# Patient Record
Sex: FEMALE | Race: WHITE | NOT HISPANIC OR LATINO | Employment: OTHER | URBAN - METROPOLITAN AREA
[De-identification: names, ages, dates, MRNs, and addresses within clinical notes are randomized per-mention and may not be internally consistent; named-entity substitution may affect disease eponyms.]

---

## 2017-03-08 ENCOUNTER — OFFICE VISIT (OUTPATIENT)
Dept: INTERNAL MEDICINE | Facility: CLINIC | Age: 72
End: 2017-03-08
Payer: COMMERCIAL

## 2017-03-08 VITALS
OXYGEN SATURATION: 97 % | HEART RATE: 66 BPM | HEIGHT: 67 IN | RESPIRATION RATE: 16 BRPM | TEMPERATURE: 97 F | DIASTOLIC BLOOD PRESSURE: 88 MMHG | BODY MASS INDEX: 28.56 KG/M2 | WEIGHT: 182 LBS | SYSTOLIC BLOOD PRESSURE: 144 MMHG

## 2017-03-08 DIAGNOSIS — I10 HYPERTENSION GOAL BP (BLOOD PRESSURE) < 140/90: ICD-10-CM

## 2017-03-08 DIAGNOSIS — Z12.11 COLON CANCER SCREENING: ICD-10-CM

## 2017-03-08 DIAGNOSIS — E78.5 HYPERLIPIDEMIA LDL GOAL <100: ICD-10-CM

## 2017-03-08 DIAGNOSIS — Z00.00 ENCOUNTER FOR ROUTINE ADULT HEALTH EXAMINATION WITHOUT ABNORMAL FINDINGS: Primary | ICD-10-CM

## 2017-03-08 DIAGNOSIS — I71.21 ASCENDING AORTIC ANEURYSM (H): ICD-10-CM

## 2017-03-08 DIAGNOSIS — Z12.31 VISIT FOR SCREENING MAMMOGRAM: ICD-10-CM

## 2017-03-08 PROCEDURE — G0439 PPPS, SUBSEQ VISIT: HCPCS | Performed by: INTERNAL MEDICINE

## 2017-03-08 RX ORDER — TRIAMTERENE AND HYDROCHLOROTHIAZIDE 37.5; 25 MG/1; MG/1
1 CAPSULE ORAL DAILY
Qty: 90 CAPSULE | Refills: 3 | Status: ON HOLD | OUTPATIENT
Start: 2017-03-08 | End: 2017-06-10

## 2017-03-08 RX ORDER — ATORVASTATIN CALCIUM 20 MG/1
20 TABLET, FILM COATED ORAL DAILY
Qty: 90 TABLET | Refills: 3 | Status: ON HOLD | OUTPATIENT
Start: 2017-03-08 | End: 2017-05-22

## 2017-03-08 ASSESSMENT — PAIN SCALES - GENERAL: PAINLEVEL: NO PAIN (0)

## 2017-03-08 NOTE — NURSING NOTE
"Chief Complaint   Patient presents with     Wellness Visit       Initial /88 (BP Location: Left arm, Patient Position: Chair, Cuff Size: Adult Regular)  Pulse 66  Temp 97  F (36.1  C) (Temporal)  Resp 16  Ht 5' 7.25\" (1.708 m)  Wt 182 lb (82.6 kg)  SpO2 97%  BMI 28.29 kg/m2 Estimated body mass index is 28.29 kg/(m^2) as calculated from the following:    Height as of this encounter: 5' 7.25\" (1.708 m).    Weight as of this encounter: 182 lb (82.6 kg).  Medication Reconciliation: complete   Kimmy Fitzgerald MA    "

## 2017-03-08 NOTE — MR AVS SNAPSHOT
"              After Visit Summary   3/8/2017    Marilia Bean    MRN: [id number]           Patient Information     Date Of Birth          1945        Visit Information        Provider Department      3/8/2017 1:30 PM Herbert Epstein MD [facility]         Follow-ups after your visit        Who to contact     If you have questions or need follow up information about today's clinic visit or your schedule please contact [facility] directly at [phone].  Normal or non-critical lab and imaging results will be communicated to you by [employer], letter or phone within 4 business days after the clinic has received the results. If you do not hear from us within 7 days, please contact the clinic through [employer] or phone. If you have a critical or abnormal lab result, we will notify you by phone as soon as possible.  Submit refill requests through [employer] or call your pharmacy and they will forward the refill request to us. Please allow 3 business days for your refill to be completed.          Additional Information About Your Visit        MyChart Information     [employer] gives you secure access to your electronic health record. If you see a primary care provider, you can also send messages to your care team and make appointments. If you have questions, please call your primary care clinic.  If you do not have a primary care provider, please call [phone] and they will assist you.        Care EveryWhere ID     This is your Care EveryWhere ID. This could be used by other organizations to access your [facility] medical records  [id number]        Your Vitals Were     Pulse Temperature Respirations Height Pulse Oximetry BMI (Body Mass Index)    66 97  F (36.1  C) (Temporal) 16 5' 7.25\" (1.708 m) 97% 28.29 kg/m2       Blood Pressure from Last 3 Encounters:   03/08/17 144/88   02/09/16 (!) 154/96   12/08/15 119/86    Weight from Last 3 Encounters:   03/08/17 182 lb (82.6 kg)   02/09/16 181 lb " (82.1 kg)   11/02/15 180 lb (81.6 kg)              Today, you had the following     No orders found for display       Primary Care Provider Office Phone # Fax #    Herbert Epstein [phone]       [facility] [address] [place] [address]        Thank you!     Thank you for choosing [facility]  for your care. Our goal is always to provide you with excellent care. Hearing back from our patients is one way we can continue to improve our services. Please take a few minutes to complete the written survey that you may receive in the mail after your visit with us. Thank you!             Your Updated Medication List - Protect others around you: Learn how to safely use, store and throw away your medicines at www.disposemymeds.org.      Notice  As of 3/8/2017  1:42 PM    You have not been prescribed any medications.

## 2017-03-08 NOTE — PROGRESS NOTES
SUBJECTIVE:                                                            Marilia Bean is a 71 year old female who presents for Preventive Visit.    Are you in the first 12 months of your Medicare coverage?  No    Physical   Annual:     Getting at least 3 servings of Calcium per day::  Yes    Bi-annual eye exam::  Yes    Dental care twice a year::  NO    Sleep apnea or symptoms of sleep apnea::  None    Diet::  Regular (no restrictions)    Frequency of exercise::  None    Taking medications regularly::  Not Applicable    Medication side effects::  Not applicable    Additional concerns today::  YES      COGNITIVE SCREEN  1) Repeat 3 items (Banana, Sunrise, Chair)    2) Clock draw: NORMAL  3) 3 item recall: Recalls 3 objects  Results: NORMAL clock, 1-2 items recalled: COGNITIVE IMPAIRMENT LESS LIKELY    Mini-CogTM Copyright LARRY Luciano. Licensed by the author for use in [facility] [employer] water systems; reprinted with permission (yamilet@[facility]). All rights reserved.        Reviewed and updated as needed this visit by clinical staff         Reviewed and updated as needed this visit by Provider        Social History   Substance Use Topics     Smoking status: Never Smoker     Smokeless tobacco: Never Used     Alcohol use Yes      Comment: rarely       The patient does not drink >3 drinks per day nor >7 drinks per week.      Today's PHQ-2 Score:   PHQ-2 ( 1999 Pf[employer]) 3/6/2017   Q1: Little interest or pleasure in doing things -   Q2: Feeling down, depressed or hopeless -   PHQ-2 Score -   Little interest or pleasure in doing things Not at all   Feeling down, depressed or hopeless Not at all   PHQ-2 Score 0       Do you feel safe in your environment - Yes    Do you have a Health Care Directive?: Yes: Patient states has Advance Directive and will bring in a copy to clinic.    Current providers sharing in care for this patient include:   Patient Care Team:  Herbert Epstein MD as PCP - General (Family Practice)      Hearing impairment:  No    Ability to successfully perform activities of daily living: Yes, no assistance needed     Fall risk:  Fallen 2 or more times in the past year?: No  Any fall with injury in the past year?: No    Home safety:  none identified      The following health maintenance items are reviewed in Epic and correct as of today:  Health Maintenance   Topic Date Due     HEPATITIS C SCREENING  03/29/1963     FALL RISK ASSESSMENT  02/09/2017     MAMMO SCREEN Q2 YR (SYSTEM ASSIGNED)  06/22/2017     INFLUENZA VACCINE (SYSTEM ASSIGNED)  09/01/2017     ADVANCE DIRECTIVE PLANNING Q5 YRS (NO INBASKET)  01/08/2018     TETANUS IMMUNIZATION (SYSTEM ASSIGNED)  03/10/2020     LIPID SCREEN Q5 YR FEMALE (SYSTEM ASSIGNED)  02/09/2021     COLON CANCER SCREEN (SYSTEM ASSIGNED)  12/08/2025     DEXA SCAN SCREENING (SYSTEM ASSIGNED)  Completed     PNEUMOCOCCAL  Completed         Pneumonia Vaccine:already has had them.     ROS:  C: NEGATIVE for fever, chills, change in weight  I: NEGATIVE for worrisome rashes, moles or lesions  E: NEGATIVE for vision changes or irritation  E/M: NEGATIVE for ear, mouth and throat problems  R: NEGATIVE for significant cough or SOB  B: NEGATIVE for masses, tenderness or discharge  CV: NEGATIVE for chest pain, palpitations or peripheral edema  GI: NEGATIVE for nausea, abdominal pain, heartburn, or change in bowel habits  : NEGATIVE for frequency, dysuria, or hematuria  M: NEGATIVE for significant arthralgias or myalgia  N: NEGATIVE for weakness, dizziness or paresthesias  E: NEGATIVE for temperature intolerance, skin/hair changes  H: NEGATIVE for bleeding problems  P: NEGATIVE for changes in mood or affect    Problem list, Medication list, Allergies, and Medical/Social/Surgical histories reviewed in [facility] and updated as appropriate.  OBJECTIVE:                                                            /88 (BP Location: Left arm, Patient Position: Chair, Cuff Size: Adult Regular)  Pulse 66  Temp 97  F (36.1  C)  "(Temporal)  Resp 16  Ht 5' 7.25\" (1.708 m)  Wt 182 lb (82.6 kg)  SpO2 97%  BMI 28.29 kg/m2 Estimated body mass index is 28.14 kg/(m^2) as calculated from the following:    Height as of 2/9/16: 5' 7.25\" (1.708 m).    Weight as of 2/9/16: 181 lb (82.1 kg).  EXAM:   GENERAL: healthy, alert and no distress  EYES: Eyes grossly normal to inspection, PERRL and conjunctivae and sclerae normal  HENT: ear canals and TM's normal, nose and mouth without ulcers or lesions  NECK: no adenopathy, no asymmetry, masses, or scars and thyroid normal to palpation  RESP: lungs clear to auscultation - no rales, rhonchi or wheezes  CV: regular rate and rhythm, normal S1 S2, no S3 or S4, no murmur, click or rub, no peripheral edema and peripheral pulses strong  ABDOMEN: soft, nontender, no hepatosplenomegaly, no masses and bowel sounds normal  MS: no gross musculoskeletal defects noted, no edema  SKIN: no suspicious lesions or rashes  NEURO: Normal strength and tone, mentation intact and speech normal  PSYCH: mentation appears normal, affect normal/bright    ASSESSMENT / PLAN:                                                                ICD-10-CM    1. Encounter for routine adult health examination without abnormal findings Z00.00    2. Hyperlipidemia LDL goal <100 E78.5 Lipid Profile     atorvastatin (LIPITOR) 20 MG tablet   3. Hypertension goal BP (blood pressure) < 140/90 I10 Comprehensive metabolic panel     Lipid Profile     Albumin Random Urine Quantitative     triamterene-hydrochlorothiazide (DYAZIDE) 37.5-25 MG per capsule   4. Ascending aortic aneurysm (H) I71.2 CT Chest w/o Contrast   5. Visit for screening mammogram Z12.31 *MA Screening Digital Bilateral   6. 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     Mammogram is due.    Will do chest ct for ascending aortic aneurysm.     Needs to get back on atorvastatin and dyazide for the " "aneurysm and blood pressure.    End of Life Planning:  Patient currently has an advanced directive: No.  I have verified the patient's ablity to prepare an advanced directive/make health care decisions.  Literature was provided to assist patient in preparing an advanced directive.    COUNSELING:  Reviewed preventive health counseling, as reflected in patient instructions       Regular exercise       Healthy diet/nutrition        Estimated body mass index is 28.14 kg/(m^2) as calculated from the following:    Height as of 2/9/16: 5' 7.25\" (1.708 m).    Weight as of 2/9/16: 181 lb (82.1 kg).     reports that she has never smoked. She has never used smokeless tobacco.      Appropriate preventive services were discussed with this patient, including applicable screening as appropriate for cardiovascular disease, diabetes, osteopenia/osteoporosis, and glaucoma.  As appropriate for age/gender, discussed screening for colorectal cancer, prostate cancer, breast cancer, and cervical cancer. Checklist reviewing preventive services available has been given to the patient.    Reviewed patients plan of care and provided an AVS. The Basic Care Plan (routine screening as documented in Health Maintenance) for Marilia meets the Care Plan requirement. This Care Plan has been established and reviewed with the Patient.    Counseling Resources:  ATP IV Guidelines  Pooled Cohorts Equation Calculator  Breast Cancer Risk Calculator  FRAX Risk Assessment  ICSI Preventive Guidelines  Dietary Guidelines for Americans, 2010  USDA's MyPlate  ASA Prophylaxis  Lung CA Screening    Herbert Epstein MD  [facility]  "

## 2017-03-10 ENCOUNTER — TELEPHONE (OUTPATIENT)
Dept: INTERNAL MEDICINE | Facility: CLINIC | Age: 72
End: 2017-03-10

## 2017-03-14 ENCOUNTER — HOSPITAL ENCOUNTER (OUTPATIENT)
Dept: MAMMOGRAPHY | Facility: CLINIC | Age: 72
End: 2017-03-14
Attending: INTERNAL MEDICINE
Payer: MEDICARE

## 2017-03-14 ENCOUNTER — HOSPITAL ENCOUNTER (OUTPATIENT)
Dept: CT IMAGING | Facility: CLINIC | Age: 72
Discharge: HOME | End: 2017-03-14
Attending: INTERNAL MEDICINE | Admitting: INTERNAL MEDICINE
Payer: MEDICARE

## 2017-03-14 DIAGNOSIS — E78.5 HYPERLIPIDEMIA LDL GOAL <100: ICD-10-CM

## 2017-03-14 DIAGNOSIS — I71.21 ASCENDING AORTIC ANEURYSM (H): ICD-10-CM

## 2017-03-14 DIAGNOSIS — I10 HYPERTENSION GOAL BP (BLOOD PRESSURE) < 140/90: ICD-10-CM

## 2017-03-14 DIAGNOSIS — Z12.31 VISIT FOR SCREENING MAMMOGRAM: ICD-10-CM

## 2017-03-14 LAB
ALBUMIN SERPL-MCNC: 3.9 G/DL (ref 3.4–5)
ALP SERPL-CCNC: 94 U/L (ref 40–150)
ALT SERPL W P-5'-P-CCNC: 24 U/L (ref 0–50)
ANION GAP SERPL CALCULATED.3IONS-SCNC: 6 MMOL/L (ref 3–14)
AST SERPL W P-5'-P-CCNC: 16 U/L (ref 0–45)
BILIRUB SERPL-MCNC: 0.5 MG/DL (ref 0.2–1.3)
BUN SERPL-MCNC: 27 MG/DL (ref 7–30)
CALCIUM SERPL-MCNC: 9.7 MG/DL (ref 8.5–10.1)
CHLORIDE SERPL-SCNC: 103 MMOL/L (ref 94–109)
CHOLEST SERPL-MCNC: 186 MG/DL
CO2 SERPL-SCNC: 31 MMOL/L (ref 20–32)
CREAT SERPL-MCNC: 1.14 MG/DL (ref 0.52–1.04)
CREAT UR-MCNC: 19 MG/DL
GFR SERPL CREATININE-BSD FRML MDRD: 47 ML/MIN/1.7M2
GLUCOSE SERPL-MCNC: 100 MG/DL (ref 70–99)
HDLC SERPL-MCNC: 55 MG/DL
LDLC SERPL CALC-MCNC: 111 MG/DL
MICROALBUMIN UR-MCNC: 6 MG/L
MICROALBUMIN/CREAT UR: 28.65 MG/G CR (ref 0–25)
NONHDLC SERPL-MCNC: 131 MG/DL
POTASSIUM SERPL-SCNC: 3.6 MMOL/L (ref 3.4–5.3)
PROT SERPL-MCNC: 7.8 G/DL (ref 6.8–8.8)
SODIUM SERPL-SCNC: 140 MMOL/L (ref 133–144)
TRIGL SERPL-MCNC: 98 MG/DL

## 2017-03-14 PROCEDURE — G0202 SCR MAMMO BI INCL CAD: HCPCS

## 2017-03-14 PROCEDURE — 36415 COLL VENOUS BLD VENIPUNCTURE: CPT | Performed by: INTERNAL MEDICINE

## 2017-03-14 PROCEDURE — 80061 LIPID PANEL: CPT | Performed by: INTERNAL MEDICINE

## 2017-03-14 PROCEDURE — 80053 COMPREHEN METABOLIC PANEL: CPT | Performed by: INTERNAL MEDICINE

## 2017-03-14 PROCEDURE — 71250 CT THORAX DX C-: CPT

## 2017-03-14 PROCEDURE — 82043 UR ALBUMIN QUANTITATIVE: CPT | Performed by: INTERNAL MEDICINE

## 2017-03-15 ENCOUNTER — TELEPHONE (OUTPATIENT)
Dept: INTERNAL MEDICINE | Facility: CLINIC | Age: 72
End: 2017-03-15

## 2017-03-15 DIAGNOSIS — Z82.49 FAMILY HISTORY OF ABDOMINAL AORTIC ANEURYSM (AAA) REPAIR: Primary | ICD-10-CM

## 2017-03-15 DIAGNOSIS — I71.9 AORTIC ANEURYSM (H): ICD-10-CM

## 2017-03-15 NOTE — TELEPHONE ENCOUNTER
----- Message from Kimmy Fitzgerald CMA sent at 3/14/2017  2:36 PM CDT -----  Pt informed of results and will set up for echocardiogram.  Will send to  to set up appt

## 2017-03-16 ENCOUNTER — HOSPITAL ENCOUNTER (OUTPATIENT)
Dept: CARDIOLOGY | Facility: CLINIC | Age: 72
Discharge: HOME | End: 2017-03-16
Attending: INTERNAL MEDICINE | Admitting: INTERNAL MEDICINE
Payer: MEDICARE

## 2017-03-16 DIAGNOSIS — I71.9 AORTIC ANEURYSM (H): ICD-10-CM

## 2017-03-16 DIAGNOSIS — Z82.49 FAMILY HISTORY OF ABDOMINAL AORTIC ANEURYSM (AAA) REPAIR: ICD-10-CM

## 2017-03-16 PROCEDURE — 93306 TTE W/DOPPLER COMPLETE: CPT

## 2017-03-16 PROCEDURE — 93306 TTE W/DOPPLER COMPLETE: CPT | Mod: 26 | Performed by: INTERNAL MEDICINE

## 2017-03-17 ENCOUNTER — TELEPHONE (OUTPATIENT)
Dept: OTHER | Facility: CLINIC | Age: 72
End: 2017-03-17

## 2017-03-17 ENCOUNTER — TELEPHONE (OUTPATIENT)
Dept: INTERNAL MEDICINE | Facility: CLINIC | Age: 72
End: 2017-03-17

## 2017-03-17 DIAGNOSIS — I71.9 AORTIC ANEURYSM (H): Primary | ICD-10-CM

## 2017-03-17 NOTE — TELEPHONE ENCOUNTER
----- Message from Kimmy Fitzgerald CMA sent at 3/17/2017  2:42 PM CDT -----  Pt informed of results and to see a vascular surgeon. Will send to  to set up appt.  Pt would like to do it after April 6th.

## 2017-03-17 NOTE — TELEPHONE ENCOUNTER
Penelope from  Vascular calling to let Dr Epstein, the referral needs to go to Cardio vs Vas because it's ascending & Vascular does descending.  If any questions please call Penelope at [phone]  Thank you,  Pretty Emmanuel  Patient Representative

## 2017-03-21 ENCOUNTER — MYC MEDICAL ADVICE (OUTPATIENT)
Dept: INTERNAL MEDICINE | Facility: CLINIC | Age: 72
End: 2017-03-21

## 2017-03-21 DIAGNOSIS — I77.89 ASCENDING AORTA ENLARGEMENT (H): Primary | ICD-10-CM

## 2017-03-30 ENCOUNTER — PRE VISIT (OUTPATIENT)
Dept: CARDIOLOGY | Facility: CLINIC | Age: 72
End: 2017-03-30

## 2017-03-30 NOTE — TELEPHONE ENCOUNTER
ASKED BY REFERRING PHYSICIAN: Dr Herbert Epstein    CHIEF COMPLAINT: Pre Visit Planning - Done (Consult for ascending aortic aneurysm)      HPI: Marilia is a 72 year old female who presents with asymptomatic ascending aortic aneurysm. Patient has family history of abdominal aortic aneurysm.  Patient's history includes; HTN, hyperlipidemia and pulmonary nodule.    PAST MEDICAL HISTORY:  Past Medical History:   Diagnosis Date     Aortic aneurysm of unspecified site without mention of rupture 7/2007    4.4 cm ascending aorta noted in 2007     Asymptomatic postmenopausal status (age-related) (natural)     on HRT  Prempro -weaning off 5/'2004     Leiomyoma of uterus, unspecified     Uterine fibroid     Lump or mass in breast 7/2004    rt. nodule - bx neg     Personal history of colonic polyps      Postmenopausal atrophic vaginitis 8/20/2006     Pulmonary nodule     incidental noted in 2007 during garcia     Pure hypercholesterolemia     mild, diet - low cholesterol, low fat.10/06 start Lovastatin       PAST SURGICAL HISTORY:  Past Surgical History:   Procedure Laterality Date     C DEXA INTERPRETATION, AXIAL  12/21/01    wnl. 7/2005 wnl but lower     COLONOSCOPY  05/07/07    Repeat in 1 year for surveillance     COLONOSCOPY  12/10/08    repeat 1 year  -see 1/2009 letter     COLONOSCOPY  03/31/10     COLONOSCOPY  10/31/2011    Procedure:COMBINED COLONOSCOPY, SINGLE BIOPSY/POLYPECTOMY BY BIOPSY; colonoscopy with polypectomy by biopsy; Surgeon:JESSICA GARCIA; Location:[facility]     COLONOSCOPY  12/6/2013    Procedure: COMBINED COLONOSCOPY, SINGLE BIOPSY/POLYPECTOMY BY BIOPSY;  Colonoscopy, Polypectomies;  Surgeon: Herbert Salinas MD;  Location: [facility]     COLONOSCOPY N/A 12/8/2015    Procedure: COLONOSCOPY;  Surgeon: Jared Carbajal MD;  Location:  GI     ESOPHAGOSCOPY, GASTROSCOPY, DUODENOSCOPY (EGD), COMBINED N/A 12/8/2015    Procedure: COMBINED ESOPHAGOSCOPY, GASTROSCOPY, DUODENOSCOPY (EGD), BIOPSY SINGLE OR MULTIPLE;   Surgeon: Jared Carbajal MD;  Location: [facility]     HC BIOPSY BREAST, PERC NEEDLE CORE, WITH IMAGING  8/17/2004    Right     HC COLONOSCOPY THRU STOMA W BIOPSY/CAUTERY TUMOR/POLYP/LESION  1999,2002 2004 polyp - hyperplastic - repeat 5 years ?     HC COLONOSCOPY W/WO BRUSH/WASH  12/12/2005    Polypectomy.  Diverticulosis-minimal. Bx adenomatous and mucosal polyps - repeat 1 year     HC LAPAROSCOPY, SURGICAL; APPENDECTOMY  10/31/2004     HC LAPAROSCOPY, SURGICAL; CHOLECYSTECTOMY  2000    Cholecystectomy, Laparoscopic     HC REVISE MEDIAN N/CARPAL TUNNEL SURG  10/01/10    left     HC UGI ENDOSCOPY, SIMPLE EXAM  03/31/10     HYSTERECTOMY, ELVIN  12/14/09    EMMY, JESSE       FAMILY HISTORY:   Family History   Problem Relation Age of Onset     CANCER Mother      Colon Cancer     CANCER Father      Colon Cancer     CANCER Sister      Colon Cancer     HEART DISEASE Mother      MI     HEART DISEASE Father      Heart Disease/ Heart attacks     HEART DISEASE Brother      Heart attacks at age 45     DIABETES Father      Adult Onset     Breast Cancer Sister      CANCER Paternal Grandmother      Unknown type     HEART DISEASE Maternal Grandfather      MI     DIABETES Sister      Adult Onset     DIABETES Sister      Adult Onset     DIABETES Brother      Adult Onset     OSTEOPOROSIS Mother      HEART DISEASE Brother      heart attack age 64     Cancer - colorectal Son      age 36       SOCIAL HISTORY:  Social History     Social History     Marital status:      Spouse name: Cain     Number of children: 4     Years of education: 12     Occupational History     [employer]     Social History Main Topics     Smoking status: Never Smoker     Smokeless tobacco: Never Used     Alcohol use Yes      Comment: rarely     Drug use: No     Sexual activity: Yes     Partners: Male      Comment: Post menopausal     Other Topics Concern      Service No     Blood Transfusions No      Caffeine Concern Yes     coffee; 3c/d pop: 1c/wk     Occupational Exposure No     Hobby Hazards No     Sleep Concern Yes     c/o insomnia     Stress Concern No     Weight Concern Yes     desire wt loss     Special Diet No     Back Care No     Exercise No     Bike Helmet No     Seat Belt Yes     Self-Exams No     Social History Narrative    Lives with spouse. No domestic violence issues.        ALLERGIES:   Allergies   Allergen Reactions     Contrast Dye Itching       CURRENT MEDICATIONS:   [unfilled]    REVIEW OF SYSTEMS:   ROS:  Constitutional: No fever, chills, or sweats. No weight gain/loss.   HEENT: No visual disturbance, ear ache, epistaxis, sore throat.   Allergies/Immunologic: Negative.   Respiratory: No cough, hemoptysis.   Cardiovascular: As per HPI.   GI: No nausea, vomiting, hematemesis, melena, or hematochezia.   : No urinary frequency, dysuria, or hematuria.   Integument: No rash.   Psychiatric: No anxiety / depression.   Neuro: No speech disturbance, focal sensory or motor deficit.   Endocrinology: No polyuria / polyphagia.   Musculoskeletal: No myalgia.      PHYSICAL EXAMINATION:   There were no vitals taken for this visit.  General: alert and oriented x 3, pleasant, no acute distress  CV: S1 S2, no murmurs, rubs or gallops, regular rate and rhythm, no peripheral edema, no carotid or abdomenal bruits, pulses in upper and lower extremities palpable  Pulm: bilateral breath sounds, clear to auscultation, easy work of breathing  GI: (+) bowel sounds, soft non-tender and non-distended  : voiding without problems  MS: moves all extremities x 4,  5+/5+ equal strength bilaterally  Neuro: pupils equal round and reactive to light, cranial nerves, II-XII grossly intact, no gross neurologic deficits noted    LABS:  BMP RESULTS:  Lab Results   Component Value Date     03/14/2017    POTASSIUM 3.6 03/14/2017    CHLORIDE 103 03/14/2017    CO2 31 03/14/2017    ANIONGAP 6 03/14/2017     (H) 03/14/2017     BUN 27 03/14/2017    CR 1.14 (H) 03/14/2017    GFRESTIMATED 47 (L) 03/14/2017    GFRESTBLACK 57 (L) 03/14/2017    TARIQ 9.7 03/14/2017        CBC RESULTS:  Lab Results   Component Value Date    WBC 12.5 (H) 03/18/2015    RBC 4.71 03/18/2015    HGB 14.0 03/18/2015    HCT 40.9 03/18/2015    MCV 87 03/18/2015    MCH 29.7 03/18/2015    MCHC 34.2 03/18/2015    RDW 12.3 03/18/2015     03/18/2015       No results found for: INR  No results found for: PTT  No results found for: UA  Lab Results   Component Value Date    A1C 5.8 08/23/2006       PROCEDURES/IMAGING:    [facility] CARDIOTHORACIC SURGERY CONSULT: 06/25/07        ECHOCARDIOGRAM: 07/25/08   The ascending aorta is Mildly dilated.  ( 4.1cm) Left ventricular systolic function is normal. The visual ejection fraction is estimated at 55-60%. No regional wall motion abnormalities noted. The transmitral spectral Doppler flow pattern is suggestive of impaired LV relaxation. No previous study for comparison.  Aortic Valve  The aortic valve is trileaflet. No aortic regurgitation is present. No hemodynamically significant valvular aortic stenosis.      ECHOCARDIOGRAM:03/11/10  No significant change since July of 2008, as the ascending aorta was 4.1 then and is 4.2 now.  Would consider repeating in two years for aortic size. The left ventricle is normal in size. There is normal left ventricular wall thickness. The visual ejection fraction is estimated at 60-65%. No regional wall motion abnormalities noted. The ascending aorta is Mildly dilated.    ECHOCARDIOGRAM: 01/08/13  The coronary sinuses are normal in diameter but the ascending aorta is moderately dilated at 4.6cm  (The upper limit of normal is 3.7cm for aortic root diameter.)  Previously (3-),  the ascending aorta was measured at 4.2cm and the prior echos study to that (7-),  the asecnding aorta was 4.01cm. A thoracic aortic CT or MRI angiogram would provide a more complete assessment of the  ascending aorta. The mitral valve, the aortic valve and the   tricuspid valve are  normal in structure and function.    CT CHEST WITH CONTRAST : 01/15/13  Findings: The thoracic aorta at the sinuses of Valsalva has a maximum diameter of approximately 3.5 cm. There is a mid ascending thoracic aortic aneurysm with a maximum diameter of approximately 4.5 cm. This is unchanged when compared to the previous exam. The maximum diameter of the mid arch is approximately 2.7 cm. The maximum diameter of the mid descending thoracic aorta is approximately 2.3 cm.  The aortic take off vessels are patent without significant stenoses.  There is a 9 mm nodule in the left lower lobe on image 114 series 6. This is new. There is a 6 mm nodule in the left lower lobe on image 94 series 6. There is a 5 and a 3 mm nodule in the right lower lobe on image 107 and 104 series 6.   IMPRESSION  1. 4.5 cm ascending thoracic aortic aneurysm. This is not significantly changed.  2. Lung nodules. One of these in the left lower lobe is new. The remaining were seen on the previous exam and are not significantly changed. Recommend 6 month followup chest CT to document stability.       ECHOCARDIOGRAM: 03/16/17  The aortic Sinus(es) of Valsalva are mildly dilated at at 4.1 cm and the ascending aorta is Moderately dilated at 5.0 cm (The ULNL = 3.7 cm). Comparedto the serial echos dated 3- and 1-8-2013, the ascending aortahas progressively dilated from 4.2 cm => 4.6 cm => currently 5.0 cm. Close continued and regular monitoring of this progressive ascending aortic dilation/aneurysm is indicated. The left ventricle is normal in size with borderline to mild concentric left ventricular hypertrophy. The visual ejection fraction is estimated at 65-70% with Grade I or early diastolic dysfunction. There is trace mitral regurgitation. There is trace aortic regurgitation. Right ventricle systolic pressure estimate is noted below and   Normal.    ASSESSMENT/PLAN:   Marilia is a 72 year old female who presents with         Risks and benefits of surgery were discussed with patient (and all present) including risk of death, stroke, bleeding, cardiac ischemia, wound infection, renal failure, arrhythmias and possible pacemaker implantation. Patient accepts these risks and is willing to proceed with surgery.   Approximately 50 minutes spent with this case including review of the clinical history and data; discussion with the patient and his family and coordination of the care    Thank you for including me in the care of this kind patient. Do not hesitate to contact me with any questions.    Dr. Akshat Soto     Cardiothoracic Surgery  [phone] pager  [phone] office          CC  Patient Care Team:  Herbert Epstein MD as PCP - General (Family Practice)

## 2017-04-13 ENCOUNTER — OFFICE VISIT (OUTPATIENT)
Dept: CARDIOLOGY | Facility: CLINIC | Age: 72
End: 2017-04-13
Attending: THORACIC SURGERY (CARDIOTHORACIC VASCULAR SURGERY)
Payer: MEDICARE

## 2017-04-13 VITALS
OXYGEN SATURATION: 96 % | BODY MASS INDEX: 27.31 KG/M2 | SYSTOLIC BLOOD PRESSURE: 130 MMHG | DIASTOLIC BLOOD PRESSURE: 89 MMHG | WEIGHT: 180.2 LBS | HEIGHT: 68 IN | HEART RATE: 69 BPM

## 2017-04-13 DIAGNOSIS — I77.89 ASCENDING AORTA ENLARGEMENT (H): ICD-10-CM

## 2017-04-13 PROCEDURE — 99213 OFFICE O/P EST LOW 20 MIN: CPT | Mod: ZF

## 2017-04-13 ASSESSMENT — PAIN SCALES - GENERAL: PAINLEVEL: NO PAIN (0)

## 2017-04-13 NOTE — MR AVS SNAPSHOT
After Visit Summary   4/13/2017    Marilia Bean    MRN: [id number]           Patient Information     Date Of Birth          1945        Visit Information        Provider Department      4/13/2017 11:00 AM Akshat Soto MD [facility]        Today's Diagnoses     Ascending aorta enlargement (H)          Care Instructions    No AVS given today        Follow-ups after your visit        Your next 10 appointments already scheduled     May 22, 2017 10:00 AM CDT   US CAROTID BILATERAL with UUUS1   [facility]    [address] [address] [phone]           Please bring a list of your medicines (including vitamins, minerals and over-the-counter drugs). Also, tell your doctor about any allergies you may have. Wear comfortable clothes and leave your valuables at home.  You do not need to do anything special to prepare for your exam.  Please call the Imaging Department at your exam site with any questions.            May 22, 2017 11:15 AM CDT   XR CHEST 2 VIEWS with UUXR3   [facility]    [address] [facility] [address] [phone]           Please bring a list of your current medicines to your exam. (Include vitamins, minerals and over-thecounter medicines.) Leave your valuables at home.  Tell your doctor if there is a chance you may be pregnant.  You do not need to do anything special for this exam.            May 22, 2017 11:30 AM CDT   Procedure 1.5 hr with U2A ROOM 17   Unit 2A [facility] ([facility])    [address] [address]               May 22, 2017  1:00 PM CDT   Cath 90 Minute with UUHCVR4   [facility] ([facility])    [address] [address]    [phone]            May 25, 2017 10:15 AM CDT   LAB with  LAB   [facility] ([facility])    [address] [place] [address]  [address] [facility] [address] [phone]           Patient must bring picture ID.  Patient should be prepared to give a urine specimen  Please do not eat 10-12 hours before your appointment if you are coming in fasting for labs on lipids, cholesterol, or glucose (sugar).  Pregnant women should follow their Care Team instructions. Water with medications is okay. Do not drink coffee or other fluids.   If you have concerns about taking  your medications, please ask at office or if scheduling via [employer], send a message by clicking on Secure Messaging, Message Your Care Team.            May 25, 2017 10:30 AM CDT   (Arrive by 10:15 AM)   PAC RN ASSESSMENT with  Pac Rn   [facility] ([facility])    [address] [place] [address]   [phone]            May 25, 2017 11:00 AM CDT   (Arrive by 10:45 AM)   PAC EVALUATION with  Pac [place] 1   [facility] ([facility])    [address] [place] [address]   [phone]            May 25, 2017 12:00 PM CDT   (Arrive by 11:45 AM)   PAC Anesthesia Consult with  Pac Anesthesiologist   [facility] ([facility])    [address] [place] [address]   [phone]            May 25, 2017  1:00 PM CDT   Ech Anup with UMARISELA   [facility] [place],  Echocardiography ([facility])    [address] [facility] [address] [facility] [address]   [phone]           1.  Please bring or wear a comfortable two-piece outfit. 2.  Arrival time: -   [facility]:  arrive 75 minutes prior to examination time. -   [facility]:  arrive 90 minutes prior to examination time. -    [facility]:   arrive 15 minutes prior to examination time. 3.  Plan to have someone here to drive you home after the test. -   Someone should stay with you for 6 hours after your test. 4.  No food or drink: -   6 hours before the test 5.  If you take antacids or water pills (diuretics): Do not take them until after your test. You may take blood pressure medicine with a few sips of water. 6.  If you have diabetes: -   Morning slots preferred -   If you take insulin, call your diabetes care team. Ask if you should take a   dose the morning of your test. -   If you take diabetes medicine by mouth, don't take it on the morning of your test. Bring it with you to take after the test. (If you have questions, call your diabetes care team.) 7.  Bring a list of any medicines you are taking. 8.  Do not drive for 24 hours after the test. 9.   A responsible adult must stay with you for 24 hours after the test.  10.  For any questions that cannot be answered, please contact the ordering physician            Jun 05, 2017   Procedure with Akshat Soto MD   [facility], [place], Same Day Surgery (--)    [address] [facility] [address] [phone]              Who to contact     If you have questions or need follow up information about today's clinic visit or your schedule please contact [facility] directly at [phone].  Normal or non-critical lab and imaging results will be communicated to you by MyChart, letter or phone within 4 business days after the clinic has received the results. If you do not hear from us within 7 days, please contact the clinic through [employer] or phone. If you have a critical or abnormal lab result, we will notify you by phone as soon as possible.  Submit refill requests through [employer] or call your pharmacy and they will forward the refill request to us. Please allow 3 business days for your refill to be completed.          Additional Information About Your Visit        [employer] Information  "    [employer] gives you secure access to your electronic health record. If you see a primary care provider, you can also send messages to your care team and make appointments. If you have questions, please call your primary care clinic.  If you do not have a primary care provider, please call [phone] and they will assist you.        Care EveryWhere ID     This is your Care EveryWhere ID. This could be used by other organizations to access your [facility] medical records  [id number]        Your Vitals Were     Pulse Height Pulse Oximetry BMI (Body Mass Index)          69 1.715 m (5' 7.5\") 96% 27.81 kg/m2         Blood Pressure from Last 3 Encounters:   04/26/17 112/88   04/13/17 130/89   03/08/17 144/88    Weight from Last 3 Encounters:   04/13/17 81.7 kg (180 lb 3.2 oz)   03/08/17 82.6 kg (182 lb)   02/09/16 82.1 kg (181 lb)              Today, you had the following     No orders found for display       Primary Care Provider Office Phone # Fax #    Herbert Epstein [phone]       [facility] [address] [place] [address]        Thank you!     Thank you for choosing [facility]  for your care. Our goal is always to provide you with excellent care. Hearing back from our patients is one way we can continue to improve our services. Please take a few minutes to complete the written survey that you may receive in the mail after your visit with us. Thank you!             Your Updated Medication List - Protect others around you: Learn how to safely use, store and throw away your medicines at www.disposemymeds.org.          This list is accurate as of: 4/13/17 11:59 PM.  Always use your most recent med list.                   Brand Name Dispense Instructions for use    atorvastatin 20 MG tablet    LIPITOR    90 tablet    Take 1 tablet (20 mg) by mouth daily       triamterene-hydrochlorothiazide 37.5-25 MG per capsule    DYAZIDE    90 capsule    Take 1 capsule by mouth daily "

## 2017-04-13 NOTE — NURSING NOTE
Chief Complaint   Patient presents with     Follow Up For     Consult for ascending aortic aneurysm

## 2017-04-13 NOTE — LETTER
4/13/2017      RE: Marilia Bean  [address]  [place] [place] [facility] [address]       Dear Colleague,    Thank you for the opportunity to participate in the care of your patient, Marilia Bean, at the [facility]. Please see a copy of my visit note below.    ASKED BY REFERRING PHYSICIAN: Dr Herbert Epstein regarding surgical treatment opinion of aortic aneurysm     CHIEF COMPLAINT:  Increased ascending aortic aneurysm      HPI:     Marilia is a 72 year old female who presents with asymptomatic ascending aortic aneurysm. Patient was found to have an ascending aneurysm since 2013 and has been followed up with annual ECHO study which has shown growth in size.  She denies chest pain, back pain or shortness of breath.     Patient has family history of abdominal aortic aneurysm.     Patient's history includes; HTN, hyperlipidemia and pulmonary nodule.    PAST MEDICAL HISTORY:  Past Medical History:   Diagnosis Date     Aortic aneurysm (H) 7/1/2007    see 7/07 [place] report -follow yearly, treat high BP is occurs Problem list name updated by automated process. Provider to review     Aortic aneurysm of unspecified site without mention of rupture 7/2007    4.4 cm ascending aorta noted in 2007     Asymptomatic postmenopausal status (age-related) (natural)     on HRT  Prempro -weaning off 5/'2004     Hyperlipidemia LDL goal <100 10/31/2010     Hypertension goal BP (blood pressure) < 140/90 2/9/2016     Leiomyoma of uterus, unspecified     Uterine fibroid     Lump or mass in breast 7/2004    rt. nodule - bx neg     Personal history of colonic polyps      Postmenopausal atrophic vaginitis 8/20/2006     Pulmonary nodule     incidental noted in 2007 during [place]     Pure hypercholesterolemia     mild, diet - low cholesterol, low fat.10/06 start Lovastatin       PAST SURGICAL HISTORY:  Past Surgical History:   Procedure Laterality Date     C DEXA INTERPRETATION, AXIAL  12/21/01    wnl.  7/2005 wnl but lower     COLONOSCOPY  05/07/07    Repeat in 1 year for surveillance     COLONOSCOPY  12/10/08    repeat 1 year  -see 1/2009 letter     COLONOSCOPY  03/31/10     COLONOSCOPY  10/31/2011    Procedure:COMBINED COLONOSCOPY, SINGLE BIOPSY/POLYPECTOMY BY BIOPSY; colonoscopy with polypectomy by biopsy; Surgeon:JESSICA GARCIA; Location:[facility]     COLONOSCOPY  12/6/2013    Procedure: COMBINED COLONOSCOPY, SINGLE BIOPSY/POLYPECTOMY BY BIOPSY;  Colonoscopy, Polypectomies;  Surgeon: Herbert Salinas MD;  Location: [facility]     COLONOSCOPY N/A 12/8/2015    Procedure: COLONOSCOPY;  Surgeon: Jared Carbajal MD;  Location: [facility]     ESOPHAGOSCOPY, GASTROSCOPY, DUODENOSCOPY (EGD), COMBINED N/A 12/8/2015    Procedure: COMBINED ESOPHAGOSCOPY, GASTROSCOPY, DUODENOSCOPY (EGD), BIOPSY SINGLE OR MULTIPLE;  Surgeon: Jared Carbajal MD;  Location: [facility]     HC BIOPSY BREAST, PERC NEEDLE CORE, WITH IMAGING  8/17/2004    Right     HC COLONOSCOPY THRU STOMA W BIOPSY/CAUTERY TUMOR/POLYP/LESION  1999,2002 2004 polyp - hyperplastic - repeat 5 years ?     HC COLONOSCOPY W/WO BRUSH/WASH  12/12/2005    Polypectomy.  Diverticulosis-minimal. Bx adenomatous and mucosal polyps - repeat 1 year     HC LAPAROSCOPY, SURGICAL; APPENDECTOMY  10/31/2004     HC LAPAROSCOPY, SURGICAL; CHOLECYSTECTOMY  2000    Cholecystectomy, Laparoscopic     HC REVISE MEDIAN N/CARPAL TUNNEL SURG  10/01/10    left      UGI ENDOSCOPY, SIMPLE EXAM  03/31/10     HYSTERECTOMY, ELVIN  12/14/09    JESSE BOOTH       FAMILY HISTORY:   Family History   Problem Relation Age of Onset     CANCER Mother      Colon Cancer     CANCER Father      Colon Cancer     CANCER Sister      Colon Cancer     HEART DISEASE Mother      MI     HEART DISEASE Father      Heart Disease/ Heart attacks     HEART DISEASE Brother      Heart attacks at age 45     DIABETES Father      Adult Onset     Breast Cancer Sister      CANCER Paternal Grandmother      Unknown type     HEART  DISEASE Maternal Grandfather      MI     DIABETES Sister      Adult Onset     DIABETES Sister      Adult Onset     DIABETES Brother      Adult Onset     OSTEOPOROSIS Mother      HEART DISEASE Brother      heart attack age 64     Cancer - colorectal Son      age 36       SOCIAL HISTORY:  Social History     Social History     Marital status:      Spouse name: Cain     Number of children: 4     Years of education: 12     Occupational History     [employer]     Social History Main Topics     Smoking status: Never Smoker     Smokeless tobacco: Never Used     Alcohol use Yes      Comment: rarely     Drug use: No     Sexual activity: Yes     Partners: Male      Comment: Post menopausal     Other Topics Concern      Service No     Blood Transfusions No     Caffeine Concern Yes     coffee; 3c/d pop: 1c/wk     Occupational Exposure No     Hobby Hazards No     Sleep Concern Yes     c/o insomnia     Stress Concern No     Weight Concern Yes     desire wt loss     Special Diet No     Back Care No     Exercise No     Bike Helmet No     Seat Belt Yes     Self-Exams No     Social History Narrative    Lives with spouse. No domestic violence issues.        ALLERGIES:   Allergies   Allergen Reactions     Contrast Dye Itching       CURRENT MEDICATIONS:   Prescription Medications as of 4/14/2017             atorvastatin (LIPITOR) 20 MG tablet Take 1 tablet (20 mg) by mouth daily    triamterene-hydrochlorothiazide (DYAZIDE) 37.5-25 MG per capsule Take 1 capsule by mouth daily          ROS:  Constitutional: No fever, chills, or sweats. No weight gain/loss.   HEENT: No visual disturbance, ear ache, epistaxis, sore throat.   Allergies/Immunologic: Negative.   Respiratory: No cough, hemoptysis.   Cardiovascular: As per HPI.   GI: No nausea, vomiting, hematemesis, melena, or hematochezia.   : No urinary frequency, dysuria, or hematuria.   Integument: No rash.   Psychiatric: No anxiety  "/ depression.   Neuro: No speech disturbance, focal sensory or motor deficit.   Endocrinology: No polyuria / polyphagia.   Musculoskeletal: No myalgia.      PHYSICAL EXAMINATION:   /89  Pulse 69  Ht 1.715 m (5' 7.5\")  Wt 81.7 kg (180 lb 3.2 oz)  SpO2 96%  BMI 27.81 kg/m2  General: alert and oriented x 3, pleasant, no acute distress  CV: S1 S2, no murmurs, rubs or gallops, regular rate and rhythm, no peripheral edema, no carotid or abdomenal bruits, pulses in upper and lower extremities palpable  Pulm: bilateral breath sounds, clear to auscultation, easy work of breathing  GI: (+) bowel sounds, soft non-tender and non-distended  : voiding without problems  MS: moves all extremities x 4,  5+/5+ equal strength bilaterally  Neuro: pupils equal round and reactive to light, cranial nerves, II-XII grossly intact, no gross neurologic deficits noted    LABS:  BMP RESULTS:  Lab Results   Component Value Date     03/14/2017    POTASSIUM 3.6 03/14/2017    CHLORIDE 103 03/14/2017    CO2 31 03/14/2017    ANIONGAP 6 03/14/2017     (H) 03/14/2017    BUN 27 03/14/2017    CR 1.14 (H) 03/14/2017    GFRESTIMATED 47 (L) 03/14/2017    GFRESTBLACK 57 (L) 03/14/2017    TARIQ 9.7 03/14/2017        CBC RESULTS:  Lab Results   Component Value Date    WBC 12.5 (H) 03/18/2015    RBC 4.71 03/18/2015    HGB 14.0 03/18/2015    HCT 40.9 03/18/2015    MCV 87 03/18/2015    MCH 29.7 03/18/2015    MCHC 34.2 03/18/2015    RDW 12.3 03/18/2015     03/18/2015       No results found for: INR  No results found for: PTT  No results found for: UA  Lab Results   Component Value Date    A1C 5.8 08/23/2006       PROCEDURES/IMAGING:    ECHOCARDIOGRAM: 07/25/08   The ascending aorta is Mildly dilated.  ( 4.1cm) Left ventricular systolic function is normal. The visual ejection fraction is estimated at 55-60%. No regional wall motion abnormalities noted. The transmitral spectral Doppler flow pattern is suggestive of impaired LV " relaxation. No previous study for comparison.  Aortic Valve  The aortic valve is trileaflet. No aortic regurgitation is present. No hemodynamically significant valvular aortic stenosis.      ECHOCARDIOGRAM:03/11/10  No significant change since July of 2008, as the ascending aorta was 4.1 then and is 4.2 now.  Would consider repeating in two years for aortic size. The left ventricle is normal in size. There is normal left ventricular wall thickness. The visual ejection fraction is estimated at 60-65%. No regional wall motion abnormalities noted. The ascending aorta is Mildly dilated.   ECHOCARDIOGRAM: 01/08/13  The coronary sinuses are normal in diameter but the ascending aorta is moderately dilated at 4.6cm  (The upper limit of normal is 3.7cm for aortic root diameter.)  Previously (3-),  the ascending aorta was measured at 4.2cm and the prior echos study to that (7-),  the asecnding aorta was 4.01cm. A thoracic aortic CT or MRI angiogram would provide a more complete assessment of the ascending aorta. The mitral valve, the aortic valve and the   tricuspid valve are  normal in structure and function.     CT CHEST WITH CONTRAST : 01/15/13  Findings: The thoracic aorta at the sinuses of Valsalva has a maximum diameter of approximately 3.5 cm. There is a mid ascending thoracic aortic aneurysm with a maximum diameter of approximately 4.5 cm. This is unchanged when compared to the previous exam. The maximum diameter of the mid arch is approximately 2.7 cm. The maximum diameter of the mid descending thoracic aorta is approximately 2.3 cm. The aortic take off vessels are patent without significant stenoses.  There is a 9 mm nodule in the left lower lobe on image 114 series 6. This is new. There is a 6 mm nodule in the left lower lobe on image 94 series 6. There is a 5 and a 3 mm nodule in the right lower lobe on image 107 and 104 series 6.   IMPRESSION  1. 4.5 cm ascending thoracic aortic aneurysm. This is  not significantly changed.  2. Lung nodules. One of these in the left lower lobe is new. The remaining were seen on the previous exam and are not significantly changed. Recommend 6 month followup chest CT to document stability.         ECHOCARDIOGRAM: 03/16/17  The aortic Sinus(es) of Valsalva are mildly dilated at at 4.1 cm and the ascending aorta is Moderately dilated at 5.0 cm (The ULNL = 3.7 cm). Comparedto the serial echos dated 3- and 1-8-2013, the ascending aortahas progressively dilated from 4.2 cm => 4.6 cm => currently 5.0 cm. Close continued and regular monitoring of this progressive ascending aortic dilation/aneurysm is indicated. The left ventricle is normal in size with borderline to mild concentric left ventricular hypertrophy. The visual ejection fraction is estimated at 65-70% with Grade I or early diastolic dysfunction. There is trace mitral regurgitation. There is trace aortic regurgitation. Right ventricle systolic pressure estimate is noted below and Normal.     ASSESSMENT/PLAN:     Marilia is a 72 year old female who presents with asymptomatic ascending aortic aneurysm.  The size of aneurysm has met the criteria of surgical repair.       We recommend ascending aortic aneurysm repair, with exploration of aortic root and valve with possible valve sparing aortic root aneurysm repair.  Should she need aortic valve replacement she recommend a tissue valve.      Risks and benefits of surgery were discussed with patient (and all present) including risk of death, stroke, bleeding, cardiac ischemia, wound infection, renal failure, arrhythmias and possible pacemaker implantation. Patient accepts these risks and is willing to proceed   with surgery.     Approximately 60 minutes spent with this case including review of the clinical history and data; discussion with the patient and his family and coordination of the care     Thank you for including me in the care of this kind patient. Do not hesitate  to contact me with any questions.     Dr. Akshat Soto   Cardiothoracic Surgery  [phone] pager  [phone] office        CC  Patient Care Team:  Herbert Epstein MD as PCP - General (Family Practice)

## 2017-04-13 NOTE — LETTER
4/13/2017      RE: Marilia Bean  [address]  [facility] [address]       Dear Colleague,    Thank you for the opportunity to participate in the care of your patient, Marilia Bean, at the [facility] at [facility]. Please see a copy of my visit note below.    ASKED BY REFERRING PHYSICIAN: Dr Herbert Epstein regarding surgical treatment opinion of aortic aneurysm     CHIEF COMPLAINT:  Increased ascending aortic aneurysm      HPI:     Marilia is a 72 year old female who presents with asymptomatic ascending aortic aneurysm. Patient was found to have an ascending aneurysm since 2013 and has been followed up with annual ECHO study which has shown growth in size.  She denies chest pain, back pain or shortness of breath.     Patient has family history of abdominal aortic aneurysm.     Patient's history includes; HTN, hyperlipidemia and pulmonary nodule.    PAST MEDICAL HISTORY:  Past Medical History:   Diagnosis Date     Aortic aneurysm (H) 7/1/2007    see 7/07 [place] report -follow yearly, treat high BP is occurs Problem list name updated by automated process. Provider to review     Aortic aneurysm of unspecified site without mention of rupture 7/2007    4.4 cm ascending aorta noted in 2007     Asymptomatic postmenopausal status (age-related) (natural)     on HRT  Prempro -weaning off 5/'2004     Hyperlipidemia LDL goal <100 10/31/2010     Hypertension goal BP (blood pressure) < 140/90 2/9/2016     Leiomyoma of uterus, unspecified     Uterine fibroid     Lump or mass in breast 7/2004    rt. nodule - bx neg     Personal history of colonic polyps      Postmenopausal atrophic vaginitis 8/20/2006     Pulmonary nodule     incidental noted in 2007 during [place]     Pure hypercholesterolemia     mild, diet - low cholesterol, low fat.10/06 start Lovastatin       PAST SURGICAL HISTORY:  Past Surgical History:   Procedure Laterality Date     C DEXA INTERPRETATION, AXIAL  12/21/01    wnl.  7/2005 wnl but lower     COLONOSCOPY  05/07/07    Repeat in 1 year for surveillance     COLONOSCOPY  12/10/08    repeat 1 year  -see 1/2009 letter     COLONOSCOPY  03/31/10     COLONOSCOPY  10/31/2011    Procedure:COMBINED COLONOSCOPY, SINGLE BIOPSY/POLYPECTOMY BY BIOPSY; colonoscopy with polypectomy by biopsy; Surgeon:JESSICA GARCIA; Location:[facility]     COLONOSCOPY  12/6/2013    Procedure: COMBINED COLONOSCOPY, SINGLE BIOPSY/POLYPECTOMY BY BIOPSY;  Colonoscopy, Polypectomies;  Surgeon: Herbert Salinas MD;  Location: [facility]     COLONOSCOPY N/A 12/8/2015    Procedure: COLONOSCOPY;  Surgeon: Jared Carbajal MD;  Location: [facility]     ESOPHAGOSCOPY, GASTROSCOPY, DUODENOSCOPY (EGD), COMBINED N/A 12/8/2015    Procedure: COMBINED ESOPHAGOSCOPY, GASTROSCOPY, DUODENOSCOPY (EGD), BIOPSY SINGLE OR MULTIPLE;  Surgeon: Jared Carbajal MD;  Location: [facility]     HC BIOPSY BREAST, PERC NEEDLE CORE, WITH IMAGING  8/17/2004    Right     HC COLONOSCOPY THRU STOMA W BIOPSY/CAUTERY TUMOR/POLYP/LESION  1999,2002 2004 polyp - hyperplastic - repeat 5 years ?     HC COLONOSCOPY W/WO BRUSH/WASH  12/12/2005    Polypectomy.  Diverticulosis-minimal. Bx adenomatous and mucosal polyps - repeat 1 year     HC LAPAROSCOPY, SURGICAL; APPENDECTOMY  10/31/2004     HC LAPAROSCOPY, SURGICAL; CHOLECYSTECTOMY  2000    Cholecystectomy, Laparoscopic     HC REVISE MEDIAN N/CARPAL TUNNEL SURG  10/01/10    left      UGI ENDOSCOPY, SIMPLE EXAM  03/31/10     HYSTERECTOMY, ELVIN  12/14/09    JESSE BOOTH       FAMILY HISTORY:   Family History   Problem Relation Age of Onset     CANCER Mother      Colon Cancer     CANCER Father      Colon Cancer     CANCER Sister      Colon Cancer     HEART DISEASE Mother      MI     HEART DISEASE Father      Heart Disease/ Heart attacks     HEART DISEASE Brother      Heart attacks at age 45     DIABETES Father      Adult Onset     Breast Cancer Sister      CANCER Paternal Grandmother      Unknown type     HEART  DISEASE Maternal Grandfather      MI     DIABETES Sister      Adult Onset     DIABETES Sister      Adult Onset     DIABETES Brother      Adult Onset     OSTEOPOROSIS Mother      HEART DISEASE Brother      heart attack age 64     Cancer - colorectal Son      age 36       SOCIAL HISTORY:  Social History     Social History     Marital status:      Spouse name: Cain     Number of children: 4     Years of education: 12     Occupational History     [employer]     Social History Main Topics     Smoking status: Never Smoker     Smokeless tobacco: Never Used     Alcohol use Yes      Comment: rarely     Drug use: No     Sexual activity: Yes     Partners: Male      Comment: Post menopausal     Other Topics Concern      Service No     Blood Transfusions No     Caffeine Concern Yes     coffee; 3c/d pop: 1c/wk     Occupational Exposure No     Hobby Hazards No     Sleep Concern Yes     c/o insomnia     Stress Concern No     Weight Concern Yes     desire wt loss     Special Diet No     Back Care No     Exercise No     Bike Helmet No     Seat Belt Yes     Self-Exams No     Social History Narrative    Lives with spouse. No domestic violence issues.        ALLERGIES:   Allergies   Allergen Reactions     Contrast Dye Itching       CURRENT MEDICATIONS:   Prescription Medications as of 4/14/2017             atorvastatin (LIPITOR) 20 MG tablet Take 1 tablet (20 mg) by mouth daily    triamterene-hydrochlorothiazide (DYAZIDE) 37.5-25 MG per capsule Take 1 capsule by mouth daily          ROS:  Constitutional: No fever, chills, or sweats. No weight gain/loss.   HEENT: No visual disturbance, ear ache, epistaxis, sore throat.   Allergies/Immunologic: Negative.   Respiratory: No cough, hemoptysis.   Cardiovascular: As per HPI.   GI: No nausea, vomiting, hematemesis, melena, or hematochezia.   : No urinary frequency, dysuria, or hematuria.   Integument: No rash.   Psychiatric: No anxiety  "/ depression.   Neuro: No speech disturbance, focal sensory or motor deficit.   Endocrinology: No polyuria / polyphagia.   Musculoskeletal: No myalgia.      PHYSICAL EXAMINATION:   /89  Pulse 69  Ht 1.715 m (5' 7.5\")  Wt 81.7 kg (180 lb 3.2 oz)  SpO2 96%  BMI 27.81 kg/m2  General: alert and oriented x 3, pleasant, no acute distress  CV: S1 S2, no murmurs, rubs or gallops, regular rate and rhythm, no peripheral edema, no carotid or abdomenal bruits, pulses in upper and lower extremities palpable  Pulm: bilateral breath sounds, clear to auscultation, easy work of breathing  GI: (+) bowel sounds, soft non-tender and non-distended  : voiding without problems  MS: moves all extremities x 4,  5+/5+ equal strength bilaterally  Neuro: pupils equal round and reactive to light, cranial nerves, II-XII grossly intact, no gross neurologic deficits noted    LABS:  BMP RESULTS:  Lab Results   Component Value Date     03/14/2017    POTASSIUM 3.6 03/14/2017    CHLORIDE 103 03/14/2017    CO2 31 03/14/2017    ANIONGAP 6 03/14/2017     (H) 03/14/2017    BUN 27 03/14/2017    CR 1.14 (H) 03/14/2017    GFRESTIMATED 47 (L) 03/14/2017    GFRESTBLACK 57 (L) 03/14/2017    TARIQ 9.7 03/14/2017        CBC RESULTS:  Lab Results   Component Value Date    WBC 12.5 (H) 03/18/2015    RBC 4.71 03/18/2015    HGB 14.0 03/18/2015    HCT 40.9 03/18/2015    MCV 87 03/18/2015    MCH 29.7 03/18/2015    MCHC 34.2 03/18/2015    RDW 12.3 03/18/2015     03/18/2015       No results found for: INR  No results found for: PTT  No results found for: UA  Lab Results   Component Value Date    A1C 5.8 08/23/2006       PROCEDURES/IMAGING:    ECHOCARDIOGRAM: 07/25/08   The ascending aorta is Mildly dilated.  ( 4.1cm) Left ventricular systolic function is normal. The visual ejection fraction is estimated at 55-60%. No regional wall motion abnormalities noted. The transmitral spectral Doppler flow pattern is suggestive of impaired LV " relaxation. No previous study for comparison.  Aortic Valve  The aortic valve is trileaflet. No aortic regurgitation is present. No hemodynamically significant valvular aortic stenosis.      ECHOCARDIOGRAM:03/11/10  No significant change since July of 2008, as the ascending aorta was 4.1 then and is 4.2 now.  Would consider repeating in two years for aortic size. The left ventricle is normal in size. There is normal left ventricular wall thickness. The visual ejection fraction is estimated at 60-65%. No regional wall motion abnormalities noted. The ascending aorta is Mildly dilated.   ECHOCARDIOGRAM: 01/08/13  The coronary sinuses are normal in diameter but the ascending aorta is moderately dilated at 4.6cm  (The upper limit of normal is 3.7cm for aortic root diameter.)  Previously (3-),  the ascending aorta was measured at 4.2cm and the prior echos study to that (7-),  the asecnding aorta was 4.01cm. A thoracic aortic CT or MRI angiogram would provide a more complete assessment of the ascending aorta. The mitral valve, the aortic valve and the   tricuspid valve are  normal in structure and function.     CT CHEST WITH CONTRAST : 01/15/13  Findings: The thoracic aorta at the sinuses of Valsalva has a maximum diameter of approximately 3.5 cm. There is a mid ascending thoracic aortic aneurysm with a maximum diameter of approximately 4.5 cm. This is unchanged when compared to the previous exam. The maximum diameter of the mid arch is approximately 2.7 cm. The maximum diameter of the mid descending thoracic aorta is approximately 2.3 cm. The aortic take off vessels are patent without significant stenoses.  There is a 9 mm nodule in the left lower lobe on image 114 series 6. This is new. There is a 6 mm nodule in the left lower lobe on image 94 series 6. There is a 5 and a 3 mm nodule in the right lower lobe on image 107 and 104 series 6.   IMPRESSION  1. 4.5 cm ascending thoracic aortic aneurysm. This is  not significantly changed.  2. Lung nodules. One of these in the left lower lobe is new. The remaining were seen on the previous exam and are not significantly changed. Recommend 6 month followup chest CT to document stability.         ECHOCARDIOGRAM: 03/16/17  The aortic Sinus(es) of Valsalva are mildly dilated at at 4.1 cm and the ascending aorta is Moderately dilated at 5.0 cm (The ULNL = 3.7 cm). Comparedto the serial echos dated 3- and 1-8-2013, the ascending aortahas progressively dilated from 4.2 cm => 4.6 cm => currently 5.0 cm. Close continued and regular monitoring of this progressive ascending aortic dilation/aneurysm is indicated. The left ventricle is normal in size with borderline to mild concentric left ventricular hypertrophy. The visual ejection fraction is estimated at 65-70% with Grade I or early diastolic dysfunction. There is trace mitral regurgitation. There is trace aortic regurgitation. Right ventricle systolic pressure estimate is noted below and Normal.     ASSESSMENT/PLAN:     Marilia is a 72 year old female who presents with asymptomatic ascending aortic aneurysm.  The size of aneurysm has met the criteria of surgical repair.       We recommend ascending aortic aneurysm repair, with exploration of aortic root and valve with possible valve sparing aortic root aneurysm repair.  Should she need aortic valve replacement she recommend a tissue valve.      Risks and benefits of surgery were discussed with patient (and all present) including risk of death, stroke, bleeding, cardiac ischemia, wound infection, renal failure, arrhythmias and possible pacemaker implantation. Patient accepts these risks and is willing to proceed   with surgery.     Approximately 60 minutes spent with this case including review of the clinical history and data; discussion with the patient and his family and coordination of the care     Thank you for including me in the care of this kind patient. Do not hesitate  to contact me with any questions.     Dr. Akshat Soto   Cardiothoracic Surgery  [phone] pager  [phone] office        CC  Patient Care Team:  Herbert Epstein MD as PCP - General ([facility])  HERBERT EPSTEIN    Please do not hesitate to contact me if you have any questions/concerns.     Sincerely,     Akshat Soto MD

## 2017-04-14 ENCOUNTER — TELEPHONE (OUTPATIENT)
Dept: CARDIOLOGY | Facility: CLINIC | Age: 72
End: 2017-04-14

## 2017-04-14 DIAGNOSIS — I72.5 ANEURYSM OF OTHER PRECEREBRAL ARTERIES (H): ICD-10-CM

## 2017-04-14 DIAGNOSIS — R93.1 ABNORMAL FINDINGS DIAGNOSTIC IMAGING OF HEART AND CORONARY CIRCULATION: ICD-10-CM

## 2017-04-14 DIAGNOSIS — R93.89 ABNORMAL CT OF THE CHEST: Primary | ICD-10-CM

## 2017-04-14 DIAGNOSIS — I71.20 THORACIC AORTIC ANEURYSM WITHOUT RUPTURE (H): ICD-10-CM

## 2017-04-14 DIAGNOSIS — Z01.810 PRE-OPERATIVE CARDIOVASCULAR EXAMINATION: ICD-10-CM

## 2017-04-14 RX ORDER — SODIUM CHLORIDE 9 MG/ML
INJECTION, SOLUTION INTRAVENOUS CONTINUOUS
Status: CANCELLED | OUTPATIENT
Start: 2017-04-14

## 2017-04-14 RX ORDER — LIDOCAINE 40 MG/G
CREAM TOPICAL
Status: CANCELLED | OUTPATIENT
Start: 2017-04-14

## 2017-04-14 NOTE — TELEPHONE ENCOUNTER
Per task, pt needs to schedule surgery with Dr. Soto . Talked with pt and offered her the first opening 6/5 at 7am . Pt ok with that. Explained to pt that I would contact her to schedule additional appts needed for surgery

## 2017-04-14 NOTE — TELEPHONE ENCOUNTER
Talked with pt about scheduling additional appts needed before surgery. Pt needs a angio and LEXI. Both appts cant be scheduled on the same day. Offered her 5/22 for the angio and 5/25 for the LEXI. Also scheduled additional appts needed for surgery on those days. Pt ok with that. Will mail packet

## 2017-04-14 NOTE — PROGRESS NOTES
ASKED BY REFERRING PHYSICIAN: Dr Herbert Epstein regarding surgical treatment opinion of aortic aneurysm     CHIEF COMPLAINT:  Increased ascending aortic aneurysm      HPI:     Marilia is a 72 year old female who presents with asymptomatic ascending aortic aneurysm. Patient was found to have an ascending aneurysm since 2013 and has been followed up with annual ECHO study which has shown growth in size.  She denies chest pain, back pain or shortness of breath.     Patient has family history of abdominal aortic aneurysm.     Patient's history includes; HTN, hyperlipidemia and pulmonary nodule.    PAST MEDICAL HISTORY:  Past Medical History:   Diagnosis Date     Aortic aneurysm (H) 7/1/2007    see 7/07 [place] report -follow yearly, treat high BP is occurs Problem list name updated by automated process. Provider to review     Aortic aneurysm of unspecified site without mention of rupture 7/2007    4.4 cm ascending aorta noted in 2007     Asymptomatic postmenopausal status (age-related) (natural)     on HRT  Prempro -weaning off 5/'2004     Hyperlipidemia LDL goal <100 10/31/2010     Hypertension goal BP (blood pressure) < 140/90 2/9/2016     Leiomyoma of uterus, unspecified     Uterine fibroid     Lump or mass in breast 7/2004    rt. nodule - bx neg     Personal history of colonic polyps      Postmenopausal atrophic vaginitis 8/20/2006     Pulmonary nodule     incidental noted in 2007 during [place]     Pure hypercholesterolemia     mild, diet - low cholesterol, low fat.10/06 start Lovastatin       PAST SURGICAL HISTORY:  Past Surgical History:   Procedure Laterality Date     C DEXA INTERPRETATION, AXIAL  12/21/01    wnl. 7/2005 wnl but lower     COLONOSCOPY  05/07/07    Repeat in 1 year for surveillance     COLONOSCOPY  12/10/08    repeat 1 year  -see 1/2009 letter     COLONOSCOPY  03/31/10     COLONOSCOPY  10/31/2011    Procedure:COMBINED COLONOSCOPY, SINGLE BIOPSY/POLYPECTOMY BY BIOPSY; colonoscopy with polypectomy by biopsy;  Surgeon:JESSICA GARCIA; Location:[facility]     COLONOSCOPY  12/6/2013    Procedure: COMBINED COLONOSCOPY, SINGLE BIOPSY/POLYPECTOMY BY BIOPSY;  Colonoscopy, Polypectomies;  Surgeon: Herbert Salinas MD;  Location: [facility]     COLONOSCOPY N/A 12/8/2015    Procedure: COLONOSCOPY;  Surgeon: Jared Carbajal MD;  Location: [facility]     ESOPHAGOSCOPY, GASTROSCOPY, DUODENOSCOPY (EGD), COMBINED N/A 12/8/2015    Procedure: COMBINED ESOPHAGOSCOPY, GASTROSCOPY, DUODENOSCOPY (EGD), BIOPSY SINGLE OR MULTIPLE;  Surgeon: Jared Carbajal MD;  Location: [facility]     HC BIOPSY BREAST, PERC NEEDLE CORE, WITH IMAGING  8/17/2004    Right     HC COLONOSCOPY THRU STOMA W BIOPSY/CAUTERY TUMOR/POLYP/LESION  1999,2002 2004 polyp - hyperplastic - repeat 5 years ?     HC COLONOSCOPY W/WO BRUSH/WASH  12/12/2005    Polypectomy.  Diverticulosis-minimal. Bx adenomatous and mucosal polyps - repeat 1 year     HC LAPAROSCOPY, SURGICAL; APPENDECTOMY  10/31/2004     HC LAPAROSCOPY, SURGICAL; CHOLECYSTECTOMY  2000    Cholecystectomy, Laparoscopic     HC REVISE MEDIAN N/CARPAL TUNNEL SURG  10/01/10    left     [facility] UGI ENDOSCOPY, SIMPLE EXAM  03/31/10     HYSTERECTOMY, ELVIN  12/14/09    JESSE BOOTH       FAMILY HISTORY:   Family History   Problem Relation Age of Onset     CANCER Mother      Colon Cancer     CANCER Father      Colon Cancer     CANCER Sister      Colon Cancer     HEART DISEASE Mother      MI     HEART DISEASE Father      Heart Disease/ Heart attacks     HEART DISEASE Brother      Heart attacks at age 45     DIABETES Father      Adult Onset     Breast Cancer Sister      CANCER Paternal Grandmother      Unknown type     HEART DISEASE Maternal Grandfather      MI     DIABETES Sister      Adult Onset     DIABETES Sister      Adult Onset     DIABETES Brother      Adult Onset     OSTEOPOROSIS Mother      HEART DISEASE Brother      heart attack age 64     Cancer - colorectal Son      age 36       SOCIAL HISTORY:  Social History     Social  "History     Marital status:      Spouse name: Cain     Number of children: 4     Years of education: 12     Occupational History     [employer] [place]     Social History Main Topics     Smoking status: Never Smoker     Smokeless tobacco: Never Used     Alcohol use Yes      Comment: rarely     Drug use: No     Sexual activity: Yes     Partners: Male      Comment: Post menopausal     Other Topics Concern      Service No     Blood Transfusions No     Caffeine Concern Yes     coffee; 3c/d pop: 1c/wk     Occupational Exposure No     Hobby Hazards No     Sleep Concern Yes     c/o insomnia     Stress Concern No     Weight Concern Yes     desire wt loss     Special Diet No     Back Care No     Exercise No     Bike Helmet No     Seat Belt Yes     Self-Exams No     Social History Narrative    Lives with spouse. No domestic violence issues.        ALLERGIES:   Allergies   Allergen Reactions     Contrast Dye Itching       CURRENT MEDICATIONS:   Prescription Medications as of 4/14/2017             atorvastatin (LIPITOR) 20 MG tablet Take 1 tablet (20 mg) by mouth daily    triamterene-hydrochlorothiazide (DYAZIDE) 37.5-25 MG per capsule Take 1 capsule by mouth daily          ROS:  Constitutional: No fever, chills, or sweats. No weight gain/loss.   HEENT: No visual disturbance, ear ache, epistaxis, sore throat.   Allergies/Immunologic: Negative.   Respiratory: No cough, hemoptysis.   Cardiovascular: As per HPI.   GI: No nausea, vomiting, hematemesis, melena, or hematochezia.   : No urinary frequency, dysuria, or hematuria.   Integument: No rash.   Psychiatric: No anxiety / depression.   Neuro: No speech disturbance, focal sensory or motor deficit.   Endocrinology: No polyuria / polyphagia.   Musculoskeletal: No myalgia.      PHYSICAL EXAMINATION:   /89  Pulse 69  Ht 1.715 m (5' 7.5\")  Wt 81.7 kg (180 lb 3.2 oz)  SpO2 96%  BMI 27.81 kg/m2  General: alert and oriented x " 3, pleasant, no acute distress  CV: S1 S2, no murmurs, rubs or gallops, regular rate and rhythm, no peripheral edema, no carotid or abdomenal bruits, pulses in upper and lower extremities palpable  Pulm: bilateral breath sounds, clear to auscultation, easy work of breathing  GI: (+) bowel sounds, soft non-tender and non-distended  : voiding without problems  MS: moves all extremities x 4,  5+/5+ equal strength bilaterally  Neuro: pupils equal round and reactive to light, cranial nerves, II-XII grossly intact, no gross neurologic deficits noted    LABS:  BMP RESULTS:  Lab Results   Component Value Date     03/14/2017    POTASSIUM 3.6 03/14/2017    CHLORIDE 103 03/14/2017    CO2 31 03/14/2017    ANIONGAP 6 03/14/2017     (H) 03/14/2017    BUN 27 03/14/2017    CR 1.14 (H) 03/14/2017    GFRESTIMATED 47 (L) 03/14/2017    GFRESTBLACK 57 (L) 03/14/2017    TARIQ 9.7 03/14/2017        CBC RESULTS:  Lab Results   Component Value Date    WBC 12.5 (H) 03/18/2015    RBC 4.71 03/18/2015    HGB 14.0 03/18/2015    HCT 40.9 03/18/2015    MCV 87 03/18/2015    MCH 29.7 03/18/2015    MCHC 34.2 03/18/2015    RDW 12.3 03/18/2015     03/18/2015       No results found for: INR  No results found for: PTT  No results found for: UA  Lab Results   Component Value Date    A1C 5.8 08/23/2006       PROCEDURES/IMAGING:    ECHOCARDIOGRAM: 07/25/08   The ascending aorta is Mildly dilated.  ( 4.1cm) Left ventricular systolic function is normal. The visual ejection fraction is estimated at 55-60%. No regional wall motion abnormalities noted. The transmitral spectral Doppler flow pattern is suggestive of impaired LV relaxation. No previous study for comparison.  Aortic Valve  The aortic valve is trileaflet. No aortic regurgitation is present. No hemodynamically significant valvular aortic stenosis.      ECHOCARDIOGRAM:03/11/10  No significant change since July of 2008, as the ascending aorta was 4.1 then and is 4.2 now.  Would  consider repeating in two years for aortic size. The left ventricle is normal in size. There is normal left ventricular wall thickness. The visual ejection fraction is estimated at 60-65%. No regional wall motion abnormalities noted. The ascending aorta is Mildly dilated.   ECHOCARDIOGRAM: 01/08/13  The coronary sinuses are normal in diameter but the ascending aorta is moderately dilated at 4.6cm  (The upper limit of normal is 3.7cm for aortic root diameter.)  Previously (3-),  the ascending aorta was measured at 4.2cm and the prior echos study to that (7-),  the asecnding aorta was 4.01cm. A thoracic aortic CT or MRI angiogram would provide a more complete assessment of the ascending aorta. The mitral valve, the aortic valve and the   tricuspid valve are  normal in structure and function.     CT CHEST WITH CONTRAST : 01/15/13  Findings: The thoracic aorta at the sinuses of Valsalva has a maximum diameter of approximately 3.5 cm. There is a mid ascending thoracic aortic aneurysm with a maximum diameter of approximately 4.5 cm. This is unchanged when compared to the previous exam. The maximum diameter of the mid arch is approximately 2.7 cm. The maximum diameter of the mid descending thoracic aorta is approximately 2.3 cm. The aortic take off vessels are patent without significant stenoses.  There is a 9 mm nodule in the left lower lobe on image 114 series 6. This is new. There is a 6 mm nodule in the left lower lobe on image 94 series 6. There is a 5 and a 3 mm nodule in the right lower lobe on image 107 and 104 series 6.   IMPRESSION  1. 4.5 cm ascending thoracic aortic aneurysm. This is not significantly changed.  2. Lung nodules. One of these in the left lower lobe is new. The remaining were seen on the previous exam and are not significantly changed. Recommend 6 month followup chest CT to document stability.         ECHOCARDIOGRAM: 03/16/17  The aortic Sinus(es) of Valsalva are mildly dilated at at  4.1 cm and the ascending aorta is Moderately dilated at 5.0 cm (The ULNL = 3.7 cm). Comparedto the serial echos dated 3- and 1-8-2013, the ascending aortahas progressively dilated from 4.2 cm => 4.6 cm => currently 5.0 cm. Close continued and regular monitoring of this progressive ascending aortic dilation/aneurysm is indicated. The left ventricle is normal in size with borderline to mild concentric left ventricular hypertrophy. The visual ejection fraction is estimated at 65-70% with Grade I or early diastolic dysfunction. There is trace mitral regurgitation. There is trace aortic regurgitation. Right ventricle systolic pressure estimate is noted below and Normal.     ASSESSMENT/PLAN:     Marilia is a 72 year old female who presents with asymptomatic ascending aortic aneurysm.  The size of aneurysm has met the criteria of surgical repair.       We recommend ascending aortic aneurysm repair, with exploration of aortic root and valve with possible valve sparing aortic root aneurysm repair.  Should she need aortic valve replacement she recommend a tissue valve.      Risks and benefits of surgery were discussed with patient (and all present) including risk of death, stroke, bleeding, cardiac ischemia, wound infection, renal failure, arrhythmias and possible pacemaker implantation. Patient accepts these risks and is willing to proceed   with surgery.     Approximately 60 minutes spent with this case including review of the clinical history and data; discussion with the patient and his family and coordination of the care     Thank you for including me in the care of this kind patient. Do not hesitate to contact me with any questions.     Dr. Akshat Soto   Cardiothoracic Surgery  [phone] pager  [phone] office        CC  Patient Care Team:  Herbert Epstein MD as PCP - General (Family Practice)  HERBERT EPSTEIN

## 2017-04-14 NOTE — NURSING NOTE
Patient seen today for consultation for ascending aortic aneurysm repair.     Surgery procedure explained to patient all questions and concerns were answered and addressed.     Risks and benefits of surgery were discussed with patient (and all present) including risk of death, stroke, bleeding, cardiac ischemia, wound infection, renal failure, arrhythmias and possible pacemaker implantation. Patient accepts these risks and is willing to proceed with surgery.     No medication changes made today.    Reviewed pre surgery tests and procedures needed and will call patient to schedule.      Pre surgery preparation folder with instructions for surgery preparation and recovery will be mailed to the patient.     Instructed patient on preparation for an angiogram to assess for coronary artery disease and LEXI to assess for tricuspid or bicuspid aortic valve.    Patient verbalized understanding of all instructions and will call with any questions or concerns.

## 2017-04-26 ENCOUNTER — HOSPITAL ENCOUNTER (OUTPATIENT)
Facility: CLINIC | Age: 72
Discharge: HOME | End: 2017-04-26
Attending: INTERNAL MEDICINE | Admitting: INTERNAL MEDICINE
Payer: MEDICARE

## 2017-04-26 ENCOUNTER — SURGERY (OUTPATIENT)
Age: 72
End: 2017-04-26

## 2017-04-26 VITALS
OXYGEN SATURATION: 96 % | SYSTOLIC BLOOD PRESSURE: 112 MMHG | RESPIRATION RATE: 19 BRPM | DIASTOLIC BLOOD PRESSURE: 88 MMHG

## 2017-04-26 LAB — COLONOSCOPY: NORMAL

## 2017-04-26 PROCEDURE — 45385 COLONOSCOPY W/LESION REMOVAL: CPT | Mod: PT | Performed by: INTERNAL MEDICINE

## 2017-04-26 PROCEDURE — 40000296 ZZH STATISTIC ENDO RECOVERY CLASS 1:2 FIRST HOUR: Performed by: INTERNAL MEDICINE

## 2017-04-26 PROCEDURE — 88305 TISSUE EXAM BY PATHOLOGIST: CPT | Performed by: INTERNAL MEDICINE

## 2017-04-26 PROCEDURE — 25000128 H RX IP 250 OP 636: Performed by: INTERNAL MEDICINE

## 2017-04-26 PROCEDURE — 88305 TISSUE EXAM BY PATHOLOGIST: CPT | Mod: 26 | Performed by: INTERNAL MEDICINE

## 2017-04-26 PROCEDURE — 25000125 ZZHC RX 250: Performed by: INTERNAL MEDICINE

## 2017-04-26 PROCEDURE — 45380 COLONOSCOPY AND BIOPSY: CPT | Mod: XU,PT | Performed by: INTERNAL MEDICINE

## 2017-04-26 RX ORDER — ONDANSETRON 2 MG/ML
4 INJECTION INTRAMUSCULAR; INTRAVENOUS
Status: DISCONTINUED | OUTPATIENT
Start: 2017-04-26 | End: 2017-04-26 | Stop reason: HOSPADM

## 2017-04-26 RX ORDER — LIDOCAINE 40 MG/G
CREAM TOPICAL
Status: DISCONTINUED | OUTPATIENT
Start: 2017-04-26 | End: 2017-04-26 | Stop reason: HOSPADM

## 2017-04-26 RX ORDER — FENTANYL CITRATE 50 UG/ML
INJECTION, SOLUTION INTRAMUSCULAR; INTRAVENOUS PRN
Status: DISCONTINUED | OUTPATIENT
Start: 2017-04-26 | End: 2017-04-26 | Stop reason: HOSPADM

## 2017-04-26 RX ADMIN — LIDOCAINE HYDROCHLORIDE 1 ML: 10 INJECTION, SOLUTION EPIDURAL; INFILTRATION; INTRACAUDAL; PERINEURAL at 08:31

## 2017-04-26 RX ADMIN — MIDAZOLAM HYDROCHLORIDE 1 MG: 1 INJECTION, SOLUTION INTRAMUSCULAR; INTRAVENOUS at 09:06

## 2017-04-26 RX ADMIN — MIDAZOLAM HYDROCHLORIDE 1 MG: 1 INJECTION, SOLUTION INTRAMUSCULAR; INTRAVENOUS at 09:08

## 2017-04-26 RX ADMIN — MIDAZOLAM HYDROCHLORIDE 1 MG: 1 INJECTION, SOLUTION INTRAMUSCULAR; INTRAVENOUS at 09:14

## 2017-04-26 RX ADMIN — MIDAZOLAM HYDROCHLORIDE 1 MG: 1 INJECTION, SOLUTION INTRAMUSCULAR; INTRAVENOUS at 09:13

## 2017-04-26 RX ADMIN — FENTANYL CITRATE 50 MCG: 50 INJECTION, SOLUTION INTRAMUSCULAR; INTRAVENOUS at 09:04

## 2017-04-26 RX ADMIN — MIDAZOLAM HYDROCHLORIDE 1 MG: 1 INJECTION, SOLUTION INTRAMUSCULAR; INTRAVENOUS at 09:05

## 2017-04-26 NOTE — CONSULTS
[facility] GI Pre-Procedure Physical Assessment    Marilia Bean MRN# [id number]   Age: 72 year old YOB: 1945      Date of Surgery: 4/26/2017  Location [facility]      Date of Exam 4/26/2017 Facility (Same day)       Home clinic: [facility]  Primary care provider: Herbert Epstein         Active problem list:   Patient Active Problem List   Diagnosis     Asymptomatic postmenopausal status     Postmenopausal atrophic vaginitis     Aortic aneurysm (H)     HYPERLIPIDEMIA LDL GOAL <100     Advanced directives, counseling/discussion     Hypertension goal BP (blood pressure) < 140/90            Medications (include herbals and vitamins):   Any Plavix use in the last 7 days?  No     Current Facility-Administered Medications   Medication     lidocaine 1 % 1 mL     lidocaine (LMX4) kit     sodium chloride (PF) 0.9% PF flush 3 mL     sodium chloride (PF) 0.9% PF flush 3 mL     sodium chloride (PF) 0.9% PF flush 3 mL     ondansetron (ZOFRAN) injection 4 mg             Allergies:      Allergies   Allergen Reactions     Contrast Dye Itching     Allergy to Latex?  No  Allergy to tape?    No          Social History:     Social History   Substance Use Topics     Smoking status: Never Smoker     Smokeless tobacco: Never Used     Alcohol use Yes      Comment: rarely            Physical Exam:   All vitals have been reviewed  There were no vitals taken for this visit.  Airway assessment:   Patient is able to open mouth wide  Patient is able to stick out tongue  Mallampatti classification: Class I (visualization of the soft palate, fauces, uvula, anterior and posterior pillars)      Lungs:   No increased work of breathing, good air exchange, clear to auscultation bilaterally, no crackles or wheezing      Cardiovascular:   Normal apical impulse, regular rate and rhythm, normal S1 and S2, no S3 or S4, and no murmur noted and R = 57           Lab / Radiology Results:   All laboratory  data reviewed          Assessment:   Appropriately NPO  Chief complaint or anatomic assessment of involved area: colonoscopy HNPCC, FH colon cancer, history polyps         Plan:   Moderate (conscious) sedation     Patient's active problems diagnostically and therapeutically optimized for the planned procedure  Risks, benefits, alternatives to sedation and blood explained and consent obtained  Risks, benefits, alternatives to procedure explained and consent obtained  P2 (patient with mild systemic disease)  Orders and progress notes are in the chart  Discharge from Phase 1 and / or Phase 2 recovery when patient meets criteria    I have reviewed the history and physical, lab finding(s), diagnostic data, medicaitons, and the plan for sedation.  I have determined this patient to be an appropriate candidate for the planned sedation / procedure and have reassessed the patient immediately prior to sedation / procedure.    I have personally and medically directed the administration of medications used.    Herbert Salinas MD

## 2017-04-27 LAB — COPATH REPORT: NORMAL

## 2017-05-19 ENCOUNTER — CARE COORDINATION (OUTPATIENT)
Dept: CARDIOLOGY | Facility: CLINIC | Age: 72
End: 2017-05-19

## 2017-05-19 NOTE — PROGRESS NOTES
Called and spoke to patient regarding preparation for her angiogram tomorrow.  Instructed patient on NPO for 4 hour prior, sips of water for meds ok.  325 mg asa the day prior and am of the procedure.  Must have a  and someone to stay with her the 1st 24 hours. Reviewed entire pre op schedule with patient and patient verbalized understanding.  Patient will call if she has any further questions.    Millicent Mina, RNCC  Cardiothoracic Surgery   O) [phone]

## 2017-05-22 ENCOUNTER — APPOINTMENT (OUTPATIENT)
Dept: MEDSURG UNIT | Facility: CLINIC | Age: 72
End: 2017-05-22
Attending: THORACIC SURGERY (CARDIOTHORACIC VASCULAR SURGERY)
Payer: MEDICARE

## 2017-05-22 ENCOUNTER — HOSPITAL ENCOUNTER (OUTPATIENT)
Dept: GENERAL RADIOLOGY | Facility: CLINIC | Age: 72
End: 2017-05-22
Attending: THORACIC SURGERY (CARDIOTHORACIC VASCULAR SURGERY)
Payer: MEDICARE

## 2017-05-22 ENCOUNTER — HOSPITAL ENCOUNTER (OUTPATIENT)
Facility: CLINIC | Age: 72
Discharge: HOME | End: 2017-05-22
Attending: THORACIC SURGERY (CARDIOTHORACIC VASCULAR SURGERY) | Admitting: INTERNAL MEDICINE
Payer: MEDICARE

## 2017-05-22 ENCOUNTER — APPOINTMENT (OUTPATIENT)
Dept: CARDIOLOGY | Facility: CLINIC | Age: 72
End: 2017-05-22
Attending: THORACIC SURGERY (CARDIOTHORACIC VASCULAR SURGERY)
Payer: MEDICARE

## 2017-05-22 ENCOUNTER — HOSPITAL ENCOUNTER (OUTPATIENT)
Dept: ULTRASOUND IMAGING | Facility: CLINIC | Age: 72
End: 2017-05-22
Attending: THORACIC SURGERY (CARDIOTHORACIC VASCULAR SURGERY)
Payer: MEDICARE

## 2017-05-22 VITALS
TEMPERATURE: 98.3 F | HEART RATE: 74 BPM | DIASTOLIC BLOOD PRESSURE: 82 MMHG | OXYGEN SATURATION: 94 % | SYSTOLIC BLOOD PRESSURE: 129 MMHG | RESPIRATION RATE: 16 BRPM

## 2017-05-22 DIAGNOSIS — Z01.810 PRE-OPERATIVE CARDIOVASCULAR EXAMINATION: ICD-10-CM

## 2017-05-22 DIAGNOSIS — R93.1 ABNORMAL FINDINGS DIAGNOSTIC IMAGING OF HEART AND CORONARY CIRCULATION: ICD-10-CM

## 2017-05-22 DIAGNOSIS — I72.5 ANEURYSM OF OTHER PRECEREBRAL ARTERIES (H): ICD-10-CM

## 2017-05-22 DIAGNOSIS — I71.20 THORACIC AORTIC ANEURYSM WITHOUT RUPTURE (H): ICD-10-CM

## 2017-05-22 DIAGNOSIS — E78.5 HYPERLIPIDEMIA LDL GOAL <100: ICD-10-CM

## 2017-05-22 DIAGNOSIS — I25.10 CORONARY ARTERY DISEASE INVOLVING NATIVE CORONARY ARTERY OF NATIVE HEART WITHOUT ANGINA PECTORIS: Primary | ICD-10-CM

## 2017-05-22 DIAGNOSIS — R93.89 ABNORMAL CT OF THE CHEST: ICD-10-CM

## 2017-05-22 PROBLEM — Z98.890 STATUS POST CORONARY ANGIOGRAM: Status: ACTIVE | Noted: 2017-05-22

## 2017-05-22 LAB
ANION GAP SERPL CALCULATED.3IONS-SCNC: 6 MMOL/L (ref 3–14)
BUN SERPL-MCNC: 21 MG/DL (ref 7–30)
CALCIUM SERPL-MCNC: 9.4 MG/DL (ref 8.5–10.1)
CHLORIDE SERPL-SCNC: 104 MMOL/L (ref 94–109)
CO2 SERPL-SCNC: 31 MMOL/L (ref 20–32)
CREAT SERPL-MCNC: 1.04 MG/DL (ref 0.52–1.04)
ERYTHROCYTE [DISTWIDTH] IN BLOOD BY AUTOMATED COUNT: 12.9 % (ref 10–15)
GFR SERPL CREATININE-BSD FRML MDRD: 52 ML/MIN/1.7M2
GLUCOSE SERPL-MCNC: 92 MG/DL (ref 70–99)
HCT VFR BLD AUTO: 41.2 % (ref 35–47)
HGB BLD-MCNC: 13.8 G/DL (ref 11.7–15.7)
MCH RBC QN AUTO: 29.9 PG (ref 26.5–33)
MCHC RBC AUTO-ENTMCNC: 33.5 G/DL (ref 31.5–36.5)
MCV RBC AUTO: 89 FL (ref 78–100)
PLATELET # BLD AUTO: 279 10E9/L (ref 150–450)
POTASSIUM SERPL-SCNC: 3.6 MMOL/L (ref 3.4–5.3)
RBC # BLD AUTO: 4.62 10E12/L (ref 3.8–5.2)
SODIUM SERPL-SCNC: 140 MMOL/L (ref 133–144)
WBC # BLD AUTO: 5.8 10E9/L (ref 4–11)

## 2017-05-22 PROCEDURE — 93010 ELECTROCARDIOGRAM REPORT: CPT | Performed by: INTERNAL MEDICINE

## 2017-05-22 PROCEDURE — 25000125 ZZHC RX 250

## 2017-05-22 PROCEDURE — C1894 INTRO/SHEATH, NON-LASER: HCPCS

## 2017-05-22 PROCEDURE — 27210742 ZZH CATH CR1

## 2017-05-22 PROCEDURE — 40000172 ZZH STATISTIC PROCEDURE PREP ONLY

## 2017-05-22 PROCEDURE — 93454 CORONARY ARTERY ANGIO S&I: CPT | Mod: 26 | Performed by: INTERNAL MEDICINE

## 2017-05-22 PROCEDURE — 27211089 ZZH KIT ACIST INJECTOR CR3

## 2017-05-22 PROCEDURE — 93454 CORONARY ARTERY ANGIO S&I: CPT

## 2017-05-22 PROCEDURE — 25000128 H RX IP 250 OP 636: Performed by: THORACIC SURGERY (CARDIOTHORACIC VASCULAR SURGERY)

## 2017-05-22 PROCEDURE — 27210946 ZZH KIT HC TOTES DISP CR8

## 2017-05-22 PROCEDURE — 85027 COMPLETE CBC AUTOMATED: CPT | Performed by: THORACIC SURGERY (CARDIOTHORACIC VASCULAR SURGERY)

## 2017-05-22 PROCEDURE — B2111ZZ FLUOROSCOPY OF MULTIPLE CORONARY ARTERIES USING LOW OSMOLAR CONTRAST: ICD-10-PCS | Performed by: INTERNAL MEDICINE

## 2017-05-22 PROCEDURE — 80048 BASIC METABOLIC PNL TOTAL CA: CPT | Performed by: THORACIC SURGERY (CARDIOTHORACIC VASCULAR SURGERY)

## 2017-05-22 PROCEDURE — 93880 EXTRACRANIAL BILAT STUDY: CPT

## 2017-05-22 PROCEDURE — 25000128 H RX IP 250 OP 636

## 2017-05-22 PROCEDURE — 27210787 ZZH MANIFOLD CR2

## 2017-05-22 PROCEDURE — 71020 XR CHEST 2 VW: CPT

## 2017-05-22 PROCEDURE — 93005 ELECTROCARDIOGRAM TRACING: CPT

## 2017-05-22 PROCEDURE — 40000065 ZZH STATISTIC EKG NON-CHARGEABLE

## 2017-05-22 RX ORDER — ASPIRIN 81 MG/1
81-324 TABLET, CHEWABLE ORAL
Status: DISCONTINUED | OUTPATIENT
Start: 2017-05-22 | End: 2017-05-22 | Stop reason: HOSPADM

## 2017-05-22 RX ORDER — NALOXONE HYDROCHLORIDE 0.4 MG/ML
.2-.4 INJECTION, SOLUTION INTRAMUSCULAR; INTRAVENOUS; SUBCUTANEOUS
Status: DISCONTINUED | OUTPATIENT
Start: 2017-05-22 | End: 2017-05-22 | Stop reason: HOSPADM

## 2017-05-22 RX ORDER — ATROPINE SULFATE 0.1 MG/ML
.5-1 INJECTION INTRAVENOUS
Status: DISCONTINUED | OUTPATIENT
Start: 2017-05-22 | End: 2017-05-22 | Stop reason: CLARIF

## 2017-05-22 RX ORDER — NITROGLYCERIN 5 MG/ML
100-200 VIAL (ML) INTRAVENOUS
Status: DISCONTINUED | OUTPATIENT
Start: 2017-05-22 | End: 2017-05-22 | Stop reason: CLARIF

## 2017-05-22 RX ORDER — LIDOCAINE 40 MG/G
CREAM TOPICAL
Status: DISCONTINUED | OUTPATIENT
Start: 2017-05-22 | End: 2017-05-22 | Stop reason: HOSPADM

## 2017-05-22 RX ORDER — FUROSEMIDE 10 MG/ML
20-100 INJECTION INTRAMUSCULAR; INTRAVENOUS
Status: DISCONTINUED | OUTPATIENT
Start: 2017-05-22 | End: 2017-05-22 | Stop reason: CLARIF

## 2017-05-22 RX ORDER — EPTIFIBATIDE 2 MG/ML
180 INJECTION, SOLUTION INTRAVENOUS EVERY 10 MIN PRN
Status: DISCONTINUED | OUTPATIENT
Start: 2017-05-22 | End: 2017-05-22 | Stop reason: CLARIF

## 2017-05-22 RX ORDER — NITROGLYCERIN 20 MG/100ML
.07-2 INJECTION INTRAVENOUS CONTINUOUS PRN
Status: DISCONTINUED | OUTPATIENT
Start: 2017-05-22 | End: 2017-05-22 | Stop reason: CLARIF

## 2017-05-22 RX ORDER — PRASUGREL 10 MG/1
10-60 TABLET, FILM COATED ORAL
Status: DISCONTINUED | OUTPATIENT
Start: 2017-05-22 | End: 2017-05-22 | Stop reason: CLARIF

## 2017-05-22 RX ORDER — ASPIRIN 325 MG
325 TABLET ORAL
Status: DISCONTINUED | OUTPATIENT
Start: 2017-05-22 | End: 2017-05-22 | Stop reason: HOSPADM

## 2017-05-22 RX ORDER — FENTANYL CITRATE 50 UG/ML
25-50 INJECTION, SOLUTION INTRAMUSCULAR; INTRAVENOUS
Status: DISCONTINUED | OUTPATIENT
Start: 2017-05-22 | End: 2017-05-22 | Stop reason: HOSPADM

## 2017-05-22 RX ORDER — DEXTROSE MONOHYDRATE 25 G/50ML
12.5-5 INJECTION, SOLUTION INTRAVENOUS EVERY 30 MIN PRN
Status: DISCONTINUED | OUTPATIENT
Start: 2017-05-22 | End: 2017-05-22 | Stop reason: HOSPADM

## 2017-05-22 RX ORDER — NICARDIPINE HYDROCHLORIDE 2.5 MG/ML
100 INJECTION INTRAVENOUS
Status: DISCONTINUED | OUTPATIENT
Start: 2017-05-22 | End: 2017-05-22 | Stop reason: CLARIF

## 2017-05-22 RX ORDER — ARGATROBAN 1 MG/ML
350 INJECTION, SOLUTION INTRAVENOUS
Status: DISCONTINUED | OUTPATIENT
Start: 2017-05-22 | End: 2017-05-22 | Stop reason: CLARIF

## 2017-05-22 RX ORDER — ARGATROBAN 1 MG/ML
150 INJECTION, SOLUTION INTRAVENOUS
Status: DISCONTINUED | OUTPATIENT
Start: 2017-05-22 | End: 2017-05-22 | Stop reason: CLARIF

## 2017-05-22 RX ORDER — POTASSIUM CHLORIDE 29.8 MG/ML
20 INJECTION INTRAVENOUS
Status: DISCONTINUED | OUTPATIENT
Start: 2017-05-22 | End: 2017-05-22 | Stop reason: CLARIF

## 2017-05-22 RX ORDER — NIFEDIPINE 10 MG/1
10 CAPSULE ORAL
Status: DISCONTINUED | OUTPATIENT
Start: 2017-05-22 | End: 2017-05-22 | Stop reason: CLARIF

## 2017-05-22 RX ORDER — SODIUM NITROPRUSSIDE 25 MG/ML
100-200 INJECTION INTRAVENOUS
Status: DISCONTINUED | OUTPATIENT
Start: 2017-05-22 | End: 2017-05-22 | Stop reason: CLARIF

## 2017-05-22 RX ORDER — ATROPINE SULFATE 0.1 MG/ML
0.5 INJECTION INTRAVENOUS EVERY 5 MIN PRN
Status: DISCONTINUED | OUTPATIENT
Start: 2017-05-22 | End: 2017-05-22 | Stop reason: HOSPADM

## 2017-05-22 RX ORDER — DOBUTAMINE HYDROCHLORIDE 200 MG/100ML
2-20 INJECTION INTRAVENOUS CONTINUOUS PRN
Status: DISCONTINUED | OUTPATIENT
Start: 2017-05-22 | End: 2017-05-22 | Stop reason: CLARIF

## 2017-05-22 RX ORDER — NALOXONE HYDROCHLORIDE 0.4 MG/ML
.1-.4 INJECTION, SOLUTION INTRAMUSCULAR; INTRAVENOUS; SUBCUTANEOUS
Status: DISCONTINUED | OUTPATIENT
Start: 2017-05-22 | End: 2017-05-22 | Stop reason: HOSPADM

## 2017-05-22 RX ORDER — PHENYLEPHRINE HCL IN 0.9% NACL 1 MG/10 ML
20-100 SYRINGE (ML) INTRAVENOUS
Status: DISCONTINUED | OUTPATIENT
Start: 2017-05-22 | End: 2017-05-22 | Stop reason: CLARIF

## 2017-05-22 RX ORDER — CLOPIDOGREL BISULFATE 75 MG/1
300-600 TABLET ORAL
Status: DISCONTINUED | OUTPATIENT
Start: 2017-05-22 | End: 2017-05-22 | Stop reason: HOSPADM

## 2017-05-22 RX ORDER — FLUMAZENIL 0.1 MG/ML
0.2 INJECTION, SOLUTION INTRAVENOUS
Status: DISCONTINUED | OUTPATIENT
Start: 2017-05-22 | End: 2017-05-22 | Stop reason: HOSPADM

## 2017-05-22 RX ORDER — PROTAMINE SULFATE 10 MG/ML
25-100 INJECTION, SOLUTION INTRAVENOUS EVERY 5 MIN PRN
Status: DISCONTINUED | OUTPATIENT
Start: 2017-05-22 | End: 2017-05-22 | Stop reason: HOSPADM

## 2017-05-22 RX ORDER — CLOPIDOGREL BISULFATE 75 MG/1
75 TABLET ORAL
Status: DISCONTINUED | OUTPATIENT
Start: 2017-05-22 | End: 2017-05-22 | Stop reason: HOSPADM

## 2017-05-22 RX ORDER — LIDOCAINE HYDROCHLORIDE 10 MG/ML
30 INJECTION, SOLUTION EPIDURAL; INFILTRATION; INTRACAUDAL; PERINEURAL
Status: DISCONTINUED | OUTPATIENT
Start: 2017-05-22 | End: 2017-05-22 | Stop reason: CLARIF

## 2017-05-22 RX ORDER — METHYLPREDNISOLONE SODIUM SUCCINATE 125 MG/2ML
125 INJECTION, POWDER, LYOPHILIZED, FOR SOLUTION INTRAMUSCULAR; INTRAVENOUS ONCE
Status: COMPLETED | OUTPATIENT
Start: 2017-05-22 | End: 2017-05-22

## 2017-05-22 RX ORDER — DIPHENHYDRAMINE HYDROCHLORIDE 50 MG/ML
25-50 INJECTION INTRAMUSCULAR; INTRAVENOUS
Status: COMPLETED | OUTPATIENT
Start: 2017-05-22 | End: 2017-05-22

## 2017-05-22 RX ORDER — NALOXONE HYDROCHLORIDE 0.4 MG/ML
0.4 INJECTION, SOLUTION INTRAMUSCULAR; INTRAVENOUS; SUBCUTANEOUS EVERY 5 MIN PRN
Status: DISCONTINUED | OUTPATIENT
Start: 2017-05-22 | End: 2017-05-22 | Stop reason: HOSPADM

## 2017-05-22 RX ORDER — EPTIFIBATIDE 2 MG/ML
2 INJECTION, SOLUTION INTRAVENOUS CONTINUOUS PRN
Status: DISCONTINUED | OUTPATIENT
Start: 2017-05-22 | End: 2017-05-22 | Stop reason: CLARIF

## 2017-05-22 RX ORDER — LORAZEPAM 2 MG/ML
.5-2 INJECTION INTRAMUSCULAR EVERY 4 HOURS PRN
Status: DISCONTINUED | OUTPATIENT
Start: 2017-05-22 | End: 2017-05-22 | Stop reason: HOSPADM

## 2017-05-22 RX ORDER — NITROGLYCERIN 5 MG/ML
100-500 VIAL (ML) INTRAVENOUS
Status: DISCONTINUED | OUTPATIENT
Start: 2017-05-22 | End: 2017-05-22 | Stop reason: CLARIF

## 2017-05-22 RX ORDER — VERAPAMIL HYDROCHLORIDE 2.5 MG/ML
1-5 INJECTION, SOLUTION INTRAVENOUS
Status: DISCONTINUED | OUTPATIENT
Start: 2017-05-22 | End: 2017-05-22 | Stop reason: CLARIF

## 2017-05-22 RX ORDER — PROMETHAZINE HYDROCHLORIDE 25 MG/ML
6.25-25 INJECTION, SOLUTION INTRAMUSCULAR; INTRAVENOUS EVERY 4 HOURS PRN
Status: DISCONTINUED | OUTPATIENT
Start: 2017-05-22 | End: 2017-05-22 | Stop reason: CLARIF

## 2017-05-22 RX ORDER — SODIUM CHLORIDE 9 MG/ML
INJECTION, SOLUTION INTRAVENOUS CONTINUOUS
Status: DISCONTINUED | OUTPATIENT
Start: 2017-05-22 | End: 2017-05-22 | Stop reason: CLARIF

## 2017-05-22 RX ORDER — IOPAMIDOL 755 MG/ML
30 INJECTION, SOLUTION INTRAVASCULAR ONCE
Status: COMPLETED | OUTPATIENT
Start: 2017-05-22 | End: 2017-05-22

## 2017-05-22 RX ORDER — ADENOSINE 3 MG/ML
12-12000 INJECTION, SOLUTION INTRAVENOUS
Status: DISCONTINUED | OUTPATIENT
Start: 2017-05-22 | End: 2017-05-22 | Stop reason: HOSPADM

## 2017-05-22 RX ORDER — HYDRALAZINE HYDROCHLORIDE 20 MG/ML
10-20 INJECTION INTRAMUSCULAR; INTRAVENOUS
Status: DISCONTINUED | OUTPATIENT
Start: 2017-05-22 | End: 2017-05-22 | Stop reason: HOSPADM

## 2017-05-22 RX ORDER — DIPHENHYDRAMINE HYDROCHLORIDE 50 MG/ML
50 INJECTION INTRAMUSCULAR; INTRAVENOUS EVERY 6 HOURS PRN
Status: DISCONTINUED | OUTPATIENT
Start: 2017-05-22 | End: 2017-05-22 | Stop reason: HOSPADM

## 2017-05-22 RX ORDER — DOPAMINE HYDROCHLORIDE 160 MG/100ML
2-20 INJECTION, SOLUTION INTRAVENOUS CONTINUOUS PRN
Status: DISCONTINUED | OUTPATIENT
Start: 2017-05-22 | End: 2017-05-22 | Stop reason: CLARIF

## 2017-05-22 RX ORDER — METHYLPREDNISOLONE SODIUM SUCCINATE 125 MG/2ML
125 INJECTION, POWDER, LYOPHILIZED, FOR SOLUTION INTRAMUSCULAR; INTRAVENOUS
Status: DISCONTINUED | OUTPATIENT
Start: 2017-05-22 | End: 2017-05-22 | Stop reason: HOSPADM

## 2017-05-22 RX ORDER — METOPROLOL TARTRATE 1 MG/ML
5 INJECTION, SOLUTION INTRAVENOUS EVERY 5 MIN PRN
Status: DISCONTINUED | OUTPATIENT
Start: 2017-05-22 | End: 2017-05-22 | Stop reason: HOSPADM

## 2017-05-22 RX ORDER — POTASSIUM CHLORIDE 7.45 MG/ML
10 INJECTION INTRAVENOUS
Status: DISCONTINUED | OUTPATIENT
Start: 2017-05-22 | End: 2017-05-22 | Stop reason: CLARIF

## 2017-05-22 RX ORDER — ATORVASTATIN CALCIUM 20 MG/1
40 TABLET, FILM COATED ORAL DAILY
Qty: 90 TABLET | Refills: 3 | Status: ON HOLD | OUTPATIENT
Start: 2017-05-22 | End: 2017-06-10

## 2017-05-22 RX ORDER — ONDANSETRON 2 MG/ML
4 INJECTION INTRAMUSCULAR; INTRAVENOUS EVERY 4 HOURS PRN
Status: DISCONTINUED | OUTPATIENT
Start: 2017-05-22 | End: 2017-05-22 | Stop reason: HOSPADM

## 2017-05-22 RX ORDER — ENALAPRILAT 1.25 MG/ML
1.25-2.5 INJECTION INTRAVENOUS
Status: DISCONTINUED | OUTPATIENT
Start: 2017-05-22 | End: 2017-05-22 | Stop reason: CLARIF

## 2017-05-22 RX ORDER — HEPARIN SODIUM 1000 [USP'U]/ML
1000-10000 INJECTION, SOLUTION INTRAVENOUS; SUBCUTANEOUS EVERY 5 MIN PRN
Status: DISCONTINUED | OUTPATIENT
Start: 2017-05-22 | End: 2017-05-22 | Stop reason: CLARIF

## 2017-05-22 RX ORDER — PROTAMINE SULFATE 10 MG/ML
1-5 INJECTION, SOLUTION INTRAVENOUS
Status: DISCONTINUED | OUTPATIENT
Start: 2017-05-22 | End: 2017-05-22 | Stop reason: HOSPADM

## 2017-05-22 RX ORDER — NITROGLYCERIN 0.4 MG/1
0.4 TABLET SUBLINGUAL EVERY 5 MIN PRN
Status: DISCONTINUED | OUTPATIENT
Start: 2017-05-22 | End: 2017-05-22 | Stop reason: HOSPADM

## 2017-05-22 RX ADMIN — METHYLPREDNISOLONE SODIUM SUCCINATE 125 MG: 125 INJECTION, POWDER, LYOPHILIZED, FOR SOLUTION INTRAMUSCULAR; INTRAVENOUS at 12:15

## 2017-05-22 RX ADMIN — DIPHENHYDRAMINE HYDROCHLORIDE 50 MG: 50 INJECTION, SOLUTION INTRAMUSCULAR; INTRAVENOUS at 13:45

## 2017-05-22 RX ADMIN — MIDAZOLAM HYDROCHLORIDE 0.5 MG: 1 INJECTION, SOLUTION INTRAMUSCULAR; INTRAVENOUS at 13:48

## 2017-05-22 RX ADMIN — MIDAZOLAM HYDROCHLORIDE 0.5 MG: 1 INJECTION, SOLUTION INTRAMUSCULAR; INTRAVENOUS at 13:50

## 2017-05-22 RX ADMIN — IOPAMIDOL 30 ML: 755 INJECTION, SOLUTION INTRAVASCULAR at 14:30

## 2017-05-22 RX ADMIN — FENTANYL CITRATE 50 MCG: 50 INJECTION, SOLUTION INTRAMUSCULAR; INTRAVENOUS at 13:46

## 2017-05-22 NOTE — PROGRESS NOTES
VSS.  Groin soft, flat, no hematoma.  IV's removed.  Discharge instructions gien and questions answered.  Pt. Able to verbalize understanding.  Pt. Has a ride home.  Pt. Has all belongings.

## 2017-05-22 NOTE — PROGRESS NOTES
Manual pressure held for 16 minutes to right groin site.  Sheath, size 4 Fr,  pulled by LACEY Barker.  Site CDI, no hematoma.  Stasis achieved at 1545. Off bedrest @ 1745.

## 2017-05-22 NOTE — IP AVS SNAPSHOT
Unit [address]D Observation [address] [address]    Phone:  [phone]    Fax:  [phone]                                       After Visit Summary   5/22/2017    Marilia Bean    MRN: [id number]           After Visit Summary Signature Page     I have received my discharge instructions, and my questions have been answered. I have discussed any challenges I see with this plan with the nurse or doctor.    ..........................................................................................................................................  Patient/Patient Representative Signature      ..........................................................................................................................................  Patient Representative Print Name and Relationship to Patient    ..................................................               ................................................  Date                                            Time    ..........................................................................................................................................  Reviewed by Signature/Title    ...................................................              ..............................................  Date                                                            Time

## 2017-05-22 NOTE — PROCEDURES
CARDIAC CATH REPORT:     PROCEDURES PERFORMED:   Coronary Angiography    PHYSICIANS:  Attending Physician: Sid Conner MD  Interventional Cardiology Fellow: Moises Claudio MD  Cardiology Fellow: MARY Mtz MD, PhD    INDICATION:  Marilia Bean is a 72 year old female who has an ascending aortic aneurysm (5.0cm), planned for surgery with Dr. Soto 6/2017, presents for coronary angiogram as part of pre-surgery workup. Mild sob symptoms with exertion.    DESCRIPTION:  1. Consent obtained with discussion of risks.  All questions were answered.  2. Sterile prep and procedure.  3. Location with Sheaths:   Rt Femoral Arterial  4 Fr 25 cm [long]  4. Access: Local anesthetic with lidocaine.  A standard 18 guage needle with ultrasound guidance was used to establish vascular access using a modified Seldinger technique.  5. Diagnostic Catheters:   4 Fr  JL 5, 3DRC  6. Guiding Catheters:  None  6. Estimated blood loss: < 5 ml    MEDICATIONS:  The procedure was performed under conscious sedation for 15 minutes from 1345 to 1400.  The patient was assessed immediately before the first sedation medication was administered.  Midazolam 1 mg and Fentanyl 50 mcg were administered.  Heart rate, BP, respiration, oxygen saturation and patient responses were monitored throughout the procedure with the assistance of the RN under my supervision.    Procedures:    CORONARY ANGIOGRAM:   1. Both coronary arteries arise from their respective cusps.  2. Dominance: Right  3. The LM has 20-30% disease.   4. LAD: Type 3 [LAD supplies the entire apex]. The LAD gives rise to septal perforators, D1 and D2.  The pLAD is calcified, and has a mild, <25% stenosis, mLAD has a 80% stenosis right at the takeoff of D2. The D2 has an 80% ostial stenosis.  5. LCX is a very small system, does not give rise to any OM branches.   6. RCA is a large system that gives rise to large PL branches and supplies PDA. Mild disease, 20-30% in the mRCA and  dRCA.      Sheath Removal:  The RFA sheath will be removed after the patient has been transferred to the unit.    Contrast: Isovue,  30 ml     Fluoroscopy Time: 2.0 min    COMPLICATIONS:  1. None    SUMMARY:   Single vessel CAD  LAD    PLAN:   >> Recommend LIMA to LAD during ascending aneurysm repair  >> ASA 81 mg qd   >> Increased atorvastatin to 40mg daily  >> Bedrest per protocol.  >> Continued medical management and lifestyle modification for cardiovascular risk factor optimization.   >> Discharge today per protocol    The attending interventional cardiologist was present and supervised all critical aspects the procedure.    See CVIS report for final draft.    MARY Mtz MD, PhD   Cardiology Fellow

## 2017-05-22 NOTE — DISCHARGE INSTRUCTIONS
Going Home after Coronary Angiogram        Name: Marilia Bean  Medical Record Number:  [id number]  Today's Date: May 22, 2017        For 24 hours:         Have an adult stay with you for 24 hours.         Relax and take it easy.         Drink plenty of fluids.         You may eat your normal diet, unless your doctor tells you otherwise.         Do NOT make any important or legal decisions.         Do NOT drive or operate machines at home or at work.         Do NOT drink alcohol.      Do NOT smoke.     Medicines:         If you have begun Plavix (clopidogrel), Effient (prasugrel), or Brilinta (ticagrelor), do not stop taking it until you talk to your heart doctor (cardiologist).         If you are on metformin (Glucophage), do not restart it until you have blood tests (within 2 to 3 days after discharge). When your doctor tells you it is safe, you may restart the metformin.         If you have stopped any other medicines, check with your nurse or provider about when to restart them.    Care of groin site:         Remove the Band-Aid after 24 hours. If there is minor oozing, apply another Band-aid and remove it after 12 hours.          Do NOT take a bath, or use a hot tub or pool for at least 3 days. You may shower.          It is normal to have a small bruise or lump at the site.         Do not scrub the site.         Do not use lotion or powder near the puncture site for 3 days.         For the first 2 days: Do not stoop or squat. When you cough, sneeze or move your bowels, hold your hand over the puncture site and press gently.         Do not lift more than 10 pounds for at least 3 to 5 days.         For 2 days, do NOT have sex or do any heavy exercise.     If you start bleeding from the site in your groin:  Lie down flat and press firmly on the site.  Call your physician immediately, or, come to the emergency room.      Call 911 right away if you have bleeding that is heavy or does not stop.     Call your doctor  if:         You have a large or growing hard lump around the site.         The site is red, swollen, hot or tender.         Blood or fluid is draining from the site.         You have chills or a fever greater than 101 F (38 C).         Your leg or arm turns bluish, feels numb or cool.         You have hives, a rash or unusual itching.

## 2017-05-22 NOTE — IP AVS SNAPSHOT
MRN:[id number]                      After Visit Summary   5/22/2017    Marilia Bean    MRN: [id number]           Thank you!     Thank you for choosing [facility] for your care. Our goal is always to provide you with excellent care. Hearing back from our patients is one way we can continue to improve our services. Please take a few minutes to complete the written survey that you may receive in the mail after you visit with us. Thank you!        Patient Information     Date Of Birth          1945        About your hospital stay     You were admitted on:  May 22, 2017 You last received care in the:  Unit 6D Observation [facility]    You were discharged on:  May 22, 2017       Who to Call     For medical emergencies, please call 911.  For non-urgent questions about your medical care, please call your primary care provider or clinic, [phone]          Attending Provider     Provider Akshat Carrero MD Thoracic Diseases    Sid Conner MD Cardiology       Primary Care Provider Office Phone # Fax #    Herbert Epstein [phone]       [facility] [address] [place] [address]        After Care Instructions     Discharge Instructions - IF on Metformin (Glucophage or Glucovance) or Metformin containing medications       IF on Metformin (Glucophage or Glucovance) or Metformin containing medications , schedule a Basic Metabolic Panel at [facility] Heart or Primary Clinic in 48 - 72 hours post procedure and PRIOR TO resuming the Metformin or Metformin containing medications.  Hold Metformin (Glucophage or Glucovance) or Metformin containing medications until after the Basic Metabolic Panel on the 2nd or 3rd day following the procedure.  May resume after blood draw is complete.                  Your next 10 appointments already scheduled     May 25, 2017 10:15 AM CDT   LAB with  LAB    Health Lab ([facility])     [address] [place] [id number]   [phone]           Patient must bring picture ID.  Patient should be prepared to give a urine specimen  Please do not eat 10-12 hours before your appointment if you are coming in fasting for labs on lipids, cholesterol, or glucose (sugar).  Pregnant women should follow their Care Team instructions. Water with medications is okay. Do not drink coffee or other fluids.   If you have concerns about taking  your medications, please ask at office or if scheduling via [employer], send a message by clicking on Secure Messaging, Message Your Care Team.            May 25, 2017 10:30 AM CDT   (Arrive by 10:15 AM)   PAC RN ASSESSMENT with Yung Pac Rn   [facility] Preoperative Assessment [place] ([facility])    [address] [place] [id number]   [phone]            May 25, 2017 11:00 AM CDT   (Arrive by 10:45 AM)   PAC EVALUATION with  Pac [place] 1   [facility] Preoperative Assessment [place] ([facility])    [address] [place] [phone]   [phone]            May 25, 2017 12:00 PM CDT   (Arrive by 11:45 AM)   PAC Anesthesia Consult with  Pac Anesthesiologist   [facility] Preoperative Assessment [place] ([facility])    [address] [place] [phone]   [phone]            May 25, 2017  1:00 PM CDT   Ech Anup with [email or web address]   [facility], [place],  [facility] ([facility] [place])    [address] [address]   [phone]           1.  Please bring or wear a comfortable two-piece outfit. 2.  Arrival time: -   [facility]:  arrive 75 minutes prior to examination time. -   [facility]:  arrive 90 minutes prior to examination time. -   [facility]:   arrive 15 minutes prior to examination time. 3.  Plan to have someone here to drive you home after the test. -    Someone should stay with you for 6 hours after your test. 4.  No food or drink: -   6 hours before the test 5.  If you take antacids or water pills (diuretics): Do not take them until after your test. You may take blood pressure medicine with a few sips of water. 6.  If you have diabetes: -   Morning slots preferred -   If you take insulin, call your diabetes care team. Ask if you should take a   dose the morning of your test. -   If you take diabetes medicine by mouth, don't take it on the morning of your test. Bring it with you to take after the test. (If you have questions, call your diabetes care team.) 7.  Bring a list of any medicines you are taking. 8.  Do not drive for 24 hours after the test. 9.   A responsible adult must stay with you for 24 hours after the test.  10.  For any questions that cannot be answered, please contact the ordering physician            Jun 05, 2017   Procedure with Akshat Soto MD   [facility], [place], Same Day Surgery (--)    [address] [id number] [phone]              Further instructions from your care team       Going Home after Coronary Angiogram        Name: Marilia Bean  Medical Record Number:  [id number]  Today's Date: May 22, 2017        For 24 hours:         Have an adult stay with you for 24 hours.         Relax and take it easy.         Drink plenty of fluids.         You may eat your normal diet, unless your doctor tells you otherwise.         Do NOT make any important or legal decisions.         Do NOT drive or operate machines at home or at work.         Do NOT drink alcohol.      Do NOT smoke.     Medicines:         If you have begun Plavix (clopidogrel), Effient (prasugrel), or Brilinta (ticagrelor), do not stop taking it until you talk to your heart doctor (cardiologist).         If you are on metformin (Glucophage), do not restart it until you have blood tests (within 2 to 3 days after discharge). When your doctor tells you it is safe,  you may restart the metformin.         If you have stopped any other medicines, check with your nurse or provider about when to restart them.    Care of groin site:         Remove the Band-Aid after 24 hours. If there is minor oozing, apply another Band-aid and remove it after 12 hours.          Do NOT take a bath, or use a hot tub or pool for at least 3 days. You may shower.          It is normal to have a small bruise or lump at the site.         Do not scrub the site.         Do not use lotion or powder near the puncture site for 3 days.         For the first 2 days: Do not stoop or squat. When you cough, sneeze or move your bowels, hold your hand over the puncture site and press gently.         Do not lift more than 10 pounds for at least 3 to 5 days.         For 2 days, do NOT have sex or do any heavy exercise.     If you start bleeding from the site in your groin:  Lie down flat and press firmly on the site.  Call your physician immediately, or, come to the emergency room.      Call 911 right away if you have bleeding that is heavy or does not stop.     Call your doctor if:         You have a large or growing hard lump around the site.         The site is red, swollen, hot or tender.         Blood or fluid is draining from the site.         You have chills or a fever greater than 101 F (38 C).         Your leg or arm turns bluish, feels numb or cool.         You have hives, a rash or unusual itching.              Pending Results     Date and Time Order Name Status Description    5/22/2017 1142 EKG 12-lead, tracing only Preliminary             Admission Information     Date & Time Provider Department Dept. Phone    5/22/2017 Sid Conner MD Unit 6D Observation [facility] [phone]      Your Vitals Were     Blood Pressure Pulse Temperature Respirations Pulse Oximetry       129/82 (BP Location: Right arm) 74 98.3  F (36.8  C) (Oral) 16 94%       MyChart Information     [employer] gives you secure  access to your electronic health record. If you see a primary care provider, you can also send messages to your care team and make appointments. If you have questions, please call your primary care clinic.  If you do not have a primary care provider, please call [phone] and they will assist you.        Care EveryWhere ID     This is your Care EveryWhere ID. This could be used by other organizations to access your [facility] medical records  [id number]           Review of your medicines      UNREVIEWED medicines. Ask your doctor about these medicines        Dose / Directions    triamterene-hydrochlorothiazide 37.5-25 MG per capsule   Commonly known as:  DYAZIDE   Used for:  Hypertension goal BP (blood pressure) < 140/90        Dose:  1 capsule   Take 1 capsule by mouth daily   Quantity:  90 capsule   Refills:  3         START taking        Dose / Directions    aspirin 81 MG tablet   Used for:  Coronary artery disease involving native coronary artery of native heart without angina pectoris        Dose:  81 mg   Take 1 tablet (81 mg) by mouth daily   Quantity:  90 tablet   Refills:  3         CONTINUE these medicines which may have CHANGED, or have new prescriptions. If we are uncertain of the size of tablets/capsules you have at home, strength may be listed as something that might have changed.        Dose / Directions    atorvastatin 20 MG tablet   Commonly known as:  LIPITOR   This may have changed:  how much to take   Used for:  Hyperlipidemia LDL goal <100        Dose:  40 mg   Take 2 tablets (40 mg) by mouth daily   Quantity:  90 tablet   Refills:  3            Where to get your medicines      Some of these will need a paper prescription and others can be bought over the counter. Ask your nurse if you have questions.     Bring a paper prescription for each of these medications     aspirin 81 MG tablet    atorvastatin 20 MG tablet                Protect others around you: Learn how to safely use, store and  throw away your medicines at www.disposemymeds.org.             Medication List: This is a list of all your medications and when to take them. Check marks below indicate your daily home schedule. Keep this list as a reference.      Medications           Morning Afternoon Evening Bedtime As Needed    aspirin 81 MG tablet   Take 1 tablet (81 mg) by mouth daily                                atorvastatin 20 MG tablet   Commonly known as:  LIPITOR   Take 2 tablets (40 mg) by mouth daily                                triamterene-hydrochlorothiazide 37.5-25 MG per capsule   Commonly known as:  DYAZIDE   Take 1 capsule by mouth daily

## 2017-05-22 NOTE — H&P
HPI:  71 y/o female with hx of ascending aortic aneurysm, planned for repair by Dr. Soto, sent for angiogram as part of pre-surgery workup. Normal stress echo in 11/2015. No symptoms.    Review Of Systems  Skin: no new lesions notes  Eyes: no vision changes, no blurry vision  ENT: no hearing changes, no change in vocal quality  Resp: no dyspnea  CV: no palpitations or chest pain  GI: no diarrhea, constipation  : no dysuria, no hematuria  MS: no muscle or joint pains  Neuro: no new neuropathies, no strength or sensation deficits  Psych: no behavioral changes  Heme/Lymph/Immune: no abnormal bruising or swelling  Endo: no heat/cold intolerance      Physical Exam:  BP (!) 135/96  Pulse 74  Temp 98  F (36.7  C) (Oral)  Resp 16  SpO2 94%    Const:  NAD, AOx3   HEENT:  EOMI, PERRLA, MMM   Resp: CTAB   CV: RRR, no m/r/g  GI:  Bowel sounds normal, NT/ND, no HSM or masses   MS: Full range of motion, no effusions  Skin: No concerning lesions, rashes or jaundice   Neuro: No focal deficit, normal gait, no tremor     Labs:  Reviewed in Epic    Imaging:  Reviewed in [facility]    Assessment and Plan  71 y/o female with hx of ascending aortic aneurysm, planned for surgical repair 6/2017.    -Proceed with angiogram as planned.    Staffed with Dr. Dalila Mtz MD, PhD  Cardiology Fellow  [phone]        I have seen and examined the patient with the [facility] team. I agree with the assessment and plan of the note above.I have reviewed pertinent labs.     Sid Conner MD  Interventional Cardiology  Pager: [phone]

## 2017-05-22 NOTE — PROGRESS NOTES
1220 Pt on 2a prepped and ready for cardiac angiogram. PIV placed and labs sent. EKG done. Pre-med solumedrol given. H&P not updated, MD notified. Family at BS. Pt ready for consent.

## 2017-05-23 LAB — INTERPRETATION ECG - MUSE: NORMAL

## 2017-05-25 ENCOUNTER — OFFICE VISIT (OUTPATIENT)
Dept: SURGERY | Facility: CLINIC | Age: 72
End: 2017-05-25

## 2017-05-25 ENCOUNTER — ANESTHESIA EVENT (OUTPATIENT)
Dept: SURGERY | Facility: CLINIC | Age: 72
DRG: 221 | End: 2017-05-25
Payer: MEDICARE

## 2017-05-25 ENCOUNTER — ALLIED HEALTH/NURSE VISIT (OUTPATIENT)
Dept: SURGERY | Facility: CLINIC | Age: 72
End: 2017-05-25

## 2017-05-25 ENCOUNTER — HOSPITAL ENCOUNTER (OUTPATIENT)
Dept: CARDIOLOGY | Facility: CLINIC | Age: 72
Discharge: HOME | End: 2017-05-25
Attending: THORACIC SURGERY (CARDIOTHORACIC VASCULAR SURGERY) | Admitting: THORACIC SURGERY (CARDIOTHORACIC VASCULAR SURGERY)
Payer: MEDICARE

## 2017-05-25 VITALS
SYSTOLIC BLOOD PRESSURE: 169 MMHG | HEART RATE: 74 BPM | OXYGEN SATURATION: 95 % | TEMPERATURE: 98.7 F | BODY MASS INDEX: 26.48 KG/M2 | DIASTOLIC BLOOD PRESSURE: 102 MMHG | RESPIRATION RATE: 16 BRPM | WEIGHT: 174.7 LBS | HEIGHT: 68 IN

## 2017-05-25 VITALS
RESPIRATION RATE: 16 BRPM | OXYGEN SATURATION: 96 % | HEART RATE: 60 BPM | DIASTOLIC BLOOD PRESSURE: 86 MMHG | SYSTOLIC BLOOD PRESSURE: 137 MMHG

## 2017-05-25 DIAGNOSIS — Z01.818 PREOP EXAMINATION: Primary | ICD-10-CM

## 2017-05-25 DIAGNOSIS — R93.1 ABNORMAL FINDINGS DIAGNOSTIC IMAGING OF HEART AND CORONARY CIRCULATION: ICD-10-CM

## 2017-05-25 DIAGNOSIS — Z01.810 PRE-OPERATIVE CARDIOVASCULAR EXAMINATION: ICD-10-CM

## 2017-05-25 DIAGNOSIS — R93.89 ABNORMAL CT OF THE CHEST: ICD-10-CM

## 2017-05-25 LAB
ALBUMIN UR-MCNC: NEGATIVE MG/DL
APPEARANCE UR: CLEAR
BILIRUB UR QL STRIP: NEGATIVE
COLOR UR AUTO: YELLOW
GLUCOSE UR STRIP-MCNC: NEGATIVE MG/DL
HGB UR QL STRIP: NEGATIVE
KETONES UR STRIP-MCNC: NEGATIVE MG/DL
LEUKOCYTE ESTERASE UR QL STRIP: NEGATIVE
MUCOUS THREADS #/AREA URNS LPF: PRESENT /LPF
NITRATE UR QL: NEGATIVE
PH UR STRIP: 5 PH (ref 5–7)
RBC #/AREA URNS AUTO: <1 /HPF (ref 0–2)
SP GR UR STRIP: 1.01 (ref 1–1.03)
URN SPEC COLLECT METH UR: ABNORMAL
UROBILINOGEN UR STRIP-MCNC: 0 MG/DL (ref 0–2)
WBC #/AREA URNS AUTO: <1 /HPF (ref 0–2)

## 2017-05-25 PROCEDURE — 99153 MOD SED SAME PHYS/QHP EA: CPT

## 2017-05-25 PROCEDURE — 93312 ECHO TRANSESOPHAGEAL: CPT | Mod: 26 | Performed by: INTERNAL MEDICINE

## 2017-05-25 PROCEDURE — 93320 DOPPLER ECHO COMPLETE: CPT | Mod: 26 | Performed by: INTERNAL MEDICINE

## 2017-05-25 PROCEDURE — 99152 MOD SED SAME PHYS/QHP 5/>YRS: CPT

## 2017-05-25 PROCEDURE — 99152 MOD SED SAME PHYS/QHP 5/>YRS: CPT | Performed by: INTERNAL MEDICINE

## 2017-05-25 PROCEDURE — 25000128 H RX IP 250 OP 636: Performed by: INTERNAL MEDICINE

## 2017-05-25 PROCEDURE — 25000132 ZZH RX MED GY IP 250 OP 250 PS 637: Mod: GY | Performed by: INTERNAL MEDICINE

## 2017-05-25 PROCEDURE — 93325 DOPPLER ECHO COLOR FLOW MAPG: CPT | Mod: 26 | Performed by: INTERNAL MEDICINE

## 2017-05-25 PROCEDURE — 25000125 ZZHC RX 250: Performed by: INTERNAL MEDICINE

## 2017-05-25 PROCEDURE — 93320 DOPPLER ECHO COMPLETE: CPT

## 2017-05-25 PROCEDURE — 99153 MOD SED SAME PHYS/QHP EA: CPT | Performed by: INTERNAL MEDICINE

## 2017-05-25 RX ORDER — FENTANYL CITRATE 50 UG/ML
25-50 INJECTION, SOLUTION INTRAMUSCULAR; INTRAVENOUS
Status: COMPLETED | OUTPATIENT
Start: 2017-05-25 | End: 2017-05-25

## 2017-05-25 RX ORDER — SODIUM CHLORIDE 9 MG/ML
INJECTION, SOLUTION INTRAVENOUS CONTINUOUS PRN
Status: DISCONTINUED | OUTPATIENT
Start: 2017-05-25 | End: 2017-05-26 | Stop reason: HOSPADM

## 2017-05-25 RX ORDER — FENTANYL CITRATE 50 UG/ML
25 INJECTION, SOLUTION INTRAMUSCULAR; INTRAVENOUS
Status: DISCONTINUED | OUTPATIENT
Start: 2017-05-25 | End: 2017-05-26 | Stop reason: HOSPADM

## 2017-05-25 RX ORDER — FLUMAZENIL 0.1 MG/ML
0.2 INJECTION, SOLUTION INTRAVENOUS
Status: DISCONTINUED | OUTPATIENT
Start: 2017-05-25 | End: 2017-05-26 | Stop reason: HOSPADM

## 2017-05-25 RX ORDER — NALOXONE HYDROCHLORIDE 0.4 MG/ML
.1-.4 INJECTION, SOLUTION INTRAMUSCULAR; INTRAVENOUS; SUBCUTANEOUS
Status: DISCONTINUED | OUTPATIENT
Start: 2017-05-25 | End: 2017-05-26 | Stop reason: HOSPADM

## 2017-05-25 RX ADMIN — LIDOCAINE HYDROCHLORIDE 30 ML: 20 SOLUTION ORAL; TOPICAL at 13:55

## 2017-05-25 RX ADMIN — FENTANYL CITRATE 75 MCG: 50 INJECTION, SOLUTION INTRAMUSCULAR; INTRAVENOUS at 13:58

## 2017-05-25 RX ADMIN — MIDAZOLAM 1.5 MG: 1 INJECTION INTRAMUSCULAR; INTRAVENOUS at 13:58

## 2017-05-25 RX ADMIN — TOPICAL ANESTHETIC 1 SPRAY: 200 SPRAY DENTAL; PERIODONTAL at 13:54

## 2017-05-25 ASSESSMENT — LIFESTYLE VARIABLES: TOBACCO_USE: 0

## 2017-05-25 NOTE — PROGRESS NOTES
Pt arrived in ECHO department  for scheduled LEXI.   Procedure explained, questions answered and consent signed. Discharge instructions discussed with patient.  Pt's throat sprayed at 13:50, therefore pt will not be able to eat or drink until 2 hours after at 15:50.  Informed pt of this time and encouraged to start with warm fluids and soft foods.    Pt tolerated procedure well, and was given a total of 75 mcg IV fentanyl and 1.5 mg IV versed for conscious sedation.   GE probe used for procedure.  Pt denied C.P or throat pain after procedure and was D/C home after awake and VSS.  Escorted out to front lobby by staff in w/c.

## 2017-05-25 NOTE — H&P
Pre-Operative H & P     CC:  Preoperative exam to assess for increased cardiopulmonary risk while undergoing surgery and anesthesia.    Date of Encounter: 5/25/2017  Primary Care Physician:  Herbert Epstein  Marilia YINKA Bean is a 72 year old female who presents for pre-operative H & P in preparation for ascending aortic aneurysm repair with Dr. Soto on 6/5/17 at [facility]. History is obtained from the patient.     Patient with enlarging ascending aortic aneurysm, first discovered 4-5 years ago when she was trying to donate a kidney to her sister. This has been followed over time, now 5.0 cm. Consult with Dr. Soto with above procedure planned.    Patient has just completed an angiogram today with finding of LAD lesion. A coronary bypass graft, LIMA to LAD has been recommended to be added to above procedure.     Patient's history is otherwise significant for Gardiner syndrome in her family. Her son had colon cancer at age 36 and she has personal history of colon polyps, but has not had genetic testing.     Past Medical History  Past Medical History:   Diagnosis Date     Aortic aneurysm (H) 7/1/2007    see 7/07 Ba report -follow yearly, treat high BP is occurs Problem list name updated by automated process. Provider to review     Aortic aneurysm of unspecified site without mention of rupture 7/2007    4.4 cm ascending aorta noted in 2007     Asymptomatic postmenopausal status (age-related) (natural)     on HRT  Prempro -weaning off 5/'2004     CAD (coronary artery disease)     LAD     Family history of Gardiner syndrome      Hyperlipidemia LDL goal <100 10/31/2010     Hypertension goal BP (blood pressure) < 140/90 2/9/2016     Leiomyoma of uterus, unspecified     Uterine fibroid     Lump or mass in breast 7/2004    rt. nodule - bx neg     Personal history of colonic polyps      Postmenopausal atrophic vaginitis 8/20/2006     Pulmonary nodule     incidental noted in 2007  during [place]     Pure hypercholesterolemia     mild, diet - low cholesterol, low fat.10/06 start Lovastatin       Past Surgical History  Past Surgical History:   Procedure Laterality Date     C DEXA INTERPRETATION, AXIAL  12/21/01    wnl. 7/2005 wnl but lower     COLONOSCOPY  05/07/07    Repeat in 1 year for surveillance     COLONOSCOPY  12/10/08    repeat 1 year  -see 1/2009 letter     COLONOSCOPY  03/31/10     COLONOSCOPY  10/31/2011    Procedure:COMBINED COLONOSCOPY, SINGLE BIOPSY/POLYPECTOMY BY BIOPSY; colonoscopy with polypectomy by biopsy; Surgeon:JESSICA GARCIA; Location:[facility]     COLONOSCOPY  12/6/2013    Procedure: COMBINED COLONOSCOPY, SINGLE BIOPSY/POLYPECTOMY BY BIOPSY;  Colonoscopy, Polypectomies;  Surgeon: Herbert Salinas MD;  Location: [facility]     COLONOSCOPY N/A 12/8/2015    Procedure: COLONOSCOPY;  Surgeon: Jared Carbajal MD;  Location:  GI     COLONOSCOPY N/A 4/26/2017    Procedure: COMBINED COLONOSCOPY, SINGLE OR MULTIPLE BIOPSY/POLYPECTOMY BY BIOPSY;  Colonoscopy with polypectomies with forceps and snare;  Surgeon: Herbert Salinas MD;  Location:  GI     ESOPHAGOSCOPY, GASTROSCOPY, DUODENOSCOPY (EGD), COMBINED N/A 12/8/2015    Procedure: COMBINED ESOPHAGOSCOPY, GASTROSCOPY, DUODENOSCOPY (EGD), BIOPSY SINGLE OR MULTIPLE;  Surgeon: Jared Carbajal MD;  Location:  GI     HC BIOPSY BREAST, PERC NEEDLE CORE, WITH IMAGING  8/17/2004    Right     HC COLONOSCOPY THRU STOMA W BIOPSY/CAUTERY TUMOR/POLYP/LESION  1999,2002 2004 polyp - hyperplastic - repeat 5 years ?     HC COLONOSCOPY W/WO BRUSH/WASH  12/12/2005    Polypectomy.  Diverticulosis-minimal. Bx adenomatous and mucosal polyps - repeat 1 year     HC ECP WITH CATARACT SURGERY Bilateral 2015, 2016     HC LAPAROSCOPY, SURGICAL; APPENDECTOMY  10/31/2004     HC LAPAROSCOPY, SURGICAL; CHOLECYSTECTOMY  2000    Cholecystectomy, Laparoscopic     HC REVISE MEDIAN N/CARPAL TUNNEL SURG  10/01/10    left     HC UGI ENDOSCOPY, SIMPLE  EXAM  03/31/10     HYSTERECTOMY, ELVIN  12/14/09    EMMY [facility]       Hx of Blood transfusions/reactions: Denies.     Hx of abnormal bleeding or anti-platelet use: Denies.    Menstrual history: No LMP recorded. Patient has had a hysterectomy.    Steroid use in the last year: Yes, for contrast prophylaxis.    Personal or FH with difficulty with Anesthesia:  Denies.    Prior to Admission Medications  Current Outpatient Prescriptions   Medication Sig Dispense Refill     aspirin 81 MG tablet Take 1 tablet (81 mg) by mouth daily (Patient taking differently: Take 81 mg by mouth every morning ) 90 tablet 3     atorvastatin (LIPITOR) 20 MG tablet Take 2 tablets (40 mg) by mouth daily (Patient taking differently: Take 20 mg by mouth every evening ) 90 tablet 3     triamterene-hydrochlorothiazide (DYAZIDE) 37.5-25 MG per capsule Take 1 capsule by mouth daily (Patient taking differently: Take 1 capsule by mouth every morning ) 90 capsule 3       Allergies  Allergies   Allergen Reactions     Contrast Dye Itching       Social History  Social History     Social History     Marital status:      Spouse name: Cain     Number of children: 4     Years of education: 12     Occupational History     Formerly named [employer]     Social History Main Topics     Smoking status: Never Smoker     Smokeless tobacco: Never Used     Alcohol use Yes      Comment: rarely     Drug use: No     Sexual activity: Yes     Partners: Male      Comment: Post menopausal     Other Topics Concern      Service No     Blood Transfusions No     Caffeine Concern Yes     coffee; 3c/d pop: 1c/wk     Occupational Exposure No     Hobby Hazards No     Sleep Concern Yes     c/o insomnia     Stress Concern No     Weight Concern Yes     desire wt loss     Special Diet No     Back Care No     Exercise No     Bike Helmet No     Seat Belt Yes     Self-Exams No     Social History Narrative    Lives with spouse. No domestic violence  "issues.       Family History  Family History   Problem Relation Age of Onset     CANCER Mother      Colon Cancer     HEART DISEASE Mother      MI     OSTEOPOROSIS Mother      CANCER Father      Colon Cancer     HEART DISEASE Father      Heart Disease/ Heart attacks     DIABETES Father      Adult Onset     CANCER Sister      Colon Cancer     HEART DISEASE Brother      Heart attacks at age 45     Breast Cancer Sister      CANCER Paternal Grandmother      Unknown type     HEART DISEASE Maternal Grandfather      MI     DIABETES Sister      Adult Onset     DIABETES Sister      Adult Onset     DIABETES Brother      Adult Onset     HEART DISEASE Brother      heart attack age 64     Cancer - colorectal Son      age 36, Gardiner syndrome       Review of Systems  The complete review of systems is negative other than noted in the HPI or here.   Constitutional: Denies fever, chills, weight loss.  HEENT: Wears glasses for vision. History of cataract surgery. Occasional feeling of food getting stuck in throat, easily washed down with water.  Respiratory: Denies cough or shortness of breath. Occasional, random feeling of not being able to get enough oxygen. Denies concern for JARED. Some snoring.  CV: Denies chest pain or irregular HR. Good activity tolerance. BP range typically 130s/70s. Has some \"white coat syndrome\".  GI: Denies abdominal pain. Irregular bowel function with frequent constipation.  : Denies dysuria.  M/S: OA.    Temp: 98.7  F (37.1  C) Temp src: Oral BP: (!) 169/102 Pulse: 74   Resp: 16 SpO2: 95 %         174 lbs 11.2 oz  5' 7.5\"   Body mass index is 26.96 kg/(m^2).       Physical Exam  Constitutional: Awake, alert, cooperative, no apparent distress, and appears stated age. Thin. Accompanied by daughter in law who is an ED nurse.  Eyes: Pupils equal, round and reactive to light, extra ocular muscles intact, sclera clear, conjunctiva normal. Glasses on.  HENT: Normocephalic, oral pharynx with moist mucus membranes, " upper/lower dentures. No goiter appreciated.   Respiratory: Clear to auscultation bilaterally, no crackles or wheezing. No cough or obvious dyspnea.  Cardiovascular: Regular rate and rhythm, normal S1 and S2, and no murmur noted.  Carotids, no bruits. No edema. Palpable pulses to radial  DP and PT arteries.   GI: Normal bowel sounds, soft, non-distended, non-tender, no masses palpated, no hepatosplenomegaly.   Lymph/Hematologic: No cervical lymphadenopathy and no supraclavicular lymphadenopathy.  Genitourinary: Deferred.   Skin: Warm and dry.  No rashes at anticipated surgical site. Right groin site checked, mild bruising, small puncture site. No signs of bleeding or hematoma.  Musculoskeletal: Full ROM of neck. There is no redness, warmth, or swelling of the joints. Gross motor strength is normal.    Neurologic: Awake, alert, oriented to name, place and time. Cranial nerves II-XII are grossly intact. Gait is normal.   Neuropsychiatric: Calm, cooperative. Normal affect.     Labs: (personally reviewed)  Lab Results   Component Value Date    WBC 5.8 05/22/2017     Lab Results   Component Value Date    RBC 4.62 05/22/2017     Lab Results   Component Value Date    HGB 13.8 05/22/2017     Lab Results   Component Value Date    HCT 41.2 05/22/2017     Lab Results   Component Value Date    MCV 89 05/22/2017     Lab Results   Component Value Date    MCH 29.9 05/22/2017     Lab Results   Component Value Date    MCHC 33.5 05/22/2017     Lab Results   Component Value Date    RDW 12.9 05/22/2017     Lab Results   Component Value Date     05/22/2017     Last Basic Metabolic Panel:  Lab Results   Component Value Date     05/22/2017      Lab Results   Component Value Date    POTASSIUM 3.6 05/22/2017     Lab Results   Component Value Date    CHLORIDE 104 05/22/2017     Lab Results   Component Value Date    TARIQ 9.4 05/22/2017     Lab Results   Component Value Date    CO2 31 05/22/2017     Lab Results   Component Value  Date    BUN 21 05/22/2017     Lab Results   Component Value Date    CR 1.04 05/22/2017     Lab Results   Component Value Date    GLC 92 05/22/2017     Lab Results   Component Value Date    AST 16 03/14/2017     Lab Results   Component Value Date    ALT 24 03/14/2017     Lab Results   Component Value Date    BILICONJ 0.0 01/02/2009      Lab Results   Component Value Date    BILITOTAL 0.5 03/14/2017     Lab Results   Component Value Date    ALBUMIN 3.9 03/14/2017     Lab Results   Component Value Date    PROTTOTAL 7.8 03/14/2017      Lab Results   Component Value Date    ALKPHOS 94 03/14/2017     EKG: Personally reviewed 5/22/17 Sinus rhythm, read as inferior infarct, age undetermined    Cardiac echo: 3/16/17 Echo  Interpretation Summary     The aortic Sinus(es) of Valsalva are mildly dilated at at 4.1 cm and the  ascending aorta is Moderately dilated at 5.0 cm (The ULNL = 3.7 cm). Compared  to the serial echos dated 3- and 1-8-2013, the ascending aorta has  progressively dilated from 4.2 cm => 4.6 cm => currently 5.0 cm. Close  continued and regular monitoring of this progressive ascending aortic  dilation/aneurysm is indicated. The left ventricle is normal in size with borderline to mild concentric left  ventricular hypertrophy. The visual ejection fraction is estimated at 65-70% with Grade I or early  diastolic dysfunction. There is trace mitral regurgitation. There is trace aortic regurgitation.  Right ventricle systolic pressure estimate is noted below and normal.  LEXI: 5/25/17 pending results  Stress test: 2014 Stress test  Interpretation Summary  Normal resting wall motion and no stress-induced wall motion abnormality.  Exercise and EKG portion reported separately. THIS IS A NORMAL STRESS  ECHOCARDIOGRAM.  Cath 5/25/17  Single vessel disease LAD.    5/22/17 Carotid US  Impression:     1. Right side:      Degree of stenosis: Normal  2. Left side:       Degree of stenosis: Normal  CTA 2013  Impression:       1. 4.5 cm ascending thoracic aortic aneurysm. This is not  significantly changed.  2. Lung nodules. One of these in the left lower lobe is new. The  remaining were seen on the previous exam and are not significantly  changed. Recommend 6 month followup chest CT to document stability.  5/22/17  CXR  FINDINGS: Cardiac silhouette is not enlarged. Fibrotic changes are  noted in the upper lung zones. Nodular opacity in the left lower lung  zone again noted. Cardiac silhouette is not enlarged.         IMPRESSION: No acute airspace disease.  3/24/17 Chest CT  IMPRESSION:  1.  Minimal enlargement of the ascending aorta, now measuring 5.0 cm  compared to 4.8 cm on prior study.  2. Bilateral subcentimeter pulmonary nodules, unchanged from prior  exam.    ASSESSMENT and PLAN  Marilia Bean is a 72 year old female scheduled to undergo ascending aortic aneurysm repair on 6/5/17 by Dr. Soto. She has the following specific operative considerations:   - RCRI : No serious cardiac risks.     - Anesthesia considerations:  Refer to PAC assessment in anesthesia records  - VTE risk: 0.26%  - JARED # of risks 4/8 = Intermediate risk  - Risk of PONV score = 2.  If > 2, anti-emetic intervention recommended.     --Ascending aortic aneurysm, first discovered 4-5 years ago. Now enlarged to 5.0 cm. Asymptomatic. Above procedure planned for repair.   --HLD. Atorvastatin. HYPERTENSION. Average range 130s/70s. Will hold Dyazide on DOS. ASA 81 mg daily and will hold up to DOS. Multiple tests above. Angiogram today showing single vessel disease in LAD. LIMA to LAD has been recommended at the same time. Surgical planning continues. Good activity tolerance.   --Nonsmoker. No pulmonary symptoms. Some snoring but denies concern for JARED.   --No history of blood transfusion. Type and screen drawn today by service.    Arrival time, NPO, shower and medication instructions provided by nursing staff today. Preparing For Your Surgery handout given.    Patient  was discussed with Dr Rasmussne.    CECILIA Mg CNS  Preoperative Assessment Center  [facility]  Phone: [phone]  Fax: [phone]

## 2017-05-25 NOTE — ANESTHESIA PREPROCEDURE EVALUATION
Anesthesia Evaluation     . Pt has had prior anesthetic. Type: General and MAC    No history of anesthetic complications          ROS/MED HX    ENT/Pulmonary:     (+)JARED risk factors snores loudly, hypertension, , . .   (-) tobacco use   Neurologic:  - neg neurologic ROS     Cardiovascular: Comment: Ascending aortic aneurysm 5.0 cm. Angiogram showed single vessel disease in LAD.    (+) Dyslipidemia, hypertension-range: 130s/70s, -CAD, --. Taking blood thinners Pt has received instructions: Instructions Given to patient: Will remain on up to DOS. . . :. . Previous cardiac testing Echodate:3/16/17 results:Interpretation Summary      The aortic Sinus(es) of Valsalva are mildly dilated at at 4.1 cm and the ascending aorta is Moderately dilated at 5.0 cm (The ULNL = 3.7 cm). Compared to the serial echos dated 3- and 1-8-2013, the ascending aorta has progressively dilated from 4.2 cm => 4.6 cm => currently 5.0 cm. Close continued and regular monitoring of this progressive ascending aortic dilation/aneurysm is indicated. The left ventricle is normal in size with borderline to mild concentric left ventricular hypertrophy.  The visual ejection fraction is estimated at 65-70% with Grade I or early diastolic dysfunction. There is trace mitral regurgitation. There is trace aortic regurgitation.  Right ventricle systolic pressure estimate is noted below and normal.   Stress Testdate:2014 results:Interpretation Summary  Normal resting wall motion and no stress-induced wall motion abnormality. Exercise and EKG portion reported separately. THIS IS A NORMAL STRESS ECHOCARDIOGRAM.  ECG reviewed date:5/22/17  results:SR, read as inferior infarct, age undeterminedCath date: 5/25/17 results:Single vessel disease LAD          METS/Exercise Tolerance:  >4 METS   Hematologic:  - neg hematologic  ROS       Musculoskeletal:   (+) arthritis, , , -       GI/Hepatic:     (+) Other GI/Hepatic Family history of Gardiner syndrome, personal  history of colon polyps      Renal/Genitourinary:  - ROS Renal section negative       Endo:  - neg endo ROS       Psychiatric:  - neg psychiatric ROS       Infectious Disease:  - neg infectious disease ROS       Malignancy:      - no malignancy   Other:    (+) No chance of pregnancy C-spine cleared: N/A, no H/O Chronic Pain,no other significant disability                    Physical Exam      Airway   Mallampati: II  TM distance: >3 FB  Neck ROM: full    Dental   (+) upper dentures and lower dentures    Cardiovascular   Rhythm and rate: regular and normal      Pulmonary    breath sounds clear to auscultation    Other findings: For further details of assessment, testing, and physical exam please see H and P completed on same date.           PAC Discussion and Assessment    ASA Classification: 2  Case is suitable for: Jet  Anesthetic techniques and relevant risks discussed: GA  Invasive monitoring and risk discussed: Yes  Types: CVC, arterial line  Possibility and Risk of blood transfusion discussed: Yes  NPO instructions given:   Additional anesthetic preparation and risks discussed:   Needs early admission to pre-op area:   Other:     PAC Resident/NP Anesthesia Assessment:  Marilia Bean is a 72 year old female scheduled to undergo ascending aortic aneurysm repair on 6/5/17 by Dr. Soto. She has the following specific operative considerations:   - RCRI : No serious cardiac risks.     - VTE risk: 0.26%  - JARED # of risks 4/8 = Intermediate risk  - Risk of PONV score = 2.  If > 2, anti-emetic intervention recommended.    Multiple sedation procedures. Last GA for hysterectomy 09. No history of problems with anesthesia.       --Ascending aortic aneurysm, first discovered 4-5 years ago. Now enlarged to 5.0 cm. Asymptomatic. Above procedure planned for repair.   --HLD. Atorvastatin. HYPERTENSION. Average range 130s/70s. Will hold Dyazide on DOS. ASA 81 mg daily and will hold up to DOS. Multiple tests above. Angiogram  today showing single vessel disease in LAD. LIMA to LAD has been recommended at the same time. Surgical planning continues. Good activity tolerance.   --Nonsmoker. No pulmonary symptoms. Some snoring but denies concern for JARED.   --No history of blood transfusion. Type and screen drawn today by service.      Patient was discussed with Dr Rasmussen.      Reviewed and Signed by PAC Mid-Level Provider/Resident  Mid-Level Provider/Resident: CECILIA Giordano CNS  Date: 5/25/17  Time: 11:52am    Attending Anesthesiologist Anesthesia Assessment:  I have examined the patient and reviewed the chart.  I have discussed the patient with the JOANA and concur with her findings.  The patient is scheduled for Asc Aortic Aneurysm repair.  Yesterday's angiogram demonstrated LAD disease so I suspect she will have LIMA to LAD also. The patient is asymptomatic; aneurysm was found on work up for possible donation of her kidney.  Her activity level is > 4 with no symptoms.  No known pulmonary or renal disease.  She has had cholecystectomy, appendectomy, ELVIN with no anesthetic complications.    PE: MPC 1 airway, full upper and lower dentures, lungs clear, RRR with 4/6 murmur.    Discussed GA, a line, central line , LEXI, possble transfusion with the patient.  She agrees to all.    Final plan per attending the day of surgery.      Reviewed and Signed by PAC Anesthesiologist  Anesthesiologist: Yovanny Rasmussen MD  Date: 05/25/2017  Time:   Pass/Fail:   Disposition:     PAC Pharmacist Assessment:        Pharmacist:   Date:   Time:      Anesthesia Plan      History & Physical Review  History and physical reviewed and following examination; no interval change.    ASA Status:  4 .    NPO Status:  > 8 hours    Plan for General and ETT with Intravenous induction. Maintenance will be Balanced.      Additional equipment: 2nd IV, Arterial Line, Central Line, LEXI, CVP and PA Catheter      Postoperative Care  Postoperative pain management:  IV analgesics.       Consents  Anesthetic plan, risks, benefits and alternatives discussed with:  Patient..        Procedure:  Procedure(s):  Ascending Aortic Aneurysm Repair  - Wound Class: I-Clean    Patient Active Problem List   Diagnosis     Asymptomatic postmenopausal status     Postmenopausal atrophic vaginitis     Aortic aneurysm (H)     HYPERLIPIDEMIA LDL GOAL <100     Advanced directives, counseling/discussion     Hypertension goal BP (blood pressure) < 140/90     Status post coronary angiogram       Past Medical History:   Diagnosis Date     Aortic aneurysm (H) 7/1/2007    see 7/07 [place] report -follow yearly, treat high BP is occurs Problem list name updated by automated process. Provider to review     Aortic aneurysm of unspecified site without mention of rupture 7/2007    4.4 cm ascending aorta noted in 2007     Asymptomatic postmenopausal status (age-related) (natural)     on HRT  Prempro -weaning off 5/'2004     CAD (coronary artery disease)     LAD     Family history of Gardiner syndrome      Hyperlipidemia LDL goal <100 10/31/2010     Hypertension goal BP (blood pressure) < 140/90 2/9/2016     Leiomyoma of uterus, unspecified     Uterine fibroid     Lump or mass in breast 7/2004    rt. nodule - bx neg     Personal history of colonic polyps      Postmenopausal atrophic vaginitis 8/20/2006     Pulmonary nodule     incidental noted in 2007 during [place]     Pure hypercholesterolemia     mild, diet - low cholesterol, low fat.10/06 start Lovastatin       Past Surgical History:   Procedure Laterality Date     C DEXA INTERPRETATION, AXIAL  12/21/01    wnl. 7/2005 wnl but lower     COLONOSCOPY  05/07/07    Repeat in 1 year for surveillance     COLONOSCOPY  12/10/08    repeat 1 year  -see 1/2009 letter     COLONOSCOPY  03/31/10     COLONOSCOPY  10/31/2011    Procedure:COMBINED COLONOSCOPY, SINGLE BIOPSY/POLYPECTOMY BY BIOPSY; colonoscopy with polypectomy by biopsy; Surgeon:JESSICA GARCIA; Location: GI     COLONOSCOPY  12/6/2013     Procedure: COMBINED COLONOSCOPY, SINGLE BIOPSY/POLYPECTOMY BY BIOPSY;  Colonoscopy, Polypectomies;  Surgeon: Herbert Salinas MD;  Location: [facility]     COLONOSCOPY N/A 12/8/2015    Procedure: COLONOSCOPY;  Surgeon: Jared Carbajal MD;  Location: [facility]     COLONOSCOPY N/A 4/26/2017    Procedure: COMBINED COLONOSCOPY, SINGLE OR MULTIPLE BIOPSY/POLYPECTOMY BY BIOPSY;  Colonoscopy with polypectomies with forceps and snare;  Surgeon: Herbert Salinas MD;  Location:  GI     ESOPHAGOSCOPY, GASTROSCOPY, DUODENOSCOPY (EGD), COMBINED N/A 12/8/2015    Procedure: COMBINED ESOPHAGOSCOPY, GASTROSCOPY, DUODENOSCOPY (EGD), BIOPSY SINGLE OR MULTIPLE;  Surgeon: Jared Carbajal MD;  Location:  GI     HC BIOPSY BREAST, PERC NEEDLE CORE, WITH IMAGING  8/17/2004    Right     HC COLONOSCOPY THRU STOMA W BIOPSY/CAUTERY TUMOR/POLYP/LESION  1999,2002 2004 polyp - hyperplastic - repeat 5 years ?     HC COLONOSCOPY W/WO BRUSH/WASH  12/12/2005    Polypectomy.  Diverticulosis-minimal. Bx adenomatous and mucosal polyps - repeat 1 year     HC ECP WITH CATARACT SURGERY Bilateral 2015, 2016     HC LAPAROSCOPY, SURGICAL; APPENDECTOMY  10/31/2004     HC LAPAROSCOPY, SURGICAL; CHOLECYSTECTOMY  2000    Cholecystectomy, Laparoscopic     HC REVISE MEDIAN N/CARPAL TUNNEL SURG  10/01/10    left     HC UGI ENDOSCOPY, SIMPLE EXAM  03/31/10     HYSTERECTOMY, ELVIN  12/14/09    TAHBSO, MMK         No current facility-administered medications on file prior to encounter.   Current Outpatient Prescriptions on File Prior to Encounter:  triamterene-hydrochlorothiazide (DYAZIDE) 37.5-25 MG per capsule Take 1 capsule by mouth daily (Patient taking differently: Take 1 capsule by mouth every morning )       Allergies   Allergen Reactions     Contrast Dye Itching       Recent Labs   Lab Test  05/22/17   1155   HGB  13.8     Recent Labs   Lab Test  06/05/17   0610   POTASSIUM  3.1*     Recent Labs   Lab Test  05/22/17   1155   PLT  279     No  results for input(s): INR in the last 13298 hours.    Recent Results (from the past 4320 hour(s))   Echocardiogram Complete    Narrative    [id number]  [id number]  [id number]  [id number]^VELASQUEZ^YANNI^WALTER           [facility]  Echocardiography Laboratory  [address] [place] [address]        Name: MARGIE PATINO  MRN: [id number]  : 1945  Study Date: 2017 10:30 AM  Age: 71 yrs  Gender: Female  Patient Location: [facility]  Reason For Study: Family history of ischemic heart disease and other diseases  of  History: Aortic Aneurysm, HTN  Ordering Physician: YANNI ENG  Referring Physician: YANNI ENG  Performed By: Leona Mullen     BSA: 1.9 m2  Height: 67 in  Weight: 182 lb  HR: 65  BP: 144/88 mmHg  _____________________________________________________________________________  __        Procedure  Complete Echo Adult.  _____________________________________________________________________________  __        Interpretation Summary        The aortic Sinus(es) of Valsalva are mildly dilated at at 4.1 cm and the  ascending aorta is Moderately dilated at 5.0 cm (The ULNL = 3.7 cm). Compared  to the serial echos dated 3- and 2013, the ascending aorta has  progressively dilated from 4.2 cm => 4.6 cm => currently 5.0 cm. Close  continued and regular monitoring of this progressive ascending aortic  dilation/aneurysm is indicated.  The left ventricle is normal in size with borderline to mild concentric left  ventricular hypertrophy.  The visual ejection fraction is estimated at 65-70% with Grade I or early  diastolic dysfunction.  There is trace mitral regurgitation.  There is trace aortic regurgitation.  Right ventricle systolic pressure estimate is noted below and normal.     _____________________________________________________________________________  __        Left Ventricle  The left ventricle is normal in size. There is borderline concentric left  ventricular hypertrophy.  The left ventricular ejection fraction is normal. The  visual ejection fraction is estimated at 65-70%. Grade I or early diastolic  dysfunction. E by E prime ratio is between 8 and 15, which is indeterminate  for assessment of left ventricular filling pressures. Normal left ventricular  wall motion. There is no thrombus seen in the left ventricle.     Right Ventricle  The right ventricle is normal in structure, function and size. The right  ventricular systolic function is normal.     Atria  Normal left atrial size. Right atrial size is normal. Intact atrial septum.     Mitral Valve  The mitral valve is normal in structure and function. There is no evidence of  mitral valve prolapse. There is trace mitral regurgitation. There is no mitral  valve stenosis.        Tricuspid Valve  The tricuspid valve is normal in structure and function. Right ventricle  systolic pressure estimate normal. There is trace tricuspid regurgitation. The  right ventricular systolic pressure is approximated at 14.5 mmHg plus the  right atrial pressure.     Aortic Valve  The aortic valve is normal in structure and function. There is trace aortic  regurgitation. No aortic stenosis is present.     Pulmonic Valve  The pulmonic valve is not well seen, but is grossly normal. There is trace  pulmonic valvular regurgitation.     Vessels  The aortic Sinus(es) of Valsalva are mildly dilated. at 4.1 cm. The ascending  aorta is Moderately dilated. at 5.0 cm (The ULNL = 3.7 cm). The inferior vena  cava is not dilated. The IVC is normal in size and reactivity with  respiration, suggesting normal central venous pressure.     Pericardium  The pericardium appears normal. There is no pleural effusion.     _____________________________________________________________________________  __  MMode/2D Measurements & Calculations  IVSd: 1.2 cm  LVIDd: 3.9 cm  LVIDs: 2.8 cm  LVPWd: 1.2 cm  FS: 28.8 %  EDV(Teich): 65.3 ml  ESV(Teich): 28.7 ml  LV mass(C)d: 158.6  grams     Ao root diam: 4.1 cm  asc Aorta Diam: 5.0 cm  LA Volume (BP): 47.2 ml  LA Volume Index (BP): 24.3 ml/m2        Doppler Measurements & Calculations  MV E max dorinda: 47.6 cm/sec  MV A max dorinda: 86.5 cm/sec  MV E/A: 0.55  MV dec time: 0.27 sec  PA acc time: 0.11 sec  TR max dorinda: 190.6 cm/sec  TR max P.5 mmHg     Lateral E/e': 12.1  Medial E/e': 10.9           _____________________________________________________________________________  __           Report approved by: Lennie Clarke 2017 12:10 PM            Attending Anesthesiologist    Luis Armando Kohler MD    [phone]                  .

## 2017-05-25 NOTE — PATIENT INSTRUCTIONS
Preparing for Your Surgery      Name:  Marilia Bean   MRN:  [id number]   :  1945   Today's Date:  2017     Arriving for surgery:   Repair Aneurysm Ascending Aorta  Surgery date:    Surgery time:  7:00 am   Arrival time:  5:00 am   Please come to:       [facility] [address]  [address] [place]  [address]    -   parking is available in front of the hospital from 5:15 am to 8:00 pm    -  Stop at the Information Desk in the lobby    -   Inform the information person that you are here for surgery. An escort to 3c will be provided. If you would not like an escort, please proceed to 3C on the 3rd floor. [phone]     What can I eat or drink?  -  You may have solid food or milk products until 8 hours prior to your surgery.  Nothing after 11 pm   -  You may have water, apple juice or 7up/Sprite until 2 hours prior to your surgery.  Until 5 am arrival time     Which medicines can I take?  -  Do NOT take these medications in the morning, the day of surgery:      Aspirin       triemterene-hydrocholorothiazide (dyazide)        -  Please take these medications the day of surgery:       Atorvastatin               How do I prepare myself?  -  Take two showers: one the night before surgery; and one the morning of surgery.         Use Scrubcare or Hibiclens to wash from neck down.  You may use your own shampoo and conditioner. No other hair products.   -  Do NOT use lotion, powder, deodorant, or antiperspirant the day of your surgery.  -  Do NOT wear any makeup, fingernail polish or jewelry.  -  Begin using Incentive Spirometer 1 week prior to surgery.  Use 4 times per day, up to 5-10 breaths each time.  Bring Incentive Spirometer to hospital.  -Do not bring your own medications to the hospital, except for inhalers and eye drops.  -  Bring your ID and insurance card.    Questions or Concerns:  If you have questions or concerns, please call  the  Preoperative Assessment Center, Monday-Friday 7AM-7PM:  [phone]  AFTER YOUR SURGERY  Breathing exercises   Breathing exercises help you recover faster. Take deep breaths and let the air out slowly. This will:     Help you wake up after surgery.    Help prevent complications like pneumonia.  Preventing complications will help you go home sooner.   We may give you a breathing device (incentive spirometer) to encourage you to breathe deeply.   Nausea and vomiting   You may feel sick to your stomach after surgery; if so, let your nurse know.    Pain control:  After surgery, you may have pain. Our goal is to help you manage your pain. Pain medicine will help you feel comfortable enough to do activities that will help you heal.  These activities may include breathing exercises, walking and physical therapy.   To help your health care team treat your pain we will ask: 1) If you have pain  2) where it is located 3) describe your pain in your words  Methods of pain control include medications given by mouth, vein or by nerve block for some surgeries.  We may give you a pain control pump that will:  1) Deliver the medicine through a tube placed in your vein  2) Control the amount of medicine you receive  3) Allow you to push a button to deliver a dose of pain medicine  Sequential Compression Device (SCD) or Pneumo Boots:  You may need to wear SCD S on your legs or feet. These are wraps connected to a machine that pumps in air and releases it. The repeated pumping helps prevent blood clots from forming.     Using an Incentive Spirometer  Soon after your surgery, a nurse or therapist will teach you breathing exercises. These keep your lungs clear, strengthen your breathing muscles, and help prevent complications.  The exercises include doing a deep-breathing exercise using a device called an incentive spirometer.  To do these exercises, you will breathe in through your mouth and not your nose. The incentive spirometer  only works correctly if you breathe in through your mouth.  Four steps to clear lungs     Deep breathing expands the lungs, aids circulation, and helps prevent pneumonia.   1. Exhale normally.    Relax and breathe out.  2. Place your lips tightly around the mouthpiece.    Make sure the device is upright and not tilted.  3. Inhale as much air as you can through the mouthpiece (don't breath through your nose).    Inhale slowly and deeply.    Hold your breath long enough to keep the balls or disk raised for at least 3 seconds.    If you re inhaling too quickly, your device may make a tone. If you hear this tone, inhale more slowly.  4. Repeat the exercise regularly.    Do this exercise every hour while you're awake, or as your health care provider instructs.    You will also be taught coughing exercises and be asked to do them regularly on your own.    2519-8827 [employer]. [address] [place] [address], [place] [address]. All rights reserved. This information is not intended as a substitute for professional medical care. Always follow your healthcare professional's instructions.

## 2017-06-05 ENCOUNTER — HOSPITAL ENCOUNTER (INPATIENT)
Facility: CLINIC | Age: 72
LOS: 5 days | Discharge: HOME | DRG: 221 | End: 2017-06-10
Attending: THORACIC SURGERY (CARDIOTHORACIC VASCULAR SURGERY) | Admitting: THORACIC SURGERY (CARDIOTHORACIC VASCULAR SURGERY)
Payer: MEDICARE

## 2017-06-05 ENCOUNTER — ANESTHESIA (OUTPATIENT)
Dept: SURGERY | Facility: CLINIC | Age: 72
DRG: 221 | End: 2017-06-05
Payer: MEDICARE

## 2017-06-05 ENCOUNTER — APPOINTMENT (OUTPATIENT)
Dept: GENERAL RADIOLOGY | Facility: CLINIC | Age: 72
DRG: 221 | End: 2017-06-05
Attending: THORACIC SURGERY (CARDIOTHORACIC VASCULAR SURGERY)
Payer: MEDICARE

## 2017-06-05 DIAGNOSIS — Z95.1 S/P CABG (CORONARY ARTERY BYPASS GRAFT): ICD-10-CM

## 2017-06-05 DIAGNOSIS — I10 HYPERTENSION GOAL BP (BLOOD PRESSURE) < 140/90: ICD-10-CM

## 2017-06-05 DIAGNOSIS — E78.5 HYPERLIPIDEMIA LDL GOAL <100: ICD-10-CM

## 2017-06-05 DIAGNOSIS — Z98.890 S/P AORTIC ANEURYSM REPAIR: ICD-10-CM

## 2017-06-05 DIAGNOSIS — G89.18 ACUTE POST-OPERATIVE PAIN: Primary | ICD-10-CM

## 2017-06-05 DIAGNOSIS — Z86.79 S/P AORTIC ANEURYSM REPAIR: ICD-10-CM

## 2017-06-05 DIAGNOSIS — I71.21 ASCENDING AORTIC ANEURYSM (H): Primary | ICD-10-CM

## 2017-06-05 LAB
ABO + RH BLD: NORMAL
ABO + RH BLD: NORMAL
ANGLE RATE OF CLOT GROWTH: 69.1 DEG (ref 59–74)
ANGLE RATE OF CLOT GROWTH: 70 DEG (ref 59–74)
ANGLE RATE OF CLOT GROWTH: 72.2 DEG (ref 59–74)
ANION GAP SERPL CALCULATED.3IONS-SCNC: 10 MMOL/L (ref 3–14)
APTT PPP: 54 SEC (ref 22–37)
BASE DEFICIT BLDA-SCNC: 0.1 MMOL/L
BASE DEFICIT BLDA-SCNC: 0.8 MMOL/L
BASE DEFICIT BLDA-SCNC: 2 MMOL/L
BASE DEFICIT BLDA-SCNC: 2.5 MMOL/L
BASE DEFICIT BLDA-SCNC: 3.2 MMOL/L
BASE DEFICIT BLDA-SCNC: 5.3 MMOL/L
BASE DEFICIT BLDV-SCNC: 4.7 MMOL/L
BASE EXCESS BLDA CALC-SCNC: 0.4 MMOL/L
BASE EXCESS BLDA CALC-SCNC: 0.6 MMOL/L
BASE EXCESS BLDA CALC-SCNC: 0.7 MMOL/L
BASE EXCESS BLDA CALC-SCNC: 1.2 MMOL/L
BASE EXCESS BLDA CALC-SCNC: 2.8 MMOL/L
BASE EXCESS BLDV CALC-SCNC: 3.2 MMOL/L
BLD GP AB SCN SERPL QL: NORMAL
BLD PROD TYP BPU: NORMAL
BLD UNIT ID BPU: 0
BLOOD BANK CMNT PATIENT-IMP: NORMAL
BLOOD BANK CMNT PATIENT-IMP: NORMAL
BLOOD PRODUCT CODE: NORMAL
BPU ID: NORMAL
BUN SERPL-MCNC: 13 MG/DL (ref 7–30)
CA-I BLD-MCNC: 4 MG/DL (ref 4.4–5.2)
CA-I BLD-MCNC: 4.1 MG/DL (ref 4.4–5.2)
CA-I BLD-MCNC: 4.2 MG/DL (ref 4.4–5.2)
CA-I BLD-MCNC: 4.6 MG/DL (ref 4.4–5.2)
CA-I BLD-MCNC: 4.7 MG/DL (ref 4.4–5.2)
CA-I BLD-MCNC: 4.9 MG/DL (ref 4.4–5.2)
CA-I BLD-MCNC: 5.3 MG/DL (ref 4.4–5.2)
CALCIUM SERPL-MCNC: 8.1 MG/DL (ref 8.5–10.1)
CHLORIDE SERPL-SCNC: 116 MMOL/L (ref 94–109)
CI HYPERCOAGULATION INDEX: 1.4 RATIO (ref 0–3)
CI HYPERCOAGULATION INDEX: 1.8 RATIO (ref 0–3)
CI HYPOCOAGULATION INDEX: 1.1 RATIO (ref 0–3)
CLOT LYSIS 30M P MA LENFR BLD TEG: 0 % (ref 0–8)
CLOT LYSIS 30M P MA LENFR BLD TEG: 0 % (ref 0–8)
CLOT LYSIS 30M P MA LENFR BLD TEG: 2 % (ref 0–8)
CLOT STRENGTH BLD TEG: 10.7 KD/SC (ref 5.3–13.2)
CLOT STRENGTH BLD TEG: 11.6 KD/SC (ref 5.3–13.2)
CLOT STRENGTH BLD TEG: 8.9 KD/SC (ref 5.3–13.2)
CO2 SERPL-SCNC: 21 MMOL/L (ref 20–32)
CREAT SERPL-MCNC: 0.9 MG/DL (ref 0.52–1.04)
ERYTHROCYTE [DISTWIDTH] IN BLOOD BY AUTOMATED COUNT: 12.9 % (ref 10–15)
FIBRINOGEN PPP-MCNC: 243 MG/DL (ref 200–420)
FIBRINOGEN PPP-MCNC: 245 MG/DL (ref 200–420)
GFR SERPL CREATININE-BSD FRML MDRD: 61 ML/MIN/1.7M2
GLUCOSE BLD-MCNC: 115 MG/DL (ref 70–99)
GLUCOSE BLD-MCNC: 151 MG/DL (ref 70–99)
GLUCOSE BLD-MCNC: 155 MG/DL (ref 70–99)
GLUCOSE BLD-MCNC: 165 MG/DL (ref 70–99)
GLUCOSE BLD-MCNC: 166 MG/DL (ref 70–99)
GLUCOSE BLD-MCNC: 168 MG/DL (ref 70–99)
GLUCOSE BLD-MCNC: 174 MG/DL (ref 70–99)
GLUCOSE BLD-MCNC: 176 MG/DL (ref 70–99)
GLUCOSE BLD-MCNC: 182 MG/DL (ref 70–99)
GLUCOSE BLDC GLUCOMTR-MCNC: 126 MG/DL (ref 70–99)
GLUCOSE BLDC GLUCOMTR-MCNC: 131 MG/DL (ref 70–99)
GLUCOSE BLDC GLUCOMTR-MCNC: 136 MG/DL (ref 70–99)
GLUCOSE BLDC GLUCOMTR-MCNC: 155 MG/DL (ref 70–99)
GLUCOSE BLDC GLUCOMTR-MCNC: 156 MG/DL (ref 70–99)
GLUCOSE BLDC GLUCOMTR-MCNC: 166 MG/DL (ref 70–99)
GLUCOSE BLDC GLUCOMTR-MCNC: 173 MG/DL (ref 70–99)
GLUCOSE BLDC GLUCOMTR-MCNC: 180 MG/DL (ref 70–99)
GLUCOSE BLDC GLUCOMTR-MCNC: 188 MG/DL (ref 70–99)
GLUCOSE SERPL-MCNC: 140 MG/DL (ref 70–99)
HCO3 BLD-SCNC: 21 MMOL/L (ref 21–28)
HCO3 BLD-SCNC: 22 MMOL/L (ref 21–28)
HCO3 BLD-SCNC: 23 MMOL/L (ref 21–28)
HCO3 BLD-SCNC: 23 MMOL/L (ref 21–28)
HCO3 BLD-SCNC: 25 MMOL/L (ref 21–28)
HCO3 BLD-SCNC: 25 MMOL/L (ref 21–28)
HCO3 BLD-SCNC: 26 MMOL/L (ref 21–28)
HCO3 BLDV-SCNC: 22 MMOL/L (ref 21–28)
HCO3 BLDV-SCNC: 28 MMOL/L (ref 21–28)
HCT VFR BLD AUTO: 28.2 % (ref 35–47)
HGB BLD-MCNC: 11.8 G/DL (ref 11.7–15.7)
HGB BLD-MCNC: 11.9 G/DL (ref 11.7–15.7)
HGB BLD-MCNC: 8.2 G/DL (ref 11.7–15.7)
HGB BLD-MCNC: 8.5 G/DL (ref 11.7–15.7)
HGB BLD-MCNC: 8.8 G/DL (ref 11.7–15.7)
HGB BLD-MCNC: 9.1 G/DL (ref 11.7–15.7)
HGB BLD-MCNC: 9.6 G/DL (ref 11.7–15.7)
INR PPP: 1.38 (ref 0.86–1.14)
INR PPP: 1.59 (ref 0.86–1.14)
K TIME TO SPEC CLOT STRENGTH: 1.2 MIN (ref 1–3)
K TIME TO SPEC CLOT STRENGTH: 1.4 MIN (ref 1–3)
K TIME TO SPEC CLOT STRENGTH: 1.5 MIN (ref 1–3)
LACTATE BLD-SCNC: 0.6 MMOL/L (ref 0.7–2.1)
LACTATE BLD-SCNC: 0.8 MMOL/L (ref 0.7–2.1)
LACTATE BLD-SCNC: 1 MMOL/L (ref 0.7–2.1)
LACTATE BLD-SCNC: 1.1 MMOL/L (ref 0.7–2.1)
LACTATE BLD-SCNC: 1.2 MMOL/L (ref 0.7–2.1)
LACTATE BLD-SCNC: 1.4 MMOL/L (ref 0.7–2.1)
LACTATE BLD-SCNC: 1.5 MMOL/L (ref 0.7–2.1)
LACTATE BLD-SCNC: 1.8 MMOL/L (ref 0.7–2.1)
LACTATE BLD-SCNC: 2.7 MMOL/L (ref 0.7–2.1)
LACTATE BLD-SCNC: 4.7 MMOL/L (ref 0.7–2.1)
LY60 LYSIS AT 60 MINUTES: 2.9 % (ref 0–15)
LY60 LYSIS AT 60 MINUTES: 3.3 % (ref 0–15)
LY60 LYSIS AT 60 MINUTES: 4.8 % (ref 0–15)
MA MAXIMUM CLOT STRENGTH: 64 MM (ref 55–74)
MA MAXIMUM CLOT STRENGTH: 68.2 MM (ref 55–74)
MA MAXIMUM CLOT STRENGTH: 69.8 MM (ref 55–74)
MAGNESIUM SERPL-MCNC: 2.5 MG/DL (ref 1.6–2.3)
MAGNESIUM SERPL-MCNC: 2.8 MG/DL (ref 1.6–2.3)
MCH RBC QN AUTO: 29.7 PG (ref 26.5–33)
MCHC RBC AUTO-ENTMCNC: 34 G/DL (ref 31.5–36.5)
MCV RBC AUTO: 87 FL (ref 78–100)
NUM BPU REQUESTED: 2
NUM BPU REQUESTED: 4
NUM BPU REQUESTED: 5
NUM BPU REQUESTED: 6
O2/TOTAL GAS SETTING VFR VENT: 100 %
O2/TOTAL GAS SETTING VFR VENT: 40 %
O2/TOTAL GAS SETTING VFR VENT: 48 %
O2/TOTAL GAS SETTING VFR VENT: 50 %
O2/TOTAL GAS SETTING VFR VENT: 55 %
O2/TOTAL GAS SETTING VFR VENT: 80 %
O2/TOTAL GAS SETTING VFR VENT: 80 %
O2/TOTAL GAS SETTING VFR VENT: NORMAL %
OXYHGB MFR BLD: 94 % (ref 92–100)
OXYHGB MFR BLD: 95 % (ref 92–100)
OXYHGB MFR BLD: 97 % (ref 92–100)
OXYHGB MFR BLDV: 72 %
PCO2 BLD: 35 MM HG (ref 35–45)
PCO2 BLD: 37 MM HG (ref 35–45)
PCO2 BLD: 39 MM HG (ref 35–45)
PCO2 BLD: 41 MM HG (ref 35–45)
PCO2 BLD: 42 MM HG (ref 35–45)
PCO2 BLD: 42 MM HG (ref 35–45)
PCO2 BLD: 44 MM HG (ref 35–45)
PCO2 BLD: 44 MM HG (ref 35–45)
PCO2 BLD: 46 MM HG (ref 35–45)
PCO2 BLD: 46 MM HG (ref 35–45)
PCO2 BLD: 47 MM HG (ref 35–45)
PCO2 BLDV: 47 MM HG (ref 40–50)
PCO2 BLDV: 47 MM HG (ref 40–50)
PH BLD: 7.31 PH (ref 7.35–7.45)
PH BLD: 7.35 PH (ref 7.35–7.45)
PH BLD: 7.35 PH (ref 7.35–7.45)
PH BLD: 7.36 PH (ref 7.35–7.45)
PH BLD: 7.36 PH (ref 7.35–7.45)
PH BLD: 7.37 PH (ref 7.35–7.45)
PH BLD: 7.38 PH (ref 7.35–7.45)
PH BLD: 7.4 PH (ref 7.35–7.45)
PH BLD: 7.49 PH (ref 7.35–7.45)
PH BLDV: 7.27 PH (ref 7.32–7.43)
PH BLDV: 7.39 PH (ref 7.32–7.43)
PHOSPHATE SERPL-MCNC: 1.3 MG/DL (ref 2.5–4.5)
PHOSPHATE SERPL-MCNC: 2.5 MG/DL (ref 2.5–4.5)
PLATELET # BLD AUTO: 125 10E9/L (ref 150–450)
PLATELET # BLD AUTO: 130 10E9/L (ref 150–450)
PO2 BLD: 108 MM HG (ref 80–105)
PO2 BLD: 137 MM HG (ref 80–105)
PO2 BLD: 139 MM HG (ref 80–105)
PO2 BLD: 179 MM HG (ref 80–105)
PO2 BLD: 223 MM HG (ref 80–105)
PO2 BLD: 244 MM HG (ref 80–105)
PO2 BLD: 258 MM HG (ref 80–105)
PO2 BLD: 296 MM HG (ref 80–105)
PO2 BLD: 333 MM HG (ref 80–105)
PO2 BLD: 438 MM HG (ref 80–105)
PO2 BLD: 81 MM HG (ref 80–105)
PO2 BLDV: 44 MM HG (ref 25–47)
PO2 BLDV: 50 MM HG (ref 25–47)
POTASSIUM BLD-SCNC: 2.8 MMOL/L (ref 3.4–5.3)
POTASSIUM BLD-SCNC: 3 MMOL/L (ref 3.4–5.3)
POTASSIUM BLD-SCNC: 3.1 MMOL/L (ref 3.4–5.3)
POTASSIUM BLD-SCNC: 3.7 MMOL/L (ref 3.4–5.3)
POTASSIUM BLD-SCNC: 3.8 MMOL/L (ref 3.4–5.3)
POTASSIUM BLD-SCNC: 4 MMOL/L (ref 3.4–5.3)
POTASSIUM BLD-SCNC: 4.3 MMOL/L (ref 3.4–5.3)
POTASSIUM SERPL-SCNC: 3.1 MMOL/L (ref 3.4–5.3)
POTASSIUM SERPL-SCNC: 3.4 MMOL/L (ref 3.4–5.3)
POTASSIUM SERPL-SCNC: 3.4 MMOL/L (ref 3.4–5.3)
R TIME UNTIL CLOT FORMS: 6 MIN (ref 4–9)
R TIME UNTIL CLOT FORMS: 6.1 MIN (ref 4–9)
R TIME UNTIL CLOT FORMS: 9 MIN (ref 4–9)
RBC # BLD AUTO: 3.23 10E12/L (ref 3.8–5.2)
SODIUM BLD-SCNC: 139 MMOL/L (ref 133–144)
SODIUM BLD-SCNC: 139 MMOL/L (ref 133–144)
SODIUM BLD-SCNC: 140 MMOL/L (ref 133–144)
SODIUM BLD-SCNC: 141 MMOL/L (ref 133–144)
SODIUM BLD-SCNC: 142 MMOL/L (ref 133–144)
SODIUM BLD-SCNC: 142 MMOL/L (ref 133–144)
SODIUM SERPL-SCNC: 147 MMOL/L (ref 133–144)
SPECIMEN EXP DATE BLD: NORMAL
TRANSFUSION STATUS PATIENT QL: NORMAL
WBC # BLD AUTO: 14.1 10E9/L (ref 4–11)

## 2017-06-05 PROCEDURE — 25000132 ZZH RX MED GY IP 250 OP 250 PS 637: Mod: GY | Performed by: PHYSICIAN ASSISTANT

## 2017-06-05 PROCEDURE — 25000128 H RX IP 250 OP 636: Performed by: THORACIC SURGERY (CARDIOTHORACIC VASCULAR SURGERY)

## 2017-06-05 PROCEDURE — 25000128 H RX IP 250 OP 636

## 2017-06-05 PROCEDURE — 25000125 ZZHC RX 250: Performed by: THORACIC SURGERY (CARDIOTHORACIC VASCULAR SURGERY)

## 2017-06-05 PROCEDURE — 20000004 ZZH R&B ICU UMMC

## 2017-06-05 PROCEDURE — 88304 TISSUE EXAM BY PATHOLOGIST: CPT | Performed by: THORACIC SURGERY (CARDIOTHORACIC VASCULAR SURGERY)

## 2017-06-05 PROCEDURE — 37000009 ZZH ANESTHESIA TECHNICAL FEE, EACH ADDTL 15 MIN: Performed by: THORACIC SURGERY (CARDIOTHORACIC VASCULAR SURGERY)

## 2017-06-05 PROCEDURE — 27210460 ZZH PUMP APP ADULT PERFUSION: Performed by: THORACIC SURGERY (CARDIOTHORACIC VASCULAR SURGERY)

## 2017-06-05 PROCEDURE — A9270 NON-COVERED ITEM OR SERVICE: HCPCS | Mod: GY | Performed by: THORACIC SURGERY (CARDIOTHORACIC VASCULAR SURGERY)

## 2017-06-05 PROCEDURE — 84132 ASSAY OF SERUM POTASSIUM: CPT | Performed by: THORACIC SURGERY (CARDIOTHORACIC VASCULAR SURGERY)

## 2017-06-05 PROCEDURE — 84100 ASSAY OF PHOSPHORUS: CPT | Performed by: THORACIC SURGERY (CARDIOTHORACIC VASCULAR SURGERY)

## 2017-06-05 PROCEDURE — 87081 CULTURE SCREEN ONLY: CPT | Performed by: THORACIC SURGERY (CARDIOTHORACIC VASCULAR SURGERY)

## 2017-06-05 PROCEDURE — 82330 ASSAY OF CALCIUM: CPT | Performed by: THORACIC SURGERY (CARDIOTHORACIC VASCULAR SURGERY)

## 2017-06-05 PROCEDURE — 36000076 ZZH SURGERY LEVEL 6 EA 15 ADDTL MIN - UMMC: Performed by: THORACIC SURGERY (CARDIOTHORACIC VASCULAR SURGERY)

## 2017-06-05 PROCEDURE — 25000125 ZZHC RX 250

## 2017-06-05 PROCEDURE — 85049 AUTOMATED PLATELET COUNT: CPT | Performed by: THORACIC SURGERY (CARDIOTHORACIC VASCULAR SURGERY)

## 2017-06-05 PROCEDURE — 85730 THROMBOPLASTIN TIME PARTIAL: CPT | Performed by: THORACIC SURGERY (CARDIOTHORACIC VASCULAR SURGERY)

## 2017-06-05 PROCEDURE — 27210794 ZZH OR GENERAL SUPPLY STERILE: Performed by: THORACIC SURGERY (CARDIOTHORACIC VASCULAR SURGERY)

## 2017-06-05 PROCEDURE — P9059 PLASMA, FRZ BETWEEN 8-24HOUR: HCPCS | Performed by: THORACIC SURGERY (CARDIOTHORACIC VASCULAR SURGERY)

## 2017-06-05 PROCEDURE — 36000074 ZZH SURGERY LEVEL 6 1ST 30 MIN - UMMC: Performed by: THORACIC SURGERY (CARDIOTHORACIC VASCULAR SURGERY)

## 2017-06-05 PROCEDURE — 25000128 H RX IP 250 OP 636: Performed by: ANESTHESIOLOGY

## 2017-06-05 PROCEDURE — 37000008 ZZH ANESTHESIA TECHNICAL FEE, 1ST 30 MIN: Performed by: THORACIC SURGERY (CARDIOTHORACIC VASCULAR SURGERY)

## 2017-06-05 PROCEDURE — 25000125 ZZHC RX 250: Performed by: ANESTHESIOLOGY

## 2017-06-05 PROCEDURE — 85610 PROTHROMBIN TIME: CPT | Performed by: THORACIC SURGERY (CARDIOTHORACIC VASCULAR SURGERY)

## 2017-06-05 PROCEDURE — 25800025 ZZH RX 258: Performed by: THORACIC SURGERY (CARDIOTHORACIC VASCULAR SURGERY)

## 2017-06-05 PROCEDURE — 27210995 ZZH RX 272: Performed by: THORACIC SURGERY (CARDIOTHORACIC VASCULAR SURGERY)

## 2017-06-05 PROCEDURE — 82805 BLOOD GASES W/O2 SATURATION: CPT | Performed by: THORACIC SURGERY (CARDIOTHORACIC VASCULAR SURGERY)

## 2017-06-05 PROCEDURE — 02RX0JZ REPLACEMENT OF THORACIC AORTA, ASCENDING/ARCH WITH SYNTHETIC SUBSTITUTE, OPEN APPROACH: ICD-10-PCS | Performed by: THORACIC SURGERY (CARDIOTHORACIC VASCULAR SURGERY)

## 2017-06-05 PROCEDURE — 85384 FIBRINOGEN ACTIVITY: CPT | Performed by: THORACIC SURGERY (CARDIOTHORACIC VASCULAR SURGERY)

## 2017-06-05 PROCEDURE — 25000131 ZZH RX MED GY IP 250 OP 636 PS 637: Mod: GY | Performed by: THORACIC SURGERY (CARDIOTHORACIC VASCULAR SURGERY)

## 2017-06-05 PROCEDURE — C1768 GRAFT, VASCULAR: HCPCS | Performed by: THORACIC SURGERY (CARDIOTHORACIC VASCULAR SURGERY)

## 2017-06-05 PROCEDURE — P9016 RBC LEUKOCYTES REDUCED: HCPCS | Performed by: THORACIC SURGERY (CARDIOTHORACIC VASCULAR SURGERY)

## 2017-06-05 PROCEDURE — 25000131 ZZH RX MED GY IP 250 OP 636 PS 637: Mod: GY | Performed by: ANESTHESIOLOGY

## 2017-06-05 PROCEDURE — 80048 BASIC METABOLIC PNL TOTAL CA: CPT | Performed by: THORACIC SURGERY (CARDIOTHORACIC VASCULAR SURGERY)

## 2017-06-05 PROCEDURE — C9248 INJ, CLEVIDIPINE BUTYRATE: HCPCS | Performed by: THORACIC SURGERY (CARDIOTHORACIC VASCULAR SURGERY)

## 2017-06-05 PROCEDURE — 25000565 ZZH ISOFLURANE, EA 15 MIN: Performed by: THORACIC SURGERY (CARDIOTHORACIC VASCULAR SURGERY)

## 2017-06-05 PROCEDURE — C1781 MESH (IMPLANTABLE): HCPCS | Performed by: THORACIC SURGERY (CARDIOTHORACIC VASCULAR SURGERY)

## 2017-06-05 PROCEDURE — P9012 CRYOPRECIPITATE EACH UNIT: HCPCS | Performed by: THORACIC SURGERY (CARDIOTHORACIC VASCULAR SURGERY)

## 2017-06-05 PROCEDURE — P9041 ALBUMIN (HUMAN),5%, 50ML: HCPCS

## 2017-06-05 PROCEDURE — 41000018 ZZH PER-PERFUSION 1ST 30 MIN: Performed by: THORACIC SURGERY (CARDIOTHORACIC VASCULAR SURGERY)

## 2017-06-05 PROCEDURE — 84295 ASSAY OF SERUM SODIUM: CPT | Performed by: THORACIC SURGERY (CARDIOTHORACIC VASCULAR SURGERY)

## 2017-06-05 PROCEDURE — 02100Z9 BYPASS CORONARY ARTERY, ONE ARTERY FROM LEFT INTERNAL MAMMARY, OPEN APPROACH: ICD-10-PCS | Performed by: THORACIC SURGERY (CARDIOTHORACIC VASCULAR SURGERY)

## 2017-06-05 PROCEDURE — 83735 ASSAY OF MAGNESIUM: CPT | Performed by: THORACIC SURGERY (CARDIOTHORACIC VASCULAR SURGERY)

## 2017-06-05 PROCEDURE — 82947 ASSAY GLUCOSE BLOOD QUANT: CPT | Performed by: THORACIC SURGERY (CARDIOTHORACIC VASCULAR SURGERY)

## 2017-06-05 PROCEDURE — 71010 XR CHEST PORT 1 VW: CPT

## 2017-06-05 PROCEDURE — 40000014 ZZH STATISTIC ARTERIAL MONITORING DAILY

## 2017-06-05 PROCEDURE — 00000146 ZZHCL STATISTIC GLUCOSE BY METER IP

## 2017-06-05 PROCEDURE — 40000196 ZZH STATISTIC RAPCV CVP MONITORING

## 2017-06-05 PROCEDURE — 83605 ASSAY OF LACTIC ACID: CPT | Performed by: THORACIC SURGERY (CARDIOTHORACIC VASCULAR SURGERY)

## 2017-06-05 PROCEDURE — 93005 ELECTROCARDIOGRAM TRACING: CPT

## 2017-06-05 PROCEDURE — S0017 INJECTION, AMINOCAPROIC ACID: HCPCS | Performed by: THORACIC SURGERY (CARDIOTHORACIC VASCULAR SURGERY)

## 2017-06-05 PROCEDURE — 40000275 ZZH STATISTIC RCP TIME EA 10 MIN

## 2017-06-05 PROCEDURE — 41000019 ZZH PERA-PERFUSION EACH ADDTL 15 MIN: Performed by: THORACIC SURGERY (CARDIOTHORACIC VASCULAR SURGERY)

## 2017-06-05 PROCEDURE — 85396 CLOTTING ASSAY WHOLE BLOOD: CPT | Performed by: THORACIC SURGERY (CARDIOTHORACIC VASCULAR SURGERY)

## 2017-06-05 PROCEDURE — 93010 ELECTROCARDIOGRAM REPORT: CPT | Performed by: INTERNAL MEDICINE

## 2017-06-05 PROCEDURE — 40000170 ZZH STATISTIC PRE-PROCEDURE ASSESSMENT II: Performed by: THORACIC SURGERY (CARDIOTHORACIC VASCULAR SURGERY)

## 2017-06-05 PROCEDURE — 36415 COLL VENOUS BLD VENIPUNCTURE: CPT | Performed by: ANESTHESIOLOGY

## 2017-06-05 PROCEDURE — 25000128 H RX IP 250 OP 636: Performed by: TRANSPLANT SURGERY

## 2017-06-05 PROCEDURE — 84132 ASSAY OF SERUM POTASSIUM: CPT | Performed by: ANESTHESIOLOGY

## 2017-06-05 PROCEDURE — 40000344 ZZHCL STATISTIC THAWING COMPONENT: Performed by: THORACIC SURGERY (CARDIOTHORACIC VASCULAR SURGERY)

## 2017-06-05 PROCEDURE — 85027 COMPLETE CBC AUTOMATED: CPT | Performed by: THORACIC SURGERY (CARDIOTHORACIC VASCULAR SURGERY)

## 2017-06-05 PROCEDURE — 25000132 ZZH RX MED GY IP 250 OP 250 PS 637: Mod: GY | Performed by: THORACIC SURGERY (CARDIOTHORACIC VASCULAR SURGERY)

## 2017-06-05 PROCEDURE — 88313 SPECIAL STAINS GROUP 2: CPT | Performed by: THORACIC SURGERY (CARDIOTHORACIC VASCULAR SURGERY)

## 2017-06-05 PROCEDURE — 25000128 H RX IP 250 OP 636: Performed by: PHYSICIAN ASSISTANT

## 2017-06-05 PROCEDURE — 40000048 ZZH STATISTIC DAILY SWAN MONITORING

## 2017-06-05 PROCEDURE — 5A1221Z PERFORMANCE OF CARDIAC OUTPUT, CONTINUOUS: ICD-10-PCS | Performed by: THORACIC SURGERY (CARDIOTHORACIC VASCULAR SURGERY)

## 2017-06-05 PROCEDURE — 82803 BLOOD GASES ANY COMBINATION: CPT | Performed by: THORACIC SURGERY (CARDIOTHORACIC VASCULAR SURGERY)

## 2017-06-05 PROCEDURE — P9037 PLATE PHERES LEUKOREDU IRRAD: HCPCS | Performed by: THORACIC SURGERY (CARDIOTHORACIC VASCULAR SURGERY)

## 2017-06-05 PROCEDURE — 27210447 ZZH PACK CELL SAVER CSP: Performed by: THORACIC SURGERY (CARDIOTHORACIC VASCULAR SURGERY)

## 2017-06-05 DEVICE — GRAFT PTFE FELT 6X6" 007837: Type: IMPLANTABLE DEVICE | Site: AORTA | Status: FUNCTIONAL

## 2017-06-05 DEVICE — IMPLANTABLE DEVICE: Type: IMPLANTABLE DEVICE | Site: AORTA | Status: FUNCTIONAL

## 2017-06-05 RX ORDER — VANCOMYCIN HYDROCHLORIDE 1 G/200ML
1000 INJECTION, SOLUTION INTRAVENOUS SEE ADMIN INSTRUCTIONS
Status: CANCELLED | OUTPATIENT
Start: 2017-06-05

## 2017-06-05 RX ORDER — PROPOFOL 10 MG/ML
10-20 INJECTION, EMULSION INTRAVENOUS EVERY 30 MIN PRN
Status: DISCONTINUED | OUTPATIENT
Start: 2017-06-05 | End: 2017-06-06

## 2017-06-05 RX ORDER — CEFAZOLIN SODIUM 1 G/3ML
1 INJECTION, POWDER, FOR SOLUTION INTRAMUSCULAR; INTRAVENOUS EVERY 8 HOURS
Status: COMPLETED | OUTPATIENT
Start: 2017-06-05 | End: 2017-06-06

## 2017-06-05 RX ORDER — PAPAVERINE HYDROCHLORIDE 30 MG/ML
INJECTION INTRAMUSCULAR; INTRAVENOUS PRN
Status: DISCONTINUED | OUTPATIENT
Start: 2017-06-05 | End: 2017-06-05 | Stop reason: HOSPADM

## 2017-06-05 RX ORDER — PROPOFOL 10 MG/ML
INJECTION, EMULSION INTRAVENOUS PRN
Status: DISCONTINUED | OUTPATIENT
Start: 2017-06-05 | End: 2017-06-05

## 2017-06-05 RX ORDER — NALOXONE HYDROCHLORIDE 0.4 MG/ML
.1-.4 INJECTION, SOLUTION INTRAMUSCULAR; INTRAVENOUS; SUBCUTANEOUS
Status: DISCONTINUED | OUTPATIENT
Start: 2017-06-05 | End: 2017-06-05

## 2017-06-05 RX ORDER — ACETAMINOPHEN 650 MG/1
650 SUPPOSITORY RECTAL EVERY 4 HOURS PRN
Status: DISCONTINUED | OUTPATIENT
Start: 2017-06-05 | End: 2017-06-07

## 2017-06-05 RX ORDER — PROTAMINE SULFATE 10 MG/ML
25 INJECTION, SOLUTION INTRAVENOUS ONCE
Status: COMPLETED | OUTPATIENT
Start: 2017-06-05 | End: 2017-06-05

## 2017-06-05 RX ORDER — EPHEDRINE SULFATE 50 MG/ML
INJECTION, SOLUTION INTRAMUSCULAR; INTRAVENOUS; SUBCUTANEOUS PRN
Status: DISCONTINUED | OUTPATIENT
Start: 2017-06-05 | End: 2017-06-05

## 2017-06-05 RX ORDER — CEFAZOLIN SODIUM 2 G/100ML
2 INJECTION, SOLUTION INTRAVENOUS
Status: COMPLETED | OUTPATIENT
Start: 2017-06-05 | End: 2017-06-05

## 2017-06-05 RX ORDER — POTASSIUM CHLORIDE 7.45 MG/ML
INJECTION INTRAVENOUS PRN
Status: DISCONTINUED | OUTPATIENT
Start: 2017-06-05 | End: 2017-06-05

## 2017-06-05 RX ORDER — NITROGLYCERIN 20 MG/100ML
.07-1.5 INJECTION INTRAVENOUS CONTINUOUS
Status: DISCONTINUED | OUTPATIENT
Start: 2017-06-05 | End: 2017-06-06

## 2017-06-05 RX ORDER — POTASSIUM CHLORIDE 7.45 MG/ML
10 INJECTION INTRAVENOUS
Status: DISCONTINUED | OUTPATIENT
Start: 2017-06-05 | End: 2017-06-10 | Stop reason: HOSPADM

## 2017-06-05 RX ORDER — VANCOMYCIN HYDROCHLORIDE 1 G/200ML
1000 INJECTION, SOLUTION INTRAVENOUS
Status: CANCELLED | OUTPATIENT
Start: 2017-06-05

## 2017-06-05 RX ORDER — DEXTROSE MONOHYDRATE 25 G/50ML
25-50 INJECTION, SOLUTION INTRAVENOUS
Status: DISCONTINUED | OUTPATIENT
Start: 2017-06-05 | End: 2017-06-06

## 2017-06-05 RX ORDER — ALBUMIN, HUMAN INJ 5% 5 %
SOLUTION INTRAVENOUS
Status: COMPLETED
Start: 2017-06-05 | End: 2017-06-05

## 2017-06-05 RX ORDER — SODIUM CHLORIDE, SODIUM LACTATE, POTASSIUM CHLORIDE, CALCIUM CHLORIDE 600; 310; 30; 20 MG/100ML; MG/100ML; MG/100ML; MG/100ML
INJECTION, SOLUTION INTRAVENOUS CONTINUOUS
Status: DISCONTINUED | OUTPATIENT
Start: 2017-06-05 | End: 2017-06-05 | Stop reason: HOSPADM

## 2017-06-05 RX ORDER — NICOTINE POLACRILEX 4 MG
15-30 LOZENGE BUCCAL
Status: DISCONTINUED | OUTPATIENT
Start: 2017-06-05 | End: 2017-06-06

## 2017-06-05 RX ORDER — ACETAMINOPHEN 325 MG/1
650 TABLET ORAL EVERY 4 HOURS PRN
Status: DISCONTINUED | OUTPATIENT
Start: 2017-06-05 | End: 2017-06-07

## 2017-06-05 RX ORDER — SODIUM CHLORIDE, SODIUM LACTATE, POTASSIUM CHLORIDE, CALCIUM CHLORIDE 600; 310; 30; 20 MG/100ML; MG/100ML; MG/100ML; MG/100ML
INJECTION, SOLUTION INTRAVENOUS CONTINUOUS PRN
Status: DISCONTINUED | OUTPATIENT
Start: 2017-06-05 | End: 2017-06-05

## 2017-06-05 RX ORDER — ONDANSETRON 4 MG/1
4 TABLET, ORALLY DISINTEGRATING ORAL EVERY 6 HOURS PRN
Status: DISCONTINUED | OUTPATIENT
Start: 2017-06-05 | End: 2017-06-10 | Stop reason: HOSPADM

## 2017-06-05 RX ORDER — ONDANSETRON 2 MG/ML
4 INJECTION INTRAMUSCULAR; INTRAVENOUS EVERY 6 HOURS PRN
Status: DISCONTINUED | OUTPATIENT
Start: 2017-06-05 | End: 2017-06-10 | Stop reason: HOSPADM

## 2017-06-05 RX ORDER — LIDOCAINE HYDROCHLORIDE 20 MG/ML
INJECTION, SOLUTION INFILTRATION; PERINEURAL PRN
Status: DISCONTINUED | OUTPATIENT
Start: 2017-06-05 | End: 2017-06-05

## 2017-06-05 RX ORDER — DEXMEDETOMIDINE HYDROCHLORIDE 4 UG/ML
.2-.7 INJECTION, SOLUTION INTRAVENOUS CONTINUOUS
Status: DISCONTINUED | OUTPATIENT
Start: 2017-06-05 | End: 2017-06-06

## 2017-06-05 RX ORDER — HEPARIN SODIUM 1000 [USP'U]/ML
INJECTION, SOLUTION INTRAVENOUS; SUBCUTANEOUS PRN
Status: DISCONTINUED | OUTPATIENT
Start: 2017-06-05 | End: 2017-06-05

## 2017-06-05 RX ORDER — ALBUTEROL SULFATE 90 UG/1
6 AEROSOL, METERED RESPIRATORY (INHALATION) EVERY 4 HOURS PRN
Status: DISCONTINUED | OUTPATIENT
Start: 2017-06-05 | End: 2017-06-06

## 2017-06-05 RX ORDER — POTASSIUM CL/LIDO/0.9 % NACL 10MEQ/0.1L
10 INTRAVENOUS SOLUTION, PIGGYBACK (ML) INTRAVENOUS
Status: DISCONTINUED | OUTPATIENT
Start: 2017-06-05 | End: 2017-06-10 | Stop reason: HOSPADM

## 2017-06-05 RX ORDER — FENTANYL CITRATE 50 UG/ML
50-100 INJECTION, SOLUTION INTRAMUSCULAR; INTRAVENOUS
Status: DISCONTINUED | OUTPATIENT
Start: 2017-06-05 | End: 2017-06-05

## 2017-06-05 RX ORDER — LIDOCAINE 40 MG/G
CREAM TOPICAL
Status: DISCONTINUED | OUTPATIENT
Start: 2017-06-05 | End: 2017-06-05 | Stop reason: HOSPADM

## 2017-06-05 RX ORDER — POTASSIUM CHLORIDE 1.5 G/1.58G
20-40 POWDER, FOR SOLUTION ORAL
Status: DISCONTINUED | OUTPATIENT
Start: 2017-06-05 | End: 2017-06-10 | Stop reason: HOSPADM

## 2017-06-05 RX ORDER — CEFAZOLIN SODIUM 1 G/3ML
INJECTION, POWDER, FOR SOLUTION INTRAMUSCULAR; INTRAVENOUS PRN
Status: DISCONTINUED | OUTPATIENT
Start: 2017-06-05 | End: 2017-06-05

## 2017-06-05 RX ORDER — LIDOCAINE 40 MG/G
CREAM TOPICAL
Status: DISCONTINUED | OUTPATIENT
Start: 2017-06-05 | End: 2017-06-10 | Stop reason: HOSPADM

## 2017-06-05 RX ORDER — ASPIRIN 81 MG/1
81 TABLET ORAL DAILY
Status: DISCONTINUED | OUTPATIENT
Start: 2017-06-06 | End: 2017-06-09

## 2017-06-05 RX ORDER — AMOXICILLIN 250 MG
1-2 CAPSULE ORAL 2 TIMES DAILY
Status: DISCONTINUED | OUTPATIENT
Start: 2017-06-05 | End: 2017-06-10 | Stop reason: HOSPADM

## 2017-06-05 RX ORDER — HYDRALAZINE HYDROCHLORIDE 20 MG/ML
10 INJECTION INTRAMUSCULAR; INTRAVENOUS EVERY 30 MIN PRN
Status: DISCONTINUED | OUTPATIENT
Start: 2017-06-05 | End: 2017-06-10 | Stop reason: HOSPADM

## 2017-06-05 RX ORDER — SODIUM CHLORIDE 9 MG/ML
INJECTION, SOLUTION INTRAVENOUS CONTINUOUS PRN
Status: DISCONTINUED | OUTPATIENT
Start: 2017-06-05 | End: 2017-06-05

## 2017-06-05 RX ORDER — DEXTROSE MONOHYDRATE, SODIUM CHLORIDE, AND POTASSIUM CHLORIDE 50; 1.49; 4.5 G/1000ML; G/1000ML; G/1000ML
INJECTION, SOLUTION INTRAVENOUS CONTINUOUS
Status: DISCONTINUED | OUTPATIENT
Start: 2017-06-05 | End: 2017-06-09

## 2017-06-05 RX ORDER — PROTAMINE SULFATE 10 MG/ML
INJECTION, SOLUTION INTRAVENOUS PRN
Status: DISCONTINUED | OUTPATIENT
Start: 2017-06-05 | End: 2017-06-05

## 2017-06-05 RX ORDER — POTASSIUM CHLORIDE 29.8 MG/ML
20 INJECTION INTRAVENOUS
Status: DISCONTINUED | OUTPATIENT
Start: 2017-06-05 | End: 2017-06-10 | Stop reason: HOSPADM

## 2017-06-05 RX ORDER — MUPIROCIN 20 MG/G
0.5 OINTMENT TOPICAL 2 TIMES DAILY
Status: DISCONTINUED | OUTPATIENT
Start: 2017-06-05 | End: 2017-06-10 | Stop reason: HOSPADM

## 2017-06-05 RX ORDER — PROPOFOL 10 MG/ML
INJECTION, EMULSION INTRAVENOUS CONTINUOUS PRN
Status: DISCONTINUED | OUTPATIENT
Start: 2017-06-05 | End: 2017-06-05

## 2017-06-05 RX ORDER — POTASSIUM CHLORIDE 750 MG/1
20-40 TABLET, EXTENDED RELEASE ORAL
Status: DISCONTINUED | OUTPATIENT
Start: 2017-06-05 | End: 2017-06-10 | Stop reason: HOSPADM

## 2017-06-05 RX ORDER — FENTANYL CITRATE 50 UG/ML
INJECTION, SOLUTION INTRAMUSCULAR; INTRAVENOUS PRN
Status: DISCONTINUED | OUTPATIENT
Start: 2017-06-05 | End: 2017-06-05

## 2017-06-05 RX ORDER — HYDROMORPHONE HYDROCHLORIDE 1 MG/ML
.3-.5 INJECTION, SOLUTION INTRAMUSCULAR; INTRAVENOUS; SUBCUTANEOUS
Status: DISCONTINUED | OUTPATIENT
Start: 2017-06-05 | End: 2017-06-10 | Stop reason: HOSPADM

## 2017-06-05 RX ORDER — CEFAZOLIN SODIUM 1 G/3ML
1 INJECTION, POWDER, FOR SOLUTION INTRAMUSCULAR; INTRAVENOUS SEE ADMIN INSTRUCTIONS
Status: DISCONTINUED | OUTPATIENT
Start: 2017-06-05 | End: 2017-06-05 | Stop reason: HOSPADM

## 2017-06-05 RX ORDER — ALBUMIN, HUMAN INJ 5% 5 %
500-1000 SOLUTION INTRAVENOUS
Status: COMPLETED | OUTPATIENT
Start: 2017-06-05 | End: 2017-06-05

## 2017-06-05 RX ORDER — PROPOFOL 10 MG/ML
5-75 INJECTION, EMULSION INTRAVENOUS CONTINUOUS
Status: DISCONTINUED | OUTPATIENT
Start: 2017-06-05 | End: 2017-06-06

## 2017-06-05 RX ORDER — MEPERIDINE HYDROCHLORIDE 25 MG/ML
12.5-25 INJECTION INTRAMUSCULAR; INTRAVENOUS; SUBCUTANEOUS
Status: DISCONTINUED | OUTPATIENT
Start: 2017-06-05 | End: 2017-06-06

## 2017-06-05 RX ORDER — MAGNESIUM SULFATE HEPTAHYDRATE 40 MG/ML
4 INJECTION, SOLUTION INTRAVENOUS EVERY 4 HOURS PRN
Status: DISCONTINUED | OUTPATIENT
Start: 2017-06-05 | End: 2017-06-10 | Stop reason: HOSPADM

## 2017-06-05 RX ORDER — NALOXONE HYDROCHLORIDE 0.4 MG/ML
.1-.4 INJECTION, SOLUTION INTRAMUSCULAR; INTRAVENOUS; SUBCUTANEOUS
Status: DISCONTINUED | OUTPATIENT
Start: 2017-06-05 | End: 2017-06-10 | Stop reason: HOSPADM

## 2017-06-05 RX ORDER — POTASSIUM CHLORIDE 29.8 MG/ML
INJECTION INTRAVENOUS PRN
Status: DISCONTINUED | OUTPATIENT
Start: 2017-06-05 | End: 2017-06-05

## 2017-06-05 RX ADMIN — PROTAMINE SULFATE 25 MG: 10 INJECTION, SOLUTION INTRAVENOUS at 16:27

## 2017-06-05 RX ADMIN — POTASSIUM CHLORIDE 20 MEQ: 29.8 INJECTION, SOLUTION INTRAVENOUS at 23:19

## 2017-06-05 RX ADMIN — HYDROMORPHONE HYDROCHLORIDE 0.5 MG: 1 INJECTION, SOLUTION INTRAMUSCULAR; INTRAVENOUS; SUBCUTANEOUS at 21:42

## 2017-06-05 RX ADMIN — MIDAZOLAM HYDROCHLORIDE 2 MG: 1 INJECTION, SOLUTION INTRAMUSCULAR; INTRAVENOUS at 12:37

## 2017-06-05 RX ADMIN — NOREPINEPHRINE BITARTRATE 6.4 MCG: 1 INJECTION INTRAVENOUS at 07:50

## 2017-06-05 RX ADMIN — CLEVIDIPINE 0.12 MG: 0.5 EMULSION INTRAVENOUS at 10:04

## 2017-06-05 RX ADMIN — PANTOPRAZOLE SODIUM 40 MG: 40 INJECTION, POWDER, FOR SOLUTION INTRAVENOUS at 15:54

## 2017-06-05 RX ADMIN — NITROGLYCERIN 0.1 MCG/KG/MIN: 20 INJECTION INTRAVENOUS at 15:58

## 2017-06-05 RX ADMIN — MUPIROCIN 1 G: 20 OINTMENT TOPICAL at 05:56

## 2017-06-05 RX ADMIN — POTASSIUM CHLORIDE 10 MEQ: 29.8 INJECTION, SOLUTION INTRAVENOUS at 13:38

## 2017-06-05 RX ADMIN — ALBUMIN HUMAN 12.5 G: 50 SOLUTION INTRAVENOUS at 15:25

## 2017-06-05 RX ADMIN — HUMAN INSULIN 2 UNITS/HR: 100 INJECTION, SOLUTION SUBCUTANEOUS at 11:46

## 2017-06-05 RX ADMIN — PROPOFOL 30 MG: 10 INJECTION, EMULSION INTRAVENOUS at 08:43

## 2017-06-05 RX ADMIN — AMINOCAPROIC ACID 5 G: 250 INJECTION, SOLUTION INTRAVENOUS at 08:52

## 2017-06-05 RX ADMIN — PHENYLEPHRINE HYDROCHLORIDE 100 MCG: 10 INJECTION, SOLUTION INTRAMUSCULAR; INTRAVENOUS; SUBCUTANEOUS at 10:24

## 2017-06-05 RX ADMIN — MUPIROCIN 0.5 G: 20 OINTMENT TOPICAL at 20:47

## 2017-06-05 RX ADMIN — HUMAN INSULIN 4 UNITS/HR: 100 INJECTION, SOLUTION SUBCUTANEOUS at 15:50

## 2017-06-05 RX ADMIN — FENTANYL CITRATE 50 MCG: 50 INJECTION, SOLUTION INTRAMUSCULAR; INTRAVENOUS at 08:45

## 2017-06-05 RX ADMIN — CLEVIDIPINE 0.12 MG: 0.5 EMULSION INTRAVENOUS at 10:18

## 2017-06-05 RX ADMIN — HYDROMORPHONE HYDROCHLORIDE 0.5 MG: 1 INJECTION, SOLUTION INTRAMUSCULAR; INTRAVENOUS; SUBCUTANEOUS at 22:39

## 2017-06-05 RX ADMIN — CEFAZOLIN 1 G: 1 INJECTION, POWDER, FOR SOLUTION INTRAMUSCULAR; INTRAVENOUS at 12:36

## 2017-06-05 RX ADMIN — Medication 10 MG: at 07:55

## 2017-06-05 RX ADMIN — FENTANYL CITRATE 250 MCG: 50 INJECTION, SOLUTION INTRAMUSCULAR; INTRAVENOUS at 07:30

## 2017-06-05 RX ADMIN — SODIUM CHLORIDE: 9 INJECTION, SOLUTION INTRAVENOUS at 07:21

## 2017-06-05 RX ADMIN — POTASSIUM CHLORIDE 20 MEQ: 29.8 INJECTION, SOLUTION INTRAVENOUS at 14:13

## 2017-06-05 RX ADMIN — CEFAZOLIN 1 G: 1 INJECTION, POWDER, FOR SOLUTION INTRAMUSCULAR; INTRAVENOUS at 20:47

## 2017-06-05 RX ADMIN — METOPROLOL TARTRATE 12.5 MG: 25 TABLET, FILM COATED ORAL at 05:53

## 2017-06-05 RX ADMIN — EPINEPHRINE 0.03 MCG/KG/MIN: 1 INJECTION PARENTERAL at 12:38

## 2017-06-05 RX ADMIN — SODIUM BICARBONATE 50 MEQ: 84 INJECTION, SOLUTION INTRAVENOUS at 16:27

## 2017-06-05 RX ADMIN — CLEVIDIPINE 0.25 MG: 0.5 EMULSION INTRAVENOUS at 13:08

## 2017-06-05 RX ADMIN — FENTANYL CITRATE 100 MCG: 50 INJECTION INTRAMUSCULAR; INTRAVENOUS at 20:27

## 2017-06-05 RX ADMIN — ALBUMIN (HUMAN) 12.5 G: 12.5 SOLUTION INTRAVENOUS at 15:25

## 2017-06-05 RX ADMIN — CLEVIDIPINE 0.25 MG: 0.5 EMULSION INTRAVENOUS at 13:25

## 2017-06-05 RX ADMIN — POTASSIUM PHOSPHATE, MONOBASIC AND POTASSIUM PHOSPHATE, DIBASIC 20 MMOL: 224; 236 INJECTION, SOLUTION INTRAVENOUS at 17:42

## 2017-06-05 RX ADMIN — INSULIN HUMAN 2 UNITS: 100 INJECTION, SOLUTION PARENTERAL at 13:16

## 2017-06-05 RX ADMIN — Medication 30000 UNITS: at 09:41

## 2017-06-05 RX ADMIN — ALBUMIN HUMAN 12.5 G: 50 SOLUTION INTRAVENOUS at 15:48

## 2017-06-05 RX ADMIN — PROPOFOL 20 MCG/KG/MIN: 10 INJECTION, EMULSION INTRAVENOUS at 15:50

## 2017-06-05 RX ADMIN — POTASSIUM CHLORIDE 20 MEQ: 29.8 INJECTION, SOLUTION INTRAVENOUS at 08:56

## 2017-06-05 RX ADMIN — ROCURONIUM BROMIDE 10 MG: 10 INJECTION INTRAVENOUS at 10:02

## 2017-06-05 RX ADMIN — HYDRALAZINE HYDROCHLORIDE 10 MG: 20 INJECTION INTRAMUSCULAR; INTRAVENOUS at 22:33

## 2017-06-05 RX ADMIN — FENTANYL CITRATE 200 MCG: 50 INJECTION, SOLUTION INTRAMUSCULAR; INTRAVENOUS at 08:42

## 2017-06-05 RX ADMIN — ROCURONIUM BROMIDE 20 MG: 10 INJECTION INTRAVENOUS at 12:37

## 2017-06-05 RX ADMIN — CEFAZOLIN 1 G: 1 INJECTION, POWDER, FOR SOLUTION INTRAMUSCULAR; INTRAVENOUS at 10:28

## 2017-06-05 RX ADMIN — POTASSIUM CHLORIDE 20 MEQ: 29.8 INJECTION, SOLUTION INTRAVENOUS at 16:03

## 2017-06-05 RX ADMIN — MIDAZOLAM HYDROCHLORIDE 5 MG: 1 INJECTION, SOLUTION INTRAMUSCULAR; INTRAVENOUS at 07:30

## 2017-06-05 RX ADMIN — FENTANYL CITRATE 50 MCG: 50 INJECTION INTRAMUSCULAR; INTRAVENOUS at 19:30

## 2017-06-05 RX ADMIN — FENTANYL CITRATE 100 MCG: 50 INJECTION INTRAMUSCULAR; INTRAVENOUS at 16:48

## 2017-06-05 RX ADMIN — FENTANYL CITRATE 100 MCG: 50 INJECTION, SOLUTION INTRAMUSCULAR; INTRAVENOUS at 14:32

## 2017-06-05 RX ADMIN — NOREPINEPHRINE BITARTRATE 6.4 MCG: 1 INJECTION INTRAVENOUS at 07:43

## 2017-06-05 RX ADMIN — PROPOFOL 30 MG: 10 INJECTION, EMULSION INTRAVENOUS at 08:50

## 2017-06-05 RX ADMIN — PROTAMINE SULFATE 180 MG: 10 INJECTION, SOLUTION INTRAVENOUS at 13:06

## 2017-06-05 RX ADMIN — ACETAMINOPHEN 650 MG: 325 TABLET, FILM COATED ORAL at 22:39

## 2017-06-05 RX ADMIN — SODIUM CHLORIDE, POTASSIUM CHLORIDE, SODIUM LACTATE AND CALCIUM CHLORIDE: 600; 310; 30; 20 INJECTION, SOLUTION INTRAVENOUS at 08:53

## 2017-06-05 RX ADMIN — AMINOCAPROIC ACID 1 G/HR: 250 INJECTION, SOLUTION INTRAVENOUS at 10:33

## 2017-06-05 RX ADMIN — ROCURONIUM BROMIDE 100 MG: 10 INJECTION INTRAVENOUS at 07:30

## 2017-06-05 RX ADMIN — HYDRALAZINE HYDROCHLORIDE 10 MG: 20 INJECTION INTRAMUSCULAR; INTRAVENOUS at 21:26

## 2017-06-05 RX ADMIN — NOREPINEPHRINE BITARTRATE 6.4 MCG: 1 INJECTION INTRAVENOUS at 07:44

## 2017-06-05 RX ADMIN — PROPOFOL 25 MCG/KG/MIN: 10 INJECTION, EMULSION INTRAVENOUS at 13:27

## 2017-06-05 RX ADMIN — POTASSIUM CHLORIDE, DEXTROSE MONOHYDRATE AND SODIUM CHLORIDE: 150; 5; 450 INJECTION, SOLUTION INTRAVENOUS at 15:56

## 2017-06-05 RX ADMIN — CLEVIDIPINE 0.25 MG: 0.5 EMULSION INTRAVENOUS at 13:18

## 2017-06-05 RX ADMIN — CEFAZOLIN SODIUM 2 G: 2 INJECTION, SOLUTION INTRAVENOUS at 08:33

## 2017-06-05 RX ADMIN — CLEVIDIPINE 0.12 MG: 0.5 EMULSION INTRAVENOUS at 10:13

## 2017-06-05 RX ADMIN — LIDOCAINE HYDROCHLORIDE 100 MG: 20 INJECTION, SOLUTION INFILTRATION; PERINEURAL at 07:30

## 2017-06-05 RX ADMIN — ALBUMIN (HUMAN) 12.5 G: 12.5 SOLUTION INTRAVENOUS at 15:48

## 2017-06-05 RX ADMIN — HUMAN INSULIN 5 UNITS/HR: 100 INJECTION, SOLUTION SUBCUTANEOUS at 18:54

## 2017-06-05 NOTE — BRIEF OP NOTE
[facility], [place]    Brief Operative Note    Pre-operative diagnosis: Ascending Aneurysm   Post-operative diagnosis * No post-op diagnosis entered *  Procedure: Procedure(s):  Median Sternotomy, Ascending Aortic Aneurysm Repair, Coronary Artery Bypass Graft x1 on pump oxygenator - Wound Class: I-Clean   - Wound Class: I-Clean  Surgeon: Surgeon(s) and Role:     * Akshat Soto MD - Primary     * Luis Armando Olivarez MD - Assisting     * Gregory Fitzpatrick MD - Resident - Assisting     * Kye Vasques PA-C - Assisting  Anesthesia: General   Estimated blood loss: 1000cc  Drains: Mediastinal x2, bilateral pleural  Specimens:   ID Type Source Tests Collected by Time Destination   1 : Aortic Aneurysm Wall Tissue Artery, Aortic OR DOCUMENTATION ONLY Akshat Soto MD 6/5/2017 10:58 AM    A : Aortic Aneurysm Wall Tissue Artery, Aortic SURGICAL PATHOLOGY EXAM Akshat Soto MD 6/5/2017 10:58 AM      Findings:   See op note.  Complications: None.      Asc Ao replacement with 30mm Gelweave  CABx1, LIMA-LAD  Femoral arterial cannulation for repair of arch cannulation site.

## 2017-06-05 NOTE — ANESTHESIA PROCEDURE NOTES
Arterial Line Procedure Note  Staff:     Anesthesiologist:  MARIO GALARZA    Resident/CRNA:  MELVIN FUCHS    Arterial line performed by resident/CRNA in presence of a teaching physician    Location: In OR After Induction  Procedure Start/Stop Times:     patient identified, IV checked, site marked, risks and benefits discussed, informed consent, monitors and equipment checked, pre-op evaluation and at physician/surgeon's request      Correct Patient: Yes      Correct Position: Yes      Correct Site: Yes      Correct Procedure: Yes      Correct Laterality:  Yes    Site Marked:  Yes  Line Placement:     Procedure:  Arterial Line    Insertion Site:  Radial    Insertion laterality:  Right    Skin Prep: Chloraprep      Patient Prep: patient draped, mask, sterile gloves, hat and hand hygiene      Catheter size:  20 gauge, 12 cm    Cath secured with: suture      Dressing:  Tegaderm    Complications:  None obvious    Arterial waveform: Yes      IBP within 10% of NIBP: Yes

## 2017-06-05 NOTE — PLAN OF CARE
Problem: Goal Outcome Summary  Goal: Goal Outcome Summary  Outcome: Improving  Pt came up from OR around 1445 after having AAA and CABGx1. VSS, afebrile. Pt on vent, currently pressure supporting. Pt came up on small dose of epi. That was turned off within the first hour. Nitroglycerin is currently running at 0.1 to keep systolic <120. Map goal is >60. Insulin gtt is running at 4u since pt got to the floor. Propofol was stopped around 1700 and pt is following commands appropriately. Urine output is about 50-75cc/hr. Chest tubes are putting out about 40-60cc/hr of sanguinous drainage. Pt is having pain at her incision site; PRN fentanyl is being given. Pt's CVP is 6 so 500cc of albumin was given. Will continue to monitor and update as needed.

## 2017-06-05 NOTE — ANESTHESIA CARE TRANSFER NOTE
Patient: Marilia Bean    Procedure(s):  Median Sternotomy, Ascending Aortic Aneurysm Repair, Coronary Artery Bypass Graft x1 on pump oxygenator - Wound Class: I-Clean   - Wound Class: I-Clean    Diagnosis: Ascending Aneurysm   Diagnosis Additional Information: No value filed.    Anesthesia Type:   General, ETT     Note:  Airway :ETT  Patient transferred to:ICU  Comments: Patient transferred ventilated and on propofol sedation to CVICU. Patient was hemodynamically stable and on minimal pressor for inotropic affect. Sign out given to CVICU resident.    Manoj Turner MD        Vitals: (Last set prior to Anesthesia Care Transfer)    [facility] VITALS  6/5/2017 1409 - 6/5/2017 1509      6/5/2017             Resp Rate (set): 10                Electronically Signed By: Manoj Turner MD  June 5, 2017  3:22 PM

## 2017-06-05 NOTE — OP NOTE
DATE OF SURGERY:  06/05/2017       PREOPERATIVE DIAGNOSES:   1.  Ascending aortic aneurysm.   2.  Single vessel coronary artery disease.      POSTOPERATIVE DIAGNOSES:   1.  Ascending aortic aneurysm.   2.  Single vessel coronary artery disease.      SURGICAL PROCEDURES PERFORMED:   1.  Ascending aortic aneurysm repair with 30 mm Gelweave vascular graft.   2.  Coronary artery bypass grafting x1 with left internal mammary artery to left anterior descending artery.   3.  Placement of ventricular epicardial pacing leads.        SURGEON:  Akshat Soto MD      FIRST ASSISTANT:  Cardiac surgery fellow, Luis Armando Olivarez MD      SECOND ASSISTANT:  Gregory Fitzpatrick MD; Kye Vasques PA-C      INDICATION FOR SURGERY:  Marilia Bean is a 72-year-old female who had a history of ascending aortic dilatation, diagnosed since 2007.  She had been having followup serial echocardiogram over the past few years and CT of the chest as well.  The followup echocardiogram and CT scan showed gradual increase in the ascending aorta to the point that it meets the criteria for aortic aneurysm repair.  The patient was also found to have single vessel coronary artery disease with 80% stenosis in the proximal mid segment of the left anterior descending artery.  Due to these findings, we recommend the patient to undergo aortic undergo ascending aortic aneurysm repair and coronary artery bypass grafting with left internal mammary artery to left anterior descending artery branch.  The benefits and risks of surgery were explained to the patient and the consent was obtained.      SURGICAL PROCEDURE:  The patient was brought to the operating room.  She was intubated.  General anesthesia was given.  Her chest, abdomen and bilateral lower extremities were prepped and draped in sterile fashion.  We made a median sternotomy incision first.  We harvested the left internal mammary artery as a pedicle graft.  We gave heparin and divided the distal end of the left  internal mammary artery which has good blood flow.      We opened the pericardium.  We inspected the ascending aorta and the heart.  We noted the ascending aorta is significantly enlarged with aortic aneurysm.  The sinotubular junction appears to be preserved, but slightly effaced.  The distal ascending aortic dilatation started to taper down near the junction with the low aortic arch.      We gave heparin.  We cannulated the distal ascending aorta with an 18-[place] EOPA arterial cannula into the low aortic arch.  We cannulated the venous cannula with a 2-stage venous cannula through the right atrial appendage to the inferior vena cava.  We induced cardiopulmonary bypass.  We placed a retrograde cardioplegic catheter into coronary sinus.  We placed an antegrade cardioplegia in the proximal ascending aorta.  We also placed an LV vent through the right superior pulmonary vein.      We applied the aortic cross-clamp at the very distal portion of the ascending aorta at the junction with the aortic arch.  We gave a dose of antegrade cardioplegia, which was followed with a dose of retrograde cardioplegia.  The heart was arrested.  We proceeded with the coronary artery bypass grafting first.      We prepared the distal end of the LIMA graft which has excellent flow.  We exposed the mid distal segment of the LAD artery.  We made a 6-mm anterior wall arteriotomy in the mid distal segment of the LAD.  We brought down the distal end of the LIMA graft for an end-to-side anastomosis.  A 7-0 Prolene suture was used for the continuous running anastomosis.  We also attached the muscle pedicle to the epicardial layer after the distal anastomosis was performed.  The LIMA to LAD flow appears to be good and there was no bleeding from the anastomosis.      We then proceeded with the ascending aortic aneurysm resection and repair.  We transected the distal ascending aorta about 1 cm off the aortic cross-clamp.  We transected the proximal  ascending aorta at the sinotubular junction.  We measured the size of the aortic stump.  A 30 mm aortic Gelweave vascular graft was found to be a good fit.  We placed a PTFE felt on the outside of the aortic stumps and Gelweave vascular graft rim strips inside the endocardial lumen.  We then used a pair of 3-0 Prolene sutures to conduct the proximal anastomosis first.  It was conducted in a continuous running suture fashion with a 3-0 Prolene suture in an end-to-end fashion.  The anastomosis was satisfactory.  We then cut the vascular graft to appropriate length to reach the distal end of the ascending aortic stump.  We used the end-to-end anastomotic technique to conduct distal anastomosis between the vascular graft and the native aorta.  Again, a 3-0 Prolene suture was used for the continuous running anastomosis.      After completion of the distal anastomosis, we released the aortic cross-clamp.  We performed aggressive deair measures.  We allowed the heart to be reperfused.  Once we released the aortic cross-clamp, we noted a small tear in the vascular graft near the arterial cannula site and near the aortic cross-clamp.  Some bleeding was noted from the arterial cannula pursestring suture needle hole.  With the amount of bleeding that is more than we usually see from the suture hole.  In order to have a good repair of the distal arterial cannulation site.  We decided to reestablish the cardiopulmonary bypass through the femoral arterial cannula, so that we can reliably repair the arterial cannulation site in the arch before the heart started to contract or move the blood with systemic pulsatile blood flow.  We brought in a separate arterial cannula to reach the groin area.  We made a cut down incision in the right groin area and dissected out the right femoral artery.  We inserted the needle in the right femoral artery and passed a guidewire into the right femoral artery.  We used a dilator to dilate the right  femoral artery.  We subsequently inserted a 17-[place] arterial cannula into the right femoral artery and advanced into the distal abdominal aorta.   We then temporarily weaned the cardiopulmonary bypass off and connected the femoral arterial cannula to the cardiopulmonary bypass circuit.  We then reestablished cardiopulmonary bypass again.      With adequate amount of drainage and there was no pulsatile flow from the arterial system and the mean pressure was kept around 50 mmHg, we pulled the aortic cannula from the low aortic arch.  We then tied the pursestring sutures.  After we tied the pursestring sutures, we noted that there is bleeding from the corner of the pursestring suture line.   We added a couple of 4-0 Prolene sutures with pledgets to reinforce and close the bleeder from the corner of the pursestring suture line.  We further reinforced the hemostasis by placing pledgeted sutures and the Hemashield pursestring sutures and Surgicel onto the suture line and the bleeding stopped and hemostasis appears to be fine, with the mean pressure of 60.  We further used the surrounding pericardial reflection to further tack on the lower aortic arch suture line for further stable hemostasis.      At this time, we rewarmed the patient.  We were able to wean the patient off the cardiopulmonary bypass without difficulties.  We checked the lower aortic arch cannulation site.  There is no bleeding coming out from the cannulation site.  We did an echocardiogram which showed no dissection or hematoma.  We removed the arterial and venous cannulas.  The cannulation sites were repaired with Prolene sutures.  We gave protamine and hemostasis is improved and ensured.  Initially the patient's native heart rate was relatively slow, so we placed a pair of epicardial pacing leads and then we were able to wean the patient off the cardiopulmonary bypass without difficulties.  We achieved good hemostasis.  We placed a pair of mediastinal  drainage tubes around the heart.  We placed 2 separate chest tubes in the left and right pleural space.  The patient's sternum was closed with interrupted sternal wires.  The subcutaneous tissue and skin were closed with the Vicryl sutures.  The patient was transferred to surgical intensive care unit in stable vital signs.         OZIEL VANESSA MD             D: 2017 15:02   T: 2017 16:11   MT: MELODY      Name:     MARGIE PATINO   MRN:      [id number]        Account:        [id number]   :      1945           Procedure Date: 2017      Document: [id number]

## 2017-06-05 NOTE — ANESTHESIA PROCEDURE NOTES
Central Line Procedure Note  Staff:     Anesthesiologist:  MARIO GALARZA    Resident/CRNA:  MELVIN FUCHS    Central line placed by Resident/CRNA in the presence of a teaching physician    Location: In OR after induction  Procedure Start/Stop Times:     patient identified, IV checked, site marked, risks and benefits discussed, informed consent, monitors and equipment checked, pre-op evaluation and at physician/surgeon's request      Correct Patient: Yes      Correct Position: Yes      Correct Site: Yes      Correct Procedure: Yes      Correct Laterality:  Yes    Site Marked:  Yes  Line Placement:     Procedure:  Central Line    Insertion laterality:  Right    Insertion site:  Internal Jugular    Position:  Trendelenburg      Maximal Sterile Barriers: All elements of maximal sterile barrier technique followed      (Maximal sterile barriers include:   Sterile gown, Sterile Gloves, Mask, Cap, Whole body draped, hand hygiene and acceptable skin prep).Skin Prep: Chloraprep         Injection Technique:  Ultrasound guided    Sterile Ultrasound Technique:  Sterile probe cover and Sterile gel    Vein evaluated via U/S for patency/adequacy of catheter insertion and is adequate.  Using realtime U/S imaging the vein was punctured, and needle was observed entering vein on U/S      Permanent Image entered into patient's record      Catheter size:  9 Fr, 2 lumen 11.5 cm (MAC)    Cath secured with: suture      Dressing:  Tegaderm    Complications:  None obvious    Blood aspirated all lumens: Yes      All Lumens Flushed: Yes      Verification method:  Placement to be verified post-op

## 2017-06-05 NOTE — OR NURSING
Dr. Kohler aware of low Potassium, stated they will take care of it intra-op, and to not replace K in pre-op

## 2017-06-05 NOTE — IP AVS SNAPSHOT
MRN:[id number]                      After Visit Summary   6/5/2017    Marilia Bean    MRN: [id number]           Thank you!     Thank you for choosing [facility] for your care. Our goal is always to provide you with excellent care. Hearing back from our patients is one way we can continue to improve our services. Please take a few minutes to complete the written survey that you may receive in the mail after you visit with us. Thank you!        Patient Information     Date Of Birth          1945        Designated Caregiver       Most Recent Value    Caregiver    Will someone help with your care after discharge? yes    Name of designated caregiver Elroy    Phone number of caregiver [phone]    Caregiver address n/a      About your hospital stay     You were admitted on:  June 5, 2017 You last received care in the:  [address] [facility]    You were discharged on:  Janey 10, 2017        Reason for your hospital stay       Your ascending aortic aneurysm was repaired 6/5 by Dr Soto with coronary artery bypass x 1 graft.                  Who to Call     For medical emergencies, please call 911.  For non-urgent questions about your medical care, please call your primary care provider or clinic, [phone]  For questions related to your surgery, please call your surgery clinic        Attending Provider     Provider Akshat Carrero MD Thoracic Diseases       Primary Care Provider Office Phone # Fax #    Herbert Epstein [phone]       When to contact your care team       Call your primary doctor or surgery team if you have any of the following: temperature greater than 101 F,  increased shortness of breath, increased drainage, increased swelling or increased pain.                  After Care Instructions     Activity       Your activity upon discharge: activity as tolerated and no driving for 4 weeks            Diet       Follow this diet upon discharge:  Orders Placed This Encounter      Snacks/Supplements Adult: Ensure Plus (Adult); Between Meals      Regular Diet Adult            Monitor and record       blood pressure daily  pulse daily  weight every day            Wound care and dressings       Instructions to care for your wound at home: as directed.  You have dissolvable sutures under your skin that do not need to be removed.  Pat wound dressing dry after showers.  Skin glue will eventually fall off in 1-2 weeks.     Keep wound clean and dry, showers are okay after discharge, but don't let spray hit directly on incision. No baths or swimming for 1 month.  Clean wounds twice a day for 2 weeks with microklenz spray if available. Cover chest tube sites with gauze until they stop draining, then leave open.                  Follow-up Appointments     Follow Up and recommended labs and tests       Follow up with primary care provider, Herbert Epstein, within 7-10 days to evaluate medication change, to evaluate treatment change, to evaluate after surgery and for hospital follow- up.  The following labs/tests are recommended: CBC, BMP, check Blood Pressure and Heart Rate. You have a follow up visit with [facility] Advance Care Practitioners on 6/23/17 at 2 pm (arrive 1:45 pm) at the [facility] building. Make an appointment with your cardiologist in 6 weeks.                  Your next 10 appointments already scheduled     Jun 23, 2017  2:00 PM CDT   (Arrive by 1:45 PM)   Return Visit with [facility]    [address]  [address] [facility] [address] [phone]              Additional Services     Cardiac Rehab Referral       Your provider has referred you to: PREFERRED PROVIDERS:                  Further instructions from your care team       [facility]      AFTER YOU GO HOME FROM YOUR HEART SURGERY    Avoid lifting anything greater than ten pounds for 6  weeks after surgery and then less than 20 pounds for an additional 6 weeks.    No driving for 4 weeks after surgery or while on pain medication.     Avoid strenuous activities such as bowling, vacuuming, raking, shoveling, golf or tennis for 12 weeks after your surgery. It is okay to resume sex if you feel comfortable in doing so. You may have to try different positions with your partner.     Splint your chest incision by hugging a pillow or bringing your arms across your chest when coughing or sneezing. Avoid pushing off with your arms when getting up for the first month if you have had your sternum opened.    Shower or wash your incisions daily with soap and water (or as instructed), pat dry. Keep wound clean and dry, showers are okay after discharge, but don't let spray hit directly on incision. No baths or swimming for 1 month.  Cover chest tube sites with gauze until they stop draining, then leave open. It is not abnormal for chest tube sites to drain yellowish/clear fluid for up to 2-3 weeks after surgery.   Watch for signs of infection: increased redness, tenderness, warmth or any drainage that appears infected (pus like) or is persistent.  Also a temperature > 100.5 F or chills. Call your surgeon or primary care provider's office immediately. Remove any skin glue left on incisions after 10 days. This will not affect your incision and can speed up healing.    Exercise is very important in your recovery. Please follow the guidelines set up for you in your cardiac rehab classes at the hospital. If outpatient cardiac rehab was ordered for you, we highly recommend you participate. If you have problems arranging your cardiac rehab, please call [phone].     Avoid sitting for prolonged periods of time, try to walk every hour during the day.     Check your weight when you get home from the hospital and continue to check it daily through your recovery for at least a month. If you notice a weight gain of 2-3  pounds in a week, notify your primary care physician, cardiologist or surgeon.    Bowel activity may be slow after surgery. If necessary, you may take an over the counter laxative such as Miralax. You may have stool softeners prescribed (docusate sodium, Senokot). We recommend using stool softeners while using narcotics for pain (oxycodone/percocet, hydrocodone/vicodin).        DO NOT SMOKE.  IF YOU NEED HELP QUITTING, PLEASE TALK WITH YOUR CARDIOLOGIST OR PRIMARY DOCTOR.    REGARDING PRESCRIPTION REFILLS.  If you need a refill on your pain medication contact us.  All other medications will be adjusted, discontinued and re-filled by your primary care physician and/or your cardiologist as they were prior to your surgery. We have given you enough for one to three month with possibly one refill.    POST-OPERATIVE CLINIC VISITS  You have a follow up visit with [facility] Advance Care Practitioners on 6/23/17 at 2 pm (arrive 1:45 pm) at the [facility].  You will then return to the care of your primary physician and your cardiologist.   It is important to call for an appointment to see your cardiologist in 4-6 weeks after surgery and your primary care physician in 7-10 days after surgery. If there is a need to return to see CT Surgery please call our  at [phone].    SURGICAL QUESTIONS  Please call Millicent Mina with any surgical questions, her phone number is listed below.  She can assist you with your needs and contact other surgery care team members as indicated.    For general questions or concerns, please call the Cardiothoracic Surgery Department at [phone] 8-4:30 M-F.   On weekends or after hours, please call [phone] and ask the  to page the Cardiothoracic Surgery fellow on call.      Thank you,    Your Cardiothoracic Surgery Team  Millicent Mina RN Care Coordinator-  [phone]   Louise Vasques  "KATHE                          Pending Results     Date and Time Order Name Status Description    6/9/2017 1048 CT Chest Angio w/o & w Contrast In process             Statement of Approval     Ordered          06/10/17 1030  I have reviewed and agree with all the recommendations and orders detailed in this document.  EFFECTIVE NOW     Approved and electronically signed by:  Louise Thorpe PA-C             Admission Information     Date & Time Provider Department Dept. Phone    6/5/2017 Akshat Soto MD [address] [facility] [phone]      Your Vitals Were     Blood Pressure Pulse Temperature Respirations Height Weight    110/59 (BP Location: Left arm) 72 99.5  F (37.5  C) (Oral) 18 1.715 m (5' 7.5\") 79.7 kg (175 lb 12.8 oz)    Pulse Oximetry BMI (Body Mass Index)                92% 27.13 kg/m2          [employer] Information     [employer] gives you secure access to your electronic health record. If you see a primary care provider, you can also send messages to your care team and make appointments. If you have questions, please call your primary care clinic.  If you do not have a primary care provider, please call [phone] and they will assist you.        Care EveryWhere ID     This is your Care EveryWhere ID. This could be used by other organizations to access your [facility] medical records  [id number]           Review of your medicines      START taking        Dose / Directions    acetaminophen 325 MG tablet   Commonly known as:  TYLENOL   Used for:  Acute post-operative pain        Dose:  650 mg   Take 2 tablets (650 mg) by mouth every 6 hours as needed for mild pain   Quantity:  100 tablet   Refills:  0       aspirin 325 MG EC tablet   Used for:  S/P CABG (coronary artery bypass graft), S/P aortic aneurysm repair   Replaces:  aspirin 81 MG tablet        Dose:  325 mg   Take 1 tablet (325 mg) by mouth daily   Quantity:  100 tablet   Refills:  0       cyclobenzaprine 5 MG tablet   Commonly known " as:  FLEXERIL   Used for:  Acute post-operative pain        Dose:  5 mg   Take 1 tablet (5 mg) by mouth 3 times daily as needed for muscle spasms or other (pain control while sleeping)   Quantity:  30 tablet   Refills:  0       lisinopril 5 MG tablet   Commonly known as:  PRINIVIL/ZESTRIL   Used for:  S/P aortic aneurysm repair, S/P CABG (coronary artery bypass graft)        Dose:  7.5 mg   Take 1.5 tablets (7.5 mg) by mouth daily   Quantity:  45 tablet   Refills:  1       metoprolol 25 MG tablet   Commonly known as:  LOPRESSOR   Used for:  S/P CABG (coronary artery bypass graft), S/P aortic aneurysm repair        Dose:  25 mg   1 tablet (25 mg) by Oral or Feeding Tube route 2 times daily   Quantity:  60 tablet   Refills:  1       oxyCODONE 5 MG IR tablet   Commonly known as:  ROXICODONE   Used for:  Acute post-operative pain        Dose:  5-10 mg   Take 1-2 tablets (5-10 mg) by mouth every 4 hours as needed for moderate to severe pain   Quantity:  50 tablet   Refills:  0       pantoprazole 40 MG EC tablet   Commonly known as:  PROTONIX   Used for:  Acute post-operative pain        Dose:  40 mg   Take 1 tablet (40 mg) by mouth every morning   Quantity:  30 tablet   Refills:  0       polyethylene glycol Packet   Commonly known as:  MIRALAX/GLYCOLAX   Used for:  Acute post-operative pain        Dose:  17 g   Take 17 g by mouth daily as needed for constipation   Quantity:  10 packet   Refills:  0       senna-docusate 8.6-50 MG per tablet   Commonly known as:  SENOKOT-S;PERICOLACE   Used for:  Acute post-operative pain        Dose:  1-2 tablet   Take 1-2 tablets by mouth 2 times daily as needed for constipation (while using narcotics)   Quantity:  100 tablet   Refills:  0         CONTINUE these medicines which have NOT CHANGED        Dose / Directions    atorvastatin 20 MG tablet   Commonly known as:  LIPITOR   Used for:  Hyperlipidemia LDL goal <100        Dose:  20 mg   Take 1 tablet (20 mg) by mouth every evening    Quantity:  90 tablet   Refills:  1         STOP taking     aspirin 81 MG tablet   Replaced by:  aspirin 325 MG EC tablet           triamterene-hydrochlorothiazide 37.5-25 MG per capsule   Commonly known as:  DYAZIDE                Where to get your medicines      These medications were sent to [facility] - [place], [place] - [address]  [address] [address]     Phone:  [phone]     acetaminophen 325 MG tablet    aspirin 325 MG EC tablet    atorvastatin 20 MG tablet    lisinopril 5 MG tablet    metoprolol 25 MG tablet    pantoprazole 40 MG EC tablet    polyethylene glycol Packet    senna-docusate 8.6-50 MG per tablet         Some of these will need a paper prescription and others can be bought over the counter. Ask your nurse if you have questions.     Bring a paper prescription for each of these medications     cyclobenzaprine 5 MG tablet    oxyCODONE 5 MG IR tablet                Protect others around you: Learn how to safely use, store and throw away your medicines at www.disposemymeds.org.             Medication List: This is a list of all your medications and when to take them. Check marks below indicate your daily home schedule. Keep this list as a reference.      Medications           Morning Afternoon Evening Bedtime As Needed    acetaminophen 325 MG tablet   Commonly known as:  TYLENOL   Take 2 tablets (650 mg) by mouth every 6 hours as needed for mild pain   Last time this was given:  975 mg on 6/9/2017  8:51 AM                                   aspirin 325 MG EC tablet   Take 1 tablet (325 mg) by mouth daily   Last time this was given:  325 mg on 6/10/2017 11:19 AM                                   atorvastatin 20 MG tablet   Commonly known as:  LIPITOR   Take 1 tablet (20 mg) by mouth every evening                                   cyclobenzaprine 5 MG tablet   Commonly known as:  FLEXERIL   Take 1 tablet (5 mg) by mouth 3 times daily as needed for  muscle spasms or other (pain control while sleeping)   Last time this was given:  5 mg on 6/10/2017 11:20 AM                                         lisinopril 5 MG tablet   Commonly known as:  PRINIVIL/ZESTRIL   Take 1.5 tablets (7.5 mg) by mouth daily   Last time this was given:  5 mg on 6/10/2017 11:20 AM                                   metoprolol 25 MG tablet   Commonly known as:  LOPRESSOR   1 tablet (25 mg) by Oral or Feeding Tube route 2 times daily   Last time this was given:  25 mg on 6/10/2017 11:20 AM                                      oxyCODONE 5 MG IR tablet   Commonly known as:  ROXICODONE   Take 1-2 tablets (5-10 mg) by mouth every 4 hours as needed for moderate to severe pain   Last time this was given:  5 mg on 6/9/2017  9:15 PM                                   pantoprazole 40 MG EC tablet   Commonly known as:  PROTONIX   Take 1 tablet (40 mg) by mouth every morning   Last time this was given:  40 mg on 6/10/2017 11:20 AM                                   polyethylene glycol Packet   Commonly known as:  MIRALAX/GLYCOLAX   Take 17 g by mouth daily as needed for constipation   Last time this was given:  17 g on 6/9/2017  8:52 AM                                   senna-docusate 8.6-50 MG per tablet   Commonly known as:  SENOKOT-S;PERICOLACE   Take 1-2 tablets by mouth 2 times daily as needed for constipation (while using narcotics)   Last time this was given:  2 tablets on 6/9/2017  8:51 AM

## 2017-06-05 NOTE — PROGRESS NOTES
Preop evaluation of patient's studies reviewed coronary angiogram showed 80% in the mid LAD.   Plan: CABG with LIMA to LAD in addition to ascending aortic aneurysm repair.

## 2017-06-05 NOTE — IP AVS SNAPSHOT
[address] [address]    Phone:  [phone]                                       After Visit Summary   6/5/2017    Marilia Bean    MRN: [id number]           After Visit Summary Signature Page     I have received my discharge instructions, and my questions have been answered. I have discussed any challenges I see with this plan with the nurse or doctor.    ..........................................................................................................................................  Patient/Patient Representative Signature      ..........................................................................................................................................  Patient Representative Print Name and Relationship to Patient    ..................................................               ................................................  Date                                            Time    ..........................................................................................................................................  Reviewed by Signature/Title    ...................................................              ..............................................  Date                                                            Time

## 2017-06-06 ENCOUNTER — APPOINTMENT (OUTPATIENT)
Dept: GENERAL RADIOLOGY | Facility: CLINIC | Age: 72
DRG: 221 | End: 2017-06-06
Attending: THORACIC SURGERY (CARDIOTHORACIC VASCULAR SURGERY)
Payer: MEDICARE

## 2017-06-06 LAB
ANION GAP SERPL CALCULATED.3IONS-SCNC: 8 MMOL/L (ref 3–14)
APTT PPP: 37 SEC (ref 22–37)
BASE DEFICIT BLDA-SCNC: 1.4 MMOL/L
BLD PROD TYP BPU: NORMAL
BLD UNIT ID BPU: 0
BLOOD PRODUCT CODE: NORMAL
BPU ID: NORMAL
BUN SERPL-MCNC: 17 MG/DL (ref 7–30)
CA-I BLD-MCNC: 4.7 MG/DL (ref 4.4–5.2)
CALCIUM SERPL-MCNC: 8 MG/DL (ref 8.5–10.1)
CHLORIDE SERPL-SCNC: 111 MMOL/L (ref 94–109)
CO2 SERPL-SCNC: 26 MMOL/L (ref 20–32)
CREAT SERPL-MCNC: 0.92 MG/DL (ref 0.52–1.04)
ERYTHROCYTE [DISTWIDTH] IN BLOOD BY AUTOMATED COUNT: 13.6 % (ref 10–15)
FIBRINOGEN PPP-MCNC: 269 MG/DL (ref 200–420)
GFR SERPL CREATININE-BSD FRML MDRD: 60 ML/MIN/1.7M2
GLUCOSE BLDC GLUCOMTR-MCNC: 104 MG/DL (ref 70–99)
GLUCOSE BLDC GLUCOMTR-MCNC: 104 MG/DL (ref 70–99)
GLUCOSE BLDC GLUCOMTR-MCNC: 113 MG/DL (ref 70–99)
GLUCOSE BLDC GLUCOMTR-MCNC: 117 MG/DL (ref 70–99)
GLUCOSE BLDC GLUCOMTR-MCNC: 134 MG/DL (ref 70–99)
GLUCOSE BLDC GLUCOMTR-MCNC: 139 MG/DL (ref 70–99)
GLUCOSE BLDC GLUCOMTR-MCNC: 144 MG/DL (ref 70–99)
GLUCOSE BLDC GLUCOMTR-MCNC: 154 MG/DL (ref 70–99)
GLUCOSE BLDC GLUCOMTR-MCNC: 95 MG/DL (ref 70–99)
GLUCOSE SERPL-MCNC: 129 MG/DL (ref 70–99)
HCO3 BLD-SCNC: 25 MMOL/L (ref 21–28)
HCT VFR BLD AUTO: 23.7 % (ref 35–47)
HGB BLD-MCNC: 7.9 G/DL (ref 11.7–15.7)
HGB BLD-MCNC: 8.1 G/DL (ref 11.7–15.7)
INR PPP: 1.3 (ref 0.86–1.14)
INTERPRETATION ECG - MUSE: NORMAL
INTERPRETATION ECG - MUSE: NORMAL
LACTATE BLD-SCNC: 1.1 MMOL/L (ref 0.7–2.1)
MAGNESIUM SERPL-MCNC: 2.7 MG/DL (ref 1.6–2.3)
MCH RBC QN AUTO: 29.7 PG (ref 26.5–33)
MCHC RBC AUTO-ENTMCNC: 34.2 G/DL (ref 31.5–36.5)
MCV RBC AUTO: 87 FL (ref 78–100)
O2/TOTAL GAS SETTING VFR VENT: ABNORMAL %
OXYHGB MFR BLD: 92 % (ref 92–100)
PCO2 BLD: 48 MM HG (ref 35–45)
PH BLD: 7.32 PH (ref 7.35–7.45)
PHOSPHATE SERPL-MCNC: 4 MG/DL (ref 2.5–4.5)
PLATELET # BLD AUTO: 117 10E9/L (ref 150–450)
PO2 BLD: 69 MM HG (ref 80–105)
POTASSIUM SERPL-SCNC: 3.7 MMOL/L (ref 3.4–5.3)
POTASSIUM SERPL-SCNC: 4 MMOL/L (ref 3.4–5.3)
RBC # BLD AUTO: 2.73 10E12/L (ref 3.8–5.2)
SODIUM SERPL-SCNC: 144 MMOL/L (ref 133–144)
TRANSFUSION STATUS PATIENT QL: NORMAL
TRANSFUSION STATUS PATIENT QL: NORMAL
WBC # BLD AUTO: 15.4 10E9/L (ref 4–11)

## 2017-06-06 PROCEDURE — 71010 XR CHEST PORT 1 VW: CPT

## 2017-06-06 PROCEDURE — 83605 ASSAY OF LACTIC ACID: CPT | Performed by: THORACIC SURGERY (CARDIOTHORACIC VASCULAR SURGERY)

## 2017-06-06 PROCEDURE — 21400006 ZZH R&B CCU INTERMEDIATE UMMC

## 2017-06-06 PROCEDURE — 00000146 ZZHCL STATISTIC GLUCOSE BY METER IP

## 2017-06-06 PROCEDURE — 40000275 ZZH STATISTIC RCP TIME EA 10 MIN

## 2017-06-06 PROCEDURE — 93005 ELECTROCARDIOGRAM TRACING: CPT

## 2017-06-06 PROCEDURE — A9270 NON-COVERED ITEM OR SERVICE: HCPCS | Mod: GY | Performed by: PHYSICIAN ASSISTANT

## 2017-06-06 PROCEDURE — 93010 ELECTROCARDIOGRAM REPORT: CPT | Performed by: INTERNAL MEDICINE

## 2017-06-06 PROCEDURE — 40000196 ZZH STATISTIC RAPCV CVP MONITORING

## 2017-06-06 PROCEDURE — 85610 PROTHROMBIN TIME: CPT | Performed by: THORACIC SURGERY (CARDIOTHORACIC VASCULAR SURGERY)

## 2017-06-06 PROCEDURE — A9270 NON-COVERED ITEM OR SERVICE: HCPCS | Mod: GY | Performed by: SURGERY

## 2017-06-06 PROCEDURE — 25000128 H RX IP 250 OP 636: Performed by: THORACIC SURGERY (CARDIOTHORACIC VASCULAR SURGERY)

## 2017-06-06 PROCEDURE — 84132 ASSAY OF SERUM POTASSIUM: CPT | Performed by: SURGERY

## 2017-06-06 PROCEDURE — 82330 ASSAY OF CALCIUM: CPT | Performed by: THORACIC SURGERY (CARDIOTHORACIC VASCULAR SURGERY)

## 2017-06-06 PROCEDURE — P9016 RBC LEUKOCYTES REDUCED: HCPCS | Performed by: THORACIC SURGERY (CARDIOTHORACIC VASCULAR SURGERY)

## 2017-06-06 PROCEDURE — 80048 BASIC METABOLIC PNL TOTAL CA: CPT | Performed by: THORACIC SURGERY (CARDIOTHORACIC VASCULAR SURGERY)

## 2017-06-06 PROCEDURE — 25000132 ZZH RX MED GY IP 250 OP 250 PS 637: Mod: GY | Performed by: SURGERY

## 2017-06-06 PROCEDURE — A9270 NON-COVERED ITEM OR SERVICE: HCPCS | Mod: GY | Performed by: THORACIC SURGERY (CARDIOTHORACIC VASCULAR SURGERY)

## 2017-06-06 PROCEDURE — 82805 BLOOD GASES W/O2 SATURATION: CPT | Performed by: THORACIC SURGERY (CARDIOTHORACIC VASCULAR SURGERY)

## 2017-06-06 PROCEDURE — 84100 ASSAY OF PHOSPHORUS: CPT | Performed by: THORACIC SURGERY (CARDIOTHORACIC VASCULAR SURGERY)

## 2017-06-06 PROCEDURE — 85018 HEMOGLOBIN: CPT | Performed by: SURGERY

## 2017-06-06 PROCEDURE — 86850 RBC ANTIBODY SCREEN: CPT | Performed by: THORACIC SURGERY (CARDIOTHORACIC VASCULAR SURGERY)

## 2017-06-06 PROCEDURE — 25000125 ZZHC RX 250: Performed by: THORACIC SURGERY (CARDIOTHORACIC VASCULAR SURGERY)

## 2017-06-06 PROCEDURE — 85384 FIBRINOGEN ACTIVITY: CPT | Performed by: THORACIC SURGERY (CARDIOTHORACIC VASCULAR SURGERY)

## 2017-06-06 PROCEDURE — 86901 BLOOD TYPING SEROLOGIC RH(D): CPT | Performed by: THORACIC SURGERY (CARDIOTHORACIC VASCULAR SURGERY)

## 2017-06-06 PROCEDURE — 85730 THROMBOPLASTIN TIME PARTIAL: CPT | Performed by: THORACIC SURGERY (CARDIOTHORACIC VASCULAR SURGERY)

## 2017-06-06 PROCEDURE — 25000128 H RX IP 250 OP 636: Performed by: TRANSPLANT SURGERY

## 2017-06-06 PROCEDURE — P9041 ALBUMIN (HUMAN),5%, 50ML: HCPCS | Performed by: TRANSPLANT SURGERY

## 2017-06-06 PROCEDURE — 25000132 ZZH RX MED GY IP 250 OP 250 PS 637: Mod: GY | Performed by: THORACIC SURGERY (CARDIOTHORACIC VASCULAR SURGERY)

## 2017-06-06 PROCEDURE — 25000132 ZZH RX MED GY IP 250 OP 250 PS 637: Mod: GY | Performed by: ANESTHESIOLOGY

## 2017-06-06 PROCEDURE — 40000014 ZZH STATISTIC ARTERIAL MONITORING DAILY

## 2017-06-06 PROCEDURE — 85027 COMPLETE CBC AUTOMATED: CPT | Performed by: THORACIC SURGERY (CARDIOTHORACIC VASCULAR SURGERY)

## 2017-06-06 PROCEDURE — 83735 ASSAY OF MAGNESIUM: CPT | Performed by: THORACIC SURGERY (CARDIOTHORACIC VASCULAR SURGERY)

## 2017-06-06 PROCEDURE — 86900 BLOOD TYPING SEROLOGIC ABO: CPT | Performed by: THORACIC SURGERY (CARDIOTHORACIC VASCULAR SURGERY)

## 2017-06-06 PROCEDURE — 25000132 ZZH RX MED GY IP 250 OP 250 PS 637: Mod: GY | Performed by: PHYSICIAN ASSISTANT

## 2017-06-06 RX ORDER — METHOCARBAMOL 500 MG/1
250 TABLET ORAL 4 TIMES DAILY PRN
Status: DISCONTINUED | OUTPATIENT
Start: 2017-06-06 | End: 2017-06-10 | Stop reason: HOSPADM

## 2017-06-06 RX ORDER — DEXTROSE MONOHYDRATE 25 G/50ML
25-50 INJECTION, SOLUTION INTRAVENOUS
Status: DISCONTINUED | OUTPATIENT
Start: 2017-06-06 | End: 2017-06-10 | Stop reason: HOSPADM

## 2017-06-06 RX ORDER — ALBUMIN, HUMAN INJ 5% 5 %
12.5 SOLUTION INTRAVENOUS ONCE
Status: COMPLETED | OUTPATIENT
Start: 2017-06-06 | End: 2017-06-06

## 2017-06-06 RX ORDER — NICOTINE POLACRILEX 4 MG
15-30 LOZENGE BUCCAL
Status: DISCONTINUED | OUTPATIENT
Start: 2017-06-06 | End: 2017-06-10 | Stop reason: HOSPADM

## 2017-06-06 RX ORDER — PANTOPRAZOLE SODIUM 40 MG/1
40 TABLET, DELAYED RELEASE ORAL EVERY MORNING
Status: DISCONTINUED | OUTPATIENT
Start: 2017-06-07 | End: 2017-06-10 | Stop reason: HOSPADM

## 2017-06-06 RX ORDER — OXYCODONE HYDROCHLORIDE 5 MG/1
5-10 TABLET ORAL EVERY 4 HOURS PRN
Status: DISCONTINUED | OUTPATIENT
Start: 2017-06-06 | End: 2017-06-10 | Stop reason: HOSPADM

## 2017-06-06 RX ADMIN — ASPIRIN 81 MG: 81 TABLET, COATED ORAL at 07:26

## 2017-06-06 RX ADMIN — CEFAZOLIN 1 G: 1 INJECTION, POWDER, FOR SOLUTION INTRAMUSCULAR; INTRAVENOUS at 12:15

## 2017-06-06 RX ADMIN — HYDROMORPHONE HYDROCHLORIDE 0.5 MG: 1 INJECTION, SOLUTION INTRAMUSCULAR; INTRAVENOUS; SUBCUTANEOUS at 00:14

## 2017-06-06 RX ADMIN — HYDROMORPHONE HYDROCHLORIDE 0.5 MG: 1 INJECTION, SOLUTION INTRAMUSCULAR; INTRAVENOUS; SUBCUTANEOUS at 05:39

## 2017-06-06 RX ADMIN — METOPROLOL TARTRATE 12.5 MG: 25 TABLET, FILM COATED ORAL at 20:52

## 2017-06-06 RX ADMIN — Medication 250 MG: at 20:53

## 2017-06-06 RX ADMIN — HUMAN INSULIN 8 UNITS/HR: 100 INJECTION, SOLUTION SUBCUTANEOUS at 00:14

## 2017-06-06 RX ADMIN — PANTOPRAZOLE SODIUM 40 MG: 40 INJECTION, POWDER, FOR SOLUTION INTRAVENOUS at 07:27

## 2017-06-06 RX ADMIN — HYDROMORPHONE HYDROCHLORIDE 0.3 MG: 1 INJECTION, SOLUTION INTRAMUSCULAR; INTRAVENOUS; SUBCUTANEOUS at 17:46

## 2017-06-06 RX ADMIN — ONDANSETRON 4 MG: 2 INJECTION INTRAMUSCULAR; INTRAVENOUS at 09:39

## 2017-06-06 RX ADMIN — SENNOSIDES AND DOCUSATE SODIUM 2 TABLET: 8.6; 5 TABLET ORAL at 07:26

## 2017-06-06 RX ADMIN — SENNOSIDES AND DOCUSATE SODIUM 2 TABLET: 8.6; 5 TABLET ORAL at 20:52

## 2017-06-06 RX ADMIN — HYDROMORPHONE HYDROCHLORIDE 0.5 MG: 1 INJECTION, SOLUTION INTRAMUSCULAR; INTRAVENOUS; SUBCUTANEOUS at 02:07

## 2017-06-06 RX ADMIN — Medication 250 MG: at 17:53

## 2017-06-06 RX ADMIN — METOPROLOL TARTRATE 12.5 MG: 25 TABLET, FILM COATED ORAL at 09:44

## 2017-06-06 RX ADMIN — CEFAZOLIN 1 G: 1 INJECTION, POWDER, FOR SOLUTION INTRAMUSCULAR; INTRAVENOUS at 03:52

## 2017-06-06 RX ADMIN — ALBUMIN (HUMAN) 12.5 G: 12.5 SOLUTION INTRAVENOUS at 01:42

## 2017-06-06 RX ADMIN — OXYCODONE HYDROCHLORIDE 5 MG: 5 TABLET ORAL at 20:52

## 2017-06-06 RX ADMIN — MUPIROCIN 0.5 G: 20 OINTMENT TOPICAL at 07:27

## 2017-06-06 RX ADMIN — HYDROMORPHONE HYDROCHLORIDE 0.5 MG: 1 INJECTION, SOLUTION INTRAMUSCULAR; INTRAVENOUS; SUBCUTANEOUS at 03:04

## 2017-06-06 RX ADMIN — POTASSIUM PHOSPHATE, MONOBASIC AND POTASSIUM PHOSPHATE, DIBASIC 10 MMOL: 224; 236 INJECTION, SOLUTION INTRAVENOUS at 00:38

## 2017-06-06 RX ADMIN — OXYCODONE HYDROCHLORIDE 5 MG: 5 TABLET ORAL at 09:39

## 2017-06-06 RX ADMIN — HYDROMORPHONE HYDROCHLORIDE 0.3 MG: 1 INJECTION, SOLUTION INTRAMUSCULAR; INTRAVENOUS; SUBCUTANEOUS at 23:51

## 2017-06-06 RX ADMIN — POTASSIUM CHLORIDE 20 MEQ: 29.8 INJECTION, SOLUTION INTRAVENOUS at 06:08

## 2017-06-06 RX ADMIN — POTASSIUM CHLORIDE 20 MEQ: 750 TABLET, EXTENDED RELEASE ORAL at 13:26

## 2017-06-06 NOTE — PROGRESS NOTES
VSS, afebrile. Pt's lines and kaur were removed. Temp pacing wires were capped. Report was given to nurse on 6C and she was transferred up there at 1600.

## 2017-06-06 NOTE — PROGRESS NOTES
CV ICU PROGRESS NOTE  6/6/2017      ASSESSMENT: Marilia Bean is a 72 year old female with h/o enlarging ascending aortic aneurysm, first discovered 4-5 years ago when she was trying to donate a kidney to her sister, this was followed over time, and when 5.0 cm patient underwent median sternotomy, AAA repair with CABGx1, POD#1      PLAN:   Neuro/ pain/ sedation:  -Monitor neurological status. Notify the MD for any acute changes in exam.  -oxy and dilaudid for pain.     Pulmonary care:   -Supplemental oxygen to keep saturation above 92 %.  -Incentive spirometer every 15- 30 minutes when awake.  -wean of O2     Cardiovascular:    -Monitor hemodynamic status.   -will start metoprolol 12.5 bid  -discontinue swan     GI care:   -Regular diet  -Senna     Fluids/ Electrolytes/ Nutrition:   -Replace lytes per protocol  -No indication for parenteral nutrition.     Renal/ Fluid Balance:    -Urine output is adequate so far.  -Will continue to monitor intake and output.  -discontinue kaur     Endocrine:    -SSI     ID/ Antibiotics:  -No indication for antibiotics.      Heme:     -Hemoglobin trending down  -Re check in afternoon   -Transfuse for hgb<8     Prophylaxis:    -Mechanical prophylaxis for DVT.     Disposition:  -CV ICU. Ok to go to floor later today.    Patient seen, findings and plan discussed with CV ICU staff.  Harvinder Ortiz [facility]  Anesthesiology  Pager #[phone]    - - - - - - - - - - - - - - - - - - - - - - - - - - - - - - - - - - - - - - - - - - - - - - - - - - - - - - - - - - - - - - - - - - - - - - - -     SUBJECTIVE: Extubated and doing well.    PHYSICAL EXAMINATION:  Temp:  [95.8  F (35.4  C)-98.6  F (37  C)] 96.5  F (35.8  C)  Heart Rate:  [70-95] 75  Resp:  [9-35] 11  BP: ()/(52-66) 83/52  MAP:  [50 mmHg-88 mmHg] 62 mmHg  Arterial Line BP: ()/(16-61) 94/46  FiO2 (%):  [40 %-50 %] 40 %  SpO2:  [92 %-100 %] 94 %     General: Alert, well-appearing, in no acute distress.  Respiratory:  Non-labored breathing. Lung sounds clear to auscultation bilaterally.   Cardiovascular: Regular rate and rhythm.   Gastrointestinal: Abdomen soft, non-distended, non-tender to palpation. No organomegaly or masses appreciated.   Extremities: Moving all four extremities. No pedal edema.      I&O:    Intake/Output Summary (Last 24 hours) at 06/06/17 1014  Last data filed at 06/06/17 0700   Gross per 24 hour   Intake          5098.19 ml   Output             2860 ml   Net          2238.19 ml       LABS: Reviewed.   Arterial Blood Gases     Recent Labs  Lab 06/06/17 0446 06/05/17 2217 06/05/17  1838 06/05/17  1456   PH 7.32* 7.38 7.38 7.31*   PCO2 48* 39 37 41   PO2 69* 81 137* 108*   HCO3 25 23 22 21     Complete Blood Count     Recent Labs  Lab 06/06/17 0446 06/05/17 1456 06/05/17  1313 06/05/17  1312 06/05/17  1250   WBC 15.4* 14.1*  --   --   --    HGB 8.1* 9.6* 9.1*  --  8.5*   * 125*  --  130*  --      Basic Metabolic Panel    Recent Labs  Lab 06/06/17 0446 06/05/17 2217 06/05/17 1456 06/05/17  1313 06/05/17  1250     --  147* 142 142   POTASSIUM 3.7 3.4 3.4 3.0* 3.7   CHLORIDE 111*  --  116*  --   --    CO2 26  --  21  --   --    BUN 17  --  13  --   --    CR 0.92  --  0.90  --   --    *  --  140* 182* 174*     Liver Function Tests    Recent Labs  Lab 06/06/17 0446 06/05/17  1456 06/05/17  1312   INR 1.30* 1.38* 1.59*     Pancreatic Enzymes  No lab results found in last 7 days.  Coagulation Profile    Recent Labs  Lab 06/06/17 0446 06/05/17 1456 06/05/17  1312   INR 1.30* 1.38* 1.59*   PTT 37 54*  --      Lactate  Invalid input(s): LACTATE

## 2017-06-06 NOTE — PROGRESS NOTES
CVICU Progress Note  06/06/2017    Subjective and Interval Events: extubated, off pressors. Pain controlled.    Objective:  Vitals:   Temp:  [95.8  F (35.4  C)-98.6  F (37  C)] 97.5  F (36.4  C)  Heart Rate:  [70-95] 76  Resp:  [9-35] 22  BP: ()/(52-66) 83/52  MAP:  [50 mmHg-88 mmHg] 84 mmHg  Arterial Line BP: ()/(16-62) 126/62  FiO2 (%):  [40 %-50 %] 40 %  SpO2:  [92 %-100 %] 98 %    Ventilation Mode: CPAP/PS  FiO2 (%): 40 %  Rate Set (breaths/minute): 16 breaths/min  Tidal Volume Set (mL): 500 mL  PEEP (cm H2O): 5 cmH2O  Pressure Support (cm H2O): 7 cmH2O  Oxygen Concentration (%): 40 %  Resp: 22    Intake/Output:   I/O last 3 completed shifts:  In: 5566.19 [P.O.:240; I.V.:3997.19; Other:290]  Out: 3285 [Urine:2160; Chest Tube:1125]    Physical exam:  General: NAD  Neuro: A&Ox3  Resp: Breathing non-labored; 4L NC  CV: RRR  Abdomen: Soft, Non-distended, Non-tender  Extremities: warm and well perfused    Labs:    Recent Labs  Lab 06/06/17  0446 06/05/17  2217 06/05/17  1456   WBC 15.4*  --  14.1*   HGB 8.1*  --  9.6*   *  --  125*   INR 1.30*  --  1.38*     --  147*   POTASSIUM 3.7 3.4 3.4   BUN 17  --  13   CR 0.92  --  0.90       Assessment and Plan: 72 F with 4.9cm ascending aortic aneurysm s/p CAB x1, ascending aortic aneurysm repair.    Neuro:  Prn tylenol, prn oxy, prn dilaudid  CV:  ASA, metoprolol 12.5mg,   Resp:  Wean O2 as tolerated  GI: PPI  FEN:  Regular diet  :  Snell out   Heme:  Hgb recheck; transfuse 1u pRBCs if <8  ID:  Ancef x48hrs  Endo:  SSI  PPx:  SCDs, PPI  Lines: PIV, CVC, a line  Dispo:  To floor    Seen and discussed with Dr. Olivarez.    Gregory Fitzpatrick MD PGY-3  Surgery Resident

## 2017-06-06 NOTE — PLAN OF CARE
Problem: Goal Outcome Summary  Goal: Goal Outcome Summary  Outcome: Improving  POD 1 CABG x1 (LIMA), and AAA repair, extubated at 1915 last evening. Pt had very positional blood pressure last evening, requiring hydralazine and nitro gtt. Epi restarted briefly overnight for low MAPs after nitro stopped. 12.5g albumin given overnight with appropriate response, epi off. Nitro remained off. K+ and Phos replaced per protocol. Dilaudid for pain management with good response. Up to 4lpm NC overnight, needs encouragement and more work with I/S. Passed bedside swallow eval, able to take PO pills well. Pleural CT with ~30-40mL/hr out, mediastinal CT with ~25mL/hr out overnight. CVP 8,11,10. Sinus rhythm with rare PACs this AM, one 14b run of ST. TPW attached to generator (VVI-[id number]), no pacing overnight. Family at bedside last evening, updated with POC for overnight.      Nathanael Romeo RN 06/06/17 5:15 AM     Hours of cares [phone]

## 2017-06-06 NOTE — PROGRESS NOTES
[facility], [place]  Procedure Note          Extubation:       Marilia Bean  MRN# [id number]   June 5, 2017, 7:14 PM         Patient extubated at: June 5, 2017, 7:14 PM   Supplemental Oxygen: Via nasal cannula at 2 liters per minute   Cough: The cough is good   Secretion Mode: Able to clear   Secretion Amount: Scant amount, moderately thick and white / yellow in color   Respiratory Exam:: Breath sounds: clear     Location: bilaterally   Skin Exam:: Patient color: natural   Patient Status: Currently appears comfortable   Arterial Blood Gasses: pH Arterial (pH)   Date Value   06/05/2017 7.38     pO2 Arterial (mm Hg)   Date Value   06/05/2017 137 (H)     pCO2 Arterial (mm Hg)   Date Value   06/05/2017 37     Bicarbonate Arterial (mmol/L)   Date Value   06/05/2017 22            Recorded by Navid Lainez

## 2017-06-07 ENCOUNTER — APPOINTMENT (OUTPATIENT)
Dept: PHYSICAL THERAPY | Facility: CLINIC | Age: 72
DRG: 221 | End: 2017-06-07
Attending: THORACIC SURGERY (CARDIOTHORACIC VASCULAR SURGERY)
Payer: MEDICARE

## 2017-06-07 ENCOUNTER — APPOINTMENT (OUTPATIENT)
Dept: GENERAL RADIOLOGY | Facility: CLINIC | Age: 72
DRG: 221 | End: 2017-06-07
Attending: PHYSICIAN ASSISTANT
Payer: MEDICARE

## 2017-06-07 ENCOUNTER — APPOINTMENT (OUTPATIENT)
Dept: OCCUPATIONAL THERAPY | Facility: CLINIC | Age: 72
DRG: 221 | End: 2017-06-07
Attending: THORACIC SURGERY (CARDIOTHORACIC VASCULAR SURGERY)
Payer: MEDICARE

## 2017-06-07 ENCOUNTER — APPOINTMENT (OUTPATIENT)
Dept: GENERAL RADIOLOGY | Facility: CLINIC | Age: 72
DRG: 221 | End: 2017-06-07
Attending: THORACIC SURGERY (CARDIOTHORACIC VASCULAR SURGERY)
Payer: MEDICARE

## 2017-06-07 LAB
ANION GAP SERPL CALCULATED.3IONS-SCNC: 6 MMOL/L (ref 3–14)
BACTERIA SPEC CULT: NORMAL
BUN SERPL-MCNC: 22 MG/DL (ref 7–30)
CALCIUM SERPL-MCNC: 8.4 MG/DL (ref 8.5–10.1)
CHLORIDE SERPL-SCNC: 106 MMOL/L (ref 94–109)
CO2 SERPL-SCNC: 25 MMOL/L (ref 20–32)
COPATH REPORT: NORMAL
CREAT SERPL-MCNC: 0.79 MG/DL (ref 0.52–1.04)
ERYTHROCYTE [DISTWIDTH] IN BLOOD BY AUTOMATED COUNT: 14.6 % (ref 10–15)
GFR SERPL CREATININE-BSD FRML MDRD: 71 ML/MIN/1.7M2
GLUCOSE BLDC GLUCOMTR-MCNC: 121 MG/DL (ref 70–99)
GLUCOSE BLDC GLUCOMTR-MCNC: 124 MG/DL (ref 70–99)
GLUCOSE BLDC GLUCOMTR-MCNC: 132 MG/DL (ref 70–99)
GLUCOSE BLDC GLUCOMTR-MCNC: 141 MG/DL (ref 70–99)
GLUCOSE SERPL-MCNC: 107 MG/DL (ref 70–99)
HBA1C MFR BLD: 5.8 % (ref 4.3–6)
HCT VFR BLD AUTO: 27.9 % (ref 35–47)
HGB BLD-MCNC: 9.2 G/DL (ref 11.7–15.7)
MCH RBC QN AUTO: 29.2 PG (ref 26.5–33)
MCHC RBC AUTO-ENTMCNC: 33 G/DL (ref 31.5–36.5)
MCV RBC AUTO: 89 FL (ref 78–100)
MICRO REPORT STATUS: NORMAL
PLATELET # BLD AUTO: 104 10E9/L (ref 150–450)
POTASSIUM SERPL-SCNC: 4.6 MMOL/L (ref 3.4–5.3)
RBC # BLD AUTO: 3.15 10E12/L (ref 3.8–5.2)
SODIUM SERPL-SCNC: 136 MMOL/L (ref 133–144)
SPECIMEN SOURCE: NORMAL
WBC # BLD AUTO: 16.9 10E9/L (ref 4–11)

## 2017-06-07 PROCEDURE — 71010 XR CHEST PORT 1 VW: CPT

## 2017-06-07 PROCEDURE — 21400006 ZZH R&B CCU INTERMEDIATE UMMC

## 2017-06-07 PROCEDURE — 97535 SELF CARE MNGMENT TRAINING: CPT | Mod: GO

## 2017-06-07 PROCEDURE — 25000132 ZZH RX MED GY IP 250 OP 250 PS 637: Mod: GY | Performed by: ANESTHESIOLOGY

## 2017-06-07 PROCEDURE — 25000132 ZZH RX MED GY IP 250 OP 250 PS 637: Mod: GY | Performed by: PHYSICIAN ASSISTANT

## 2017-06-07 PROCEDURE — 83036 HEMOGLOBIN GLYCOSYLATED A1C: CPT | Performed by: SURGERY

## 2017-06-07 PROCEDURE — A9270 NON-COVERED ITEM OR SERVICE: HCPCS | Mod: GY | Performed by: THORACIC SURGERY (CARDIOTHORACIC VASCULAR SURGERY)

## 2017-06-07 PROCEDURE — A9270 NON-COVERED ITEM OR SERVICE: HCPCS | Mod: GY | Performed by: SURGERY

## 2017-06-07 PROCEDURE — 97161 PT EVAL LOW COMPLEX 20 MIN: CPT | Mod: GP | Performed by: PHYSICAL THERAPIST

## 2017-06-07 PROCEDURE — 25000132 ZZH RX MED GY IP 250 OP 250 PS 637: Mod: GY | Performed by: SURGERY

## 2017-06-07 PROCEDURE — 25000132 ZZH RX MED GY IP 250 OP 250 PS 637: Mod: GY | Performed by: STUDENT IN AN ORGANIZED HEALTH CARE EDUCATION/TRAINING PROGRAM

## 2017-06-07 PROCEDURE — 97165 OT EVAL LOW COMPLEX 30 MIN: CPT | Mod: GO

## 2017-06-07 PROCEDURE — 36415 COLL VENOUS BLD VENIPUNCTURE: CPT | Performed by: PHYSICIAN ASSISTANT

## 2017-06-07 PROCEDURE — 85027 COMPLETE CBC AUTOMATED: CPT | Performed by: PHYSICIAN ASSISTANT

## 2017-06-07 PROCEDURE — 80048 BASIC METABOLIC PNL TOTAL CA: CPT | Performed by: PHYSICIAN ASSISTANT

## 2017-06-07 PROCEDURE — 40000133 ZZH STATISTIC OT WARD VISIT

## 2017-06-07 PROCEDURE — 25000132 ZZH RX MED GY IP 250 OP 250 PS 637: Mod: GY | Performed by: THORACIC SURGERY (CARDIOTHORACIC VASCULAR SURGERY)

## 2017-06-07 PROCEDURE — A9270 NON-COVERED ITEM OR SERVICE: HCPCS | Mod: GY | Performed by: STUDENT IN AN ORGANIZED HEALTH CARE EDUCATION/TRAINING PROGRAM

## 2017-06-07 PROCEDURE — 71020 XR CHEST 2 VW: CPT

## 2017-06-07 PROCEDURE — 40000193 ZZH STATISTIC PT WARD VISIT: Performed by: PHYSICAL THERAPIST

## 2017-06-07 PROCEDURE — 00000146 ZZHCL STATISTIC GLUCOSE BY METER IP

## 2017-06-07 PROCEDURE — 97530 THERAPEUTIC ACTIVITIES: CPT | Mod: GP | Performed by: PHYSICAL THERAPIST

## 2017-06-07 PROCEDURE — A9270 NON-COVERED ITEM OR SERVICE: HCPCS | Mod: GY | Performed by: PHYSICIAN ASSISTANT

## 2017-06-07 RX ORDER — CYCLOBENZAPRINE HCL 5 MG
5 TABLET ORAL 3 TIMES DAILY
Status: DISCONTINUED | OUTPATIENT
Start: 2017-06-07 | End: 2017-06-10 | Stop reason: HOSPADM

## 2017-06-07 RX ORDER — FUROSEMIDE 20 MG
20 TABLET ORAL ONCE
Status: COMPLETED | OUTPATIENT
Start: 2017-06-07 | End: 2017-06-07

## 2017-06-07 RX ORDER — ACETAMINOPHEN 325 MG/1
975 TABLET ORAL 3 TIMES DAILY
Status: DISPENSED | OUTPATIENT
Start: 2017-06-07 | End: 2017-06-09

## 2017-06-07 RX ORDER — POLYETHYLENE GLYCOL 3350 17 G/17G
17 POWDER, FOR SOLUTION ORAL DAILY
Status: DISCONTINUED | OUTPATIENT
Start: 2017-06-07 | End: 2017-06-10 | Stop reason: HOSPADM

## 2017-06-07 RX ADMIN — ACETAMINOPHEN 975 MG: 325 TABLET, FILM COATED ORAL at 20:20

## 2017-06-07 RX ADMIN — METOPROLOL TARTRATE 12.5 MG: 25 TABLET, FILM COATED ORAL at 07:40

## 2017-06-07 RX ADMIN — CYCLOBENZAPRINE HYDROCHLORIDE 5 MG: 5 TABLET, FILM COATED ORAL at 21:40

## 2017-06-07 RX ADMIN — FUROSEMIDE 20 MG: 20 TABLET ORAL at 11:17

## 2017-06-07 RX ADMIN — METOPROLOL TARTRATE 12.5 MG: 25 TABLET, FILM COATED ORAL at 20:20

## 2017-06-07 RX ADMIN — ACETAMINOPHEN 975 MG: 325 TABLET, FILM COATED ORAL at 14:47

## 2017-06-07 RX ADMIN — MUPIROCIN 0.5 G: 20 OINTMENT TOPICAL at 20:20

## 2017-06-07 RX ADMIN — OXYCODONE HYDROCHLORIDE 10 MG: 5 TABLET ORAL at 02:01

## 2017-06-07 RX ADMIN — PANTOPRAZOLE SODIUM 40 MG: 40 TABLET, DELAYED RELEASE ORAL at 07:40

## 2017-06-07 RX ADMIN — ACETAMINOPHEN 650 MG: 325 TABLET, FILM COATED ORAL at 02:01

## 2017-06-07 RX ADMIN — OXYCODONE HYDROCHLORIDE 5 MG: 5 TABLET ORAL at 12:07

## 2017-06-07 RX ADMIN — OXYCODONE HYDROCHLORIDE 10 MG: 5 TABLET ORAL at 20:20

## 2017-06-07 RX ADMIN — POLYETHYLENE GLYCOL 3350 17 G: 17 POWDER, FOR SOLUTION ORAL at 11:17

## 2017-06-07 RX ADMIN — ASPIRIN 81 MG: 81 TABLET, COATED ORAL at 07:40

## 2017-06-07 RX ADMIN — ACETAMINOPHEN 650 MG: 325 TABLET, FILM COATED ORAL at 07:41

## 2017-06-07 RX ADMIN — SENNOSIDES AND DOCUSATE SODIUM 2 TABLET: 8.6; 5 TABLET ORAL at 07:41

## 2017-06-07 RX ADMIN — MUPIROCIN 0.5 G: 20 OINTMENT TOPICAL at 07:41

## 2017-06-07 ASSESSMENT — PAIN DESCRIPTION - DESCRIPTORS
DESCRIPTORS: SHARP
DESCRIPTORS: DISCOMFORT
DESCRIPTORS: DISCOMFORT
DESCRIPTORS: DISCOMFORT;CONSTANT
DESCRIPTORS: SHARP

## 2017-06-07 NOTE — PLAN OF CARE
"Problem: Goal Outcome Summary  Goal: Goal Outcome Summary  PT/6c: PT eval completed. Pt c/o feeling \"very tired\" at rest; c/o mild lightheadedness w/ activity - VSS. Pt performs bed mobility, transfers, gait w/ Ax1; ambulates a total of 24 ft w/ WW w/ CGA. Pt also c/o pain and burning in her R thigh/groin site - CVTS notified.  Currently anticipate short term (tcu) rehab placement to maximize her functional IND and endurance; however, pending length of stay, may progress to discharging home w/ OP phase II CR as long as pt is functionally IND w/ all mobility.       "

## 2017-06-07 NOTE — PLAN OF CARE
Problem: Goal Outcome Summary  Goal: Goal Outcome Summary  OT-6C: Evaluation completed and treatment initiated. Therapist educated pt on OT role and discussed OT POC and Goals for therapy. Therapist educated pt on sternal precautions and safety with functional transfers with heart pillow. Pt required Min A and Max vc's for transfer supine<->seated EOB and sit<->standing pivot to bedside chair. Pt limited by fatigue weakness and decreased activity tolerance. VSS.     REC: Discharge to TCU at this time though anticipate pt may progress to discharge Home with A and OP CR with continued progression during IP hospital stay.

## 2017-06-07 NOTE — PROGRESS NOTES
CVTS Daily Note  6/7/2017  Attending: Akshat Soto,*    S:   No overnight events. Up from ICU yesterday, lines and kaur out, TPW capped.   1 unit PRBC yesterday for drifting Hgb 7.9.   Good BP control.     Pt seen at bedside resting comfortably.    Does complain of feeling very tired, otherwise no acute complaints.  Sleeping okay.     Denies F/C/Sweats.  No CP, SOB, or calf pain.    Tolerating diet but not eating much due to lack of hunger.  - BM.  - Flatus.    Ambulates well with assistance.    Pain controlled well.    O:   Vitals:    06/06/17 2345 06/07/17 0354 06/07/17 0500 06/07/17 0730   BP: 105/63 103/59  125/69   BP Location: Left arm Left arm  Left arm   Pulse:       Resp: 20 18 18   Temp: 97.9  F (36.6  C)   97.9  F (36.6  C)   TempSrc: Oral   Oral   SpO2: 96% 98%  95%   Weight:   105 kg (231 lb 7.7 oz)    Height:         Vitals:    06/05/17 0520 06/06/17 0600 06/07/17 0500   Weight: 78.7 kg (173 lb 8 oz) 82.1 kg (181 lb) 105 kg (231 lb 7.7 oz)       Intake/Output Summary (Last 24 hours) at 06/07/17 0926  Last data filed at 06/07/17 0745   Gross per 24 hour   Intake             1490 ml   Output             1240 ml   Net              250 ml       MAPs: 77 - 93   Gen: AAO x 3, pleasant, NAD  CV: RRR, S1S2 normal, no murmurs, rubs, or gallops.   Pulm: CTA, no rhonchi or wheezes  Abd: soft, non-tender, no guarding  Ext: trace peripheral edema, non-pitting  Incision: clean, dry, intact, no erythema  Chest Tube sites: dressings clean and dry, serosanguinous, no air leak, output 375 cc mediastinal, 420 cc pleurals   * removed mediastinal tubes without immediate complication.    *  R and L pleural tubes.     Labs:  BMP    Recent Labs  Lab 06/07/17  0549 06/06/17  1156 06/06/17  0446 06/05/17  2217 06/05/17  1456 06/05/17  1313     --  144  --  147* 142   POTASSIUM 4.6 4.0 3.7 3.4 3.4 3.0*   CHLORIDE 106  --  111*  --  116*  --    TARIQ 8.4*  --  8.0*  --  8.1*  --    CO2 25  --  26  --  21   --    BUN 22  --  17  --  13  --    CR 0.79  --  0.92  --  0.90  --    *  --  129*  --  140* 182*     CBC    Recent Labs  Lab 06/07/17  0549 06/06/17  1156 06/06/17  0446 06/05/17  1456  06/05/17  1312   WBC 16.9*  --  15.4* 14.1*  --   --    RBC 3.15*  --  2.73* 3.23*  --   --    HGB 9.2* 7.9* 8.1* 9.6*  < >  --    HCT 27.9*  --  23.7* 28.2*  --   --    MCV 89  --  87 87  --   --    MCH 29.2  --  29.7 29.7  --   --    MCHC 33.0  --  34.2 34.0  --   --    RDW 14.6  --  13.6 12.9  --   --    *  --  117* 125*  --  130*   < > = values in this interval not displayed.  INR    Recent Labs  Lab 06/06/17  0446 06/05/17  1456 06/05/17  1312   INR 1.30* 1.38* 1.59*      Hepatic Panel   Lab Results   Component Value Date    AST 16 03/14/2017     Lab Results   Component Value Date    ALT 24 03/14/2017     Lab Results   Component Value Date    ALBUMIN 3.9 03/14/2017     GLUCOSE:     Recent Labs  Lab 06/07/17  0549 06/07/17  0204 06/06/17  2220 06/06/17  1712 06/06/17  1208 06/06/17  0945 06/06/17  0735  06/06/17  0446  06/05/17  1456 06/05/17  1313 06/05/17  1250 06/05/17  1226   *  --   --   --   --   --   --   --  129*  --  140* 182* 174* 176*   BGM  --  121* 134* 113* 154* 117* 95  < >  --   < >  --   --   --   --    < > = values in this interval not displayed.      Imaging:    CXR 6/6-   1. Increasing left basilar atelectasis/consolidation, and increased left pleural fluid.  2. Interval extubation. Otherwise stable support devices.      A/P:   Marilia Bean is a 72 year old female who is status post repair of ascending aortic aneurysm and CAB x 1 on 6/5 with Dr Soto.     Neuro:   - Dylon APAP, PRN oxycodone and IV dilaudid.   - Intact neuro status, but feels weak after surgery.     CV:   - ASA 81 mg, metoprolol 12.5 mg BID   - BP Goal: keep SBP < 120 mmHg      Pulm:   - Wean O2 as tolerated.   - Bilateral Pleural tubes, currently on suction, off suction okay for exams/walking     ID:   - NGTD for  surgical tissue cultures     GI / FEN:  - Reg Diet, supplements. Bowel regimen.      Renal / :   - Remove Snell this afternoon.  Lasix 20 mg PO x 1 dose    Heme:   - Received 1 unit PRBC 6/6. Continue to monitor Hgb. Hgb 9.2 today.   - Plts 104     Endo:   - Hgb A1C 5.8 (6/7), sliding scale pre-prandial only     PPX:   - Protonix    Anticoagulation:   - Asa 81 mg, increase to 325 mg at discharge    Dispo:   - 6C since 6/6      Staff surgeons have been informed of changes through both  verbal and written communication.      Efren Rangel PA-C  Cardiothoracic Surgery  Pager [phone]    9:26 AM   June 7, 2017

## 2017-06-07 NOTE — PROGRESS NOTES
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 to assist as needed   Functional Level Prior   Ambulation 0-->independent  (Reports being able to walk 1 mile without rest pre-op)   Transferring 0-->independent   Toileting 0-->independent   Bathing 0-->independent   Dressing 0-->independent   Eating 0-->independent   Communication 0-->understands/communicates without difficulty   Swallowing 0-->swallows foods/liquids without difficulty   Cognition 0 - no cognition issues reported   Fall history within last six months no   General Information   Onset of Illness/Injury or Date of Surgery - Date 06/05/17   Referring Physician Nanette Jenkins MD   Patient/Family Goals Statement Return to prior level of function, return to running food shelf   Pertinent History of Current Problem (include personal factors and/or comorbidities that impact the POC) 71 yo female s/p CABG X 1 and AAA repair on 6/6/2017   Precautions/Limitations sternal precautions;fall precautions;oxygen therapy device and L/min   Weight-Bearing Status - LUE (No lifting over 10# X6-8 weeks)   Weight-Bearing Status - RUE (No lifting over 10# X6-8 weeks)   Heart Disease Risk Factors Overweight;Family history;Age;Dislipidemia;Medical history;Gender   Cognitive Status Examination   Orientation orientation to person, place and time   Level of Consciousness alert   Follows Commands and Answers Questions able to follow multistep instructions;100% of  the time   Personal Safety and Judgment impaired;at risk behaviors demonstrated  (needs cues to adhere to sternal precautions)   Memory intact   Cognitive Comment Pt appeared fatigued and complained of being tired, but was responsive and cooperative   Pain Assessment   Patient Currently in Pain Yes, see Vital Sign flowsheet   Integumentary/Edema   Integumentary/Edema other (describe)   Integumentary/Edema Comments Edema in UEs and LEs, UEs seemed to be more affected especially R forearm and hand   Posture    Posture Comments Preference to sit EOB with arms ABD and bracing for stability   Range of Motion (ROM)   ROM Quick Adds Shoulder, Left;Shoulder, Right;Elbow/Forearm, Left;Elbow/Forearm, Right;Wrist, Right   ROM Comment B shoulder flexion/ABD ~90 degrees self limited; B elbow flexion end range limitations d/t soft tissue approximation; AROM restrictions possibly due to incisional pain or edema being physically in the way and/or weakness from edema adding weight to pt's arm; BLE AROM WFL except pain w/ R hip movement d/t groin site   Strength   Strength Comments UE not tested due to sternal precautions, B quads at least 3/5   Bed Mobility   Bed Mobility Bed mobility skill: Rolling/Turning;Bed mobility skill: Supine to sit;Bed mobility skill: Sit to supine;Bed mobility skill: Scooting/Bridging   Bed Mobility Skill: Rolling/Turning   Level of [place]: Rolling/Turning contact guard   Physical/Nonphysical Assist: Rolling/Turning verbal cues   Bed Mobility Skill: Scooting/Bridging   Level of [place]: Scooting/Bridging contact guard   Physical/Nonphysical Assist: Scooting/Bridging verbal cues   Bed Mobility Skill: Sit to Supine   Level of [place]: Sit/Supine other (see comments)  (CGA for trunk/UE, mod assist needed to lift legs)   Physical Assist/Nonphysical Assist: Sit/Supine 1 person assist;verbal cues   Assistive Device: Sit/Supine other (see comments)  (HOB elevated 10 degrees)   Bed Mobility Skill:  Supine to Sit   Level of [place]: Supine/Sit contact guard   Physical Assist/Nonphysical Assist: Supine/Sit verbal cues   Assistive Device: Supine/Sit other (see comments)  (HOB raised to 30 degrees)   Transfer Skills   Transfer Transfer Skill: Sit to Stand   Transfer Skill:  Sit to Stand   Level of [place]: Sit/Stand contact guard   Physical Assist/Nonphysical Assist: Sit/Stand verbal cues   Assistive Device for Transfer: Sit/Stand rolling walker   Gait   Gait Comments pt ambulated 25 ft, required CGA and 2WW, verbal cues for gait mechanics   Balance   Balance Comments pt able to sit IND EOB, uses UE support on walker in standing   Sensory Examination   Sensory Perception no deficits were identified   Sensory Perception Comments B UE and LE LT   General Therapy Interventions   Planned Therapy Interventions balance training;bed mobility training;gait training;transfer training;strengthening;risk factor education;home program guidelines;progressive activity/exercise   Clinical Impression   Criteria for Skilled Therapeutic Intervention yes, treatment indicated   PT Diagnosis Impaired functional mobility   Influenced by the following impairments Edema, UE ROM limitations, sternal precautions, fatigue, weakness, pain, balance deficits   Functional limitations due to impairments reduced activity tolerance, bed mobility, transfers, gait   Clinical Presentation Stable/Uncomplicated   Clinical Presentation Rationale previously IND; good support system; no complications following heart surgery   Clinical Decision Making (Complexity) Low complexity   Therapy Frequency` daily   Predicted Duration of Therapy Intervention (days/wks) 6/12/17   Anticipated Discharge Disposition Transitional Care Facility   Risk & Benefits of therapy have been explained Yes   Patient, Family & other staff in agreement with plan of care Yes   Clinical Impression Comments Currently anticipate short term (tcu) rehab placement to maximize her  "functional IND and endurance; however, pending length of stay, may progress to discharging home w/ OP phase II CR as long as pt is functionally IND w/ all mobility.    [facility] TM \"6 Clicks\"   2016, Trustees of [facility], under license to [employer].  All rights reserved.   6 Clicks Short Forms Basic Mobility Inpatient Short Form   [facility] AM-PAC  \"6 Clicks\" V.2 Basic Mobility Inpatient Short Form   1. Turning from your back to your side while in a flat bed without using bedrails? 3 - A Little   2. Moving from lying on your back to sitting on the side of a flat bed without using bedrails? 3 - A Little   3. Moving to and from a bed to a chair (including a wheelchair)? 3 - A Little   4. Standing up from a chair using your arms (e.g., wheelchair, or bedside chair)? 3 - A Little   5. To walk in hospital room? 3 - A Little   6. Climbing 3-5 steps with a railing? 2 - A Lot   Basic Mobility Raw Score (Score out of 24.Lower scores equate to lower levels of function) 17   Total Evaluation Time   Total Evaluation Time (Minutes) 20     "

## 2017-06-07 NOTE — PLAN OF CARE
D:shandra lungs diminished, IS use up to 750 cc SATs 86-94 on room air  I:promote and demo IS use and respiratory toilet  A:pt needs reminding  P:promote activity as tolerated, x-ray in the am

## 2017-06-07 NOTE — PLAN OF CARE
Problem: Goal Outcome Summary  Goal: Goal Outcome Summary  Outcome: No Change     D: S/p AAA and CABG on 6/5.   I/A: VSSA, on 4L NC to maintains sats >92%. Monitor shows SR. Incisional pain controlled with prn oxycodone, tylenol q4h and dilaudid (x1). CT x4 to two suction canisters with serosanguinous drainage, see flowsheets for output. Pacer wires capped. All dressings CDI. Encouraged pt to use IS. BG stable overnight. Sleeping between cares.   P: Chest xray this morning. Continue to monitor and notify CVTS with pertinent changes.

## 2017-06-07 NOTE — PROGRESS NOTES
Care Coordinator Progress Note     Admission Date/Time:  6/5/2017  Attending MD:  Akshat Soto  Data  Chart reviewed, discussed with interdisciplinary team.   Patient is S/P ascending aortic aneurysm repair and CAB x1 on 6/5    Concerns with insurance coverage for discharge needs: None.  Current Living Situation: Patient lives with spouse. Pt states she was independent with her cares prior to this admission.  Support System: Supportive and Involved spouse  Services Involved: None currently  Transportation: Family or Friend will provide  Barriers to Discharge: Post op recovery       Assessment  This writer met with pt and family to introduce self and role of RNCC, per discussion, no home care needs identified.  OT currently recommending discharge to TCU, though anticipate that pt may progress to discharge to home with assist and OP CR, pt states she is agreeable with this recommendation.  Pt currently with chest tubes x2 to suction and cardiac telemetry monitoring inpatient.     Plan  Anticipated Discharge Date: TBD  Anticipated Discharge Plan: TBD, pending progress with therapies.  CC will continue to monitor patient's medical condition and progress towards discharge.  Mona Díaz RN BSN  6C Unit Care Coordinator  Phone number: [phone]  Pager: [phone]

## 2017-06-07 NOTE — PLAN OF CARE
Problem: Goal Outcome Summary  Goal: Goal Outcome Summary  Outcome: Improving  Pt VSS; SR with HR in the 70s; 2L NC with sats in the mid 90s. Incisional pain addressed with oxycodone and Tylenol with relief. Mediastinal CT pulled; 2 pleural CT to suction. Pacer wires capped. Up with A1 to commode; adequate UO. No BM; miralax and senna given. BS WNL, no SSI administered. Pt has poor appetite; states she is not hungry and does not want to eat this morning/afternoon. Continue to monitor and notify team with changes.

## 2017-06-08 ENCOUNTER — APPOINTMENT (OUTPATIENT)
Dept: OCCUPATIONAL THERAPY | Facility: CLINIC | Age: 72
DRG: 221 | End: 2017-06-08
Attending: THORACIC SURGERY (CARDIOTHORACIC VASCULAR SURGERY)
Payer: MEDICARE

## 2017-06-08 ENCOUNTER — APPOINTMENT (OUTPATIENT)
Dept: GENERAL RADIOLOGY | Facility: CLINIC | Age: 72
DRG: 221 | End: 2017-06-08
Attending: PHYSICIAN ASSISTANT
Payer: MEDICARE

## 2017-06-08 ENCOUNTER — APPOINTMENT (OUTPATIENT)
Dept: ULTRASOUND IMAGING | Facility: CLINIC | Age: 72
DRG: 221 | End: 2017-06-08
Attending: PHYSICIAN ASSISTANT
Payer: MEDICARE

## 2017-06-08 ENCOUNTER — APPOINTMENT (OUTPATIENT)
Dept: PHYSICAL THERAPY | Facility: CLINIC | Age: 72
DRG: 221 | End: 2017-06-08
Attending: THORACIC SURGERY (CARDIOTHORACIC VASCULAR SURGERY)
Payer: MEDICARE

## 2017-06-08 LAB
ANION GAP SERPL CALCULATED.3IONS-SCNC: 5 MMOL/L (ref 3–14)
BUN SERPL-MCNC: 20 MG/DL (ref 7–30)
CALCIUM SERPL-MCNC: 8.4 MG/DL (ref 8.5–10.1)
CHLORIDE SERPL-SCNC: 101 MMOL/L (ref 94–109)
CO2 SERPL-SCNC: 29 MMOL/L (ref 20–32)
CREAT SERPL-MCNC: 0.78 MG/DL (ref 0.52–1.04)
ERYTHROCYTE [DISTWIDTH] IN BLOOD BY AUTOMATED COUNT: 13.9 % (ref 10–15)
GFR SERPL CREATININE-BSD FRML MDRD: 73 ML/MIN/1.7M2
GLUCOSE BLDC GLUCOMTR-MCNC: 106 MG/DL (ref 70–99)
GLUCOSE BLDC GLUCOMTR-MCNC: 116 MG/DL (ref 70–99)
GLUCOSE BLDC GLUCOMTR-MCNC: 117 MG/DL (ref 70–99)
GLUCOSE BLDC GLUCOMTR-MCNC: 98 MG/DL (ref 70–99)
GLUCOSE SERPL-MCNC: 106 MG/DL (ref 70–99)
HCT VFR BLD AUTO: 25.8 % (ref 35–47)
HGB BLD-MCNC: 8.9 G/DL (ref 11.7–15.7)
MAGNESIUM SERPL-MCNC: 2.2 MG/DL (ref 1.6–2.3)
MCH RBC QN AUTO: 29.9 PG (ref 26.5–33)
MCHC RBC AUTO-ENTMCNC: 34.5 G/DL (ref 31.5–36.5)
MCV RBC AUTO: 87 FL (ref 78–100)
PLATELET # BLD AUTO: 120 10E9/L (ref 150–450)
POTASSIUM SERPL-SCNC: 3.7 MMOL/L (ref 3.4–5.3)
RBC # BLD AUTO: 2.98 10E12/L (ref 3.8–5.2)
SODIUM SERPL-SCNC: 135 MMOL/L (ref 133–144)
WBC # BLD AUTO: 11.2 10E9/L (ref 4–11)

## 2017-06-08 PROCEDURE — 93970 EXTREMITY STUDY: CPT

## 2017-06-08 PROCEDURE — 97110 THERAPEUTIC EXERCISES: CPT | Mod: GO

## 2017-06-08 PROCEDURE — 36415 COLL VENOUS BLD VENIPUNCTURE: CPT | Performed by: PHYSICIAN ASSISTANT

## 2017-06-08 PROCEDURE — 25000132 ZZH RX MED GY IP 250 OP 250 PS 637: Mod: GY | Performed by: STUDENT IN AN ORGANIZED HEALTH CARE EDUCATION/TRAINING PROGRAM

## 2017-06-08 PROCEDURE — A9270 NON-COVERED ITEM OR SERVICE: HCPCS | Mod: GY | Performed by: STUDENT IN AN ORGANIZED HEALTH CARE EDUCATION/TRAINING PROGRAM

## 2017-06-08 PROCEDURE — 97535 SELF CARE MNGMENT TRAINING: CPT | Mod: GO

## 2017-06-08 PROCEDURE — 40000802 ZZH SITE CHECK

## 2017-06-08 PROCEDURE — 83735 ASSAY OF MAGNESIUM: CPT | Performed by: PHYSICIAN ASSISTANT

## 2017-06-08 PROCEDURE — 85027 COMPLETE CBC AUTOMATED: CPT | Performed by: PHYSICIAN ASSISTANT

## 2017-06-08 PROCEDURE — 40000133 ZZH STATISTIC OT WARD VISIT

## 2017-06-08 PROCEDURE — 40000193 ZZH STATISTIC PT WARD VISIT: Performed by: PHYSICAL THERAPIST

## 2017-06-08 PROCEDURE — 40000141 ZZH STATISTIC PERIPHERAL IV START W/O US GUIDANCE

## 2017-06-08 PROCEDURE — A9270 NON-COVERED ITEM OR SERVICE: HCPCS | Mod: GY | Performed by: PHYSICIAN ASSISTANT

## 2017-06-08 PROCEDURE — 21400006 ZZH R&B CCU INTERMEDIATE UMMC

## 2017-06-08 PROCEDURE — 25000128 H RX IP 250 OP 636: Performed by: TRANSPLANT SURGERY

## 2017-06-08 PROCEDURE — 25000132 ZZH RX MED GY IP 250 OP 250 PS 637: Mod: GY | Performed by: ANESTHESIOLOGY

## 2017-06-08 PROCEDURE — 25000132 ZZH RX MED GY IP 250 OP 250 PS 637: Mod: GY | Performed by: THORACIC SURGERY (CARDIOTHORACIC VASCULAR SURGERY)

## 2017-06-08 PROCEDURE — 25000132 ZZH RX MED GY IP 250 OP 250 PS 637: Mod: GY | Performed by: PHYSICIAN ASSISTANT

## 2017-06-08 PROCEDURE — 71020 XR CHEST 2 VW: CPT

## 2017-06-08 PROCEDURE — 97530 THERAPEUTIC ACTIVITIES: CPT | Mod: GP | Performed by: PHYSICAL THERAPIST

## 2017-06-08 PROCEDURE — 80048 BASIC METABOLIC PNL TOTAL CA: CPT | Performed by: PHYSICIAN ASSISTANT

## 2017-06-08 PROCEDURE — A9270 NON-COVERED ITEM OR SERVICE: HCPCS | Mod: GY | Performed by: THORACIC SURGERY (CARDIOTHORACIC VASCULAR SURGERY)

## 2017-06-08 PROCEDURE — 00000146 ZZHCL STATISTIC GLUCOSE BY METER IP

## 2017-06-08 PROCEDURE — 97116 GAIT TRAINING THERAPY: CPT | Mod: GP | Performed by: PHYSICAL THERAPIST

## 2017-06-08 PROCEDURE — 71020 XR CHEST 2 VW: CPT | Mod: 77

## 2017-06-08 RX ORDER — LISINOPRIL 5 MG/1
5 TABLET ORAL DAILY
Status: DISCONTINUED | OUTPATIENT
Start: 2017-06-08 | End: 2017-06-10 | Stop reason: HOSPADM

## 2017-06-08 RX ADMIN — CYCLOBENZAPRINE HYDROCHLORIDE 5 MG: 5 TABLET, FILM COATED ORAL at 13:21

## 2017-06-08 RX ADMIN — LISINOPRIL 5 MG: 5 TABLET ORAL at 13:21

## 2017-06-08 RX ADMIN — MUPIROCIN 0.5 G: 20 OINTMENT TOPICAL at 07:54

## 2017-06-08 RX ADMIN — METOPROLOL TARTRATE 12.5 MG: 25 TABLET, FILM COATED ORAL at 07:53

## 2017-06-08 RX ADMIN — PANTOPRAZOLE SODIUM 40 MG: 40 TABLET, DELAYED RELEASE ORAL at 07:53

## 2017-06-08 RX ADMIN — POTASSIUM CHLORIDE 20 MEQ: 750 TABLET, EXTENDED RELEASE ORAL at 07:59

## 2017-06-08 RX ADMIN — SENNOSIDES AND DOCUSATE SODIUM 2 TABLET: 8.6; 5 TABLET ORAL at 19:55

## 2017-06-08 RX ADMIN — POLYETHYLENE GLYCOL 3350 17 G: 17 POWDER, FOR SOLUTION ORAL at 07:53

## 2017-06-08 RX ADMIN — ACETAMINOPHEN 975 MG: 325 TABLET, FILM COATED ORAL at 19:55

## 2017-06-08 RX ADMIN — CYCLOBENZAPRINE HYDROCHLORIDE 5 MG: 5 TABLET, FILM COATED ORAL at 07:53

## 2017-06-08 RX ADMIN — METOPROLOL TARTRATE 12.5 MG: 25 TABLET, FILM COATED ORAL at 19:55

## 2017-06-08 RX ADMIN — HYDROMORPHONE HYDROCHLORIDE 0.5 MG: 1 INJECTION, SOLUTION INTRAMUSCULAR; INTRAVENOUS; SUBCUTANEOUS at 12:42

## 2017-06-08 RX ADMIN — ACETAMINOPHEN 975 MG: 325 TABLET, FILM COATED ORAL at 07:53

## 2017-06-08 RX ADMIN — CYCLOBENZAPRINE HYDROCHLORIDE 5 MG: 5 TABLET, FILM COATED ORAL at 19:55

## 2017-06-08 RX ADMIN — MUPIROCIN 0.5 G: 20 OINTMENT TOPICAL at 19:55

## 2017-06-08 RX ADMIN — SENNOSIDES AND DOCUSATE SODIUM 2 TABLET: 8.6; 5 TABLET ORAL at 07:53

## 2017-06-08 RX ADMIN — ASPIRIN 81 MG: 81 TABLET, COATED ORAL at 07:53

## 2017-06-08 ASSESSMENT — PAIN DESCRIPTION - DESCRIPTORS: DESCRIPTORS: ACHING

## 2017-06-08 NOTE — PLAN OF CARE
Problem: Goal Outcome Summary  Goal: Goal Outcome Summary  RN  1. Pt will be hemodynamically stable  2.Pain will be controlled  3. Pt will have adequate nutrition   Outcome: No Change  Pt VSS; SR with HR in the 70s; 1L NC with sats in the upper 90s. Incisional pain addressed with Tylenol with relief. Significant decrease in L and R arm strength; MD notified and pt sent down for US. No significant findings. Continues on diuretics for edema. Pleural CT removed. Up with SBA to commode; adequate UO. No BM. BS WNL. Pt has increased appetite and had a larger lunch. Plan to discharge in the next few days. Continue to monitor and notify team with changes.

## 2017-06-08 NOTE — PROVIDER NOTIFICATION
Pt has significant weakness in BUE. R>L. Pt having difficult time gripping toothbrush to brush dentures. Neuros intact. CVTS notified and US ordered.

## 2017-06-08 NOTE — PROVIDER NOTIFICATION
C/o L sided pain. Medicated with scheduled tylenol and prn oxycodone. Pt reports pain is now sharp under her L breast/ rib cage. Unable to take a deep breath. O2 shanna on 2l 93%. No air leak noted in chest tubes. Discussed with Dr. Justice Burris MD to come and assess. Await further orders.

## 2017-06-08 NOTE — PLAN OF CARE
Problem: Goal Outcome Summary  Goal: Goal Outcome Summary  RN  1. Pt will be hemodynamically stable  2.Pain will be controlled  3. Pt will have adequate nutrition   Outcome: Improving  D:  Patient is s/p repair of ascending aortic aneurysm and CAB x 1 on 6/5 with Dr Soto     I/A: Hemodynamically stable.      VS: Stable. Tmax 99.3   Lines/drains/airway: Left and Right chest tubes. PIV.  Neuro: A/O x4. Anxious at times, redirectable.   CV: SR.  Pulm: LS more diminished in the left. 2L NC.  GI:  No BM this shift. Passing flatus.  Regular diet, poor appetite.   : Voiding adequately in bathroom.  Skin: Chest tube dressings intact. No other skin concerns.   I/D: Tmax 99.3  Pain: C/o of intermittent Left chest pains, but declines pain medication. Is receiving scheduled Flexeril which has improved pain.      P: Continue to monitor and notify MD of changes.

## 2017-06-08 NOTE — PLAN OF CARE
Problem: Goal Outcome Summary  Goal: Goal Outcome Summary  RN  1. Pt will be hemodynamically stable  2.Pain will be controlled  3. Pt will have adequate nutrition   PT/CR/6C  Pt moved in and out of bed w/ SBA and verbal cueing. Pt ambulated 115 ft using 2WW on 2L O2 w/ CGA. Pt mobility and tolerance to activity is improving. Anticipate that pt will be appropriate to discharge to home when medically stable.

## 2017-06-08 NOTE — PROGRESS NOTES
6/8/2017   CVTS Progress Note  Attending provider: Akshat Soto,*        S:  No acute issues over night. Patient reports breathing better today as pain better with flexeril. Denies CP, fever, chills, sweats. Patient  eating well.  + ambulating in halls. No BM, passing flatus. No arrhythmias.     O:   Vitals:    06/08/17 0400 06/08/17 0714 06/08/17 1058 06/08/17 1124   BP: 112/67 120/60 119/63 146/70   BP Location: Left arm Left arm Left arm Left arm   Pulse:       Resp: 16 16 16   Temp: 99.3  F (37.4  C) 98.8  F (37.1  C)  97.8  F (36.6  C)   TempSrc: Oral Oral  Oral   SpO2: 93% 93% 92% 94%   Weight:       Height:         Vitals:    06/06/17 0600 06/07/17 0500 06/08/17 0300   Weight: 82.1 kg (181 lb) 105 kg (231 lb 7.7 oz) 82.3 kg (181 lb 8 oz)       Intake/Output Summary (Last 24 hours) at 06/08/17 1137  Last data filed at 06/08/17 1125   Gross per 24 hour   Intake              220 ml   Output             2298 ml   Net            -2078 ml       MAPs: 77 - 93   Gen: AAO x 3, pleasant, NAD  CV: RRR, S1S2 normal, no murmurs, rubs, or gallops.   Pulm: CTA, no rhonchi or wheezes  Abd: soft, non-tender, no guarding  Ext: no peripheral edema, non-pitting  Incision: clean, dry, intact, no erythema  Chest Tube sites: left 100 last 24 hours, Right pleural 40/50cc, no air leaks.      Labs:   BMP  Recent Labs  Lab 06/08/17  0705 06/07/17  0549 06/06/17  1156 06/06/17  0446  06/05/17  1456    136  --  144  --  147*   POTASSIUM 3.7 4.6 4.0 3.7  < > 3.4   CHLORIDE 101 106  --  111*  --  116*   TARIQ 8.4* 8.4*  --  8.0*  --  8.1*   CO2 29 25  --  26  --  21   BUN 20 22  --  17  --  13   CR 0.78 0.79  --  0.92  --  0.90   * 107*  --  129*  --  140*   < > = values in this interval not displayed.  CBC  Recent Labs  Lab 06/08/17  0705 06/07/17  0549 06/06/17  1156 06/06/17  0446 06/05/17  1456   WBC 11.2* 16.9*  --  15.4* 14.1*   RBC 2.98* 3.15*  --  2.73* 3.23*   HGB 8.9* 9.2* 7.9* 8.1* 9.6*   HCT 25.8* 27.9*   --  23.7* 28.2*   MCV 87 89  --  87 87   MCH 29.9 29.2  --  29.7 29.7   MCHC 34.5 33.0  --  34.2 34.0   RDW 13.9 14.6  --  13.6 12.9   * 104*  --  117* 125*     INR  Recent Labs  Lab 17  0446 17  1456 17  1312   INR 1.30* 1.38* 1.59*      Hepatic Panel   Lab Results   Component Value Date    AST 16 2017     Lab Results   Component Value Date    ALT 24 2017     Lab Results   Component Value Date    BILICONJ 0.0 2009      Lab Results   Component Value Date    BILITOTAL 0.5 2017     Lab Results   Component Value Date    ALBUMIN 3.9 2017     Lab Results   Component Value Date    PROTTOTAL 7.8 2017      Lab Results   Component Value Date    ALKPHOS 94 2017         Recent Labs  Lab 17  1123 17  0705 17  0240 17  2146 17  1722 17  1120 17  0549 17  0204  17  0446  17  1456 17  1313 17  1250   GLC  --  106*  --   --   --   --  107*  --   --  129*  --  140* 182* 174*   *  --  106* 141* 132* 124*  --  121*  < >  --   < >  --   --   --    < > = values in this interval not displayed.      Imagin/8/17  9:29 AM [id number] [facility], [place], Ultrasound    [employer] Interactive Report and InfoRx   View the interactive report   PACS Images   Show images for US Upper Extremity Venous Duplex Bilat   Study Result   Examination:  Right upper extremity venous ultrasound      History: upper extremity edema, rule out thrombus; recent right  internal jugular central venous line     Comparison: None.     Technique: Grayscale images with compression, color and spectral  Doppler's of the deep veins of the left or right? upper extremity.     Findings:   The diameter of the right internal jugular vein smoothly taper from  4mm to 2mm as it joins the subclavian vein.  Both internal jugular veins are fully compressible. A barium vein at  the base of the neck with flow toward the right internal jugular  vein.  Waveforms do not suggest a central obstruction.  The right and left internal jugular, brachiocephalic, subclavian,  axillary veins have no filling defects, and normal venous waveforms.          Impression:   1. No deep venous thrombosis in the internal jugular, brachiocephalic,  subclavian, or axillary veins.  2. Small diameter of the central right internal jugular vein, likely  from prior thrombus.     I have personally reviewed the examination and initial interpretation  and I agree with the findings.     PRANAV GARDNER MD         A/P:   Marilia Bean is a 72 year old female who is status post repair of ascending aortic aneurysm and CAB x 1 on 6/5 with Dr Soto. EF 65-70%.     Neuro:   - Dylon APAP, PRN oxycodone and IV dilaudid, flexeril.      CV:   - ASA 81 mg, metoprolol 12.5 mg BID   - BP Goal: keep SBP < 120 mmHg    - mediastinal drains removed 6/7, will remove both pleural drains today 6/8.   - Start Lisinopril 5 mg today     Pulm:   - Wean O2 as tolerated.   - remove pleural drains today.      ID:   - NGTD for surgical tissue cultures   - completed post op Abx  - WBC trending down 11.2     GI / FEN:  - Reg Diet, supplements. Bowel regimen.       Renal / :   - Remove Snell this afternoon.  Lasix 20 mg PO x 1 dose on 6/7, patient now auto diuresing with 2L out this AM. Will hold lasix for now.      Heme:   - Received 1 unit PRBC 6/6. Continue to monitor Hgb. Hgb 9.2 today.   - Plts 120      Endo:   - Hgb A1C 5.8 (6/7), sliding scale pre-prandial only, can stop insulin today 6/8.      PPX:   - Protonix     Anticoagulation:   - Asa 81 mg, increase to 325 mg at discharge     Dispo:   - 6C since 6/6  - possible discharge Friday Saturday.         Staff surgeons have been informed of changes through both  verbal and written communication.      Louise Mercado PA-C  [phone]  June 8, 2017

## 2017-06-08 NOTE — PROGRESS NOTES
Social Work: Assessment with Discharge Plan    Patient Name:  Marilia Bean  :  1945  Age:  72 year old  MRN:  [id number]  Risk/Complexity Score:  Filed Complexity Screen Score: 4  Completed assessment with:  Patient, pt;s  Cain and pt's daughter Elle     Presenting Information   Reason for Referral:  Discharge plan  Date of Intake:  2017  Referral Source:  Chart Review  Decision Maker:  Patient   Alternate Decision Maker:  Patients    Health Care Directive:  Copy in Chart Patient reports that copy was provided when patient admitted. Not scanned in yet.   Living Situation:  House  Previous Functional Status:  Independent  Patient and family understanding of hospitalization:  Patient and pt's family appear to understand hospitalization. Very involved in patients treatment plan.   Cultural/Language/Spiritual Considerations:  None reported   Adjustment to Illness:  Patient appears to be adjusting appropriately. Patients family report that seeing patient in the hospital is much different as patient is normally very independent.     Physical Health  Reason for Admission:  No diagnosis found.  Services Needed/Recommended:  TCU    Mental Health/Chemical Dependency  Diagnosis:  NA   Support/Services in Place:  NA   Services Needed/Recommended:  NA     Support System  Significant relationship at present time:  Patients  Cain   Family of origin is available for support:  Yes   Other support available:  Additional family members   Gaps in support system:  None reported at this time   Patient is caregiver to:  None     Provider Information   Primary Care Physician:  Herbert Epstein   [phone]   Clinic:  [place] [id number] [facility]  / [address] [facility] [address]      :  None reported     Financial   Income Source:  SSI   Financial Concerns:  None reported   Insurance:    Payor/Plan Subscriber Name Rel Member # Group #   MEDICARE - MEDICARE F* MARILIA BEAN  [id number]        [employer], [address]   MEDICA - MEDICA PRIME* MARGIE PATINO  [id number]      [address] [address]       Discharge Plan   Patient and family discharge goal:  Discharge home with services   Provided education on discharge plan:  YES   Patient agreeable to discharge plan:  NO  A list of Medicare Certified Facilities was provided to the patient and/or family to encourage patient choice. Patient's choices for facility are:  Patient and pt's family were not interested in seeing a list of TCU facilities in chosen location. It is patients choice and family's that patient will DC home when medically stable.   Will NH provide Skilled rehabilitation or complex medical:  YES  General information regarding anticipated insurance coverage and possible out of pocket cost was discussed. Patient and patient's family are aware patient may incur the cost of transportation to the facility, pending insurance payment: YES  Barriers to discharge:  Patient and pt's family are not in agreement with patient Discharging to a TCU. Patient prefers to DC home. Patients  reports that he will provide support to patient and that patient has additional family members involved for assistance.     Discharge Recommendations   Anticipated Disposition:  Home with services  Transportation Needs:  Family:  Patients spouse      Additional comments   SW provided education to patient and patients family that TCU was recommended. Patient is not interested in TCU at this time.     JAX Urrutia  [facility] , Coverage 6C   PH: [phone]  Pgr: [phone]

## 2017-06-08 NOTE — PLAN OF CARE
"Problem: Goal Outcome Summary  Goal: Goal Outcome Summary  RN  1. Pt will be hemodynamically stable  2.Pain will be controlled  3. Pt will have adequate nutrition  D-S/P repair of ascending aortic aneurysm and CBG X1 on 06/05/17. See flow sheets for vs and assessments. Up to the BR with walker and assist of one. Poor appetite. Denied pain until around 2015 when pt c/o sharp pain under L breast/ rib cage. Unable to take a deep breath.  I-Medicated with scheduled tylenol and 10 mg of prn oxycodone..  A-Pain not relieved by 2100. Declined IV dilaudid. States, \"It makes me sick\".  I-Notified surgery cross cover, Dr. Justice Florian. Here to assess pt. Obtained order for chest xray and flexeril.  A-Chest xray showed \"Stable position of chest tubes with out appreciable pneumothorax.\" Appeared to be sleeping after flexeril.  Telemetry shows SR.  P-Continue with current poc. Encourage po intake. Monitor pain. Increase activity as tolerated.      "

## 2017-06-08 NOTE — PLAN OF CARE
Problem: Goal Outcome Summary  Goal: Goal Outcome Summary  RN  1. Pt will be hemodynamically stable  2.Pain will be controlled  3. Pt will have adequate nutrition   Outcome: Therapy, progress towards functional goals is fair  OT-6C: Pt required Max vc's, and Min A for transfer supine<->seated EOB within precautions. Pt required vc's to recall precautions. Pt required many vc's for adherence to precautions. Therapist educated pt on LE dressing within precautions. Pt required Min A, and Max vc's for transfer sit<->standing ambulation to bathroom. Pt engaged in self cares standing at sink with setup, Min A and vc's. Pt demonstrates impaired RUE fine motor control/coordination improved during session. Pt engaged in BUE foam block exercises with fair tolerance. Pt tolerated session well. Pt limited by impulsivity with mobility, weakness,  VSS.      REC: Discharge to TCU when medically appropriate.

## 2017-06-08 NOTE — PROGRESS NOTES
Surgery Cross-Cover Note    Was asked to assess Pt's new chest pain. Pt reports a sharp subxiphoid pleuritic chest pain which started earlier this evening. Pain is not reproducible, no tenderness to exam. Of note patient had some mediastinal chest tubes removed today.    Patient resting comfortably upon arrival at bedside. No acute change in respiratory status, satting well on 2LNC, nonlabored breathing. Vitals unremarkable. No tenderness on exam.     CXR obtained, no acute changes noted. Given Flexeril for muscle spasm, will continue to monitor.      Justice Florian MD  PGY-1 General Surgery

## 2017-06-09 ENCOUNTER — APPOINTMENT (OUTPATIENT)
Dept: PHYSICAL THERAPY | Facility: CLINIC | Age: 72
DRG: 221 | End: 2017-06-09
Attending: THORACIC SURGERY (CARDIOTHORACIC VASCULAR SURGERY)
Payer: MEDICARE

## 2017-06-09 ENCOUNTER — APPOINTMENT (OUTPATIENT)
Dept: OCCUPATIONAL THERAPY | Facility: CLINIC | Age: 72
DRG: 221 | End: 2017-06-09
Attending: THORACIC SURGERY (CARDIOTHORACIC VASCULAR SURGERY)
Payer: MEDICARE

## 2017-06-09 ENCOUNTER — APPOINTMENT (OUTPATIENT)
Dept: GENERAL RADIOLOGY | Facility: CLINIC | Age: 72
DRG: 221 | End: 2017-06-09
Attending: PHYSICIAN ASSISTANT
Payer: MEDICARE

## 2017-06-09 LAB
ANION GAP SERPL CALCULATED.3IONS-SCNC: 7 MMOL/L (ref 3–14)
BUN SERPL-MCNC: 20 MG/DL (ref 7–30)
CALCIUM SERPL-MCNC: 8.6 MG/DL (ref 8.5–10.1)
CHLORIDE SERPL-SCNC: 104 MMOL/L (ref 94–109)
CO2 SERPL-SCNC: 28 MMOL/L (ref 20–32)
CREAT SERPL-MCNC: 0.76 MG/DL (ref 0.52–1.04)
ERYTHROCYTE [DISTWIDTH] IN BLOOD BY AUTOMATED COUNT: 14.2 % (ref 10–15)
GFR SERPL CREATININE-BSD FRML MDRD: 75 ML/MIN/1.7M2
GLUCOSE BLDC GLUCOMTR-MCNC: 83 MG/DL (ref 70–99)
GLUCOSE SERPL-MCNC: 118 MG/DL (ref 70–99)
HCT VFR BLD AUTO: 27.4 % (ref 35–47)
HGB BLD-MCNC: 9.3 G/DL (ref 11.7–15.7)
MCH RBC QN AUTO: 29.8 PG (ref 26.5–33)
MCHC RBC AUTO-ENTMCNC: 33.9 G/DL (ref 31.5–36.5)
MCV RBC AUTO: 88 FL (ref 78–100)
PLATELET # BLD AUTO: 172 10E9/L (ref 150–450)
POTASSIUM SERPL-SCNC: 3.6 MMOL/L (ref 3.4–5.3)
RBC # BLD AUTO: 3.12 10E12/L (ref 3.8–5.2)
SODIUM SERPL-SCNC: 139 MMOL/L (ref 133–144)
WBC # BLD AUTO: 7.7 10E9/L (ref 4–11)

## 2017-06-09 PROCEDURE — 00000146 ZZHCL STATISTIC GLUCOSE BY METER IP

## 2017-06-09 PROCEDURE — 25000128 H RX IP 250 OP 636: Performed by: PHYSICIAN ASSISTANT

## 2017-06-09 PROCEDURE — A9270 NON-COVERED ITEM OR SERVICE: HCPCS | Mod: GY | Performed by: THORACIC SURGERY (CARDIOTHORACIC VASCULAR SURGERY)

## 2017-06-09 PROCEDURE — 97116 GAIT TRAINING THERAPY: CPT | Mod: GP | Performed by: PHYSICAL THERAPIST

## 2017-06-09 PROCEDURE — 25000132 ZZH RX MED GY IP 250 OP 250 PS 637: Mod: GY | Performed by: SURGERY

## 2017-06-09 PROCEDURE — A9270 NON-COVERED ITEM OR SERVICE: HCPCS | Mod: GY | Performed by: SURGERY

## 2017-06-09 PROCEDURE — 21400006 ZZH R&B CCU INTERMEDIATE UMMC

## 2017-06-09 PROCEDURE — 40000141 ZZH STATISTIC PERIPHERAL IV START W/O US GUIDANCE

## 2017-06-09 PROCEDURE — 97530 THERAPEUTIC ACTIVITIES: CPT | Mod: GP | Performed by: PHYSICAL THERAPIST

## 2017-06-09 PROCEDURE — A9270 NON-COVERED ITEM OR SERVICE: HCPCS | Mod: GY | Performed by: PHYSICIAN ASSISTANT

## 2017-06-09 PROCEDURE — 40000193 ZZH STATISTIC PT WARD VISIT: Performed by: PHYSICAL THERAPIST

## 2017-06-09 PROCEDURE — 71020 XR CHEST 2 VW: CPT

## 2017-06-09 PROCEDURE — 25000125 ZZHC RX 250: Performed by: PHYSICIAN ASSISTANT

## 2017-06-09 PROCEDURE — 97110 THERAPEUTIC EXERCISES: CPT | Mod: GO

## 2017-06-09 PROCEDURE — A9270 NON-COVERED ITEM OR SERVICE: HCPCS | Mod: GY | Performed by: STUDENT IN AN ORGANIZED HEALTH CARE EDUCATION/TRAINING PROGRAM

## 2017-06-09 PROCEDURE — 40000133 ZZH STATISTIC OT WARD VISIT

## 2017-06-09 PROCEDURE — 25000132 ZZH RX MED GY IP 250 OP 250 PS 637: Mod: GY | Performed by: THORACIC SURGERY (CARDIOTHORACIC VASCULAR SURGERY)

## 2017-06-09 PROCEDURE — 25000132 ZZH RX MED GY IP 250 OP 250 PS 637: Mod: GY | Performed by: PHYSICIAN ASSISTANT

## 2017-06-09 PROCEDURE — 36415 COLL VENOUS BLD VENIPUNCTURE: CPT | Performed by: PHYSICIAN ASSISTANT

## 2017-06-09 PROCEDURE — 85027 COMPLETE CBC AUTOMATED: CPT | Performed by: PHYSICIAN ASSISTANT

## 2017-06-09 PROCEDURE — 97530 THERAPEUTIC ACTIVITIES: CPT | Mod: GO

## 2017-06-09 PROCEDURE — 97535 SELF CARE MNGMENT TRAINING: CPT | Mod: GO

## 2017-06-09 PROCEDURE — 25000132 ZZH RX MED GY IP 250 OP 250 PS 637: Mod: GY | Performed by: STUDENT IN AN ORGANIZED HEALTH CARE EDUCATION/TRAINING PROGRAM

## 2017-06-09 PROCEDURE — 80048 BASIC METABOLIC PNL TOTAL CA: CPT | Performed by: PHYSICIAN ASSISTANT

## 2017-06-09 PROCEDURE — 25000132 ZZH RX MED GY IP 250 OP 250 PS 637: Mod: GY | Performed by: ANESTHESIOLOGY

## 2017-06-09 RX ORDER — BISACODYL 10 MG
10 SUPPOSITORY, RECTAL RECTAL DAILY PRN
Status: DISCONTINUED | OUTPATIENT
Start: 2017-06-09 | End: 2017-06-10 | Stop reason: HOSPADM

## 2017-06-09 RX ORDER — CYCLOBENZAPRINE HCL 5 MG
5 TABLET ORAL 2 TIMES DAILY PRN
Qty: 30 TABLET | Refills: 0 | Status: SHIPPED | OUTPATIENT
Start: 2017-06-09 | End: 2017-06-10

## 2017-06-09 RX ORDER — METHYLPREDNISOLONE SODIUM SUCCINATE 40 MG/ML
32 INJECTION, POWDER, LYOPHILIZED, FOR SOLUTION INTRAMUSCULAR; INTRAVENOUS ONCE
Status: COMPLETED | OUTPATIENT
Start: 2017-06-09 | End: 2017-06-09

## 2017-06-09 RX ORDER — PANTOPRAZOLE SODIUM 40 MG/1
40 TABLET, DELAYED RELEASE ORAL EVERY MORNING
Qty: 30 TABLET | Refills: 0 | Status: SHIPPED | OUTPATIENT
Start: 2017-06-09 | End: 2017-07-09

## 2017-06-09 RX ORDER — BACITRACIN ZINC 500 [USP'U]/G
OINTMENT TOPICAL 2 TIMES DAILY
Status: DISCONTINUED | OUTPATIENT
Start: 2017-06-09 | End: 2017-06-10 | Stop reason: HOSPADM

## 2017-06-09 RX ORDER — ASPIRIN 325 MG
325 TABLET, DELAYED RELEASE (ENTERIC COATED) ORAL DAILY
Qty: 100 TABLET | Refills: 0 | Status: SHIPPED | OUTPATIENT
Start: 2017-06-10 | End: 2017-06-21

## 2017-06-09 RX ORDER — POLYETHYLENE GLYCOL 3350 17 G/17G
17 POWDER, FOR SOLUTION ORAL DAILY PRN
Qty: 10 PACKET | Refills: 0 | Status: SHIPPED | OUTPATIENT
Start: 2017-06-09 | End: 2017-08-25

## 2017-06-09 RX ORDER — OXYCODONE HYDROCHLORIDE 5 MG/1
5-10 TABLET ORAL EVERY 4 HOURS PRN
Qty: 50 TABLET | Refills: 0 | Status: SHIPPED | OUTPATIENT
Start: 2017-06-09 | End: 2017-06-21

## 2017-06-09 RX ORDER — METHYLPREDNISOLONE SODIUM SUCCINATE 40 MG/ML
32 INJECTION, POWDER, LYOPHILIZED, FOR SOLUTION INTRAMUSCULAR; INTRAVENOUS ONCE
Status: COMPLETED | OUTPATIENT
Start: 2017-06-10 | End: 2017-06-10

## 2017-06-09 RX ORDER — AMOXICILLIN 250 MG
1-2 CAPSULE ORAL 2 TIMES DAILY PRN
Qty: 100 TABLET | Refills: 0 | Status: SHIPPED | OUTPATIENT
Start: 2017-06-09 | End: 2017-07-06

## 2017-06-09 RX ORDER — HYDROXYZINE HYDROCHLORIDE 25 MG/1
25 TABLET, FILM COATED ORAL 3 TIMES DAILY
Status: DISCONTINUED | OUTPATIENT
Start: 2017-06-09 | End: 2017-06-09

## 2017-06-09 RX ORDER — METOPROLOL TARTRATE 25 MG/1
25 TABLET, FILM COATED ORAL 2 TIMES DAILY
Status: DISCONTINUED | OUTPATIENT
Start: 2017-06-09 | End: 2017-06-10 | Stop reason: HOSPADM

## 2017-06-09 RX ORDER — ACETAMINOPHEN 325 MG/1
650 TABLET ORAL EVERY 6 HOURS PRN
Qty: 100 TABLET | Refills: 0 | Status: SHIPPED | OUTPATIENT
Start: 2017-06-09 | End: 2018-09-19 | Stop reason: ALTCHOICE

## 2017-06-09 RX ADMIN — OXYCODONE HYDROCHLORIDE 5 MG: 5 TABLET ORAL at 02:05

## 2017-06-09 RX ADMIN — METOPROLOL TARTRATE 12.5 MG: 25 TABLET, FILM COATED ORAL at 08:52

## 2017-06-09 RX ADMIN — CYCLOBENZAPRINE HYDROCHLORIDE 5 MG: 5 TABLET, FILM COATED ORAL at 08:51

## 2017-06-09 RX ADMIN — LISINOPRIL 5 MG: 5 TABLET ORAL at 08:51

## 2017-06-09 RX ADMIN — CYCLOBENZAPRINE HYDROCHLORIDE 5 MG: 5 TABLET, FILM COATED ORAL at 13:37

## 2017-06-09 RX ADMIN — BISACODYL 10 MG: 10 SUPPOSITORY RECTAL at 13:44

## 2017-06-09 RX ADMIN — ASPIRIN 81 MG: 81 TABLET, COATED ORAL at 08:51

## 2017-06-09 RX ADMIN — BACITRACIN ZINC: 500 OINTMENT TOPICAL at 13:37

## 2017-06-09 RX ADMIN — BACITRACIN ZINC: 500 OINTMENT TOPICAL at 21:16

## 2017-06-09 RX ADMIN — MUPIROCIN 0.5 G: 20 OINTMENT TOPICAL at 21:01

## 2017-06-09 RX ADMIN — OXYCODONE HYDROCHLORIDE 5 MG: 5 TABLET ORAL at 21:15

## 2017-06-09 RX ADMIN — ACETAMINOPHEN 975 MG: 325 TABLET, FILM COATED ORAL at 08:51

## 2017-06-09 RX ADMIN — CYCLOBENZAPRINE HYDROCHLORIDE 5 MG: 5 TABLET, FILM COATED ORAL at 20:59

## 2017-06-09 RX ADMIN — POTASSIUM CHLORIDE 20 MEQ: 750 TABLET, EXTENDED RELEASE ORAL at 08:52

## 2017-06-09 RX ADMIN — METOPROLOL TARTRATE 25 MG: 25 TABLET, FILM COATED ORAL at 20:59

## 2017-06-09 RX ADMIN — METHYLPREDNISOLONE SODIUM SUCCINATE 32 MG: 40 INJECTION, POWDER, LYOPHILIZED, FOR SOLUTION INTRAMUSCULAR; INTRAVENOUS at 21:00

## 2017-06-09 RX ADMIN — SENNOSIDES AND DOCUSATE SODIUM 2 TABLET: 8.6; 5 TABLET ORAL at 08:51

## 2017-06-09 RX ADMIN — PANTOPRAZOLE SODIUM 40 MG: 40 TABLET, DELAYED RELEASE ORAL at 08:51

## 2017-06-09 RX ADMIN — METOPROLOL TARTRATE 12.5 MG: 25 TABLET, FILM COATED ORAL at 12:38

## 2017-06-09 RX ADMIN — POLYETHYLENE GLYCOL 3350 17 G: 17 POWDER, FOR SOLUTION ORAL at 08:52

## 2017-06-09 ASSESSMENT — PAIN DESCRIPTION - DESCRIPTORS
DESCRIPTORS: BURNING;CONSTANT
DESCRIPTORS: SORE
DESCRIPTORS: SORE

## 2017-06-09 NOTE — PLAN OF CARE
Problem: Goal Outcome Summary  Goal: Goal Outcome Summary  RN  1. Pt will be hemodynamically stable  2.Pain will be controlled  3. Pt will have adequate nutrition   PT/CR/6C  Pt IND with bed mobility, requiring cues for sit<>stand to maintain sternal precautions 100% of time. Ascended/descended 3 stairs X2 with SBA. Progressed gait to no AD with SBA and pt ambulated 140' without loss of balance.     Given pt's progress, discharge to home with spouse and OP Cardiac Rehab is appropriate. Initially, would recommend SBA of spouse/caregiver with all activity for balance/safety.

## 2017-06-09 NOTE — PROGRESS NOTES
CVTS Daily Note  6/9/2017  Attending: Akshat Soto,*    S:   No overnight events.    RN reports she is unsteady and weak on feet. Patient and family want to DC home.     Pt seen at bedside resting comfortably.    Does complain of bilateral upper ext weakness that is slowly improving, otherwise no acute complaints.      Denies F/C/Sweats.  No CP, SOB, or calf pain.    Tolerating diet but not very hungry, using supplements.     - BM.  + Flatus.  Has a Hx 4-5 days without stools at home, possibly related to antihistamine use.     Ambulated well without assistance.    Pain controlled well.    O:   Vitals:    06/08/17 1905 06/08/17 2300 06/09/17 0100 06/09/17 0733   BP: 128/66 127/75  136/69   BP Location: Left arm Left arm  Left arm   Pulse:       Resp: 16 18 16   Temp: 98.4  F (36.9  C) 98.8  F (37.1  C)  99.6  F (37.6  C)   TempSrc: Oral Oral  Oral   SpO2: 94% 93%  94%   Weight:   79.8 kg (176 lb)    Height:         Vitals:    06/07/17 0500 06/08/17 0300 06/09/17 0100   Weight: 105 kg (231 lb 7.7 oz) 82.3 kg (181 lb 8 oz) 79.8 kg (176 lb)     + 1 kg since admit    Intake/Output Summary (Last 24 hours) at 06/09/17 0929  Last data filed at 06/09/17 0733   Gross per 24 hour   Intake              440 ml   Output             4075 ml   Net            -3635 ml       MAPs: 85 - 102  Gen: AAO x 3, pleasant, NAD  CV: RRR, S1S2 normal, no murmurs, rubs, or gallops. no JVD  Pulm: CTA, no rhonchi or wheezes  Abd: soft, non-tender, no guarding  Ext: no peripheral edema  Incision: clean, dry, intact, no erythema  Chest Tube sites: dressings clean and dry    Labs:  BMP    Recent Labs  Lab 06/09/17  0658 06/08/17  0705 06/07/17  0549 06/06/17  1156 06/06/17  0446    135 136  --  144   POTASSIUM 3.6 3.7 4.6 4.0 3.7   CHLORIDE 104 101 106  --  111*   TARIQ 8.6 8.4* 8.4*  --  8.0*   CO2 28 29 25  --  26   BUN 20 20 22  --  17   CR 0.76 0.78 0.79  --  0.92   * 106* 107*  --  129*     CBC    Recent Labs  Lab  06/09/17  0658 06/08/17  0705 06/07/17  0549 06/06/17  1156 06/06/17  0446   WBC 7.7 11.2* 16.9*  --  15.4*   RBC 3.12* 2.98* 3.15*  --  2.73*   HGB 9.3* 8.9* 9.2* 7.9* 8.1*   HCT 27.4* 25.8* 27.9*  --  23.7*   MCV 88 87 89  --  87   MCH 29.8 29.9 29.2  --  29.7   MCHC 33.9 34.5 33.0  --  34.2   RDW 14.2 13.9 14.6  --  13.6    120* 104*  --  117*      Hepatic Panel   Lab Results   Component Value Date    AST 16 03/14/2017     Lab Results   Component Value Date    ALT 24 03/14/2017     Lab Results   Component Value Date    ALBUMIN 3.9 03/14/2017     GLUCOSE:     Recent Labs  Lab 06/09/17  0658 06/08/17  2147 06/08/17  1724 06/08/17  1123 06/08/17  0705 06/08/17  0240 06/07/17  2146 06/07/17  1722  06/07/17  0549  06/06/17  0446  06/05/17  1456 06/05/17  1313   *  --   --   --  106*  --   --   --   --  107*  --  129*  --  140* 182*   BGM  --  98 116* 117*  --  106* 141* 132*  < >  --   < >  --   < >  --   --    < > = values in this interval not displayed.    Imaging:   CXR 6/8-   1. Interval removal of chest tubes with apparent resolution of the right apical pneumothorax.  2. No change in left pleural effusion and bibasilar opacities.  3. Widened mediastinum of uncertain etiology.      A/P:   Marilia Bean is a 72 year old female who is status post repair of ascending aortic aneurysm and CAB x 1 on 6/5 with Dr Soto. EF 65-70%.    Routine post-repair Imaging to be done 6/10, methylprednisolone and benadryl ordered for pre-contrast empiric allergy treatment.        Neuro:   - Dylon APAP, PRN oxycodone and IV dilaudid, flexeril.       CV:    -  mg, metoprolol 25 mg BID   - BP Goal: keep SBP < 120 mmHg    - mediastinal drains removed 6/7, removed both pleural drains 6/8.   - Lisinopril 5 mg 6/8 with better MAPs       Pulm:   - Wean O2 as tolerated.   - Removed pleural drains 6/8.        ID:   - NGTD for surgical tissue cultures   - completed post op Abx  - WBC trending down, now WNL       GI / FEN:  -  Reg Diet, supplements. Bowel regimen.   - Lactulose 20 g 6/9, PRN suppository available       Renal / :   - Snell out.  Lasix 20 mg PO x 1 dose on 6/7, patient now auto diuresing with 2L out this AM. Will hold lasix for now.        Heme:   - Received 1 unit PRBC 6/6. Continue to monitor Hgb. Hgb stable in 9's.   - Plts 172   - Upper Ext Edema, Doppler 6/8: no DVT in upper extremities      Endo:   - Hgb A1C 5.8 (6/7), off insulin      PPX:   - Protonix      Anticoagulation:   - Asa 325 mg      Dispo:   - 6C since 6/6  - CT scan before discharge.   - DC home Saturday AM if + BM. Does not want rehab stay.       Staff surgeons have been informed of changes through both  verbal and written communication.      Efren Rangel PA-C  Cardiothoracic Surgery  Pager [phone]    9:29 AM   June 9, 2017

## 2017-06-09 NOTE — PLAN OF CARE
OT/6C: Pt completed supine<>sit with SBA. Pt SBA for sit<>stand x3 . Pt ambulated ~70ft with SBA and FWW. Pt required vcs for adherence to sternal precautions.  Pt completed toilet transfer with SBA and FWW. Pt requiring Min A for toileting cares, Max to doff/don socks.  Pt completed fine motor task ~10 mins to facilitated FM strength and coordination. Pt required vcs to sequence task. Pt limited by impulsivity, decreased activity tolerance and impaired FM coordination.  REC: Patient is progressing well with rehab goals; still needing assist with some ADL's and with significant deficits in FMC. Patient and family with strong preference of discharge to home. Given patient's increasing activity tolerance, discharge home may be realistic with family support for dressing, bathing and heavier IADL's. Also recommend follow up with [facility].

## 2017-06-09 NOTE — DISCHARGE SUMMARY
[facility], [address]   Cardiothoracic Surgery Hospital Discharge Summary     Marilia Bean MRN# [id number]   Age: 72 year old YOB: 1945     Admitting Physician:  Akshat Soto MD  Discharge Physician:  INA Weston  Primary Care Physician:        Herbert Epstein     DATE OF ADMISSION: 6/5/2017     DATE OF DISCHARGE: Janey 10, 2017          Admission Diagnoses:   1. Ascending aortic aneurysm  2. Single vessel Coronary Artery Disease  3. Hypercholesterolemia   4. HTN          Discharge Diagnosis:   1. Ascending aortic aneurysm, s/p repair with vascular graft  2. Single vessel Coronary Artery Disease, s/p CAB x 1  3. Hypercholesterolemia   4. HTN    PROCEDURES PERFORMED:   Date: 6/5/2017.  Surgeon: Dr. Colin Soto   1.  Ascending aortic aneurysm repair with 30 mm Gelweave vascular graft.   2.  Coronary artery bypass grafting x1 with left internal mammary artery to left anterior descending artery.   3.  Placement of ventricular epicardial pacing leads.       INTRAOPERATIVE COMPLICATIONS:  None    PATHOLOGY RESULTS:    1. Surgical Pathology 6/5/17:    SPECIMEN(S): Aortic aneurysm wall   FINAL DIAGNOSIS: AORTA   - Focal aortic laminar medial necrosis   - Mild focal fragmentation of elastic lamellae, see comment   COMMENT:   Sections of aortic wall show nonspecific focal fragmentation of elastic   lamellae.  This finding is consistent with degeneration related to aging   and hypertension and is not diagnostic for Marfan's syndrome.     CULTURE RESULTS:    1. Nares, swab: no MRSA, final 6/7/17.    DRAINS/TUBES PRESENT AT DISCHARGE:  None    CONSULTS:    1.  PT/OT  2.  None    Patient discharged on aspirin:  Yes 325 mg  Patient discharged on beta blocker: yes    Patient discharged on ACE Inhibitor/ARB: yes               Discharge Disposition:   Discharged to home            Condition on Discharge:   Discharge condition: Stable   Discharge vitals: Blood pressure  "110/59, pulse 72, temperature 99.5  F (37.5  C), temperature source Oral, resp. rate 18, height 1.715 m (5' 7.5\"), weight 79.7 kg (175 lb 12.8 oz), SpO2 92 %.     Code status on discharge: Full Code     DAY OF DISCHARGE PHYSICAL EXAM:  Vitals:    06/08/17 2300 06/09/17 0100 06/09/17 0733 06/09/17 1106   BP: 127/75  136/69 122/57   BP Location: Left arm  Left arm Left arm   Pulse:       Resp: 18  16 16   Temp: 98.8  F (37.1  C)  99.6  F (37.6  C) 98.5  F (36.9  C)   TempSrc: Oral  Oral Oral   SpO2: 93%  94% 93%   Weight:  79.8 kg (176 lb)     Height:         Vitals:    06/07/17 0500 06/08/17 0300 06/09/17 0100   Weight: 105 kg (231 lb 7.7 oz) 82.3 kg (181 lb 8 oz) 79.8 kg (176 lb)     Gen:  NAD, conversational  CV:  S1S2 normal, no murmurs, rubs, or gallops  Pulm:  CTA, no rhonchi or wheezes  Abd:  Soft, nondistended, NTTP  Ext: no LE edema  Incision: sternal and groin incision Clean, dry, intact, no erythema  Chest Tube sites:  Dressings clean and dry      BMP    Recent Labs  Lab 06/09/17  0658 06/08/17  0705 06/07/17  0549 06/06/17  1156 06/06/17  0446    135 136  --  144   POTASSIUM 3.6 3.7 4.6 4.0 3.7   CHLORIDE 104 101 106  --  111*   TARIQ 8.6 8.4* 8.4*  --  8.0*   CO2 28 29 25  --  26   BUN 20 20 22  --  17   CR 0.76 0.78 0.79  --  0.92   * 106* 107*  --  129*     CBC    Recent Labs  Lab 06/09/17  0658 06/08/17  0705 06/07/17  0549 06/06/17  1156 06/06/17  0446   WBC 7.7 11.2* 16.9*  --  15.4*   RBC 3.12* 2.98* 3.15*  --  2.73*   HGB 9.3* 8.9* 9.2* 7.9* 8.1*   HCT 27.4* 25.8* 27.9*  --  23.7*   MCV 88 87 89  --  87   MCH 29.8 29.9 29.2  --  29.7   MCHC 33.9 34.5 33.0  --  34.2   RDW 14.2 13.9 14.6  --  13.6    120* 104*  --  117*     INR    Recent Labs  Lab 06/06/17  0446 06/05/17  1456 06/05/17  1312   INR 1.30* 1.38* 1.59*      Hepatic Panel   Lab Results   Component Value Date    AST 16 03/14/2017     Lab Results   Component Value Date    ALT 24 03/14/2017     Lab Results   Component Value " Date    ALBUMIN 3.9 03/14/2017         Recent Labs  Lab 06/09/17  1156 06/09/17  0658 06/08/17  2147 06/08/17  1724 06/08/17  1123 06/08/17  0705 06/08/17  0240 06/07/17  2146  06/07/17  0549  06/06/17  0446  06/05/17  1456 06/05/17  1313   GLC  --  118*  --   --   --  106*  --   --   --  107*  --  129*  --  140* 182*   BGM 83  --  98 116* 117*  --  106* 141*  < >  --   < >  --   < >  --   --    < > = values in this interval not displayed.    BRIEF HISTORY OF ILLNESS:  Marilia Bean is a 72-year-old female who had a history of ascending aortic dilatation, diagnosed since 2007.  She had been having followup serial echocardiogram over the past few years and a CT of the chest as well.  The followup echocardiogram and CT scan showed gradual increase in the ascending aorta to the point that it meets the criteria of aortic aneurysm repair (5.0 cm). The patient was also found to have single vessel coronary artery disease with 80% stenosis in the proximal mid segment of the left anterior descending artery. Due to these findings, we recommended the patient to undergo aortic undergo ascending aortic aneurysm repair and coronary artery bypass grafting with left internal mammary artery to left anterior descending artery branch.      HOSPITAL COURSE: Marilia Bean is a 72 year old female who on 6/5/2017 underwent the above-named procedures.  She tolerated the operation well and postoperatively was admitted to the CVICU.  She was extubated on POD # 0 to 2 lpm via NC.  Her ICU stay was relatively uncomplicated by and treatment included the usual hemodynamic support, she was weaned off pressors by POD #1 AM.      She was transferred to the post-surgical telemetry unit on evening of POD # 1.  Temporary Pacing wires and mediastinal tubes were removed POD #2 without complication. She complained of chest discomfort that was relieved with flexeril on evening of POD #2. Bilateral pleural tubes were removed without complication as  indicated on POD#3.  She complained of upper arm weakness and edema after surgery, no DVTs on doppler 6/8. Weakness was improving before discharge. She participated well with PT/OT, who did recommend DC to rehab, but patient and family wanted to go home.     Prior to discharge, her pain was controlled well, she was able to perform most ADLs and ambulate without difficulty, and had full return of bowel and bladder function.  On Janey 10, 2017, she was discharged to home in stable condition.    ECHOCARDIOGRAM, 5/25/2017-   The Ejection Fraction is estimated at 60-65%.  Right ventricular function, chamber size, wall motion, and thickness are normal.  The left atrial appendage is normal. It is free of spontaneous echo contrast and thrombus.  The aortic valve is tricuspid. There is trace central and commisural aortic  insufficiency. The aortic root is severely dilated measuring 3.9 cm x 3.8 cm x 3.7 cm (Z score + 8.00). The sinuses of valsalva are moderately dilated measuring 3.9 cm (Z score +3.00).   The ST junction is effaced. The ascending aorta was incompletely visualized.  Compared to prior study on 3/16/17, the aortic root and sinuses of valsalva  are significantly dilated, within standard measurement variability from prior  study. The aortic valve is tricuspid with trace aortic insufficiency.    CXR 6/9/17-   PA and lateral views of chest. Postsurgical changes of CABG with aortic valve replacement.  Cardiomegaly. Bibasilar streaky opacities, unchanged. Trace right pneumothorax. Small left  pleural effusion.    IMPRESSION:   1. Postsurgical changes of CABG and aortic valve replacement.  2. Stable cardiomegaly and bibasilar streaky opacities, likely atelectasis.  3. Trace right pneumothorax    CTA performed at time of discharge 6/10 and result pending     DISCHARGE INSTRUCTIONS:  Avoid lifting anything greater than ten pounds for 6 weeks after surgery and then less than 20 pounds for an additional 6 weeks.    No  driving for 4 weeks after surgery or while on pain medication. If you had a minimally invasive procedure, you may drive after three weeks if not taking pain medication.      Avoid strenuous activities such as bowling, vacuuming, raking, shoveling, golf or tennis for 12 weeks after your surgery. It is okay to resume sex if you feel comfortable in doing so. You may have to try different positions with your partner.     Splint your chest incision by hugging a pillow or bringing your arms across your chest when coughing or sneezing. Avoid pushing off with your arms when getting up for the first month if you have had your sternum opened.    Shower or wash your incisions daily with soap and water (or as instructed), pat dry. Keep wound clean and dry, showers are okay after discharge, but don't let spray hit directly on incision. No baths or swimming for 1 month.  Clean wounds twice a day for 2 weeks with microklenz spray if available. Cover chest tube sites with gauze until they stop draining, then leave open. It is not abnormal for chest tube sites to drain yellowish/clear fluid for up to 2-3 weeks after surgery.   Watch for signs of infection: increased redness, tenderness, warmth or any drainage that appears infected (pus like) or is persistent.  Also a temperature > 100.5 F or chills. Call your surgeon or primary care provider's office immediately. Remove any skin glue left on incisions after 10 days. This will not affect your incision and can speed up healing.    Exercise is very important in your recovery. Please follow the guidelines set up for you in your cardiac rehab classes at the hospital. If outpatient cardiac rehab was ordered for you, we highly recommend you participate. If you have problems arranging your cardiac rehab, please call [phone].     Avoid sitting for prolonged periods of time, try to walk every hour during the day. If you have a leg incision, elevate your leg often when you are not  walking.    Check your weight when you get home from the hospital and continue to check it daily through your recovery for at least a month. If you notice a weight gain of 2-3 pounds in a week, notify your primary care physician, cardiologist or surgeon.    Bowel activity may be slow after surgery. If necessary, you may take an over the counter laxative such as Milk of Magnesia or Miralax. You may have stool softeners prescribed (docusate sodium, Senokot). We recommend using stool softeners while using narcotics for pain (oxycodone/percocet, hydrocodone/vicodin).      DENTAL VISITS AFTER SURGERY  If you have had your heart valve repaired or replaced, we do not recommend having any dental work done for 6 months and you will need to take an antibiotic prior to dental visits from now on.  Please notify your dentist before any procedure for the proper treatment needed. The antibiotic is taken by mouth one hour prior to visit. This includes routine cleanings.    DO NOT SMOKE.  IF YOU NEED HELP QUITTING, PLEASE TALK WITH YOUR CARDIOLOGIST OR PRIMARY DOCTOR.    REGARDING PRESCRIPTION REFILLS.  If you need a refill on your pain medication contact us.  All other medications will be adjusted, discontinued and re-filled by your primary care physician and/or your cardiologist as they were prior to your surgery. We have given you enough for one to three month with possibly one refill.    FOLLOW UP APPOINTMENTS:   You have a follow up visit with CVTS Surgery Advance Care Practitioners on 6/23/17 at 2 pm (arrive 1:45 pm) at the [facility].  You will then return to the care of your primary physician and your cardiologist.   It is important to call for an appointment to see your cardiologist in 4-6 weeks after surgery and your primary care physician in 7-10 days after surgery. If there is a need to return to see CT Surgery please call our  at [phone].    PRE-ADMISSION MEDICATIONS:    No current  facility-administered medications on file prior to encounter.   Current Outpatient Prescriptions on File Prior to Encounter:  triamterene-hydrochlorothiazide (DYAZIDE) 37.5-25 MG per capsule Take 1 capsule by mouth daily (Patient taking differently: Take 1 capsule by mouth every morning )        DISCHARGE MEDICATIONS:      Review of your medicines      START taking       Dose / Directions    acetaminophen 325 MG tablet   Commonly known as:  TYLENOL   Used for:  Acute post-operative pain        Dose:  650 mg   Take 2 tablets (650 mg) by mouth every 6 hours as needed for mild pain   Quantity:  100 tablet   Refills:  0       aspirin 325 MG EC tablet   Used for:  S/P CABG (coronary artery bypass graft), S/P aortic aneurysm repair   Replaces:  aspirin 81 MG tablet        Dose:  325 mg   Take 1 tablet (325 mg) by mouth daily   Quantity:  100 tablet   Refills:  0       cyclobenzaprine 5 MG tablet   Commonly known as:  FLEXERIL   Used for:  Acute post-operative pain        Dose:  5 mg   Take 1 tablet (5 mg) by mouth 3 times daily as needed for muscle spasms or other (pain control while sleeping)   Quantity:  30 tablet   Refills:  0       lisinopril 5 MG tablet   Commonly known as:  PRINIVIL/ZESTRIL   Used for:  S/P aortic aneurysm repair, S/P CABG (coronary artery bypass graft)        Dose:  7.5 mg   Take 1.5 tablets (7.5 mg) by mouth daily   Quantity:  45 tablet   Refills:  1       metoprolol 25 MG tablet   Commonly known as:  LOPRESSOR   Used for:  S/P CABG (coronary artery bypass graft), S/P aortic aneurysm repair        Dose:  25 mg   1 tablet (25 mg) by Oral or Feeding Tube route 2 times daily   Quantity:  60 tablet   Refills:  1       oxyCODONE 5 MG IR tablet   Commonly known as:  ROXICODONE   Used for:  Acute post-operative pain        Dose:  5-10 mg   Take 1-2 tablets (5-10 mg) by mouth every 4 hours as needed for moderate to severe pain   Quantity:  50 tablet   Refills:  0       pantoprazole 40 MG EC tablet    Commonly known as:  PROTONIX   Used for:  Acute post-operative pain        Dose:  40 mg   Take 1 tablet (40 mg) by mouth every morning   Quantity:  30 tablet   Refills:  0       polyethylene glycol Packet   Commonly known as:  MIRALAX/GLYCOLAX   Used for:  Acute post-operative pain        Dose:  17 g   Take 17 g by mouth daily as needed for constipation   Quantity:  10 packet   Refills:  0       senna-docusate 8.6-50 MG per tablet   Commonly known as:  SENOKOT-S;PERICOLACE   Used for:  Acute post-operative pain        Dose:  1-2 tablet   Take 1-2 tablets by mouth 2 times daily as needed for constipation (while using narcotics)   Quantity:  100 tablet   Refills:  0         CONTINUE these medicines which have NOT CHANGED       Dose / Directions    atorvastatin 20 MG tablet   Commonly known as:  LIPITOR   Used for:  Hyperlipidemia LDL goal <100        Dose:  20 mg   Take 1 tablet (20 mg) by mouth every evening   Quantity:  90 tablet   Refills:  1         STOP taking          aspirin 81 MG tablet   Replaced by:  aspirin 325 MG EC tablet           triamterene-hydrochlorothiazide 37.5-25 MG per capsule   Commonly known as:  DYAZIDE                Where to get your medicines      These medications were sent to [facility] [place], [place] - [address] [place] [id number]     Phone:  [phone]      acetaminophen 325 MG tablet     aspirin 325 MG EC tablet     atorvastatin 20 MG tablet     lisinopril 5 MG tablet     metoprolol 25 MG tablet     pantoprazole 40 MG EC tablet     polyethylene glycol Packet     senna-docusate 8.6-50 MG per tablet         Some of these will need a paper prescription and others can be bought over the counter. Ask your nurse if you have questions.     Bring a paper prescription for each of these medications      cyclobenzaprine 5 MG tablet     oxyCODONE 5 MG IR tablet               CC:Herbert Garcia PA-C  [facility]  [facility]   Cardiothoracic Surgery  Office phone: [phone]

## 2017-06-09 NOTE — PLAN OF CARE
Problem: Goal Outcome Summary  Goal: Goal Outcome Summary  RN  1. Pt will be hemodynamically stable  2.Pain will be controlled  3. Pt will have adequate nutrition   D-S/P ascending aortic aneurysm repair and CABG X1 on 06/05/17. See flow sheets for vs and assessments. Working with therapies. All tubes out. Ambulates to BR with walker and assist of one. Fair po intake at evening meal. Verbalizes lack of appetite.  I-Flexeril and scheduled tylenol for pain control. Encouraged to sit up in chair, eat and use IS.  A-Pain controlled. Telemetry shows SR.  P-Continue with current poc. Encourage activity and po intake. Anticipate dc to home soon.

## 2017-06-09 NOTE — PLAN OF CARE
Problem: Goal Outcome Summary  Goal: Goal Outcome Summary  RN  1. Pt will be hemodynamically stable  2.Pain will be controlled  3. Pt will have adequate nutrition   Outcome: Improving     D: Pt POD 4 s/p AAA repair.  I/A: Sternal incision c/d/i- some edema at superior end, bruising. Dressing over old CT sites c/d/i. Oxycodone x1 and ice pack for incisional pain- pt declined other PRN meds. Continues with moderate edema in arms, trace edema in legs. No BM yet, passing flatus. Drank 1/2 ensure overnight. Up with A of 1, unsteady, weak. SR 70-90. Afebrile.   P: Continue to monitor and assess pt condition. Encourage pulm toilet, activity, PO intake. Contact treatment team with questions or concerns.

## 2017-06-10 ENCOUNTER — APPOINTMENT (OUTPATIENT)
Dept: CT IMAGING | Facility: CLINIC | Age: 72
DRG: 221 | End: 2017-06-10
Attending: PHYSICIAN ASSISTANT
Payer: MEDICARE

## 2017-06-10 VITALS
HEIGHT: 68 IN | OXYGEN SATURATION: 92 % | DIASTOLIC BLOOD PRESSURE: 59 MMHG | SYSTOLIC BLOOD PRESSURE: 110 MMHG | TEMPERATURE: 99.5 F | RESPIRATION RATE: 18 BRPM | WEIGHT: 175.8 LBS | HEART RATE: 72 BPM | BODY MASS INDEX: 26.64 KG/M2

## 2017-06-10 LAB
ANION GAP SERPL CALCULATED.3IONS-SCNC: 6 MMOL/L (ref 3–14)
BUN SERPL-MCNC: 21 MG/DL (ref 7–30)
CALCIUM SERPL-MCNC: 8.6 MG/DL (ref 8.5–10.1)
CHLORIDE SERPL-SCNC: 106 MMOL/L (ref 94–109)
CO2 SERPL-SCNC: 27 MMOL/L (ref 20–32)
CREAT SERPL-MCNC: 0.78 MG/DL (ref 0.52–1.04)
ERYTHROCYTE [DISTWIDTH] IN BLOOD BY AUTOMATED COUNT: 14 % (ref 10–15)
GFR SERPL CREATININE-BSD FRML MDRD: 73 ML/MIN/1.7M2
GLUCOSE BLDC GLUCOMTR-MCNC: 108 MG/DL (ref 70–99)
GLUCOSE SERPL-MCNC: 124 MG/DL (ref 70–99)
HCT VFR BLD AUTO: 27 % (ref 35–47)
HGB BLD-MCNC: 8.9 G/DL (ref 11.7–15.7)
MCH RBC QN AUTO: 29.4 PG (ref 26.5–33)
MCHC RBC AUTO-ENTMCNC: 33 G/DL (ref 31.5–36.5)
MCV RBC AUTO: 89 FL (ref 78–100)
PLATELET # BLD AUTO: 197 10E9/L (ref 150–450)
POTASSIUM SERPL-SCNC: 4.5 MMOL/L (ref 3.4–5.3)
RBC # BLD AUTO: 3.03 10E12/L (ref 3.8–5.2)
SODIUM SERPL-SCNC: 139 MMOL/L (ref 133–144)
WBC # BLD AUTO: 7 10E9/L (ref 4–11)

## 2017-06-10 PROCEDURE — 25000132 ZZH RX MED GY IP 250 OP 250 PS 637: Mod: GY | Performed by: PHYSICIAN ASSISTANT

## 2017-06-10 PROCEDURE — 25000125 ZZHC RX 250: Performed by: PHYSICIAN ASSISTANT

## 2017-06-10 PROCEDURE — 80048 BASIC METABOLIC PNL TOTAL CA: CPT | Performed by: PHYSICIAN ASSISTANT

## 2017-06-10 PROCEDURE — 25000132 ZZH RX MED GY IP 250 OP 250 PS 637: Mod: GY | Performed by: STUDENT IN AN ORGANIZED HEALTH CARE EDUCATION/TRAINING PROGRAM

## 2017-06-10 PROCEDURE — 71275 CT ANGIOGRAPHY CHEST: CPT

## 2017-06-10 PROCEDURE — 00000146 ZZHCL STATISTIC GLUCOSE BY METER IP

## 2017-06-10 PROCEDURE — A9270 NON-COVERED ITEM OR SERVICE: HCPCS | Mod: GY | Performed by: THORACIC SURGERY (CARDIOTHORACIC VASCULAR SURGERY)

## 2017-06-10 PROCEDURE — 25000132 ZZH RX MED GY IP 250 OP 250 PS 637: Mod: GY | Performed by: SURGERY

## 2017-06-10 PROCEDURE — A9270 NON-COVERED ITEM OR SERVICE: HCPCS | Mod: GY | Performed by: PHYSICIAN ASSISTANT

## 2017-06-10 PROCEDURE — 40000141 ZZH STATISTIC PERIPHERAL IV START W/O US GUIDANCE

## 2017-06-10 PROCEDURE — 85027 COMPLETE CBC AUTOMATED: CPT | Performed by: PHYSICIAN ASSISTANT

## 2017-06-10 PROCEDURE — 36415 COLL VENOUS BLD VENIPUNCTURE: CPT | Performed by: PHYSICIAN ASSISTANT

## 2017-06-10 PROCEDURE — A9270 NON-COVERED ITEM OR SERVICE: HCPCS | Mod: GY | Performed by: STUDENT IN AN ORGANIZED HEALTH CARE EDUCATION/TRAINING PROGRAM

## 2017-06-10 PROCEDURE — 25000128 H RX IP 250 OP 636: Performed by: RADIOLOGY

## 2017-06-10 PROCEDURE — A9270 NON-COVERED ITEM OR SERVICE: HCPCS | Mod: GY | Performed by: SURGERY

## 2017-06-10 PROCEDURE — 25000132 ZZH RX MED GY IP 250 OP 250 PS 637: Mod: GY | Performed by: THORACIC SURGERY (CARDIOTHORACIC VASCULAR SURGERY)

## 2017-06-10 PROCEDURE — 40000802 ZZH SITE CHECK

## 2017-06-10 PROCEDURE — 25000128 H RX IP 250 OP 636: Performed by: PHYSICIAN ASSISTANT

## 2017-06-10 RX ORDER — HYDROXYZINE HYDROCHLORIDE 10 MG/1
10 TABLET, FILM COATED ORAL 3 TIMES DAILY PRN
Status: DISCONTINUED | OUTPATIENT
Start: 2017-06-10 | End: 2017-06-10 | Stop reason: HOSPADM

## 2017-06-10 RX ORDER — ATORVASTATIN CALCIUM 20 MG/1
20 TABLET, FILM COATED ORAL EVERY EVENING
Qty: 90 TABLET | Refills: 1 | Status: SHIPPED | OUTPATIENT
Start: 2017-06-10 | End: 2018-01-02

## 2017-06-10 RX ORDER — METOPROLOL TARTRATE 25 MG/1
25 TABLET, FILM COATED ORAL 2 TIMES DAILY
Qty: 60 TABLET | Refills: 1 | Status: SHIPPED | OUTPATIENT
Start: 2017-06-10 | End: 2017-08-09

## 2017-06-10 RX ORDER — IOPAMIDOL 755 MG/ML
100 INJECTION, SOLUTION INTRAVASCULAR ONCE
Status: COMPLETED | OUTPATIENT
Start: 2017-06-10 | End: 2017-06-10

## 2017-06-10 RX ORDER — CYCLOBENZAPRINE HCL 5 MG
5 TABLET ORAL 3 TIMES DAILY PRN
Qty: 30 TABLET | Refills: 0 | Status: SHIPPED | OUTPATIENT
Start: 2017-06-10 | End: 2017-07-06

## 2017-06-10 RX ORDER — LISINOPRIL 5 MG/1
7.5 TABLET ORAL DAILY
Qty: 45 TABLET | Refills: 1 | Status: SHIPPED | OUTPATIENT
Start: 2017-06-10 | End: 2017-07-06

## 2017-06-10 RX ADMIN — BACITRACIN ZINC: 500 OINTMENT TOPICAL at 10:00

## 2017-06-10 RX ADMIN — METOPROLOL TARTRATE 25 MG: 25 TABLET, FILM COATED ORAL at 11:20

## 2017-06-10 RX ADMIN — METHYLPREDNISOLONE SODIUM SUCCINATE 32 MG: 40 INJECTION, POWDER, LYOPHILIZED, FOR SOLUTION INTRAMUSCULAR; INTRAVENOUS at 06:39

## 2017-06-10 RX ADMIN — ASPIRIN 325 MG: 325 TABLET, DELAYED RELEASE ORAL at 11:19

## 2017-06-10 RX ADMIN — PANTOPRAZOLE SODIUM 40 MG: 40 TABLET, DELAYED RELEASE ORAL at 11:20

## 2017-06-10 RX ADMIN — LISINOPRIL 5 MG: 5 TABLET ORAL at 11:20

## 2017-06-10 RX ADMIN — OXYCODONE HYDROCHLORIDE 5 MG: 5 TABLET ORAL at 12:07

## 2017-06-10 RX ADMIN — DIPHENHYDRAMINE HYDROCHLORIDE 50 MG: 50 INJECTION, SOLUTION INTRAMUSCULAR; INTRAVENOUS at 08:26

## 2017-06-10 RX ADMIN — CYCLOBENZAPRINE HYDROCHLORIDE 5 MG: 5 TABLET, FILM COATED ORAL at 11:20

## 2017-06-10 RX ADMIN — IOPAMIDOL 100 ML: 755 INJECTION, SOLUTION INTRAVENOUS at 10:16

## 2017-06-10 NOTE — PLAN OF CARE
Problem: Cardiac Surgery (Adult)  Goal: Signs and Symptoms of Listed Potential Problems Will be Absent or Manageable (Cardiac Surgery)  Signs and symptoms of listed potential problems will be absent or manageable by discharge/transition of care (reference Cardiac Surgery (Adult) CPG).   Outcome: Adequate for Discharge Date Met:  06/10/17  D/I: Monitor shows SR 80s. PIV and monitor dc'd. Declined shower, stating she would do it at home. Incision care reviewed with patient and . Ambulated in halls with family with minimal assist. Denies pain or shortness of breath. See flowsheets for assessments and additional data.  A: Stable post-OHS.  P: Dc to home with family and f/u CTs for monitoring.      DISCHARGE   Discharged to: Home  Via: Automobile  Accompanied by: Family  Discharge Instructions: diet, activity, medications, follow up appointments, when to call the MD, and what to watchout for (i.e. s/s of infection, increasing SOB, palpitations, chest pain)  Prescriptions: To be filled by [facility] pharmacy per pt's request; medication list reviewed & sent with pt  Follow Up Appointments: arranged; information given  Belongings: All sent with pt  IV: out  Telemetry: off  Pt exhibits understanding of above discharge instructions; all questions answered.  Discharge Paperwork: faxed        06/10/17 2764   Cardiac Surgery   Problems Assessed (Cardiac Surgery) all   Problems Present (Cardiac Surgery) none

## 2017-06-10 NOTE — PLAN OF CARE
Problem: Goal Outcome Summary  Goal: Goal Outcome Summary  RN  1. Pt will be hemodynamically stable  2.Pain will be controlled  3. Pt will have adequate nutrition   D: Pt requested 1 oxycodone for burning feeling at sternum incision.   I: Monitored/assessed pt. Assisted with cares.  A: Pt VS stable and denied further pain. Rhythm remains NSR. Pt given solumedrol for CT with contrast in the morning.  P: Continue to monitor/assess pt, contact provider with concerns.

## 2017-06-10 NOTE — DISCHARGE INSTRUCTIONS
[facility]      AFTER YOU GO HOME FROM YOUR HEART SURGERY    Avoid lifting anything greater than ten pounds for 6 weeks after surgery and then less than 20 pounds for an additional 6 weeks.    No driving for 4 weeks after surgery or while on pain medication.     Avoid strenuous activities such as bowling, vacuuming, raking, shoveling, golf or tennis for 12 weeks after your surgery. It is okay to resume sex if you feel comfortable in doing so. You may have to try different positions with your partner.     Splint your chest incision by hugging a pillow or bringing your arms across your chest when coughing or sneezing. Avoid pushing off with your arms when getting up for the first month if you have had your sternum opened.    Shower or wash your incisions daily with soap and water (or as instructed), pat dry. Keep wound clean and dry, showers are okay after discharge, but don't let spray hit directly on incision. No baths or swimming for 1 month.  Cover chest tube sites with gauze until they stop draining, then leave open. It is not abnormal for chest tube sites to drain yellowish/clear fluid for up to 2-3 weeks after surgery.   Watch for signs of infection: increased redness, tenderness, warmth or any drainage that appears infected (pus like) or is persistent.  Also a temperature > 100.5 F or chills. Call your surgeon or primary care provider's office immediately. Remove any skin glue left on incisions after 10 days. This will not affect your incision and can speed up healing.    Exercise is very important in your recovery. Please follow the guidelines set up for you in your cardiac rehab classes at the hospital. If outpatient cardiac rehab was ordered for you, we highly recommend you participate. If you have problems arranging your cardiac rehab, please call [phone].     Avoid sitting for prolonged periods of time, try to walk every hour during the day.     Check your weight when you  get home from the hospital and continue to check it daily through your recovery for at least a month. If you notice a weight gain of 2-3 pounds in a week, notify your primary care physician, cardiologist or surgeon.    Bowel activity may be slow after surgery. If necessary, you may take an over the counter laxative such as Miralax. You may have stool softeners prescribed (docusate sodium, Senokot). We recommend using stool softeners while using narcotics for pain (oxycodone/percocet, hydrocodone/vicodin).        DO NOT SMOKE.  IF YOU NEED HELP QUITTING, PLEASE TALK WITH YOUR CARDIOLOGIST OR PRIMARY DOCTOR.    REGARDING PRESCRIPTION REFILLS.  If you need a refill on your pain medication contact us.  All other medications will be adjusted, discontinued and re-filled by your primary care physician and/or your cardiologist as they were prior to your surgery. We have given you enough for one to three month with possibly one refill.    POST-OPERATIVE CLINIC VISITS  You have a follow up visit with CV Surgery Advance Care Practitioners on 6/23/17 at 2 pm (arrive 1:45 pm) at the [facility].  You will then return to the care of your primary physician and your cardiologist.   It is important to call for an appointment to see your cardiologist in 4-6 weeks after surgery and your primary care physician in 7-10 days after surgery. If there is a need to return to see CT Surgery please call our  at [phone].    SURGICAL QUESTIONS  Please call Millicent Mina with any surgical questions, her phone number is listed below.  She can assist you with your needs and contact other surgery care team members as indicated.    For general questions or concerns, please call the Cardiothoracic Surgery Department at [phone] 8-4:30 M-F.   On weekends or after hours, please call [phone] and ask the  to page the Cardiothoracic Surgery fellow on call.      Thank you,    Your Cardiothoracic  Surgery Team  Millicent Mina RN Care Coordinator-  [phone]   Louise Vasques PA-C

## 2017-06-10 NOTE — PLAN OF CARE
Problem: Cardiac Surgery (Adult)  Goal: Signs and Symptoms of Listed Potential Problems Will be Absent or Manageable (Cardiac Surgery)  Signs and symptoms of listed potential problems will be absent or manageable by discharge/transition of care (reference Cardiac Surgery (Adult) CPG).  Outcome: Improving  D/I: Monitor shows SR 80s. Denies pain or shortness of breath. 4 large BMs since suppository given at 1300. Ambulated to bathroom independently. Increasing appetite. Eating more today than yesterday, per patient. See flowsheets for assessments and additional data.  A: Improving post-op AAArepair/CAB x1.  P: Post-op baseline chest CT tomorrow. Plan to DC to home after. Encourage PO intake. Continue current cares and notify providers with questions or concerns.     06/09/17 1905   Cardiac Surgery   Problems Assessed (Cardiac Surgery) all   Problems Present (Cardiac Surgery) none;situational response

## 2017-06-11 NOTE — PLAN OF CARE
Problem: Goal Outcome Summary  Goal: Goal Outcome Summary  RN  1. Pt will be hemodynamically stable  2.Pain will be controlled  3. Pt will have adequate nutrition   Physical Therapy Discharge Summary     Reason for therapy discharge:    Discharged to home with outpatient therapy.     Progress towards therapy goal(s). See goals on Care Plan in [facility] electronic health record for goal details.  Goals partially met.  Barriers to achieving goals:   discharge from facility.     Therapy recommendation(s):    Continued therapy is recommended.  Rationale/Recommendations:  OP Cardiac Rehab to continue to work on endurance. .

## 2017-06-12 ENCOUNTER — CARE COORDINATION (OUTPATIENT)
Dept: CARE COORDINATION | Facility: CLINIC | Age: 72
End: 2017-06-12

## 2017-06-12 ENCOUNTER — HOSPITAL ENCOUNTER (OUTPATIENT)
Facility: CLINIC | Age: 72
Setting detail: OBSERVATION
Discharge: HOME | DRG: 309 | End: 2017-06-13
Attending: INTERNAL MEDICINE | Admitting: INTERNAL MEDICINE
Payer: MEDICARE

## 2017-06-12 ENCOUNTER — HOSPITAL ENCOUNTER (EMERGENCY)
Facility: CLINIC | Age: 72
Discharge: ANOTHER HOSPITAL | End: 2017-06-12
Attending: PHYSICIAN ASSISTANT | Admitting: PHYSICIAN ASSISTANT
Payer: MEDICARE

## 2017-06-12 ENCOUNTER — CARE COORDINATION (OUTPATIENT)
Dept: CARDIOLOGY | Facility: CLINIC | Age: 72
End: 2017-06-12

## 2017-06-12 ENCOUNTER — TELEPHONE (OUTPATIENT)
Dept: INTERNAL MEDICINE | Facility: CLINIC | Age: 72
End: 2017-06-12

## 2017-06-12 VITALS
HEIGHT: 68 IN | RESPIRATION RATE: 20 BRPM | DIASTOLIC BLOOD PRESSURE: 71 MMHG | SYSTOLIC BLOOD PRESSURE: 127 MMHG | TEMPERATURE: 98.3 F | WEIGHT: 175 LBS | BODY MASS INDEX: 26.52 KG/M2 | HEART RATE: 158 BPM | OXYGEN SATURATION: 93 %

## 2017-06-12 DIAGNOSIS — Z95.1 STATUS POST CORONARY ARTERY BYPASS GRAFT: ICD-10-CM

## 2017-06-12 DIAGNOSIS — I48.91 ATRIAL FIBRILLATION WITH RVR (H): ICD-10-CM

## 2017-06-12 DIAGNOSIS — I48.91 ATRIAL FIBRILLATION WITH RVR (H): Primary | ICD-10-CM

## 2017-06-12 LAB
ABO + RH BLD: NORMAL
ABO + RH BLD: NORMAL
ALBUMIN SERPL-MCNC: 2.6 G/DL (ref 3.4–5)
ALP SERPL-CCNC: 116 U/L (ref 40–150)
ALT SERPL W P-5'-P-CCNC: 39 U/L (ref 0–50)
ANION GAP SERPL CALCULATED.3IONS-SCNC: 10 MMOL/L (ref 3–14)
APTT PPP: 33 SEC (ref 22–37)
AST SERPL W P-5'-P-CCNC: 36 U/L (ref 0–45)
BASOPHILS # BLD AUTO: 0 10E9/L (ref 0–0.2)
BASOPHILS NFR BLD AUTO: 0.2 %
BILIRUB SERPL-MCNC: 0.5 MG/DL (ref 0.2–1.3)
BLD GP AB SCN SERPL QL: NORMAL
BLOOD BANK CMNT PATIENT-IMP: NORMAL
BUN SERPL-MCNC: 18 MG/DL (ref 7–30)
CALCIUM SERPL-MCNC: 8.5 MG/DL (ref 8.5–10.1)
CHLORIDE SERPL-SCNC: 103 MMOL/L (ref 94–109)
CO2 SERPL-SCNC: 27 MMOL/L (ref 20–32)
CREAT SERPL-MCNC: 0.98 MG/DL (ref 0.52–1.04)
DIFFERENTIAL METHOD BLD: ABNORMAL
EOSINOPHIL # BLD AUTO: 0.7 10E9/L (ref 0–0.7)
EOSINOPHIL NFR BLD AUTO: 6.5 %
ERYTHROCYTE [DISTWIDTH] IN BLOOD BY AUTOMATED COUNT: 13.8 % (ref 10–15)
GFR SERPL CREATININE-BSD FRML MDRD: 56 ML/MIN/1.7M2
GLUCOSE SERPL-MCNC: 125 MG/DL (ref 70–99)
HCT VFR BLD AUTO: 29.3 % (ref 35–47)
HGB BLD-MCNC: 9.4 G/DL (ref 11.7–15.7)
IMM GRANULOCYTES # BLD: 0.1 10E9/L (ref 0–0.4)
IMM GRANULOCYTES NFR BLD: 0.9 %
INR PPP: 0.95 (ref 0.86–1.14)
LYMPHOCYTES # BLD AUTO: 2 10E9/L (ref 0.8–5.3)
LYMPHOCYTES NFR BLD AUTO: 18.3 %
MCH RBC QN AUTO: 29.6 PG (ref 26.5–33)
MCHC RBC AUTO-ENTMCNC: 32.1 G/DL (ref 31.5–36.5)
MCV RBC AUTO: 92 FL (ref 78–100)
MONOCYTES # BLD AUTO: 1.2 10E9/L (ref 0–1.3)
MONOCYTES NFR BLD AUTO: 11.3 %
NEUTROPHILS # BLD AUTO: 6.8 10E9/L (ref 1.6–8.3)
NEUTROPHILS NFR BLD AUTO: 62.8 %
PLATELET # BLD AUTO: 363 10E9/L (ref 150–450)
POTASSIUM SERPL-SCNC: 3.9 MMOL/L (ref 3.4–5.3)
PROT SERPL-MCNC: 6.4 G/DL (ref 6.8–8.8)
RBC # BLD AUTO: 3.18 10E12/L (ref 3.8–5.2)
SODIUM SERPL-SCNC: 140 MMOL/L (ref 133–144)
SPECIMEN EXP DATE BLD: NORMAL
TROPONIN I SERPL-MCNC: 0.11 UG/L (ref 0–0.04)
WBC # BLD AUTO: 10.9 10E9/L (ref 4–11)

## 2017-06-12 PROCEDURE — 84484 ASSAY OF TROPONIN QUANT: CPT | Performed by: PHYSICIAN ASSISTANT

## 2017-06-12 PROCEDURE — 96361 HYDRATE IV INFUSION ADD-ON: CPT | Performed by: PHYSICIAN ASSISTANT

## 2017-06-12 PROCEDURE — 96365 THER/PROPH/DIAG IV INF INIT: CPT | Performed by: PHYSICIAN ASSISTANT

## 2017-06-12 PROCEDURE — 99285 EMERGENCY DEPT VISIT HI MDM: CPT | Mod: Z6 | Performed by: PHYSICIAN ASSISTANT

## 2017-06-12 PROCEDURE — 80053 COMPREHEN METABOLIC PANEL: CPT | Performed by: PHYSICIAN ASSISTANT

## 2017-06-12 PROCEDURE — 25000125 ZZHC RX 250: Performed by: PHYSICIAN ASSISTANT

## 2017-06-12 PROCEDURE — 96375 TX/PRO/DX INJ NEW DRUG ADDON: CPT | Performed by: PHYSICIAN ASSISTANT

## 2017-06-12 PROCEDURE — 96367 TX/PROPH/DG ADDL SEQ IV INF: CPT | Performed by: PHYSICIAN ASSISTANT

## 2017-06-12 PROCEDURE — 86900 BLOOD TYPING SEROLOGIC ABO: CPT | Performed by: PHYSICIAN ASSISTANT

## 2017-06-12 PROCEDURE — 86901 BLOOD TYPING SEROLOGIC RH(D): CPT | Performed by: PHYSICIAN ASSISTANT

## 2017-06-12 PROCEDURE — 25000128 H RX IP 250 OP 636: Performed by: PHYSICIAN ASSISTANT

## 2017-06-12 PROCEDURE — 85025 COMPLETE CBC W/AUTO DIFF WBC: CPT | Performed by: PHYSICIAN ASSISTANT

## 2017-06-12 PROCEDURE — 85730 THROMBOPLASTIN TIME PARTIAL: CPT | Performed by: PHYSICIAN ASSISTANT

## 2017-06-12 PROCEDURE — 86850 RBC ANTIBODY SCREEN: CPT | Performed by: PHYSICIAN ASSISTANT

## 2017-06-12 PROCEDURE — 85610 PROTHROMBIN TIME: CPT | Performed by: PHYSICIAN ASSISTANT

## 2017-06-12 PROCEDURE — 99285 EMERGENCY DEPT VISIT HI MDM: CPT | Mod: 25 | Performed by: PHYSICIAN ASSISTANT

## 2017-06-12 PROCEDURE — 93010 ELECTROCARDIOGRAM REPORT: CPT | Mod: Z6 | Performed by: PHYSICIAN ASSISTANT

## 2017-06-12 PROCEDURE — 93005 ELECTROCARDIOGRAM TRACING: CPT | Performed by: PHYSICIAN ASSISTANT

## 2017-06-12 RX ORDER — HEPARIN SODIUM 10000 [USP'U]/100ML
0-3500 INJECTION, SOLUTION INTRAVENOUS CONTINUOUS
Status: DISCONTINUED | OUTPATIENT
Start: 2017-06-12 | End: 2017-06-12 | Stop reason: HOSPADM

## 2017-06-12 RX ORDER — SODIUM CHLORIDE 9 MG/ML
1000 INJECTION, SOLUTION INTRAVENOUS CONTINUOUS
Status: DISCONTINUED | OUTPATIENT
Start: 2017-06-12 | End: 2017-06-12 | Stop reason: HOSPADM

## 2017-06-12 RX ORDER — METOPROLOL TARTRATE 1 MG/ML
5 INJECTION, SOLUTION INTRAVENOUS ONCE
Status: COMPLETED | OUTPATIENT
Start: 2017-06-12 | End: 2017-06-12

## 2017-06-12 RX ORDER — LIDOCAINE 40 MG/G
CREAM TOPICAL
Status: DISCONTINUED | OUTPATIENT
Start: 2017-06-12 | End: 2017-06-12 | Stop reason: HOSPADM

## 2017-06-12 RX ADMIN — AMIODARONE HYDROCHLORIDE 150 MG: 1.5 INJECTION, SOLUTION INTRAVENOUS at 19:32

## 2017-06-12 RX ADMIN — HEPARIN SODIUM 950 UNITS/HR: 10000 INJECTION, SOLUTION INTRAVENOUS at 19:31

## 2017-06-12 RX ADMIN — METOPROLOL TARTRATE 5 MG: 5 INJECTION INTRAVENOUS at 18:16

## 2017-06-12 RX ADMIN — SODIUM CHLORIDE 1000 ML: 9 INJECTION, SOLUTION INTRAVENOUS at 17:53

## 2017-06-12 RX ADMIN — AMIODARONE HYDROCHLORIDE 1 MG/MIN: 50 INJECTION, SOLUTION INTRAVENOUS at 20:23

## 2017-06-12 RX ADMIN — SODIUM CHLORIDE 1000 ML: 9 INJECTION, SOLUTION INTRAVENOUS at 18:40

## 2017-06-12 ASSESSMENT — ENCOUNTER SYMPTOMS
VOMITING: 0
LIGHT-HEADEDNESS: 1
COUGH: 0
BACK PAIN: 0
ABDOMINAL PAIN: 0
FEVER: 0
NAUSEA: 0
SHORTNESS OF BREATH: 0
APPETITE CHANGE: 1
WEAKNESS: 1

## 2017-06-12 NOTE — LETTER
Transition Communication Hand-off for Care Transitions to Next Level of Care Provider    Name: Marilia Bean  MRN #: [id number]  Primary Care Provider: Herbert Epstein     Primary Clinic: [facility] [address] [place] [address] [address] [address]     Reason for Hospitalization:  A-Fib  Atrial fibrillation with RVR (H)  Admit Date/Time: 6/12/2017 11:49 PM  Discharge Date:  6/13/17  Payor Source: Payor: MEDICA / Plan: MEDICA PRIME SOLUTION / Product Type: Indemnity /     Readmission Assessment Measure (IVAN) Risk Score/category:  medium  Reason for Communication Hand-off Referral: Multiple providers/specialties  Discharge Plan:     Concern for non-adherence with plan of care:   No  Discharge Needs Assessment:    Follow-up specialty is recommended: Yes    Follow-up plan:  Future Appointments  Date Time Provider Department Center   6/15/2017 10:30 AM Maggie Pop TH [facility]   6/23/2017 2:00 PM UC CVTS [facility]             Key Recommendations:  Post hospitalization follow up.     WIN MCFARLAND RN CC

## 2017-06-12 NOTE — PROGRESS NOTES
"[facility]  \"Hello, my name is Ashley Boo , and I am calling from the [facility].  I want to check in and see how you are doing, after leaving the hospital.  You may also receive a call from your Care Coordinator (care team), but I want to make sure you don t have any urgent needs.  I have a couple questions to review with you:     Post-Discharge Outreach                                                    Marilia Bean is a 72 year old female     Follow-up Appointments           Follow Up and recommended labs and tests         Follow up with primary care provider, Herbert Epstein, within 7-10 days to evaluate medication change, to evaluate treatment change, to evaluate after surgery and for hospital follow- up.  The following labs/tests are recommended: CBC, BMP, check Blood Pressure and Heart Rate. You have a follow up visit with [facility] Advance Care Practitioners on 6/23/17 at 2 pm (arrive 1:45 pm) at the [facility] building. Make an appointment with your cardiologist in 6 weeks.                        Your next 10 appointments already scheduled            Jun 23, 2017  2:00 PM CDT   (Arrive by 1:45 PM)   Return Visit with [facility] ([facility])     [address] [place] [address]  3rd Floor  [facility] [address] [phone]              Care Team:    Patient Care Team       Relationship Specialty Notifications Start End    Herbert Epstein MD PCP - General Family Practice  2/17/10     Phone: [phone] Fax: [phone]         [address] [place] [address]            Transition of Care Review                                                      Did you have a surgery or procedure during your hospital visit? Yes   If yes, do you have any of the following:     Signs of infection:  NO    Pain:  Yes     Pain Scale (0-10) 2/10     Location: Left arm radiates to " back    Wound/incision concerns? No    Do you have all of your medications/refills?  Yes    Are you having any side effects or questions about your medication(s)? No    Do you have any new or worsening symptoms?  Yes- patient reports left arm pain/tingling that radiates to her back, she wants to know if this is normal and if she can do anything to relieve it?    Do you have any future appointments scheduled?   Yes             Plan                                                      Thanks for your time.  Your Care Coordinator may follow-up within the next couple days.  In the meantime if you have questions, concerns or problems call your care team.        Ashley Boo

## 2017-06-12 NOTE — ED NOTES
"Pt presents with weakness and hypotension. Pt states took blood pressure at home and BP was in the 80's. Pt had aortic aneurysm repair and CABG times 1 one week ago. Discharged from [facility] on Saturday. Note at triage 's to 170's. BP stable. Color pale. Pt states \"just incisional pain to chest.\" Pt mentions \"my heart is beating fast I can feel it in my neck..\"   "

## 2017-06-12 NOTE — ED NOTES
BP 81/63, only 1.6mg/1.6ml of metoprolol given. Put head of bed flat , Patient talking, denies dizziness Becka BUCKLEY notified, she came in the room to talk with the patient

## 2017-06-12 NOTE — IP AVS SNAPSHOT
` ` Patient Information     Patient Name Sex     Marilia Patino ([id number]) Female 1945       Room Bed    [facility] 6420-02      Patient Demographics     Address Phone E-mail Address    [address] [address] [phone] (Home)  [phone] (Mobile) [email or web address]      Patient Ethnicity & Race     Ethnic Group Patient Race    American White      Emergency Contact(s)     Name Relation Home Work Mobile    Cain Patino Spouse [phone]    Elle Castillo  Daughter [phone]      Documents on File        Status Date Received Description       Documents for the Patient    Privacy Notice - [place] Received 11/10/08     Face Sheet Received () 05     Insurance Card  06     Face Sheet Received () 11/10/08     Consent Form  08     Waiver - Payment  08     External Medication Information Consent Accepted () 09     Face Sheet Received () 03/10/10     External Medication Information Consent Accepted () 09/27/10     Consent for Services - Hospital/Clinic Received () 11     Insurance Card Received 11     HIM ALVA Authorization    Chart Authorization - [facility] - 11    External Medication Information Consent Accepted 13     Consent for Services - Hospital/Clinic Received () 13     Insurance Card Received 13 medica    Consent to Communicate   PHI - 13    HIM ALVA Authorization  01/10/13 MARILIA PATINO 01/10/13    HIM ALVA Authorization  13 patient    Consent for EHR Access  13 Copied from existing Consent for services - C/HOD collected on 2013    [facility] Specified Other       Insurance Card Received 01/15/14 Med[employer]    Consent for Services - Hospital/Clinic Received () 01/15/14     Consent for Services - Hospital/Clinic Received () 01/30/15     Insurance Card Received 11/02/15 medica    Patient ID       Physical Therapy  Certification Received 11/05/15     Business/Insurance/Care Coordination/Health Form - Patient  16 ADULT REHABILITATION ATTENDANCE POLICY    Consent for Services/Privacy Notice - Hospital/Clinic Received () 16     Patient ID Received 17 exp 3/2019 [place]    Insurance Card Received 17 medica    Consent for Services/Privacy Notice - Hospital/Clinic Received 17     Consent for Services - [facility]       Consent for EHR Access Received 17     Insurance Card Received 17 [employer] card    Face Sheet Received (Deleted) 03/11/10     Patient ID  (Deleted)        Admission Information     Attending Provider Admitting Provider Admission Type Admission Date/Time    Casimiro Kwan MD Sakaguchi, Scott, MD Urgent 17  2349    Discharge Date Hospital Service Auth/Cert Status Service Area     Cardiology Incomplete [facility] SERVICES    Unit Room/Bed Admission Status       UU U6 6420/6420-02 Admission (Confirmed)       Admission     Complaint    A-Fib      Hospital Account     Name Acct ID Class Status Primary Coverage    Marilia Bean [id number] Outpatient Open MEDICARE - MEDICARE FOR HB SUPPLEMENT            Guarantor Account (for Hospital Account #[id number])     Name Relation to Pt Service Area Active? Acct Type    Marilia Bean  FCS Yes Personal/Family    Address Phone          [address] [address] [phone](H)              Coverage Information (for Hospital Account #[id number])     1. MEDICARE/MEDICARE FOR HB SUPPLEMENT     F/O Payor/Plan Precert #    MEDICARE/MEDICARE FOR HB SUPPLEMENT     Subscriber Subscriber #    Marilia Bean [id number]    Address Phone    ATTN CLAIMS  [address]  [facility] [place] [address] [phone]          2. MEDICA/MEDICA PRIME SOLUTION     F/O Payor/Plan Precert #    MEDICA/MEDICA PRIME SOLUTION     Subscriber Subscriber #    Marilia Bean [id number]    Address Phone    [address] [address]  [place] [address]  [phone]

## 2017-06-12 NOTE — IP AVS SNAPSHOT
[address] [address]    Phone:  [phone]                                       After Visit Summary   6/12/2017    Marilia Bean    MRN: [id number]           After Visit Summary Signature Page     I have received my discharge instructions, and my questions have been answered. I have discussed any challenges I see with this plan with the nurse or doctor.    ..........................................................................................................................................  Patient/Patient Representative Signature      ..........................................................................................................................................  Patient Representative Print Name and Relationship to Patient    ..................................................               ................................................  Date                                            Time    ..........................................................................................................................................  Reviewed by Signature/Title    ...................................................              ..............................................  Date                                                            Time

## 2017-06-12 NOTE — IP AVS SNAPSHOT
MRN:[id number]                      After Visit Summary   6/12/2017    Marilia Bean    MRN: [id number]           Thank you!     Thank you for choosing [facility] for your care. Our goal is always to provide you with excellent care. Hearing back from our patients is one way we can continue to improve our services. Please take a few minutes to complete the written survey that you may receive in the mail after you visit with us. Thank you!        Patient Information     Date Of Birth          1945        Designated Caregiver       Most Recent Value    Caregiver    Will someone help with your care after discharge? yes    Name of designated caregiver Elroy    Phone number of caregiver [phone]    Caregiver address [address] [place] [id number]      About your hospital stay     You were admitted on:  June 12, 2017 You last received care in the:  [address] [place] [address]    You were discharged on:  June 13, 2017        Reason for your hospital stay       Atrial fibrillation                  Who to Call     For medical emergencies, please call 911.  For non-urgent questions about your medical care, please call your primary care provider or clinic, [phone]          Attending Provider     Provider Specialty    Casimiro Kwan MD Clinical Cardiac Electrophysiology       Primary Care Provider Office Phone # Fax #    Herbert Epstein [phone]      After Care Instructions     Activity       Your activity upon discharge: As prior to admission            Diet       Follow this diet upon discharge: Cardiac                  Follow-up Appointments     Adult [facility] Follow-up and recommended labs and tests       Follow up with primary care provider, Herbert Epstein, within 7 days for hospital follow- up.    Please also follow up with your out patient Cardiologist within a month of discharge.      Appointments on [facility]or [facility] (with [facility] or [facility] provider or service). Call  "[phone] if you haven't heard regarding these appointments within 7 days of discharge.                  Your next 10 appointments already scheduled     Iggy 15, 2017 10:30 AM CDT   Cardiac Evaluation with EDDIE Berumen   [facility] ([facility])    [address] [place] [address] [address] [address]   [phone]            Jun 23, 2017  2:00 PM CDT   (Arrive by 1:45 PM)   Return Visit with  CVTS   [facility] ([facility])    [address] [place] [address]  3rd Floor  [facility] [address] [phone]              Pending Results     Date and Time Order Name Status Description    6/13/2017 0853 XR Chest Port 1 View Preliminary     6/13/2017 0109 EKG 12-lead, tracing only Preliminary             Statement of Approval     Ordered          06/13/17 1034  I have reviewed and agree with all the recommendations and orders detailed in this document.  EFFECTIVE NOW     Approved and electronically signed by:  Gato Mack MD             Admission Information     Date & Time Provider Department Dept. Phone    6/12/2017 Casimiro Kwan MD [address] [facility] [phone]      Your Vitals Were     Blood Pressure Pulse Temperature Respirations Height Weight    140/82 (BP Location: Left arm) 75 99.1  F (37.3  C) (Oral) 17 1.702 m (5' 7\") 82.3 kg (181 lb 6.4 oz)    Pulse Oximetry BMI (Body Mass Index)                96% 28.41 kg/m2          [facility] Information     [facility] gives you secure access to your electronic health record. If you see a primary care provider, you can also send messages to your care team and make appointments. If you have questions, please call your primary care clinic.  If you do not have a primary care provider, please call [phone] and they will assist you.        Care EveryWhere ID     This is your Care EveryWhere ID. This could be used by other organizations to access your [place] medical records  [id number]      "      Review of your medicines      START taking        Dose / Directions    * amiodarone 200 MG tablet   Commonly known as:  PACERONE/CODARONE        Dose:  200 mg   Take 1 tablet (200 mg) by mouth 2 times daily for 7 days   Quantity:  14 tablet   Refills:  0       * amiodarone 200 MG tablet   Commonly known as:  PACERONE/CODARONE        Dose:  200 mg   Start taking on:  6/21/2017   Take 1 tablet (200 mg) by mouth daily for 21 days   Quantity:  21 tablet   Refills:  0       rivaroxaban ANTICOAGULANT 20 MG Tabs tablet   Commonly known as:  XARELTO        Dose:  20 mg   Take 1 tablet (20 mg) by mouth daily (with dinner)   Quantity:  90 tablet   Refills:  0       * Notice:  This list has 2 medication(s) that are the same as other medications prescribed for you. Read the directions carefully, and ask your doctor or other care provider to review them with you.      CONTINUE these medicines which have NOT CHANGED        Dose / Directions    acetaminophen 325 MG tablet   Commonly known as:  TYLENOL   Used for:  Acute post-operative pain        Dose:  650 mg   Take 2 tablets (650 mg) by mouth every 6 hours as needed for mild pain   Quantity:  100 tablet   Refills:  0       aspirin 325 MG EC tablet   Used for:  S/P CABG (coronary artery bypass graft), S/P aortic aneurysm repair        Dose:  325 mg   Take 1 tablet (325 mg) by mouth daily   Quantity:  100 tablet   Refills:  0       atorvastatin 20 MG tablet   Commonly known as:  LIPITOR   Used for:  Hyperlipidemia LDL goal <100        Dose:  20 mg   Take 1 tablet (20 mg) by mouth every evening   Quantity:  90 tablet   Refills:  1       cyclobenzaprine 5 MG tablet   Commonly known as:  FLEXERIL   Used for:  Acute post-operative pain        Dose:  5 mg   Take 1 tablet (5 mg) by mouth 3 times daily as needed for muscle spasms or other (pain control while sleeping)   Quantity:  30 tablet   Refills:  0       lisinopril 5 MG tablet   Commonly known as:  PRINIVIL/ZESTRIL   Used for:   S/P aortic aneurysm repair, S/P CABG (coronary artery bypass graft)        Dose:  7.5 mg   Take 1.5 tablets (7.5 mg) by mouth daily   Quantity:  45 tablet   Refills:  1       metoprolol 25 MG tablet   Commonly known as:  LOPRESSOR   Used for:  S/P CABG (coronary artery bypass graft), S/P aortic aneurysm repair        Dose:  25 mg   1 tablet (25 mg) by Oral or Feeding Tube route 2 times daily   Quantity:  60 tablet   Refills:  1       oxyCODONE 5 MG IR tablet   Commonly known as:  ROXICODONE   Used for:  Acute post-operative pain        Dose:  5-10 mg   Take 1-2 tablets (5-10 mg) by mouth every 4 hours as needed for moderate to severe pain   Quantity:  50 tablet   Refills:  0       pantoprazole 40 MG EC tablet   Commonly known as:  PROTONIX   Used for:  Acute post-operative pain        Dose:  40 mg   Take 1 tablet (40 mg) by mouth every morning   Quantity:  30 tablet   Refills:  0       polyethylene glycol Packet   Commonly known as:  MIRALAX/GLYCOLAX   Used for:  Acute post-operative pain        Dose:  17 g   Take 17 g by mouth daily as needed for constipation   Quantity:  10 packet   Refills:  0       senna-docusate 8.6-50 MG per tablet   Commonly known as:  SENOKOT-S;PERICOLACE   Used for:  Acute post-operative pain        Dose:  1-2 tablet   Take 1-2 tablets by mouth 2 times daily as needed for constipation (while using narcotics)   Quantity:  100 tablet   Refills:  0            Where to get your medicines      These medications were sent to [facility] [place], [place] - [address] [facility]  [address] [facility] [address]     Phone:  [phone]     amiodarone 200 MG tablet    amiodarone 200 MG tablet    rivaroxaban ANTICOAGULANT 20 MG Tabs tablet                Protect others around you: Learn how to safely use, store and throw away your medicines at www.disposemymeds.org.             Medication List: This is a list of all your medications and when to take them. Check marks below  indicate your daily home schedule. Keep this list as a reference.      Medications           Morning Afternoon Evening Bedtime As Needed    acetaminophen 325 MG tablet   Commonly known as:  TYLENOL   Take 2 tablets (650 mg) by mouth every 6 hours as needed for mild pain   Last time this was given:  650 mg on 6/13/2017  8:17 AM                                   * amiodarone 200 MG tablet   Commonly known as:  PACERONE/CODARONE   Take 1 tablet (200 mg) by mouth 2 times daily for 7 days                                      * amiodarone 200 MG tablet   Commonly known as:  PACERONE/CODARONE   Take 1 tablet (200 mg) by mouth daily for 21 days   Start taking on:  6/21/2017                                   aspirin 325 MG EC tablet   Take 1 tablet (325 mg) by mouth daily                                   atorvastatin 20 MG tablet   Commonly known as:  LIPITOR   Take 1 tablet (20 mg) by mouth every evening                                   cyclobenzaprine 5 MG tablet   Commonly known as:  FLEXERIL   Take 1 tablet (5 mg) by mouth 3 times daily as needed for muscle spasms or other (pain control while sleeping)   Last time this was given:  5 mg on 6/13/2017  5:56 AM                                   lisinopril 5 MG tablet   Commonly known as:  PRINIVIL/ZESTRIL   Take 1.5 tablets (7.5 mg) by mouth daily   Last time this was given:  7.5 mg on 6/13/2017  8:10 AM                                   metoprolol 25 MG tablet   Commonly known as:  LOPRESSOR   1 tablet (25 mg) by Oral or Feeding Tube route 2 times daily   Last time this was given:  25 mg on 6/13/2017  8:10 AM                                      oxyCODONE 5 MG IR tablet   Commonly known as:  ROXICODONE   Take 1-2 tablets (5-10 mg) by mouth every 4 hours as needed for moderate to severe pain                                   pantoprazole 40 MG EC tablet   Commonly known as:  PROTONIX   Take 1 tablet (40 mg) by mouth every morning   Last time this was given:  40 mg on  6/13/2017  8:10 AM                                   polyethylene glycol Packet   Commonly known as:  MIRALAX/GLYCOLAX   Take 17 g by mouth daily as needed for constipation                                   rivaroxaban ANTICOAGULANT 20 MG Tabs tablet   Commonly known as:  XARELTO   Take 1 tablet (20 mg) by mouth daily (with dinner)                                   senna-docusate 8.6-50 MG per tablet   Commonly known as:  SENOKOT-S;PERICOLACE   Take 1-2 tablets by mouth 2 times daily as needed for constipation (while using narcotics)                                   * Notice:  This list has 2 medication(s) that are the same as other medications prescribed for you. Read the directions carefully, and ask your doctor or other care provider to review them with you.

## 2017-06-12 NOTE — TELEPHONE ENCOUNTER
"Patient is reporting she had aortic aneurysm surgery 1 week ago today.  Patient is reporting today she took an Oxy in the morning about 10:00 which makes her feel \"Blech\".  She is reporting that has since worn off and she has taken Tylenol, but is feeling lightheaded, dizzy, having a hard time sitting up in her chair and feeling like she is going to black out any minute.  Her BP's throughout the day have been slowly decreasing.  In the AM it was 104/56, afternoon was 81/46 and now 1 hour ago was 80/40.  She states she has not been drinking much and is wondering if she eats and drinks if the BP will come back up.    Patient is informed that with the symptoms she is experiencing, she needs to go to the ED for further evaluation.   Patient does not really want to go to ED, but she is informed that with the recent surgery, RN is concerned she may have a slight bleed causing these symptoms.  Patient understands and agrees to this plan.  Closing this encounter.  Julianne Godfrey RN      "

## 2017-06-12 NOTE — ED PROVIDER NOTES
"  History     Chief Complaint   Patient presents with     Generalized Weakness     Associated with hypotension. Pt had aortic anuerysm repair and CABG times 1 one week ago.      The history is provided by the patient.     Marilia Bean is a 72 year old female who presented to the ED with her , with complaints of generalized weakness associated with hypotension.  Patient underwent single coronary artery bypass surgery (LIMA to LAD) and ascending aortic aneurysm repair on 6/5 at the [facility].  She was discharged from the hospital on 6/9.  Since then she reports feeling very well, but suddenly started to feel light headed and weak/fatigued at approximately 11:00 this morning after taking Oxycodone around 10:30am. Stated her heart seemed to be pounding fast and that she can feel it in her neck. She endorses having fuzzy vision since this morning. She reports not drinking as much fluids as usual because \"nothing tastes good\". The patient denies worsened chest pain (endorses pain at incision site from recent heart surgery). Denies shortness of breath, abdominal pain, fever, edema, nausea or vomiting. She is currently on metoprolol and lisinopril.     I have reviewed the Medications, Allergies, Past Medical and Surgical History, and Social History in the Epic system.    Patient Active Problem List   Diagnosis     Asymptomatic postmenopausal status     Postmenopausal atrophic vaginitis     Aortic aneurysm (H)     HYPERLIPIDEMIA LDL GOAL <100     Advanced directives, counseling/discussion     Hypertension goal BP (blood pressure) < 140/90     Status post coronary angiogram     Past Medical History:   Diagnosis Date     Aortic aneurysm (H) 7/1/2007    see 7/07 Ba report -follow yearly, treat high BP is occurs Problem list name updated by automated process. Provider to review     Aortic aneurysm of unspecified site without mention of rupture 7/2007    4.4 cm ascending aorta noted in 2007 "     Asymptomatic postmenopausal status (age-related) (natural)     on HRT  Prempro -weaning off 5/'2004     CAD (coronary artery disease)     LAD     Family history of Gardiner syndrome      Hyperlipidemia LDL goal <100 10/31/2010     Hypertension goal BP (blood pressure) < 140/90 2/9/2016     Leiomyoma of uterus, unspecified     Uterine fibroid     Lump or mass in breast 7/2004    rt. nodule - bx neg     Personal history of colonic polyps      Postmenopausal atrophic vaginitis 8/20/2006     Pulmonary nodule     incidental noted in 2007 during garcia     Pure hypercholesterolemia     mild, diet - low cholesterol, low fat.10/06 start Lovastatin     Past Surgical History:   Procedure Laterality Date     BYPASS GRAFT ARTERY CORONARY N/A 6/5/2017    Procedure: BYPASS GRAFT ARTERY CORONARY;;  Surgeon: Akshat Soto MD;  Location: [facility] OR     C DEXA INTERPRETATION, AXIAL  12/21/01    wnl. 7/2005 wnl but lower     COLONOSCOPY  05/07/07    Repeat in 1 year for surveillance     COLONOSCOPY  12/10/08    repeat 1 year  -see 1/2009 letter     COLONOSCOPY  03/31/10     COLONOSCOPY  10/31/2011    Procedure:COMBINED COLONOSCOPY, SINGLE BIOPSY/POLYPECTOMY BY BIOPSY; colonoscopy with polypectomy by biopsy; Surgeon:JESSICA GARCIA; Location:[facility]     COLONOSCOPY  12/6/2013    Procedure: COMBINED COLONOSCOPY, SINGLE BIOPSY/POLYPECTOMY BY BIOPSY;  Colonoscopy, Polypectomies;  Surgeon: Herbert Salinas MD;  Location: [facility]     COLONOSCOPY N/A 12/8/2015    Procedure: COLONOSCOPY;  Surgeon: Jared Carbajal MD;  Location:  GI     COLONOSCOPY N/A 4/26/2017    Procedure: COMBINED COLONOSCOPY, SINGLE OR MULTIPLE BIOPSY/POLYPECTOMY BY BIOPSY;  Colonoscopy with polypectomies with forceps and snare;  Surgeon: Herbert Salinas MD;  Location:  GI     ESOPHAGOSCOPY, GASTROSCOPY, DUODENOSCOPY (EGD), COMBINED N/A 12/8/2015    Procedure: COMBINED ESOPHAGOSCOPY, GASTROSCOPY, DUODENOSCOPY (EGD), BIOPSY SINGLE OR MULTIPLE;  Surgeon:  Jared Carbajal MD;  Location: [facility]     HC BIOPSY BREAST, PERC NEEDLE CORE, WITH IMAGING  8/17/2004    Right     HC COLONOSCOPY THRU STOMA W BIOPSY/CAUTERY TUMOR/POLYP/LESION  1999,2002 2004 polyp - hyperplastic - repeat 5 years ?     HC COLONOSCOPY W/WO BRUSH/WASH  12/12/2005    Polypectomy.  Diverticulosis-minimal. Bx adenomatous and mucosal polyps - repeat 1 year     HC ECP WITH CATARACT SURGERY Bilateral 2015, 2016     HC LAPAROSCOPY, SURGICAL; APPENDECTOMY  10/31/2004     HC LAPAROSCOPY, SURGICAL; CHOLECYSTECTOMY  2000    Cholecystectomy, Laparoscopic     HC REVISE MEDIAN N/CARPAL TUNNEL SURG  10/01/10    left     [facility] ENDOSCOPY, SIMPLE EXAM  03/31/10     HYSTERECTOMY, ELVIN  12/14/09    TAHBSO, MMK     REPAIR ANEURYSM ASCENDING AORTA N/A 6/5/2017    Procedure: REPAIR ANEURYSM ASCENDING AORTA;  Median Sternotomy, Ascending Aortic Aneurysm Repair, Coronary Artery Bypass Graft x1 on pump oxygenator;  Surgeon: Akshat Soto MD;  Location: [facility] OR     Family History   Problem Relation Age of Onset     CANCER Mother      Colon Cancer     HEART DISEASE Mother      MI     OSTEOPOROSIS Mother      CANCER Father      Colon Cancer     HEART DISEASE Father      Heart Disease/ Heart attacks     DIABETES Father      Adult Onset     CANCER Sister      Colon Cancer     HEART DISEASE Brother      Heart attacks at age 45     Breast Cancer Sister      CANCER Paternal Grandmother      Unknown type     HEART DISEASE Maternal Grandfather      MI     DIABETES Sister      Adult Onset     DIABETES Sister      Adult Onset     DIABETES Brother      Adult Onset     HEART DISEASE Brother      heart attack age 64     Cancer - colorectal Son      age 36, Gardiner syndrome     Social History   Substance Use Topics     Smoking status: Never Smoker     Smokeless tobacco: Never Used     Alcohol use Yes      Comment: rarely      Immunization History   Administered Date(s) Administered     Hepatitis B 01/17/1997     Influenza  (High Dose) 3 valent vaccine 01/08/2013     Influenza (IIV3) 10/31/2005, 11/06/2006     Pneumococcal (PCV 13) 02/09/2016     Pneumococcal 23 valent 01/15/2014     TD (ADULT, 7+) 05/04/1994, 05/18/2004     TDAP Vaccine (Adacel) 03/10/2010     Varicella Pt Report Hx of Varicella/Chicken Pox 01[birth year]01/1951     Allergies   Allergen Reactions     Contrast Dye Itching     Current Outpatient Prescriptions   Medication Sig Dispense Refill     lisinopril (PRINIVIL/ZESTRIL) 5 MG tablet Take 1.5 tablets (7.5 mg) by mouth daily 45 tablet 1     metoprolol (LOPRESSOR) 25 MG tablet 1 tablet (25 mg) by Oral or Feeding Tube route 2 times daily 60 tablet 1     atorvastatin (LIPITOR) 20 MG tablet Take 1 tablet (20 mg) by mouth every evening 90 tablet 1     cyclobenzaprine (FLEXERIL) 5 MG tablet Take 1 tablet (5 mg) by mouth 3 times daily as needed for muscle spasms or other (pain control while sleeping) 30 tablet 0     oxyCODONE (ROXICODONE) 5 MG IR tablet Take 1-2 tablets (5-10 mg) by mouth every 4 hours as needed for moderate to severe pain 50 tablet 0     acetaminophen (TYLENOL) 325 MG tablet Take 2 tablets (650 mg) by mouth every 6 hours as needed for mild pain 100 tablet 0     aspirin  MG EC tablet Take 1 tablet (325 mg) by mouth daily 100 tablet 0     pantoprazole (PROTONIX) 40 MG EC tablet Take 1 tablet (40 mg) by mouth every morning 30 tablet 0     polyethylene glycol (MIRALAX/GLYCOLAX) Packet Take 17 g by mouth daily as needed for constipation 10 packet 0     senna-docusate (SENOKOT-S;PERICOLACE) 8.6-50 MG per tablet Take 1-2 tablets by mouth 2 times daily as needed for constipation (while using narcotics) 100 tablet 0     Review of Systems   Constitutional: Positive for appetite change (decreased). Negative for fever.   Eyes: Positive for visual disturbance (blurred vision).   Respiratory: Negative for cough and shortness of breath.    Cardiovascular: Positive for chest pain (post-op). Negative for leg swelling.  "  Gastrointestinal: Negative for abdominal pain, nausea and vomiting.   Musculoskeletal: Negative for back pain.   Neurological: Positive for weakness and light-headedness.   All other systems reviewed and are negative.    Physical Exam   BP: 117/84  Pulse: 158  Temp: 98.3  F (36.8  C)  Resp: 23  Height: 171.5 cm (5' 7.5\")  Weight: 79.4 kg (175 lb)  SpO2: 96 %  Physical Exam   Constitutional: She is oriented to person, place, and time. She appears well-developed and well-nourished.   HENT:   Head: Normocephalic and atraumatic.   Eyes: Conjunctivae and EOM are normal.   Neck: Normal range of motion. Neck supple.   Cardiovascular: An irregularly irregular rhythm present. Tachycardia present.    No murmur heard.  Pulmonary/Chest: Effort normal and breath sounds normal. No respiratory distress. She has no wheezes. She has no rales.   Post-op sternal surgical incision C/D/I.   Abdominal: Soft. Bowel sounds are normal. She exhibits no distension. There is no tenderness.   Post-op chest tube incisions C/D/I, no drainage   Musculoskeletal: Normal range of motion. She exhibits no edema.   Neurological: She is alert and oriented to person, place, and time.   Skin: Skin is warm and dry.   Psychiatric: She has a normal mood and affect. Her behavior is normal.   Nursing note and vitals reviewed.    ED Course     ED Course     Procedures             EKG Interpretation:      Interpreted by Becka Grant  Time reviewed: 17:42  Symptoms at time of EKG: lightheadedness   Rhythm: atrial fibrillation - rapid  Rate: Tachycardia- 158  Axis: Left Axis Deviation  Ectopy: premature ventricular contractions (multifocal)  Conduction: normal  ST Segments/ T Waves: No acute ischemic changes, non-specific ST-T wave changes  Q Waves: none  Comparison to prior: now with atrial fibrillation    Clinical Impression: atrial fibrillation (new onset)      Results for orders placed or performed during the hospital encounter of 06/12/17 (from the " past 24 hour(s))   CBC with platelets differential   Result Value Ref Range    WBC 10.9 4.0 - 11.0 10e9/L    RBC Count 3.18 (L) 3.8 - 5.2 10e12/L    Hemoglobin 9.4 (L) 11.7 - 15.7 g/dL    Hematocrit 29.3 (L) 35.0 - 47.0 %    MCV 92 78 - 100 fl    MCH 29.6 26.5 - 33.0 pg    MCHC 32.1 31.5 - 36.5 g/dL    RDW 13.8 10.0 - 15.0 %    Platelet Count 363 150 - 450 10e9/L    Diff Method Automated Method     % Neutrophils 62.8 %    % Lymphocytes 18.3 %    % Monocytes 11.3 %    % Eosinophils 6.5 %    % Basophils 0.2 %    % Immature Granulocytes 0.9 %    Absolute Neutrophil 6.8 1.6 - 8.3 10e9/L    Absolute Lymphocytes 2.0 0.8 - 5.3 10e9/L    Absolute Monocytes 1.2 0.0 - 1.3 10e9/L    Absolute Eosinophils 0.7 0.0 - 0.7 10e9/L    Absolute Basophils 0.0 0.0 - 0.2 10e9/L    Abs Immature Granulocytes 0.1 0 - 0.4 10e9/L   ABO/Rh type and screen   Result Value Ref Range    ABO Pending     RH(D) Pending     Antibody Screen Pending     Test Valid Only At Pending     Specimen Expires Pending    INR   Result Value Ref Range    INR 0.95 0.86 - 1.14   Partial thromboplastin time   Result Value Ref Range    PTT 33 22 - 37 sec   Comprehensive metabolic panel   Result Value Ref Range    Sodium 140 133 - 144 mmol/L    Potassium 3.9 3.4 - 5.3 mmol/L    Chloride 103 94 - 109 mmol/L    Carbon Dioxide 27 20 - 32 mmol/L    Anion Gap 10 3 - 14 mmol/L    Glucose 125 (H) 70 - 99 mg/dL    Urea Nitrogen 18 7 - 30 mg/dL    Creatinine 0.98 0.52 - 1.04 mg/dL    GFR Estimate 56 (L) >60 mL/min/1.7m2    GFR Estimate If Black 68 >60 mL/min/1.7m2    Calcium 8.5 8.5 - 10.1 mg/dL    Bilirubin Total 0.5 0.2 - 1.3 mg/dL    Albumin 2.6 (L) 3.4 - 5.0 g/dL    Protein Total 6.4 (L) 6.8 - 8.8 g/dL    Alkaline Phosphatase 116 40 - 150 U/L    ALT 39 0 - 50 U/L    AST 36 0 - 45 U/L   Troponin I   Result Value Ref Range    Troponin I ES 0.107 (H) 0.000 - 0.045 ug/L     Medications   lidocaine 1 % 1 mL (not administered)   lidocaine (LMX4) kit (not administered)   sodium  chloride (PF) 0.9% PF flush 3 mL (not administered)   sodium chloride (PF) 0.9% PF flush 3 mL (3 mLs Intracatheter Given 6/12/17 1753)   0.9% sodium chloride BOLUS (0 mLs Intravenous Stopped 6/12/17 1840)     Followed by   0.9% sodium chloride infusion (1,000 mLs Intravenous New Bag 6/12/17 1840)   heparin  drip 25,000 units in 0.45% NaCl 250 mL (see additional administration details for dose) (950 Units/hr Intravenous New Bag 6/12/17 1931)   amiodarone (NEXTERONE) 250 mg in D5W 250 mL infusion (not administered)   amiodarone (NEXTERONE) 250 mg in D5W 250 mL infusion (not administered)   amiodarone (NEXTERONE) bolus 150 mg (150 mg Intravenous Given 6/12/17 1932)   metoprolol (LOPRESSOR) injection 5 mg (5 mg Intravenous Given 6/12/17 1816)     Assessments & Plan (with Medical Decision Making)  Marilia Bean is a 72 year old female with history significant for recent CABG and aortic aneurysm repair one week ago who presented to the emergency department complaining of lightheadedness and generalized weakness.  This developed around 11 AM today.  Prior to this she reported feeling well.  On arrival vital signs are notable for heart rate in the 150s with a blood pressure 117/54, but otherwise normal vital signs.  She had a tachycardic, irregularly irregular rhythm on examination and clear lung sounds.  EKG confirmed that she was in atrial fibrillation.  Patient denied ever having A. fib in the past.  Patient was started on IV fluids and labs were drawn.  Her troponin was elevated at 0.107, but this may be related to recent cardiac surgery.  CBC was unremarkable, hemoglobin was 9.4, improved from 8.9 2 days ago.  Creatinine was 0.98, slightly increased from 0.78 2 days ago.  After speaking with Dr. Grant we decided to try IV metoprolol to slow her rate down.  She received less than 2 mg of this and her blood pressure dropped to 80 systolic so this was stopped.  Rate remained unchanged. Patient denied symptoms of  lightheadedness at this time, but was resting in exam bed. I called and spoke with Dr. Kwan, cardiologist at [facility], who advised starting amiodarone 150 mg over the course of 30-45 minutes to reduce risk of recurrent hypotension, then starting subsequent amiodarone infusion. Also recommended initiating heparin infusion. Both of these medications were administered in the ED here. On reassessment patient reported feeling near baseline, and on cardiac monitoring she did appear to be back into sinus rhythm after bolus of amiodarone. Because of new onset atrial fibrillation with recent cardiac surgery, we determined that it would be best for the patient to be admitted to the [facility] cardiology service. Dr. Kwan accepted the patient in transfer. Patient and family were also agreeable to this plan. Patient will be transferred via ALS due to infusions, continued cardiac monitoring.   Plan:  - Transfer to [facility]     I have reviewed the nursing notes.    I have reviewed the findings, diagnosis, plan and need for follow up with the patient.      New Prescriptions    No medications on file     Final diagnoses:   Atrial fibrillation with RVR (H)   Status post coronary artery bypass graft     This document serves as a record of services personally performed by Becka Grant PA-C. It was created on their behalf by Marlen Mckay, a trained medical scribe. The creation of this record is based on the provider's personal observations and the statements of the patient. This document has been checked and approved by the attending provider.    Note: Chart documentation done in part with Dragon Voice Recognition software. Although reviewed after completion, some word and grammatical errors may remain.    6/12/2017   [facility] EMERGENCY DEPARTMENT     Becka Grant PA-C  06/12/17 2016

## 2017-06-13 ENCOUNTER — APPOINTMENT (OUTPATIENT)
Dept: GENERAL RADIOLOGY | Facility: CLINIC | Age: 72
DRG: 309 | End: 2017-06-13
Payer: MEDICARE

## 2017-06-13 VITALS
DIASTOLIC BLOOD PRESSURE: 82 MMHG | RESPIRATION RATE: 17 BRPM | SYSTOLIC BLOOD PRESSURE: 140 MMHG | WEIGHT: 181.4 LBS | HEIGHT: 67 IN | HEART RATE: 75 BPM | TEMPERATURE: 99.1 F | OXYGEN SATURATION: 96 % | BODY MASS INDEX: 28.47 KG/M2

## 2017-06-13 PROBLEM — I48.91 ATRIAL FIBRILLATION WITH RVR (H): Status: ACTIVE | Noted: 2017-06-13

## 2017-06-13 LAB
ANION GAP SERPL CALCULATED.3IONS-SCNC: 8 MMOL/L (ref 3–14)
BUN SERPL-MCNC: 15 MG/DL (ref 7–30)
CALCIUM SERPL-MCNC: 8.5 MG/DL (ref 8.5–10.1)
CHLORIDE SERPL-SCNC: 107 MMOL/L (ref 94–109)
CO2 SERPL-SCNC: 23 MMOL/L (ref 20–32)
CREAT SERPL-MCNC: 0.82 MG/DL (ref 0.52–1.04)
GFR SERPL CREATININE-BSD FRML MDRD: 69 ML/MIN/1.7M2
GLUCOSE SERPL-MCNC: 99 MG/DL (ref 70–99)
LMWH PPP CHRO-ACNC: 0.5 IU/ML
MAGNESIUM SERPL-MCNC: 2.4 MG/DL (ref 1.6–2.3)
POTASSIUM SERPL-SCNC: 3.9 MMOL/L (ref 3.4–5.3)
SODIUM SERPL-SCNC: 138 MMOL/L (ref 133–144)
TROPONIN I SERPL-MCNC: 0.11 UG/L (ref 0–0.04)
TROPONIN I SERPL-MCNC: 0.24 UG/L (ref 0–0.04)
TSH SERPL DL<=0.05 MIU/L-ACNC: 4.97 MU/L (ref 0.4–4)

## 2017-06-13 PROCEDURE — 93010 ELECTROCARDIOGRAM REPORT: CPT | Performed by: INTERNAL MEDICINE

## 2017-06-13 PROCEDURE — 99234 HOSP IP/OBS SM DT SF/LOW 45: CPT | Mod: GC | Performed by: INTERNAL MEDICINE

## 2017-06-13 RX ORDER — NALOXONE HYDROCHLORIDE 0.4 MG/ML
.1-.4 INJECTION, SOLUTION INTRAMUSCULAR; INTRAVENOUS; SUBCUTANEOUS
Status: DISCONTINUED | OUTPATIENT
Start: 2017-06-13 | End: 2017-06-13 | Stop reason: HOSPADM

## 2017-06-13 RX ORDER — AMOXICILLIN 250 MG
1-2 CAPSULE ORAL 2 TIMES DAILY PRN
Status: DISCONTINUED | OUTPATIENT
Start: 2017-06-13 | End: 2017-06-13 | Stop reason: HOSPADM

## 2017-06-13 RX ORDER — PANTOPRAZOLE SODIUM 40 MG/1
40 TABLET, DELAYED RELEASE ORAL EVERY MORNING
Status: DISCONTINUED | OUTPATIENT
Start: 2017-06-13 | End: 2017-06-13 | Stop reason: HOSPADM

## 2017-06-13 RX ORDER — OXYCODONE HYDROCHLORIDE 5 MG/1
5-10 TABLET ORAL EVERY 4 HOURS PRN
Status: DISCONTINUED | OUTPATIENT
Start: 2017-06-13 | End: 2017-06-13 | Stop reason: HOSPADM

## 2017-06-13 RX ORDER — ATORVASTATIN CALCIUM 20 MG/1
20 TABLET, FILM COATED ORAL EVERY EVENING
Status: DISCONTINUED | OUTPATIENT
Start: 2017-06-13 | End: 2017-06-13 | Stop reason: HOSPADM

## 2017-06-13 RX ORDER — METOPROLOL TARTRATE 25 MG/1
25 TABLET, FILM COATED ORAL 2 TIMES DAILY
Status: DISCONTINUED | OUTPATIENT
Start: 2017-06-13 | End: 2017-06-13 | Stop reason: HOSPADM

## 2017-06-13 RX ORDER — AMIODARONE HYDROCHLORIDE 200 MG/1
200 TABLET ORAL 2 TIMES DAILY
Qty: 14 TABLET | Refills: 0 | Status: SHIPPED | OUTPATIENT
Start: 2017-06-13 | End: 2017-06-14

## 2017-06-13 RX ORDER — CYCLOBENZAPRINE HCL 5 MG
5 TABLET ORAL 3 TIMES DAILY PRN
Status: DISCONTINUED | OUTPATIENT
Start: 2017-06-13 | End: 2017-06-13 | Stop reason: HOSPADM

## 2017-06-13 RX ORDER — AMIODARONE HYDROCHLORIDE 200 MG/1
200 TABLET ORAL DAILY
Qty: 21 TABLET | Refills: 0 | Status: SHIPPED | OUTPATIENT
Start: 2017-06-21 | End: 2017-06-14

## 2017-06-13 RX ORDER — HEPARIN SODIUM 10000 [USP'U]/100ML
0-3500 INJECTION, SOLUTION INTRAVENOUS CONTINUOUS
Status: DISCONTINUED | OUTPATIENT
Start: 2017-06-13 | End: 2017-06-13 | Stop reason: HOSPADM

## 2017-06-13 RX ORDER — POLYETHYLENE GLYCOL 3350 17 G/17G
17 POWDER, FOR SOLUTION ORAL DAILY PRN
Status: DISCONTINUED | OUTPATIENT
Start: 2017-06-13 | End: 2017-06-13 | Stop reason: HOSPADM

## 2017-06-13 RX ORDER — ACETAMINOPHEN 325 MG/1
650 TABLET ORAL EVERY 6 HOURS PRN
Status: DISCONTINUED | OUTPATIENT
Start: 2017-06-13 | End: 2017-06-13 | Stop reason: HOSPADM

## 2017-06-13 RX ADMIN — PANTOPRAZOLE SODIUM 40 MG: 40 TABLET, DELAYED RELEASE ORAL at 08:10

## 2017-06-13 RX ADMIN — METOPROLOL TARTRATE 25 MG: 25 TABLET, FILM COATED ORAL at 08:10

## 2017-06-13 RX ADMIN — CYCLOBENZAPRINE HYDROCHLORIDE 5 MG: 5 TABLET, FILM COATED ORAL at 05:56

## 2017-06-13 RX ADMIN — LISINOPRIL 7.5 MG: 2.5 TABLET ORAL at 08:10

## 2017-06-13 RX ADMIN — HEPARIN SODIUM 1000 UNITS/HR: 10000 INJECTION, SOLUTION INTRAVENOUS at 02:15

## 2017-06-13 RX ADMIN — ACETAMINOPHEN 650 MG: 325 TABLET, FILM COATED ORAL at 08:17

## 2017-06-13 RX ADMIN — DEXTROSE 0.5 MG/MIN: 5 SOLUTION INTRAVENOUS at 03:34

## 2017-06-13 NOTE — DISCHARGE SUMMARY
Hospital Discharge Summary     Marilia Bean MRN# [id number]   YOB: 1945 Age: 72 year old      Date of Admission: 6/12/2017  Date of Discharge:       06/13/17  Discharging MD:  Gato Mack       Discharge Diagnoses:  1] AFib with RVR          Brief HPI:  Adapted from H&P.  Please refer to it for further details.    72 year old female with CAD s/p CABG (LIMA to LAD on 6/5/17), ascending aortic aneurysm s/p vascular graft 6/5/17, and HTN presenting for evaluation of chest pounding and lightheadedness. She has a history of rare palpitations and chest pounding. No known history of Afib. She had been feeling well and recovering from her recent CABG and aortic aneurysm repair, when she develops chest pounding and lightheadedness around 11 AM the day of presentation. She took a nap to see if this would be better with rest. When she awoke, she was still having the same symptoms. After taking her BP at home with a reading of 80/40, she presented to the ED. She denies any associated chest pain. No worsening with exertion or relief with rest. Has had mild cough, but mostly non-productive. No fevers, chills.     She was found to be in Afib with RVR in the ED with rates in the 150s, she was given fluids and IV metoprolol with a drop in her BP.  She was started on a heparin gtt and amiodarone (bolused 150). She converted to sinus rhythm prior to transfer. She was transferred to [facility], due to her recent surgery.      Hospital Course:    By the time, she reached the hospital she had already converted to NSR and was asymptomatic. She remained asymptomatic and in NSR overnight. Was discharged home the following morning with plan to follow up with her out pt cardiologist in one month.  Prior to d/c, she was started on Amiodarone (200 mg BID for one week, followed by 200 mg daily for 3 weeks), as well as Xarelto. The duration of treatment (for amiodarone and Xarelto) will be decided by  "her out pt cardiologist (post op AFib).       Discharge Physical Exam:  /82 (BP Location: Left arm)  Pulse 75  Temp 99.1  F (37.3  C) (Oral)  Resp 17  Ht 1.702 m (5' 7\")  Wt 82.3 kg (181 lb 6.4 oz)  SpO2 96%  BMI 28.41 kg/m2  GEN: aao x 3, NAD  Neck: No JVD elevation  LUNGS: No wheezing or rales  HEART: S1S2 audible, no murmurs or rubs. Regular rhythm  ABDOMEN: Soft, nt, nd. +BS  EXTREMITIES: Warm calves, +DPs, no LE edema  NEURO: aao x 3, no focal deficits        Discharge Information:  Discharge diet:  Cardiac  Discharge activity:  Activity as tolerated  Disposition:  Discharged to home        Code Status:  FULL      Plan of care discussed with Attending Physician Dr. Cerna on the day of discharge.    Gato Mack MD  Cardiovascular Disease Fellow  Pager: [phone]    CARDIOLOGY STAFF  Patient seen and examined by me.  History and physical examination discussed with Dr. Mack whose note reflects our joint assessment and recommendation/plans.  Please see my comments for today on the admission H&P.  This is her first documented episode of AF although it is possible that she has had PAF in the past.  We have stared a 4 week course of oral amiodarone for post-op AF.  She will then discontinue amiodarone.  We will begin a NOAC.  She would like to follow-up with Cardiology at [facility].  I would suggest 3 month follow-up.   At that point it would be appropriate to consider stopping anticoagulation, continuing anticoagulation indefinitely, or using outpatient monitoring to look for additional AF.  She would like to see Dr. Rock and she will contact him.  (20 minutes were spent in discharge instructions, but no Discharge Day charge will be filed as it is the same day as the Admission.)  Casimiro Kwan          =========================================================================    Discharge Medications:   Marilia Bean   Home Medication Instructions NEELIMA:[id number]    Printed " on:06/13/17 1144   Medication Information                      acetaminophen (TYLENOL) 325 MG tablet  Take 2 tablets (650 mg) by mouth every 6 hours as needed for mild pain             amiodarone (PACERONE/CODARONE) 200 MG tablet  Take 1 tablet (200 mg) by mouth 2 times daily for 7 days             amiodarone (PACERONE/CODARONE) 200 MG tablet  Take 1 tablet (200 mg) by mouth daily for 21 days             aspirin  MG EC tablet  Take 1 tablet (325 mg) by mouth daily             atorvastatin (LIPITOR) 20 MG tablet  Take 1 tablet (20 mg) by mouth every evening             cyclobenzaprine (FLEXERIL) 5 MG tablet  Take 1 tablet (5 mg) by mouth 3 times daily as needed for muscle spasms or other (pain control while sleeping)             lisinopril (PRINIVIL/ZESTRIL) 5 MG tablet  Take 1.5 tablets (7.5 mg) by mouth daily             metoprolol (LOPRESSOR) 25 MG tablet  1 tablet (25 mg) by Oral or Feeding Tube route 2 times daily             oxyCODONE (ROXICODONE) 5 MG IR tablet  Take 1-2 tablets (5-10 mg) by mouth every 4 hours as needed for moderate to severe pain             pantoprazole (PROTONIX) 40 MG EC tablet  Take 1 tablet (40 mg) by mouth every morning             polyethylene glycol (MIRALAX/GLYCOLAX) Packet  Take 17 g by mouth daily as needed for constipation             rivaroxaban ANTICOAGULANT (XARELTO) 20 MG TABS tablet  Take 1 tablet (20 mg) by mouth daily (with dinner)             senna-docusate (SENOKOT-S;PERICOLACE) 8.6-50 MG per tablet  Take 1-2 tablets by mouth 2 times daily as needed for constipation (while using narcotics)                   =========================================================================      Reason for your hospital stay   Atrial fibrillation     Adult [facility] Follow-up and recommended labs and tests   Follow up with primary care provider, Herbert Epstein, within 7 days for hospital follow- up.    Please also follow up with your out patient Cardiologist within a month of  discharge.      Appointments on [place] and/or [facility] (with [facility] or [facility] provider or service). Call [phone] if you haven't heard regarding these appointments within 7 days of discharge.     Activity   Your activity upon discharge: As prior to admission     Diet   Follow this diet upon discharge: Cardiac

## 2017-06-13 NOTE — H&P
Cards 1  History and Physical    Marilia Bean MRN# [id number]   Age: 72 year old YOB: 1945     Date of Admission:  6/12/2017    Primary care provider: Herbert Epstein      Chief Complaint   Chest pounding and lightheadedness    History of Present Illness   Marilia Bean is a 72 year old female with CAD s/p CABG (LIMA to LAD on 6/5/17), ascending aortic aneurysm s/p vascular graft 6/5/17, and HTN presenting for evaluation of chest pounding and lightheadedness. She has a history of rare palpitations and chest pounding. No known history of Afib. She had been feeling well and recovering from her recent CABG and aortic aneurysm repair, when she develops chest pounding and lightheadedness around 11 AM. She took a nap to see if this would be better with rest. When she awoke, she was still having the same symptoms. After taking her BP at home with a reading of 80/40, she presented to the ED. She denies any associated chest pain. No worsening with exertion or relief with rest. Has had mild cough, but mostly non-productive. No fevers, chills.    She was found to be in Afib with RVR in the ED with rates in the 150s, she was given fluids and IV metoprolol with a drop in her BP.  She was started on a heparin gtt and amiodarone (bolused 150). She converted to sinus rhythm prior to transfer. She was transferred to [facility], due to her recent surgery.    Past Medical History     Past Medical History:   Diagnosis Date     Aortic aneurysm (H) 7/1/2007    see 7/07 [facility] report -follow yearly, treat high BP is occurs Problem list name updated by automated process. Provider to review     Aortic aneurysm of unspecified site without mention of rupture 7/2007    4.4 cm ascending aorta noted in 2007     Asymptomatic postmenopausal status (age-related) (natural)     on HRT  Prempro -weaning off 5/'2004     CAD (coronary artery disease)     LAD     Family history of Gardiner syndrome      Hyperlipidemia LDL goal <100 10/31/2010      Hypertension goal BP (blood pressure) < 140/90 2/9/2016     Leiomyoma of uterus, unspecified     Uterine fibroid     Lump or mass in breast 7/2004    rt. nodule - bx neg     Personal history of colonic polyps      Postmenopausal atrophic vaginitis 8/20/2006     Pulmonary nodule     incidental noted in 2007 during [place]     Pure hypercholesterolemia     mild, diet - low cholesterol, low fat.10/06 start Lovastatin        Past Surgical History      Past Surgical History:   Procedure Laterality Date     BYPASS GRAFT ARTERY CORONARY N/A 6/5/2017    Procedure: BYPASS GRAFT ARTERY CORONARY;;  Surgeon: Akshat Soto MD;  Location: [facility] OR     C DEXA INTERPRETATION, AXIAL  12/21/01    wnl. 7/2005 wnl but lower     COLONOSCOPY  05/07/07    Repeat in 1 year for surveillance     COLONOSCOPY  12/10/08    repeat 1 year  -see 1/2009 letter     COLONOSCOPY  03/31/10     COLONOSCOPY  10/31/2011    Procedure:COMBINED COLONOSCOPY, SINGLE BIOPSY/POLYPECTOMY BY BIOPSY; colonoscopy with polypectomy by biopsy; Surgeon:JESSICA GARCIA; Location: GI     COLONOSCOPY  12/6/2013    Procedure: COMBINED COLONOSCOPY, SINGLE BIOPSY/POLYPECTOMY BY BIOPSY;  Colonoscopy, Polypectomies;  Surgeon: Herbert Salinas MD;  Location: [facility]     COLONOSCOPY N/A 12/8/2015    Procedure: COLONOSCOPY;  Surgeon: Jared Carbajal MD;  Location:  GI     COLONOSCOPY N/A 4/26/2017    Procedure: COMBINED COLONOSCOPY, SINGLE OR MULTIPLE BIOPSY/POLYPECTOMY BY BIOPSY;  Colonoscopy with polypectomies with forceps and snare;  Surgeon: Herbert Salinas MD;  Location:  GI     ESOPHAGOSCOPY, GASTROSCOPY, DUODENOSCOPY (EGD), COMBINED N/A 12/8/2015    Procedure: COMBINED ESOPHAGOSCOPY, GASTROSCOPY, DUODENOSCOPY (EGD), BIOPSY SINGLE OR MULTIPLE;  Surgeon: Jared Carbajal MD;  Location:  GI     HC BIOPSY BREAST, PERC NEEDLE CORE, WITH IMAGING  8/17/2004    Right     HC COLONOSCOPY THRU STOMA W BIOPSY/CAUTERY TUMOR/POLYP/LESION  1999,2002 2004  polyp - hyperplastic - repeat 5 years ?     HC COLONOSCOPY W/WO BRUSH/WASH  12/12/2005    Polypectomy.  Diverticulosis-minimal. Bx adenomatous and mucosal polyps - repeat 1 year     HC ECP WITH CATARACT SURGERY Bilateral 2015, 2016     HC LAPAROSCOPY, SURGICAL; APPENDECTOMY  10/31/2004     HC LAPAROSCOPY, SURGICAL; CHOLECYSTECTOMY  2000    Cholecystectomy, Laparoscopic     HC REVISE MEDIAN N/CARPAL TUNNEL SURG  10/01/10    left     HC [facility] ENDOSCOPY, SIMPLE EXAM  03/31/10     HYSTERECTOMY, ELVIN  12/14/09    TAHBSO, MMK     REPAIR ANEURYSM ASCENDING AORTA N/A 6/5/2017    Procedure: REPAIR ANEURYSM ASCENDING AORTA;  Median Sternotomy, Ascending Aortic Aneurysm Repair, Coronary Artery Bypass Graft x1 on pump oxygenator;  Surgeon: Akshat Soto MD;  Location:  OR        Social History     Social History   Substance Use Topics     Smoking status: Never Smoker     Smokeless tobacco: Never Used     Alcohol use Yes      Comment: rarely      Lives in [place] with [place] .     Family History     Family History   Problem Relation Age of Onset     CANCER Mother      Colon Cancer     HEART DISEASE Mother      MI     OSTEOPOROSIS Mother      CANCER Father      Colon Cancer     HEART DISEASE Father      Heart Disease/ Heart attacks     DIABETES Father      Adult Onset     CANCER Sister      Colon Cancer     HEART DISEASE Brother      Heart attacks at age 45     Breast Cancer Sister      CANCER Paternal Grandmother      Unknown type     HEART DISEASE Maternal Grandfather      MI     DIABETES Sister      Adult Onset     DIABETES Sister      Adult Onset     DIABETES Brother      Adult Onset     HEART DISEASE Brother      heart attack age 64     Cancer - colorectal Son      age 36, Gardiner syndrome        Allergies     Allergies   Allergen Reactions     Contrast Dye Itching        Medications     Prescriptions Prior to Admission   Medication Sig Dispense Refill Last Dose     lisinopril (PRINIVIL/ZESTRIL) 5 MG tablet Take  "1.5 tablets (7.5 mg) by mouth daily 45 tablet 1 6/12/2017 at 0800     metoprolol (LOPRESSOR) 25 MG tablet 1 tablet (25 mg) by Oral or Feeding Tube route 2 times daily 60 tablet 1 6/12/2017 at 0800     atorvastatin (LIPITOR) 20 MG tablet Take 1 tablet (20 mg) by mouth every evening 90 tablet 1 6/11/2017 at 2000     cyclobenzaprine (FLEXERIL) 5 MG tablet Take 1 tablet (5 mg) by mouth 3 times daily as needed for muscle spasms or other (pain control while sleeping) 30 tablet 0 6/12/2017 at 0800     oxyCODONE (ROXICODONE) 5 MG IR tablet Take 1-2 tablets (5-10 mg) by mouth every 4 hours as needed for moderate to severe pain 50 tablet 0 6/12/2017 at 1400     acetaminophen (TYLENOL) 325 MG tablet Take 2 tablets (650 mg) by mouth every 6 hours as needed for mild pain 100 tablet 0 6/12/2017 at 1400     aspirin  MG EC tablet Take 1 tablet (325 mg) by mouth daily 100 tablet 0 6/12/2017 at 0800     polyethylene glycol (MIRALAX/GLYCOLAX) Packet Take 17 g by mouth daily as needed for constipation 10 packet 0 Unknown at Unknown time     senna-docusate (SENOKOT-S;PERICOLACE) 8.6-50 MG per tablet Take 1-2 tablets by mouth 2 times daily as needed for constipation (while using narcotics) 100 tablet 0 Unknown at Unknown time     pantoprazole (PROTONIX) 40 MG EC tablet Take 1 tablet (40 mg) by mouth every morning 30 tablet 0 6/12/2017 at 0800        Review of Systems   Review Of Systems  Skin: no drainage from surgical incision  Eyes: negative  Ears/Nose/Throat: negative  Respiratory: see HPI  Cardiovascular: see HPI  Gastrointestinal: negative  Genitourinary: negative  Musculoskeletal: negative  Neurologic: numbness of left hand in ulnar distribution  Psychiatric: negative  Hematologic/Lymphatic/Immunologic: negative  Endocrine: negative     Physical Exam   Blood pressure 136/71, pulse 75, temperature 98.3  F (36.8  C), temperature source Oral, resp. rate 16, height 1.702 m (5' 7\"), weight 82.3 kg (181 lb 6.4 oz), SpO2 96 %.    Wt " Readings from Last 4 Encounters:   06/12/17 82.3 kg (181 lb 6.4 oz)   06/12/17 79.4 kg (175 lb)   06/10/17 79.7 kg (175 lb 12.8 oz)   05/25/17 79.2 kg (174 lb 11.2 oz)     General: Alert, interactive, NAD  HEENT: AT/NC, sclera anicteric, EOMI  Neck: Supple, no JVD or cervical LAD  Resp: clear to auscultation bilaterally (anterior)  Cardiac: regular rate and rhythm, no murmur  Abdomen: Soft, nontender, nondistended. +BS.  No rebound or guarding.  Extremities: No LE edema  Skin: Warm and dry, no jaundice or rash; well healing skin incision above sternum without drainage  Neuro:  Face symmetric, moving all extremities without focal deficit     Data   CMP  Recent Labs  Lab 06/12/17  1745 06/10/17  0718 06/09/17  0658 06/08/17  0705  06/06/17  0446    139 139 135  < > 144   POTASSIUM 3.9 4.5 3.6 3.7  < > 3.7   CHLORIDE 103 106 104 101  < > 111*   CO2 27 27 28 29  < > 26   ANIONGAP 10 6 7 5  < > 8   * 124* 118* 106*  < > 129*   BUN 18 21 20 20  < > 17   CR 0.98 0.78 0.76 0.78  < > 0.92   GFRESTIMATED 56* 73 75 73  < > 60*   GFRESTBLACK 68 88 >90African American GFR Calc 88  < > 72   TARIQ 8.5 8.6 8.6 8.4*  < > 8.0*   MAG  --   --   --  2.2  --  2.7*   PHOS  --   --   --   --   --  4.0   PROTTOTAL 6.4*  --   --   --   --   --    ALBUMIN 2.6*  --   --   --   --   --    BILITOTAL 0.5  --   --   --   --   --    ALKPHOS 116  --   --   --   --   --    AST 36  --   --   --   --   --    ALT 39  --   --   --   --   --    < > = values in this interval not displayed.  CBC  Recent Labs  Lab 06/12/17  1745 06/10/17  0718 06/09/17  0658 06/08/17  0705   WBC 10.9 7.0 7.7 11.2*   RBC 3.18* 3.03* 3.12* 2.98*   HGB 9.4* 8.9* 9.3* 8.9*   HCT 29.3* 27.0* 27.4* 25.8*   MCV 92 89 88 87   MCH 29.6 29.4 29.8 29.9   MCHC 32.1 33.0 33.9 34.5   RDW 13.8 14.0 14.2 13.9    197 172 120*     INR  Recent Labs  Lab 06/12/17  1745 06/06/17  0446   INR 0.95 1.30*     Lab Results   Component Value Date    TROPI 0.107 (H) 06/12/2017      EKG  Afib with RVR, with mild T wave inversions in I and II compared to prior EKG    LEXI 5/25/2017  Interpretation Summary  The Ejection Fraction is estimated at 60-65%.  Right ventricular function, chamber size, wall motion, and thickness are  normal.  The left atrial appendage is normal. It is free of spontaneous echo contrast  and thrombus.  The aortic valve is tricuspid. There is trace central and commisural aortic  insufficiency. The aortic root is severely dilated measuring 3.9 cm x 3.8 cm x  3.7 cm (Z score + 8.00). The sinuses of valsalva are moderately dilated  measuring 3.9 cm (Z score +3.00). The ST junction is effaced. The ascending  aorta was incompletely visualized.     Compared to prior study on 3/16/17, the aortic root and sinuses of valsalva  are significantly dilated, within standard measurement variability from prior  study. The aortic valve is tricuspid with trace aortic insufficiency.     Assessment    Marilai Bean is a 72 year old female with CAD s/p CABG (LIMA to LAD on 6/5/17), ascending aortic aneurysm s/p vascular graft 6/5/17, and HTN presenting with Afib with RVR.       Plan   # Afib with RVR, resolved  CHADSVASC 4. Discussed role of anticoagulation for stroke prevention going forward. Had recent ECHO without significant valvular disease. Unclear trigger. No signs of infection, though may have been a bit dehydrated based on mild increase in creatinine and lower BP on presentation.  - continue amiodarone  - continue heparin gtt  - defer long term anticoagulation plan to AM team  - TSH      # Demand ischemia  Suspect type II NSTEMI in setting of tachycardia.  - trend troponin    # CAD s/p CABG (LIMA to LAD on 6/5/2017)  # Ascending aortic aneurysm s/p vascular graft 6/5/17  - continue ASA and statin  - continue metoprolol  - consider CV surgery consult given recent surgery    Chronic medical problems:  # HTN - continue lisinopril and metoprolol  # GERD - continue pantoprazole    FEN:  cardiac diet  Prophylaxis: on heparin gtt  Dispo: admit to observation for management of Afib with RVR    CODE: FULL    Discussed with cardiology fellow overnight. To be staffed in the AM.    Oleg Morales, PGY-2  Internal Medicine  [phone]    CARDIOLOGY STAFF  Patient seen and examined by me.  History and physical examination discussed with housestaff whose note reflects our joint assessment and recommendation/plans.  72 year old woman with aortic aneurysm followed by her primary care physician and referred to Dr. Soto for repair and single vessel CABG at same surgery, 6/5/2017.  She was discharged last week, then presented to [facility] ER with new onset palpitations and found to be in AF with RVR. She had associated lightheadedness and dyspnea.  Transfer to [facility] was requested. She was started on IV amiodarone and converted to sinus before transfer here.  She felt better immediately.  She has had somewhat similar, shorter palpitations in the past but it is not clear whether these are AF.  On exam, NAD, Cor RRR nol S1S2, no rubs or murmurs.  Crackles left base.  I had a long discussion with the patient about atrial fibrillation, including the natural history of the disease, risk of embolic events, role of antithrombotic therapy, role of rate control therapy and the role of antiarrhythmic medications.  We discussed the AFFIRM trial.  At this point, we will begin a 1 month course of amiodarone and then d/c.  We will begin a NOAC for antithrombotic therapy ([id number] = 3, age, vascular disease, female gender).  She should have cardiology follow-up to consider whether she needs lifelong antithrombotic therapy, or whether she has only post-op AF.  I discussed the importance of the distinction and the difficulties in making such a distinction.  She would like to have her follow-up at [facility].  We can help arrange that.  I anticipate that she can be discharged today.  Casimiro Kwan

## 2017-06-13 NOTE — PROGRESS NOTES
DISCHARGE                         6/13/2017 11:49 AM  ----------------------------------------------------------------------------  Discharged to: Home  Via: Automobile  Accompanied by: Family  Discharge Instructions: diet, activity, medications, follow up appointments, when to call the MD, aftercare instructions, and what to watchout for (i.e. s/s of infection, increasing SOB, palpitations, chest pain,)  Prescriptions: To be filled by [facility] pharmacy per pt's request; medication list reviewed & sent with pt  Follow Up Appointments: arranged; information given  Belongings: All sent with pt  IV: out  Telemetry: off  Pt exhibits understanding of above discharge instructions; all questions answered.    Discharge Paperwork: Signed, copied, and sent home with patient.

## 2017-06-13 NOTE — PLAN OF CARE
Problem: Goal Outcome Summary  Goal: Goal Outcome Summary  1) Monitor and tx to facilitate hemodynamic stability.  2) Monitor and tx pain to facilitate adequate pain control.  3) Educate and/or reinforce heart healthy habits.  4) Maintain fall and infection precautions.  Outcome: Improving  Pt admitted 6/12 from [facility] ER with Afib RVR (recent s/p AAA and CABG).  SR on arrival and remained.  VS'S on RA and denied pain.  Prn Flexeril given for back ache.  Amio gtt decrease to 0.5 mg/min (30 ml/hr) around 200.  Hep gtt started at 1000 units/hr with next 10a scheduled for 800.  1+ LE edema.  LUE been numb since p/o.  Yesterday's chest angio showed new anterior sternum inflammation and hematoma.   [place]roy stayed in family lounge and now in room.  Otherwise, pt slept okay and up SBA.  Continue to monitor and with POC.

## 2017-06-13 NOTE — PROGRESS NOTES
"Tell me how you have been doing since you were discharged from the hospital? \"I have felt better, but it is as I expected.  I have back pain.\"    ACTIVITY  How is your activity tolerance? Increasing slowly, took a walk to the mailbox today, will go to the neighbors mailbox tomorrow.     POST OP MONITORING  How is your pain on a 0-10 scale, how are you managing your pain? Patient has pain but is using only tylenol 500 mg tabs, 2 tabs every 4-6 hours.  Patient states oxycodone makes her \"goofy\" and won't take it.  Instructed patient on maximum of 4000 mg of tylenol in 24 hours.  Patient verbalized understanding and declined alternative pain meds.     Are you still doing sternal precautions? Yes    Do you hear any clicking when you are moving or taking a deep breath?  No    Are you weighing yourself daily?  Yes, weight is stable and patient denies swelling.        SIGNS AND SYMPTOMS OF INFECTION  1. INCREASE IN PAIN no  2. FEVER no  3. DRAINAGE no    If Yes, color:                 5. REDNESS no    6. SWELLING no    ASSISTANCE  Do you have someone at home to assist you with your daily activities?  Spouse is helping      MEDICATIONS  Is someone helping you to set up your medications?  Pt is independent.   Do you have any questions about your medications?  No     Are you on a blood thinner?  No  Who is managing your INRs?  N/A     FOLLOW UP  Are you scheduled for cardiac rehab? Patient is scheduled for rehab at [facility].       You are scheduled to see our surgery advanced practice provider for post operative follow up on 06/23 at 2:00 pm, however since patient is so far away informed patient she can just follow with her PCP and cardiologist and cancel the appointment here.  Patient will call to cancel if neeeded.     You are scheduled to see your cardiologist on:  Patient to call and schedule    You are scheduled to see your primary care physician on: patient will call and schedule, instructed patient " on labs recommended on discharge, patient states she knows what labs as they are on the discharge paperwork.        CONTACT INFORMATION  Please feel free to call us with any other questions or symptoms that are concerning for you at [phone], if it is after 4:30 in the afternoon, or a weekend please call [phone] and ask for the on call specialist.  We want to do everything we can to help prevent you needing to return to the ED, so please do not hesitate to call us.

## 2017-06-13 NOTE — PROGRESS NOTES
Admission    Diagnosis: Afib with RVR, hypotension, dizzness  Admitted from: [facility] ER  Via: Wheelchair  Accompanied by: family  Belongings: Placed in closet; valuables sent home with family, declined sending any items to security.  Admission Profile: Complete  Teaching: orientation to unit, call don't fall, use of console, meal times, visiting hours, when to call for the RN (angina/sob/dizzyness, etc.), and enforced importance of safety   Access: PIV Right and Left AC  Telemetry: Placed on patient  Height/Weight: Complete

## 2017-06-14 ENCOUNTER — HOSPITAL ENCOUNTER (EMERGENCY)
Facility: CLINIC | Age: 72
Discharge: HOME | End: 2017-06-14
Attending: FAMILY MEDICINE | Admitting: FAMILY MEDICINE
Payer: MEDICARE

## 2017-06-14 ENCOUNTER — APPOINTMENT (OUTPATIENT)
Dept: GENERAL RADIOLOGY | Facility: CLINIC | Age: 72
End: 2017-06-14
Attending: FAMILY MEDICINE
Payer: MEDICARE

## 2017-06-14 ENCOUNTER — TELEPHONE (OUTPATIENT)
Dept: INTERNAL MEDICINE | Facility: CLINIC | Age: 72
End: 2017-06-14

## 2017-06-14 VITALS
RESPIRATION RATE: 16 BRPM | WEIGHT: 177 LBS | SYSTOLIC BLOOD PRESSURE: 140 MMHG | BODY MASS INDEX: 27.72 KG/M2 | DIASTOLIC BLOOD PRESSURE: 80 MMHG | OXYGEN SATURATION: 96 % | TEMPERATURE: 97 F

## 2017-06-14 DIAGNOSIS — I48.91 ATRIAL FIBRILLATION WITH RVR (H): Primary | ICD-10-CM

## 2017-06-14 LAB
ANION GAP SERPL CALCULATED.3IONS-SCNC: 9 MMOL/L (ref 3–14)
BASOPHILS # BLD AUTO: 0 10E9/L (ref 0–0.2)
BASOPHILS NFR BLD AUTO: 0.3 %
BUN SERPL-MCNC: 12 MG/DL (ref 7–30)
CALCIUM SERPL-MCNC: 8.4 MG/DL (ref 8.5–10.1)
CHLORIDE SERPL-SCNC: 104 MMOL/L (ref 94–109)
CO2 SERPL-SCNC: 25 MMOL/L (ref 20–32)
CREAT SERPL-MCNC: 0.91 MG/DL (ref 0.52–1.04)
DIFFERENTIAL METHOD BLD: ABNORMAL
EOSINOPHIL # BLD AUTO: 0.4 10E9/L (ref 0–0.7)
EOSINOPHIL NFR BLD AUTO: 3.2 %
ERYTHROCYTE [DISTWIDTH] IN BLOOD BY AUTOMATED COUNT: 13.6 % (ref 10–15)
GFR SERPL CREATININE-BSD FRML MDRD: 61 ML/MIN/1.7M2
GLUCOSE SERPL-MCNC: 112 MG/DL (ref 70–99)
HCT VFR BLD AUTO: 29.3 % (ref 35–47)
HGB BLD-MCNC: 9.3 G/DL (ref 11.7–15.7)
IMM GRANULOCYTES # BLD: 0.1 10E9/L (ref 0–0.4)
IMM GRANULOCYTES NFR BLD: 0.4 %
INTERPRETATION ECG - MUSE: NORMAL
LYMPHOCYTES # BLD AUTO: 1.4 10E9/L (ref 0.8–5.3)
LYMPHOCYTES NFR BLD AUTO: 10.9 %
MCH RBC QN AUTO: 29.1 PG (ref 26.5–33)
MCHC RBC AUTO-ENTMCNC: 31.7 G/DL (ref 31.5–36.5)
MCV RBC AUTO: 92 FL (ref 78–100)
MONOCYTES # BLD AUTO: 1.1 10E9/L (ref 0–1.3)
MONOCYTES NFR BLD AUTO: 8.3 %
NEUTROPHILS # BLD AUTO: 10 10E9/L (ref 1.6–8.3)
NEUTROPHILS NFR BLD AUTO: 76.9 %
PLATELET # BLD AUTO: 390 10E9/L (ref 150–450)
POTASSIUM SERPL-SCNC: 4.1 MMOL/L (ref 3.4–5.3)
RBC # BLD AUTO: 3.2 10E12/L (ref 3.8–5.2)
SODIUM SERPL-SCNC: 138 MMOL/L (ref 133–144)
TROPONIN I SERPL-MCNC: 0.17 UG/L (ref 0–0.04)
WBC # BLD AUTO: 13 10E9/L (ref 4–11)

## 2017-06-14 PROCEDURE — 85025 COMPLETE CBC W/AUTO DIFF WBC: CPT | Performed by: FAMILY MEDICINE

## 2017-06-14 PROCEDURE — 96375 TX/PRO/DX INJ NEW DRUG ADDON: CPT | Performed by: FAMILY MEDICINE

## 2017-06-14 PROCEDURE — 71010 XR CHEST PORT 1 VW: CPT | Mod: TC

## 2017-06-14 PROCEDURE — 93010 ELECTROCARDIOGRAM REPORT: CPT | Mod: Z6 | Performed by: FAMILY MEDICINE

## 2017-06-14 PROCEDURE — 80048 BASIC METABOLIC PNL TOTAL CA: CPT | Performed by: FAMILY MEDICINE

## 2017-06-14 PROCEDURE — 99285 EMERGENCY DEPT VISIT HI MDM: CPT | Mod: 25 | Performed by: FAMILY MEDICINE

## 2017-06-14 PROCEDURE — A9270 NON-COVERED ITEM OR SERVICE: HCPCS | Mod: GY | Performed by: FAMILY MEDICINE

## 2017-06-14 PROCEDURE — 25000128 H RX IP 250 OP 636: Performed by: FAMILY MEDICINE

## 2017-06-14 PROCEDURE — 93005 ELECTROCARDIOGRAM TRACING: CPT | Performed by: FAMILY MEDICINE

## 2017-06-14 PROCEDURE — 96365 THER/PROPH/DIAG IV INF INIT: CPT | Performed by: FAMILY MEDICINE

## 2017-06-14 PROCEDURE — 25000132 ZZH RX MED GY IP 250 OP 250 PS 637: Mod: GY | Performed by: FAMILY MEDICINE

## 2017-06-14 PROCEDURE — 25000125 ZZHC RX 250: Performed by: FAMILY MEDICINE

## 2017-06-14 PROCEDURE — 93005 ELECTROCARDIOGRAM TRACING: CPT | Mod: 76 | Performed by: FAMILY MEDICINE

## 2017-06-14 PROCEDURE — 96366 THER/PROPH/DIAG IV INF ADDON: CPT | Performed by: FAMILY MEDICINE

## 2017-06-14 PROCEDURE — 84484 ASSAY OF TROPONIN QUANT: CPT | Performed by: FAMILY MEDICINE

## 2017-06-14 RX ORDER — AMIODARONE HYDROCHLORIDE 200 MG/1
200 TABLET ORAL 3 TIMES DAILY
Qty: 21 TABLET | Refills: 0 | COMMUNITY
Start: 2017-06-14 | End: 2017-06-14

## 2017-06-14 RX ORDER — SODIUM CHLORIDE 9 MG/ML
1000 INJECTION, SOLUTION INTRAVENOUS CONTINUOUS
Status: DISCONTINUED | OUTPATIENT
Start: 2017-06-14 | End: 2017-06-14 | Stop reason: HOSPADM

## 2017-06-14 RX ORDER — ONDANSETRON 2 MG/ML
4 INJECTION INTRAMUSCULAR; INTRAVENOUS EVERY 30 MIN PRN
Status: DISCONTINUED | OUTPATIENT
Start: 2017-06-14 | End: 2017-06-14

## 2017-06-14 RX ORDER — AMIODARONE HYDROCHLORIDE 200 MG/1
TABLET ORAL
Qty: 40 TABLET | Refills: 0 | Status: SHIPPED | OUTPATIENT
Start: 2017-06-14 | End: 2017-06-14

## 2017-06-14 RX ORDER — LIDOCAINE 40 MG/G
CREAM TOPICAL
Status: DISCONTINUED | OUTPATIENT
Start: 2017-06-14 | End: 2017-06-14 | Stop reason: HOSPADM

## 2017-06-14 RX ORDER — AMIODARONE HYDROCHLORIDE 200 MG/1
TABLET ORAL
Qty: 40 TABLET | Refills: 0 | Status: ON HOLD | OUTPATIENT
Start: 2017-06-14 | End: 2017-06-17

## 2017-06-14 RX ORDER — FENTANYL CITRATE 50 UG/ML
50 INJECTION, SOLUTION INTRAMUSCULAR; INTRAVENOUS EVERY 30 MIN PRN
Status: DISCONTINUED | OUTPATIENT
Start: 2017-06-14 | End: 2017-06-14 | Stop reason: HOSPADM

## 2017-06-14 RX ORDER — AMIODARONE HYDROCHLORIDE 200 MG/1
200 TABLET ORAL ONCE
Status: COMPLETED | OUTPATIENT
Start: 2017-06-14 | End: 2017-06-14

## 2017-06-14 RX ADMIN — AMIODARONE HYDROCHLORIDE 1 MG/MIN: 50 INJECTION, SOLUTION INTRAVENOUS at 08:29

## 2017-06-14 RX ADMIN — AMIODARONE HYDROCHLORIDE 200 MG: 200 TABLET ORAL at 11:13

## 2017-06-14 RX ADMIN — PROCHLORPERAZINE EDISYLATE 5 MG: 5 INJECTION INTRAMUSCULAR; INTRAVENOUS at 08:15

## 2017-06-14 RX ADMIN — SODIUM CHLORIDE 1000 ML: 9 INJECTION, SOLUTION INTRAVENOUS at 09:20

## 2017-06-14 RX ADMIN — FENTANYL CITRATE 50 MCG: 50 INJECTION, SOLUTION INTRAMUSCULAR; INTRAVENOUS at 08:29

## 2017-06-14 RX ADMIN — SODIUM CHLORIDE 1000 ML: 9 INJECTION, SOLUTION INTRAVENOUS at 08:16

## 2017-06-14 NOTE — ED AVS SNAPSHOT
[facility] Emergency Department    [address] [place] [address]    Phone:  [phone]    Fax:  [phone]                                       Marilia Bean   MRN: [id number]    Department:  [facility] Emergency Department   Date of Visit:  6/14/2017           Patient Information     Date Of Birth          1945        Your diagnoses for this visit were:     Atrial fibrillation with RVR (H)        You were seen by Kavin Grant MD.      Follow-up Information     Schedule an appointment as soon as possible for a visit with cardiology.    Why:  June 23, as previously scheduled        Discharge Instructions         Discharge Instructions for Atrial Fibrillation  You have been diagnosed with an abnormal heart rhythm called atrial fibrillation. With this condition, your heart s 2 upper chambers quiver rather than squeeze the blood out in a normal pattern. This leads to an irregular and sometimes rapid heartbeat. Some people will develop associated symptoms such as a flip-flopping heartbeat, chest pain, lightheadedness, or shortness of breath. Other people may have no symptoms at all. Atrial fibrillation is serious because it affects the heart s ability to fill with blood as it should. Blood clots may form. This increases the risk for stroke. Untreated atrial fibrillation can also lead to heart failure. Atrial fibrillation can be controlled. With treatment, most people with atrial fibrillation lead normal lives.  Treatment options  Recommended treatment for atrial fibrillation depends on your age, symptoms, how long you have had atrial fibrillation, and other factors. You will have a complete evaluation to find out if you have any abnormalities that caused your heart to go into atrial fibrillation. This might be blocked heart arteries or a thyroid problem. Your doctor will assess your particular case and discuss choices with you.  Treatment choices may  include:    Treating an underlying disorder that puts you at risk for atrial fibrillation. For example, correcting an abnormal thyroid or electrolyte problem, or treating a blocked heart artery.    Restoring a normal heart rhythm with an electrical shock (cardioversion) or with an antiarrhythmic medicine (chemical cardioversion)    Using medicine to control your heart rate in atrial fibrillation.    Preventing the risk for blood clot and stroke using blood-thinning medicines. Your doctor will tell you what he or she recommends. Choices may include aspirin, clopidogrel, warfarin, dabigatran, rivaroxaban, apixaban, and edoxaban.    Doing catheter ablation or a surgical maze procedure. These use different methods to destroy certain areas of heart tissue. This interrupts the electrical signals causing atrial fibrillation. One of these procedures may be a choice when medicines do not work, or as an alternative to long-term medicine.    Other treatment choices may be recommended for you by your doctor.  Managing risk factors for stroke and preventing heart failure are important parts of any treatment plan for atrial fibrillation.  Home care    Take your medicines exactly as directed. We have increased the Amiodarone to three times a day.  Don t skip doses.    Work with your doctor to find the right medicines and doses for you.    Learn to take your own pulse. Keep a record of your results. Ask your doctor which pulse rates mean that you need medical attention. Slowing your pulse is often the goal of treatment. Ask your doctor if it s OK for you to use an automatic machine to check your pulse at home. Sometimes these machines don t count the pulse correctly when you have atrial fibrillation.    Limit your intake of coffee, tea, cola, and other beverages with caffeine. Talk with your doctor about whether you should eliminate caffeine.    Avoid over-the-counter medicines that have caffeine in them.    Let your doctor know  what medicines you take, including prescription and over-the-counter medicines, as well as any supplements. They interfere with some medicines given for atrial fibrillation.    Ask your doctor about whether you can drink alcohol. Some people need to avoid alcohol to better treat atrial fibrillation. If you are taking blood-thinner medicines, alcohol may interfere with them by increasing their effect.    Never take stimulants such as amphetamines or cocaine. These drugs can speed up your heart rate and trigger atrial fibrillation.  Follow-up care  Follow up with cardiologist as planned previously.      When should I call my healthcare provider  Call your healthcare provider right away if you have any of the following:    Weakness    Dizziness    Fainting    Fatigue    Shortness of breath    Chest pain with increased activity    A change in the usual regularity of your heartbeat, or an unusually fast heartbeat     Thank you for choosing our Emergency Department for your care.     Sincerely,    Dr Vasyl Grant M.D.        Future Appointments        Provider Department Dept Phone Center    6/16/2017 10:00 AM Maggie Pop [facility] Cardiac Rehab [phone] [facility]    6/23/2017 2:00 PM Cardiovascular Thoracic Surgery [facility] [phone] [facility]      24 Hour Appointment Hotline       To make an appointment at any [facility], call [phone] ([phone]). If you don't have a family doctor or clinic, we will help you find one. [facility] are conveniently located to serve the needs of you and your family.             Review of your medicines      CONTINUE these medicines which may have CHANGED, or have new prescriptions. If we are uncertain of the size of tablets/capsules you have at home, strength may be listed as something that might have changed.        Dose / Directions Last dose taken    amiodarone 200 MG tablet   Commonly known as:  PACERONE/CODARONE   What  changed:    - how much to take  - how to take this  - when to take this  - additional instructions  - Another medication with the same name was removed. Continue taking this medication, and follow the directions you see here.   Quantity:  40 tablet        Take one pill three times a day for a week, then go to one tablet twice a day until you see cardiology.   Refills:  0          Our records show that you are taking the medicines listed below. If these are incorrect, please call your family doctor or clinic.        Dose / Directions Last dose taken    acetaminophen 325 MG tablet   Commonly known as:  TYLENOL   Dose:  650 mg   Quantity:  100 tablet        Take 2 tablets (650 mg) by mouth every 6 hours as needed for mild pain   Refills:  0        aspirin 325 MG EC tablet   Dose:  325 mg   Quantity:  100 tablet        Take 1 tablet (325 mg) by mouth daily   Refills:  0        atorvastatin 20 MG tablet   Commonly known as:  LIPITOR   Dose:  20 mg   Quantity:  90 tablet        Take 1 tablet (20 mg) by mouth every evening   Refills:  1        cyclobenzaprine 5 MG tablet   Commonly known as:  FLEXERIL   Dose:  5 mg   Quantity:  30 tablet        Take 1 tablet (5 mg) by mouth 3 times daily as needed for muscle spasms or other (pain control while sleeping)   Refills:  0        lisinopril 5 MG tablet   Commonly known as:  PRINIVIL/ZESTRIL   Dose:  7.5 mg   Quantity:  45 tablet        Take 1.5 tablets (7.5 mg) by mouth daily   Refills:  1        metoprolol 25 MG tablet   Commonly known as:  LOPRESSOR   Dose:  25 mg   Quantity:  60 tablet        1 tablet (25 mg) by Oral or Feeding Tube route 2 times daily   Refills:  1        oxyCODONE 5 MG IR tablet   Commonly known as:  ROXICODONE   Dose:  5-10 mg   Quantity:  50 tablet        Take 1-2 tablets (5-10 mg) by mouth every 4 hours as needed for moderate to severe pain   Refills:  0        pantoprazole 40 MG EC tablet   Commonly known as:  PROTONIX   Dose:  40 mg   Quantity:  30  tablet        Take 1 tablet (40 mg) by mouth every morning   Refills:  0        polyethylene glycol Packet   Commonly known as:  MIRALAX/GLYCOLAX   Dose:  17 g   Quantity:  10 packet        Take 17 g by mouth daily as needed for constipation   Refills:  0        rivaroxaban ANTICOAGULANT 20 MG Tabs tablet   Commonly known as:  XARELTO   Dose:  20 mg   Quantity:  90 tablet        Take 1 tablet (20 mg) by mouth daily (with dinner)   Refills:  0        senna-docusate 8.6-50 MG per tablet   Commonly known as:  SENOKOT-S;PERICOLACE   Dose:  1-2 tablet   Quantity:  100 tablet        Take 1-2 tablets by mouth 2 times daily as needed for constipation (while using narcotics)   Refills:  0                Prescriptions were sent or printed at these locations (1 Prescription)                   [facility] [place] [id number] [facility]    [address] , [place] [address]    Telephone:  [phone]   Fax:  [phone]   Hours:                  E-Prescribed (1 of 1)         amiodarone (PACERONE/CODARONE) 200 MG tablet                Procedures and tests performed during your visit     Procedure/Test Number of Times Performed    Basic metabolic panel 1    CBC with platelets differential 1    Chest  XR, 1 view portable 1    EKG 12-lead, tracing only 2    Peripheral IV catheter 1    Troponin I 1      Orders Needing Specimen Collection     None      Pending Results     Date and Time Order Name Status Description    6/14/2017 0905 Chest  XR, 1 view portable Preliminary     6/13/2017 0109 EKG 12-lead, tracing only Preliminary             Pending Culture Results     No orders found from 6/12/2017 to 6/15/2017.            Pending Results Instructions     If you had any lab results that were not finalized at the time of your Discharge, you can call the ED Lab Result RN at [phone]. You will be contacted by this team for any positive Lab results or changes in treatment. The nurses are available 7 days a week  from 10A to 6:30P.  You can leave a message 24 hours per day and they will return your call.        Thank you for choosing [facility]       Thank you for choosing [facility] for your care. Our goal is always to provide you with excellent care. Hearing back from our patients is one way we can continue to improve our services. Please take a few minutes to complete the written survey that you may receive in the mail after you visit with us. Thank you!        [employer] Information     [employer] gives you secure access to your electronic health record. If you see a primary care provider, you can also send messages to your care team and make appointments. If you have questions, please call your primary care clinic.  If you do not have a primary care provider, please call [phone] and they will assist you.        Care EveryWhere ID     This is your Care EveryWhere ID. This could be used by other organizations to access your [facility] medical records  [id number]        After Visit Summary       This is your record. Keep this with you and show to your community pharmacist(s) and doctor(s) at your next visit.

## 2017-06-14 NOTE — ED PROVIDER NOTES
History     Chief Complaint   Patient presents with     Irregular Heart Beat     HPI  Marilia Bean is a 72 year old female who s here with onset of rapid heart rate.  They came home from the hospital yesterday and that her 1st blood pressure and pulse yesterday at home was at 2:40 PM when her blood pressure was 81/46 with a pulse of 96.  She does sometimes have soft blood pressures and through the evening yesterday her blood pressure ran anywhere from 8249 with a pulse from .  At 3:15 AM she woke up and was not feeling well.  She had pulses as high as 156 bpm.  She did take her dose of amiodarone 200 mg last night and took her morning dose of amiodarone at 5:30 this morning.  She continued to have elevated heart rate this morning at home and they came to the emergency department.  Here in the ED, as we were rolling her down the christy toward room 6, she started retching.    Her cardiac symptoms seem to be related to her recent visit to the [facility], where she was hospitalized from June 5 - 10. Here is part of that note:  BRIEF HISTORY OF ILLNESS:  Marilia Bean is a 72-year-old female who had a history of ascending aortic dilatation, diagnosed since 2007.  She had been having followup serial echocardiogram over the past few years and a CT of the chest as well.  The followup echocardiogram and CT scan showed gradual increase in the ascending aorta to the point that it meets the criteria of aortic aneurysm repair (5.0 cm). The patient was also found to have single vessel coronary artery disease with 80% stenosis in the proximal mid segment of the left anterior descending artery. Due to these findings, we recommended the patient to undergo aortic undergo ascending aortic aneurysm repair and coronary artery bypass grafting with left internal mammary artery to left anterior descending artery branch.       HOSPITAL COURSE: Marilia Bean is a 72 year old female who on 6/5/2017 underwent the above-named procedures.   She tolerated the operation well and postoperatively was admitted to the CVICU.  She was extubated on POD # 0 to 2 lpm via NC.  Her ICU stay was relatively uncomplicated by and treatment included the usual hemodynamic support, she was weaned off pressors by POD #1 AM.       She was transferred to the post-surgical telemetry unit on evening of POD # 1.  Temporary Pacing wires and mediastinal tubes were removed POD #2 without complication. She complained of chest discomfort that was relieved with flexeril on evening of POD #2. Bilateral pleural tubes were removed without complication as indicated on POD#3.  She complained of upper arm weakness and edema after surgery, no DVTs on doppler 6/8. Weakness was improving before discharge. She participated well with PT/OT, who did recommend DC to rehab, but patient and family wanted to go home.      Prior to discharge, her pain was controlled well, she was able to perform most ADLs and ambulate without difficulty, and had full return of bowel and bladder function.  On Janey 10, 2017, she was discharged to home in stable condition.     ECHOCARDIOGRAM, 5/25/2017-   The Ejection Fraction is estimated at 60-65%.  Right ventricular function, chamber size, wall motion, and thickness are normal.  The left atrial appendage is normal. It is free of spontaneous echo contrast and thrombus.  The aortic valve is tricuspid. There is trace central and commisural aortic  insufficiency. The aortic root is severely dilated measuring 3.9 cm x 3.8 cm x 3.7 cm (Z score + 8.00). The sinuses of valsalva are moderately dilated measuring 3.9 cm (Z score +3.00).   The ST junction is effaced. The ascending aorta was incompletely visualized.  Compared to prior study on 3/16/17, the aortic root and sinuses of valsalva  are significantly dilated, within standard measurement variability from prior  study. The aortic valve is tricuspid with trace aortic insufficiency.     CXR 6/9/17-   PA and lateral views  of chest. Postsurgical changes of CABG with aortic valve replacement.  Cardiomegaly. Bibasilar streaky opacities, unchanged. Trace right pneumothorax. Small left  pleural effusion.    IMPRESSION:   1. Postsurgical changes of CABG and aortic valve replacement.  2. Stable cardiomegaly and bibasilar streaky opacities, likely atelectasis.  3. Trace right pneumothorax     CTA performed at time of discharge 6/10 and result pending       She was seen in our ED by Becka Grant two days ago. She responded well to amiodarone bolus and drip and was sent to [facility] as she has just recently had a cardiac procedure there.  She was discharged from the [facility] yesterday. Here is part of that note:  Hospital Course:    By the time, she reached the hospital she had already converted to NSR and was asymptomatic. She remained asymptomatic and in NSR overnight. Was discharged home the following morning with plan to follow up with her out pt cardiologist in one month.  Prior to d/c, she was started on Amiodarone (200 mg BID for one week, followed by 200 mg daily for 3 weeks), as well as Xarelto. The duration of treatment (for amiodarone and Xarelto) will be decided by her out pt cardiologist (post op AFib).      Since that discharge she had done well, until this morning at about 3:00 AM as noted above.        I have reviewed the Medications, Allergies, Past Medical and Surgical History, and Social History in the Epic system.    Allergies:   Allergies   Allergen Reactions     Contrast Dye Itching         No current facility-administered medications on file prior to encounter.   Current Outpatient Prescriptions on File Prior to Encounter:  rivaroxaban ANTICOAGULANT (XARELTO) 20 MG TABS tablet Take 1 tablet (20 mg) by mouth daily (with dinner)   [DISCONTINUED] amiodarone (PACERONE/CODARONE) 200 MG tablet Take 1 tablet (200 mg) by mouth 2 times daily for 7 days   [DISCONTINUED] amiodarone (PACERONE/CODARONE) 200 MG tablet  Take 1 tablet (200 mg) by mouth daily for 21 days   lisinopril (PRINIVIL/ZESTRIL) 5 MG tablet Take 1.5 tablets (7.5 mg) by mouth daily   metoprolol (LOPRESSOR) 25 MG tablet 1 tablet (25 mg) by Oral or Feeding Tube route 2 times daily   atorvastatin (LIPITOR) 20 MG tablet Take 1 tablet (20 mg) by mouth every evening   cyclobenzaprine (FLEXERIL) 5 MG tablet Take 1 tablet (5 mg) by mouth 3 times daily as needed for muscle spasms or other (pain control while sleeping)   oxyCODONE (ROXICODONE) 5 MG IR tablet Take 1-2 tablets (5-10 mg) by mouth every 4 hours as needed for moderate to severe pain   acetaminophen (TYLENOL) 325 MG tablet Take 2 tablets (650 mg) by mouth every 6 hours as needed for mild pain   aspirin  MG EC tablet Take 1 tablet (325 mg) by mouth daily   polyethylene glycol (MIRALAX/GLYCOLAX) Packet Take 17 g by mouth daily as needed for constipation   senna-docusate (SENOKOT-S;PERICOLACE) 8.6-50 MG per tablet Take 1-2 tablets by mouth 2 times daily as needed for constipation (while using narcotics)   pantoprazole (PROTONIX) 40 MG EC tablet Take 1 tablet (40 mg) by mouth every morning       Patient Active Problem List   Diagnosis     Asymptomatic postmenopausal status     Postmenopausal atrophic vaginitis     Aortic aneurysm (H)     HYPERLIPIDEMIA LDL GOAL <100     Advanced directives, counseling/discussion     Hypertension goal BP (blood pressure) < 140/90     Status post coronary angiogram     Atrial fibrillation with RVR (H)       Past Surgical History:   Procedure Laterality Date     BYPASS GRAFT ARTERY CORONARY N/A 6/5/2017    Procedure: BYPASS GRAFT ARTERY CORONARY;;  Surgeon: Akshat Soto MD;  Location:  OR      DEXA INTERPRETATION, AXIAL  12/21/01    wnl. 7/2005 wnl but lower     COLONOSCOPY  05/07/07    Repeat in 1 year for surveillance     COLONOSCOPY  12/10/08    repeat 1 year  -see 1/2009 letter     COLONOSCOPY  03/31/10     COLONOSCOPY  10/31/2011    Procedure:COMBINED  COLONOSCOPY, SINGLE BIOPSY/POLYPECTOMY BY BIOPSY; colonoscopy with polypectomy by biopsy; Surgeon:JESSICA GARCIA; Location:[facility]     COLONOSCOPY  12/6/2013    Procedure: COMBINED COLONOSCOPY, SINGLE BIOPSY/POLYPECTOMY BY BIOPSY;  Colonoscopy, Polypectomies;  Surgeon: Herbert Salinas MD;  Location: [facility]     COLONOSCOPY N/A 12/8/2015    Procedure: COLONOSCOPY;  Surgeon: Jared Carbajal MD;  Location: [facility]     COLONOSCOPY N/A 4/26/2017    Procedure: COMBINED COLONOSCOPY, SINGLE OR MULTIPLE BIOPSY/POLYPECTOMY BY BIOPSY;  Colonoscopy with polypectomies with forceps and snare;  Surgeon: Herbert Salinas MD;  Location: [facility]     ESOPHAGOSCOPY, GASTROSCOPY, DUODENOSCOPY (EGD), COMBINED N/A 12/8/2015    Procedure: COMBINED ESOPHAGOSCOPY, GASTROSCOPY, DUODENOSCOPY (EGD), BIOPSY SINGLE OR MULTIPLE;  Surgeon: Jared Carbajal MD;  Location: [facility]     HC BIOPSY BREAST, PERC NEEDLE CORE, WITH IMAGING  8/17/2004    Right     HC COLONOSCOPY THRU STOMA W BIOPSY/CAUTERY TUMOR/POLYP/LESION  1999,2002 2004 polyp - hyperplastic - repeat 5 years ?     HC COLONOSCOPY W/WO BRUSH/WASH  12/12/2005    Polypectomy.  Diverticulosis-minimal. Bx adenomatous and mucosal polyps - repeat 1 year     HC ECP WITH CATARACT SURGERY Bilateral 2015, 2016     HC LAPAROSCOPY, SURGICAL; APPENDECTOMY  10/31/2004     HC LAPAROSCOPY, SURGICAL; CHOLECYSTECTOMY  2000    Cholecystectomy, Laparoscopic     HC REVISE MEDIAN N/CARPAL TUNNEL SURG  10/01/10    left     HC UGI ENDOSCOPY, SIMPLE EXAM  03/31/10     HYSTERECTOMY, ELVIN  12/14/09    TAHBSO, MMK     REPAIR ANEURYSM ASCENDING AORTA N/A 6/5/2017    Procedure: REPAIR ANEURYSM ASCENDING AORTA;  Median Sternotomy, Ascending Aortic Aneurysm Repair, Coronary Artery Bypass Graft x1 on pump oxygenator;  Surgeon: Akshat Soto MD;  Location: [facility]       Social History   Substance Use Topics     Smoking status: Never Smoker     Smokeless tobacco: Never Used     Alcohol use Yes       "Comment: rarely       Most Recent Immunizations   Administered Date(s) Administered     Hepatitis B 01/17/1997     Influenza (High Dose) 3 valent vaccine 01/08/2013     Influenza (IIV3) 11/06/2006     Pneumococcal (PCV 13) 02/09/2016     Pneumococcal 23 valent 01/15/2014     TD (ADULT, 7+) 05/18/2004     TDAP Vaccine (Adacel) 03/10/2010     Varicella Pt Report Hx of Varicella/Chicken Pox 01/01/1951       BMI: Estimated body mass index is 27.72 kg/(m^2) as calculated from the following:    Height as of 6/12/17: 1.702 m (5' 7\").    Weight as of this encounter: 80.3 kg (177 lb).      Review of Systems    Physical Exam      Physical Exam    ED Course  8:19 AM  I spoke with Dr. Kwan at [facility].  We did discuss the patient's case this morning.  I have already started an amiodarone bolus and drip, and he agrees with that idea.  We did discuss her home amiodarone dose and he recommends we try and increase that at discharge to 200 mg 3 times a day.  Sometimes this can cause nausea and vomiting, similar to what our patient had when she rolled into our emergency department.  He feels pretty comfortable that her symptoms today are caused by her atrial fibrillation rather than the medication and recommends we not change her from amiodarone.      10:35 AM  It appears that she has converted to sinus rhythm. Her rate is now 81 bpm and repeat EKG shows sinus rhythm with no ST segment elevations       ED Course     Procedures    EKG was completed on arrival at 8:06 AM and was reviewed by me.  Atrial fibrillation.  Heart rate 172 bpm.  It is difficult to determine if there is ST depression, although that is read by the computer.  We will repeat an EKG if she gets under 100 bpm.    Critical Care time:  was 30 minutes for this patient excluding procedures.  EKG reviewed by me after the patient returned to sinus rhythm. Normal rate. Sinus rhythm. Normal axis. No ST segment elevation.    Labs: note that some of these " results are from her [facility] visit yesterday:    Results for orders placed or performed during the hospital encounter of 06/14/17 (from the past 24 hour(s))   CBC with platelets differential   Result Value Ref Range    WBC 13.0 (H) 4.0 - 11.0 10e9/L    RBC Count 3.20 (L) 3.8 - 5.2 10e12/L    Hemoglobin 9.3 (L) 11.7 - 15.7 g/dL    Hematocrit 29.3 (L) 35.0 - 47.0 %    MCV 92 78 - 100 fl    MCH 29.1 26.5 - 33.0 pg    MCHC 31.7 31.5 - 36.5 g/dL    RDW 13.6 10.0 - 15.0 %    Platelet Count 390 150 - 450 10e9/L    Diff Method Automated Method     % Neutrophils 76.9 %    % Lymphocytes 10.9 %    % Monocytes 8.3 %    % Eosinophils 3.2 %    % Basophils 0.3 %    % Immature Granulocytes 0.4 %    Absolute Neutrophil 10.0 (H) 1.6 - 8.3 10e9/L    Absolute Lymphocytes 1.4 0.8 - 5.3 10e9/L    Absolute Monocytes 1.1 0.0 - 1.3 10e9/L    Absolute Eosinophils 0.4 0.0 - 0.7 10e9/L    Absolute Basophils 0.0 0.0 - 0.2 10e9/L    Abs Immature Granulocytes 0.1 0 - 0.4 10e9/L   Basic metabolic panel   Result Value Ref Range    Sodium 138 133 - 144 mmol/L    Potassium 4.1 3.4 - 5.3 mmol/L    Chloride 104 94 - 109 mmol/L    Carbon Dioxide 25 20 - 32 mmol/L    Anion Gap 9 3 - 14 mmol/L    Glucose 112 (H) 70 - 99 mg/dL    Urea Nitrogen 12 7 - 30 mg/dL    Creatinine 0.91 0.52 - 1.04 mg/dL    GFR Estimate 61 >60 mL/min/1.7m2    GFR Estimate If Black 73 >60 mL/min/1.7m2    Calcium 8.4 (L) 8.5 - 10.1 mg/dL   Troponin I   Result Value Ref Range    Troponin I ES 0.166 (HH) 0.000 - 0.045 ug/L   Chest  XR, 1 view portable    Narrative    CHEST PORTABLE ONE VIEW   6/14/2017 9:26 AM     HISTORY: Atrial fibrillation with rapid ventricular response, no  fever, no cough, recent open heart surgery.    COMPARISON: Chest x-rays dated 6/13/2017 and 5/22/2017, CT chest dated  6/10/2017.      Impression    IMPRESSION:  Postop changes status post probable CABG. Left basilar  opacity is again noted and likely represents pleural fluid,  atelectasis and scarring.  Infiltrate is difficult to exclude, but is  not definitely seen. Right lung remains clear. No pneumothorax.  Cardiac silhouette remains enlarged. No significant change since prior  study dated 6/13/2017.       Results for orders placed or performed during the hospital encounter of 06/14/17 (from the past 24 hour(s))   CBC with platelets differential   Result Value Ref Range    WBC 13.0 (H) 4.0 - 11.0 10e9/L    RBC Count 3.20 (L) 3.8 - 5.2 10e12/L    Hemoglobin 9.3 (L) 11.7 - 15.7 g/dL    Hematocrit 29.3 (L) 35.0 - 47.0 %    MCV 92 78 - 100 fl    MCH 29.1 26.5 - 33.0 pg    MCHC 31.7 31.5 - 36.5 g/dL    RDW 13.6 10.0 - 15.0 %    Platelet Count 390 150 - 450 10e9/L    Diff Method Automated Method     % Neutrophils 76.9 %    % Lymphocytes 10.9 %    % Monocytes 8.3 %    % Eosinophils 3.2 %    % Basophils 0.3 %    % Immature Granulocytes 0.4 %    Absolute Neutrophil 10.0 (H) 1.6 - 8.3 10e9/L    Absolute Lymphocytes 1.4 0.8 - 5.3 10e9/L    Absolute Monocytes 1.1 0.0 - 1.3 10e9/L    Absolute Eosinophils 0.4 0.0 - 0.7 10e9/L    Absolute Basophils 0.0 0.0 - 0.2 10e9/L    Abs Immature Granulocytes 0.1 0 - 0.4 10e9/L   Basic metabolic panel   Result Value Ref Range    Sodium 138 133 - 144 mmol/L    Potassium 4.1 3.4 - 5.3 mmol/L    Chloride 104 94 - 109 mmol/L    Carbon Dioxide 25 20 - 32 mmol/L    Anion Gap 9 3 - 14 mmol/L    Glucose 112 (H) 70 - 99 mg/dL    Urea Nitrogen 12 7 - 30 mg/dL    Creatinine 0.91 0.52 - 1.04 mg/dL    GFR Estimate 61 >60 mL/min/1.7m2    GFR Estimate If Black 73 >60 mL/min/1.7m2    Calcium 8.4 (L) 8.5 - 10.1 mg/dL   Troponin I   Result Value Ref Range    Troponin I ES 0.166 (HH) 0.000 - 0.045 ug/L   Chest  XR, 1 view portable    Narrative    CHEST PORTABLE ONE VIEW   6/14/2017 9:26 AM     HISTORY: Atrial fibrillation with rapid ventricular response, no  fever, no cough, recent open heart surgery.    COMPARISON: Chest x-rays dated 6/13/2017 and 5/22/2017, CT chest dated  6/10/2017.      Impression     IMPRESSION:  Postop changes status post probable CABG. Left basilar  opacity is again noted and likely represents pleural fluid,  atelectasis and scarring. Infiltrate is difficult to exclude, but is  not definitely seen. Right lung remains clear. No pneumothorax.  Cardiac silhouette remains enlarged. No significant change since prior  study dated 6/13/2017.         Assessments & Plan (with Medical Decision Making)  Marilia is a 73 yo female here with atrial fibrillation with rapid ventricular response.  She has been in this situation previously responded well to amiodarone.  She is taking 200 mg amiodarone twice a day at home.  On arrival her vital signs showed an elevated heart rate and irregular rhythm.  The rest of her exam was normal.  We gave her an amiodarone bolus and she did convert to normal sinus rhythm.   I did speak to the cardiologist and he recommended 200 mg amiodarone 3 times a day.  At about 11:00 AM we gave her her mid day old dose of amiodarone.  The patient was discharged home with recommendations to take amiodarone 3 times a day for 7 days, then changed to twice a day and she has a follow-up with cardiology in about 9 days.  I recommend she keep that appointment.       I have reviewed the nursing notes.    I have reviewed the findings, diagnosis, plan and need for follow up with the patient.       Discharge Medication List as of 6/14/2017 11:45 AM          Final diagnoses:   Atrial fibrillation with RVR (H)       6/14/2017   [facility] EMERGENCY DEPARTMENT     Kavin Grant MD  06/14/17 [id number]

## 2017-06-14 NOTE — ED NOTES
Presents with complaints of not feeling well. I haven't felt good since my aneurysm repair. My heart is in Afib again.

## 2017-06-14 NOTE — ED AVS SNAPSHOT
[facility] Emergency Department    [address] [place] [address]    Phone:  [phone]    Fax:  [phone]                                       Marilia Bean   MRN: [id number]    Department:  [facility] Emergency Department   Date of Visit:  6/14/2017           After Visit Summary Signature Page     I have received my discharge instructions, and my questions have been answered. I have discussed any challenges I see with this plan with the nurse or doctor.    ..........................................................................................................................................  Patient/Patient Representative Signature      ..........................................................................................................................................  Patient Representative Print Name and Relationship to Patient    ..................................................               ................................................  Date                                            Time    ..........................................................................................................................................  Reviewed by Signature/Title    ...................................................              ..............................................  Date                                                            Time

## 2017-06-14 NOTE — DISCHARGE INSTRUCTIONS
Discharge Instructions for Atrial Fibrillation  You have been diagnosed with an abnormal heart rhythm called atrial fibrillation. With this condition, your heart s 2 upper chambers quiver rather than squeeze the blood out in a normal pattern. This leads to an irregular and sometimes rapid heartbeat. Some people will develop associated symptoms such as a flip-flopping heartbeat, chest pain, lightheadedness, or shortness of breath. Other people may have no symptoms at all. Atrial fibrillation is serious because it affects the heart s ability to fill with blood as it should. Blood clots may form. This increases the risk for stroke. Untreated atrial fibrillation can also lead to heart failure. Atrial fibrillation can be controlled. With treatment, most people with atrial fibrillation lead normal lives.  Treatment options  Recommended treatment for atrial fibrillation depends on your age, symptoms, how long you have had atrial fibrillation, and other factors. You will have a complete evaluation to find out if you have any abnormalities that caused your heart to go into atrial fibrillation. This might be blocked heart arteries or a thyroid problem. Your doctor will assess your particular case and discuss choices with you.  Treatment choices may include:    Treating an underlying disorder that puts you at risk for atrial fibrillation. For example, correcting an abnormal thyroid or electrolyte problem, or treating a blocked heart artery.    Restoring a normal heart rhythm with an electrical shock (cardioversion) or with an antiarrhythmic medicine (chemical cardioversion)    Using medicine to control your heart rate in atrial fibrillation.    Preventing the risk for blood clot and stroke using blood-thinning medicines. Your doctor will tell you what he or she recommends. Choices may include aspirin, clopidogrel, warfarin, dabigatran, rivaroxaban, apixaban, and edoxaban.    Doing catheter ablation or a surgical maze  procedure. These use different methods to destroy certain areas of heart tissue. This interrupts the electrical signals causing atrial fibrillation. One of these procedures may be a choice when medicines do not work, or as an alternative to long-term medicine.    Other treatment choices may be recommended for you by your doctor.  Managing risk factors for stroke and preventing heart failure are important parts of any treatment plan for atrial fibrillation.  Home care    Take your medicines exactly as directed. We have increased the Amiodarone to three times a day.  Don t skip doses.    Work with your doctor to find the right medicines and doses for you.    Learn to take your own pulse. Keep a record of your results. Ask your doctor which pulse rates mean that you need medical attention. Slowing your pulse is often the goal of treatment. Ask your doctor if it s OK for you to use an automatic machine to check your pulse at home. Sometimes these machines don t count the pulse correctly when you have atrial fibrillation.    Limit your intake of coffee, tea, cola, and other beverages with caffeine. Talk with your doctor about whether you should eliminate caffeine.    Avoid over-the-counter medicines that have caffeine in them.    Let your doctor know what medicines you take, including prescription and over-the-counter medicines, as well as any supplements. They interfere with some medicines given for atrial fibrillation.    Ask your doctor about whether you can drink alcohol. Some people need to avoid alcohol to better treat atrial fibrillation. If you are taking blood-thinner medicines, alcohol may interfere with them by increasing their effect.    Never take stimulants such as amphetamines or cocaine. These drugs can speed up your heart rate and trigger atrial fibrillation.  Follow-up care  Follow up with cardiologist as planned previously.      When should I call my healthcare provider  Call your healthcare provider  right away if you have any of the following:    Weakness    Dizziness    Fainting    Fatigue    Shortness of breath    Chest pain with increased activity    A change in the usual regularity of your heartbeat, or an unusually fast heartbeat     Thank you for choosing our Emergency Department for your care.     Sincerely,    Dr Vasyl Grant M.D.

## 2017-06-14 NOTE — ED NOTES
"Pt stood on scale at triage at 0806. She stated \"I feel like passing out. Proceeded to ER room 6 at that time. Dr. Grant at bedside assessing pt. EKG, IV, and labs started. O2 applied at 2 LNC  "

## 2017-06-15 ENCOUNTER — HOSPITAL ENCOUNTER (INPATIENT)
Facility: CLINIC | Age: 72
LOS: 2 days | Discharge: HOME | DRG: 309 | End: 2017-06-17
Attending: THORACIC SURGERY (CARDIOTHORACIC VASCULAR SURGERY) | Admitting: THORACIC SURGERY (CARDIOTHORACIC VASCULAR SURGERY)
Payer: MEDICARE

## 2017-06-15 ENCOUNTER — CARE COORDINATION (OUTPATIENT)
Dept: CARDIOLOGY | Facility: CLINIC | Age: 72
End: 2017-06-15

## 2017-06-15 ENCOUNTER — APPOINTMENT (OUTPATIENT)
Dept: GENERAL RADIOLOGY | Facility: CLINIC | Age: 72
End: 2017-06-15
Attending: NURSE PRACTITIONER
Payer: MEDICARE

## 2017-06-15 ENCOUNTER — TELEPHONE (OUTPATIENT)
Dept: OTHER | Facility: CLINIC | Age: 72
End: 2017-06-15

## 2017-06-15 ENCOUNTER — HOSPITAL ENCOUNTER (EMERGENCY)
Facility: CLINIC | Age: 72
Discharge: ANOTHER HOSPITAL | End: 2017-06-15
Attending: NURSE PRACTITIONER | Admitting: NURSE PRACTITIONER
Payer: MEDICARE

## 2017-06-15 VITALS
DIASTOLIC BLOOD PRESSURE: 82 MMHG | SYSTOLIC BLOOD PRESSURE: 132 MMHG | HEART RATE: 117 BPM | WEIGHT: 179 LBS | TEMPERATURE: 98.5 F | RESPIRATION RATE: 16 BRPM | OXYGEN SATURATION: 98 % | BODY MASS INDEX: 27.13 KG/M2 | HEIGHT: 68 IN

## 2017-06-15 DIAGNOSIS — G89.18 ACUTE POST-OPERATIVE PAIN: Primary | ICD-10-CM

## 2017-06-15 DIAGNOSIS — N39.0 URINARY TRACT INFECTION WITHOUT HEMATURIA, SITE UNSPECIFIED: ICD-10-CM

## 2017-06-15 DIAGNOSIS — I48.0 PAROXYSMAL ATRIAL FIBRILLATION (H): Primary | ICD-10-CM

## 2017-06-15 DIAGNOSIS — I48.91 ATRIAL FIBRILLATION WITH RVR (H): ICD-10-CM

## 2017-06-15 DIAGNOSIS — Z79.01 LONG TERM (CURRENT) USE OF ANTICOAGULANTS: ICD-10-CM

## 2017-06-15 DIAGNOSIS — M79.2 NERVE PAIN: ICD-10-CM

## 2017-06-15 DIAGNOSIS — J90 PLEURAL EFFUSION: ICD-10-CM

## 2017-06-15 DIAGNOSIS — R79.89 OTHER SPECIFIED ABNORMAL FINDINGS OF BLOOD CHEMISTRY: ICD-10-CM

## 2017-06-15 LAB
ALBUMIN SERPL-MCNC: 2.5 G/DL (ref 3.4–5)
ALP SERPL-CCNC: 128 U/L (ref 40–150)
ALT SERPL W P-5'-P-CCNC: 32 U/L (ref 0–50)
ANION GAP SERPL CALCULATED.3IONS-SCNC: 9 MMOL/L (ref 3–14)
AST SERPL W P-5'-P-CCNC: 26 U/L (ref 0–45)
BASOPHILS # BLD AUTO: 0 10E9/L (ref 0–0.2)
BASOPHILS NFR BLD AUTO: 0.2 %
BILIRUB SERPL-MCNC: 0.4 MG/DL (ref 0.2–1.3)
BUN SERPL-MCNC: 14 MG/DL (ref 7–30)
CALCIUM SERPL-MCNC: 8.1 MG/DL (ref 8.5–10.1)
CHLORIDE SERPL-SCNC: 106 MMOL/L (ref 94–109)
CO2 SERPL-SCNC: 25 MMOL/L (ref 20–32)
CREAT SERPL-MCNC: 0.87 MG/DL (ref 0.52–1.04)
DIFFERENTIAL METHOD BLD: ABNORMAL
EOSINOPHIL # BLD AUTO: 0.6 10E9/L (ref 0–0.7)
EOSINOPHIL NFR BLD AUTO: 4.3 %
ERYTHROCYTE [DISTWIDTH] IN BLOOD BY AUTOMATED COUNT: 13.8 % (ref 10–15)
GFR SERPL CREATININE-BSD FRML MDRD: 64 ML/MIN/1.7M2
GLUCOSE SERPL-MCNC: 118 MG/DL (ref 70–99)
HCT VFR BLD AUTO: 26.4 % (ref 35–47)
HGB BLD-MCNC: 8.4 G/DL (ref 11.7–15.7)
IMM GRANULOCYTES # BLD: 0.1 10E9/L (ref 0–0.4)
IMM GRANULOCYTES NFR BLD: 0.5 %
LYMPHOCYTES # BLD AUTO: 1.4 10E9/L (ref 0.8–5.3)
LYMPHOCYTES NFR BLD AUTO: 10.9 %
MCH RBC QN AUTO: 29.1 PG (ref 26.5–33)
MCHC RBC AUTO-ENTMCNC: 31.8 G/DL (ref 31.5–36.5)
MCV RBC AUTO: 91 FL (ref 78–100)
MONOCYTES # BLD AUTO: 0.9 10E9/L (ref 0–1.3)
MONOCYTES NFR BLD AUTO: 6.9 %
NEUTROPHILS # BLD AUTO: 10.2 10E9/L (ref 1.6–8.3)
NEUTROPHILS NFR BLD AUTO: 77.2 %
NT-PROBNP SERPL-MCNC: 3623 PG/ML (ref 0–900)
PLATELET # BLD AUTO: 428 10E9/L (ref 150–450)
POTASSIUM SERPL-SCNC: 3.3 MMOL/L (ref 3.4–5.3)
PROT SERPL-MCNC: 6.6 G/DL (ref 6.8–8.8)
RBC # BLD AUTO: 2.89 10E12/L (ref 3.8–5.2)
SODIUM SERPL-SCNC: 140 MMOL/L (ref 133–144)
TROPONIN I SERPL-MCNC: 0.08 UG/L (ref 0–0.04)
WBC # BLD AUTO: 13.2 10E9/L (ref 4–11)

## 2017-06-15 PROCEDURE — 80053 COMPREHEN METABOLIC PANEL: CPT | Performed by: NURSE PRACTITIONER

## 2017-06-15 PROCEDURE — A9270 NON-COVERED ITEM OR SERVICE: HCPCS | Mod: GY | Performed by: STUDENT IN AN ORGANIZED HEALTH CARE EDUCATION/TRAINING PROGRAM

## 2017-06-15 PROCEDURE — 25000128 H RX IP 250 OP 636: Performed by: NURSE PRACTITIONER

## 2017-06-15 PROCEDURE — 96366 THER/PROPH/DIAG IV INF ADDON: CPT | Performed by: FAMILY MEDICINE

## 2017-06-15 PROCEDURE — 93010 ELECTROCARDIOGRAM REPORT: CPT | Mod: Z6 | Performed by: FAMILY MEDICINE

## 2017-06-15 PROCEDURE — 93005 ELECTROCARDIOGRAM TRACING: CPT | Performed by: FAMILY MEDICINE

## 2017-06-15 PROCEDURE — 83880 ASSAY OF NATRIURETIC PEPTIDE: CPT | Performed by: NURSE PRACTITIONER

## 2017-06-15 PROCEDURE — 85025 COMPLETE CBC W/AUTO DIFF WBC: CPT | Performed by: NURSE PRACTITIONER

## 2017-06-15 PROCEDURE — 25000125 ZZHC RX 250: Performed by: NURSE PRACTITIONER

## 2017-06-15 PROCEDURE — 25000132 ZZH RX MED GY IP 250 OP 250 PS 637: Mod: GY | Performed by: STUDENT IN AN ORGANIZED HEALTH CARE EDUCATION/TRAINING PROGRAM

## 2017-06-15 PROCEDURE — 99285 EMERGENCY DEPT VISIT HI MDM: CPT | Mod: 25 | Performed by: FAMILY MEDICINE

## 2017-06-15 PROCEDURE — 96375 TX/PRO/DX INJ NEW DRUG ADDON: CPT | Performed by: FAMILY MEDICINE

## 2017-06-15 PROCEDURE — 21400006 ZZH R&B CCU INTERMEDIATE UMMC

## 2017-06-15 PROCEDURE — 96365 THER/PROPH/DIAG IV INF INIT: CPT | Performed by: FAMILY MEDICINE

## 2017-06-15 PROCEDURE — 71010 XR CHEST PORT 1 VW: CPT | Mod: TC

## 2017-06-15 PROCEDURE — 84484 ASSAY OF TROPONIN QUANT: CPT | Performed by: NURSE PRACTITIONER

## 2017-06-15 PROCEDURE — 96376 TX/PRO/DX INJ SAME DRUG ADON: CPT | Performed by: FAMILY MEDICINE

## 2017-06-15 RX ORDER — LIDOCAINE 40 MG/G
CREAM TOPICAL
Status: DISCONTINUED | OUTPATIENT
Start: 2017-06-15 | End: 2017-06-17 | Stop reason: HOSPADM

## 2017-06-15 RX ORDER — POTASSIUM CHLORIDE 1.5 G/1.58G
20-40 POWDER, FOR SOLUTION ORAL
Status: DISCONTINUED | OUTPATIENT
Start: 2017-06-15 | End: 2017-06-17 | Stop reason: HOSPADM

## 2017-06-15 RX ORDER — ATORVASTATIN CALCIUM 20 MG/1
20 TABLET, FILM COATED ORAL EVERY EVENING
Status: DISCONTINUED | OUTPATIENT
Start: 2017-06-15 | End: 2017-06-17 | Stop reason: HOSPADM

## 2017-06-15 RX ORDER — TRAMADOL HYDROCHLORIDE 100 MG/1
50 TABLET, EXTENDED RELEASE ORAL EVERY 8 HOURS PRN
Qty: 20 TABLET | Refills: 0 | Status: SHIPPED | OUTPATIENT
Start: 2017-06-15 | End: 2017-06-15 | Stop reason: ALTCHOICE

## 2017-06-15 RX ORDER — FENTANYL CITRATE 50 UG/ML
25 INJECTION, SOLUTION INTRAMUSCULAR; INTRAVENOUS ONCE
Status: COMPLETED | OUTPATIENT
Start: 2017-06-15 | End: 2017-06-15

## 2017-06-15 RX ORDER — POTASSIUM CHLORIDE 750 MG/1
20-40 TABLET, EXTENDED RELEASE ORAL
Status: DISCONTINUED | OUTPATIENT
Start: 2017-06-15 | End: 2017-06-17 | Stop reason: HOSPADM

## 2017-06-15 RX ORDER — NALOXONE HYDROCHLORIDE 0.4 MG/ML
.1-.4 INJECTION, SOLUTION INTRAMUSCULAR; INTRAVENOUS; SUBCUTANEOUS
Status: DISCONTINUED | OUTPATIENT
Start: 2017-06-15 | End: 2017-06-17 | Stop reason: HOSPADM

## 2017-06-15 RX ORDER — TRAMADOL HYDROCHLORIDE 50 MG/1
50-100 TABLET ORAL EVERY 8 HOURS PRN
Qty: 40 TABLET | Refills: 0 | Status: SHIPPED | OUTPATIENT
Start: 2017-06-15 | End: 2017-06-21

## 2017-06-15 RX ORDER — OXYCODONE HYDROCHLORIDE 5 MG/1
5-10 TABLET ORAL EVERY 4 HOURS PRN
Status: DISCONTINUED | OUTPATIENT
Start: 2017-06-15 | End: 2017-06-17 | Stop reason: HOSPADM

## 2017-06-15 RX ORDER — ONDANSETRON 4 MG/1
4 TABLET, ORALLY DISINTEGRATING ORAL EVERY 6 HOURS PRN
Status: DISCONTINUED | OUTPATIENT
Start: 2017-06-15 | End: 2017-06-17 | Stop reason: HOSPADM

## 2017-06-15 RX ORDER — PANTOPRAZOLE SODIUM 40 MG/1
40 TABLET, DELAYED RELEASE ORAL
Status: DISCONTINUED | OUTPATIENT
Start: 2017-06-16 | End: 2017-06-17 | Stop reason: HOSPADM

## 2017-06-15 RX ORDER — ACETAMINOPHEN 325 MG/1
650 TABLET ORAL EVERY 6 HOURS PRN
Status: DISCONTINUED | OUTPATIENT
Start: 2017-06-15 | End: 2017-06-17 | Stop reason: HOSPADM

## 2017-06-15 RX ORDER — TRAMADOL HYDROCHLORIDE 50 MG/1
50-100 TABLET ORAL EVERY 8 HOURS PRN
Status: DISCONTINUED | OUTPATIENT
Start: 2017-06-15 | End: 2017-06-17 | Stop reason: HOSPADM

## 2017-06-15 RX ORDER — POTASSIUM CHLORIDE 7.45 MG/ML
10 INJECTION INTRAVENOUS
Status: DISCONTINUED | OUTPATIENT
Start: 2017-06-15 | End: 2017-06-17 | Stop reason: HOSPADM

## 2017-06-15 RX ORDER — POTASSIUM CHLORIDE 29.8 MG/ML
20 INJECTION INTRAVENOUS
Status: DISCONTINUED | OUTPATIENT
Start: 2017-06-15 | End: 2017-06-17 | Stop reason: HOSPADM

## 2017-06-15 RX ORDER — AMOXICILLIN 250 MG
1-2 CAPSULE ORAL 2 TIMES DAILY PRN
Status: DISCONTINUED | OUTPATIENT
Start: 2017-06-15 | End: 2017-06-17 | Stop reason: HOSPADM

## 2017-06-15 RX ORDER — POTASSIUM CL/LIDO/0.9 % NACL 10MEQ/0.1L
10 INTRAVENOUS SOLUTION, PIGGYBACK (ML) INTRAVENOUS
Status: DISCONTINUED | OUTPATIENT
Start: 2017-06-15 | End: 2017-06-17 | Stop reason: HOSPADM

## 2017-06-15 RX ORDER — LIDOCAINE 40 MG/G
CREAM TOPICAL
Status: DISCONTINUED | OUTPATIENT
Start: 2017-06-15 | End: 2017-06-15 | Stop reason: HOSPADM

## 2017-06-15 RX ORDER — ONDANSETRON 2 MG/ML
4 INJECTION INTRAMUSCULAR; INTRAVENOUS EVERY 6 HOURS PRN
Status: DISCONTINUED | OUTPATIENT
Start: 2017-06-15 | End: 2017-06-17 | Stop reason: HOSPADM

## 2017-06-15 RX ORDER — GABAPENTIN 100 MG/1
100 CAPSULE ORAL 3 TIMES DAILY
Status: DISCONTINUED | OUTPATIENT
Start: 2017-06-15 | End: 2017-06-17

## 2017-06-15 RX ORDER — POLYETHYLENE GLYCOL 3350 17 G/17G
17 POWDER, FOR SOLUTION ORAL DAILY PRN
Status: DISCONTINUED | OUTPATIENT
Start: 2017-06-15 | End: 2017-06-17 | Stop reason: HOSPADM

## 2017-06-15 RX ORDER — MAGNESIUM SULFATE HEPTAHYDRATE 40 MG/ML
4 INJECTION, SOLUTION INTRAVENOUS EVERY 4 HOURS PRN
Status: DISCONTINUED | OUTPATIENT
Start: 2017-06-15 | End: 2017-06-17 | Stop reason: HOSPADM

## 2017-06-15 RX ORDER — CYCLOBENZAPRINE HCL 5 MG
5 TABLET ORAL 3 TIMES DAILY PRN
Status: DISCONTINUED | OUTPATIENT
Start: 2017-06-15 | End: 2017-06-17 | Stop reason: HOSPADM

## 2017-06-15 RX ADMIN — AMIODARONE HYDROCHLORIDE 1 MG/MIN: 50 INJECTION, SOLUTION INTRAVENOUS at 18:26

## 2017-06-15 RX ADMIN — AMIODARONE HYDROCHLORIDE 150 MG: 1.5 INJECTION, SOLUTION INTRAVENOUS at 18:15

## 2017-06-15 RX ADMIN — FENTANYL CITRATE 25 MCG: 50 INJECTION, SOLUTION INTRAMUSCULAR; INTRAVENOUS at 19:29

## 2017-06-15 RX ADMIN — TRAMADOL HYDROCHLORIDE 100 MG: 50 TABLET, COATED ORAL at 23:52

## 2017-06-15 ASSESSMENT — ENCOUNTER SYMPTOMS
PALPITATIONS: 1
APPETITE CHANGE: 0
HEADACHES: 0
CHEST TIGHTNESS: 0
MUSCULOSKELETAL NEGATIVE: 1
DIZZINESS: 0
LIGHT-HEADEDNESS: 1
DIAPHORESIS: 0
WEAKNESS: 0
COUGH: 0
ACTIVITY CHANGE: 1
PSYCHIATRIC NEGATIVE: 1
GASTROINTESTINAL NEGATIVE: 1
SHORTNESS OF BREATH: 1
EYES NEGATIVE: 1
FATIGUE: 1

## 2017-06-15 NOTE — IP AVS SNAPSHOT
[address] [address]    Phone:  [phone]                                       After Visit Summary   6/15/2017    Marilia Bean    MRN: [id number]           After Visit Summary Signature Page     I have received my discharge instructions, and my questions have been answered. I have discussed any challenges I see with this plan with the nurse or doctor.    ..........................................................................................................................................  Patient/Patient Representative Signature      ..........................................................................................................................................  Patient Representative Print Name and Relationship to Patient    ..................................................               ................................................  Date                                            Time    ..........................................................................................................................................  Reviewed by Signature/Title    ...................................................              ..............................................  Date                                                            Time

## 2017-06-15 NOTE — IP AVS SNAPSHOT
MRN:[id number]                      After Visit Summary   6/15/2017    Marilia Bean    MRN: [id number]           Thank you!     Thank you for choosing [facility] for your care. Our goal is always to provide you with excellent care. Hearing back from our patients is one way we can continue to improve our services. Please take a few minutes to complete the written survey that you may receive in the mail after you visit with us. Thank you!        Patient Information     Date Of Birth          1945        Designated Caregiver       Most Recent Value    Caregiver    Will someone help with your care after discharge? no      About your hospital stay     You were admitted on:  Janey 15, 2017 You last received care in the:  [address] [facility]    You were discharged on:  June 17, 2017        Reason for your hospital stay       Atrial fibrillation                  Who to Call     For medical emergencies, please call 911.  For non-urgent questions about your medical care, please call your primary care provider or clinic, [phone]          Attending Provider     Provider Akshat Carrero MD Thoracic Diseases       Primary Care Provider Office Phone # Fax #    Herbert Epstein [phone]      After Care Instructions     Activity       Your activity upon discharge: keep the activity limitations as discussed at your post operative discharge.            Diet       Follow this diet upon discharge: Orders Placed This Encounter      Regular Diet Adult                  Follow-up Appointments     Adult [facility] Follow-up and recommended labs and tests       Follow up with CV surgery and cardiology as planned.    Follow up with your Primary care physician on 6/19 for INR check.                  Your next 10 appointments already scheduled     Jun 23, 2017  2:00 PM CDT   (Arrive by 1:45 PM)   Return Visit with  CVTS   [facility]  "[place])    [address]  3rd Floor  [facility] [address] [phone]              Additional Services     Inr Clinic Referral       Discharge coumadin 3mg daily  INR goal 2-3 for Atrial fibrillation  Next INR check 6/19/17  Lives in [place], [place]                  Future tests that were ordered for you     INR       Must get on 6/19/17                  Warfarin Instruction     You have started taking a medicine called warfarin. This is a blood-thinning medicine (anticoagulant). It helps prevent and treat blood clots.      Before leaving the hospital, make sure you know how much to take and how long to take it.      You will need regular blood tests to make sure your blood is clotting safely. It is very important to see your doctor for regular blood tests.    Talk to your doctor before taking any new medicine (this includes over-the-counter drugs and herbal products). Many medicines can interact with warfarin. This may cause more bleeding or too much clotting.     Eating a lot of vitamin K--found in green, leafy vegetables--can change the way warfarin works in your body. Do NOT avoid these foods. Instead, try to eat the same amount each day.     Bleeding is the most common side-effect of warfarin. You may notice bleeding gums, a bloody nose, bruises and bleeding longer when you cut yourself. See a doctor at once if:   o You cough up blood  o You find blood in your stool (poop)  o You have a deep cut, or a cut that bleeds longer than 10 minutes   o You have a bad cut, hard fall, accident or hit your head (go to urgent care or the emergency room).    For women who can get pregnant: This medicine can harm an unborn baby. Be very careful not to get pregnant while taking this medicine. If you think you might be pregnant, call your doctor right away.    For more information, read \"Guide to Warfarin Therapy,  the booklet you received in the hospital.        Pending Results     No orders found from 6/13/2017 to " 6/16/2017.            Statement of Approval     Ordered          06/17/17 1107  I have reviewed and agree with all the recommendations and orders detailed in this document.  EFFECTIVE NOW     Approved and electronically signed by:  Gregory Fitzpatrick MD             Admission Information     Date & Time Provider Department Dept. Phone    6/15/2017 Akshat Soot MD [address] [facility] [phone]      Your Vitals Were     Blood Pressure Temperature Respirations Weight Pulse Oximetry BMI (Body Mass Index)    127/92 (BP Location: Right arm) 98.4  F (36.9  C) (Oral) 18 79.6 kg (175 lb 8 oz) 94% 27.08 kg/m2      [employer] Information     [facility] gives you secure access to your electronic health record. If you see a primary care provider, you can also send messages to your care team and make appointments. If you have questions, please call your primary care clinic.  If you do not have a primary care provider, please call [phone] and they will assist you.        Care EveryWhere ID     This is your Care EveryWhere ID. This could be used by other organizations to access your [facility] medical records  [id number]           Review of your medicines      START taking        Dose / Directions    gabapentin 100 MG capsule   Commonly known as:  NEURONTIN   Used for:  Nerve pain        Dose:  200 mg   Take 2 capsules (200 mg) by mouth 3 times daily   Quantity:  180 capsule   Refills:  0       levofloxacin 250 MG tablet   Commonly known as:  LEVAQUIN   Indication:  Urinary Tract Infection   Used for:  Urinary tract infection without hematuria, site unspecified        Dose:  250 mg   Take 1 tablet (250 mg) by mouth daily for 4 days   Quantity:  4 tablet   Refills:  0       warfarin 3 MG tablet   Commonly known as:  COUMADIN        Dose:  3 mg   Take 1 tablet (3 mg) by mouth daily   Quantity:  7 tablet   Refills:  0         CONTINUE these medicines which may have CHANGED, or have new prescriptions. If we are uncertain  of the size of tablets/capsules you have at home, strength may be listed as something that might have changed.        Dose / Directions    amiodarone 200 MG tablet   Commonly known as:  PACERONE/CODARONE   This may have changed:    - how much to take  - how to take this  - when to take this  - additional instructions        Dose:  400 mg   Take 2 tablets (400 mg) by mouth 2 times daily Take 400mg twice daily for 7 days Then take 200mg twice daily for 7 days Then take 200mg once daily for 7 days Then stop taking   Quantity:  49 tablet   Refills:  0         CONTINUE these medicines which have NOT CHANGED        Dose / Directions    acetaminophen 325 MG tablet   Commonly known as:  TYLENOL   Used for:  Acute post-operative pain        Dose:  650 mg   Take 2 tablets (650 mg) by mouth every 6 hours as needed for mild pain   Quantity:  100 tablet   Refills:  0       aspirin 325 MG EC tablet   Used for:  S/P CABG (coronary artery bypass graft), S/P aortic aneurysm repair        Dose:  325 mg   Take 1 tablet (325 mg) by mouth daily   Quantity:  100 tablet   Refills:  0       atorvastatin 20 MG tablet   Commonly known as:  LIPITOR   Used for:  Hyperlipidemia LDL goal <100        Dose:  20 mg   Take 1 tablet (20 mg) by mouth every evening   Quantity:  90 tablet   Refills:  1       cyclobenzaprine 5 MG tablet   Commonly known as:  FLEXERIL   Used for:  Acute post-operative pain        Dose:  5 mg   Take 1 tablet (5 mg) by mouth 3 times daily as needed for muscle spasms or other (pain control while sleeping)   Quantity:  30 tablet   Refills:  0       lisinopril 5 MG tablet   Commonly known as:  PRINIVIL/ZESTRIL   Used for:  S/P aortic aneurysm repair, S/P CABG (coronary artery bypass graft)        Dose:  7.5 mg   Take 1.5 tablets (7.5 mg) by mouth daily   Quantity:  45 tablet   Refills:  1       metoprolol 25 MG tablet   Commonly known as:  LOPRESSOR   Used for:  S/P CABG (coronary artery bypass graft), S/P aortic aneurysm  repair        Dose:  25 mg   1 tablet (25 mg) by Oral or Feeding Tube route 2 times daily   Quantity:  60 tablet   Refills:  1       oxyCODONE 5 MG IR tablet   Commonly known as:  ROXICODONE   Used for:  Acute post-operative pain        Dose:  5-10 mg   Take 1-2 tablets (5-10 mg) by mouth every 4 hours as needed for moderate to severe pain   Quantity:  50 tablet   Refills:  0       pantoprazole 40 MG EC tablet   Commonly known as:  PROTONIX   Used for:  Acute post-operative pain        Dose:  40 mg   Take 1 tablet (40 mg) by mouth every morning   Quantity:  30 tablet   Refills:  0       polyethylene glycol Packet   Commonly known as:  MIRALAX/GLYCOLAX   Used for:  Acute post-operative pain        Dose:  17 g   Take 17 g by mouth daily as needed for constipation   Quantity:  10 packet   Refills:  0       senna-docusate 8.6-50 MG per tablet   Commonly known as:  SENOKOT-S;PERICOLACE   Used for:  Acute post-operative pain        Dose:  1-2 tablet   Take 1-2 tablets by mouth 2 times daily as needed for constipation (while using narcotics)   Quantity:  100 tablet   Refills:  0       traMADol 50 MG tablet   Commonly known as:  ULTRAM   Used for:  Acute post-operative pain        Dose:   mg   Take 1-2 tablets ( mg) by mouth every 8 hours as needed for pain maximum 6 tablet(s) per day   Quantity:  40 tablet   Refills:  0         STOP taking     rivaroxaban ANTICOAGULANT 20 MG Tabs tablet   Commonly known as:  XARELTO                Where to get your medicines      These medications were sent to [facility] [place], [place] - [address] [address]     Phone:  [phone]     gabapentin 100 MG capsule    levofloxacin 250 MG tablet    warfarin 3 MG tablet         Some of these will need a paper prescription and others can be bought over the counter. Ask your nurse if you have questions.     Bring a paper prescription for each of these medications      amiodarone 200 MG tablet                Protect others around you: Learn how to safely use, store and throw away your medicines at www.disposemymeds.org.             Medication List: This is a list of all your medications and when to take them. Check marks below indicate your daily home schedule. Keep this list as a reference.      Medications           Morning Afternoon Evening Bedtime As Needed    acetaminophen 325 MG tablet   Commonly known as:  TYLENOL   Take 2 tablets (650 mg) by mouth every 6 hours as needed for mild pain   Last time this was given:  650 mg on 6/17/2017  2:35 AM                                   amiodarone 200 MG tablet   Commonly known as:  PACERONE/CODARONE   Take 2 tablets (400 mg) by mouth 2 times daily Take 400mg twice daily for 7 days Then take 200mg twice daily for 7 days Then take 200mg once daily for 7 days Then stop taking   Last time this was given:  400 mg on 6/17/2017  7:55 AM                                      aspirin 325 MG EC tablet   Take 1 tablet (325 mg) by mouth daily   Last time this was given:  81 mg on 6/17/2017  7:56 AM                                   atorvastatin 20 MG tablet   Commonly known as:  LIPITOR   Take 1 tablet (20 mg) by mouth every evening   Last time this was given:  20 mg on 6/16/2017  8:37 PM                                   cyclobenzaprine 5 MG tablet   Commonly known as:  FLEXERIL   Take 1 tablet (5 mg) by mouth 3 times daily as needed for muscle spasms or other (pain control while sleeping)                                   gabapentin 100 MG capsule   Commonly known as:  NEURONTIN   Take 2 capsules (200 mg) by mouth 3 times daily   Last time this was given:  100 mg on 6/17/2017  7:55 AM   Next Dose Due:  2 pm                                         levofloxacin 250 MG tablet   Commonly known as:  LEVAQUIN   Take 1 tablet (250 mg) by mouth daily for 4 days   Last time this was given:  250 mg on 6/17/2017  7:56 AM                                    lisinopril 5 MG tablet   Commonly known as:  PRINIVIL/ZESTRIL   Take 1.5 tablets (7.5 mg) by mouth daily   Last time this was given:  7.5 mg on 6/17/2017  7:55 AM                                   metoprolol 25 MG tablet   Commonly known as:  LOPRESSOR   1 tablet (25 mg) by Oral or Feeding Tube route 2 times daily   Last time this was given:  25 mg on 6/17/2017  7:55 AM                                      oxyCODONE 5 MG IR tablet   Commonly known as:  ROXICODONE   Take 1-2 tablets (5-10 mg) by mouth every 4 hours as needed for moderate to severe pain                                   pantoprazole 40 MG EC tablet   Commonly known as:  PROTONIX   Take 1 tablet (40 mg) by mouth every morning   Last time this was given:  40 mg on 6/17/2017  7:56 AM                                   polyethylene glycol Packet   Commonly known as:  MIRALAX/GLYCOLAX   Take 17 g by mouth daily as needed for constipation                                   senna-docusate 8.6-50 MG per tablet   Commonly known as:  SENOKOT-S;PERICOLACE   Take 1-2 tablets by mouth 2 times daily as needed for constipation (while using narcotics)                                   traMADol 50 MG tablet   Commonly known as:  ULTRAM   Take 1-2 tablets ( mg) by mouth every 8 hours as needed for pain maximum 6 tablet(s) per day   Last time this was given:  100 mg on 6/15/2017 11:52 PM                                   warfarin 3 MG tablet   Commonly known as:  COUMADIN   Take 1 tablet (3 mg) by mouth daily   Last time this was given:  3 mg on 6/16/2017  6:34 PM

## 2017-06-15 NOTE — ED PROVIDER NOTES
"  History     Chief Complaint   Patient presents with     Atrial Fib     HPI  Marilia Bean is a 72 year old female who presents to the ED with heart palpitations. Nilam states this is her third trip to our ED in the last 4 days with heart palpitations. She recently was at the [facility] for an aortic anuerysm repair and single bypass surgery on June 5, 2017. Since being discharged she has had two episodes of rapid atrial fibrillation and feels this is the cause of her heart palpitations this afternoon. On her first presentation to the ED on  6/12/17 she was given amiodarone infusion in which she converted to a normal sinus rhythm. She was transferred to the [facility] at that time however was discharged the next day. She subsequently presented again yesterday with the same symptoms, she was again found to be in rapid atrial fibrillation and was treated again with an amiodarone infusion and increase in her home dose of amiodarone. She was discharged in a normal sinus rhythm. Today, she states she was drinking a glass of ice water when she felt her heart start \"jumping around\" in her chest. She states she felt light-headed when her symptoms started. She states she has decreased energy and becomes short of breath with exertion when she is having the palpitations. She also complains of pain in her left arm and shoulder, extending down her arm into her hand which she states has numbness and tingling which has been ongoing since surgery. She states she has called the nurse of her cardiac surgeon who prescribed her pain medication for her arm, however has not picked them up from the pharmacy yet. She otherwise denies chest pain and shortness of breath. She denies nausea or headache. She is accompanied by her  whom she has been living with at home.     I have reviewed the Medications, Allergies, Past Medical and Surgical History, and Social History in the Epic system.    Allergies:   Allergies   Allergen Reactions     " Contrast Dye Itching         No current facility-administered medications on file prior to encounter.   Current Outpatient Prescriptions on File Prior to Encounter:  amiodarone (PACERONE/CODARONE) 200 MG tablet Take one pill three times a day for a week, then go to one tablet twice a day until you see cardiology.   lisinopril (PRINIVIL/ZESTRIL) 5 MG tablet Take 1.5 tablets (7.5 mg) by mouth daily   metoprolol (LOPRESSOR) 25 MG tablet 1 tablet (25 mg) by Oral or Feeding Tube route 2 times daily   atorvastatin (LIPITOR) 20 MG tablet Take 1 tablet (20 mg) by mouth every evening   cyclobenzaprine (FLEXERIL) 5 MG tablet Take 1 tablet (5 mg) by mouth 3 times daily as needed for muscle spasms or other (pain control while sleeping)   acetaminophen (TYLENOL) 325 MG tablet Take 2 tablets (650 mg) by mouth every 6 hours as needed for mild pain   aspirin  MG EC tablet Take 1 tablet (325 mg) by mouth daily   pantoprazole (PROTONIX) 40 MG EC tablet Take 1 tablet (40 mg) by mouth every morning   traMADol (ULTRAM) 50 MG tablet Take 1-2 tablets ( mg) by mouth every 8 hours as needed for pain maximum 6 tablet(s) per day   rivaroxaban ANTICOAGULANT (XARELTO) 20 MG TABS tablet Take 1 tablet (20 mg) by mouth daily (with dinner)   oxyCODONE (ROXICODONE) 5 MG IR tablet Take 1-2 tablets (5-10 mg) by mouth every 4 hours as needed for moderate to severe pain   polyethylene glycol (MIRALAX/GLYCOLAX) Packet Take 17 g by mouth daily as needed for constipation   senna-docusate (SENOKOT-S;PERICOLACE) 8.6-50 MG per tablet Take 1-2 tablets by mouth 2 times daily as needed for constipation (while using narcotics)       Patient Active Problem List   Diagnosis     Asymptomatic postmenopausal status     Postmenopausal atrophic vaginitis     Aortic aneurysm (H)     HYPERLIPIDEMIA LDL GOAL <100     Advanced directives, counseling/discussion     Hypertension goal BP (blood pressure) < 140/90     Status post coronary angiogram     Atrial  fibrillation with RVR (H)       Past Surgical History:   Procedure Laterality Date     BYPASS GRAFT ARTERY CORONARY N/A 6/5/2017    Procedure: BYPASS GRAFT ARTERY CORONARY;;  Surgeon: Akshat Soto MD;  Location: [facility] OR     C DEXA INTERPRETATION, AXIAL  12/21/01    wnl. 7/2005 wnl but lower     COLONOSCOPY  05/07/07    Repeat in 1 year for surveillance     COLONOSCOPY  12/10/08    repeat 1 year  -see 1/2009 letter     COLONOSCOPY  03/31/10     COLONOSCOPY  10/31/2011    Procedure:COMBINED COLONOSCOPY, SINGLE BIOPSY/POLYPECTOMY BY BIOPSY; colonoscopy with polypectomy by biopsy; Surgeon:JESSICA GARCIA; Location:[facility]     COLONOSCOPY  12/6/2013    Procedure: COMBINED COLONOSCOPY, SINGLE BIOPSY/POLYPECTOMY BY BIOPSY;  Colonoscopy, Polypectomies;  Surgeon: Herbert Salinas MD;  Location: [facility]     COLONOSCOPY N/A 12/8/2015    Procedure: COLONOSCOPY;  Surgeon: Jared Carbajal MD;  Location:  GI     COLONOSCOPY N/A 4/26/2017    Procedure: COMBINED COLONOSCOPY, SINGLE OR MULTIPLE BIOPSY/POLYPECTOMY BY BIOPSY;  Colonoscopy with polypectomies with forceps and snare;  Surgeon: Herbert Salinas MD;  Location:  GI     ESOPHAGOSCOPY, GASTROSCOPY, DUODENOSCOPY (EGD), COMBINED N/A 12/8/2015    Procedure: COMBINED ESOPHAGOSCOPY, GASTROSCOPY, DUODENOSCOPY (EGD), BIOPSY SINGLE OR MULTIPLE;  Surgeon: Jared Carbajal MD;  Location:  GI     HC BIOPSY BREAST, PERC NEEDLE CORE, WITH IMAGING  8/17/2004    Right     HC COLONOSCOPY THRU STOMA W BIOPSY/CAUTERY TUMOR/POLYP/LESION  1999,2002 2004 polyp - hyperplastic - repeat 5 years ?     HC COLONOSCOPY W/WO BRUSH/WASH  12/12/2005    Polypectomy.  Diverticulosis-minimal. Bx adenomatous and mucosal polyps - repeat 1 year     HC ECP WITH CATARACT SURGERY Bilateral 2015, 2016     HC LAPAROSCOPY, SURGICAL; APPENDECTOMY  10/31/2004     HC LAPAROSCOPY, SURGICAL; CHOLECYSTECTOMY  2000    Cholecystectomy, Laparoscopic     HC REVISE MEDIAN N/CARPAL TUNNEL SURG   "10/01/10    left     HC U[facility] ENDOSCOPY, SIMPLE EXAM  03/31/10     HYSTERECTOMY, ELVIN  12/14/09    TAHBSO, MMK     REPAIR ANEURYSM ASCENDING AORTA N/A 6/5/2017    Procedure: REPAIR ANEURYSM ASCENDING AORTA;  Median Sternotomy, Ascending Aortic Aneurysm Repair, Coronary Artery Bypass Graft x1 on pump oxygenator;  Surgeon: Akshat Soto MD;  Location:  OR       Social History   Substance Use Topics     Smoking status: Never Smoker     Smokeless tobacco: Never Used     Alcohol use Yes      Comment: rarely       Most Recent Immunizations   Administered Date(s) Administered     Hepatitis B 01/17/1997     Influenza (High Dose) 3 valent vaccine 01/08/2013     Influenza (IIV3) 11/06/2006     Pneumococcal (PCV 13) 02/09/2016     Pneumococcal 23 valent 01/15/2014     TD (ADULT, 7+) 05/18/2004     TDAP Vaccine (Adacel) 03/10/2010     Varicella Pt Report Hx of Varicella/Chicken Pox 01/01/1951       BMI: Estimated body mass index is 27.62 kg/(m^2) as calculated from the following:    Height as of this encounter: 1.715 m (5' 7.5\").    Weight as of this encounter: 81.2 kg (179 lb).      Review of Systems   Constitutional: Positive for activity change and fatigue. Negative for appetite change and diaphoresis.   HENT: Negative.    Eyes: Negative.    Respiratory: Positive for shortness of breath. Negative for cough and chest tightness.    Cardiovascular: Positive for palpitations. Negative for chest pain and leg swelling.   Gastrointestinal: Negative.    Genitourinary: Negative.    Musculoskeletal: Negative.    Neurological: Positive for light-headedness. Negative for dizziness, syncope, weakness and headaches.   Psychiatric/Behavioral: Negative.        Physical Exam   BP: (!) 139/113  Heart Rate: 144  Resp: 20  Height: 171.5 cm (5' 7.5\")  Weight: 81.2 kg (179 lb)  SpO2: 95 %  Physical Exam   Constitutional: She is oriented to person, place, and time. She appears well-developed and well-nourished. She appears distressed. "   HENT:   Head: Normocephalic and atraumatic.   Mouth/Throat: Oropharynx is clear and moist.   Eyes: Pupils are equal, round, and reactive to light.   Neck: Normal range of motion.   Cardiovascular: S1 normal, S2 normal, normal heart sounds and intact distal pulses.  An irregular rhythm present. Tachycardia present.    No murmur heard.  Pulmonary/Chest: Effort normal and breath sounds normal. No respiratory distress. She exhibits tenderness (anterior chest wall).   Abdominal: Soft. Bowel sounds are normal.   Musculoskeletal: Normal range of motion.   Lymphadenopathy:     She has no cervical adenopathy.   Neurological: She is alert and oriented to person, place, and time. She has normal reflexes.   Skin: Skin is warm and dry. She is not diaphoretic. There is pallor.   Sternotomy scar, healing well   Psychiatric: She has a normal mood and affect.       ED Course     ED Course     Procedures         EKG Interpretation:      Interpreted by Citlalli Escalante  Time reviewed:1730  Symptoms at time of EKG: Palpitations   Rhythm: Normal sinus  and Atrial fibrillation - rapid  Rate: Tachycardia  Axis: Normal  Ectopy: None and Premature ventricular contractions (unifocal)  Conduction: Normal  ST Segments/ T Waves: No ST-T wave changes and No acute ischemic changes  Q Waves: None  Comparison to prior: Unchanged  Clinical Impression: Atrial fibrillation with RVR      Results for orders placed or performed during the hospital encounter of 06/15/17   XR Chest Port 1 View    Narrative    XR CHEST PORT 1 VW 6/15/2017 6:16 PM    COMPARISON: 6/14/2017    HISTORY: Atrial fibrillation.      Impression    IMPRESSION: Median sternotomy wires appear intact. Enlarged cardiac  silhouette is again seen and unchanged. Small left pleural effusion  with associated left basilar atelectasis/consolidation is unchanged.  Right lung is clear. No pneumothorax on either side.    YAYO CONDE   CBC with platelets differential   Result Value Ref Range     WBC 13.2 (H) 4.0 - 11.0 10e9/L    RBC Count 2.89 (L) 3.8 - 5.2 10e12/L    Hemoglobin 8.4 (L) 11.7 - 15.7 g/dL    Hematocrit 26.4 (L) 35.0 - 47.0 %    MCV 91 78 - 100 fl    MCH 29.1 26.5 - 33.0 pg    MCHC 31.8 31.5 - 36.5 g/dL    RDW 13.8 10.0 - 15.0 %    Platelet Count 428 150 - 450 10e9/L    Diff Method Automated Method     % Neutrophils 77.2 %    % Lymphocytes 10.9 %    % Monocytes 6.9 %    % Eosinophils 4.3 %    % Basophils 0.2 %    % Immature Granulocytes 0.5 %    Absolute Neutrophil 10.2 (H) 1.6 - 8.3 10e9/L    Absolute Lymphocytes 1.4 0.8 - 5.3 10e9/L    Absolute Monocytes 0.9 0.0 - 1.3 10e9/L    Absolute Eosinophils 0.6 0.0 - 0.7 10e9/L    Absolute Basophils 0.0 0.0 - 0.2 10e9/L    Abs Immature Granulocytes 0.1 0 - 0.4 10e9/L   Comprehensive metabolic panel   Result Value Ref Range    Sodium 140 133 - 144 mmol/L    Potassium 3.3 (L) 3.4 - 5.3 mmol/L    Chloride 106 94 - 109 mmol/L    Carbon Dioxide 25 20 - 32 mmol/L    Anion Gap 9 3 - 14 mmol/L    Glucose 118 (H) 70 - 99 mg/dL    Urea Nitrogen 14 7 - 30 mg/dL    Creatinine 0.87 0.52 - 1.04 mg/dL    GFR Estimate 64 >60 mL/min/1.7m2    GFR Estimate If Black 78 >60 mL/min/1.7m2    Calcium 8.1 (L) 8.5 - 10.1 mg/dL    Bilirubin Total 0.4 0.2 - 1.3 mg/dL    Albumin 2.5 (L) 3.4 - 5.0 g/dL    Protein Total 6.6 (L) 6.8 - 8.8 g/dL    Alkaline Phosphatase 128 40 - 150 U/L    ALT 32 0 - 50 U/L    AST 26 0 - 45 U/L   Troponin I   Result Value Ref Range    Troponin I ES 0.077 (H) 0.000 - 0.045 ug/L   Nt probnp inpatient (BNP)   Result Value Ref Range    N-Terminal Pro BNP Inpatient 3623 (H) 0 - 900 pg/mL       Assessments & Plan (with Medical Decision Making)  Marilia is a 72-year-old female who presents to the emergency department today in atrial fibrillation with RVR.  Please refer to HPI and focused exam.  This is patient's 3rd ED visit here since June 12 with this problem, she has previously converted with amiodarone and has been discharged home.  Patient denies any  other associated symptoms other than increased weakness and mild shortness of breath.  EKG confirms A. fib with RVR with no signs of acute ischemic findings. Patient did have aortic aneurysm repair with single vessel bypass at the [facility] on June 5.  Patient is currently anticoagulated on Xarelto.  Patient's initial heart rate on arrival here is 158.  She is mildly hypertensive and afebrile, she is not hypoxic.  Amiodarone bolus of 150 mg was ordered and given by nursing staff followed by a 1 mg/m infusion, patient at this time remains in A. fib with RVR, rate has slowed anywhere from 120 to mid 130s, blood pressure is slightly lower at 109 systolic.  Patient's blood work is consistent with where it is been the last several times she is been here, hemoglobin has been slightly trending downwards and is 8.9 today.  Troponin is also trending downwards and is 0.077 today.  BNP is mildly elevated at 3623, calcium is 8.1 consistent with mild hypocalcemia, potassium is 3.3 consistent with mild hypokalemia.  Chest x-ray is unchanged with a small left pleural effusion.  I feel based on patient's repeat visits and returning A. fib with RVR, she really needs to see cardiology again and be transferred back to [facility] for further testing and possible intervention including an ablation.  I spoke to Dr. Shane, cardiologist, from the [facility] who has accepted patient for transfer.  I did discuss with patient findings of today's exam and my concerns, she is agreeable to transfer.  Patient was given 25  g of fentanyl for her ongoing left upper arm pain and placed in a sling.  This is not a new finding for patient and can be further addressed at the [facility].  Patient was staffed in the emergency department with Dr. Grant and will be discharged via ALS in stable condition.       I have reviewed the nursing notes.    I have reviewed the findings, diagnosis, plan and need for follow up with the  patient.    New Prescriptions    No medications on file       Final diagnoses:   Atrial fibrillation with RVR (H)       6/15/2017   [facility] EMERGENCY DEPARTMENT     Citlalli Escalante APRN CNP  06/15/17 [id number]

## 2017-06-15 NOTE — PROGRESS NOTES
Patient called with c/o of severe pain that starts in her back and shoulder and radiates to her elbow and from the elbow to her hand she has numbness.  Patient states she cannot take the oxycodone as it makes her feel like she is floating and goofy.  Patient states she has had several episodes of A-fib since going home and has been to the ER twice once on 06/12 and again on 06/14.  On 06/12 patient was given IV amiodarone and kept overnight and converted to NSR and discharged the next day.  Patient returned to the ED on the 14th and again was started on amiodarone gtt and converted to NSR.  Patient was discharged from the hospital with an increased dose of oral amiodarone at 200 mg three times daily.    Patient states she thinks the oxycodone was causing her to go into a-fib.  Explained to patient that it was most likely the pain, anxiety and stress she was experiencing from the surgery, pain and ER visits that was causing her to go into a-fib.      Spoke to INA Rangel and he called a prescription into patient's pharmacy for Tramadol for her ongoing pain.    Called patient back to let her know about the new pain medication and patient informed this nurse that she was back at the [facility] ED with an episode of A-Fib again.  Asked patient to call this nurse in the am with an update on her status.      Millicent Mina RN

## 2017-06-15 NOTE — LETTER
Transition Communication Hand-off for Care Transitions to Next Level of Care Provider    Name: Marilia Bean  MRN #: [id number]  Primary Care Provider: Herbert Epstein     Primary Clinic: [facility] [address] [place] [address] [address] DELLA DONG [id number]       Reason for Communication Hand-off Referral:  Notify of admit and discharge    Discharge Plan:Home with follow up with PCP and lab draw on 6/19/2017 for INR       Concern for non-adherence with plan of care:   Y/N No  Discharge Needs Assessment:      Follow-up specialty is recommended: Yes    Follow-up plan:  Future Appointments  Date Time Provider Department Center   6/23/2017 2:00 PM [facility]       Any outstanding tests or procedures:        Referrals     Future Labs/Procedures    Inr Clinic Referral     Comments:    Discharge coumadin 3mg daily  INR goal 2-3 for Atrial fibrillation  Next INR check 6/19/17  Lives in [place], [place]            Key Recommendations:      Aline Dent    AVS/Discharge Summary is the source of truth; this is a helpful guide for improved communication of patient story

## 2017-06-15 NOTE — TELEPHONE ENCOUNTER
Still with incisional pain, will give Toradol 50 mg tabs PO q 8 hrs PRN for pain.   Don't take with oxycodone.       Efren Rangel PA-C  Cardiothoracic Surgery  Pager [phone]    5:08 PM   Janey 15, 2017

## 2017-06-16 LAB
ALBUMIN UR-MCNC: 10 MG/DL
ANION GAP SERPL CALCULATED.3IONS-SCNC: 9 MMOL/L (ref 3–14)
APPEARANCE UR: CLEAR
BILIRUB UR QL STRIP: NEGATIVE
BUN SERPL-MCNC: 11 MG/DL (ref 7–30)
CALCIUM SERPL-MCNC: 8.4 MG/DL (ref 8.5–10.1)
CHLORIDE SERPL-SCNC: 103 MMOL/L (ref 94–109)
CO2 SERPL-SCNC: 25 MMOL/L (ref 20–32)
COLOR UR AUTO: YELLOW
CREAT SERPL-MCNC: 0.71 MG/DL (ref 0.52–1.04)
ERYTHROCYTE [DISTWIDTH] IN BLOOD BY AUTOMATED COUNT: 13.9 % (ref 10–15)
GFR SERPL CREATININE-BSD FRML MDRD: 81 ML/MIN/1.7M2
GLUCOSE SERPL-MCNC: 113 MG/DL (ref 70–99)
GLUCOSE UR STRIP-MCNC: NEGATIVE MG/DL
HCT VFR BLD AUTO: 27.6 % (ref 35–47)
HGB BLD-MCNC: 8.9 G/DL (ref 11.7–15.7)
HGB UR QL STRIP: NEGATIVE
KETONES UR STRIP-MCNC: NEGATIVE MG/DL
LACTATE BLD-SCNC: 1 MMOL/L (ref 0.7–2.1)
LEUKOCYTE ESTERASE UR QL STRIP: ABNORMAL
MCH RBC QN AUTO: 29.3 PG (ref 26.5–33)
MCHC RBC AUTO-ENTMCNC: 32.2 G/DL (ref 31.5–36.5)
MCV RBC AUTO: 91 FL (ref 78–100)
MUCOUS THREADS #/AREA URNS LPF: PRESENT /LPF
NITRATE UR QL: NEGATIVE
PH UR STRIP: 5.5 PH (ref 5–7)
PLATELET # BLD AUTO: 523 10E9/L (ref 150–450)
POTASSIUM SERPL-SCNC: 3.4 MMOL/L (ref 3.4–5.3)
RBC # BLD AUTO: 3.04 10E12/L (ref 3.8–5.2)
RBC #/AREA URNS AUTO: 4 /HPF (ref 0–2)
SODIUM SERPL-SCNC: 136 MMOL/L (ref 133–144)
SP GR UR STRIP: 1.01 (ref 1–1.03)
SQUAMOUS #/AREA URNS AUTO: 2 /HPF (ref 0–1)
TRANS CELLS #/AREA URNS HPF: <1 /HPF (ref 0–1)
URN SPEC COLLECT METH UR: ABNORMAL
UROBILINOGEN UR STRIP-MCNC: NORMAL MG/DL (ref 0–2)
WBC # BLD AUTO: 16.7 10E9/L (ref 4–11)
WBC #/AREA URNS AUTO: 8 /HPF (ref 0–2)

## 2017-06-16 PROCEDURE — 83605 ASSAY OF LACTIC ACID: CPT | Performed by: THORACIC SURGERY (CARDIOTHORACIC VASCULAR SURGERY)

## 2017-06-16 PROCEDURE — 36415 COLL VENOUS BLD VENIPUNCTURE: CPT | Performed by: STUDENT IN AN ORGANIZED HEALTH CARE EDUCATION/TRAINING PROGRAM

## 2017-06-16 PROCEDURE — 25000132 ZZH RX MED GY IP 250 OP 250 PS 637: Mod: GY | Performed by: PHYSICIAN ASSISTANT

## 2017-06-16 PROCEDURE — 36415 COLL VENOUS BLD VENIPUNCTURE: CPT | Performed by: THORACIC SURGERY (CARDIOTHORACIC VASCULAR SURGERY)

## 2017-06-16 PROCEDURE — 25000125 ZZHC RX 250: Performed by: STUDENT IN AN ORGANIZED HEALTH CARE EDUCATION/TRAINING PROGRAM

## 2017-06-16 PROCEDURE — A9270 NON-COVERED ITEM OR SERVICE: HCPCS | Mod: GY | Performed by: STUDENT IN AN ORGANIZED HEALTH CARE EDUCATION/TRAINING PROGRAM

## 2017-06-16 PROCEDURE — 25000128 H RX IP 250 OP 636: Performed by: STUDENT IN AN ORGANIZED HEALTH CARE EDUCATION/TRAINING PROGRAM

## 2017-06-16 PROCEDURE — 21400006 ZZH R&B CCU INTERMEDIATE UMMC

## 2017-06-16 PROCEDURE — 81001 URINALYSIS AUTO W/SCOPE: CPT | Performed by: PHYSICIAN ASSISTANT

## 2017-06-16 PROCEDURE — 99233 SBSQ HOSP IP/OBS HIGH 50: CPT | Mod: GC | Performed by: INTERNAL MEDICINE

## 2017-06-16 PROCEDURE — 80048 BASIC METABOLIC PNL TOTAL CA: CPT | Performed by: STUDENT IN AN ORGANIZED HEALTH CARE EDUCATION/TRAINING PROGRAM

## 2017-06-16 PROCEDURE — A9270 NON-COVERED ITEM OR SERVICE: HCPCS | Mod: GY | Performed by: PHYSICIAN ASSISTANT

## 2017-06-16 PROCEDURE — 25000132 ZZH RX MED GY IP 250 OP 250 PS 637: Mod: GY

## 2017-06-16 PROCEDURE — 85027 COMPLETE CBC AUTOMATED: CPT | Performed by: PHYSICIAN ASSISTANT

## 2017-06-16 PROCEDURE — A9270 NON-COVERED ITEM OR SERVICE: HCPCS | Mod: GY

## 2017-06-16 PROCEDURE — 25000132 ZZH RX MED GY IP 250 OP 250 PS 637: Mod: GY | Performed by: STUDENT IN AN ORGANIZED HEALTH CARE EDUCATION/TRAINING PROGRAM

## 2017-06-16 PROCEDURE — 36415 COLL VENOUS BLD VENIPUNCTURE: CPT | Performed by: PHYSICIAN ASSISTANT

## 2017-06-16 RX ORDER — ASPIRIN 81 MG/1
81 TABLET ORAL DAILY
Status: DISCONTINUED | OUTPATIENT
Start: 2017-06-17 | End: 2017-06-17 | Stop reason: HOSPADM

## 2017-06-16 RX ORDER — LEVOFLOXACIN 250 MG/1
250 TABLET, FILM COATED ORAL DAILY
Status: DISCONTINUED | OUTPATIENT
Start: 2017-06-16 | End: 2017-06-17 | Stop reason: HOSPADM

## 2017-06-16 RX ORDER — AMIODARONE HYDROCHLORIDE 200 MG/1
400 TABLET ORAL 2 TIMES DAILY
Status: DISCONTINUED | OUTPATIENT
Start: 2017-06-16 | End: 2017-06-17 | Stop reason: HOSPADM

## 2017-06-16 RX ORDER — WARFARIN SODIUM 3 MG/1
3 TABLET ORAL
Status: COMPLETED | OUTPATIENT
Start: 2017-06-16 | End: 2017-06-16

## 2017-06-16 RX ORDER — METOPROLOL TARTRATE 25 MG/1
25 TABLET, FILM COATED ORAL 2 TIMES DAILY
Status: DISCONTINUED | OUTPATIENT
Start: 2017-06-16 | End: 2017-06-17 | Stop reason: HOSPADM

## 2017-06-16 RX ORDER — DEXTROMETHORPHAN POLISTIREX 30 MG/5ML
30 SUSPENSION ORAL 2 TIMES DAILY PRN
Status: DISCONTINUED | OUTPATIENT
Start: 2017-06-16 | End: 2017-06-17 | Stop reason: HOSPADM

## 2017-06-16 RX ADMIN — DEXTROMETHORPHAN POLISTIREX 30 MG: 30 SUSPENSION ORAL at 22:35

## 2017-06-16 RX ADMIN — DEXTROSE 0.5 MG/MIN: 5 SOLUTION INTRAVENOUS at 11:45

## 2017-06-16 RX ADMIN — POTASSIUM CHLORIDE 40 MEQ: 750 TABLET, EXTENDED RELEASE ORAL at 12:27

## 2017-06-16 RX ADMIN — DEXTROSE 1 MG/MIN: 5 SOLUTION INTRAVENOUS at 05:10

## 2017-06-16 RX ADMIN — GABAPENTIN 100 MG: 100 CAPSULE ORAL at 13:45

## 2017-06-16 RX ADMIN — ATORVASTATIN CALCIUM 20 MG: 20 TABLET, FILM COATED ORAL at 20:37

## 2017-06-16 RX ADMIN — METOPROLOL TARTRATE 25 MG: 25 TABLET ORAL at 10:57

## 2017-06-16 RX ADMIN — AMIODARONE HYDROCHLORIDE 400 MG: 200 TABLET ORAL at 20:37

## 2017-06-16 RX ADMIN — METOPROLOL TARTRATE 25 MG: 25 TABLET ORAL at 20:37

## 2017-06-16 RX ADMIN — AMIODARONE HYDROCHLORIDE 400 MG: 200 TABLET ORAL at 13:45

## 2017-06-16 RX ADMIN — ATORVASTATIN CALCIUM 20 MG: 20 TABLET, FILM COATED ORAL at 01:05

## 2017-06-16 RX ADMIN — LISINOPRIL 7.5 MG: 2.5 TABLET ORAL at 08:55

## 2017-06-16 RX ADMIN — DEXTROSE 1 MG/MIN: 5 SOLUTION INTRAVENOUS at 01:06

## 2017-06-16 RX ADMIN — GABAPENTIN 100 MG: 100 CAPSULE ORAL at 01:05

## 2017-06-16 RX ADMIN — LEVOFLOXACIN 250 MG: 250 TABLET, FILM COATED ORAL at 15:00

## 2017-06-16 RX ADMIN — GABAPENTIN 100 MG: 100 CAPSULE ORAL at 08:55

## 2017-06-16 RX ADMIN — RIVAROXABAN 20 MG: 20 TABLET, FILM COATED ORAL at 01:05

## 2017-06-16 RX ADMIN — PANTOPRAZOLE SODIUM 40 MG: 40 TABLET, DELAYED RELEASE ORAL at 08:55

## 2017-06-16 RX ADMIN — GABAPENTIN 100 MG: 100 CAPSULE ORAL at 20:37

## 2017-06-16 RX ADMIN — WARFARIN SODIUM 3 MG: 3 TABLET ORAL at 18:34

## 2017-06-16 RX ADMIN — ASPIRIN 325 MG: 325 TABLET, DELAYED RELEASE ORAL at 08:55

## 2017-06-16 NOTE — PROGRESS NOTES
CVTS Progress Note  6/16/2017   Attending provider: Akshat Soto,*        S:    No acute issues over night.   Nerve pain improved with gabapentin, Otherwise no acute complaints  Patient denies SOB, CP, fever, chills, sweats. Denies any dysuria.     Patient has been tolerating diet. + BM.   Ambulating in halls. NSR since starting IV amio.     O:   Vitals:    06/16/17 0500 06/16/17 0756 06/16/17 1156 06/16/17 1500   BP: 144/77 147/79 164/90 (!) 169/93   BP Location: Right arm Right arm Right arm Right arm   Resp:  20 18 16   Temp:  97.7  F (36.5  C) 98.1  F (36.7  C) 99.1  F (37.3  C)   TempSrc:  Oral Oral Oral   SpO2: 93% 96% 96% 90%   Weight:         HR range 24 hours: Heart Rate: 71 Heart Rate  Min: 67  Max: 158    Gen: A&Ox3, NAD  Neck: No JVD   CV: RRR, no murmurs, rubs or gallops   Pulm: Lungs CTA, No wheezing or rhonchi  Abd: Soft, NT, ND, +BS  Ext: No LE edema  Incision: c/d/i, no erythema, sternum stable  Tubes/drains: Dressing clean and dry    Labs:   BMP    Recent Labs  Lab 06/16/17  0651 06/15/17  1740 06/14/17  0815 06/13/17  0800    140 138 138   POTASSIUM 3.4 3.3* 4.1 3.9   CHLORIDE 103 106 104 107   TARIQ 8.4* 8.1* 8.4* 8.5   CO2 25 25 25 23   BUN 11 14 12 15   CR 0.71 0.87 0.91 0.82   * 118* 112* 99     CBC    Recent Labs  Lab 06/16/17  1307 06/15/17  1740 06/14/17  0815 06/12/17  1745   WBC 16.7* 13.2* 13.0* 10.9   RBC 3.04* 2.89* 3.20* 3.18*   HGB 8.9* 8.4* 9.3* 9.4*   HCT 27.6* 26.4* 29.3* 29.3*   MCV 91 91 92 92   MCH 29.3 29.1 29.1 29.6   MCHC 32.2 31.8 31.7 32.1   RDW 13.9 13.8 13.6 13.8   * 428 390 363     INR    Recent Labs  Lab 06/12/17  1745   INR 0.95      Hepatic Panel   Lab Results   Component Value Date    AST 26 06/15/2017     Lab Results   Component Value Date    ALT 32 06/15/2017     Lab Results   Component Value Date    BILICONJ 0.0 01/02/2009      Lab Results   Component Value Date    BILITOTAL 0.4 06/15/2017     Lab Results   Component Value Date     ALBUMIN 2.5 06/15/2017     Lab Results   Component Value Date    PROTTOTAL 6.6 06/15/2017      Lab Results   Component Value Date    ALKPHOS 128 06/15/2017       Recent Labs  Lab 06/16/17  0651 06/15/17  1740 06/14/17  0815 06/13/17  0800 06/12/17  1745 06/10/17  0729 06/10/17  0718   * 118* 112* 99 125*  --  124*   BGM  --   --   --   --   --  108*  --        Imaging: CXR 6/15:  Median sternotomy wires appear intact. Enlarged cardiac  silhouette is again seen and unchanged. Small left pleural effusion  with associated left basilar atelectasis/consolidation is unchanged.  Right lung is clear. No pneumothorax on either side.      A/P: Patient is a 72 yr old woman POD 10 s/p CABG and aortic aneurysm repair with recurrent postop A-fib with RVR now converted to sinus rhythm on amiodarone gtt.      Neuro:   - Acute postop pain: tylenol, oxycodone prn. Gabapentin for nerve pain, improving    CV:   - ASA 81 mg, atorvastatin 20 qd. Metoprolol 25 mg bid. Lisinopril 7.5 mg daily  - HTN: BP elevated today, restarted metoprolol 25 mg BID, continue to monitor, goal SBP less than 130  - Post op atrial fibrillation - multiple ED visits in past week, cards consult. Converted with IV amio, start PO amio at 400 mg BID (was on 200 BID).     Resp:  - IS, encourage activity      FEN/GI:   - regular diet, bowel regimen, + BM    Renal/:   - Creatinine at baseline, adequate UOP  - Volume status: euvolemic  - UTI - Large LE and 8 WBC in urine, start levaquin 250 mg daily      ID: Completed og-op abx,   - WBC 13->16, afebrile, no signs or symptoms of infection, UA as above, starting levaquin for UTI    Heme:   - Hgb 8.4->8.9, no signs or symptoms of bleeding      Endo:   - BG well controlled      PPx:   - PPI      Anticoagulation:   - Xarelto as outpatient, d/c and start coumadin, goal 2-3, no need to bridge if remains in sinus      Dispo:   - 6C since 6/15  - D/c to home when stable off IV amio    Staff surgeons have been  informed of changes through both verbal and written communication.       Kye Vasques PA-C  June 16, 2017 4:00 PM   p. [phone]

## 2017-06-16 NOTE — PROGRESS NOTES
Care Coordinator Progress Note     Admission Date/Time:  6/15/2017  Attending MD:  Akshat Soto  Data  Chart reviewed, discussed with interdisciplinary team.   Patient with recent history of CABG and aortic aneurysm repair admitted with a fib and RVR.  Concerns with insurance coverage for discharge needs: None.  Current Living Situation: Patient lives with spouse.  Support System: Supportive and Involved spouse  Services Involved: None currently  Transportation: Family or Friend will provide  Barriers to Discharge: Medical plan of care    Coordination of Care and Referrals: No home care needs anticipated. Pt states she has been adjusting alright at home, but has been limited by symptomatic a fib and neuropathy pain. Pt states she would like to return home upon discharge and states she has adequate family support.  Assessment  Pt currently on telemetry cardiac monitoring and IV amiodarone drip inpatient.  Unable to complete discharge planning at this time.     Plan  Anticipated Discharge Date: TBD  Anticipated Discharge Plan: Discharge to home   CC will continue to monitor patient's medical condition and progress towards discharge.  Mona Díaz RN BSN  6C Unit Care Coordinator  Phone number: [phone]  Pager: [phone]

## 2017-06-16 NOTE — H&P
Cardiothoracic Surgery History & Physical    Marilia Bean MRN# [id number]   Age: 72 year old YOB: 1945     Date of Admission:  6/15/2017         Assessment and Plan:   Assessment:   Marilia Bean is a 72 yr old woman POD 10 s/p CABG and aortic aneurysm repair with recurrent postop A-fib with RVR now converted to sinus rhythm on amiodarone gtt.        Plan:   - Admit to CVTS  - Continue amiodarone gtt  - Restart home meds  - Begin gabapentin for neuropathic pain in left upper extremity  - Regular diet    Discussed with fellow, Dr. Olivarez, who will discuss with staff.    Justice Florian MD  PGY-1 General Surgery            Chief Complaint:   Afib with RVR         History of Present Illness:   Marilia Bean is a 73yo female s/p CAB and aortic aneurysm repair on 6/5/17 transferred from [facility] ED for AFib in RVR. She reports heart palpitations, shortness of breath, easy fatigability and weakness. Notably she has presented several times to her local ED since postop discharge from [facility] for episodes of Afib, usually successfully converted with a dose of amiodarone. She was noted in [facility] ED to be tachycardic to 140-150s, normal BPs. She was started on amiodarone infusion and transferred to [facility]. Upon arrival on floor here she was initially -130s with slight dyspnea, shortly found to be converted back to sinus rhythm with HR 80s, normal range BPs, and resolution of dyspnea.    She also notes LUE pain and numbness in the ulnar nerve distribution. She denies chest pain, ROM restriction, nausea, vomiting, and fever. She also states that she has been unable to do her physical therapy requirements due to these symptoms.          Past Medical History:     Past Medical History:   Diagnosis Date     Aortic aneurysm (H) 7/1/2007    see 7/07 [facility] report -follow yearly, treat high BP is occurs Problem list name updated by automated process. Provider to review     Aortic aneurysm of unspecified site without  mention of rupture 7/2007    4.4 cm ascending aorta noted in 2007     Asymptomatic postmenopausal status (age-related) (natural)     on HRT  Prempro -weaning off 5/'2004     CAD (coronary artery disease)     LAD     Family history of Gardiner syndrome      Hyperlipidemia LDL goal <100 10/31/2010     Hypertension goal BP (blood pressure) < 140/90 2/9/2016     Leiomyoma of uterus, unspecified     Uterine fibroid     Lump or mass in breast 7/2004    rt. nodule - bx neg     Personal history of colonic polyps      Postmenopausal atrophic vaginitis 8/20/2006     Pulmonary nodule     incidental noted in 2007 during [place]     Pure hypercholesterolemia     mild, diet - low cholesterol, low fat.10/06 start Lovastatin             Past Surgical History:     Past Surgical History:   Procedure Laterality Date     BYPASS GRAFT ARTERY CORONARY N/A 6/5/2017    Procedure: BYPASS GRAFT ARTERY CORONARY;;  Surgeon: Akshat Soto MD;  Location: [facility] OR     C DEXA INTERPRETATION, AXIAL  12/21/01    wnl. 7/2005 wnl but lower     COLONOSCOPY  05/07/07    Repeat in 1 year for surveillance     COLONOSCOPY  12/10/08    repeat 1 year  -see 1/2009 letter     COLONOSCOPY  03/31/10     COLONOSCOPY  10/31/2011    Procedure:COMBINED COLONOSCOPY, SINGLE BIOPSY/POLYPECTOMY BY BIOPSY; colonoscopy with polypectomy by biopsy; Surgeon:JESSICA GARCIA; Location: GI     COLONOSCOPY  12/6/2013    Procedure: COMBINED COLONOSCOPY, SINGLE BIOPSY/POLYPECTOMY BY BIOPSY;  Colonoscopy, Polypectomies;  Surgeon: Herbert Salinas MD;  Location:  GI     COLONOSCOPY N/A 12/8/2015    Procedure: COLONOSCOPY;  Surgeon: Jared Carbajal MD;  Location:  GI     COLONOSCOPY N/A 4/26/2017    Procedure: COMBINED COLONOSCOPY, SINGLE OR MULTIPLE BIOPSY/POLYPECTOMY BY BIOPSY;  Colonoscopy with polypectomies with forceps and snare;  Surgeon: Herbert Salinas MD;  Location:  GI     ESOPHAGOSCOPY, GASTROSCOPY, DUODENOSCOPY (EGD), COMBINED N/A 12/8/2015     Procedure: COMBINED ESOPHAGOSCOPY, GASTROSCOPY, DUODENOSCOPY (EGD), BIOPSY SINGLE OR MULTIPLE;  Surgeon: Jared Carbajal MD;  Location: [facility]     HC BIOPSY BREAST, PERC NEEDLE CORE, WITH IMAGING  8/17/2004    Right     HC COLONOSCOPY THRU STOMA W BIOPSY/CAUTERY TUMOR/POLYP/LESION  1999,2002 2004 polyp - hyperplastic - repeat 5 years ?     HC COLONOSCOPY W/WO BRUSH/WASH  12/12/2005    Polypectomy.  Diverticulosis-minimal. Bx adenomatous and mucosal polyps - repeat 1 year     HC ECP WITH CATARACT SURGERY Bilateral 2015, 2016     HC LAPAROSCOPY, SURGICAL; APPENDECTOMY  10/31/2004     HC LAPAROSCOPY, SURGICAL; CHOLECYSTECTOMY  2000    Cholecystectomy, Laparoscopic     HC REVISE MEDIAN N/CARPAL TUNNEL SURG  10/01/10    left     HC UGI ENDOSCOPY, SIMPLE EXAM  03/31/10     HYSTERECTOMY, ELVIN  12/14/09    TAHBSO, MMK     REPAIR ANEURYSM ASCENDING AORTA N/A 6/5/2017    Procedure: REPAIR ANEURYSM ASCENDING AORTA;  Median Sternotomy, Ascending Aortic Aneurysm Repair, Coronary Artery Bypass Graft x1 on pump oxygenator;  Surgeon: Akshat Soto MD;  Location: [facility] OR             Social History:     Social History   Substance Use Topics     Smoking status: Never Smoker     Smokeless tobacco: Never Used     Alcohol use Yes      Comment: rarely             Family History:     Family History   Problem Relation Age of Onset     CANCER Mother      Colon Cancer     HEART DISEASE Mother      MI     OSTEOPOROSIS Mother      CANCER Father      Colon Cancer     HEART DISEASE Father      Heart Disease/ Heart attacks     DIABETES Father      Adult Onset     CANCER Sister      Colon Cancer     HEART DISEASE Brother      Heart attacks at age 45     Breast Cancer Sister      CANCER Paternal Grandmother      Unknown type     HEART DISEASE Maternal Grandfather      MI     DIABETES Sister      Adult Onset     DIABETES Sister      Adult Onset     DIABETES Brother      Adult Onset     HEART DISEASE Brother      heart attack age 64      Cancer - colorectal Son      age 36, Gardiner syndrome                Allergies:     Allergies   Allergen Reactions     Contrast Dye Itching             Medications:     Current Facility-Administered Medications   Medication     acetaminophen (TYLENOL) tablet 650 mg     [START ON 6/16/2017] aspirin EC EC tablet 325 mg     atorvastatin (LIPITOR) tablet 20 mg     cyclobenzaprine (FLEXERIL) tablet 5 mg     [START ON 6/16/2017] lisinopril (PRINIVIL/Zestril) tablet 7.5 mg     oxyCODONE (ROXICODONE) IR tablet 5-10 mg     [START ON 6/16/2017] pantoprazole (PROTONIX) EC tablet 40 mg     polyethylene glycol (MIRALAX/GLYCOLAX) Packet 17 g     rivaroxaban ANTICOAGULANT (XARELTO) tablet 20 mg     senna-docusate (SENOKOT-S;PERICOLACE) 8.6-50 MG per tablet 1-2 tablet     traMADol (ULTRAM) tablet  mg     naloxone (NARCAN) injection 0.1-0.4 mg     lidocaine 1 % 1 mL     lidocaine (LMX4) kit     sodium chloride (PF) 0.9% PF flush 3 mL     sodium chloride (PF) 0.9% PF flush 3 mL     ondansetron (ZOFRAN-ODT) ODT tab 4 mg    Or     ondansetron (ZOFRAN) injection 4 mg     gabapentin (NEURONTIN) capsule 100 mg     potassium chloride SA (K-DUR/KLOR-CON M) CR tablet 20-40 mEq     potassium chloride (KLOR-CON) Packet 20-40 mEq     potassium chloride 10 mEq in 100 mL intermittent infusion     potassium chloride 10 mEq in 100 mL intermittent infusion with 10 mg lidocaine     potassium chloride 20 mEq in 50 mL intermittent infusion     magnesium sulfate 2 g in NS intermittent infusion ([facility]ar[facility] or [facility] Cmpd)     magnesium sulfate 4 g in 100 mL sterile water (premade)               Review of Systems:   10-point ROS otherwise negative except as noted above.          Physical Exam:   All vitals have been reviewed  Temp:  [97.5  F (36.4  C)-98.5  F (36.9  C)] 97.5  F (36.4  C)  Pulse:  [117] 117  Heart Rate:  [] 80  Resp:  [10-34] 16  BP: (103-149)/() 148/78  SpO2:  [93 %-98 %] 94 %    No intake or output data in the 24 hours  ending 06/15/17 2302    Physical Exam:  Gen: alert, responsive, NAD  CV: tachycardic, converted to sinus rhythm then RRR  Pulm: mild dyspnea on RA. CTAB. Incisions C/D/I.  Abd: soft, NTND  Ext: WWP          Data:   All laboratory data reviewed    Results:  BMP  Recent Labs  Lab 06/15/17  1740 06/14/17  0815 06/13/17  0800 06/12/17  1745    138 138 140   POTASSIUM 3.3* 4.1 3.9 3.9   CHLORIDE 106 104 107 103   CO2 25 25 23 27   BUN 14 12 15 18   CR 0.87 0.91 0.82 0.98   * 112* 99 125*     CBC  Recent Labs  Lab 06/15/17  1740 06/14/17  0815 06/12/17  1745 06/10/17  0718   WBC 13.2* 13.0* 10.9 7.0   HGB 8.4* 9.3* 9.4* 8.9*    390 363 197     LFT  Recent Labs  Lab 06/15/17  1740 06/12/17  1745   AST 26 36   ALT 32 39   ALKPHOS 128 116   BILITOTAL 0.4 0.5   ALBUMIN 2.5* 2.6*   INR  --  0.95       Recent Labs  Lab 06/15/17  1740 06/14/17  0815 06/13/17  0800 06/12/17  1745 06/10/17  0729 06/10/17  0718 06/09/17  1156 06/09/17  0658   * 112* 99 125*  --  124*  --  118*   BGM  --   --   --   --  108*  --  83  --        Imaging:  N/A      Justice Florian MD  PGY-1 General Surgery

## 2017-06-16 NOTE — PROGRESS NOTES
Admission  Diagnosis:     Admitted from: [facility] ED  Via: Ambulance   Accompanied: Son, EMT  Belongings: in room   Admission Profile: complete  Teaching: call don't fall, notify staff with additional chest pain, palpitations, sob, possible procedures, room orientation  Access: Right PIV  Telemetry: Yes  Ht/Wt: see flowsheet    Patient admitted with AFIB RVR then converted to sinus within 1 hour of admission. Arrived with Amiodarone running @ 1 mg/hr (started at [facility] 1800). Up independent. Incision sites from previous CABG WDL. No complaints of SOB once patient converted to sinus. Complains of left shoulder pain radiating to fingertips. Possible nerve pain after surgery. Gave 100mg tramadol. Surgical cross cover approved of running amiodarone at 1 mg/hr until morning team assessed. Patient pleasant and cooperative with cares.

## 2017-06-16 NOTE — PHARMACY-ANTICOAGULATION SERVICE
Clinical Pharmacy - Warfarin Dosing Consult     Pharmacy has been consulted to manage this patient s warfarin therapy.  Indication: Atrial Fibrillation  Therapy Goal: INR 2-3  Warfarin Prior to Admission: No  Significant drug interactions: aspirin, amiodarone, levofloxacin    INR   Date Value Ref Range Status   06/12/2017 0.95 0.86 - 1.14 Final   06/06/2017 1.30 (H) 0.86 - 1.14 Final       Recommend warfarin 3 mg today.  Pharmacy will monitor Marilia Bean daily and order warfarin doses to achieve specified goal.      Please contact pharmacy as soon as possible if the warfarin needs to be held for a procedure or if the warfarin goals change.

## 2017-06-16 NOTE — PLAN OF CARE
"Vitals:    06/15/17 2221 06/15/17 2234 06/16/17 0157 06/16/17 0500   BP: 149/82 148/78 135/82 144/77   BP Location: Right arm  Left arm Right arm   Resp:   22    Temp:   97.8  F (36.6  C)    TempSrc:   Oral    SpO2: 94%  92% 93%   Weight: 81.1 kg (178 lb 14.4 oz)        Afebrile, 92-93% RA, HR 67-83, other VSS. A&O, able to make needs known. Denies nausea and SOB. Pt states having \"nerve pain\" in the shoulder area, scheduled gabapentin, hot pack given & self repositioned, partially effective. Pt declined pain medication (Oxycodone). Midline incision dermabonded, CDI. Old drain sites on abdomen CDI. R groin incision CDI. Amiodarone infusing 60ml/hr rate/dose to be changed to 30ml/hr at 0700. Voiding spontaneously, not saving. No BM this shift. Up ad tyson. Appeared to be resting between cares. Continue to monitor.    "

## 2017-06-16 NOTE — CONSULTS
CARDIOLOGY CONSULTATION    Name: Marilia Bean MRN: [id number]     Age: 72 year old   YOB: 1945            HPI:   Reason for Consultation: AFib/RVR    History is obtained from the patient and per chart review.    72F, with a PMH of CAD s/p CABG (LIMA to LAD on 6/5/17), ascending aortic aneurysm s/p vascular graft 6/5/17, and HTN,cardiology consulted for paroxysmal post op AFib.    Pt initially admitted Monday evening for AF/RVR, loaded with amio IV and discharged on 200 mg PO BID on Tuesday morning after she had converted to NSR. Presented to the ED on Wednesday again for AF/RVR as well as nausea. Received a part of her amio IV laod (3 hrs 1 mg/min), and converted to NSR, this time discharged on 200 mg TID. Comes in again Thursday evening, with fatigue and palpitations and was again found to be in AF/RVR.    She was once again started on an amio load, and once again converted to NSR and her symptoms subsided.          Past Medical History:     Past Medical History:   Diagnosis Date     Aortic aneurysm (H) 7/1/2007    see 7/07 [place] report -follow yearly, treat high BP is occurs Problem list name updated by automated process. Provider to review     Aortic aneurysm of unspecified site without mention of rupture 7/2007    4.4 cm ascending aorta noted in 2007     Asymptomatic postmenopausal status (age-related) (natural)     on HRT  Prempro -weaning off 5/'2004     CAD (coronary artery disease)     LAD     Family history of Gardiner syndrome      Hyperlipidemia LDL goal <100 10/31/2010     Hypertension goal BP (blood pressure) < 140/90 2/9/2016     Leiomyoma of uterus, unspecified     Uterine fibroid     Lump or mass in breast 7/2004    rt. nodule - bx neg     Personal history of colonic polyps      Postmenopausal atrophic vaginitis 8/20/2006     Pulmonary nodule     incidental noted in 2007 during [place]     Pure hypercholesterolemia     mild, diet - low cholesterol, low fat.10/06 start Lovastatin              Past Surgical History:      Past Surgical History:   Procedure Laterality Date     BYPASS GRAFT ARTERY CORONARY N/A 6/5/2017    Procedure: BYPASS GRAFT ARTERY CORONARY;;  Surgeon: Akshat Soto MD;  Location: [facility] OR     C DEXA INTERPRETATION, AXIAL  12/21/01    wnl. 7/2005 wnl but lower     COLONOSCOPY  05/07/07    Repeat in 1 year for surveillance     COLONOSCOPY  12/10/08    repeat 1 year  -see 1/2009 letter     COLONOSCOPY  03/31/10     COLONOSCOPY  10/31/2011    Procedure:COMBINED COLONOSCOPY, SINGLE BIOPSY/POLYPECTOMY BY BIOPSY; colonoscopy with polypectomy by biopsy; Surgeon:JESSICA GARCIA; Location:[facility]     COLONOSCOPY  12/6/2013    Procedure: COMBINED COLONOSCOPY, SINGLE BIOPSY/POLYPECTOMY BY BIOPSY;  Colonoscopy, Polypectomies;  Surgeon: Herbert Salinas MD;  Location: [facility]     COLONOSCOPY N/A 12/8/2015    Procedure: COLONOSCOPY;  Surgeon: Jared Carbajal MD;  Location:  GI     COLONOSCOPY N/A 4/26/2017    Procedure: COMBINED COLONOSCOPY, SINGLE OR MULTIPLE BIOPSY/POLYPECTOMY BY BIOPSY;  Colonoscopy with polypectomies with forceps and snare;  Surgeon: Herbert Salinas MD;  Location:  GI     ESOPHAGOSCOPY, GASTROSCOPY, DUODENOSCOPY (EGD), COMBINED N/A 12/8/2015    Procedure: COMBINED ESOPHAGOSCOPY, GASTROSCOPY, DUODENOSCOPY (EGD), BIOPSY SINGLE OR MULTIPLE;  Surgeon: Jared Carbajal MD;  Location:  GI     HC BIOPSY BREAST, PERC NEEDLE CORE, WITH IMAGING  8/17/2004    Right     HC COLONOSCOPY THRU STOMA W BIOPSY/CAUTERY TUMOR/POLYP/LESION  1999,2002 2004 polyp - hyperplastic - repeat 5 years ?     HC COLONOSCOPY W/WO BRUSH/WASH  12/12/2005    Polypectomy.  Diverticulosis-minimal. Bx adenomatous and mucosal polyps - repeat 1 year     HC ECP WITH CATARACT SURGERY Bilateral 2015, 2016     HC LAPAROSCOPY, SURGICAL; APPENDECTOMY  10/31/2004     HC LAPAROSCOPY, SURGICAL; CHOLECYSTECTOMY  2000    Cholecystectomy, Laparoscopic     HC REVISE MEDIAN N/CARPAL TUNNEL SURG   10/01/10    left     HC UGI ENDOSCOPY, SIMPLE EXAM  03/31/10     HYSTERECTOMY, ELVIN  12/14/09    TAHBSO, MMK     REPAIR ANEURYSM ASCENDING AORTA N/A 6/5/2017    Procedure: REPAIR ANEURYSM ASCENDING AORTA;  Median Sternotomy, Ascending Aortic Aneurysm Repair, Coronary Artery Bypass Graft x1 on pump oxygenator;  Surgeon: Akshat Soto MD;  Location:  OR             Social History:     Social History   Substance Use Topics     Smoking status: Never Smoker     Smokeless tobacco: Never Used     Alcohol use Yes      Comment: rarely             Family History:     Family History   Problem Relation Age of Onset     CANCER Mother      Colon Cancer     HEART DISEASE Mother      MI     OSTEOPOROSIS Mother      CANCER Father      Colon Cancer     HEART DISEASE Father      Heart Disease/ Heart attacks     DIABETES Father      Adult Onset     CANCER Sister      Colon Cancer     HEART DISEASE Brother      Heart attacks at age 45     Breast Cancer Sister      CANCER Paternal Grandmother      Unknown type     HEART DISEASE Maternal Grandfather      MI     DIABETES Sister      Adult Onset     DIABETES Sister      Adult Onset     DIABETES Brother      Adult Onset     HEART DISEASE Brother      heart attack age 64     Cancer - colorectal Son      age 36, Gardiner syndrome             Allergies:     Allergies   Allergen Reactions     Contrast Dye Itching             Medications:     Prescriptions Prior to Admission   Medication Sig Dispense Refill Last Dose     amiodarone (PACERONE/CODARONE) 200 MG tablet Take one pill three times a day for a week, then go to one tablet twice a day until you see cardiology. 40 tablet 0 6/15/2017 at 1300     rivaroxaban ANTICOAGULANT (XARELTO) 20 MG TABS tablet Take 1 tablet (20 mg) by mouth daily (with dinner) 90 tablet 0 6/14/2017 at 1800     lisinopril (PRINIVIL/ZESTRIL) 5 MG tablet Take 1.5 tablets (7.5 mg) by mouth daily 45 tablet 1 6/15/2017 at 0700     metoprolol (LOPRESSOR) 25 MG tablet  1 tablet (25 mg) by Oral or Feeding Tube route 2 times daily 60 tablet 1 6/15/2017 at 0700     atorvastatin (LIPITOR) 20 MG tablet Take 1 tablet (20 mg) by mouth every evening 90 tablet 1 6/14/2017 at 2000     cyclobenzaprine (FLEXERIL) 5 MG tablet Take 1 tablet (5 mg) by mouth 3 times daily as needed for muscle spasms or other (pain control while sleeping) 30 tablet 0 6/15/2017 at 0700     oxyCODONE (ROXICODONE) 5 MG IR tablet Take 1-2 tablets (5-10 mg) by mouth every 4 hours as needed for moderate to severe pain 50 tablet 0 Past Week at Unknown time     acetaminophen (TYLENOL) 325 MG tablet Take 2 tablets (650 mg) by mouth every 6 hours as needed for mild pain 100 tablet 0 6/15/2017 at 1600     aspirin  MG EC tablet Take 1 tablet (325 mg) by mouth daily 100 tablet 0 6/15/2017 at 0700     pantoprazole (PROTONIX) 40 MG EC tablet Take 1 tablet (40 mg) by mouth every morning 30 tablet 0 6/15/2017 at 0700     traMADol (ULTRAM) 50 MG tablet Take 1-2 tablets ( mg) by mouth every 8 hours as needed for pain maximum 6 tablet(s) per day 40 tablet 0  at has not started     polyethylene glycol (MIRALAX/GLYCOLAX) Packet Take 17 g by mouth daily as needed for constipation 10 packet 0 6/11/2017     senna-docusate (SENOKOT-S;PERICOLACE) 8.6-50 MG per tablet Take 1-2 tablets by mouth 2 times daily as needed for constipation (while using narcotics) 100 tablet 0 6/11/2017             Review of Systems:   GEN: Denies fevers, shaking chills, night sweats, decreased appetite, weight loss/gain  EYES: Denies blurry or double vision  EARS/NOSE/THROAT: Denies sore throat, dysphagia  RESP: She says her exercise capacity has declined since the surgery  CARDIO: Per HPI  ABD: Denies any abdominal pain, constipation, diarrhea, melena. Does endorse occasional nausea, but no emesis  : Denies dysuria or hematuria  MSK: Denies significant fatigue or muscle aches  NEURO: Denies headache, numbness/tingling, weakness  HEME: Denies  bruising, bleeding petechiae   ENDO: Denies heat/cold intolerance, polyuria, polydipsia  **Remainder of the ROS is negative except that commented on in HPI.         Exam:   BP (!) 169/93 (BP Location: Right arm)  Temp 99.1  F (37.3  C) (Oral)  Resp 16  Wt 81.1 kg (178 lb 14.4 oz)  SpO2 90%  BMI 27.61 kg/m2  GEN: aao x 3, NAD  Neck: No JVD elevation  LUNGS: No wheezing or rales  HEART: S1S2 audible, no murmurs or rubs. Regular rhythm  ABDOMEN: Soft, nt, nd. +BS  EXTREMITIES: Warm calves, +DPs, no LE edema  NEURO: aao x 3, no focal deficits           Data:   ROUTINE ICU LABS (Last four results)  CMP  Recent Labs  Lab 06/16/17  0651 06/15/17  1740 06/14/17  0815 06/13/17  0800 06/12/17  1745    140 138 138 140   POTASSIUM 3.4 3.3* 4.1 3.9 3.9   CHLORIDE 103 106 104 107 103   CO2 25 25 25 23 27   ANIONGAP 9 9 9 8 10   * 118* 112* 99 125*   BUN 11 14 12 15 18   CR 0.71 0.87 0.91 0.82 0.98   GFRESTIMATED 81 64 61 69 56*   GFRESTBLACK >90African American GFR Calc 78 73 83 68   TARIQ 8.4* 8.1* 8.4* 8.5 8.5   MAG  --   --   --  2.4*  --    PROTTOTAL  --  6.6*  --   --  6.4*   ALBUMIN  --  2.5*  --   --  2.6*   BILITOTAL  --  0.4  --   --  0.5   ALKPHOS  --  128  --   --  116   AST  --  26  --   --  36   ALT  --  32  --   --  39     CBC  Recent Labs  Lab 06/16/17  1307 06/15/17  1740 06/14/17  0815 06/12/17  1745   WBC 16.7* 13.2* 13.0* 10.9   RBC 3.04* 2.89* 3.20* 3.18*   HGB 8.9* 8.4* 9.3* 9.4*   HCT 27.6* 26.4* 29.3* 29.3*   MCV 91 91 92 92   MCH 29.3 29.1 29.1 29.6   MCHC 32.2 31.8 31.7 32.1   RDW 13.9 13.8 13.6 13.8   * 428 390 363     INR  Recent Labs  Lab 06/12/17  1745   INR 0.95     Arterial Blood GasNo lab results found in last 7 days.            Assessment and Recomendations:   Assessment and Recs:    - 72F, with a PMH of CAD s/p CABG (LIMA to LAD on 6/5/17), ascending aortic aneurysm s/p vascular graft 6/5/17, and HTN,cardiology consulted for paroxysmal post op AFib.    - Back in NSR. Per  tele review, she was paroxysmal AFib overnight  - Will recommend completing IV amiodarone load  - Discharge on 400 mg PO BID amiodarone until her cardiology follow up (next week)  - Will recommend continuing her Xarelto    Patient seen and discussed with Dr. Aquiles Mack MD  Cardiology Fellow  [phone]      CARDIOLOGY STAFF  Patient seen and examined by me.  History and physical examination discussed with Dr. Mack whose note reflects our joint assessment and recommendation/plans.  72 year old woman known to me from recent admission for her first documented episode of AF a few days after discharge from hospital for ascending aortic aneurysm repair and 1 vessel CABG.  She had been given IV amiodarone prior to transfer here and she had spontaneously converted to sinus rhythm.  We discharged her on amiodarone 200 mg po twice daily.  She returned the next day in AF and spontaneously converted again after partial IV amiodarone load.  Oral amiodarone increased to 200 tid.  She was discharged, but returned last night in AF.  She was again started on IV amiodarone.  She converted to sinus spontaneously, monitor review showed several conversions between AF and NSR, then remained in sinus until about 3 am when she had about 1 hour of AF.  We are seeing the effect of gradual amiodarone load.  Agree with current plan to increase her amiodarone to 400 mg twice daily.  If she has any GI upset she can take smaller, more frequent doses.  She has follow-up next week at which point I suggest decreasing amiodarone to 200 mg daily.  I would still suggest discontinuation after she has recovered from surgery, e.g., 4 weeks.  Thank you for asking us to see Mx. Tess Kwan

## 2017-06-17 ENCOUNTER — TELEPHONE (OUTPATIENT)
Dept: INTERNAL MEDICINE | Facility: CLINIC | Age: 72
End: 2017-06-17

## 2017-06-17 VITALS
SYSTOLIC BLOOD PRESSURE: 127 MMHG | WEIGHT: 175.5 LBS | BODY MASS INDEX: 27.08 KG/M2 | OXYGEN SATURATION: 94 % | DIASTOLIC BLOOD PRESSURE: 92 MMHG | TEMPERATURE: 98.4 F | RESPIRATION RATE: 18 BRPM

## 2017-06-17 LAB
ANION GAP SERPL CALCULATED.3IONS-SCNC: 7 MMOL/L (ref 3–14)
BUN SERPL-MCNC: 9 MG/DL (ref 7–30)
CALCIUM SERPL-MCNC: 8.6 MG/DL (ref 8.5–10.1)
CHLORIDE SERPL-SCNC: 106 MMOL/L (ref 94–109)
CO2 SERPL-SCNC: 25 MMOL/L (ref 20–32)
CREAT SERPL-MCNC: 0.79 MG/DL (ref 0.52–1.04)
ERYTHROCYTE [DISTWIDTH] IN BLOOD BY AUTOMATED COUNT: 13.9 % (ref 10–15)
GFR SERPL CREATININE-BSD FRML MDRD: 71 ML/MIN/1.7M2
GLUCOSE SERPL-MCNC: 91 MG/DL (ref 70–99)
HCT VFR BLD AUTO: 24.2 % (ref 35–47)
HGB BLD-MCNC: 8.1 G/DL (ref 11.7–15.7)
INR PPP: 1.34 (ref 0.86–1.14)
MCH RBC QN AUTO: 29.5 PG (ref 26.5–33)
MCHC RBC AUTO-ENTMCNC: 33.5 G/DL (ref 31.5–36.5)
MCV RBC AUTO: 88 FL (ref 78–100)
PLATELET # BLD AUTO: 412 10E9/L (ref 150–450)
POTASSIUM SERPL-SCNC: 3.9 MMOL/L (ref 3.4–5.3)
RBC # BLD AUTO: 2.75 10E12/L (ref 3.8–5.2)
SODIUM SERPL-SCNC: 138 MMOL/L (ref 133–144)
WBC # BLD AUTO: 10.9 10E9/L (ref 4–11)

## 2017-06-17 PROCEDURE — A9270 NON-COVERED ITEM OR SERVICE: HCPCS | Mod: GY | Performed by: STUDENT IN AN ORGANIZED HEALTH CARE EDUCATION/TRAINING PROGRAM

## 2017-06-17 PROCEDURE — 85027 COMPLETE CBC AUTOMATED: CPT | Performed by: PHYSICIAN ASSISTANT

## 2017-06-17 PROCEDURE — 36415 COLL VENOUS BLD VENIPUNCTURE: CPT | Performed by: PHYSICIAN ASSISTANT

## 2017-06-17 PROCEDURE — A9270 NON-COVERED ITEM OR SERVICE: HCPCS | Mod: GY | Performed by: PHYSICIAN ASSISTANT

## 2017-06-17 PROCEDURE — 25000132 ZZH RX MED GY IP 250 OP 250 PS 637: Mod: GY | Performed by: STUDENT IN AN ORGANIZED HEALTH CARE EDUCATION/TRAINING PROGRAM

## 2017-06-17 PROCEDURE — 85610 PROTHROMBIN TIME: CPT | Performed by: PHYSICIAN ASSISTANT

## 2017-06-17 PROCEDURE — 80048 BASIC METABOLIC PNL TOTAL CA: CPT | Performed by: PHYSICIAN ASSISTANT

## 2017-06-17 PROCEDURE — 25000132 ZZH RX MED GY IP 250 OP 250 PS 637: Mod: GY | Performed by: PHYSICIAN ASSISTANT

## 2017-06-17 RX ORDER — WARFARIN SODIUM 3 MG/1
3 TABLET ORAL DAILY
Qty: 7 TABLET | Refills: 0 | Status: SHIPPED | OUTPATIENT
Start: 2017-06-17 | End: 2017-06-22

## 2017-06-17 RX ORDER — AMIODARONE HYDROCHLORIDE 200 MG/1
400 TABLET ORAL 2 TIMES DAILY
Qty: 49 TABLET | Refills: 0 | Status: SHIPPED | OUTPATIENT
Start: 2017-06-17 | End: 2017-07-06

## 2017-06-17 RX ORDER — LEVOFLOXACIN 250 MG/1
250 TABLET, FILM COATED ORAL DAILY
Qty: 4 TABLET | Refills: 0 | Status: SHIPPED | OUTPATIENT
Start: 2017-06-17 | End: 2017-06-21

## 2017-06-17 RX ORDER — GABAPENTIN 100 MG/1
200 CAPSULE ORAL 3 TIMES DAILY
Qty: 180 CAPSULE | Refills: 0 | Status: SHIPPED | OUTPATIENT
Start: 2017-06-17 | End: 2017-08-25

## 2017-06-17 RX ORDER — GABAPENTIN 100 MG/1
200 CAPSULE ORAL 3 TIMES DAILY
Status: DISCONTINUED | OUTPATIENT
Start: 2017-06-17 | End: 2017-06-17 | Stop reason: HOSPADM

## 2017-06-17 RX ADMIN — LISINOPRIL 7.5 MG: 2.5 TABLET ORAL at 07:55

## 2017-06-17 RX ADMIN — POTASSIUM CHLORIDE 20 MEQ: 750 TABLET, EXTENDED RELEASE ORAL at 07:55

## 2017-06-17 RX ADMIN — ACETAMINOPHEN 650 MG: 325 TABLET, FILM COATED ORAL at 02:35

## 2017-06-17 RX ADMIN — GABAPENTIN 100 MG: 100 CAPSULE ORAL at 07:55

## 2017-06-17 RX ADMIN — LEVOFLOXACIN 250 MG: 250 TABLET, FILM COATED ORAL at 07:56

## 2017-06-17 RX ADMIN — AMIODARONE HYDROCHLORIDE 400 MG: 200 TABLET ORAL at 07:55

## 2017-06-17 RX ADMIN — METOPROLOL TARTRATE 25 MG: 25 TABLET ORAL at 07:55

## 2017-06-17 RX ADMIN — PANTOPRAZOLE SODIUM 40 MG: 40 TABLET, DELAYED RELEASE ORAL at 07:56

## 2017-06-17 RX ADMIN — ASPIRIN 81 MG: 81 TABLET, COATED ORAL at 07:56

## 2017-06-17 RX ADMIN — DEXTROMETHORPHAN POLISTIREX 30 MG: 30 SUSPENSION ORAL at 02:36

## 2017-06-17 NOTE — PROGRESS NOTES
CVTs note    S: no issues. Still in SR.    O:  /79 (BP Location: Right arm)  Temp 98  F (36.7  C) (Oral)  Resp 16  Wt 79.6 kg (175 lb 8 oz)  SpO2 94%  BMI 27.08 kg/m2    NAD  RRR  CTAB  Soft, ND, NT    A/p:  72F POD 12 s/p CABG and aortic aneurysm repair with recurrent postop A-fib with RVR now converted to sinus rhythm on amiodarone gtt.    -ASA 81 mg, atorvastatin 20 qd. Metoprolol 25 mg bid. Lisinopril 7.5 mg daily. amio 400mg BID  -coumadin, INR 1.34 6/17  -no heparin gtt unless back in A fib  -levaquin for UTI plan for 5 day course, final culture pending  -home today    Seen and discussed with Dr. Olivarez.    Gregory Fitzpatrick MD PGY-3  Surgery Resident

## 2017-06-17 NOTE — PROGRESS NOTES
Care Coordinator- Discharge Planning     Admission Date/Time:  6/15/2017  Attending MD:  Akshat Soto,*     Data  Date of initial CC assessment:  6/17/2017  Chart reviewed, discussed with interdisciplinary team.   Patient was admitted for:   1. Paroxysmal atrial fibrillation (H)    2. Nerve pain    3. Urinary tract infection without hematuria, site unspecified         Assessment  Concerns with insurance coverage for discharge needs: None.  Current Living Situation: Patient lives with spouse.  Support System: Supportive  Services Involved: Outpatient Infusion Services and No services in place  Transportation: Car  Barriers to Discharge: None identified      Coordination of Care:  Patient will  be discharged on new orders for Coumadin, will follow with PCP Dr. Herbert Epstein at [facility]. Patient has  number and will call to make an appointment.  DC orders were faxed to  Clinic at . Patient denied any further concerns regarding discharge at this time. Spouse is at bedside and will provide transport home.      Plan  Anticipated Discharge Date:  6/17/17  Anticipated Discharge Plan:  Home w/ follow up with PCP    CTS Handoff completed:  YES    Aline Dent RN

## 2017-06-17 NOTE — PLAN OF CARE
Problem: Goal Outcome Summary  Goal: Goal Outcome Summary  Outcome: Improving  Pt is alert, oriented and able to make her needs known. Denied pain and reported that she feels Gabapentin is managing shoulder pain well.   K 3.4. Replaced via po.   Pt up ambulating in her room independently. Denied weakness or dizziness.   VSS.   Amiodarone running at 30 ml/hr.   Will continue to monitor VS, labs and heart rhythm. Changes or concerns will be reported to provider.

## 2017-06-17 NOTE — PLAN OF CARE
Problem: Individualization  Goal: Patient Preferences  D: Pt stable and comfortable, sinus rhythm 70s-80s. Shoulder pain relieved with tylenol and hot packs. No shortness of breath noted.   I: Monitored/assessed pt. Assisted with cares.  A: Pt stable and comfortable.  P: Continue to monitor/assess pt, contact provider with concerns.

## 2017-06-17 NOTE — TELEPHONE ENCOUNTER
Patient is needing more Warfarin medications, INR needs to be check and is needing to come in for a hospital follow up. Please contact patient to see if she can be squeezed into the schedule this coming week. Thank you. [phone] (home)     Central SchedulerShubham

## 2017-06-17 NOTE — PLAN OF CARE
Problem: Goal Outcome Summary  Goal: Goal Outcome Summary  Outcome: Improving  T max 99.5. OVSS, sinus rhythm, transitioned to oral amiodarone  Denies pain. Delsym given once for cough. Up ad tyson. Voiding well. Continue with plan of care.

## 2017-06-17 NOTE — DISCHARGE SUMMARY
SURGERY DISCHARGE SUMMARY    DATE OF ADMISSION:  6/15/2017    DATE OF DISCHARGE:  6/17/2017    ATTENDING PHYSICIAN: * Surgery not found *    DISCHARGE DIAGNOSES:  Atrila fibrillation    PROCEDURES PERFORMED:  None    HPI:  Marilia Bean is a 71yo female s/p CAB and aortic aneurysm repair on 6/5/17 transferred from [facility] ED for AFib in [place]. She reports heart palpitations, shortness of breath, easy fatigability and weakness. Notably she has presented several times to her local ED since postop discharge from [facility] for episodes of Afib, usually successfully converted with a dose of amiodarone. She was noted in [facility] ED to be tachycardic to 140-150s, normal BPs. She was started on amiodarone infusion and transferred to [facility]. Upon arrival on floor here she was initially -130s with slight dyspnea, shortly found to be converted back to sinus rhythm with HR 80s, normal range BPs, and resolution of dyspnea.     She also notes LUE pain and numbness in the ulnar nerve distribution. She denies chest pain, ROM restriction, nausea, vomiting, and fever. She also states that she has been unable to do her physical therapy requirements due to these symptoms.    HOSPITAL COURSE:  She was started on amio drip and converted to SR. Coumadin was started. No bridging given SR. incidentally patient was diagnosed with UTI and will complete 5d course of levaquin. She will have PO amio taper and f/u with PCP for INR check on 6/19.    DISCHARGE INSTRUCTIONS:    Discharge Procedure Orders  INR   Standing Status: Future  Standing Exp. Date: 08/16/17   Order Comments: Must get on 6/19/17     Inr Clinic Referral     Reason for your hospital stay   Order Comments: Atrial fibrillation     Adult [facility] Follow-up and recommended labs and tests   Order Comments: Follow up with CV surgery and cardiology as planned.    Follow up with your Primary care physician on 6/19 for INR check.     Activity   Order Comments: Your activity upon  discharge: keep the activity limitations as discussed at your post operative discharge.   Order Specific Question Answer Comments   Is discharge order? Yes      Full Code     Diet   Order Comments: Follow this diet upon discharge: Orders Placed This Encounter     Regular Diet Adult   Order Specific Question Answer Comments   Is discharge order? Yes          DISCHARGE MEDICATIONS:     Review of your medicines      START taking       Dose / Directions    gabapentin 100 MG capsule   Commonly known as:  NEURONTIN   Used for:  Nerve pain        Dose:  200 mg   Take 2 capsules (200 mg) by mouth 3 times daily   Quantity:  180 capsule   Refills:  0       levofloxacin 250 MG tablet   Commonly known as:  LEVAQUIN   Indication:  Urinary Tract Infection   Used for:  Urinary tract infection without hematuria, site unspecified        Dose:  250 mg   Take 1 tablet (250 mg) by mouth daily for 4 days   Quantity:  4 tablet   Refills:  0       warfarin 3 MG tablet   Commonly known as:  COUMADIN        Dose:  3 mg   Take 1 tablet (3 mg) by mouth daily   Quantity:  7 tablet   Refills:  0         CONTINUE these medicines which may have CHANGED, or have new prescriptions. If we are uncertain of the size of tablets/capsules you have at home, strength may be listed as something that might have changed.       Dose / Directions    amiodarone 200 MG tablet   Commonly known as:  PACERONE/CODARONE   This may have changed:    - how much to take  - how to take this  - when to take this  - additional instructions        Dose:  400 mg   Take 2 tablets (400 mg) by mouth 2 times daily Take 400mg twice daily for 7 days Then take 200mg twice daily for 7 days Then take 200mg once daily for 7 days Then stop taking   Quantity:  49 tablet   Refills:  0         CONTINUE these medicines which have NOT CHANGED       Dose / Directions    acetaminophen 325 MG tablet   Commonly known as:  TYLENOL   Used for:  Acute post-operative pain        Dose:  650 mg   Take  2 tablets (650 mg) by mouth every 6 hours as needed for mild pain   Quantity:  100 tablet   Refills:  0       aspirin 325 MG EC tablet   Used for:  S/P CABG (coronary artery bypass graft), S/P aortic aneurysm repair        Dose:  325 mg   Take 1 tablet (325 mg) by mouth daily   Quantity:  100 tablet   Refills:  0       atorvastatin 20 MG tablet   Commonly known as:  LIPITOR   Used for:  Hyperlipidemia LDL goal <100        Dose:  20 mg   Take 1 tablet (20 mg) by mouth every evening   Quantity:  90 tablet   Refills:  1       cyclobenzaprine 5 MG tablet   Commonly known as:  FLEXERIL   Used for:  Acute post-operative pain        Dose:  5 mg   Take 1 tablet (5 mg) by mouth 3 times daily as needed for muscle spasms or other (pain control while sleeping)   Quantity:  30 tablet   Refills:  0       lisinopril 5 MG tablet   Commonly known as:  PRINIVIL/ZESTRIL   Used for:  S/P aortic aneurysm repair, S/P CABG (coronary artery bypass graft)        Dose:  7.5 mg   Take 1.5 tablets (7.5 mg) by mouth daily   Quantity:  45 tablet   Refills:  1       metoprolol 25 MG tablet   Commonly known as:  LOPRESSOR   Used for:  S/P CABG (coronary artery bypass graft), S/P aortic aneurysm repair        Dose:  25 mg   1 tablet (25 mg) by Oral or Feeding Tube route 2 times daily   Quantity:  60 tablet   Refills:  1       oxyCODONE 5 MG IR tablet   Commonly known as:  ROXICODONE   Used for:  Acute post-operative pain        Dose:  5-10 mg   Take 1-2 tablets (5-10 mg) by mouth every 4 hours as needed for moderate to severe pain   Quantity:  50 tablet   Refills:  0       pantoprazole 40 MG EC tablet   Commonly known as:  PROTONIX   Used for:  Acute post-operative pain        Dose:  40 mg   Take 1 tablet (40 mg) by mouth every morning   Quantity:  30 tablet   Refills:  0       polyethylene glycol Packet   Commonly known as:  MIRALAX/GLYCOLAX   Used for:  Acute post-operative pain        Dose:  17 g   Take 17 g by mouth daily as needed for  constipation   Quantity:  10 packet   Refills:  0       senna-docusate 8.6-50 MG per tablet   Commonly known as:  SENOKOT-S;PERICOLACE   Used for:  Acute post-operative pain        Dose:  1-2 tablet   Take 1-2 tablets by mouth 2 times daily as needed for constipation (while using narcotics)   Quantity:  100 tablet   Refills:  0       traMADol 50 MG tablet   Commonly known as:  ULTRAM   Used for:  Acute post-operative pain        Dose:   mg   Take 1-2 tablets ( mg) by mouth every 8 hours as needed for pain maximum 6 tablet(s) per day   Quantity:  40 tablet   Refills:  0         STOP taking          rivaroxaban ANTICOAGULANT 20 MG Tabs tablet   Commonly known as:  XARELTO                Where to get your medicines      These medications were sent to [facility] [address] [address]     Phone:  [phone]      gabapentin 100 MG capsule     levofloxacin 250 MG tablet     warfarin 3 MG tablet         Some of these will need a paper prescription and others can be bought over the counter. Ask your nurse if you have questions.     Bring a paper prescription for each of these medications      amiodarone 200 MG tablet               Gregory Fitzpatrick MD PGY-3   Surgery Resident  Pg [phone]

## 2017-06-19 ENCOUNTER — ANTICOAGULATION THERAPY VISIT (OUTPATIENT)
Dept: ANTICOAGULATION | Facility: OTHER | Age: 72
End: 2017-06-19
Payer: COMMERCIAL

## 2017-06-19 ENCOUNTER — CARE COORDINATION (OUTPATIENT)
Dept: CARE COORDINATION | Facility: CLINIC | Age: 72
End: 2017-06-19

## 2017-06-19 ENCOUNTER — MYC MEDICAL ADVICE (OUTPATIENT)
Dept: INTERNAL MEDICINE | Facility: CLINIC | Age: 72
End: 2017-06-19

## 2017-06-19 DIAGNOSIS — I48.91 ATRIAL FIBRILLATION WITH RVR (H): ICD-10-CM

## 2017-06-19 DIAGNOSIS — Z79.01 LONG-TERM (CURRENT) USE OF ANTICOAGULANTS: Primary | ICD-10-CM

## 2017-06-19 DIAGNOSIS — Z79.01 LONG-TERM (CURRENT) USE OF ANTICOAGULANTS: ICD-10-CM

## 2017-06-19 DIAGNOSIS — I48.91 ATRIAL FIBRILLATION, UNSPECIFIED TYPE (H): ICD-10-CM

## 2017-06-19 LAB — INR POINT OF CARE: 1.7 (ref 0.86–1.14)

## 2017-06-19 PROCEDURE — 36416 COLLJ CAPILLARY BLOOD SPEC: CPT

## 2017-06-19 PROCEDURE — 85610 PROTHROMBIN TIME: CPT | Mod: QW

## 2017-06-19 PROCEDURE — 99207 ZZC NO CHARGE NURSE ONLY: CPT

## 2017-06-19 NOTE — TELEPHONE ENCOUNTER
Spoke with patient.  She is seeing coumadin clinic now and told her appt with Dr. Epstein is on Wednesday at 3:30.

## 2017-06-19 NOTE — PROGRESS NOTES
ANTICOAGULATION INITIAL CLINIC VISIT    Patient Name:  Marilia Bean  Date:  6/19/2017  Referred by: Dr Lucian MD  Contact Type:  Face to Face    SUBJECTIVE:  Coumadin education was completed today.  Topics covered include:  -Introduction to coumadin  -Proper Administration  -INR Testing  -Sign/Symptoms of Bleeding  -Signs/Symptoms of Clot Formation or Stroke  -Dietary Intake of Vitamin K  -Drug Interactions  -Anticoagulation Identification (bracelet, necklace or wallet card)  -Future Surgery  -Effects of Alcohol, Tobacco, and Exercise on Coumadin    Coumadin Education Booklet and Coumadin Identification Wallet Card were given to the patient.       Patient Findings     Positives Change in medications (Is currently on an amiodorone taper for 2 weeks. This may be rasing her INR at this time. Pt is aware we may need to adjust her dose when she is done with this taper.), Initiation of therapy          OBJECTIVE    INR Protime   Date Value Ref Range Status   06/19/2017 1.7 (A) 0.86 - 1.14 Final       ASSESSMENT / PLAN  INR assessment SUB    Recheck INR In: 2 DAYS    INR Location Clinic      Anticoagulation Summary as of 6/19/2017     INR goal 2.0-3.0   Today's INR 1.7!   Maintenance plan No maintenance plan   Full instructions 6/19: 3 mg; 6/20: 3 mg; Otherwise No maintenance plan   Next INR check 6/21/2017   Target end date     Indications   Long-term (current) use of anticoagulants [Z79.01] [Z79.01]  Atrial fibrillation (H) [I48.91]         Anticoagulation Episode Summary     INR check location     Preferred lab     Send INR reminders to [facility]    Comments       Anticoagulation Care Providers     Provider Role Specialty Phone number    Herbert Epstein MD [facility] Internal Medicine [phone]            See the Encounter Report to view Anticoagulation Flowsheet and Dosing Calendar (Go to Encounters tab in chart review, and find the Anticoagulation Therapy Visit)    Dosage adjustment made based on physician  directed care plan.    Justin Carbajal RN

## 2017-06-19 NOTE — PROGRESS NOTES
Patient has f/u 6/19 so no post DC follow up call is needed            Follow up with your Primary care physician on 6/19

## 2017-06-19 NOTE — MR AVS SNAPSHOT
Marilia Bean   6/19/2017 1:00 PM   Anticoagulation Therapy Visit    Description:  72 year old female   Provider:  FABI ANTI [facility]ORDAN   Department:  [facility]           INR as of 6/19/2017     Today's INR 1.7!      Anticoagulation Summary as of 6/19/2017     INR goal 2.0-3.0   Today's INR 1.7!   Full instructions 6/19: 3 mg; 6/20: 3 mg; Otherwise No maintenance plan   Next INR check 6/21/2017    Indications   Long-term (current) use of anticoagulants [Z79.01] [Z79.01]  Atrial fibrillation (H) [I48.91]         Contact Numbers     Clinic Number:         June 2017 Details    Sun Mon Tue Wed Thu Fri Sat         1               2               3                 4               5               6               7               8               9               10                 11               12               13               14               15               16               17                 18               19      3 mg   See details      20      3 mg         21            22               23               24                 25               26               27               28               29               30                 Date Details   06/19 This INR check       Date of next INR:  6/21/2017         How to take your warfarin dose     To take:  3 mg Take 1 of the 3 mg tablets.

## 2017-06-21 ENCOUNTER — OFFICE VISIT (OUTPATIENT)
Dept: INTERNAL MEDICINE | Facility: CLINIC | Age: 72
End: 2017-06-21
Payer: COMMERCIAL

## 2017-06-21 ENCOUNTER — HOSPITAL ENCOUNTER (OUTPATIENT)
Dept: GENERAL RADIOLOGY | Facility: CLINIC | Age: 72
Discharge: HOME | End: 2017-06-21
Attending: INTERNAL MEDICINE | Admitting: INTERNAL MEDICINE
Payer: COMMERCIAL

## 2017-06-21 ENCOUNTER — ANTICOAGULATION THERAPY VISIT (OUTPATIENT)
Dept: ANTICOAGULATION | Facility: OTHER | Age: 72
End: 2017-06-21
Payer: COMMERCIAL

## 2017-06-21 VITALS
HEART RATE: 74 BPM | TEMPERATURE: 98.4 F | DIASTOLIC BLOOD PRESSURE: 66 MMHG | SYSTOLIC BLOOD PRESSURE: 106 MMHG | BODY MASS INDEX: 26.54 KG/M2 | OXYGEN SATURATION: 93 % | WEIGHT: 172 LBS | RESPIRATION RATE: 16 BRPM

## 2017-06-21 DIAGNOSIS — I71.20 THORACIC AORTIC ANEURYSM WITHOUT RUPTURE (H): ICD-10-CM

## 2017-06-21 DIAGNOSIS — R05.9 COUGH: ICD-10-CM

## 2017-06-21 DIAGNOSIS — I25.10 ASCVD (ARTERIOSCLEROTIC CARDIOVASCULAR DISEASE): ICD-10-CM

## 2017-06-21 DIAGNOSIS — I10 HYPERTENSION GOAL BP (BLOOD PRESSURE) < 140/90: ICD-10-CM

## 2017-06-21 DIAGNOSIS — Z86.79 S/P THORACIC AORTIC ANEURYSM REPAIR: Primary | ICD-10-CM

## 2017-06-21 DIAGNOSIS — I48.0 PAROXYSMAL ATRIAL FIBRILLATION (H): ICD-10-CM

## 2017-06-21 DIAGNOSIS — D62 ANEMIA DUE TO BLOOD LOSS, ACUTE: ICD-10-CM

## 2017-06-21 DIAGNOSIS — Z79.01 LONG-TERM (CURRENT) USE OF ANTICOAGULANTS: ICD-10-CM

## 2017-06-21 DIAGNOSIS — I48.91 ATRIAL FIBRILLATION, UNSPECIFIED TYPE (H): ICD-10-CM

## 2017-06-21 DIAGNOSIS — Z98.890 S/P THORACIC AORTIC ANEURYSM REPAIR: Primary | ICD-10-CM

## 2017-06-21 LAB
ALBUMIN SERPL-MCNC: 2.5 G/DL (ref 3.4–5)
ALP SERPL-CCNC: 155 U/L (ref 40–150)
ALT SERPL W P-5'-P-CCNC: 66 U/L (ref 0–50)
ANION GAP SERPL CALCULATED.3IONS-SCNC: 8 MMOL/L (ref 3–14)
AST SERPL W P-5'-P-CCNC: 61 U/L (ref 0–45)
BILIRUB SERPL-MCNC: 0.5 MG/DL (ref 0.2–1.3)
BUN SERPL-MCNC: 13 MG/DL (ref 7–30)
CALCIUM SERPL-MCNC: 8.5 MG/DL (ref 8.5–10.1)
CHLORIDE SERPL-SCNC: 99 MMOL/L (ref 94–109)
CO2 SERPL-SCNC: 29 MMOL/L (ref 20–32)
CREAT SERPL-MCNC: 1.1 MG/DL (ref 0.52–1.04)
ERYTHROCYTE [DISTWIDTH] IN BLOOD BY AUTOMATED COUNT: 13.4 % (ref 10–15)
GFR SERPL CREATININE-BSD FRML MDRD: 49 ML/MIN/1.7M2
GLUCOSE SERPL-MCNC: 100 MG/DL (ref 70–99)
HCT VFR BLD AUTO: 29.6 % (ref 35–47)
HGB BLD-MCNC: 9.2 G/DL (ref 11.7–15.7)
INR PPP: 2.18 (ref 0.86–1.14)
MCH RBC QN AUTO: 28.5 PG (ref 26.5–33)
MCHC RBC AUTO-ENTMCNC: 31.1 G/DL (ref 31.5–36.5)
MCV RBC AUTO: 92 FL (ref 78–100)
PLATELET # BLD AUTO: 632 10E9/L (ref 150–450)
POTASSIUM SERPL-SCNC: 4.1 MMOL/L (ref 3.4–5.3)
PROT SERPL-MCNC: 7.4 G/DL (ref 6.8–8.8)
RBC # BLD AUTO: 3.23 10E12/L (ref 3.8–5.2)
SODIUM SERPL-SCNC: 136 MMOL/L (ref 133–144)
WBC # BLD AUTO: 9.9 10E9/L (ref 4–11)

## 2017-06-21 PROCEDURE — 80053 COMPREHEN METABOLIC PANEL: CPT | Performed by: INTERNAL MEDICINE

## 2017-06-21 PROCEDURE — 99207 ZZC NO CHARGE NURSE ONLY: CPT | Performed by: INTERNAL MEDICINE

## 2017-06-21 PROCEDURE — 85610 PROTHROMBIN TIME: CPT | Performed by: INTERNAL MEDICINE

## 2017-06-21 PROCEDURE — 71020 XR CHEST 2 VW: CPT | Mod: TC

## 2017-06-21 PROCEDURE — 99495 TRANSJ CARE MGMT MOD F2F 14D: CPT | Performed by: INTERNAL MEDICINE

## 2017-06-21 PROCEDURE — 85027 COMPLETE CBC AUTOMATED: CPT | Performed by: INTERNAL MEDICINE

## 2017-06-21 PROCEDURE — 36415 COLL VENOUS BLD VENIPUNCTURE: CPT | Performed by: INTERNAL MEDICINE

## 2017-06-21 RX ORDER — CODEINE PHOSPHATE AND GUAIFENESIN 10; 100 MG/5ML; MG/5ML
1 SOLUTION ORAL EVERY 4 HOURS PRN
Qty: 120 ML | Refills: 0 | Status: SHIPPED | OUTPATIENT
Start: 2017-06-21 | End: 2017-07-06

## 2017-06-21 RX ORDER — BENZONATATE 200 MG/1
200 CAPSULE ORAL 3 TIMES DAILY PRN
Qty: 21 CAPSULE | Refills: 0 | Status: SHIPPED | OUTPATIENT
Start: 2017-06-21 | End: 2017-07-06

## 2017-06-21 RX ORDER — LOSARTAN POTASSIUM 25 MG/1
25 TABLET ORAL DAILY
Qty: 30 TABLET | Refills: 1 | Status: SHIPPED | OUTPATIENT
Start: 2017-06-21 | End: 2017-08-09

## 2017-06-21 ASSESSMENT — PAIN SCALES - GENERAL: PAINLEVEL: MODERATE PAIN (5)

## 2017-06-21 NOTE — MR AVS SNAPSHOT
After Visit Summary   6/21/2017    Marilia Bean    MRN: [id number]           Patient Information     Date Of Birth          1945        Visit Information        Provider Department      6/21/2017 3:30 PM Herbert Epstein MD [facility]         Follow-ups after your visit        Your next 10 appointments already scheduled     Jun 23, 2017  2:00 PM CDT   (Arrive by 1:45 PM)   Return Visit with [facility] ([facility])    [address]  [address] [facility] [address] [phone]            Jul 06, 2017  2:00 PM CDT   New Visit with Kiko Chow MD   [facility]    [address] [address] [phone]              Who to contact     If you have questions or need follow up information about today's clinic visit or your schedule please contact [facility] directly at [phone].  Normal or non-critical lab and imaging results will be communicated to you by Fabbeohart, letter or phone within 4 business days after the clinic has received the results. If you do not hear from us within 7 days, please contact the clinic through FIEL[employer] CHIN[employer] or phone. If you have a critical or abnormal lab result, we will notify you by phone as soon as possible.  Submit refill requests through FanGager ([email or web address]) or call your pharmacy and they will forward the refill request to us. Please allow 3 business days for your refill to be completed.          Additional Information About Your Visit        [email or web address] Information     FanGager (MyBrandz) gives you secure access to your electronic health record. If you see a primary care provider, you can also send messages to your care team and make appointments. If you have questions, please call your primary care clinic.  If you do not have a primary care provider, please call [phone] and they will assist you.        Care EveryWhere ID     This is your  Care EveryWhere ID. This could be used by other organizations to access your [place] medical records  [id number]        Your Vitals Were     Pulse Temperature Respirations Pulse Oximetry BMI (Body Mass Index)       74 98.4  F (36.9  C) (Temporal) 16 93% 26.54 kg/m2        Blood Pressure from Last 3 Encounters:   06/21/17 106/66   06/17/17 (!) 127/92   06/15/17 132/82    Weight from Last 3 Encounters:   06/21/17 172 lb (78 kg)   06/17/17 175 lb 8 oz (79.6 kg)   06/15/17 179 lb (81.2 kg)              Today, you had the following     No orders found for display         Today's Medication Changes          These changes are accurate as of: 6/21/17  3:36 PM.  If you have any questions, ask your nurse or doctor.               Stop taking these medicines if you haven't already. Please contact your care team if you have questions.     oxyCODONE 5 MG IR tablet   Commonly known as:  ROXICODONE   Stopped by:  Herbert Epstein MD                    Primary Care Provider Office Phone # Fax #    Herbert Eptsein [phone]       [place] [id number] [place] [address]        Equal Access to Services     CASEY HEREDIA AH: Hadii sandy marcelino hadasho Soomaali, waaxda luqadaha, qaybta kaalmada adeegyada, shahnaz thorne. So [facility] [phone].    ATENCIÓN: Si habla español, tiene a de la rosa disposición servicios gratuitos de asistencia lingüística. Llame al [phone].    We comply with applicable federal civil rights laws and [place] laws. We do not discriminate on the basis of race, color, national origin, age, disability sex, sexual orientation or gender identity.            Thank you!     Thank you for choosing [facility]  for your care. Our goal is always to provide you with excellent care. Hearing back from our patients is one way we can continue to improve our services. Please take a few minutes to complete the written survey that you may receive in the mail  after your visit with us. Thank you!             Your Updated Medication List - Protect others around you: Learn how to safely use, store and throw away your medicines at www.disposemymeds.org.          This list is accurate as of: 6/21/17  3:36 PM.  Always use your most recent med list.                   Brand Name Dispense Instructions for use Diagnosis    acetaminophen 325 MG tablet    TYLENOL    100 tablet    Take 2 tablets (650 mg) by mouth every 6 hours as needed for mild pain    Acute post-operative pain       amiodarone 200 MG tablet    PACERONE/CODARONE    49 tablet    Take 2 tablets (400 mg) by mouth 2 times daily Take 400mg twice daily for 7 days Then take 200mg twice daily for 7 days Then take 200mg once daily for 7 days Then stop taking    Paroxysmal atrial fibrillation (H)       aspirin 325 MG EC tablet     100 tablet    Take 1 tablet (325 mg) by mouth daily    S/P CABG (coronary artery bypass graft), S/P aortic aneurysm repair       atorvastatin 20 MG tablet    LIPITOR    90 tablet    Take 1 tablet (20 mg) by mouth every evening    Hyperlipidemia LDL goal <100       cyclobenzaprine 5 MG tablet    FLEXERIL    30 tablet    Take 1 tablet (5 mg) by mouth 3 times daily as needed for muscle spasms or other (pain control while sleeping)    Acute post-operative pain       gabapentin 100 MG capsule    NEURONTIN    180 capsule    Take 2 capsules (200 mg) by mouth 3 times daily    Nerve pain       lisinopril 5 MG tablet    PRINIVIL/ZESTRIL    45 tablet    Take 1.5 tablets (7.5 mg) by mouth daily    S/P aortic aneurysm repair, S/P CABG (coronary artery bypass graft)       metoprolol 25 MG tablet    LOPRESSOR    60 tablet    1 tablet (25 mg) by Oral or Feeding Tube route 2 times daily    S/P CABG (coronary artery bypass graft), S/P aortic aneurysm repair       pantoprazole 40 MG EC tablet    PROTONIX    30 tablet    Take 1 tablet (40 mg) by mouth every morning    Acute post-operative pain       polyethylene glycol  Packet    MIRALAX/GLYCOLAX    10 packet    Take 17 g by mouth daily as needed for constipation    Acute post-operative pain       senna-docusate 8.6-50 MG per tablet    SENOKOT-S;PERICOLACE    100 tablet    Take 1-2 tablets by mouth 2 times daily as needed for constipation (while using narcotics)    Acute post-operative pain       warfarin 3 MG tablet    COUMADIN    7 tablet    Take 1 tablet (3 mg) by mouth daily    Paroxysmal atrial fibrillation (H)

## 2017-06-21 NOTE — PROGRESS NOTES
SUBJECTIVE:                                                    Marilia Bean is a 72 year old female who presents to clinic today for the following health issues:    Hospital Follow-up Visit:    Hospital/Nursing Home/IP Rehab Facility: [facility]  Date of Admission: 6/15/17  Date of Discharge: 6/17/17  Reason(s) for Admission: A Fib and UTI            Problems taking medications regularly:  None       Medication changes since discharge: yes since first discharge       Problems adhering to non-medication therapy:  None    Summary of hospitalization:  [facility] discharge summary reviewed  Diagnostic Tests/Treatments reviewed.  Follow up needed: none  Other Healthcare Providers Involved in Patient s Care:         None  Update since discharge: improved.     Post Discharge Medication Reconciliation: discharge medications reconciled and changed, per note/orders (see AVS).  Plan of care communicated with patient and family        Left arm pain.      Needs INR draw, saw coumadin clinic yesterday.  We can draw it today, needs refill.      Amiodarone is tapering down each Friday. afib has been pretty good, one episode this weekend but cold showed broke that event.    Question of asa 325 versus 81.  Will do 81 mg aspirin.        Coughing a lot, lisinopril is new for her.  No sputum, no fevers, no blood.    Had UTI in the hospital and that was treated.      Not much for pain but not eating much, low energy.    Past Medical History:   Diagnosis Date     Aortic aneurysm (H) 7/1/2007    see 7/07 Ba report -follow yearly, treat high BP is occurs Problem list name updated by automated process. Provider to review     Aortic aneurysm of unspecified site without mention of rupture 7/2007    4.4 cm ascending aorta noted in 2007     Asymptomatic postmenopausal status (age-related) (natural)     on HRT  Prempro -weaning off 5/'2004     CAD (coronary artery disease)     LAD     Family history of Gardiner  syndrome      Hyperlipidemia LDL goal <100 10/31/2010     Hypertension goal BP (blood pressure) < 140/90 2/9/2016     Leiomyoma of uterus, unspecified     Uterine fibroid     Lump or mass in breast 7/2004    rt. nodule - bx neg     Personal history of colonic polyps      Postmenopausal atrophic vaginitis 8/20/2006     Pulmonary nodule     incidental noted in 2007 during garcia     Pure hypercholesterolemia     mild, diet - low cholesterol, low fat.10/06 start Lovastatin     Current Outpatient Prescriptions   Medication     aspirin 81 MG EC tablet     losartan (COZAAR) 25 MG tablet     guaiFENesin-codeine (ROBITUSSIN AC) 100-10 MG/5ML SOLN solution     benzonatate (TESSALON) 200 MG capsule     amiodarone (PACERONE/CODARONE) 200 MG tablet     gabapentin (NEURONTIN) 100 MG capsule     warfarin (COUMADIN) 3 MG tablet     lisinopril (PRINIVIL/ZESTRIL) 5 MG tablet     metoprolol (LOPRESSOR) 25 MG tablet     cyclobenzaprine (FLEXERIL) 5 MG tablet     acetaminophen (TYLENOL) 325 MG tablet     polyethylene glycol (MIRALAX/GLYCOLAX) Packet     senna-docusate (SENOKOT-S;PERICOLACE) 8.6-50 MG per tablet     pantoprazole (PROTONIX) 40 MG EC tablet     atorvastatin (LIPITOR) 20 MG tablet     No current facility-administered medications for this visit.      Social History   Substance Use Topics     Smoking status: Never Smoker     Smokeless tobacco: Never Used     Alcohol use Yes      Comment: rarely     Review of Systems  Constitutional-No fevers, chills, or weight changes..  ENT-No earpain, sore throat, voice changes or rhinitis.  Cardiac-No chest pain or palpitations.  Respiratory-Cough without sputum.  GI-No nausea, vomitting, diarrhea, constipation, or blood in the stool.  Musculoskeletal-No muscles aches or joint pains.  Skin-incisions are good..    Physical Exam  /66  Pulse 74  Temp 98.4  F (36.9  C) (Temporal)  Resp 16  Wt 172 lb (78 kg)  SpO2 93%  BMI 26.54 kg/m2  General Appearance-healthy, alert, no  distress  Cardiac-regular rate and rhythm-slight murmur  Lungs-clear to auscultation  Extremities-no peripheral edema, peripheral pulses normal  Incisions are clean and dry.      ASSESSMENT:  Patient that is here status post thoracic ascending aortic aneurysm repair and single vessel bypass. Unfortunately she developed a post operative a fib and rapid rate and hypotension. She has been in the hospital multiple times, cardioverted and now on higher dose amiodarone, had one episode of a fib but cold showed flipped her to sinus.  BP has been stable.  Pain is stable, on coumadin, needs inr today.      Will continue amiodarone taper until sees cardioogy on July 6th.     Surgical incisions are good.    Cough-could be ace inhibitor or small left pleural effusion, once had a ptx, will check a chest xray today, treat with robitussin ac, tessalon perles and change lisinopril to losartan. Could be from irritated throat with the intubation but getting to be further out from that.    Left arm pain and tingling is nerve injury from surgery, will give time, work on the arm, may eventually need an EMG but hopefully will just come around.    Continue aspirin at 81mg a day    Will add Ensure for protein and malnutrition.    Get back to cardiac rehab.    Anemia will recheck today, may need some oral iron if not coming up.    Electronically signed by Herbert Epstein MD

## 2017-06-21 NOTE — NURSING NOTE
"Chief Complaint   Patient presents with     Hospital F/U       Initial /66  Pulse 74  Temp 98.4  F (36.9  C) (Temporal)  Resp 16  Wt 172 lb (78 kg)  SpO2 93%  BMI 26.54 kg/m2 Estimated body mass index is 26.54 kg/(m^2) as calculated from the following:    Height as of 6/15/17: 5' 7.5\" (1.715 m).    Weight as of this encounter: 172 lb (78 kg).  Medication Reconciliation: complete    "

## 2017-06-21 NOTE — MR AVS SNAPSHOT
Marilia Bean   6/21/2017   Anticoagulation Therapy Visit    Description:  72 year old female   Provider:  Herbert Epstein MD   Department:   Anticoag           INR as of 6/21/2017     Today's INR 2.18      Anticoagulation Summary as of 6/21/2017     INR goal 2.0-3.0   Today's INR 2.18   Full instructions 6/21: 3 mg; 6/22: 3 mg; Otherwise No maintenance plan   Next INR check 6/23/2017    Indications   Long-term (current) use of anticoagulants [Z79.01] [Z79.01]  Atrial fibrillation (H) [I48.91]         Your next Anticoagulation Clinic appointment(s)     Jun 23, 2017  4:15 PM CDT   [facility]  [facility]    [address] [facility] [address]   [phone]              Contact Numbers     Clinic Number:         June 2017 Details    Sun Mon Tue Wed Thu Fri Sat         1               2               3                 4               5               6               7               8               9               10                 11               12               13               14               15               16               17                 18               19               20               21      3 mg   See details      22      3 mg         23            24                 25               26               27               28               29               30                 Date Details   06/21 This INR check       Date of next INR:  6/23/2017         How to take your warfarin dose     To take:  3 mg Take 1 of the 3 mg tablets.

## 2017-06-22 ENCOUNTER — MYC MEDICAL ADVICE (OUTPATIENT)
Dept: INTERNAL MEDICINE | Facility: CLINIC | Age: 72
End: 2017-06-22

## 2017-06-22 RX ORDER — WARFARIN SODIUM 3 MG/1
3 TABLET ORAL DAILY
Qty: 30 TABLET | Refills: 0 | Status: SHIPPED | OUTPATIENT
Start: 2017-06-22 | End: 2017-07-25

## 2017-06-22 NOTE — TELEPHONE ENCOUNTER
Patient is informed via [employer] the number to reach her cardiologist.  Also asked further clarifying questions via [employer].  Will keep open for a response.  Julianne Godfrey RN

## 2017-06-22 NOTE — TELEPHONE ENCOUNTER
": 1945  PHONE #'s: [phone] (home)     PRESENTING PROBLEM:  I have NO energy at all. I cough a lot and it wears me out.  I just stopped Lisinopril today and I have noticed I am coughing less. I am happy about that. Dr. Epstein just started me on something else COZAAR.    NURSING ASSESSMENT  Description:\"  I do think I am feeling better this afternoon. IT's just when I am up on my feet for a while I have to sit down. I am drained. \"   Onset/duration:  Past few days.   Precip. factors:  Hx of A. Fib. \" I know when I get that. I feel it in my chest. I don't even have to check my pulse.   Assoc. Sx:  Denies chest pain or any referred pain ( jaw, arm, shoulder, back) Does feel somewhat SOB if up and about for a while, but does not prevent her from talking or walking. Denies weakness in her arms and legs. \" I just have 3 fingers on my left hand that have felt tingling, intermittent since one of my surgeries.   Improves/worsens Sx:  Improved.   Pain scale (1-10)   0/10  Sx specific meds:  Coumadin, COZAAR( hasn't started yet) , Baby ASA, Amiodarone, Neurontin, Lopressor,Lipitor, Protonix.  LMP/preg/breast feeding:  NA  Last exam/Tx:  17 with DR. Epstein S/P Thoracic aortic aneurysm repair and A. Fib    RECOMMENDED DISPOSITION:  See in 24 hours - IF she isn't feeling any better tomorrow AM. She will call the clinic back in the AM if no improvement. She hasn't started the new medications yet and she said she was definitely feeling better this afternoon. She has a coumadin appt at 10 AM at [facility] tomorrow.  RN did review with her from Clinical [facility] , the common side effects of the Amiodarone and Unusual weakness and feeling tired in one of them. \" I have been taking 2 of those pills in the AM and 2 in the afternoon. Tomorrow I get to cut down to 1 in the AM and 1 in the PM. Maybe that will help how I feel , too?\"     Seek emergency care Immediately If Any of the Following Occur:  * continuous chest pain " accompanied with chest tightness, pressure, or  if it is accompanied by:    * Shortness of breath    * dizziness    * Cool, moist skin    * Nausea or vomiting    * Pain in the neck, shoulders, jaw, teeth, back , or arms.     * Blue of gray face, lips, earlobes, or fingernails,    * Heart palpiataions.   * NO relief from repeated Nitroglycerin every 5 minutes for three doses.  .* Sudden change in ability to walk, stand, bear weight or move, visual disturbances, speech and language problems. One sided weakness in arms and legs. .       Will comply with recommendation: YES   If further questions/concerns or if Sx do not improve, worsen or new Sx develop, call your PCP or [facility] as soon as possible.    NOTES:  Disposition was determined by the first positive assessment question, therefore all previous assessment questions were negative.  Informed to check provider manual or call insurance company to assure coverage.    Guideline used: Weakness  Telephone Triage Protocols for Nurses, Fifth Edition, Meche Peters RN

## 2017-06-22 NOTE — PROGRESS NOTES
ANTICOAGULATION FOLLOW-UP CLINIC VISIT    Patient Name:  Marilia Bean  Date:  6/22/2017  Contact Type:  Telephone    SUBJECTIVE:     Patient Findings     Positives Initiation of therapy, No Problem Findings           OBJECTIVE    INR   Date Value Ref Range Status   06/21/2017 2.18 (H) 0.86 - 1.14 Final       ASSESSMENT / PLAN  INR assessment THER    Recheck INR In: 2 DAYS    INR Location Outside lab      Anticoagulation Summary as of 6/21/2017     INR goal 2.0-3.0   Today's INR 2.18   Maintenance plan No maintenance plan   Full instructions 6/21: 3 mg; 6/22: 3 mg; Otherwise No maintenance plan   Next INR check 6/23/2017   Target end date     Indications   Long-term (current) use of anticoagulants [Z79.01] [Z79.01]  Atrial fibrillation (H) [I48.91]         Anticoagulation Episode Summary     INR check location     Preferred lab     Send INR reminders to [facility]    Comments       Anticoagulation Care Providers     Provider Role Specialty Phone number    Herbert Epstein MD [facility] Internal Medicine [phone]            See the Encounter Report to view Anticoagulation Flowsheet and Dosing Calendar (Go to Encounters tab in chart review, and find the Anticoagulation Therapy Visit)    Dosage adjustment made based on physician directed care plan.    Justin Carbajal RN

## 2017-06-23 ENCOUNTER — ANTICOAGULATION THERAPY VISIT (OUTPATIENT)
Dept: ANTICOAGULATION | Facility: OTHER | Age: 72
End: 2017-06-23
Payer: COMMERCIAL

## 2017-06-23 DIAGNOSIS — I48.91 ATRIAL FIBRILLATION, UNSPECIFIED TYPE (H): ICD-10-CM

## 2017-06-23 DIAGNOSIS — Z79.01 LONG-TERM (CURRENT) USE OF ANTICOAGULANTS: ICD-10-CM

## 2017-06-23 LAB — INR POINT OF CARE: 2.3 (ref 0.86–1.14)

## 2017-06-23 PROCEDURE — 99207 ZZC NO CHARGE NURSE ONLY: CPT

## 2017-06-23 PROCEDURE — 36416 COLLJ CAPILLARY BLOOD SPEC: CPT

## 2017-06-23 PROCEDURE — 85610 PROTHROMBIN TIME: CPT | Mod: QW

## 2017-06-23 NOTE — MR AVS SNAPSHOT
Marilia Gregoryier   6/23/2017 4:15 PM   Anticoagulation Therapy Visit    Description:  72 year old female   Provider:  [facility]   Department:  [facility]           INR as of 6/23/2017     Today's INR 2.3      Anticoagulation Summary as of 6/23/2017     INR goal 2.0-3.0   Today's INR 2.3   Full instructions 3 mg every day   Next INR check 6/30/2017    Indications   Long-term (current) use of anticoagulants [Z79.01] [Z79.01]  Atrial fibrillation (H) [I48.91]         Your next Anticoagulation Clinic appointment(s)     Jun 23, 2017  4:15 PM CDT   Anticoagulation Visit with  ANT[facility]AG   [facility] ([facility])    [address] [facility] [address]   [phone]            Jun 30, 2017  9:00 AM CDT   Anticoagulation Visit with  ANTI COAG   [facility] ([facility])    [address] [facility] [address]   [phone]              Contact Numbers     Clinic Number:         June 2017 Details    Sun Mon Tue Wed Thu Fri Sat         1               2               3                 4               5               6               7               8               9               10                 11               12               13               14               15               16               17                 18               19               20               21               22               23      3 mg   See details      24      3 mg           25      3 mg         26      3 mg         27      3 mg         28      3 mg         29      3 mg         30              Date Details   06/23 This INR check       Date of next INR:  6/30/2017         How to take your warfarin dose     To take:  3 mg Take 1 of the 3 mg tablets.

## 2017-06-23 NOTE — TELEPHONE ENCOUNTER
Rn spoke with Marilia. She is no longer coughing  But is feeling tired out.  RN relayed below message from Dr Epstein.  Marilia states she is improving and will continue with plan. She will check in with us next week as discussed.  Dominique Fitzgerald RN

## 2017-06-23 NOTE — PROGRESS NOTES
ANTICOAGULATION FOLLOW-UP CLINIC VISIT    Patient Name:  Marilia Bean  Date:  6/23/2017  Contact Type:  Face to Face    SUBJECTIVE:     Patient Findings     Positives No Problem Findings           OBJECTIVE    INR Protime   Date Value Ref Range Status   06/23/2017 2.3 (A) 0.86 - 1.14 Final       ASSESSMENT / PLAN  INR assessment THER    Recheck INR In: 1 WEEK    INR Location Clinic      Anticoagulation Summary as of 6/23/2017     INR goal 2.0-3.0   Today's INR 2.3   Maintenance plan 3 mg (3 mg x 1) every day   Full instructions 3 mg every day   Weekly total 21 mg   Plan last modified Justin Carbajal RN (6/23/2017)   Next INR check 6/30/2017   Target end date     Indications   Long-term (current) use of anticoagulants [Z79.01] [Z79.01]  Atrial fibrillation (H) [I48.91]         Anticoagulation Episode Summary     INR check location     Preferred lab     Send INR reminders to [facility]    Comments       Anticoagulation Care Providers     Provider Role Specialty Phone number    Herbert Epstein MD [facility] Internal Medicine [phone]            See the Encounter Report to view Anticoagulation Flowsheet and Dosing Calendar (Go to Encounters tab in chart review, and find the Anticoagulation Therapy Visit)    Dosage adjustment made based on physician directed care plan.    Justin Carbajal, RN

## 2017-06-29 ENCOUNTER — HOSPITAL ENCOUNTER (OUTPATIENT)
Dept: CARDIAC REHAB | Facility: CLINIC | Age: 72
End: 2017-06-29
Attending: INTERNAL MEDICINE
Payer: MEDICARE

## 2017-06-29 VITALS — HEIGHT: 67 IN | BODY MASS INDEX: 27 KG/M2 | WEIGHT: 172 LBS

## 2017-06-29 PROCEDURE — 93798 PHYS/QHP OP CAR RHAB W/ECG: CPT

## 2017-06-29 PROCEDURE — 40000116 ZZH STATISTIC OP CR VISIT

## 2017-06-29 PROCEDURE — 40000575 ZZH STATISTIC OP CARDIAC VISIT #2

## 2017-06-29 PROCEDURE — 93797 PHYS/QHP OP CAR RHAB WO ECG: CPT

## 2017-06-29 ASSESSMENT — 6 MINUTE WALK TEST (6MWT)
MALE CALC: 1605.4
FEMALE CALC: 1421.73
PREDICTED: 1615.19
TOTAL DISTANCE WALKED (FT): 780

## 2017-06-29 NOTE — PROGRESS NOTES
06/29/17 0800   Session   Session Initial Evaluation and Exercise Prescription   Certified through this date 07/28/17   Cardiac Rehab Assessment   Cardiac Rehab Assessment  Marilia Bean  1945  CABGx1 & Ascending Aortic Aneurysm Repair   Marilia Bean is a pleasant 72-year-old female who had a history of ascending aortic dilatation, diagnosed in 2007.  Follow up echocardiogram and CT scan showed gradual increase in the ascending aorta to the point that it meets the criteria of aortic aneurysm repair (5.0 cm). The patient was also found to have single vessel coronary artery disease with 80% stenosis in the proximal mid segment of the left anterior descending artery. Marilia underwent aortic ascending aortic aneurysm repair and coronary artery bypass grafting with left internal mammary artery to left anterior descending artery branch.  Unfortunately she developed a post operative a fib and rapid rate and hypotension. She has been in the hospital multiple times, cardioverted and now on higher dose amiodarone.  The patient's history and clinical status including hemodynamics and ECG were evaluated.  The patient was assessed to be stable and appropriate to begin exercise.   The patient's functional capacity and exercise prescription were determined by the completion of the 6 minute walk test.  See results below.  The patient was oriented to the program.  Risk factor profile was completed. Goals and objectives were discussed. CV response was WNL. No symptoms, complaints or pain were reported. Good prognosis for reaching above goals. Skilled therapy is necessary in order to monitor CV response to exercise, to provide education on risk factors and behavior change counseling needed to achieve patient's goals.  Plan to progress to 30-40 minutes of exercise prior to discharge from cardiac rehab.  Initial THR of 20-30 beats above RHR; Effort rating of 4-6.  Initiate muscle conditioning as appropriate.  Provide risk factor  education and behavior change counseling.      I have established, reviewed and made necessary changes to the individualized treatment plan and exercise prescription for this patient.    Physician Name (printed): ________________________   Date: _______  Time: ______    Physician Signature: ___________________________________________      General Information   Treatment Diagnosis Coronary Artery Bypass Surgery  (Ascending aortic aneurysm)   Date of Treatment Diagnosis 06/05/17   Secondary Treatment Diagnosis Other (see comments)  (Ascending aortic aneurysm, s/p repair with vascular graft)   Significant Past CV History None   Comorbidities None   Other Medical History Past Medical History:   Diagnosis Date     Aortic aneurysm (H) 7/1/2007    see 7/07 [place] report -follow yearly, treat high BP is occurs Problem list name updated by automated process. Provider to review     Aortic aneurysm of unspecified site without mention of rupture 7/2007    4.4 cm ascending aorta noted in 2007     Asymptomatic postmenopausal status (age-related) (natural)     on HRT  Prempro -weaning off 5/'2004     CAD (coronary artery disease)     LAD     Family history of Gardiner syndrome      Hyperlipidemia LDL goal <100 10/31/2010     Hypertension goal BP (blood pressure) < 140/90 2/9/2016     Leiomyoma of uterus, unspecified     Uterine fibroid     Lump or mass in breast 7/2004    rt. nodule - bx neg     Personal history of colonic polyps      Postmenopausal atrophic vaginitis 8/20/2006     Pulmonary nodule     incidental noted in 2007 during [place]     Pure hypercholesterolemia     mild, diet - low cholesterol, low fat.10/06 start Lovastatin        Lead up symptoms CT scan in 2006   Hospital Location [facility]   Hospital Discharge Date 06/10/17   Signs and Symptoms Post Hospital Discharge Fatigue;Other (see comments)  (Frequent A-Fib)   Outpatient Cardiac Rehab Start Date 06/29/17   Primary Physician Herbert Epstein  "  Primary Physician Follow Up Completed   Surgeon Akshat Soto MD   Cardiologist Dr. Chow   Cardiologist Follow Up Scheduled  (7/6)   Ejection Fraction 65%   Risk Stratification Low   Summary of Cath Report   Summary of Cath Report Available   Date Performed 05/22/17   LAD MURPHY   Cath Report Comments CABG with LIMA to LAD in addition to ascending aortic aneurysm repair    Living and Work Status    Living Arrangements and Social Status spouse;house   Support System Live with an adult   Return to Employment Retired   Occupation Worked in Medical Records   Preventative Medications   CMS recommended medications Pneumonia vaccination   Falls Screen   Have you fallen two or more times in the past year? No   Have you fallen and had an injury in the past year? No   Referral Initiated to Physical Therapy No   Pain   Patient Currently in Pain No   Physical Assessments   Incisions WNL   Edema None   Right Lung Sounds not assessed   Left Lung Sounds not assessed   Limitations Surgical;Arthritis   Individualized Treatment Plan   Monitored Sessions Scheduled 36   Monitored Sessions Attended 1   Oxygen   Supplemental Oxygen needed No   Nutrition Management - Weight Management   Assessment Initial Assessment   Age 72   Weight 78 kg (172 lb)   Height 1.714 m (5' 7.48\")   BMI (Calculated) 26.61   Nutrition Management - Lipids   Lipids Labs Available   Date 03/14/17   Total Cholesterol 186   Triglycerides 98   HDL 55      Prescribed Lipid Medication Yes   Statin Intensity Moderate Intensity   Nutrition Management - Diabetes   Diabetes No   Nutrition Management Summary   Dietary Recommendations Low Fat;Low Cholesterol;Low Sodium   Stages of Change for Diet Compliance Contemplation   Interventions Planned Attend Nutrition Education Class(es)   Psychosocial Management   Psychosocial Assessment Initial   Is there history of clinical depression or increased risk of depression? No previous history   Current Level of Stress per " Patient Report Denies   Initial Patient Health Questionnaire -9 Score (PHQ-9) for depression. 5-9 Minimal symptoms, 10-14 Minor depression, 15-19 Major depression, moderately severe, > 20 Major depression, severe  11   Initial [employer] COOP Survey score.  Quality of Life:   If total score > 25 review individual areas where patient rated a 4 or 5.  Consider patients current medical condition and what role that plays on the score.   Adjust treatment protocol to improve areas of concern.  Consider the following:  PHQ9 score, DASI, and re-assessment within the next 30 days to assist with developing treatments.  29   Stages of Change Action   Interventions Planned Reassess PHQ-9 and/or [place] COOP Surveys if outside of defined limits   Psychosocial Comments Pt reports that she scored high on the PHQ-9 form due to the recent survey and is not a reflection of depression   Other Core Components - Hypertension   History of or Diagnosis of Hypertension Yes   Currently taking Anti-Hypertensives Yes   Other Core Components - Tobacco   History of Tobacco Use Never   Activity/Exercise History   Activity/Exercise Assessment Initial   Activity/Exercise Status prior to event? Was Physically Active   Number of Days Currently participating in Moderate Physical Activity? 0   Number of Days Currently performing  Aerobic Exercise (including rehab)? 0   Number of Minutes per Session Currently of Aerobic Exercise (average)? 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   Goal #2 Target Date 08/13/17   Goal #2 Progress Towards Goal Pt will engage  in 20-30 minutes of aerobic exercise in addition to RT with 2-3# weights for 1/10 for 6 exercises/   Exercise Assessment   6 Minute Walk Predicted (Male) 1605.4   6 Minute Walk Predicted (Female) 1421.73   Initial 6 Minute Walk Distance (Feet) 780 ft   Resting HR 58 bpm   Exercise HR 67 bpm   Post Exercise HR 60 bpm   Resting /68   Exercise /64   Post Exercise /60   Effort Rating 5   Current MET Level 2.1   MET Level Goal 5   ECG Rhythm Sinus rhythm   Ectopy None   Current Symptoms Fatigue   Limitations/Restrictions Sternal Precautions   Exercise Prescription   Mode Treadmill;Nustep;Weights  (weights in 2 weeks)   Duration/Time 15-30 min;Intermittent bouts   Frequency 3 daysweek   THR (85% of age predicted max HR) 125.8   OMNI Effort Rating (0-10 Scale) 4-6/10   Progression Intermittent bouts;Total exercise time of 20-30 minutes;Progress peak intensity by 1/2 MET per week   Recommended Home Exercise   Type of Exercise Walking   Frequency (days per week) daily   Duration (minutes per session) Other (see comments);15-30 min   Effort Rating Recommended 4-6/10   30 Day Exercise Plan 5-10 minutes to start    Current Home Exercise   Type of Exercise None   Follow-up/On-going Support   Provider follow-up needed on the following No follow-up needed   Learning Assessment   Learner Patient;Spouse/Significant Other  ([place])   Primary Language English   Preferred Learning Style Demonstration;Other  (Hand on)   Patient Education   Education recommended Anatomy and Physiology of the Heart;Medication Overview;Nutrition;Risk Factors;Exercise Principles;Blood Pressure

## 2017-06-30 ENCOUNTER — ANTICOAGULATION THERAPY VISIT (OUTPATIENT)
Dept: ANTICOAGULATION | Facility: OTHER | Age: 72
End: 2017-06-30
Payer: COMMERCIAL

## 2017-06-30 DIAGNOSIS — Z79.01 LONG-TERM (CURRENT) USE OF ANTICOAGULANTS: ICD-10-CM

## 2017-06-30 DIAGNOSIS — I48.91 ATRIAL FIBRILLATION, UNSPECIFIED TYPE (H): ICD-10-CM

## 2017-06-30 LAB — INR POINT OF CARE: 4.2 (ref 0.86–1.14)

## 2017-06-30 PROCEDURE — 99207 ZZC NO CHARGE NURSE ONLY: CPT

## 2017-06-30 PROCEDURE — 85610 PROTHROMBIN TIME: CPT | Mod: QW

## 2017-06-30 PROCEDURE — 36416 COLLJ CAPILLARY BLOOD SPEC: CPT

## 2017-06-30 NOTE — PROGRESS NOTES
ANTICOAGULATION FOLLOW-UP CLINIC VISIT    Patient Name:  Marilia Bean  Date:  6/30/2017  Contact Type:  Face to Face    SUBJECTIVE:     Patient Findings     Positives Initiation of therapy, Unexplained INR or factor level change           OBJECTIVE    INR Protime   Date Value Ref Range Status   06/30/2017 4.2 (A) 0.86 - 1.14 Final       ASSESSMENT / PLAN  INR assessment SUPRA    Recheck INR In: 6 DAYS    INR Location Clinic      Anticoagulation Summary as of 6/30/2017     INR goal 2.0-3.0   Today's INR 4.2!   Maintenance plan 3 mg (3 mg x 1) every day   Full instructions 6/30: Hold; 7/3: 1.5 mg; Otherwise 3 mg every day   Weekly total 21 mg   Plan last modified Justin Carbajal RN (6/23/2017)   Next INR check 7/6/2017   Target end date     Indications   Long-term (current) use of anticoagulants [Z79.01] [Z79.01]  Atrial fibrillation (H) [I48.91]         Anticoagulation Episode Summary     INR check location     Preferred lab     Send INR reminders to [facility]    Comments       Anticoagulation Care Providers     Provider Role Specialty Phone number    Herbert Epstein MD [facility] Internal Medicine [phone]            See the Encounter Report to view Anticoagulation Flowsheet and Dosing Calendar (Go to Encounters tab in chart review, and find the Anticoagulation Therapy Visit)    Dosage adjustment made based on physician directed care plan.    Justin Carbajal RN

## 2017-06-30 NOTE — MR AVS SNAPSHOT
Marilia Bean   6/30/2017 9:00 AM   Anticoagulation Therapy Visit    Description:  72 year old female   Provider:  [facility]   Department:  [facility]           INR as of 6/30/2017     Today's INR 4.2!      Anticoagulation Summary as of 6/30/2017     INR goal 2.0-3.0   Today's INR 4.2!   Full instructions 6/30: Hold; 7/3: 1.5 mg; Otherwise 3 mg every day   Next INR check 7/6/2017    Indications   Long-term (current) use of anticoagulants [Z79.01] [Z79.01]  Atrial fibrillation (H) [I48.91]         Your next Anticoagulation Clinic appointment(s)     Jul 06, 2017  4:00 PM CDT   Anticoagulation Visit with [facility]   [facility]    [address] [place] [address]   [phone]              Contact Numbers     Clinic Number:         June 2017 Details    Sun Mon Tue Wed Thu Fri Sat         1               2               3                 4               5               6               7               8               9               10                 11               12               13               14               15               16               17                 18               19               20               21               22               23               24                 25               26               27               28               29               30      Hold   See details        Date Details   06/30 This INR check               How to take your warfarin dose     Hold Do not take your warfarin dose. See the Details table to the right for additional instructions.                July 2017 Details    Sun Mon Tue Wed Thu Fri Sat           1      3 mg           2      3 mg         3      1.5 mg         4      3 mg         5      3 mg         6            7               8                 9               10               11               12               13               14               15                 16               17               18                19               20               21               22                 23               24               25               26               27               28               29                 30               31                     Date Details   No additional details    Date of next INR:  7/6/2017         How to take your warfarin dose     To take:  1.5 mg Take 0.5 of a 3 mg tablet.    To take:  3 mg Take 1 of the 3 mg tablets.

## 2017-07-06 ENCOUNTER — ANTICOAGULATION THERAPY VISIT (OUTPATIENT)
Dept: ANTICOAGULATION | Facility: OTHER | Age: 72
End: 2017-07-06
Payer: COMMERCIAL

## 2017-07-06 ENCOUNTER — OFFICE VISIT (OUTPATIENT)
Dept: CARDIOLOGY | Facility: CLINIC | Age: 72
End: 2017-07-06
Payer: COMMERCIAL

## 2017-07-06 VITALS
DIASTOLIC BLOOD PRESSURE: 70 MMHG | WEIGHT: 169.8 LBS | HEART RATE: 60 BPM | HEIGHT: 68 IN | SYSTOLIC BLOOD PRESSURE: 146 MMHG | BODY MASS INDEX: 25.73 KG/M2 | OXYGEN SATURATION: 100 %

## 2017-07-06 DIAGNOSIS — I48.91 ATRIAL FIBRILLATION, UNSPECIFIED TYPE (H): ICD-10-CM

## 2017-07-06 DIAGNOSIS — I25.10 ATHEROSCLEROSIS OF NATIVE CORONARY ARTERY OF NATIVE HEART WITHOUT ANGINA PECTORIS: ICD-10-CM

## 2017-07-06 DIAGNOSIS — I48.0 PAROXYSMAL ATRIAL FIBRILLATION (H): Primary | ICD-10-CM

## 2017-07-06 DIAGNOSIS — Z98.890 S/P THORACIC AORTIC ANEURYSM REPAIR: ICD-10-CM

## 2017-07-06 DIAGNOSIS — Z79.01 LONG-TERM (CURRENT) USE OF ANTICOAGULANTS: ICD-10-CM

## 2017-07-06 DIAGNOSIS — E78.5 HYPERLIPIDEMIA LDL GOAL <100: ICD-10-CM

## 2017-07-06 DIAGNOSIS — Z86.79 S/P THORACIC AORTIC ANEURYSM REPAIR: ICD-10-CM

## 2017-07-06 LAB — INR POINT OF CARE: 3 (ref 0.86–1.14)

## 2017-07-06 PROCEDURE — 99214 OFFICE O/P EST MOD 30 MIN: CPT | Performed by: INTERNAL MEDICINE

## 2017-07-06 PROCEDURE — 99207 ZZC NO CHARGE NURSE ONLY: CPT

## 2017-07-06 PROCEDURE — 85610 PROTHROMBIN TIME: CPT | Mod: QW

## 2017-07-06 PROCEDURE — 36416 COLLJ CAPILLARY BLOOD SPEC: CPT

## 2017-07-06 NOTE — MR AVS SNAPSHOT
After Visit Summary   7/6/2017    Marilia Bean    MRN: [id number]           Patient Information     Date Of Birth          1945        Visit Information        Provider Department      7/6/2017 2:00 PM Kiko Chow MD [facility]        Today's Diagnoses     Paroxysmal atrial fibrillation (H)    -  1    S/P thoracic aortic aneurysm repair        Hyperlipidemia LDL goal <100        Atherosclerosis of native coronary artery of native heart without angina pectoris           Follow-ups after your visit        Additional Services     Follow-Up with Cardiologist                 Your next 10 appointments already scheduled     Jul 06, 2017  4:00 PM CDT   [facility] with  [facility]   [facility]    [address]  [facility] [place] [address]   [phone]            Jul 07, 2017  8:00 AM CDT   Cardiac Treatment with Maggie Pop [facility] ([facility])    [address] [place] [address] [place] [address]   [phone]            Jul 10, 2017  8:00 AM CDT   Cardiac Treatment with KOTA Johnson   [facility] ([facility])    [address] [place] [address]   [phone]            Jul 12, 2017  8:00 AM CDT   Cardiac Treatment with KOTA Johnson   [facility] ([facility])    [address] [place] [address]   [phone]            Jul 14, 2017  8:00 AM CDT   Cardiac Treatment with Maggie Pop [facility] ([facility])    [address] [place] [address]   [phone]            Jul 17, 2017  8:00 AM CDT   Cardiac Treatment with KOTA Johnson   [facility] ([facility])    [address] [place] [address]   [phone]            Jul 19, 2017  8:00 AM CDT   Cardiac Treatment with Julienne Yap,  KOTA   [facility] ([facility])    [address] [place] [address]   [phone]            Jul 21, 2017  8:00 AM CDT   Cardiac Treatment with EDDIE Berumen   [facility] ([facility])    [address] [place] [address]   [phone]            Jul 24, 2017  8:00 AM CDT   Cardiac Treatment with KOTA Johnson   [facility] ([facility])    [address] [place] [address]   [phone]            Jul 26, 2017  8:00 AM CDT   Cardiac Treatment with KOTA Johnson   [facility] ([facility])    [address] [place] [address]   [phone]              Future tests that were ordered for you today     Open Future Orders        Priority Expected Expires Ordered    Follow-Up with Cardiologist Routine 7/6/2018 11/18/2018 7/6/2017            Who to contact     If you have questions or need follow up information about today's clinic visit or your schedule please contact [facility] directly at [phone].  Normal or non-critical lab and imaging results will be communicated to you by Sunpremehart, letter or phone within 4 business days after the clinic has received the results. If you do not hear from us within 7 days, please contact the clinic through Sunpremehart or phone. If you have a critical or abnormal lab result, we will notify you by phone as soon as possible.  Submit refill requests through Escapio or call your pharmacy and they will forward the refill request to us. Please allow 3 business days for your refill to be completed.          Additional Information About Your Visit        Escapio Information     Escapio gives you secure access to your electronic health record. If you see a primary care provider, you can also send messages to your care team and make appointments. If you have questions, please call  "your primary care clinic.  If you do not have a primary care provider, please call [phone] and they will assist you.        Care EveryWhere ID     This is your Care EveryWhere ID. This could be used by other organizations to access your [facility] medical records  [id number]        Your Vitals Were     Pulse Height Pulse Oximetry BMI (Body Mass Index)          60 1.715 m (5' 7.5\") 100% 26.2 kg/m2         Blood Pressure from Last 3 Encounters:   07/06/17 146/70   06/21/17 106/66   06/17/17 (!) 127/92    Weight from Last 3 Encounters:   07/06/17 77 kg (169 lb 12.8 oz)   06/29/17 78 kg (172 lb)   06/21/17 78 kg (172 lb)                 Today's Medication Changes          These changes are accurate as of: 7/6/17  2:50 PM.  If you have any questions, ask your nurse or doctor.               Stop taking these medicines if you haven't already. Please contact your care team if you have questions.     amiodarone 200 MG tablet   Commonly known as:  PACERONE/CODARONE   Stopped by:  Kiko Chow MD                    Primary Care Provider Office Phone # Fax #    Herbert Epstein [phone]       [facility] [address] [place] [address]        Equal Access to Services     HALEY HEREDIA AH: Hadii aad ku hadasho Soomaali, waaxda luqadaha, qaybta kaalmada adeegyada, waxay brody thorne. So [facility] [phone].    ATENCIÓN: Si habla español, tiene a de la rosa disposición servicios gratuitos de asistencia lingüística. Llame al [phone].    We comply with applicable federal civil rights laws and [place] laws. We do not discriminate on the basis of race, color, national origin, age, disability sex, sexual orientation or gender identity.            Thank you!     Thank you for choosing [facility]  for your care. Our goal is always to provide you with excellent care. Hearing back from our patients is one way we can continue to improve our services. Please take " a few minutes to complete the written survey that you may receive in the mail after your visit with us. Thank you!             Your Updated Medication List - Protect others around you: Learn how to safely use, store and throw away your medicines at www.disposemymeds.org.          This list is accurate as of: 7/6/17  2:50 PM.  Always use your most recent med list.                   Brand Name Dispense Instructions for use Diagnosis    acetaminophen 325 MG tablet    TYLENOL    100 tablet    Take 2 tablets (650 mg) by mouth every 6 hours as needed for mild pain    Acute post-operative pain       aspirin 81 MG EC tablet     90 tablet    Take 1 tablet (81 mg) by mouth daily    ASCVD (arteriosclerotic cardiovascular disease)       atorvastatin 20 MG tablet    LIPITOR    90 tablet    Take 1 tablet (20 mg) by mouth every evening    Hyperlipidemia LDL goal <100       gabapentin 100 MG capsule    NEURONTIN    180 capsule    Take 2 capsules (200 mg) by mouth 3 times daily    Nerve pain       losartan 25 MG tablet    COZAAR    30 tablet    Take 1 tablet (25 mg) by mouth daily    Hypertension goal BP (blood pressure) < 140/90, ASCVD (arteriosclerotic cardiovascular disease)       metoprolol 25 MG tablet    LOPRESSOR    60 tablet    1 tablet (25 mg) by Oral or Feeding Tube route 2 times daily    S/P CABG (coronary artery bypass graft), S/P aortic aneurysm repair       pantoprazole 40 MG EC tablet    PROTONIX    30 tablet    Take 1 tablet (40 mg) by mouth every morning    Acute post-operative pain       polyethylene glycol Packet    MIRALAX/GLYCOLAX    10 packet    Take 17 g by mouth daily as needed for constipation    Acute post-operative pain       warfarin 3 MG tablet    COUMADIN    30 tablet    Take 1 tablet (3 mg) by mouth daily    Paroxysmal atrial fibrillation (H)

## 2017-07-06 NOTE — LETTER
7/6/2017    Herbetr Epstein MD  [facility]   [address] [place] [address]    RE: Marilia Bean       Dear Colleague,    I had the pleasure of seeing Marilia Bean in the [facility].    HPI:    Mrs. Bean is 72 years old.  She is one month post aortic root replacement with a single coronary artery bypass with the mammary artery inserted into the LAD.  Subsequently she was hassled by recurrent and rapid atrial fibrillation which has been treated with warfarin prophylaxis and amiodarone.  She's now taking amiodarone 200 mg a day.  She is up and around relatively comfortably but complains that she doesn't have as much energy as she would like.  She's taking a nap intermittently during the day for the first time in her life.  I told her this was not an uncommon consequence of the surgery she had.    Her review of systems is as noted below and apical.  I endorsed the observations that are reported.  She's been a little lightheaded she's had a little bit of palpitations but more related to the atrial fibrillation upper GI lower GI and  were negative Rodriguez skeletal positive for some numbness of the third fourth and fifth fingers of her left hand.  This has been a consequence of her thoracic surgery.  Sleeping has been a bit fitful but otherwise her review is relatively negative her chest feels stable and is healing nicely.        The medications are listed below: Today I stopped her amiodarone but continued the warfarin.  His peak that she can see Dr. Mireles in 4-6 weeks if there are no clinical clues of atrial fibrillation,  I believe warfarin can be discontinued.    I discussed the recovery and the fact that only one month post thoracic surgery that her intake energy level would indeed can be somewhat diminished.  I promised her that she would continue to gain strength and recovery but it will take 3-6 months for her to really rebound completely.    Physical exam  is as noted below.   She is a bright alert female who got up on the exam table effortlessly.  She is normotensive she looks well and feels well head neck heart lungs abdomen and extremities are as listed below and she's doing well her sternum is beautifully healed.        Brief Operative Note on 6-5-2017 at the [facility].     Pre-operative diagnosis:                   Ascending Aneurysm   Post-operative diagnosis                  * No post-op diagnosis entered *  Procedure:                Procedure(s):  Median Sternotomy, Ascending Aortic Aneurysm Repair, Coronary Artery Bypass Graft  X  1 MURPHY => LAD, on pump oxygenator -     HPI and Plan:           Plan:    Discontinue amiodarone.    Repeat discontinue warfarin in 4-6 weeks after seen by Dr. Epstein.    Cardiac rehabilitation per the patient's wishes and desires    Ongoing treatment with the patient's current cardiovascular program including aspirin, losartan, metoprolol and  atorvastatin.  Protonix,  gabapentin and warfarin will be managed by Dr. Epstein.    I asked the patient to return in a year and care will be directed to Dr. Epstein.        No orders of the defined types were placed in this encounter.    No orders of the defined types were placed in this encounter.    Medications Discontinued During This Encounter   Medication Reason     benzonatate (TESSALON) 200 MG capsule Therapy completed     cyclobenzaprine (FLEXERIL) 5 MG tablet Therapy completed     guaiFENesin-codeine (ROBITUSSIN AC) 100-10 MG/5ML SOLN solution Therapy completed     lisinopril (PRINIVIL/ZESTRIL) 5 MG tablet Therapy completed     senna-docusate (SENOKOT-S;PERICOLACE) 8.6-50 MG per tablet Therapy completed         No diagnosis found.    CURRENT MEDICATIONS:  Current Outpatient Prescriptions   Medication Sig Dispense Refill     warfarin (COUMADIN) 3 MG tablet Take 1 tablet (3 mg) by mouth daily 30 tablet 0     aspirin 81 MG EC tablet Take 1 tablet (81 mg) by mouth daily 90 tablet 3      losartan (COZAAR) 25 MG tablet Take 1 tablet (25 mg) by mouth daily 30 tablet 1     amiodarone (PACERONE/CODARONE) 200 MG tablet Take 2 tablets (400 mg) by mouth 2 times daily Take 400mg twice daily for 7 days  Then take 200mg twice daily for 7 days  Then take 200mg once daily for 7 days  Then stop taking 49 tablet 0     gabapentin (NEURONTIN) 100 MG capsule Take 2 capsules (200 mg) by mouth 3 times daily 180 capsule 0     metoprolol (LOPRESSOR) 25 MG tablet 1 tablet (25 mg) by Oral or Feeding Tube route 2 times daily 60 tablet 1     atorvastatin (LIPITOR) 20 MG tablet Take 1 tablet (20 mg) by mouth every evening 90 tablet 1     acetaminophen (TYLENOL) 325 MG tablet Take 2 tablets (650 mg) by mouth every 6 hours as needed for mild pain 100 tablet 0     polyethylene glycol (MIRALAX/GLYCOLAX) Packet Take 17 g by mouth daily as needed for constipation 10 packet 0     pantoprazole (PROTONIX) 40 MG EC tablet Take 1 tablet (40 mg) by mouth every morning 30 tablet 0       ALLERGIES     Allergies   Allergen Reactions     Contrast Dye Itching       PAST MEDICAL HISTORY:  Past Medical History:   Diagnosis Date     Aortic aneurysm (H) 7/1/2007    see 7/07 [place] report -follow yearly, treat high BP is occurs Problem list name updated by automated process. Provider to review     Aortic aneurysm of unspecified site without mention of rupture 7/2007    4.4 cm ascending aorta noted in 2007     Asymptomatic postmenopausal status (age-related) (natural)     on HRT  Prempro -weaning off 5/'2004     CAD (coronary artery disease)     LAD     Family history of Gardiner syndrome      Hyperlipidemia LDL goal <100 10/31/2010     Hypertension goal BP (blood pressure) < 140/90 2/9/2016     Leiomyoma of uterus, unspecified     Uterine fibroid     Lump or mass in breast 7/2004    rt. nodule - bx neg     Personal history of colonic polyps      Postmenopausal atrophic vaginitis 8/20/2006     Pulmonary nodule     incidental noted in 2007 during [place]      Pure hypercholesterolemia     mild, diet - low cholesterol, low fat.10/06 start Lovastatin       PAST SURGICAL HISTORY:  Past Surgical History:   Procedure Laterality Date     BYPASS GRAFT ARTERY CORONARY N/A 6/5/2017    Procedure: BYPASS GRAFT ARTERY CORONARY;;  Surgeon: Akshat Soto MD;  Location: [facility] OR     C DEXA INTERPRETATION, AXIAL  12/21/01    wnl. 7/2005 wnl but lower     COLONOSCOPY  05/07/07    Repeat in 1 year for surveillance     COLONOSCOPY  12/10/08    repeat 1 year  -see 1/2009 letter     COLONOSCOPY  03/31/10     COLONOSCOPY  10/31/2011    Procedure:COMBINED COLONOSCOPY, SINGLE BIOPSY/POLYPECTOMY BY BIOPSY; colonoscopy with polypectomy by biopsy; Surgeon:JESSICA GARCIA; Location:[facility]     COLONOSCOPY  12/6/2013    Procedure: COMBINED COLONOSCOPY, SINGLE BIOPSY/POLYPECTOMY BY BIOPSY;  Colonoscopy, Polypectomies;  Surgeon: Herbert Salinas MD;  Location: [facility]     COLONOSCOPY N/A 12/8/2015    Procedure: COLONOSCOPY;  Surgeon: Jared Carbajal MD;  Location: [facility]     COLONOSCOPY N/A 4/26/2017    Procedure: COMBINED COLONOSCOPY, SINGLE OR MULTIPLE BIOPSY/POLYPECTOMY BY BIOPSY;  Colonoscopy with polypectomies with forceps and snare;  Surgeon: Herbert Salinas MD;  Location:  GI     ESOPHAGOSCOPY, GASTROSCOPY, DUODENOSCOPY (EGD), COMBINED N/A 12/8/2015    Procedure: COMBINED ESOPHAGOSCOPY, GASTROSCOPY, DUODENOSCOPY (EGD), BIOPSY SINGLE OR MULTIPLE;  Surgeon: Jared Carbajal MD;  Location:  GI     HC BIOPSY BREAST, PERC NEEDLE CORE, WITH IMAGING  8/17/2004    Right     HC COLONOSCOPY THRU STOMA W BIOPSY/CAUTERY TUMOR/POLYP/LESION  1999,2002 2004 polyp - hyperplastic - repeat 5 years ?     HC COLONOSCOPY W/WO BRUSH/WASH  12/12/2005    Polypectomy.  Diverticulosis-minimal. Bx adenomatous and mucosal polyps - repeat 1 year     HC ECP WITH CATARACT SURGERY Bilateral 2015, 2016     HC LAPAROSCOPY, SURGICAL; APPENDECTOMY  10/31/2004      LAPAROSCOPY, SURGICAL;  CHOLECYSTECTOMY  2000    Cholecystectomy, Laparoscopic     HC REVISE MEDIAN N/CARPAL TUNNEL SURG  10/01/10    left     HC UGI ENDOSCOPY, SIMPLE EXAM  03/31/10     HYSTERECTOMY, ELVIN  12/14/09    EMMY, DENNYK     REPAIR ANEURYSM ASCENDING AORTA N/A 6/5/2017    Procedure: REPAIR ANEURYSM ASCENDING AORTA;  Median Sternotomy, Ascending Aortic Aneurysm Repair, Coronary Artery Bypass Graft x1 on pump oxygenator;  Surgeon: Akshat Soto MD;  Location:  OR       FAMILY HISTORY:  Family History   Problem Relation Age of Onset     CANCER Mother      Colon Cancer     HEART DISEASE Mother      MI     OSTEOPOROSIS Mother      CANCER Father      Colon Cancer     HEART DISEASE Father      Heart Disease/ Heart attacks     DIABETES Father      Adult Onset     CANCER Sister      Colon Cancer     HEART DISEASE Brother      Heart attacks at age 45     Breast Cancer Sister      CANCER Paternal Grandmother      Unknown type     HEART DISEASE Maternal Grandfather      MI     DIABETES Sister      Adult Onset     DIABETES Sister      Adult Onset     DIABETES Brother      Adult Onset     HEART DISEASE Brother      heart attack age 64     Cancer - colorectal Son      age 36, Gardiner syndrome       SOCIAL HISTORY:  Social History     Social History     Marital status:      Spouse name: Cain     Number of children: 4     Years of education: 12     Occupational History     [employer]     Social History Main Topics     Smoking status: Never Smoker     Smokeless tobacco: Never Used     Alcohol use Yes      Comment: rarely     Drug use: No     Sexual activity: Yes     Partners: Male      Comment: Post menopausal     Other Topics Concern      Service No     Blood Transfusions No     Caffeine Concern Yes     coffee; 3c/d pop: 1c/wk     Occupational Exposure No     Hobby Hazards No     Sleep Concern Yes     c/o insomnia     Stress Concern No     Weight Concern Yes     desire wt loss      "Special Diet No     Back Care No     Exercise No     Bike Helmet No     Seat Belt Yes     Self-Exams No     Social History Narrative    Lives with spouse. No domestic violence issues.         Review of Systems:  Skin:  Negative       Eyes:  Positive for glasses    ENT:  Positive for      Respiratory:  Positive for shortness of breath will wake up from a nap will feel like she can't breath   Cardiovascular:  Negative for;chest pain;edema;palpitations Positive for;lightheadedness with walking will get very lightheaded   Gastroenterology:        Genitourinary:  Negative      Musculoskeletal:  Positive for   msucle soreness across chest, nerve pain from back down to arm and fingers from surgery   Neurologic:  Positive for numbness or tingling of hands    Psychiatric:  Positive for sleep disturbances    Heme/Lymph/Imm:  Positive for allergies contrast dye   Endocrine:  Negative        Physical Exam:  Vitals: /70 (BP Location: Right arm, Patient Position: Fowlers, Cuff Size: Adult Large)  Pulse 60  Ht 1.715 m (5' 7.5\")  Wt 77 kg (169 lb 12.8 oz)  SpO2 100%  BMI 26.2 kg/m2    Constitutional:  cooperative, alert and oriented, well developed, well nourished, in no acute distress        Skin:  warm and dry to the touch        Head:  normocephalic        Eyes:  pupils equal and round;sclera white        ENT:  no pallor or cyanosis        Neck:  JVP normal;no carotid bruit        Chest:  clear to auscultation;healed median sternotomy scar          Cardiac: regular rhythm;no murmurs, gallops or rubs detected                  Abdomen:           Vascular: pulses full and equal                                        Extremities and Back:  no deformities, clubbing, cyanosis, erythema observed;no edema              Neurological:  affect appropriate, oriented to time, person and place;no gross motor deficits          Recent Lab Results:  LIPID RESULTS:  Lab Results   Component Value Date    CHOL 186 03/14/2017    HDL 55 " 03/14/2017     (H) 03/14/2017    TRIG 98 03/14/2017    CHOLHDLRATIO 3.0 01/15/2014       LIVER ENZYME RESULTS:  Lab Results   Component Value Date    AST 61 (H) 06/21/2017    ALT 66 (H) 06/21/2017       CBC RESULTS:  Lab Results   Component Value Date    WBC 9.9 06/21/2017    RBC 3.23 (L) 06/21/2017    HGB 9.2 (L) 06/21/2017    HCT 29.6 (L) 06/21/2017    MCV 92 06/21/2017    MCH 28.5 06/21/2017    MCHC 31.1 (L) 06/21/2017    RDW 13.4 06/21/2017     (H) 06/21/2017       BMP RESULTS:  Lab Results   Component Value Date     06/21/2017    POTASSIUM 4.1 06/21/2017    CHLORIDE 99 06/21/2017    CO2 29 06/21/2017    ANIONGAP 8 06/21/2017     (H) 06/21/2017    BUN 13 06/21/2017    CR 1.10 (H) 06/21/2017    GFRESTIMATED 49 (L) 06/21/2017    GFRESTBLACK 59 (L) 06/21/2017    TARIQ 8.5 06/21/2017        A1C RESULTS:  Lab Results   Component Value Date    A1C 5.8 06/07/2017       INR RESULTS:  Lab Results   Component Value Date    INR 4.2 (A) 06/30/2017    INR 2.3 (A) 06/23/2017    INR 2.18 (H) 06/21/2017    INR 1.34 (H) 06/17/2017     Thank you for allowing me to participate in the care of your patient.    Sincerely,     HUDSON GONZALEZ MD     [facility]

## 2017-07-06 NOTE — MR AVS SNAPSHOT
Marilia YINKA Vikas   7/6/2017 4:00 PM   Anticoagulation Therapy Visit    Description:  72 year old female   Provider:  [facility]   Department:  [facility]           INR as of 7/6/2017     Today's INR 3.0      Anticoagulation Summary as of 7/6/2017     INR goal 2.0-3.0   Today's INR 3.0   Full instructions 3 mg every day   Next INR check 7/13/2017    Indications   Long-term (current) use of anticoagulants [Z79.01] [Z79.01]  Atrial fibrillation (H) [I48.91]         Your next Anticoagulation Clinic appointment(s)     Jul 06, 2017  4:00 PM CDT   [facility]    [address] [address]   [phone]            Jul 13, 2017 10:30 AM CDT   [facility] with  [facility]   [facility]    [address] [facility] [address]   [phone]              Contact Numbers     Clinic Number:         July 2017 Details    Sun Mon Tue Wed Thu Fri Sat           1                 2               3               4               5               6      3 mg   See details      7      3 mg         8      3 mg           9      3 mg         10      3 mg         11      3 mg         12      3 mg         13            14               15                 16               17               18               19               20               21               22                 23               24               25               26               27               28               29                 30               31                     Date Details   07/06 This INR check       Date of next INR:  7/13/2017         How to take your warfarin dose     To take:  3 mg Take 1 of the 3 mg tablets.

## 2017-07-06 NOTE — PROGRESS NOTES
HPI:    Mrs. Bean is 72 years old.  She is one month post aortic root replacement with a single coronary artery bypass with the mammary artery inserted into the LAD.  Subsequently she was hassled by recurrent and rapid atrial fibrillation which has been treated with warfarin prophylaxis and amiodarone.  She's now taking amiodarone 200 mg a day.  She is up and around relatively comfortably but complains that she doesn't have as much energy as she would like.  She's taking a nap intermittently during the day for the first time in her life.  I told her this was not an uncommon consequence of the surgery she had.    Her review of systems is as noted below and apical.  I endorsed the observations that are reported.  She's been a little lightheaded she's had a little bit of palpitations but more related to the atrial fibrillation upper GI lower GI and  were negative Rodriguez skeletal positive for some numbness of the third fourth and fifth fingers of her left hand.  This has been a consequence of her thoracic surgery.  Sleeping has been a bit fitful but otherwise her review is relatively negative her chest feels stable and is healing nicely.        The medications are listed below: Today I stopped her amiodarone but continued the warfarin.  His peak that she can see Dr. Mireles in 4-6 weeks if there are no clinical clues of atrial fibrillation,  I believe warfarin can be discontinued.    I discussed the recovery and the fact that only one month post thoracic surgery that her intake energy level would indeed can be somewhat diminished.  I promised her that she would continue to gain strength and recovery but it will take 3-6 months for her to really rebound completely.    Physical exam is as noted below.   She is a bright alert female who got up on the exam table effortlessly.  She is normotensive she looks well and feels well head neck heart lungs abdomen and extremities are as listed below and she's doing well her  sternum is beautifully healed.        Brief Operative Note on 6-5-2017 at the [facility].     Pre-operative diagnosis:                   Ascending Aneurysm   Post-operative diagnosis                  * No post-op diagnosis entered *  Procedure:                Procedure(s):  Median Sternotomy, Ascending Aortic Aneurysm Repair, Coronary Artery Bypass Graft  X  1 MURPHY => LAD, on pump oxygenator -     HPI and Plan:           Plan:    Discontinue amiodarone.    Repeat discontinue warfarin in 4-6 weeks after seen by Dr. Epstein.    Cardiac rehabilitation per the patient's wishes and desires    Ongoing treatment with the patient's current cardiovascular program including aspirin, losartan, metoprolol and  atorvastatin.  Protonix,  gabapentin and warfarin will be managed by Dr. Epstein.    I asked the patient to return in a year and care will be directed to Dr. Epstein.        No orders of the defined types were placed in this encounter.    No orders of the defined types were placed in this encounter.    Medications Discontinued During This Encounter   Medication Reason     benzonatate (TESSALON) 200 MG capsule Therapy completed     cyclobenzaprine (FLEXERIL) 5 MG tablet Therapy completed     guaiFENesin-codeine (ROBITUSSIN AC) 100-10 MG/5ML SOLN solution Therapy completed     lisinopril (PRINIVIL/ZESTRIL) 5 MG tablet Therapy completed     senna-docusate (SENOKOT-S;PERICOLACE) 8.6-50 MG per tablet Therapy completed         No diagnosis found.    CURRENT MEDICATIONS:  Current Outpatient Prescriptions   Medication Sig Dispense Refill     warfarin (COUMADIN) 3 MG tablet Take 1 tablet (3 mg) by mouth daily 30 tablet 0     aspirin 81 MG EC tablet Take 1 tablet (81 mg) by mouth daily 90 tablet 3     losartan (COZAAR) 25 MG tablet Take 1 tablet (25 mg) by mouth daily 30 tablet 1     amiodarone (PACERONE/CODARONE) 200 MG tablet Take 2 tablets (400 mg) by mouth 2 times daily Take 400mg twice daily for 7 days  Then take 200mg twice daily  for 7 days  Then take 200mg once daily for 7 days  Then stop taking 49 tablet 0     gabapentin (NEURONTIN) 100 MG capsule Take 2 capsules (200 mg) by mouth 3 times daily 180 capsule 0     metoprolol (LOPRESSOR) 25 MG tablet 1 tablet (25 mg) by Oral or Feeding Tube route 2 times daily 60 tablet 1     atorvastatin (LIPITOR) 20 MG tablet Take 1 tablet (20 mg) by mouth every evening 90 tablet 1     acetaminophen (TYLENOL) 325 MG tablet Take 2 tablets (650 mg) by mouth every 6 hours as needed for mild pain 100 tablet 0     polyethylene glycol (MIRALAX/GLYCOLAX) Packet Take 17 g by mouth daily as needed for constipation 10 packet 0     pantoprazole (PROTONIX) 40 MG EC tablet Take 1 tablet (40 mg) by mouth every morning 30 tablet 0       ALLERGIES     Allergies   Allergen Reactions     Contrast Dye Itching       PAST MEDICAL HISTORY:  Past Medical History:   Diagnosis Date     Aortic aneurysm (H) 7/1/2007    see 7/07 [place] report -follow yearly, treat high BP is occurs Problem list name updated by automated process. Provider to review     Aortic aneurysm of unspecified site without mention of rupture 7/2007    4.4 cm ascending aorta noted in 2007     Asymptomatic postmenopausal status (age-related) (natural)     on HRT  Prempro -weaning off 5/'2004     CAD (coronary artery disease)     LAD     Family history of Gardiner syndrome      Hyperlipidemia LDL goal <100 10/31/2010     Hypertension goal BP (blood pressure) < 140/90 2/9/2016     Leiomyoma of uterus, unspecified     Uterine fibroid     Lump or mass in breast 7/2004    rt. nodule - bx neg     Personal history of colonic polyps      Postmenopausal atrophic vaginitis 8/20/2006     Pulmonary nodule     incidental noted in 2007 during [place]     Pure hypercholesterolemia     mild, diet - low cholesterol, low fat.10/06 start Lovastatin       PAST SURGICAL HISTORY:  Past Surgical History:   Procedure Laterality Date     BYPASS GRAFT ARTERY CORONARY N/A 6/5/2017    Procedure:  BYPASS GRAFT ARTERY CORONARY;;  Surgeon: Akshat Soto MD;  Location: [facility] OR     C DEXA INTERPRETATION, AXIAL  12/21/01    wnl. 7/2005 wnl but lower     COLONOSCOPY  05/07/07    Repeat in 1 year for surveillance     COLONOSCOPY  12/10/08    repeat 1 year  -see 1/2009 letter     COLONOSCOPY  03/31/10     COLONOSCOPY  10/31/2011    Procedure:COMBINED COLONOSCOPY, SINGLE BIOPSY/POLYPECTOMY BY BIOPSY; colonoscopy with polypectomy by biopsy; Surgeon:JESSICA GARCIA; Location:[facility]     COLONOSCOPY  12/6/2013    Procedure: COMBINED COLONOSCOPY, SINGLE BIOPSY/POLYPECTOMY BY BIOPSY;  Colonoscopy, Polypectomies;  Surgeon: Herbert Salinas MD;  Location: [facility]     COLONOSCOPY N/A 12/8/2015    Procedure: COLONOSCOPY;  Surgeon: Jared Carbajal MD;  Location: [facility]     COLONOSCOPY N/A 4/26/2017    Procedure: COMBINED COLONOSCOPY, SINGLE OR MULTIPLE BIOPSY/POLYPECTOMY BY BIOPSY;  Colonoscopy with polypectomies with forceps and snare;  Surgeon: Herbert Salinas MD;  Location:  GI     ESOPHAGOSCOPY, GASTROSCOPY, DUODENOSCOPY (EGD), COMBINED N/A 12/8/2015    Procedure: COMBINED ESOPHAGOSCOPY, GASTROSCOPY, DUODENOSCOPY (EGD), BIOPSY SINGLE OR MULTIPLE;  Surgeon: Jared Carbajal MD;  Location:  GI     HC BIOPSY BREAST, PERC NEEDLE CORE, WITH IMAGING  8/17/2004    Right     HC COLONOSCOPY THRU STOMA W BIOPSY/CAUTERY TUMOR/POLYP/LESION  1999,2002 2004 polyp - hyperplastic - repeat 5 years ?     HC COLONOSCOPY W/WO BRUSH/WASH  12/12/2005    Polypectomy.  Diverticulosis-minimal. Bx adenomatous and mucosal polyps - repeat 1 year     HC ECP WITH CATARACT SURGERY Bilateral 2015, 2016     HC LAPAROSCOPY, SURGICAL; APPENDECTOMY  10/31/2004     HC LAPAROSCOPY, SURGICAL; CHOLECYSTECTOMY  2000    Cholecystectomy, Laparoscopic     HC REVISE MEDIAN N/CARPAL TUNNEL SURG  10/01/10    left     [facility] UGI ENDOSCOPY, SIMPLE EXAM  03/31/10     HYSTERECTOMY, ELVIN  12/14/09    TAHBSO, MMK     REPAIR ANEURYSM ASCENDING AORTA N/A  6/5/2017    Procedure: REPAIR ANEURYSM ASCENDING AORTA;  Median Sternotomy, Ascending Aortic Aneurysm Repair, Coronary Artery Bypass Graft x1 on pump oxygenator;  Surgeon: Akshat Soto MD;  Location:  OR       FAMILY HISTORY:  Family History   Problem Relation Age of Onset     CANCER Mother      Colon Cancer     HEART DISEASE Mother      MI     OSTEOPOROSIS Mother      CANCER Father      Colon Cancer     HEART DISEASE Father      Heart Disease/ Heart attacks     DIABETES Father      Adult Onset     CANCER Sister      Colon Cancer     HEART DISEASE Brother      Heart attacks at age 45     Breast Cancer Sister      CANCER Paternal Grandmother      Unknown type     HEART DISEASE Maternal Grandfather      MI     DIABETES Sister      Adult Onset     DIABETES Sister      Adult Onset     DIABETES Brother      Adult Onset     HEART DISEASE Brother      heart attack age 64     Cancer - colorectal Son      age 36, Gardiner syndrome       SOCIAL HISTORY:  Social History     Social History     Marital status:      Spouse name: Cain     Number of children: 4     Years of education: 12     Occupational History     [employer]     Social History Main Topics     Smoking status: Never Smoker     Smokeless tobacco: Never Used     Alcohol use Yes      Comment: rarely     Drug use: No     Sexual activity: Yes     Partners: Male      Comment: Post menopausal     Other Topics Concern      Service No     Blood Transfusions No     Caffeine Concern Yes     coffee; 3c/d pop: 1c/wk     Occupational Exposure No     Hobby Hazards No     Sleep Concern Yes     c/o insomnia     Stress Concern No     Weight Concern Yes     desire wt loss     Special Diet No     Back Care No     Exercise No     Bike Helmet No     Seat Belt Yes     Self-Exams No     Social History Narrative    Lives with spouse. No domestic violence issues.         Review of Systems:  Skin:  Negative       Eyes:   "Positive for glasses    ENT:  Positive for      Respiratory:  Positive for shortness of breath will wake up from a nap will feel like she can't breath   Cardiovascular:  Negative for;chest pain;edema;palpitations Positive for;lightheadedness with walking will get very lightheaded   Gastroenterology:        Genitourinary:  Negative      Musculoskeletal:  Positive for   msucle soreness across chest, nerve pain from back down to arm and fingers from surgery   Neurologic:  Positive for numbness or tingling of hands    Psychiatric:  Positive for sleep disturbances    Heme/Lymph/Imm:  Positive for allergies contrast dye   Endocrine:  Negative        Physical Exam:  Vitals: /70 (BP Location: Right arm, Patient Position: Fowlers, Cuff Size: Adult Large)  Pulse 60  Ht 1.715 m (5' 7.5\")  Wt 77 kg (169 lb 12.8 oz)  SpO2 100%  BMI 26.2 kg/m2    Constitutional:  cooperative, alert and oriented, well developed, well nourished, in no acute distress        Skin:  warm and dry to the touch        Head:  normocephalic        Eyes:  pupils equal and round;sclera white        ENT:  no pallor or cyanosis        Neck:  JVP normal;no carotid bruit        Chest:  clear to auscultation;healed median sternotomy scar          Cardiac: regular rhythm;no murmurs, gallops or rubs detected                  Abdomen:           Vascular: pulses full and equal                                        Extremities and Back:  no deformities, clubbing, cyanosis, erythema observed;no edema              Neurological:  affect appropriate, oriented to time, person and place;no gross motor deficits          Recent Lab Results:  LIPID RESULTS:  Lab Results   Component Value Date    CHOL 186 03/14/2017    HDL 55 03/14/2017     (H) 03/14/2017    TRIG 98 03/14/2017    CHOLHDLRATIO 3.0 01/15/2014       LIVER ENZYME RESULTS:  Lab Results   Component Value Date    AST 61 (H) 06/21/2017    ALT 66 (H) 06/21/2017       CBC RESULTS:  Lab Results "   Component Value Date    WBC 9.9 06/21/2017    RBC 3.23 (L) 06/21/2017    HGB 9.2 (L) 06/21/2017    HCT 29.6 (L) 06/21/2017    MCV 92 06/21/2017    MCH 28.5 06/21/2017    MCHC 31.1 (L) 06/21/2017    RDW 13.4 06/21/2017     (H) 06/21/2017       BMP RESULTS:  Lab Results   Component Value Date     06/21/2017    POTASSIUM 4.1 06/21/2017    CHLORIDE 99 06/21/2017    CO2 29 06/21/2017    ANIONGAP 8 06/21/2017     (H) 06/21/2017    BUN 13 06/21/2017    CR 1.10 (H) 06/21/2017    GFRESTIMATED 49 (L) 06/21/2017    GFRESTBLACK 59 (L) 06/21/2017    TARIQ 8.5 06/21/2017        A1C RESULTS:  Lab Results   Component Value Date    A1C 5.8 06/07/2017       INR RESULTS:  Lab Results   Component Value Date    INR 4.2 (A) 06/30/2017    INR 2.3 (A) 06/23/2017    INR 2.18 (H) 06/21/2017    INR 1.34 (H) 06/17/2017           CC  No referring provider defined for this encounter.

## 2017-07-06 NOTE — LETTER
7/6/2017      RE: Marilia Bean  [address]  [facility] [address]       Dear Colleague,    Thank you for the opportunity to participate in the care of your patient, Marilia Bean, at the [facility]. Please see a copy of my visit note below.    HPI:    Mrs. Bean is 72 years old.  She is one month post aortic root replacement with a single coronary artery bypass with the mammary artery inserted into the LAD.  Subsequently she was hassled by recurrent and rapid atrial fibrillation which has been treated with warfarin prophylaxis and amiodarone.  She's now taking amiodarone 200 mg a day.  She is up and around relatively comfortably but complains that she doesn't have as much energy as she would like.  She's taking a nap intermittently during the day for the first time in her life.  I told her this was not an uncommon consequence of the surgery she had.    Her review of systems is as noted below and apical.  I endorsed the observations that are reported.  She's been a little lightheaded she's had a little bit of palpitations but more related to the atrial fibrillation upper GI lower GI and  were negative Rodriguez skeletal positive for some numbness of the third fourth and fifth fingers of her left hand.  This has been a consequence of her thoracic surgery.  Sleeping has been a bit fitful but otherwise her review is relatively negative her chest feels stable and is healing nicely.        The medications are listed below: Today I stopped her amiodarone but continued the warfarin.  His peak that she can see Dr. Mireles in 4-6 weeks if there are no clinical clues of atrial fibrillation,  I believe warfarin can be discontinued.    I discussed the recovery and the fact that only one month post thoracic surgery that her intake energy level would indeed can be somewhat diminished.  I promised her that she would continue to gain strength and recovery but  it will take 3-6 months for her to really rebound completely.    Physical exam is as noted below.   She is a bright alert female who got up on the exam table effortlessly.  She is normotensive she looks well and feels well head neck heart lungs abdomen and extremities are as listed below and she's doing well her sternum is beautifully healed.        Brief Operative Note on 6-5-2017 at the [facility].     Pre-operative diagnosis:                   Ascending Aneurysm   Post-operative diagnosis                  * No post-op diagnosis entered *  Procedure:                Procedure(s):  Median Sternotomy, Ascending Aortic Aneurysm Repair, Coronary Artery Bypass Graft  X  1 MURPHY => LAD, on pump oxygenator -     HPI and Plan:           Plan:    Discontinue amiodarone.    Repeat discontinue warfarin in 4-6 weeks after seen by Dr. Epstein.    Cardiac rehabilitation per the patient's wishes and desires    Ongoing treatment with the patient's current cardiovascular program including aspirin, losartan, metoprolol and  atorvastatin.  Protonix,  gabapentin and warfarin will be managed by Dr. Epstein.    I asked the patient to return in a year and care will be directed to Dr. Epstein.        No orders of the defined types were placed in this encounter.    No orders of the defined types were placed in this encounter.    Medications Discontinued During This Encounter   Medication Reason     benzonatate (TESSALON) 200 MG capsule Therapy completed     cyclobenzaprine (FLEXERIL) 5 MG tablet Therapy completed     guaiFENesin-codeine (ROBITUSSIN AC) 100-10 MG/5ML SOLN solution Therapy completed     lisinopril (PRINIVIL/ZESTRIL) 5 MG tablet Therapy completed     senna-docusate (SENOKOT-S;PERICOLACE) 8.6-50 MG per tablet Therapy completed         No diagnosis found.    CURRENT MEDICATIONS:  Current Outpatient Prescriptions   Medication Sig Dispense Refill     warfarin (COUMADIN) 3 MG tablet Take 1 tablet (3 mg) by mouth daily 30 tablet 0      aspirin 81 MG EC tablet Take 1 tablet (81 mg) by mouth daily 90 tablet 3     losartan (COZAAR) 25 MG tablet Take 1 tablet (25 mg) by mouth daily 30 tablet 1     amiodarone (PACERONE/CODARONE) 200 MG tablet Take 2 tablets (400 mg) by mouth 2 times daily Take 400mg twice daily for 7 days  Then take 200mg twice daily for 7 days  Then take 200mg once daily for 7 days  Then stop taking 49 tablet 0     gabapentin (NEURONTIN) 100 MG capsule Take 2 capsules (200 mg) by mouth 3 times daily 180 capsule 0     metoprolol (LOPRESSOR) 25 MG tablet 1 tablet (25 mg) by Oral or Feeding Tube route 2 times daily 60 tablet 1     atorvastatin (LIPITOR) 20 MG tablet Take 1 tablet (20 mg) by mouth every evening 90 tablet 1     acetaminophen (TYLENOL) 325 MG tablet Take 2 tablets (650 mg) by mouth every 6 hours as needed for mild pain 100 tablet 0     polyethylene glycol (MIRALAX/GLYCOLAX) Packet Take 17 g by mouth daily as needed for constipation 10 packet 0     pantoprazole (PROTONIX) 40 MG EC tablet Take 1 tablet (40 mg) by mouth every morning 30 tablet 0       ALLERGIES     Allergies   Allergen Reactions     Contrast Dye Itching       PAST MEDICAL HISTORY:  Past Medical History:   Diagnosis Date     Aortic aneurysm (H) 7/1/2007    see 7/07 Ba report -follow yearly, treat high BP is occurs Problem list name updated by automated process. Provider to review     Aortic aneurysm of unspecified site without mention of rupture 7/2007    4.4 cm ascending aorta noted in 2007     Asymptomatic postmenopausal status (age-related) (natural)     on HRT  Prempro -weaning off 5/'2004     CAD (coronary artery disease)     LAD     Family history of Gardiner syndrome      Hyperlipidemia LDL goal <100 10/31/2010     Hypertension goal BP (blood pressure) < 140/90 2/9/2016     Leiomyoma of uterus, unspecified     Uterine fibroid     Lump or mass in breast 7/2004    rt. nodule - bx neg     Personal history of colonic polyps      Postmenopausal atrophic  vaginitis 8/20/2006     Pulmonary nodule     incidental noted in 2007 during garcia     Pure hypercholesterolemia     mild, diet - low cholesterol, low fat.10/06 start Lovastatin       PAST SURGICAL HISTORY:  Past Surgical History:   Procedure Laterality Date     BYPASS GRAFT ARTERY CORONARY N/A 6/5/2017    Procedure: BYPASS GRAFT ARTERY CORONARY;;  Surgeon: Akshat Soto MD;  Location: [facility] OR     C DEXA INTERPRETATION, AXIAL  12/21/01    wnl. 7/2005 wnl but lower     COLONOSCOPY  05/07/07    Repeat in 1 year for surveillance     COLONOSCOPY  12/10/08    repeat 1 year  -see 1/2009 letter     COLONOSCOPY  03/31/10     COLONOSCOPY  10/31/2011    Procedure:COMBINED COLONOSCOPY, SINGLE BIOPSY/POLYPECTOMY BY BIOPSY; colonoscopy with polypectomy by biopsy; Surgeon:JESSICA GARCIA; Location: GI     COLONOSCOPY  12/6/2013    Procedure: COMBINED COLONOSCOPY, SINGLE BIOPSY/POLYPECTOMY BY BIOPSY;  Colonoscopy, Polypectomies;  Surgeon: Herbert Salinas MD;  Location:  GI     COLONOSCOPY N/A 12/8/2015    Procedure: COLONOSCOPY;  Surgeon: Jared Carbajal MD;  Location:  GI     COLONOSCOPY N/A 4/26/2017    Procedure: COMBINED COLONOSCOPY, SINGLE OR MULTIPLE BIOPSY/POLYPECTOMY BY BIOPSY;  Colonoscopy with polypectomies with forceps and snare;  Surgeon: Herbert Salinas MD;  Location:  GI     ESOPHAGOSCOPY, GASTROSCOPY, DUODENOSCOPY (EGD), COMBINED N/A 12/8/2015    Procedure: COMBINED ESOPHAGOSCOPY, GASTROSCOPY, DUODENOSCOPY (EGD), BIOPSY SINGLE OR MULTIPLE;  Surgeon: Jared Carbajal MD;  Location: [facility] BIOPSY BREAST, PERC NEEDLE CORE, WITH IMAGING  8/17/2004    Right     HC COLONOSCOPY THRU STOMA W BIOPSY/CAUTERY TUMOR/POLYP/LESION  1999,2002 2004 polyp - hyperplastic - repeat 5 years ?     HC COLONOSCOPY W/WO BRUSH/WASH  12/12/2005    Polypectomy.  Diverticulosis-minimal. Bx adenomatous and mucosal polyps - repeat 1 year     HC ECP WITH CATARACT SURGERY Bilateral 2015, 2016       LAPAROSCOPY, SURGICAL; APPENDECTOMY  10/31/2004     HC LAPAROSCOPY, SURGICAL; CHOLECYSTECTOMY  2000    Cholecystectomy, Laparoscopic     HC REVISE MEDIAN N/CARPAL TUNNEL SURG  10/01/10    left     HC UGI ENDOSCOPY, SIMPLE EXAM  03/31/10     HYSTERECTOMY, ELVIN  12/14/09    TADENNY BERRYK     REPAIR ANEURYSM ASCENDING AORTA N/A 6/5/2017    Procedure: REPAIR ANEURYSM ASCENDING AORTA;  Median Sternotomy, Ascending Aortic Aneurysm Repair, Coronary Artery Bypass Graft x1 on pump oxygenator;  Surgeon: Akshat Soto MD;  Location:  OR       FAMILY HISTORY:  Family History   Problem Relation Age of Onset     CANCER Mother      Colon Cancer     HEART DISEASE Mother      MI     OSTEOPOROSIS Mother      CANCER Father      Colon Cancer     HEART DISEASE Father      Heart Disease/ Heart attacks     DIABETES Father      Adult Onset     CANCER Sister      Colon Cancer     HEART DISEASE Brother      Heart attacks at age 45     Breast Cancer Sister      CANCER Paternal Grandmother      Unknown type     HEART DISEASE Maternal Grandfather      MI     DIABETES Sister      Adult Onset     DIABETES Sister      Adult Onset     DIABETES Brother      Adult Onset     HEART DISEASE Brother      heart attack age 64     Cancer - colorectal Son      age 36, Gardiner syndrome       SOCIAL HISTORY:  Social History     Social History     Marital status:      Spouse name: Cain     Number of children: 4     Years of education: 12     Occupational History     [employer]     Social History Main Topics     Smoking status: Never Smoker     Smokeless tobacco: Never Used     Alcohol use Yes      Comment: rarely     Drug use: No     Sexual activity: Yes     Partners: Male      Comment: Post menopausal     Other Topics Concern      Service No     Blood Transfusions No     Caffeine Concern Yes     coffee; 3c/d pop: 1c/wk     Occupational Exposure No     Hobby Hazards No     Sleep Concern Yes      "c/o insomnia     Stress Concern No     Weight Concern Yes     desire wt loss     Special Diet No     Back Care No     Exercise No     Bike Helmet No     Seat Belt Yes     Self-Exams No     Social History Narrative    Lives with spouse. No domestic violence issues.         Review of Systems:  Skin:  Negative       Eyes:  Positive for glasses    ENT:  Positive for      Respiratory:  Positive for shortness of breath will wake up from a nap will feel like she can't breath   Cardiovascular:  Negative for;chest pain;edema;palpitations Positive for;lightheadedness with walking will get very lightheaded   Gastroenterology:        Genitourinary:  Negative      Musculoskeletal:  Positive for   msucle soreness across chest, nerve pain from back down to arm and fingers from surgery   Neurologic:  Positive for numbness or tingling of hands    Psychiatric:  Positive for sleep disturbances    Heme/Lymph/Imm:  Positive for allergies contrast dye   Endocrine:  Negative        Physical Exam:  Vitals: /70 (BP Location: Right arm, Patient Position: Fowlers, Cuff Size: Adult Large)  Pulse 60  Ht 1.715 m (5' 7.5\")  Wt 77 kg (169 lb 12.8 oz)  SpO2 100%  BMI 26.2 kg/m2    Constitutional:  cooperative, alert and oriented, well developed, well nourished, in no acute distress        Skin:  warm and dry to the touch        Head:  normocephalic        Eyes:  pupils equal and round;sclera white        ENT:  no pallor or cyanosis        Neck:  JVP normal;no carotid bruit        Chest:  clear to auscultation;healed median sternotomy scar          Cardiac: regular rhythm;no murmurs, gallops or rubs detected                  Abdomen:           Vascular: pulses full and equal                                        Extremities and Back:  no deformities, clubbing, cyanosis, erythema observed;no edema              Neurological:  affect appropriate, oriented to time, person and place;no gross motor deficits          Recent Lab Results:  LIPID " RESULTS:  Lab Results   Component Value Date    CHOL 186 03/14/2017    HDL 55 03/14/2017     (H) 03/14/2017    TRIG 98 03/14/2017    CHOLHDLRATIO 3.0 01/15/2014       LIVER ENZYME RESULTS:  Lab Results   Component Value Date    AST 61 (H) 06/21/2017    ALT 66 (H) 06/21/2017       CBC RESULTS:  Lab Results   Component Value Date    WBC 9.9 06/21/2017    RBC 3.23 (L) 06/21/2017    HGB 9.2 (L) 06/21/2017    HCT 29.6 (L) 06/21/2017    MCV 92 06/21/2017    MCH 28.5 06/21/2017    MCHC 31.1 (L) 06/21/2017    RDW 13.4 06/21/2017     (H) 06/21/2017       BMP RESULTS:  Lab Results   Component Value Date     06/21/2017    POTASSIUM 4.1 06/21/2017    CHLORIDE 99 06/21/2017    CO2 29 06/21/2017    ANIONGAP 8 06/21/2017     (H) 06/21/2017    BUN 13 06/21/2017    CR 1.10 (H) 06/21/2017    GFRESTIMATED 49 (L) 06/21/2017    GFRESTBLACK 59 (L) 06/21/2017    TARIQ 8.5 06/21/2017        A1C RESULTS:  Lab Results   Component Value Date    A1C 5.8 06/07/2017       INR RESULTS:  Lab Results   Component Value Date    INR 4.2 (A) 06/30/2017    INR 2.3 (A) 06/23/2017    INR 2.18 (H) 06/21/2017    INR 1.34 (H) 06/17/2017       Sincerely,     HUDSON GONZALEZ MD      CC  No referring provider defined for this encounter.

## 2017-07-06 NOTE — PROGRESS NOTES
ANTICOAGULATION FOLLOW-UP CLINIC VISIT    Patient Name:  Marilia Bean  Date:  7/6/2017  Contact Type:  Face to Face    SUBJECTIVE:     Patient Findings     Positives No Problem Findings           OBJECTIVE    INR Protime   Date Value Ref Range Status   07/06/2017 3.0 (A) 0.86 - 1.14 Final       ASSESSMENT / PLAN  INR assessment THER    Recheck INR In: 1 WEEK    INR Location Clinic      Anticoagulation Summary as of 7/6/2017     INR goal 2.0-3.0   Today's INR 3.0   Maintenance plan 3 mg (3 mg x 1) every day   Full instructions 3 mg every day   Weekly total 21 mg   No change documented Justin Carbajal, RN   Plan last modified Justin Carbajal RN (6/23/2017)   Next INR check 7/13/2017   Target end date     Indications   Long-term (current) use of anticoagulants [Z79.01] [Z79.01]  Atrial fibrillation (H) [I48.91]         Anticoagulation Episode Summary     INR check location     Preferred lab     Send INR reminders to [facility]    Comments       Anticoagulation Care Providers     Provider Role Specialty Phone number    Herbert Eptsein MD [facility] Internal Medicine [phone]            See the Encounter Report to view Anticoagulation Flowsheet and Dosing Calendar (Go to Encounters tab in chart review, and find the Anticoagulation Therapy Visit)    Dosage adjustment made based on physician directed care plan.    Justin Carbajal, RN

## 2017-07-07 ENCOUNTER — HOSPITAL ENCOUNTER (OUTPATIENT)
Dept: CARDIAC REHAB | Facility: CLINIC | Age: 72
End: 2017-07-07
Attending: PHYSICIAN ASSISTANT
Payer: MEDICARE

## 2017-07-07 PROCEDURE — 93798 PHYS/QHP OP CAR RHAB W/ECG: CPT

## 2017-07-07 PROCEDURE — 40000116 ZZH STATISTIC OP CR VISIT

## 2017-07-10 ENCOUNTER — HOSPITAL ENCOUNTER (OUTPATIENT)
Dept: CARDIAC REHAB | Facility: CLINIC | Age: 72
End: 2017-07-10
Attending: PHYSICIAN ASSISTANT
Payer: MEDICARE

## 2017-07-10 PROCEDURE — 93798 PHYS/QHP OP CAR RHAB W/ECG: CPT | Performed by: REHABILITATION PRACTITIONER

## 2017-07-10 PROCEDURE — 40000116 ZZH STATISTIC OP CR VISIT: Performed by: REHABILITATION PRACTITIONER

## 2017-07-13 ENCOUNTER — ANTICOAGULATION THERAPY VISIT (OUTPATIENT)
Dept: ANTICOAGULATION | Facility: OTHER | Age: 72
End: 2017-07-13
Payer: COMMERCIAL

## 2017-07-13 DIAGNOSIS — I48.91 ATRIAL FIBRILLATION, UNSPECIFIED TYPE (H): ICD-10-CM

## 2017-07-13 DIAGNOSIS — Z79.01 LONG-TERM (CURRENT) USE OF ANTICOAGULANTS: ICD-10-CM

## 2017-07-13 LAB — INR POINT OF CARE: 2.6 (ref 0.86–1.14)

## 2017-07-13 PROCEDURE — 36416 COLLJ CAPILLARY BLOOD SPEC: CPT

## 2017-07-13 PROCEDURE — 85610 PROTHROMBIN TIME: CPT | Mod: QW

## 2017-07-13 PROCEDURE — 99207 ZZC NO CHARGE NURSE ONLY: CPT

## 2017-07-13 NOTE — PROGRESS NOTES
ANTICOAGULATION FOLLOW-UP CLINIC VISIT    Patient Name:  Marilia Bean  Date:  7/13/2017  Contact Type:  Face to Face    SUBJECTIVE:     Patient Findings     Positives No Problem Findings           OBJECTIVE    INR Protime   Date Value Ref Range Status   07/13/2017 2.6 (A) 0.86 - 1.14 Final       ASSESSMENT / PLAN  INR assessment THER    Recheck INR In: 1 WEEK    INR Location Clinic      Anticoagulation Summary as of 7/13/2017     INR goal 2.0-3.0   Today's INR 2.6   Maintenance plan 3 mg (3 mg x 1) every day   Full instructions 3 mg every day   Weekly total 21 mg   No change documented Justin Carbajal, RN   Plan last modified Justin Carbajal RN (6/23/2017)   Next INR check 7/20/2017   Target end date     Indications   Long-term (current) use of anticoagulants [Z79.01] [Z79.01]  Atrial fibrillation (H) [I48.91]         Anticoagulation Episode Summary     INR check location     Preferred lab     Send INR reminders to \[facility]\""    Comments       Anticoagulation Care Providers     Provider Role Specialty Phone number    Herbert Epstein MD [facility] Internal Medicine [phone]            See the Encounter Report to view Anticoagulation Flowsheet and Dosing Calendar (Go to Encounters tab in chart review, and find the Anticoagulation Therapy Visit)    Dosage adjustment made based on physician directed care plan.    Justin Carbajal, RN

## 2017-07-13 NOTE — MR AVS SNAPSHOT
Marilia YINKA GregoryVikas   7/13/2017 10:30 AM   Anticoagulation Therapy Visit    Description:  72 year old female   Provider:  [facility]AB[facility] ANTI COAJORDAN   Department:  [facility]           INR as of 7/13/2017     Today's INR 2.6      Anticoagulation Summary as of 7/13/2017     INR goal 2.0-3.0   Today's INR 2.6   Full instructions 3 mg every day   Next INR check 7/20/2017    Indications   Long-term (current) use of anticoagulants [Z79.01] [Z79.01]  Atrial fibrillation (H) [I48.91]         Your next Anticoagulation Clinic appointment(s)     Jul 20, 2017  9:45 AM CDT   Anticoagulation Visit with [facility] ANTI COAG   [facility] ([facility]    [address] [facility] [address]   [phone]              Contact Numbers     Clinic Number:         July 2017 Details    Sun Mon Tue Wed Thu Fri Sat           1                 2               3               4               5               6               7               8                 9               10               11               12               13      3 mg   See details      14      3 mg         15      3 mg           16      3 mg         17      3 mg         18      3 mg         19      3 mg         20            21               22                 23               24               25               26               27               28               29                 30               31                     Date Details   07/13 This INR check       Date of next INR:  7/20/2017         How to take your warfarin dose     To take:  3 mg Take 1 of the 3 mg tablets.

## 2017-07-19 ENCOUNTER — HOSPITAL ENCOUNTER (OUTPATIENT)
Dept: CARDIAC REHAB | Facility: CLINIC | Age: 72
End: 2017-07-19
Attending: PHYSICIAN ASSISTANT
Payer: MEDICARE

## 2017-07-19 PROCEDURE — 40000116 ZZH STATISTIC OP CR VISIT: Performed by: REHABILITATION PRACTITIONER

## 2017-07-19 PROCEDURE — 93798 PHYS/QHP OP CAR RHAB W/ECG: CPT | Performed by: REHABILITATION PRACTITIONER

## 2017-07-20 ENCOUNTER — ANTICOAGULATION THERAPY VISIT (OUTPATIENT)
Dept: ANTICOAGULATION | Facility: OTHER | Age: 72
End: 2017-07-20
Payer: COMMERCIAL

## 2017-07-20 DIAGNOSIS — Z79.01 LONG-TERM (CURRENT) USE OF ANTICOAGULANTS: ICD-10-CM

## 2017-07-20 DIAGNOSIS — I48.91 ATRIAL FIBRILLATION, UNSPECIFIED TYPE (H): ICD-10-CM

## 2017-07-20 LAB — INR POINT OF CARE: 3.1 (ref 0.86–1.14)

## 2017-07-20 PROCEDURE — 85610 PROTHROMBIN TIME: CPT | Mod: QW

## 2017-07-20 PROCEDURE — 99207 ZZC NO CHARGE NURSE ONLY: CPT

## 2017-07-20 PROCEDURE — 36416 COLLJ CAPILLARY BLOOD SPEC: CPT

## 2017-07-20 NOTE — MR AVS SNAPSHOT
Marilia Gregoryier   7/20/2017 9:45 AM   Anticoagulation Therapy Visit    Description:  72 year old female   Provider:  FABI ANTI COAG   Department:  [facility]           INR as of 7/20/2017     Today's INR 3.1!      Anticoagulation Summary as of 7/20/2017     INR goal 2.0-3.0   Today's INR 3.1!   Full instructions 1.5 mg on Thu; 3 mg all other days   Next INR check 8/3/2017    Indications   Long-term (current) use of anticoagulants [Z79.01] [Z79.01]  Atrial fibrillation (H) [I48.91]         Your next Anticoagulation Clinic appointment(s)     Aug 03, 2017  9:30 AM CDT   Anticoagulation Visit with FABI ANTI VERONICA   [facility]    [address] [facility] [address]   [phone]              Contact Numbers     Clinic Number:         July 2017 Details    Sun Mon Tue Wed Thu Fri Sat           1                 2               3               4               5               6               7               8                 9               10               11               12               13               14               15                 16               17               18               19               20      1.5 mg   See details      21      3 mg         22      3 mg           23      3 mg         24      3 mg         25      3 mg         26      3 mg         27      1.5 mg         28      3 mg         29      3 mg           30      3 mg         31      3 mg               Date Details   07/20 This INR check               How to take your warfarin dose     To take:  1.5 mg Take 0.5 of a 3 mg tablet.    To take:  3 mg Take 1 of the 3 mg tablets.           August 2017 Details    Sun Mon Tue Wed Thu Fri Sat       1      3 mg         2      3 mg         3            4               5                 6               7               8               9               10               11               12                 13               14               15               16                17               18               19                 20               21               22               23               24               25               26                 27               28               29               30               31                  Date Details   No additional details    Date of next INR:  8/3/2017         How to take your warfarin dose     To take:  1.5 mg Take 0.5 of a 3 mg tablet.    To take:  3 mg Take 1 of the 3 mg tablets.

## 2017-07-20 NOTE — PROGRESS NOTES
ANTICOAGULATION FOLLOW-UP CLINIC VISIT    Patient Name:  Marilia Bean  Date:  7/20/2017  Contact Type:  Face to Face    SUBJECTIVE:     Patient Findings     Positives No Problem Findings           OBJECTIVE    INR Protime   Date Value Ref Range Status   07/20/2017 3.1 (A) 0.86 - 1.14 Final       ASSESSMENT / PLAN  INR assessment THER    Recheck INR In: 2 WEEKS    INR Location Clinic      Anticoagulation Summary as of 7/20/2017     INR goal 2.0-3.0   Today's INR 3.1!   Maintenance plan 1.5 mg (3 mg x 0.5) on Thu; 3 mg (3 mg x 1) all other days   Full instructions 1.5 mg on Thu; 3 mg all other days   Weekly total 19.5 mg   Plan last modified Justin Carbajal RN (7/20/2017)   Next INR check 8/3/2017   Target end date     Indications   Long-term (current) use of anticoagulants [Z79.01] [Z79.01]  Atrial fibrillation (H) [I48.91]         Anticoagulation Episode Summary     INR check location     Preferred lab     Send INR reminders to [facility]    Comments       Anticoagulation Care Providers     Provider Role Specialty Phone number    Herbert Epstein MD [facility] Internal Medicine [phone]            See the Encounter Report to view Anticoagulation Flowsheet and Dosing Calendar (Go to Encounters tab in chart review, and find the Anticoagulation Therapy Visit)    Dosage adjustment made based on physician directed care plan.    Justin Carbajal RN

## 2017-07-25 DIAGNOSIS — I48.0 PAROXYSMAL ATRIAL FIBRILLATION (H): ICD-10-CM

## 2017-07-25 RX ORDER — WARFARIN SODIUM 3 MG/1
TABLET ORAL
Qty: 60 TABLET | Refills: 0 | Status: SHIPPED | OUTPATIENT
Start: 2017-07-25 | End: 2017-08-25

## 2017-07-25 NOTE — TELEPHONE ENCOUNTER
warfarin (COUMADIN) 3 MG tablet    Last Written Prescription Date: 6/22/17  Last Fill Qty: 30, # refills: 0  Last Office Visit with G, P or [facility] prescribing provider: 6/21/17  Next 5 appointments (look out 90 days)     Aug 11, 2017  9:30 AM CDT   Office Visit with Herbert Epstein MD   [facility] ([facility])    [address] [place] [address] [phone]                   Date and Result of Last PT/INR:   Lab Results   Component Value Date    INR 3.1 07/20/2017    INR 2.6 07/13/2017    INR 2.18 06/21/2017    INR 1.34 06/17/2017

## 2017-07-31 NOTE — ANESTHESIA POSTPROCEDURE EVALUATION
Patient: Marilia Bean    Procedure(s):  Median Sternotomy, Ascending Aortic Aneurysm Repair, Coronary Artery Bypass Graft x1 on pump oxygenator - Wound Class: I-Clean   - Wound Class: I-Clean    Diagnosis:Ascending Aneurysm   Diagnosis Additional Information: No value filed.    Anesthesia Type:  General, ETT    Note:  Anesthesia Post Evaluation    Patient location during evaluation: ICU  Patient participation: Unable to evaluate secondary to administered sedation  Level of consciousness: obtunded/minimal responses  Pain management: unable to assess  Airway patency: patent  Respiratory status: acceptable  Hydration status: acceptable  PONV: unable to assess             Last vitals:  Vitals:    06/10/17 0300 06/10/17 0600 06/10/17 0724   BP:   110/59   Pulse:      Resp: 20  18   Temp:   37.5  C (99.5  F)   SpO2: 93% 92% 92%         Electronically Signed By: Luis Armando Kohler MD  July 31, 2017  5:12 PM

## 2017-07-31 NOTE — PROGRESS NOTES
Cardiac Rehab Discharge Summary    Reason for discharge:    insurance co-payment was too high    Progress towards goals:  Goals partially met.  Barriers to achieving goals:   discharge from facility.    Recommendation(s):    No further therapy is recommended.

## 2017-07-31 NOTE — ADDENDUM NOTE
Encounter addended by: Julienne Yap EP on: 7/31/2017 10:21 AM<BR>     Actions taken: Sign clinical note, Episode resolved

## 2017-08-03 ENCOUNTER — ANTICOAGULATION THERAPY VISIT (OUTPATIENT)
Dept: ANTICOAGULATION | Facility: OTHER | Age: 72
End: 2017-08-03
Payer: COMMERCIAL

## 2017-08-03 DIAGNOSIS — I48.91 ATRIAL FIBRILLATION, UNSPECIFIED TYPE (H): ICD-10-CM

## 2017-08-03 DIAGNOSIS — Z79.01 LONG-TERM (CURRENT) USE OF ANTICOAGULANTS: ICD-10-CM

## 2017-08-03 LAB — INR POINT OF CARE: 1.4 (ref 0.86–1.14)

## 2017-08-03 PROCEDURE — 36416 COLLJ CAPILLARY BLOOD SPEC: CPT

## 2017-08-03 PROCEDURE — 85610 PROTHROMBIN TIME: CPT | Mod: QW

## 2017-08-03 PROCEDURE — 99207 ZZC NO CHARGE NURSE ONLY: CPT

## 2017-08-03 NOTE — MR AVS SNAPSHOT
Marilia Bean   8/3/2017 9:30 AM   Anticoagulation Therapy Visit    Description:  72 year old female   Provider:  [facility]   Department:  [facility]           INR as of 8/3/2017     Today's INR 1.4!      Anticoagulation Summary as of 8/3/2017     INR goal 2.0-3.0   Today's INR 1.4!   Full instructions 4.5 mg on Thu; 3 mg all other days   Next INR check 8/11/2017    Indications   Long-term (current) use of anticoagulants [Z79.01] [Z79.01]  Atrial fibrillation (H) [I48.91]         Contact Numbers     Clinic Number:         August 2017 Details    Sun Mon Tue Wed Thu Fri Sat       1               2               3      4.5 mg   See details      4      3 mg         5      3 mg           6      3 mg         7      3 mg         8      3 mg         9      3 mg         10      4.5 mg         11            12                 13               14               15               16               17               18               19                 20               21               22               23               24               25               26                 27               28               29               30               31                  Date Details   08/03 This INR check       Date of next INR:  8/11/2017         How to take your warfarin dose     To take:  3 mg Take 1 of the 3 mg tablets.    To take:  4.5 mg Take 1.5 of the 3 mg tablets.

## 2017-08-03 NOTE — PROGRESS NOTES
ANTICOAGULATION FOLLOW-UP CLINIC VISIT    Patient Name:  Marilia Bean  Date:  8/3/2017  Contact Type:  Face to Face    SUBJECTIVE:     Patient Findings     Positives Change in medications (Pt's amiodarone has now been D/C'd for a few weeks so should be completely out of her system), Change in diet/appetite (Increased Vit K due to fresh garden veggies), Missed doses (Could have missed Sunday's dose. )           OBJECTIVE    INR Protime   Date Value Ref Range Status   08/03/2017 1.4 (A) 0.86 - 1.14 Final       ASSESSMENT / PLAN  INR assessment SUB    Recheck INR In: 8 DAYS    INR Location Clinic      Anticoagulation Summary as of 8/3/2017     INR goal 2.0-3.0   Today's INR 1.4!   Maintenance plan 4.5 mg (3 mg x 1.5) on Thu; 3 mg (3 mg x 1) all other days   Full instructions 4.5 mg on Thu; 3 mg all other days   Weekly total 22.5 mg   Plan last modified Justin Carbajal RN (8/3/2017)   Next INR check 8/11/2017   Target end date     Indications   Long-term (current) use of anticoagulants [Z79.01] [Z79.01]  Atrial fibrillation (H) [I48.91]         Anticoagulation Episode Summary     INR check location     Preferred lab     Send INR reminders to [facility]    Comments       Anticoagulation Care Providers     Provider Role Specialty Phone number    Herbert Epstein MD [facility] Internal Medicine [phone]            See the Encounter Report to view Anticoagulation Flowsheet and Dosing Calendar (Go to Encounters tab in chart review, and find the Anticoagulation Therapy Visit)    Dosage adjustment made based on physician directed care plan.    Justin Carbajal RN

## 2017-08-09 DIAGNOSIS — I25.10 ASCVD (ARTERIOSCLEROTIC CARDIOVASCULAR DISEASE): ICD-10-CM

## 2017-08-09 DIAGNOSIS — I10 HYPERTENSION GOAL BP (BLOOD PRESSURE) < 140/90: ICD-10-CM

## 2017-08-09 DIAGNOSIS — Z95.1 S/P CABG (CORONARY ARTERY BYPASS GRAFT): ICD-10-CM

## 2017-08-09 DIAGNOSIS — Z98.890 S/P AORTIC ANEURYSM REPAIR: ICD-10-CM

## 2017-08-09 DIAGNOSIS — Z86.79 S/P AORTIC ANEURYSM REPAIR: ICD-10-CM

## 2017-08-09 RX ORDER — METOPROLOL TARTRATE 25 MG/1
TABLET, FILM COATED ORAL
Qty: 60 TABLET | Refills: 0 | Status: SHIPPED | OUTPATIENT
Start: 2017-08-09 | End: 2017-11-15

## 2017-08-10 ENCOUNTER — ANTICOAGULATION THERAPY VISIT (OUTPATIENT)
Dept: ANTICOAGULATION | Facility: OTHER | Age: 72
End: 2017-08-10
Payer: COMMERCIAL

## 2017-08-10 DIAGNOSIS — I48.91 ATRIAL FIBRILLATION, UNSPECIFIED TYPE (H): ICD-10-CM

## 2017-08-10 DIAGNOSIS — Z79.01 LONG-TERM (CURRENT) USE OF ANTICOAGULANTS: ICD-10-CM

## 2017-08-10 LAB — INR POINT OF CARE: 1.6 (ref 0.86–1.14)

## 2017-08-10 PROCEDURE — 99207 ZZC NO CHARGE NURSE ONLY: CPT

## 2017-08-10 PROCEDURE — 36416 COLLJ CAPILLARY BLOOD SPEC: CPT

## 2017-08-10 PROCEDURE — 85610 PROTHROMBIN TIME: CPT | Mod: QW

## 2017-08-10 RX ORDER — LOSARTAN POTASSIUM 25 MG/1
TABLET ORAL
Qty: 30 TABLET | Refills: 1 | Status: SHIPPED | OUTPATIENT
Start: 2017-08-10 | End: 2018-03-02

## 2017-08-10 NOTE — TELEPHONE ENCOUNTER
Routing refill request to provider for review/approval because:  Labs out of range:  Creatinine.     Marissa Reyez RN

## 2017-08-10 NOTE — MR AVS SNAPSHOT
Marilia Bean   8/10/2017 1:45 PM   Anticoagulation Therapy Visit    Description:  72 year old female   Provider:  [facility]   Department:  [facility]           INR as of 8/10/2017     Today's INR 1.6!      Anticoagulation Summary as of 8/10/2017     INR goal 2.0-3.0   Today's INR 1.6!   Full instructions 4.5 mg on Tue, Thu, Sat; 3 mg all other days   Next INR check 8/17/2017    Indications   Long-term (current) use of anticoagulants [Z79.01] [Z79.01]  Atrial fibrillation (H) [I48.91]         Your next Anticoagulation Clinic appointment(s)     Aug 17, 2017  9:30 AM CDT   [facility] with [facility]   [facility]    [address] [facility] [address]   [phone]              Contact Numbers     Clinic Number:         August 2017 Details    Sun Mon Tue Wed Thu Fri Sat       1               2               3               4               5                 6               7               8               9               10      4.5 mg   See details      11      3 mg         12      4.5 mg           13      3 mg         14      3 mg         15      4.5 mg         16      3 mg         17            18               19                 20               21               22               23               24               25               26                 27               28               29               30               31                  Date Details   08/10 This INR check       Date of next INR:  8/17/2017         How to take your warfarin dose     To take:  3 mg Take 1 of the 3 mg tablets.    To take:  4.5 mg Take 1.5 of the 3 mg tablets.

## 2017-08-10 NOTE — PROGRESS NOTES
ANTICOAGULATION FOLLOW-UP CLINIC VISIT    Patient Name:  Marilia Bean  Date:  8/10/2017  Contact Type:  Face to Face    SUBJECTIVE:     Patient Findings     Positives Change in medications (Discontinued amiodarone - still catching up from this. )           OBJECTIVE    INR Protime   Date Value Ref Range Status   08/10/2017 1.6 (A) 0.86 - 1.14 Final       ASSESSMENT / PLAN  INR assessment SUB    Recheck INR In: 1 WEEK    INR Location Clinic      Anticoagulation Summary as of 8/10/2017     INR goal 2.0-3.0   Today's INR 1.6!   Maintenance plan 4.5 mg (3 mg x 1.5) on Tue, Thu, Sat; 3 mg (3 mg x 1) all other days   Full instructions 4.5 mg on Tue, Thu, Sat; 3 mg all other days   Weekly total 25.5 mg   Plan last modified Justin Carbajal RN (8/10/2017)   Next INR check 8/17/2017   Target end date     Indications   Long-term (current) use of anticoagulants [Z79.01] [Z79.01]  Atrial fibrillation (H) [I48.91]         Anticoagulation Episode Summary     INR check location     Preferred lab     Send INR reminders to [facility]    Comments       Anticoagulation Care Providers     Provider Role Specialty Phone number    Herbert Epstein MD [facility] Internal Medicine [phone]            See the Encounter Report to view Anticoagulation Flowsheet and Dosing Calendar (Go to Encounters tab in chart review, and find the Anticoagulation Therapy Visit)    Dosage adjustment made based on physician directed care plan.    Justin Carbajal RN

## 2017-08-17 ENCOUNTER — ANTICOAGULATION THERAPY VISIT (OUTPATIENT)
Dept: ANTICOAGULATION | Facility: OTHER | Age: 72
End: 2017-08-17
Payer: COMMERCIAL

## 2017-08-17 DIAGNOSIS — I48.91 ATRIAL FIBRILLATION, UNSPECIFIED TYPE (H): ICD-10-CM

## 2017-08-17 DIAGNOSIS — Z79.01 LONG-TERM (CURRENT) USE OF ANTICOAGULANTS: ICD-10-CM

## 2017-08-17 LAB — INR POINT OF CARE: 2.2 (ref 0.86–1.14)

## 2017-08-17 PROCEDURE — 36416 COLLJ CAPILLARY BLOOD SPEC: CPT

## 2017-08-17 PROCEDURE — 85610 PROTHROMBIN TIME: CPT | Mod: QW

## 2017-08-17 PROCEDURE — 99207 ZZC NO CHARGE NURSE ONLY: CPT

## 2017-08-17 NOTE — MR AVS SNAPSHOT
Marilia Bean   8/17/2017 9:30 AM   Anticoagulation Therapy Visit    Description:  72 year old female   Provider:  [facility] ANTI COAG   Department:  [facility]           INR as of 8/17/2017     Today's INR 2.2      Anticoagulation Summary as of 8/17/2017     INR goal 2.0-3.0   Today's INR 2.2   Full instructions 4.5 mg on Tue, Thu, Sat; 3 mg all other days   Next INR check 8/25/2017    Indications   Long-term (current) use of anticoagulants [Z79.01] [Z79.01]  Atrial fibrillation (H) [I48.91]         Your next Anticoagulation Clinic appointment(s)     Aug 25, 2017  2:15 PM CDT   [facility] with  [facility]   [facility]    [address] [place] [address]   [phone]              Contact Numbers     Clinic Number:         August 2017 Details    Sun Mon Tue Wed Thu Fri Sat       1               2               3               4               5                 6               7               8               9               10               11               12                 13               14               15               16               17      4.5 mg   See details      18      3 mg         19      4.5 mg           20      3 mg         21      3 mg         22      4.5 mg         23      3 mg         24      4.5 mg         25            26                 27               28               29               30               31                  Date Details   08/17 This INR check       Date of next INR:  8/25/2017         How to take your warfarin dose     To take:  3 mg Take 1 of the 3 mg tablets.    To take:  4.5 mg Take 1.5 of the 3 mg tablets.

## 2017-08-17 NOTE — PROGRESS NOTES
ANTICOAGULATION FOLLOW-UP CLINIC VISIT    Patient Name:  Marilia Bean  Date:  8/17/2017  Contact Type:  Face to Face    SUBJECTIVE:     Patient Findings     Positives No Problem Findings           OBJECTIVE    INR Protime   Date Value Ref Range Status   08/17/2017 2.2 (A) 0.86 - 1.14 Final       ASSESSMENT / PLAN  INR assessment THER    Recheck INR In: 8 DAYS    INR Location Clinic      Anticoagulation Summary as of 8/17/2017     INR goal 2.0-3.0   Today's INR 2.2   Maintenance plan 4.5 mg (3 mg x 1.5) on Tue, Thu, Sat; 3 mg (3 mg x 1) all other days   Full instructions 4.5 mg on Tue, Thu, Sat; 3 mg all other days   Weekly total 25.5 mg   No change documented Ramirez Dimas RN   Plan last modified Justin Carbajal RN (8/10/2017)   Next INR check 8/25/2017   Target end date     Indications   Long-term (current) use of anticoagulants [Z79.01] [Z79.01]  Atrial fibrillation (H) [I48.91]         Anticoagulation Episode Summary     INR check location     Preferred lab     Send INR reminders to [facility]    Comments       Anticoagulation Care Providers     Provider Role Specialty Phone number    Herbert Epstein MD [facility] Internal Medicine [phone]            See the Encounter Report to view Anticoagulation Flowsheet and Dosing Calendar (Go to Encounters tab in chart review, and find the Anticoagulation Therapy Visit)    Dosage adjustment made based on physician directed care plan.    Ramirez Dimas, RN

## 2017-08-25 ENCOUNTER — OFFICE VISIT (OUTPATIENT)
Dept: INTERNAL MEDICINE | Facility: CLINIC | Age: 72
End: 2017-08-25
Payer: COMMERCIAL

## 2017-08-25 ENCOUNTER — ANTICOAGULATION THERAPY VISIT (OUTPATIENT)
Dept: ANTICOAGULATION | Facility: CLINIC | Age: 72
End: 2017-08-25
Payer: COMMERCIAL

## 2017-08-25 VITALS
WEIGHT: 171 LBS | SYSTOLIC BLOOD PRESSURE: 138 MMHG | HEART RATE: 62 BPM | RESPIRATION RATE: 16 BRPM | TEMPERATURE: 96.8 F | BODY MASS INDEX: 26.39 KG/M2 | OXYGEN SATURATION: 97 % | DIASTOLIC BLOOD PRESSURE: 66 MMHG

## 2017-08-25 DIAGNOSIS — I48.91 ATRIAL FIBRILLATION, UNSPECIFIED TYPE (H): ICD-10-CM

## 2017-08-25 DIAGNOSIS — Z86.79 S/P THORACIC AORTIC ANEURYSM REPAIR: Primary | ICD-10-CM

## 2017-08-25 DIAGNOSIS — Z79.01 LONG-TERM (CURRENT) USE OF ANTICOAGULANTS: ICD-10-CM

## 2017-08-25 DIAGNOSIS — Z86.79 HISTORY OF ATRIAL FIBRILLATION: ICD-10-CM

## 2017-08-25 DIAGNOSIS — Z98.890 S/P THORACIC AORTIC ANEURYSM REPAIR: Primary | ICD-10-CM

## 2017-08-25 LAB — INR POINT OF CARE: 1.8 (ref 0.86–1.14)

## 2017-08-25 PROCEDURE — 93000 ELECTROCARDIOGRAM COMPLETE: CPT | Performed by: INTERNAL MEDICINE

## 2017-08-25 PROCEDURE — 99207 ZZC NO CHARGE NURSE ONLY: CPT

## 2017-08-25 PROCEDURE — 36416 COLLJ CAPILLARY BLOOD SPEC: CPT

## 2017-08-25 PROCEDURE — 99213 OFFICE O/P EST LOW 20 MIN: CPT | Performed by: INTERNAL MEDICINE

## 2017-08-25 PROCEDURE — 85610 PROTHROMBIN TIME: CPT | Mod: QW

## 2017-08-25 ASSESSMENT — PAIN SCALES - GENERAL: PAINLEVEL: NO PAIN (0)

## 2017-08-25 NOTE — PROGRESS NOTES
ANTICOAGULATION FOLLOW-UP CLINIC VISIT    Patient Name:  Marilia Bean  Date:  8/25/2017  Contact Type:  Face to Face    SUBJECTIVE:        OBJECTIVE    INR Protime   Date Value Ref Range Status   08/25/2017 1.8 (A) 0.86 - 1.14 Final       ASSESSMENT / PLAN  INR assessment SUB    Recheck INR In: 2 WEEKS    INR Location Clinic      Anticoagulation Summary as of 8/25/2017     INR goal 2.0-3.0   Today's INR 1.8!   Maintenance plan 4.5 mg (3 mg x 1.5) on Tue, Thu, Sat; 3 mg (3 mg x 1) all other days   Full instructions 8/25: 6 mg; Otherwise 4.5 mg on Tue, Thu, Sat; 3 mg all other days   Weekly total 25.5 mg   Plan last modified Justin Carbajal RN (8/10/2017)   Next INR check 9/7/2017   Target end date     Indications   Long-term (current) use of anticoagulants [Z79.01] [Z79.01]  Atrial fibrillation (H) [I48.91]         Anticoagulation Episode Summary     INR check location     Preferred lab     Send INR reminders to [facility]    Comments       Anticoagulation Care Providers     Provider Role Specialty Phone number    Herbert Epstein MD [facility] Internal Medicine [phone]            See the Encounter Report to view Anticoagulation Flowsheet and Dosing Calendar (Go to Encounters tab in chart review, and find the Anticoagulation Therapy Visit)    Dosage adjustment made based on physician directed care plan.    Pt will take 6 mg tonight then resume. She will go to the lab in [place] on 9/7 for INR check since Justin's schedule is full that AM. INR order signed.   She is seeing PL today and is hoping she will be taken off coumadin. I advised her to let us know if he discontinues it.     Lizbeth Wolf, RN

## 2017-08-25 NOTE — PROGRESS NOTES
SUBJECTIVE:   Marilia Bean is a 72 year old female who presents to clinic today for the following health issues:    Chief Complaint   Patient presents with     Atrial Fib     follow up     She is doing well, energy is good.  Exercising and walking up to 2 miles.  No chest pains, no palpitations, no sob.  Incision is healed.  Pain with sneezing and cough.      On coumadin and no problems with it.    Past Medical History:   Diagnosis Date     Aortic aneurysm (H) 7/1/2007    see 7/07 [place] report -follow yearly, treat high BP is occurs Problem list name updated by automated process. Provider to review     Aortic aneurysm of unspecified site without mention of rupture 7/2007    4.4 cm ascending aorta noted in 2007     Asymptomatic postmenopausal status (age-related) (natural)     on HRT  Prempro -weaning off 5/'2004     CAD (coronary artery disease)     LAD     Family history of Gardiner syndrome      Hyperlipidemia LDL goal <100 10/31/2010     Hypertension goal BP (blood pressure) < 140/90 2/9/2016     Leiomyoma of uterus, unspecified     Uterine fibroid     Lump or mass in breast 7/2004    rt. nodule - bx neg     Personal history of colonic polyps      Postmenopausal atrophic vaginitis 8/20/2006     Pulmonary nodule     incidental noted in 2007 during [place]     Pure hypercholesterolemia     mild, diet - low cholesterol, low fat.10/06 start Lovastatin     Current Outpatient Prescriptions   Medication     losartan (COZAAR) 25 MG tablet     metoprolol (LOPRESSOR) 25 MG tablet     warfarin (JANTOVEN) 3 MG tablet     aspirin 81 MG EC tablet     atorvastatin (LIPITOR) 20 MG tablet     acetaminophen (TYLENOL) 325 MG tablet     No current facility-administered medications for this visit.      Social History   Substance Use Topics     Smoking status: Never Smoker     Smokeless tobacco: Never Used     Alcohol use Yes      Comment: rarely     Physical Exam  /66  Pulse 62  Temp 96.8  F (36  C) (Temporal)  Resp 16  Wt 171  lb (77.6 kg)  SpO2 97%  BMI 26.39 kg/m2  General Appearance-healthy, alert, no distress  Cardiac-regular rate and rhythm  with normal S1, S2 ; no murmur, rub or gallops    EKG-nsr, slight increase QT, no st changes.    ASSESSMENT:  Patient is doing well since her aortic root repair. She had a one-vessel bypass. This is complicated by atrial fibrillation. She has been out of A. fib, we confirmed this with a normal EKG today. She'll continue on metoprolol. We'll stop her Coumadin.    Electronically signed by Herbert Epstein MD

## 2017-08-25 NOTE — MR AVS SNAPSHOT
After Visit Summary   8/25/2017    Marilia Bean    MRN: [id number]           Patient Information     Date Of Birth          1945        Visit Information        Provider Department      8/25/2017 2:45 PM Herbert Epstein MD [facility]         Follow-ups after your visit        Your next 10 appointments already scheduled     Sep 07, 2017  9:00 AM CDT   LAB with [facility] ([facility])    [address]  [facility] [address]   [phone]           Patient must bring picture ID. Patient should be prepared to give a urine specimen  Please do not eat 10-12 hours before your appointment if you are coming in fasting for labs on lipids, cholesterol, or glucose (sugar). Pregnant women should follow their Care Team instructions. Water with medications is okay. Do not drink coffee or other fluids. If you have concerns about taking  your medications, please ask at office or if scheduling via [employer], send a message by clicking on Secure Messaging, Message Your Care Team.              Future tests that were ordered for you today     Open Standing Orders        Priority Remaining Interval Expires Ordered    INR point of care Routine 20/20 8/6/2018 8/25/2017            Who to contact     If you have questions or need follow up information about today's clinic visit or your schedule please contact [facility] directly at [phone].  Normal or non-critical lab and imaging results will be communicated to you by MyChart, letter or phone within 4 business days after the clinic has received the results. If you do not hear from us within 7 days, please contact the clinic through MyChart or phone. If you have a critical or abnormal lab result, we will notify you by phone as soon as possible.  Submit refill requests through [employer] or call your pharmacy and they will forward the refill request to us. Please allow 3 business days for your refill  to be completed.          Additional Information About Your Visit        [employer] Information     [employer] gives you secure access to your electronic health record. If you see a primary care provider, you can also send messages to your care team and make appointments. If you have questions, please call your primary care clinic.  If you do not have a primary care provider, please call [phone] and they will assist you.        Care EveryWhere ID     This is your Care EveryWhere ID. This could be used by other organizations to access your [facility] medical records  [id number]        Your Vitals Were     Pulse Temperature Respirations Pulse Oximetry BMI (Body Mass Index)       62 96.8  F (36  C) (Temporal) 16 97% 26.39 kg/m2        Blood Pressure from Last 3 Encounters:   08/25/17 138/66   07/06/17 146/70   06/21/17 106/66    Weight from Last 3 Encounters:   08/25/17 171 lb (77.6 kg)   07/06/17 169 lb 12.8 oz (77 kg)   06/29/17 172 lb (78 kg)              Today, you had the following     No orders found for display       Primary Care Provider Office Phone # Fax #    Herbert Epstein [phone]       [facility] [address] [place] [address]        Equal Access to Services     CASEY HEREDIA : Hadii aad ku hadasho Soomaali, waaxda luqadaha, qaybta kaalmada adeegyada, waxay idiin haysanchezn sammi blair lamiles thorne. So [facility] [phone].    ATENCIÓN: Si habla español, tiene a de la rosa disposición servicios gratuitos de asistencia lingüística. Llgris al [phone].    We comply with applicable federal civil rights laws and [place] laws. We do not discriminate on the basis of race, color, national origin, age, disability sex, sexual orientation or gender identity.            Thank you!     Thank you for choosing [facility]  for your care. Our goal is always to provide you with excellent care. Hearing back from our patients is one way we can continue to improve our services.  Please take a few minutes to complete the written survey that you may receive in the mail after your visit with us. Thank you!             Your Updated Medication List - Protect others around you: Learn how to safely use, store and throw away your medicines at www.disposemymeds.org.          This list is accurate as of: 8/25/17  2:54 PM.  Always use your most recent med list.                   Brand Name Dispense Instructions for use Diagnosis    acetaminophen 325 MG tablet    TYLENOL    100 tablet    Take 2 tablets (650 mg) by mouth every 6 hours as needed for mild pain    Acute post-operative pain       aspirin 81 MG EC tablet     90 tablet    Take 1 tablet (81 mg) by mouth daily    ASCVD (arteriosclerotic cardiovascular disease)       atorvastatin 20 MG tablet    LIPITOR    90 tablet    Take 1 tablet (20 mg) by mouth every evening    Hyperlipidemia LDL goal <100       losartan 25 MG tablet    COZAAR    30 tablet    TAKE ONE TABLET BY MOUTH EVERY DAY    Hypertension goal BP (blood pressure) < 140/90, ASCVD (arteriosclerotic cardiovascular disease)       metoprolol 25 MG tablet    LOPRESSOR    60 tablet    TAKE ONE TABLET BY MOUTH OR FEEDING TUBE ROUTE TWO TIMES A DAY    S/P CABG (coronary artery bypass graft), S/P aortic aneurysm repair       warfarin 3 MG tablet    JANTOVEN    60 tablet    Take 1.5 mg on Thurs and 3 mg all other days or as directed by coumadin clinic    Paroxysmal atrial fibrillation (H)

## 2017-08-25 NOTE — MR AVS SNAPSHOT
Marilia Bean   8/25/2017 2:15 PM   Anticoagulation Therapy Visit    Description:  72 year old female   Provider:  [facility] ANTI COAG   Department:  [facility]           INR as of 8/25/2017     Today's INR 1.8!      Anticoagulation Summary as of 8/25/2017     INR goal 2.0-3.0   Today's INR 1.8!   Full instructions 8/25: 6 mg; Otherwise 4.5 mg on Tue, Thu, Sat; 3 mg all other days   Next INR check 9/7/2017    Indications   Long-term (current) use of anticoagulants [Z79.01] [Z79.01]  Atrial fibrillation (H) [I48.91]         Contact Numbers     Clinic Number:         August 2017 Details    Sun Mon Tue Wed Thu Fri Sat       1               2               3               4               5                 6               7               8               9               10               11               12                 13               14               15               16               17               18               19                 20               21               22               23               24               25      6 mg   See details      26      4.5 mg           27      3 mg         28      3 mg         29      4.5 mg         30      3 mg         31      4.5 mg            Date Details   08/25 This INR check               How to take your warfarin dose     To take:  3 mg Take 1 of the 3 mg tablets.    To take:  4.5 mg Take 1.5 of the 3 mg tablets.    To take:  6 mg Take 2 of the 3 mg tablets.           September 2017 Details    Sun Mon Tue Wed Thu Fri Sat          1      3 mg         2      4.5 mg           3      3 mg         4      3 mg         5      4.5 mg         6      3 mg         7            8               9                 10               11               12               13               14               15               16                 17               18               19               20               21               22               23                 24               25                26               27               28               29               30                Date Details   No additional details    Date of next INR:  9/7/2017         How to take your warfarin dose     To take:  3 mg Take 1 of the 3 mg tablets.    To take:  4.5 mg Take 1.5 of the 3 mg tablets.

## 2017-08-25 NOTE — NURSING NOTE
"Chief Complaint   Patient presents with     Atrial Fib     follow up       Initial /66  Pulse 62  Temp 96.8  F (36  C) (Temporal)  Resp 16  Wt 171 lb (77.6 kg)  SpO2 97%  BMI 26.39 kg/m2 Estimated body mass index is 26.39 kg/(m^2) as calculated from the following:    Height as of 7/6/17: 5' 7.5\" (1.715 m).    Weight as of this encounter: 171 lb (77.6 kg).  Medication Reconciliation: complete    "

## 2017-09-07 ENCOUNTER — ANTICOAGULATION THERAPY VISIT (OUTPATIENT)
Dept: ANTICOAGULATION | Facility: OTHER | Age: 72
End: 2017-09-07

## 2017-10-13 ENCOUNTER — DOCUMENTATION ONLY (OUTPATIENT)
Dept: OTHER | Facility: CLINIC | Age: 72
End: 2017-10-13

## 2017-10-13 DIAGNOSIS — Z71.89 ACP (ADVANCE CARE PLANNING): Chronic | ICD-10-CM

## 2017-11-15 DIAGNOSIS — Z95.1 S/P CABG (CORONARY ARTERY BYPASS GRAFT): ICD-10-CM

## 2017-11-15 DIAGNOSIS — Z98.890 S/P AORTIC ANEURYSM REPAIR: ICD-10-CM

## 2017-11-15 DIAGNOSIS — Z86.79 S/P AORTIC ANEURYSM REPAIR: ICD-10-CM

## 2017-11-15 RX ORDER — METOPROLOL TARTRATE 25 MG/1
TABLET, FILM COATED ORAL
Qty: 180 TABLET | Refills: 0 | Status: SHIPPED | OUTPATIENT
Start: 2017-11-15 | End: 2018-03-02

## 2017-11-15 NOTE — TELEPHONE ENCOUNTER
Prescription approved per [facility] Refill Protocol.    Domenica Garvey RN  [facility]

## 2017-11-15 NOTE — TELEPHONE ENCOUNTER
Requested Prescriptions   Pending Prescriptions Disp Refills     metoprolol (LOPRESSOR) 25 MG tablet [Pharmacy Med Name: METOPROLOL TARTRATE 25MG TABS] 180 tablet 0     Sig: TAKE ONE TABLET BY MOUTH OR FEEDING TUBE TWICE A DAY    Beta-Blockers Protocol Passed    11/15/2017  6:55 AM       Passed - Blood pressure under 140/90    BP Readings from Last 3 Encounters:   08/25/17 138/66   07/06/17 146/70   06/21/17 106/66                Passed - Patient is age 6 or older       Passed - Recent or future visit with authorizing provider's specialty    Patient had office visit in the last year or has a visit in the next 30 days with authorizing provider.  See chart review.

## 2018-01-02 DIAGNOSIS — E78.5 HYPERLIPIDEMIA LDL GOAL <100: ICD-10-CM

## 2018-01-03 RX ORDER — ATORVASTATIN CALCIUM 20 MG/1
20 TABLET, FILM COATED ORAL EVERY EVENING
Qty: 90 TABLET | Refills: 1 | Status: SHIPPED | OUTPATIENT
Start: 2018-01-03 | End: 2018-09-13

## 2018-01-03 NOTE — TELEPHONE ENCOUNTER
Prescription approved per [facility] Refill Protocol.  Dominique Fitzgerald RN

## 2018-02-27 ENCOUNTER — ALLIED HEALTH/NURSE VISIT (OUTPATIENT)
Dept: FAMILY MEDICINE | Facility: OTHER | Age: 73
End: 2018-02-27
Payer: COMMERCIAL

## 2018-02-27 DIAGNOSIS — Z23 NEED FOR PROPHYLACTIC VACCINATION AND INOCULATION AGAINST INFLUENZA: Primary | ICD-10-CM

## 2018-02-27 PROCEDURE — 90662 IIV NO PRSV INCREASED AG IM: CPT

## 2018-02-27 PROCEDURE — G0008 ADMIN INFLUENZA VIRUS VAC: HCPCS

## 2018-02-27 NOTE — PROGRESS NOTES

## 2018-02-27 NOTE — MR AVS SNAPSHOT
After Visit Summary   2/27/2018    Marilia Bean    MRN: [id number]           Patient Information     Date Of Birth          1945        Visit Information        Provider Department      2/27/2018 9:00 AM FABI [facility]        Today's Diagnoses     Need for prophylactic vaccination and inoculation against influenza    -  1       Follow-ups after your visit        Who to contact     If you have questions or need follow up information about today's clinic visit or your schedule please contact [facility] directly at [phone].  Normal or non-critical lab and imaging results will be communicated to you by [employer]hart, letter or phone within 4 business days after the clinic has received the results. If you do not hear from us within 7 days, please contact the clinic through cartmit or phone. If you have a critical or abnormal lab result, we will notify you by phone as soon as possible.  Submit refill requests through [employer] or call your pharmacy and they will forward the refill request to us. Please allow 3 business days for your refill to be completed.          Additional Information About Your Visit        MyChart Information     [employer] gives you secure access to your electronic health record. If you see a primary care provider, you can also send messages to your care team and make appointments. If you have questions, please call your primary care clinic.  If you do not have a primary care provider, please call [phone] and they will assist you.        Care EveryWhere ID     This is your Care EveryWhere ID. This could be used by other organizations to access your [facility] medical records  [id number]         Blood Pressure from Last 3 Encounters:   08/25/17 138/66   07/06/17 146/70   06/21/17 106/66    Weight from Last 3 Encounters:   08/25/17 171 lb (77.6 kg)   07/06/17 169 lb 12.8 oz (77 kg)   06/29/17 172 lb (78 kg)              We Performed the Following      ADMIN INFLUENZA (For MEDICARE Patients ONLY) []     FLU VACCINE, INCREASED ANTIGEN, PRESV FREE, AGE 65+ [72571]        Primary Care Provider Office Phone # Fax #    Herbert Epstein [phone]       [facility] [address] [place] [address]        Equal Access to Services     HALEY HEREDIA : Hadii aad ku hadasho Soomaali, waaxda luqadaha, qaybta kaalmada adeegyada, waxay idiin hayaan adeeg kharash la'aan . So [facility] [phone].    ATENCIÓN: Si habla español, tiene a de la rosa disposición servicios gratuitos de asistencia lingüística. Llame al [phone].    We comply with applicable federal civil rights laws and [place] laws. We do not discriminate on the basis of race, color, national origin, age, disability, sex, sexual orientation, or gender identity.            Thank you!     Thank you for choosing [facility]  for your care. Our goal is always to provide you with excellent care. Hearing back from our patients is one way we can continue to improve our services. Please take a few minutes to complete the written survey that you may receive in the mail after your visit with us. Thank you!             Your Updated Medication List - Protect others around you: Learn how to safely use, store and throw away your medicines at www.disposemymeds.org.          This list is accurate as of 2/27/18  4:17 PM.  Always use your most recent med list.                   Brand Name Dispense Instructions for use Diagnosis    acetaminophen 325 MG tablet    TYLENOL    100 tablet    Take 2 tablets (650 mg) by mouth every 6 hours as needed for mild pain    Acute post-operative pain       aspirin 81 MG EC tablet     90 tablet    Take 1 tablet (81 mg) by mouth daily    ASCVD (arteriosclerotic cardiovascular disease)       atorvastatin 20 MG tablet    LIPITOR    90 tablet    Take 1 tablet (20 mg) by mouth every evening    Hyperlipidemia LDL goal <100       losartan 25 MG tablet    COZAAR     30 tablet    TAKE ONE TABLET BY MOUTH EVERY DAY    Hypertension goal BP (blood pressure) < 140/90, ASCVD (arteriosclerotic cardiovascular disease)       metoprolol tartrate 25 MG tablet    LOPRESSOR    180 tablet    TAKE ONE TABLET BY MOUTH OR FEEDING TUBE TWICE A DAY    S/P CABG (coronary artery bypass graft), S/P aortic aneurysm repair

## 2018-03-02 DIAGNOSIS — Z98.890 S/P AORTIC ANEURYSM REPAIR: ICD-10-CM

## 2018-03-02 DIAGNOSIS — I25.10 ASCVD (ARTERIOSCLEROTIC CARDIOVASCULAR DISEASE): ICD-10-CM

## 2018-03-02 DIAGNOSIS — Z86.79 S/P AORTIC ANEURYSM REPAIR: ICD-10-CM

## 2018-03-02 DIAGNOSIS — I10 HYPERTENSION GOAL BP (BLOOD PRESSURE) < 140/90: ICD-10-CM

## 2018-03-02 DIAGNOSIS — Z95.1 S/P CABG (CORONARY ARTERY BYPASS GRAFT): ICD-10-CM

## 2018-03-02 NOTE — TELEPHONE ENCOUNTER
"Requested Prescriptions   Pending Prescriptions Disp Refills     losartan (COZAAR) 25 MG tablet [Pharmacy Med Name: LOSARTAN POTASSIUM 25MG TABS] 90 tablet 1     Sig: TAKE ONE TABLET BY MOUTH EVERY DAY    Angiotensin-II Receptors Failed    3/2/2018  7:14 AM       Failed - Normal serum creatinine on file in past 12 months    Recent Labs   Lab Test  06/21/17   1646   CR  1.10*            Passed - Blood pressure under 140/90 in past 12 months    BP Readings from Last 3 Encounters:   08/25/17 138/66   07/06/17 146/70   06/21/17 106/66                Passed - Recent or future visit with authorizing provider's specialty    Patient had office visit in the last year or has a visit in the next 30 days with authorizing provider.  See \"Patient Info\" tab in inbasket, or \"Choose Columns\" in Meds & Orders section of the refill encounter.            Passed - Patient is age 18 or older       Passed - No active pregnancy on record       Passed - Normal serum potassium on file in past 12 months    Recent Labs   Lab Test  06/21/17   1646   POTASSIUM  4.1                   Passed - No positive pregnancy test in past 12 months        metoprolol tartrate (LOPRESSOR) 25 MG tablet [Pharmacy Med Name: METOPROLOL TARTRATE 25MG TABS] 180 tablet 0     Sig: TAKE ONE TABLET BY MOUTH OR FEEDING TUBE TWICE A DAY    Beta-Blockers Protocol Passed    3/2/2018  7:14 AM       Passed - Blood pressure under 140/90 in past 12 months    BP Readings from Last 3 Encounters:   08/25/17 138/66   07/06/17 146/70   06/21/17 106/66                Passed - Patient is age 6 or older       Passed - Recent or future visit with authorizing provider's specialty    Patient had office visit in the last year or has a visit in the next 30 days with authorizing provider.  See \"Patient Info\" tab in inbasket, or \"Choose Columns\" in Meds & Orders section of the refill encounter.               Last Written Prescription Date:  11/15/17, 8/10/17  Last Fill Quantity: 180, 30,  # " refills: 0, 1   Last Office Visit with [facility], [facility] or [facility] prescribing provider:  8/25/17   Future Office Visit:

## 2018-03-05 RX ORDER — METOPROLOL TARTRATE 25 MG/1
25 TABLET, FILM COATED ORAL 2 TIMES DAILY
Qty: 180 TABLET | Refills: 1 | Status: SHIPPED | OUTPATIENT
Start: 2018-03-05 | End: 2018-09-13

## 2018-03-05 RX ORDER — LOSARTAN POTASSIUM 25 MG/1
TABLET ORAL
Qty: 90 TABLET | Refills: 1 | Status: SHIPPED | OUTPATIENT
Start: 2018-03-05 | End: 2018-09-13

## 2018-03-05 NOTE — TELEPHONE ENCOUNTER
Routing refill request to provider for review/approval because:  Labs out of range:  Cr 1.10  Dominique Fitzgerald RN

## 2018-09-04 ENCOUNTER — MYC MEDICAL ADVICE (OUTPATIENT)
Dept: INTERNAL MEDICINE | Facility: CLINIC | Age: 73
End: 2018-09-04

## 2018-09-04 DIAGNOSIS — M79.661 PAIN OF RIGHT LOWER LEG: Primary | ICD-10-CM

## 2018-09-05 ENCOUNTER — HOSPITAL ENCOUNTER (OUTPATIENT)
Dept: ULTRASOUND IMAGING | Facility: CLINIC | Age: 73
Discharge: HOME | End: 2018-09-05
Attending: INTERNAL MEDICINE | Admitting: INTERNAL MEDICINE
Payer: MEDICARE

## 2018-09-05 DIAGNOSIS — M79.661 PAIN OF RIGHT LOWER LEG: ICD-10-CM

## 2018-09-05 PROCEDURE — 93971 EXTREMITY STUDY: CPT | Mod: RT

## 2018-09-13 DIAGNOSIS — Z95.1 S/P CABG (CORONARY ARTERY BYPASS GRAFT): ICD-10-CM

## 2018-09-13 DIAGNOSIS — Z86.79 S/P AORTIC ANEURYSM REPAIR: ICD-10-CM

## 2018-09-13 DIAGNOSIS — I10 HYPERTENSION GOAL BP (BLOOD PRESSURE) < 140/90: ICD-10-CM

## 2018-09-13 DIAGNOSIS — Z98.890 S/P AORTIC ANEURYSM REPAIR: ICD-10-CM

## 2018-09-13 DIAGNOSIS — I25.10 ASCVD (ARTERIOSCLEROTIC CARDIOVASCULAR DISEASE): ICD-10-CM

## 2018-09-13 DIAGNOSIS — E78.5 HYPERLIPIDEMIA LDL GOAL <100: ICD-10-CM

## 2018-09-13 RX ORDER — ATORVASTATIN CALCIUM 20 MG/1
TABLET, FILM COATED ORAL
Qty: 30 TABLET | Refills: 0 | Status: SHIPPED | OUTPATIENT
Start: 2018-09-13 | End: 2018-09-19

## 2018-09-13 RX ORDER — LOSARTAN POTASSIUM 25 MG/1
TABLET ORAL
Qty: 30 TABLET | Refills: 0 | Status: SHIPPED | OUTPATIENT
Start: 2018-09-13 | End: 2018-09-19

## 2018-09-13 RX ORDER — METOPROLOL TARTRATE 25 MG/1
TABLET, FILM COATED ORAL
Qty: 60 TABLET | Refills: 0 | Status: SHIPPED | OUTPATIENT
Start: 2018-09-13 | End: 2018-09-19

## 2018-09-13 NOTE — TELEPHONE ENCOUNTER
Prescription approved per [facility] Ref[facility].  May get additional refills after upcoming physical/labs.     Aysha Torre RN. . .  9/13/2018, 1:58 PM

## 2018-09-13 NOTE — TELEPHONE ENCOUNTER
"Requested Prescriptions   Pending Prescriptions Disp Refills     atorvastatin (LIPITOR) 20 MG tablet [Pharmacy Med Name: ATORVASTATIN CALCIUM 20MG TABS] 90 tablet 1    Last Written Prescription Date:  7/3/18  Last Fill Quantity: 90,  # refills: 1   Last office visit: 2/27/2018 with prescribing provider:  2/27/18   Future Office Visit:   Next 5 appointments (look out 90 days)     Sep 19, 2018  3:00 PM CDT   PHYSICAL with Herbert Epstein MD   [address] [id number] [phone]                  Sig: TAKE ONE TABLET BY MOUTH EVERY EVENING    Statins Protocol Failed    9/13/2018  9:29 AM       Failed - LDL on file in past 12 months    Recent Labs   Lab Test  03/14/17   0926   LDL  111*            Passed - No abnormal creatine kinase in past 12 months    No lab results found.            Passed - Recent (12 mo) or future (30 days) visit within the authorizing provider's specialty    Patient had office visit in the last 12 months or has a visit in the next 30 days with authorizing provider or within the authorizing provider's specialty.  See \"Patient Info\" tab in inbasket, or \"Choose Columns\" in Meds & Orders section of the refill encounter.           Passed - Patient is age 18 or older       Passed - No active pregnancy on record       Passed - No positive pregnancy test in past 12 months        losartan (COZAAR) 25 MG tablet [Pharmacy Med Name: LOSARTAN POTASSIUM 25MG TABS] 90 tablet 1    Last Written Prescription Date:  3/5/18  Last Fill Quantity: 90,  # refills: 1   Last office visit: 2/27/2018 with prescribing provider:  2/27/18   Future Office Visit:   Next 5 appointments (look out 90 days)     Sep 19, 2018  3:00 PM CDT   PHYSICAL with Herbert Epstein MD   [address] [id number] [phone]                  Sig: TAKE ONE TABLET BY MOUTH EVERY DAY    " "Angiotensin-II Receptors Failed    9/13/2018  9:29 AM       Failed - Blood pressure under 140/90 in past 12 months    BP Readings from Last 3 Encounters:   08/25/17 138/66   07/06/17 146/70   06/21/17 106/66                Failed - Normal serum creatinine on file in past 12 months    Recent Labs   Lab Test  06/21/17   1646   10/22/13   0910   CR  1.10*   < >   --    CREAT   --    --   0.8    < > = values in this interval not displayed.            Failed - Normal serum potassium on file in past 12 months    Recent Labs   Lab Test  06/21/17   1646   POTASSIUM  4.1                   Passed - Recent (12 mo) or future (30 days) visit within the authorizing provider's specialty    Patient had office visit in the last 12 months or has a visit in the next 30 days with authorizing provider or within the authorizing provider's specialty.  See \"Patient Info\" tab in inbasket, or \"Choose Columns\" in Meds & Orders section of the refill encounter.           Passed - Patient is age 18 or older       Passed - No active pregnancy on record       Passed - No positive pregnancy test in past 12 months        metoprolol tartrate (LOPRESSOR) 25 MG tablet [Pharmacy Med Name: METOPROLOL TARTRATE 25MG TABS] 180 tablet 1    Last Written Prescription Date:  3/5/18  Last Fill Quantity: 180,  # refills: 1   Last office visit: 2/27/2018 with prescribing provider:  2/27/18   Future Office Visit:   Next 5 appointments (look out 90 days)     Sep 19, 2018  3:00 PM CDT   PHYSICAL with Herbert Epstein MD   [facility] ([address] [phone]                  Sig: TAKE ONE TABLET BY MOUTH TWICE A DAY    Beta-Blockers Protocol Failed    9/13/2018  9:29 AM       Failed - Blood pressure under 140/90 in past 12 months    BP Readings from Last 3 Encounters:   08/25/17 138/66   07/06/17 146/70   06/21/17 106/66                Passed - Patient is age 6 or older       Passed - " "Recent (12 mo) or future (30 days) visit within the authorizing provider's specialty    Patient had office visit in the last 12 months or has a visit in the next 30 days with authorizing provider or within the authorizing provider's specialty.  See \"Patient Info\" tab in inbasket, or \"Choose Columns\" in Meds & Orders section of the refill encounter.              "

## 2018-09-18 ASSESSMENT — ACTIVITIES OF DAILY LIVING (ADL)
I_NEED_ASSISTANCE_FOR_THE_FOLLOWING_DAILY_ACTIVITIES:: NO ASSISTANCE IS NEEDED
CURRENT_FUNCTION: NO ASSISTANCE NEEDED

## 2018-09-19 ENCOUNTER — OFFICE VISIT (OUTPATIENT)
Dept: INTERNAL MEDICINE | Facility: CLINIC | Age: 73
End: 2018-09-19
Payer: COMMERCIAL

## 2018-09-19 VITALS
HEIGHT: 68 IN | DIASTOLIC BLOOD PRESSURE: 76 MMHG | SYSTOLIC BLOOD PRESSURE: 135 MMHG | WEIGHT: 183 LBS | HEART RATE: 72 BPM | TEMPERATURE: 96.7 F | BODY MASS INDEX: 27.74 KG/M2 | OXYGEN SATURATION: 97 %

## 2018-09-19 DIAGNOSIS — Z95.1 S/P CABG (CORONARY ARTERY BYPASS GRAFT): ICD-10-CM

## 2018-09-19 DIAGNOSIS — Z86.79 S/P AORTIC ANEURYSM REPAIR: ICD-10-CM

## 2018-09-19 DIAGNOSIS — I10 HYPERTENSION GOAL BP (BLOOD PRESSURE) < 140/90: ICD-10-CM

## 2018-09-19 DIAGNOSIS — I25.10 ASCVD (ARTERIOSCLEROTIC CARDIOVASCULAR DISEASE): ICD-10-CM

## 2018-09-19 DIAGNOSIS — E78.5 HYPERLIPIDEMIA LDL GOAL <100: ICD-10-CM

## 2018-09-19 DIAGNOSIS — Z00.00 ENCOUNTER FOR ROUTINE ADULT HEALTH EXAMINATION WITHOUT ABNORMAL FINDINGS: Primary | ICD-10-CM

## 2018-09-19 DIAGNOSIS — Z98.890 S/P AORTIC ANEURYSM REPAIR: ICD-10-CM

## 2018-09-19 PROCEDURE — 99397 PER PM REEVAL EST PAT 65+ YR: CPT | Performed by: INTERNAL MEDICINE

## 2018-09-19 RX ORDER — LOSARTAN POTASSIUM 25 MG/1
25 TABLET ORAL DAILY
Qty: 90 TABLET | Refills: 3 | Status: SHIPPED | OUTPATIENT
Start: 2018-09-19 | End: 2019-10-01

## 2018-09-19 RX ORDER — METOPROLOL TARTRATE 25 MG/1
25 TABLET, FILM COATED ORAL 2 TIMES DAILY
Qty: 180 TABLET | Refills: 3 | Status: SHIPPED | OUTPATIENT
Start: 2018-09-19 | End: 2019-10-01

## 2018-09-19 RX ORDER — ATORVASTATIN CALCIUM 20 MG/1
20 TABLET, FILM COATED ORAL EVERY MORNING
Qty: 90 TABLET | Refills: 3 | Status: SHIPPED | OUTPATIENT
Start: 2018-09-19 | End: 2019-10-01

## 2018-09-19 RX ORDER — OMEGA-3 FATTY ACIDS/FISH OIL 300-1000MG
200 CAPSULE ORAL EVERY 4 HOURS PRN
COMMUNITY
End: 2019-06-24

## 2018-09-19 ASSESSMENT — PAIN SCALES - GENERAL: PAINLEVEL: NO PAIN (0)

## 2018-09-19 ASSESSMENT — ACTIVITIES OF DAILY LIVING (ADL): CURRENT_FUNCTION: NO ASSISTANCE NEEDED

## 2018-09-19 NOTE — PROGRESS NOTES
SUBJECTIVE:   Marilia Bean is a 73 year old female who presents for Preventive Visit.    Are you in the first 12 months of your Medicare coverage?      Physical   Annual:     Getting at least 3 servings of Calcium per day:  NO    Bi-annual eye exam:  Yes    Dental care twice a year:  NO    Sleep apnea or symptoms of sleep apnea:  Excessive snoring    Diet:  Regular (no restrictions)    Frequency of exercise:  None    Taking medications regularly:  Yes    Medication side effects:  No significant flushing and Muscle aches    Additional concerns today:  No    Ability to successfully perform activities of daily living: no assistance needed    Home Safety:  Lack of grab bars in the bathroom    Hearing Impairment: difficulty understanding soft or whispered speech and no hearing concerns    Doing ok she says, check bp at home can be 121, 135 systolic at home.   No more a fib since the surgery.   Fall risk:  Fallen 2 or more times in the past year?: No  Any fall with injury in the past year?: No    COGNITIVE SCREEN  1) Repeat 3 items (Leader, Season, Table)    2) Clock draw: NORMAL  3) 3 item recall: Recalls 3 objects  Results: 3 items recalled: COGNITIVE IMPAIRMENT LESS LIKELY    Mini-CogTM Copyright S Mustapha. Licensed by the author for use in [facility]; reprinted with permission (soob@[facility]). All rights reserved.        Reviewed and updated as needed this visit by clinical staff  Allergies  Meds         Reviewed and updated as needed this visit by Provider        Social History   Substance Use Topics     Smoking status: Never Smoker     Smokeless tobacco: Never Used     Alcohol use Yes      Comment: rarely       Alcohol Use 9/18/2018   If you drink alcohol do you typically have greater than 3 drinks per day OR greater than 7 drinks per week? Not Applicable       Today's PHQ-2 Score:   PHQ-2 ( 1999 [employer]) 9/18/2018   Q1: Little interest or pleasure in doing things 0   Q2: Feeling down, depressed or  hopeless 0   PHQ-2 Score 0   Q1: Little interest or pleasure in doing things Not at all   Q2: Feeling down, depressed or hopeless Not at all   PHQ-2 Score 0       Do you feel safe in your environment - Yes    Do you have a Health Care Directive?: Yes: Advance Directive has been received and scanned.    Current providers sharing in care for this patient include:   Patient Care Team:  Herbert Epstein MD as PCP - General (Family Practice)    The following health maintenance items are reviewed in Epic and correct as of today:  Health Maintenance   Topic Date Due     HEPATITIS C SCREENING  03/29/1963     FALL RISK ASSESSMENT  03/08/2018     PHQ-2 Q1 YR  03/08/2018     INFLUENZA VACCINE (1) 09/01/2018     MAMMO SCREEN Q2 YR (SYSTEM ASSIGNED)  03/14/2019     TETANUS IMMUNIZATION (SYSTEM ASSIGNED)  03/10/2020     LIPID SCREEN Q5 YR FEMALE (SYSTEM ASSIGNED)  03/14/2022     ADVANCE DIRECTIVE PLANNING Q5 YRS  10/13/2022     COLON CANCER SCREEN (SYSTEM ASSIGNED)  04/26/2027     DEXA SCAN SCREENING (SYSTEM ASSIGNED)  Completed     PNEUMOCOCCAL  Completed     Pneumonia Vaccine:already done.     Review of Systems  CONSTITUTIONAL: NEGATIVE for fever, chills, change in weight  INTEGUMENTARY/SKIN: NEGATIVE for worrisome rashes, moles or lesions  EYES: NEGATIVE for vision changes or irritation  ENT/MOUTH: NEGATIVE for ear, mouth and throat problems  RESP: NEGATIVE for significant cough or SOB  BREAST: NEGATIVE for masses, tenderness or discharge  CV: NEGATIVE for chest pain, palpitations or peripheral edema  GI: NEGATIVE for nausea, abdominal pain, heartburn, or change in bowel habits  : NEGATIVE for frequency, dysuria, or hematuria  MUSCULOSKELETAL:right lower leg swelling  NEURO: NEGATIVE for weakness, dizziness or paresthesias  ENDOCRINE: NEGATIVE for temperature intolerance, skin/hair changes  HEME: NEGATIVE for bleeding problems  PSYCHIATRIC: NEGATIVE for changes in mood or affect    OBJECTIVE:   /76  Pulse 72  Temp  "96.7  F (35.9  C) (Temporal)  Ht 5' 7.5\" (1.715 m)  Wt 183 lb (83 kg)  SpO2 97%  BMI 28.24 kg/m2 Estimated body mass index is 26.39 kg/(m^2) as calculated from the following:    Height as of 7/6/17: 5' 7.5\" (1.715 m).    Weight as of 8/25/17: 171 lb (77.6 kg).  Physical Exam  GENERAL: healthy, alert and no distress  EYES: Eyes grossly normal to inspection, PERRL and conjunctivae and sclerae normal  HENT: ear canals and TM's normal, nose and mouth without ulcers or lesions  NECK: no adenopathy, no asymmetry, masses, or scars and thyroid normal to palpation  RESP: lungs clear to auscultation - no rales, rhonchi or wheezes  CV: regular rate and rhythm, normal S1 S2, no S3 or S4, no murmur, click or rub, no peripheral edema and peripheral pulses strong  ABDOMEN: soft, nontender, no hepatosplenomegaly, no masses and bowel sounds normal  MS: no gross musculoskeletal defects noted, no edema  SKIN: no suspicious lesions or rashes  NEURO: Normal strength and tone, mentation intact and speech normal  PSYCH: mentation appears normal, affect normal/bright        ASSESSMENT / PLAN:       ICD-10-CM    1. Encounter for routine adult health examination without abnormal findings Z00.00 **Comprehensive metabolic panel FUTURE anytime     Lipid panel reflex to direct LDL Fasting     Albumin Random Urine Quantitative with Creat Ratio   2. Hyperlipidemia LDL goal <100 E78.5 atorvastatin (LIPITOR) 20 MG tablet     **Comprehensive metabolic panel FUTURE anytime     Lipid panel reflex to direct LDL Fasting     Albumin Random Urine Quantitative with Creat Ratio   3. Hypertension goal BP (blood pressure) < 140/90 I10 losartan (COZAAR) 25 MG tablet     **Comprehensive metabolic panel FUTURE anytime     Lipid panel reflex to direct LDL Fasting     Albumin Random Urine Quantitative with Creat Ratio   4. ASCVD (arteriosclerotic cardiovascular disease) I25.10 losartan (COZAAR) 25 MG tablet     **Comprehensive metabolic panel FUTURE anytime     " "Lipid panel reflex to direct LDL Fasting     Albumin Random Urine Quantitative with Creat Ratio   5. S/P CABG (coronary artery bypass graft) Z95.1 metoprolol tartrate (LOPRESSOR) 25 MG tablet     **Comprehensive metabolic panel FUTURE anytime     Lipid panel reflex to direct LDL Fasting     Albumin Random Urine Quantitative with Creat Ratio   6. S/P aortic aneurysm repair Z98.890 metoprolol tartrate (LOPRESSOR) 25 MG tablet    Z86.79 **Comprehensive metabolic panel FUTURE anytime     Lipid panel reflex to direct LDL Fasting     Albumin Random Urine Quantitative with Creat Ratio     Overall doing ok, needs to watch bp closely ok at home per her.  REfill medications, she will do fasting labs in a few weeks.   She will do mammogram and colonoscopy in 2019  Flu shot in a few weeks.    End of Life Planning:  Patient currently has an advanced directive: Yes.  Practitioner is supportive of decision.    COUNSELING:  Reviewed preventive health counseling, as reflected in patient instructions       Regular exercise       Healthy diet/nutrition    BP Readings from Last 1 Encounters:   08/25/17 138/66     Estimated body mass index is 26.39 kg/(m^2) as calculated from the following:    Height as of 7/6/17: 5' 7.5\" (1.715 m).    Weight as of 8/25/17: 171 lb (77.6 kg).           reports that she has never smoked. She has never used smokeless tobacco.      Appropriate preventive services were discussed with this patient, including applicable screening as appropriate for cardiovascular disease, diabetes, osteopenia/osteoporosis, and glaucoma.  As appropriate for age/gender, discussed screening for colorectal cancer, prostate cancer, breast cancer, and cervical cancer. Checklist reviewing preventive services available has been given to the patient.    Reviewed patients plan of care and provided an AVS. The Basic Care Plan (routine screening as documented in Health Maintenance) for Marilia meets the Care Plan requirement. This Care " Plan has been established and reviewed with the Patient.    Counseling Resources:  ATP IV Guidelines  Pooled Cohorts Equation Calculator  Breast Cancer Risk Calculator  FRAX Risk Assessment  ICSI Preventive Guidelines  Dietary Guidelines for Americans, 2010  USDA's MyPlate  ASA Prophylaxis  Lung CA Screening    Herbert Epstein MD  [facility]

## 2018-09-19 NOTE — MR AVS SNAPSHOT
After Visit Summary   9/19/2018    Marilia Bean    MRN: [id number]           Patient Information     Date Of Birth          1945        Visit Information        Provider Department      9/19/2018 3:00 PM Herbert Epstein MD [facility]        Today's Diagnoses     Hyperlipidemia LDL goal <100        Hypertension goal BP (blood pressure) < 140/90        ASCVD (arteriosclerotic cardiovascular disease)        S/P CABG (coronary artery bypass graft)        S/P aortic aneurysm repair           Follow-ups after your visit        Who to contact     If you have questions or need follow up information about today's clinic visit or your schedule please contact [facility] directly at [phone].  Normal or non-critical lab and imaging results will be communicated to you by [facility], letter or phone within 4 business days after the clinic has received the results. If you do not hear from us within 7 days, please contact the clinic through [facility] or phone. If you have a critical or abnormal lab result, we will notify you by phone as soon as possible.  Submit refill requests through [employer]Link or call your pharmacy and they will forward the refill request to us. Please allow 3 business days for your refill to be completed.          Additional Information About Your Visit        MyChart Information     SecureLink gives you secure access to your electronic health record. If you see a primary care provider, you can also send messages to your care team and make appointments. If you have questions, please call your primary care clinic.  If you do not have a primary care provider, please call [phone] and they will assist you.        Care EveryWhere ID     This is your Care EveryWhere ID. This could be used by other organizations to access your [place] medical records  [id number]        Your Vitals Were     Pulse Temperature Height Pulse Oximetry BMI (Body Mass Index)       72 96.7  " F (35.9  C) (Temporal) 5' 7.5\" (1.715 m) 97% 28.24 kg/m2        Blood Pressure from Last 3 Encounters:   09/19/18 144/76   08/25/17 138/66   07/06/17 146/70    Weight from Last 3 Encounters:   09/19/18 183 lb (83 kg)   08/25/17 171 lb (77.6 kg)   07/06/17 169 lb 12.8 oz (77 kg)              Today, you had the following     No orders found for display         Today's Medication Changes          These changes are accurate as of 9/19/18  3:14 PM.  If you have any questions, ask your nurse or doctor.               Stop taking these medicines if you haven't already. Please contact your care team if you have questions.     acetaminophen 325 MG tablet   Commonly known as:  TYLENOL   Stopped by:  Herbert Epstein MD                    Primary Care Provider Office Phone # Fax #    Herbert Epstein [phone]       [address] [facility] [address]        Equal Access to Services     [facility]: Hadii sandy ku hadasho Soomaali, waaxda luqadaha, qaybta kaalmada adeegyada, waxay brycein haybrian spann . So [facility] [phone].    ATENCIÓN: Si habla español, tiene a de la rosa disposición servicios gratuitos de asistencia lingüística. Llame al [phone].    We comply with applicable federal civil rights laws and [place] laws. We do not discriminate on the basis of race, color, national origin, age, disability, sex, sexual orientation, or gender identity.            Thank you!     Thank you for choosing [facility]  for your care. Our goal is always to provide you with excellent care. Hearing back from our patients is one way we can continue to improve our services. Please take a few minutes to complete the written survey that you may receive in the mail after your visit with us. Thank you!             Your Updated Medication List - Protect others around you: Learn how to safely use, store and throw away your medicines at www.disposemymeds.org.          This list is accurate as of " 9/19/18  3:14 PM.  Always use your most recent med list.                   Brand Name Dispense Instructions for use Diagnosis    aspirin 81 MG EC tablet     90 tablet    Take 1 tablet (81 mg) by mouth daily    ASCVD (arteriosclerotic cardiovascular disease)       atorvastatin 20 MG tablet    LIPITOR    30 tablet    TAKE ONE TABLET BY MOUTH EVERY EVENING    Hyperlipidemia LDL goal <100       ibuprofen 200 MG capsule      Take 200 mg by mouth every 4 hours as needed for fever        losartan 25 MG tablet    COZAAR    30 tablet    TAKE ONE TABLET BY MOUTH EVERY DAY    Hypertension goal BP (blood pressure) < 140/90, ASCVD (arteriosclerotic cardiovascular disease)       metoprolol tartrate 25 MG tablet    LOPRESSOR    60 tablet    TAKE ONE TABLET BY MOUTH TWICE A DAY    S/P CABG (coronary artery bypass graft), S/P aortic aneurysm repair

## 2018-10-17 DIAGNOSIS — I10 HYPERTENSION GOAL BP (BLOOD PRESSURE) < 140/90: ICD-10-CM

## 2018-10-17 DIAGNOSIS — Z98.890 S/P AORTIC ANEURYSM REPAIR: ICD-10-CM

## 2018-10-17 DIAGNOSIS — Z00.00 ENCOUNTER FOR ROUTINE ADULT HEALTH EXAMINATION WITHOUT ABNORMAL FINDINGS: ICD-10-CM

## 2018-10-17 DIAGNOSIS — Z86.79 S/P AORTIC ANEURYSM REPAIR: ICD-10-CM

## 2018-10-17 DIAGNOSIS — Z95.1 S/P CABG (CORONARY ARTERY BYPASS GRAFT): ICD-10-CM

## 2018-10-17 DIAGNOSIS — E78.5 HYPERLIPIDEMIA LDL GOAL <100: ICD-10-CM

## 2018-10-17 DIAGNOSIS — I25.10 ASCVD (ARTERIOSCLEROTIC CARDIOVASCULAR DISEASE): ICD-10-CM

## 2018-10-17 LAB
ALBUMIN SERPL-MCNC: 3.7 G/DL (ref 3.4–5)
ALP SERPL-CCNC: 103 U/L (ref 40–150)
ALT SERPL W P-5'-P-CCNC: 25 U/L (ref 0–50)
ANION GAP SERPL CALCULATED.3IONS-SCNC: 7 MMOL/L (ref 3–14)
AST SERPL W P-5'-P-CCNC: 25 U/L (ref 0–45)
BILIRUB SERPL-MCNC: 0.8 MG/DL (ref 0.2–1.3)
BUN SERPL-MCNC: 19 MG/DL (ref 7–30)
CALCIUM SERPL-MCNC: 9.6 MG/DL (ref 8.5–10.1)
CHLORIDE SERPL-SCNC: 104 MMOL/L (ref 94–109)
CHOLEST SERPL-MCNC: 154 MG/DL
CO2 SERPL-SCNC: 30 MMOL/L (ref 20–32)
CREAT SERPL-MCNC: 1.06 MG/DL (ref 0.52–1.04)
CREAT UR-MCNC: 127 MG/DL
GFR SERPL CREATININE-BSD FRML MDRD: 51 ML/MIN/1.7M2
GLUCOSE SERPL-MCNC: 94 MG/DL (ref 70–99)
HDLC SERPL-MCNC: 55 MG/DL
LDLC SERPL CALC-MCNC: 82 MG/DL
MICROALBUMIN UR-MCNC: 10 MG/L
MICROALBUMIN/CREAT UR: 8.03 MG/G CR (ref 0–25)
NONHDLC SERPL-MCNC: 99 MG/DL
POTASSIUM SERPL-SCNC: 4 MMOL/L (ref 3.4–5.3)
PROT SERPL-MCNC: 7.6 G/DL (ref 6.8–8.8)
SODIUM SERPL-SCNC: 141 MMOL/L (ref 133–144)
TRIGL SERPL-MCNC: 85 MG/DL

## 2018-10-17 PROCEDURE — 80061 LIPID PANEL: CPT | Performed by: FAMILY MEDICINE

## 2018-10-17 PROCEDURE — 36415 COLL VENOUS BLD VENIPUNCTURE: CPT | Performed by: FAMILY MEDICINE

## 2018-10-17 PROCEDURE — 82043 UR ALBUMIN QUANTITATIVE: CPT | Performed by: FAMILY MEDICINE

## 2018-10-17 PROCEDURE — 80053 COMPREHEN METABOLIC PANEL: CPT | Performed by: FAMILY MEDICINE

## 2018-11-15 ENCOUNTER — ALLIED HEALTH/NURSE VISIT (OUTPATIENT)
Dept: FAMILY MEDICINE | Facility: OTHER | Age: 73
End: 2018-11-15
Payer: COMMERCIAL

## 2018-11-15 DIAGNOSIS — Z23 NEED FOR PROPHYLACTIC VACCINATION AND INOCULATION AGAINST INFLUENZA: Primary | ICD-10-CM

## 2018-11-15 PROCEDURE — G0008 ADMIN INFLUENZA VIRUS VAC: HCPCS

## 2018-11-15 PROCEDURE — 90662 IIV NO PRSV INCREASED AG IM: CPT

## 2018-11-15 NOTE — PROGRESS NOTES

## 2018-11-15 NOTE — MR AVS SNAPSHOT
After Visit Summary   11/15/2018    Marilia Bean    MRN: [id number]           Patient Information     Date Of Birth          1945        Visit Information        Provider Department      11/15/2018 9:45 AM FABI HAND MA [facility]        Today's Diagnoses     Need for prophylactic vaccination and inoculation against influenza    -  1       Follow-ups after your visit        Who to contact     If you have questions or need follow up information about today's clinic visit or your schedule please contact [facility] directly at [phone].  Normal or non-critical lab and imaging results will be communicated to you by [employer] [facility], letter or phone within 4 business days after the clinic has received the results. If you do not hear from us within 7 days, please contact the clinic through [facility]ut or phone. If you have a critical or abnormal lab result, we will notify you by phone as soon as possible.  Submit refill requests through [employer] or call your pharmacy and they will forward the refill request to us. Please allow 3 business days for your refill to be completed.          Additional Information About Your Visit        MyChart Information     [employer] gives you secure access to your electronic health record. If you see a primary care provider, you can also send messages to your care team and make appointments. If you have questions, please call your primary care clinic.  If you do not have a primary care provider, please call [phone] and they will assist you.        Care EveryWhere ID     This is your Care EveryWhere ID. This could be used by other organizations to access your [place] medical records  [id number]         Blood Pressure from Last 3 Encounters:   09/19/18 135/76   08/25/17 138/66   07/06/17 146/70    Weight from Last 3 Encounters:   09/19/18 183 lb (83 kg)   08/25/17 171 lb (77.6 kg)   07/06/17 169 lb 12.8 oz (77 kg)              We Performed the Following      ADMIN INFLUENZA (For MEDICARE Patients ONLY) []     FLU VACCINE, INCREASED ANTIGEN, PRESV FREE, AGE 65+ [87497]        Primary Care Provider Office Phone # Fax #    Herbert Epstein [phone] [address] [facility] [address]        Equal Access to Services     CASEY HEREDIA : Hadii aad ku hadasho Soomaali, waaxda luqadaha, qaybta kaalmada adeegyada, waxay brody haysanchezn adetiana avendanomikeybel thorne. So [facility] [phone].    ATENCIÓN: Si habla español, tiene a de la rosa disposición servicios gratuitos de asistencia lingüística. [facility] [phone].    We comply with applicable federal civil rights laws and [place] laws. We do not discriminate on the basis of race, color, national origin, age, disability, sex, sexual orientation, or gender identity.            Thank you!     Thank you for choosing [facility]  for your care. Our goal is always to provide you with excellent care. Hearing back from our patients is one way we can continue to improve our services. Please take a few minutes to complete the written survey that you may receive in the mail after your visit with us. Thank you!             Your Updated Medication List - Protect others around you: Learn how to safely use, store and throw away your medicines at www.disposemymeds.org.          This list is accurate as of 11/15/18 10:09 AM.  Always use your most recent med list.                   Brand Name Dispense Instructions for use Diagnosis    aspirin 81 MG EC tablet     90 tablet    Take 1 tablet (81 mg) by mouth daily    ASCVD (arteriosclerotic cardiovascular disease)       atorvastatin 20 MG tablet    LIPITOR    90 tablet    Take 1 tablet (20 mg) by mouth every morning    Hyperlipidemia LDL goal <100       ibuprofen 200 MG capsule      Take 200 mg by mouth every 4 hours as needed for fever        losartan 25 MG tablet    COZAAR    90 tablet    Take 1 tablet (25 mg) by mouth daily    Hypertension goal BP (blood  pressure) < 140/90, ASCVD (arteriosclerotic cardiovascular disease)       metoprolol tartrate 25 MG tablet    LOPRESSOR    180 tablet    Take 1 tablet (25 mg) by mouth 2 times daily    S/P CABG (coronary artery bypass graft), S/P aortic aneurysm repair

## 2018-11-15 NOTE — NURSING NOTE
Prior to injection verified patient identity using patient's name and date of birth.  Due to injection administration, patient instructed to remain in clinic for 15 minutes  afterwards, and to report any adverse reaction to me immediately.    Samantha Jones MA 11/15/2018  10:07 AM

## 2019-01-03 ENCOUNTER — MYC MEDICAL ADVICE (OUTPATIENT)
Dept: INTERNAL MEDICINE | Facility: CLINIC | Age: 74
End: 2019-01-03

## 2019-01-03 DIAGNOSIS — M54.42 BILATERAL LOW BACK PAIN WITH LEFT-SIDED SCIATICA, UNSPECIFIED CHRONICITY: Primary | ICD-10-CM

## 2019-01-08 ENCOUNTER — HOSPITAL ENCOUNTER (OUTPATIENT)
Dept: PHYSICAL THERAPY | Facility: OTHER | Age: 74
Setting detail: THERAPIES SERIES
End: 2019-01-08
Attending: INTERNAL MEDICINE
Payer: MEDICARE

## 2019-01-08 DIAGNOSIS — M54.42 BILATERAL LOW BACK PAIN WITH LEFT-SIDED SCIATICA, UNSPECIFIED CHRONICITY: ICD-10-CM

## 2019-01-08 PROCEDURE — 97530 THERAPEUTIC ACTIVITIES: CPT | Mod: GP

## 2019-01-08 PROCEDURE — 97161 PT EVAL LOW COMPLEX 20 MIN: CPT | Mod: GP

## 2019-01-08 NOTE — PROGRESS NOTES
01/08/19 0759   General Information   Type of Visit Initial OP Ortho PT Evaluation   Start of Care Date 01/08/19   Referring Physician Herbert Epstein MD   Patient/Family Goals Statement Would like to learn exercises to get rid of pain   Orders Evaluate and Treat   Orders Comment Sciatic pain   Date of Order 01/04/19   Insurance Type Medicare   Medical Diagnosis Bilateral low back pain with left-sided sciatica, unspecified chronicity   Surgical/Medical history reviewed Yes   Precautions/Limitations no known precautions/limitations   Weight-Bearing Status - LUE full weight-bearing   Weight-Bearing Status - RUE full weight-bearing   Weight-Bearing Status - LLE full weight-bearing   Weight-Bearing Status - RLE full weight-bearing       Present No   Body Part(s)   Body Part(s) Lumbar Spine/SI   Presentation and Etiology   Pertinent history of current problem (include personal factors and/or comorbidities that impact the POC) Patient reports in November 2018, she was lifting box in fridge and turned, felt sharp pain in L buttocks and shooting down left leg. Since then she has been experiencing L sciatic pain intermittently, mostly in the evening. Reports she used to have to crawl at home when pain was exacerbated however it has gotten better. Does not have much low back pain when sciatic symptoms flare up, has tingliness down into foot at times.   Impairments A. Pain   Functional Limitations perform activities of daily living;perform desired leisure / sports activities   Symptom Location L LE   How/Where did it occur While lifting   Onset date of current episode/exacerbation 11/08/18   Chronicity New   Pain rating (0-10 point scale) Best (/10);Worst (/10)   Best (/10) 0   Worst (/10) 5-6   Pain quality A. Sharp;B. Dull;E. Shooting   Frequency of pain/symptoms B. Intermittent   Pain/symptoms are: Worse during the night   Pain/symptoms exacerbated by A. Sitting;M. Other   Pain exacerbation comment  Sitting in car, Getting out of car, Standing prolonged periods, Turning in bed   Pain/symptoms eased by B. Walking;C. Rest;F. Certain positions;G. Heat;I. OTC medication(s)  (Advil prn)   Progression of symptoms since onset: Improved   Prior Level of Function   Prior Level of Function-Mobility Independent   Prior Level of Function-ADLs Independent   Current Level of Function   Current Community Support Family/friend caregiver  ()   Patient role/employment history F. Retired  (Volunteer work)   Living [facility]   Home/community accessibility 4 steps into house, flight into basement   Current equipment-Gait/Locomotion None   Current equipment-ADL None   Fall Risk Screen   Fall screen completed by PT   Have you fallen 2 or more times in the past year? No   Have you fallen and had an injury in the past year? No   Is patient a fall risk? No   System Outcome Measures   Outcome Measures Low Back Pain (see Oswestry and Nayla)   Hip Objective Findings   Side (if bilateral, select both right and left) Right;Left   Right Hip ER PROM Limitations noted, no pain   Left Hip ER PROM Limitations noted, no pain   Lumbar Spine/SI Objective Findings   Observation Patient reports to eval alone, in no apparent distress   Posture Forward head, rounded shoulders   Gait/Locomotion No noticeable abnormalities   Flexion ROM WNL   Extension ROM WNL   Right Side Bending ROM WNL   Left Side Bending ROM WNL   Lumbar ROM Comment Thoracic rotation WNL, bilateral   Pelvic Screen Anteriorly rotated pelvis in sitting and standing   SLR Positive, Left   Slump Test Postive, Left   Planned Therapy Interventions   Planned Therapy Interventions manual therapy;neuromuscular re-education;strengthening;stretching   Planned Therapy Interventions Comment Other interventions as needed   Planned Modality Interventions   Planned Modality Interventions Hot packs   Planned Modality Interventions Comments Other modalities as needed   Clinical  Impression   Criteria for Skilled Therapeutic Interventions Met yes, treatment indicated   PT Diagnosis Radiating L LE symptoms d/t decreased hip ER and anterior pelvic position   Influenced by the following impairments ROM, pelvic position, flexibility, neural tension, pain   Functional limitations due to impairments Prolonged standing and sitting, Sleeping   Clinical Presentation Stable/Uncomplicated   Clinical Presentation Rationale Patient is 73 y.o. female, presenting with L LE radiating symptoms, no report of LBP. Patient exhibits ROM deficits in hip ER bilaterally and upon exam, anteriorly rotated pelvis. Patient demonstrates positive L slump test and SLR. Patient reports decrease in L LE symptoms when instructed in proper resting pelvis position in sitting and standing. Patient will benefit from skilled physical therapy intervention to address aforementioned deficits and limitations.    Clinical Decision Making (Complexity) Low complexity   Therapy Frequency 1 time/week   Predicted Duration of Therapy Intervention (days/wks) 4 weeks   Risk & Benefits of therapy have been explained Yes   Patient, Family & other staff in agreement with plan of care Yes   Education Assessment   Preferred Learning Style Listening;Demonstration   Barriers to Learning No barriers   ORTHO GOALS   PT Ortho Eval Goals 1;2;3   Ortho Goal 1   Goal Identifier 1   Goal Description Patient will improve JOLYNN score by at least 10 points in order to demonstrate functional improvements.   Target Date 02/05/19   Ortho Goal 2   Goal Identifier 2   Goal Description Patient will report ability to sit in car and  kitchen for 30-45 minutes without increased LE symptoms.    Target Date 02/05/19   Ortho Goal 3   Goal Identifier 3   Goal Description Patient will demonstrate HEP and proper pelvic position to determine understanding of home program for safe discharge.    Target Date 02/05/19   Total Evaluation Time   PT Eval, Low Complexity  Minutes (35921) 40   Therapy Certification   Certification date from 01/08/19   Certification date to 02/05/19   Medical Diagnosis Bilateral low back pain with left-sided sciatica, unspecified chronicity       Addendum 1/25/19: Discharging patient from physical therapy services. Spoke with patient on phone and she reports that she is managing pain well with her home program. Patient will be out of town for 6 weeks and is requesting to discharge from PT.         Thank you for your referral.     Nivia Olivas, PT, DPT    [facility]    O: [phone]  E: [email or web address]

## 2019-01-25 NOTE — ADDENDUM NOTE
Encounter addended by: Nivia Olivas, PT on: 1/25/2019 4:00 PM   Actions taken: Sign clinical note, Episode resolved

## 2019-03-25 ENCOUNTER — MYC MEDICAL ADVICE (OUTPATIENT)
Dept: INTERNAL MEDICINE | Facility: CLINIC | Age: 74
End: 2019-03-25

## 2019-03-25 DIAGNOSIS — Z86.0100 HISTORY OF COLONIC POLYPS: ICD-10-CM

## 2019-03-25 DIAGNOSIS — Z12.11 SCREENING FOR COLON CANCER: Primary | ICD-10-CM

## 2019-05-07 ENCOUNTER — TRANSFERRED RECORDS (OUTPATIENT)
Dept: HEALTH INFORMATION MANAGEMENT | Facility: CLINIC | Age: 74
End: 2019-05-07

## 2019-05-29 ENCOUNTER — TRANSFERRED RECORDS (OUTPATIENT)
Dept: HEALTH INFORMATION MANAGEMENT | Facility: CLINIC | Age: 74
End: 2019-05-29

## 2019-06-11 ENCOUNTER — DOCUMENTATION ONLY (OUTPATIENT)
Dept: CARE COORDINATION | Facility: CLINIC | Age: 74
End: 2019-06-11

## 2019-06-11 DIAGNOSIS — D13.2 DUODENAL ADENOMA: Primary | ICD-10-CM

## 2019-06-17 PROBLEM — Z79.01 LONG TERM CURRENT USE OF ANTICOAGULANT THERAPY: Status: RESOLVED | Noted: 2017-06-19 | Resolved: 2017-08-25

## 2019-06-19 ENCOUNTER — TELEPHONE (OUTPATIENT)
Dept: SURGERY | Facility: CLINIC | Age: 74
End: 2019-06-19

## 2019-06-19 ENCOUNTER — TELEPHONE (OUTPATIENT)
Dept: INTERNAL MEDICINE | Facility: CLINIC | Age: 74
End: 2019-06-19

## 2019-06-19 DIAGNOSIS — Z91.041 CONTRAST MEDIA ALLERGY: Primary | ICD-10-CM

## 2019-06-19 RX ORDER — METHYLPREDNISOLONE 4 MG/1
TABLET ORAL
Qty: 16 TABLET | Refills: 0 | Status: SHIPPED | OUTPATIENT
Start: 2019-06-19 | End: 2019-06-24

## 2019-06-19 ASSESSMENT — ENCOUNTER SYMPTOMS
ABDOMINAL PAIN: 0
RECTAL PAIN: 0
JAUNDICE: 0
BLOOD IN STOOL: 0
DIARRHEA: 0
BOWEL INCONTINENCE: 0
CONSTIPATION: 0
VOMITING: 0
NAUSEA: 0
HEARTBURN: 1
BLOATING: 0

## 2019-06-19 NOTE — TELEPHONE ENCOUNTER
Pre Visit Call and Assessment    Date of call:  06/19/2019    Phone numbers:  Home number on file [phone] (home)    Reached patient/confirmed appointment:  Yes  Patient care team/Primary provider:  Herbert Epstein  Referred to:  Dr. Cory Brito    Reason for visit:  New patient consult  Other:  Patient will be coming with her two daughters to the appointment with daughter. This writer confirmed details for her CT imaging scan. The patient did confirm her contrast dye allergy.

## 2019-06-20 ENCOUNTER — ANCILLARY PROCEDURE (OUTPATIENT)
Dept: CT IMAGING | Facility: CLINIC | Age: 74
End: 2019-06-20
Attending: SURGERY
Payer: COMMERCIAL

## 2019-06-20 ENCOUNTER — OFFICE VISIT (OUTPATIENT)
Dept: SURGERY | Facility: CLINIC | Age: 74
End: 2019-06-20
Payer: COMMERCIAL

## 2019-06-20 VITALS
SYSTOLIC BLOOD PRESSURE: 144 MMHG | HEIGHT: 68 IN | BODY MASS INDEX: 28.3 KG/M2 | DIASTOLIC BLOOD PRESSURE: 74 MMHG | OXYGEN SATURATION: 96 % | WEIGHT: 186.7 LBS | HEART RATE: 104 BPM

## 2019-06-20 DIAGNOSIS — D13.2 DUODENAL ADENOMA: ICD-10-CM

## 2019-06-20 DIAGNOSIS — C17.0 DUODENAL ADENOCARCINOMA (H): Primary | ICD-10-CM

## 2019-06-20 LAB
CREAT BLD-MCNC: 0.8 MG/DL (ref 0.52–1.04)
GFR SERPL CREATININE-BSD FRML MDRD: 70 ML/MIN/{1.73_M2}

## 2019-06-20 RX ORDER — IOPAMIDOL 755 MG/ML
112 INJECTION, SOLUTION INTRAVASCULAR ONCE
Status: COMPLETED | OUTPATIENT
Start: 2019-06-20 | End: 2019-06-20

## 2019-06-20 RX ADMIN — IOPAMIDOL 112 ML: 755 INJECTION, SOLUTION INTRAVASCULAR at 12:57

## 2019-06-20 ASSESSMENT — PAIN SCALES - GENERAL: PAINLEVEL: NO PAIN (0)

## 2019-06-20 ASSESSMENT — MIFFLIN-ST. JEOR: SCORE: 1387.43

## 2019-06-20 NOTE — LETTER
6/20/2019       RE: Marilia Bean  [address]  [facility] [address]     Dear Colleague,    Thank you for referring your patient, Marilia Bean, to the [facility] at [facility]. Please see a copy of my visit note below.    See dictated note: [id number]  Visit time 45 min, 25 min counselling  GB  Answers for HPI/ROS submitted by the patient on 6/19/2019   General Symptoms: No  Skin Symptoms: No  HENT Symptoms: No  EYE SYMPTOMS: No  HEART SYMPTOMS: No  LUNG SYMPTOMS: No  INTESTINAL SYMPTOMS: Yes  URINARY SYMPTOMS: No  GYNECOLOGIC SYMPTOMS: No  BREAST SYMPTOMS: No  SKELETAL SYMPTOMS: No  BLOOD SYMPTOMS: No  NERVOUS SYSTEM SYMPTOMS: No  MENTAL HEALTH SYMPTOMS: No  Heart burn or indigestion: Yes  Nausea: No  Vomiting: No  Abdominal pain: No  Bloating: No  Constipation: No  Diarrhea: No  Blood in stool: No  Black stools: No  Rectal or Anal pain: No  Fecal incontinence: No  Yellowing of skin or eyes: No  Vomit with blood: No  Change in stools: No      Again, thank you for allowing me to participate in the care of your patient.      Sincerely,    Joaquin Brito MD

## 2019-06-20 NOTE — PROGRESS NOTES
See dictated note: [id number]  Visit time 45 min, 25 min counselling  GB  Answers for HPI/ROS submitted by the patient on 6/19/2019   General Symptoms: No  Skin Symptoms: No  HENT Symptoms: No  EYE SYMPTOMS: No  HEART SYMPTOMS: No  LUNG SYMPTOMS: No  INTESTINAL SYMPTOMS: Yes  URINARY SYMPTOMS: No  GYNECOLOGIC SYMPTOMS: No  BREAST SYMPTOMS: No  SKELETAL SYMPTOMS: No  BLOOD SYMPTOMS: No  NERVOUS SYSTEM SYMPTOMS: No  MENTAL HEALTH SYMPTOMS: No  Heart burn or indigestion: Yes  Nausea: No  Vomiting: No  Abdominal pain: No  Bloating: No  Constipation: No  Diarrhea: No  Blood in stool: No  Black stools: No  Rectal or Anal pain: No  Fecal incontinence: No  Yellowing of skin or eyes: No  Vomit with blood: No  Change in stools: No

## 2019-06-20 NOTE — NURSING NOTE
"Chief Complaint   Patient presents with     Consult     new pt here for consult       Vitals:    06/20/19 1447   BP: 144/74   BP Location: Left arm   Patient Position: Chair   Cuff Size: Adult Regular   Pulse: 104   SpO2: 96%   Weight: 84.7 kg (186 lb 11.2 oz)   Height: 1.715 m (5' 7.5\")       Body mass index is 28.81 kg/m .    Edvin Pantoja, EMT    "

## 2019-06-20 NOTE — LETTER
2019      RE: Marilia Bean  [address] [facility] [address]     Visit time 45 min, 25 min counselling  Answers for HPI/ROS submitted by the patient on 2019   General Symptoms: No  Skin Symptoms: No  HENT Symptoms: No  EYE SYMPTOMS: No  HEART SYMPTOMS: No  LUNG SYMPTOMS: No  INTESTINAL SYMPTOMS: Yes  URINARY SYMPTOMS: No  GYNECOLOGIC SYMPTOMS: No  BREAST SYMPTOMS: No  SKELETAL SYMPTOMS: No  BLOOD SYMPTOMS: No  NERVOUS SYSTEM SYMPTOMS: No  MENTAL HEALTH SYMPTOMS: No  Heart burn or indigestion: Yes  Nausea: No  Vomiting: No  Abdominal pain: No  Bloating: No  Constipation: No  Diarrhea: No  Blood in stool: No  Black stools: No  Rectal or Anal pain: No  Fecal incontinence: No  Yellowing of skin or eyes: No  Vomit with blood: No  Change in stools: No      Service Date: 2019      Samuel Nelson MD    [address] [place] [address]      RE: Marilia Bean   MRN: [id number]   : 1945      Dear Samuel:       I had the pleasure of seeing your patient, Ms. Marilia Bean in Surgery Clinic today.  As you know, you had asked me to see her regarding her recently diagnosed duodenal carcinoma.  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  She undergoes colonoscopy every 2 years and has undergone upper endoscopy about every 5 years or so.  She had a biopsy performed about a month ago which showed signs concerning for cancer.  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  At the time of your endoscopy you noted that this was on the antimesenteric border of the duodenum and opposite of the pancreas.  She has had no significant symptoms.  Denies weight loss, fevers or chills.      PAST MEDICAL HISTORY:   1.  Thoracic aortic replacement with a 1-vessel coronary artery bypass 2 years ago.  This was performed here at the [facility] by Dr. Colin Soto.  She has a history of  hypertension, hysterectomy, appendectomy, cholecystectomy, and carpal tunnel surgery.      MEDICATIONS:  Anticholesterol medication, Cozaar, she also takes metoprolol.  She is on aspirin 81 mg p.o. q. day.      ALLERGIES:  She is allergic to contrast dye.      SOCIAL HISTORY:  She is a nonsmoker, nondrinker, is  and has 4 adult children.      REVIEW OF SYSTEMS:  Positive for Gardiner syndrome.  Her son has also undergone colon surgery for Gardiner syndrome.      PHYSICAL EXAMINATION:   GENERAL:  This is a well-developed, well-nourished white female appearing younger than her stated age of 74.   HEENT:  Normocephalic, atraumatic.   NECK:  Supple.   CHEST:  Clear.   CARDIOVASCULAR:  Regular rate and rhythm.   ABDOMEN:  Soft and nontender.  There are well-healed laparoscopic scars in her upper abdomen. There are no masses palpable.   EXTREMITIES:  Without cyanosis, clubbing or edema.      I looked at her CT scan from today which was negative for cardiomegaly or pericardial effusion.  There is no conspicuous gastric mucosal thickening. The duodenum is normal in thickness.  The liver appears to be normal and there is no splenomegaly.  The pancreas is negative for masses or ductal dilation.      I reviewed the path report from her biopsy.  This path report shows adenocarcinoma 0.7 cm in diameter involving the duodenal mucosa. This adenocarcinoma extends at least 6 mm below the muscularis mucosa into the submucosa and extends to the base of the specimen fragments.      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  I think that this may be locally resectable but also may require Whipple.  I discussed the risks and benefits of both surgeries at length with the patient and her family and included the recovery process for a local resection versus a Whipple procedure.  We plan on doing her surgery in the next week and a half or  so.  I certainly appreciate the chance to see this evan lady and her family and will let you know how things go.      Sincerely,      Joaquin Brito MD           D: 2019   T: 2019   MT: NAVEED      Name:     MARGIE PATINO   MRN:      [id number]        Account:      [id number]   :      1945           Service Date: 2019      Document: [id number]

## 2019-06-20 NOTE — DISCHARGE INSTRUCTIONS

## 2019-06-21 ENCOUNTER — TELEPHONE (OUTPATIENT)
Dept: SURGERY | Facility: CLINIC | Age: 74
End: 2019-06-21

## 2019-06-21 ENCOUNTER — PRE VISIT (OUTPATIENT)
Dept: SURGERY | Facility: CLINIC | Age: 74
End: 2019-06-21

## 2019-06-21 DIAGNOSIS — D13.2 DUODENAL ADENOMA: Primary | ICD-10-CM

## 2019-06-21 NOTE — PROGRESS NOTES
Service Date: 2019      Samuel Nelson MD    [address] [place] [address]      RE: Marilia Bean   MRN: [id number]   : 1945      Dear Samuel:       I had the pleasure of seeing your patient, Ms. Marilia Bean in Surgery Clinic today.  As you know, you had asked me to see her regarding her recently diagnosed duodenal carcinoma.  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  She undergoes colonoscopy every 2 years and has undergone upper endoscopy about every 5 years or so.  She had a biopsy performed about a month ago which showed signs concerning for cancer.  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  At the time of your endoscopy you noted that this was on the antimesenteric border of the duodenum and opposite of the pancreas.  She has had no significant symptoms.  Denies weight loss, fevers or chills.      PAST MEDICAL HISTORY:   1.  Thoracic aortic replacement with a 1-vessel coronary artery bypass 2 years ago.  This was performed here at the [facility] by Dr. Colin Soto.  She has a history of hypertension, hysterectomy, appendectomy, cholecystectomy, and carpal tunnel surgery.      MEDICATIONS:  Anticholesterol medication, Cozaar, she also takes metoprolol.  She is on aspirin 81 mg p.o. q. day.      ALLERGIES:  She is allergic to contrast dye.      SOCIAL HISTORY:  She is a nonsmoker, nondrinker, is  and has 4 adult children.      REVIEW OF SYSTEMS:  Positive for Gardiner syndrome.  Her son has also undergone colon surgery for Gardiner syndrome.      PHYSICAL EXAMINATION:   GENERAL:  This is a well-developed, well-nourished white female appearing younger than her stated age of 74.   HEENT:  Normocephalic, atraumatic.   NECK:  Supple.   CHEST:  Clear.   CARDIOVASCULAR:  Regular rate and rhythm.   ABDOMEN:  Soft and nontender.  There are well-healed laparoscopic  scars in her upper abdomen. There are no masses palpable.   EXTREMITIES:  Without cyanosis, clubbing or edema.      I looked at her CT scan from today which was negative for cardiomegaly or pericardial effusion.  There is no conspicuous gastric mucosal thickening. The duodenum is normal in thickness.  The liver appears to be normal and there is no splenomegaly.  The pancreas is negative for masses or ductal dilation.      I reviewed the path report from her biopsy.  This path report shows adenocarcinoma 0.7 cm in diameter involving the duodenal mucosa. This adenocarcinoma extends at least 6 mm below the muscularis mucosa into the submucosa and extends to the base of the specimen fragments.      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  I think that this may be locally resectable but also may require Whipple.  I discussed the risks and benefits of both surgeries at length with the patient and her family and included the recovery process for a local resection versus a Whipple procedure.  We plan on doing her surgery in the next week and a half or so.  I certainly appreciate the chance to see this evan lady and her family and will let you know how things go.      Sincerely,      MD KRISHNA Villanueva MD             D: 2019   T: 2019   MT: NAVEED      Name:     MARGIE PATINO   MRN:      [id number]        Account:      [id number]   :      1945           Service Date: 2019      Document: [id number]

## 2019-06-21 NOTE — TELEPHONE ENCOUNTER
This writer called the patient to confirm her surgery date as discussed in clinic.    Patient is scheduled for surgery with Dr. Cory Brito      Left message for: Marilia Bean about surgery date    Date of Surgery: 07/03/2019 at 11:10 AM with Dr. Cory Brito    H&P: Scheduled with [facility] (PAC) 06/24/2019 at 7:30 AM.     Informed patient they will need an adult : Yes    Additional comments: She also knows to call general surgery if she experiences any cold or flu symptoms. She was asked to call [phone] if she needs to reschedule and [phone] she experiences any symptoms.

## 2019-06-21 NOTE — TELEPHONE ENCOUNTER
FUTURE VISIT INFORMATION      SURGERY INFORMATION:    OR 2019 (Duodenal resection, possible whipple) [facility] [phone]    RECORDS REQUESTED FROM:       Primary Care Provider: Herbert Caputo    Pertinent Medical History:  Aortic aneurysm, Hypertension    Most recent EKG+ Tracin19- [facility]- requested tracing    Most recent ECHO: 17    Most recent Cardiac Stress Test: 11/4/15    Most recent Coronary Angiogram: 17

## 2019-06-24 ENCOUNTER — ANESTHESIA EVENT (OUTPATIENT)
Dept: SURGERY | Facility: CLINIC | Age: 74
DRG: 328 | End: 2019-06-24
Payer: MEDICARE

## 2019-06-24 ENCOUNTER — OFFICE VISIT (OUTPATIENT)
Dept: SURGERY | Facility: CLINIC | Age: 74
End: 2019-06-24
Payer: COMMERCIAL

## 2019-06-24 VITALS
DIASTOLIC BLOOD PRESSURE: 78 MMHG | RESPIRATION RATE: 16 BRPM | TEMPERATURE: 98.4 F | SYSTOLIC BLOOD PRESSURE: 134 MMHG | HEART RATE: 59 BPM | HEIGHT: 68 IN | OXYGEN SATURATION: 96 % | BODY MASS INDEX: 28.28 KG/M2 | WEIGHT: 186.6 LBS

## 2019-06-24 DIAGNOSIS — Z01.818 PRE-OP EXAMINATION: Primary | ICD-10-CM

## 2019-06-24 DIAGNOSIS — D13.2 ADENOMA OF DUODENUM: ICD-10-CM

## 2019-06-24 LAB
ANION GAP SERPL CALCULATED.3IONS-SCNC: 4 MMOL/L (ref 3–14)
BASOPHILS # BLD AUTO: 0 10E9/L (ref 0–0.2)
BASOPHILS NFR BLD AUTO: 0.4 %
BUN SERPL-MCNC: 18 MG/DL (ref 7–30)
CALCIUM SERPL-MCNC: 9.4 MG/DL (ref 8.5–10.1)
CHLORIDE SERPL-SCNC: 106 MMOL/L (ref 94–109)
CO2 SERPL-SCNC: 29 MMOL/L (ref 20–32)
CREAT SERPL-MCNC: 0.94 MG/DL (ref 0.52–1.04)
DIFFERENTIAL METHOD BLD: NORMAL
EOSINOPHIL # BLD AUTO: 0.3 10E9/L (ref 0–0.7)
EOSINOPHIL NFR BLD AUTO: 4.7 %
ERYTHROCYTE [DISTWIDTH] IN BLOOD BY AUTOMATED COUNT: 12.6 % (ref 10–15)
GFR SERPL CREATININE-BSD FRML MDRD: 60 ML/MIN/{1.73_M2}
GLUCOSE SERPL-MCNC: 95 MG/DL (ref 70–99)
HCT VFR BLD AUTO: 42.5 % (ref 35–47)
HGB BLD-MCNC: 14.1 G/DL (ref 11.7–15.7)
IMM GRANULOCYTES # BLD: 0 10E9/L (ref 0–0.4)
IMM GRANULOCYTES NFR BLD: 0.3 %
LYMPHOCYTES # BLD AUTO: 2 10E9/L (ref 0.8–5.3)
LYMPHOCYTES NFR BLD AUTO: 28.4 %
MCH RBC QN AUTO: 29.7 PG (ref 26.5–33)
MCHC RBC AUTO-ENTMCNC: 33.2 G/DL (ref 31.5–36.5)
MCV RBC AUTO: 90 FL (ref 78–100)
MONOCYTES # BLD AUTO: 0.6 10E9/L (ref 0–1.3)
MONOCYTES NFR BLD AUTO: 8.1 %
NEUTROPHILS # BLD AUTO: 4.2 10E9/L (ref 1.6–8.3)
NEUTROPHILS NFR BLD AUTO: 58.1 %
NRBC # BLD AUTO: 0 10*3/UL
NRBC BLD AUTO-RTO: 0 /100
PLATELET # BLD AUTO: 245 10E9/L (ref 150–450)
POTASSIUM SERPL-SCNC: 4.2 MMOL/L (ref 3.4–5.3)
RBC # BLD AUTO: 4.74 10E12/L (ref 3.8–5.2)
SODIUM SERPL-SCNC: 139 MMOL/L (ref 133–144)
WBC # BLD AUTO: 5.9 10E9/L (ref 4–11)

## 2019-06-24 ASSESSMENT — MIFFLIN-ST. JEOR: SCORE: 1386.97

## 2019-06-24 ASSESSMENT — LIFESTYLE VARIABLES: TOBACCO_USE: 0

## 2019-06-24 ASSESSMENT — ENCOUNTER SYMPTOMS: DYSRHYTHMIAS: 1

## 2019-06-24 NOTE — ANESTHESIA PREPROCEDURE EVALUATION
Anesthesia Pre-Procedure Evaluation    Patient: Marilia Bean   MRN:     [id number] Gender:   female   Age:    74 year old :      1945        Preoperative Diagnosis: * No surgery found *        Past Medical History:   Diagnosis Date     Aortic aneurysm (H) 2007    see  [facility] report -follow yearly, treat high BP is occurs Problem list name updated by automated process. Provider to review     Aortic aneurysm of unspecified site without mention of rupture 2007    4.4 cm ascending aorta noted in      Asymptomatic postmenopausal status (age-related) (natural)     on HRT  Prempro -weaning off      CAD (coronary artery disease)     LAD     Family history of Gardiner syndrome      Hyperlipidemia LDL goal <100 10/31/2010     Hypertension goal BP (blood pressure) < 140/90 2016     Leiomyoma of uterus, unspecified     Uterine fibroid     Lump or mass in breast 2004    rt. nodule - bx neg     Personal history of colonic polyps      Postmenopausal atrophic vaginitis 2006     Pulmonary nodule     incidental noted in  during [facility]     Pure hypercholesterolemia     mild, diet - low cholesterol, low fat.10/06 start Lovastatin      Past Surgical History:   Procedure Laterality Date     BYPASS GRAFT ARTERY CORONARY N/A 2017    Procedure: BYPASS GRAFT ARTERY CORONARY;;  Surgeon: Akshat Soto MD;  Location: [facility] OR     C DEXA INTERPRETATION, AXIAL  01    wnl. 2005 wnl but lower     COLONOSCOPY  07    Repeat in 1 year for surveillance     COLONOSCOPY  12/10/08    repeat 1 year  -see 2009 letter     COLONOSCOPY  03/31/10     COLONOSCOPY  10/31/2011    Procedure:COMBINED COLONOSCOPY, SINGLE BIOPSY/POLYPECTOMY BY BIOPSY; colonoscopy with polypectomy by biopsy; Surgeon:JESSICA GARCIA; Location: GI     COLONOSCOPY  2013    Procedure: COMBINED COLONOSCOPY, SINGLE BIOPSY/POLYPECTOMY BY BIOPSY;  Colonoscopy, Polypectomies;  Surgeon: Herbert Salinas MD;  Location:   GI     COLONOSCOPY N/A 12/8/2015    Procedure: COLONOSCOPY;  Surgeon: Jared Carbajal MD;  Location:  GI     COLONOSCOPY N/A 4/26/2017    Procedure: COMBINED COLONOSCOPY, SINGLE OR MULTIPLE BIOPSY/POLYPECTOMY BY BIOPSY;  Colonoscopy with polypectomies with forceps and snare;  Surgeon: Herbert Salinas MD;  Location:  GI     ESOPHAGOSCOPY, GASTROSCOPY, DUODENOSCOPY (EGD), COMBINED N/A 12/8/2015    Procedure: COMBINED ESOPHAGOSCOPY, GASTROSCOPY, DUODENOSCOPY (EGD), BIOPSY SINGLE OR MULTIPLE;  Surgeon: Jared Carbajal MD;  Location:  GI     HC BIOPSY BREAST, PERC NEEDLE CORE, WITH IMAGING  8/17/2004    Right     HC COLONOSCOPY THRU STOMA W BIOPSY/CAUTERY TUMOR/POLYP/LESION  1999,2002 2004 polyp - hyperplastic - repeat 5 years ?     HC COLONOSCOPY W/WO BRUSH/WASH  12/12/2005    Polypectomy.  Diverticulosis-minimal. Bx adenomatous and mucosal polyps - repeat 1 year     HC ECP WITH CATARACT SURGERY Bilateral 2015, 2016     HC LAPAROSCOPY, SURGICAL; APPENDECTOMY  10/31/2004     HC LAPAROSCOPY, SURGICAL; CHOLECYSTECTOMY  2000    Cholecystectomy, Laparoscopic     HC REVISE MEDIAN N/CARPAL TUNNEL SURG  10/01/10    left     HC [facility] ENDOSCOPY, SIMPLE EXAM  03/31/10     HYSTERECTOMY, ELVIN  12/14/09    TAHBSO, MMK     REPAIR ANEURYSM ASCENDING AORTA N/A 6/5/2017    Procedure: REPAIR ANEURYSM ASCENDING AORTA;  Median Sternotomy, Ascending Aortic Aneurysm Repair, Coronary Artery Bypass Graft x1 on pump oxygenator;  Surgeon: Akshat Soto MD;  Location: [facility] OR          Anesthesia Evaluation     . Pt has had prior anesthetic. Type: General and MAC    No history of anesthetic complications          ROS/MED HX    ENT/Pulmonary:     (+)JARED risk factors hypertension, , . .   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 ,     Cardiovascular: Comment: Denies chest pain, SOB, palpitations, syncope, JAY, orthopnea, or PND    Ascending aortic aneurysm s/p repaired 6/5/2017.   CABG with LIMA to LAD done at the same time. Post-op atrial fibrillation treated with amiodarone and warfarin that eventually resolved.      (+) Dyslipidemia, hypertension--CAD, -CABG-date: single vessel LIMA-LAD, . Taking blood thinners : . . . :. dysrhythmias (Post CABG that resolved. ) a-fib, . 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  Flight of stairs at home that she goes up and down routinely without issues.  >4 METS   Hematologic:     (+) History of Transfusion (During open heart surgery 2017) no previous transfusion reaction -      Musculoskeletal:   (+) arthritis (osteoarthrits- hands. ),  -       GI/Hepatic:     (+) GERD (Takes TUMS as needed.  has been in remission for past 2-3 months. ) Asymptomatic on medication, appendicitis (s/p appendectomy), cholecystitis/cholelithiasis (s/p cholecystectomy. ),       Renal/Genitourinary:  - ROS Renal section negative       Endo:  - neg endo ROS       Psychiatric:  - neg psychiatric ROS       Infectious Disease:  - neg infectious disease ROS       Malignancy:   (+) Malignancy History of Other  Other CA Adenoma of duodenum Active status post         Other: Comment: S/P ELVIN, BSO and MMK . 12/2009   (+) No chance of pregnancy C-spine cleared: Yes, no H/O Chronic Pain,no other significant disability                        PHYSICAL EXAM:   Mental Status/Neuro: A/A/O   Airway: Facies: Feasible  Mallampati: II  Mouth/Opening: Full  TM distance: < 6 cm  Neck ROM: Full   Respiratory: Auscultation: CTAB     Resp. Rate: Normal     Resp. Effort: Normal      CV: Rhythm: Regular  Rate: Age appropriate  Heart: Normal Sounds   Comments:      Dental:  Dentures: Upper; Lower                  Lab Results   Component Value Date    WBC 9.9 06/21/2017    HGB 9.2 (L) 06/21/2017    HCT 29.6 (L) 06/21/2017     (H)  "06/21/2017     10/17/2018    POTASSIUM 4.0 10/17/2018    CHLORIDE 104 10/17/2018    CO2 30 10/17/2018    BUN 19 10/17/2018    CR 1.06 (H) 10/17/2018    GLC 94 10/17/2018    TARIQ 9.6 10/17/2018    PHOS 4.0 06/06/2017    MAG 2.4 (H) 06/13/2017    ALBUMIN 3.7 10/17/2018    PROTTOTAL 7.6 10/17/2018    ALT 25 10/17/2018    AST 25 10/17/2018    ALKPHOS 103 10/17/2018    BILITOTAL 0.8 10/17/2018    LIPASE 107 10/31/2004    PTT 33 06/12/2017    INR 1.8 (A) 08/25/2017    FIBR 269 06/06/2017    TSH 4.97 (H) 06/13/2017       Preop Vitals  BP Readings from Last 3 Encounters:   06/24/19 134/78   06/20/19 144/74   09/19/18 135/76    Pulse Readings from Last 3 Encounters:   06/24/19 59   06/20/19 104   09/19/18 72      Resp Readings from Last 3 Encounters:   06/24/19 16   08/25/17 16   06/21/17 16    SpO2 Readings from Last 3 Encounters:   06/24/19 96%   06/20/19 96%   09/19/18 97%      Temp Readings from Last 1 Encounters:   06/24/19 98.4  F (36.9  C) (Oral)    Ht Readings from Last 1 Encounters:   06/24/19 1.715 m (5' 7.5\")      Wt Readings from Last 1 Encounters:   06/24/19 84.6 kg (186 lb 9.6 oz)    Estimated body mass index is 28.79 kg/m  as calculated from the following:    Height as of this encounter: 1.715 m (5' 7.5\").    Weight as of this encounter: 84.6 kg (186 lb 9.6 oz).     LDA:            Assessment:   ASA SCORE: 3    NPO Status: > 6 hours since completed [employer]   Documentation: H&P complete; Preop Testing complete; Consents complete   Proceeding: Proceed without further delay  Tobacco Use:  NO Active use of Tobacco/UNKNOWN Tobacco use status     Plan:   Anes. Type:  General   Pre-Induction: Midazolam IV; Acetaminophen PO   Induction:  IV (Standard)   Airway: Oral ETT   Access/Monitoring: PIV; 2nd PIV   Maintenance: Balanced   Emergence: Procedure Site   Logistics: Same Day Surgery     Postop Pain/Sedation Strategy:  Standard-Options: Opioids PRN     PONV Management:  Adult Risk Factors: Female, Non-Smoker, " Postop Opioids  Prevention: Ondansetron     CONSENT: Direct conversation   Plan and risks discussed with: Patient                     PAC Discussion and Assessment    ASA Classification: 3  Case is suitable for: Kimber[place]  Anesthetic techniques and relevant risks discussed: GA  Invasive monitoring and risk discussed:   Types:   Possibility and Risk of blood transfusion discussed:   NPO instructions given:   Additional anesthetic preparation and risks discussed:   Needs early admission to pre-op area:   Other:     PAC Resident/NP Anesthesia Assessment:  Marilia Bean is a 74-year-old female scheduled for Duodenal Resection Of Adenoma on 7/3/19 with Dr. Brito at the [facility] under general anesthesia.  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 Brito for recent diagnosis of duodenal carcinoma.  Through the evaluation, the above procedure was recommended.      Past medical history is notable for: as above; CAD and aortic aneurysm s/p aortic aneurysm repair with vascular graft and single vessel coronary artery bypass by Dr. Soto (6/5/17); HTN; and HLD.  Procedures   Echocardiogram 3/16/17 this was done prior to aortic aneurysm repair  Interpretation Summary        The aortic Sinus(es) of Valsalva are mildly dilated at at 4.1 cm and the  ascending aorta is Moderately dilated at 5.0 cm (The ULNL = 3.7 cm). Compared  to the serial echos dated 3- and 1-8-2013, the ascending aorta has  progressively dilated from 4.2 cm => 4.6 cm => currently 5.0 cm. Close  continued and regular monitoring of this progressive ascending aortic  dilation/aneurysm is indicated.  The left ventricle is normal in size with borderline to mild concentric left  ventricular hypertrophy.  The visual ejection fraction is estimated at 65-70% with Grade I or early  diastolic dysfunction.  There is trace mitral regurgitation.  There is trace aortic regurgitation.  Right ventricle  systolic pressure estimate is noted below and normal.    Coronary angiogram 5/22/2017  Catheterization diagnosis:   1-vessel coronary artery disease (LAD), without left main lesion.     Carotid US, bilateral 4/14/17                                                                  Impression:     1. Right side:      Degree of stenosis: Normal  2. Left side:       Degree of stenosis: Normal     She has the following specific operative considerations:   - METS:  >4. RCRI : Coronary Artery Disease (MI, positive stress test, angina, Qs on EKG).  0.9 % risk of major adverse cardiac event.  - JARED # of risks 2/8 = low risk  - VTE risk:  3%  - Risk of PONV score = 2-3.  If > 2, anti-emetic intervention recommended.      #  Cardiology - Single vessel CAD and ascending aortic aneurysm s/p CABG with LIMA to LAD and ascending aortic aneurysm repair on 6/5/2017 with Dr. Soto.  Post AF with RVR treated with amiodarone and warfarin now in remission.  No ongoing cardiac complaints.   Hold ASA for 7 days prior to surgery.  Hold ARB DOS.  Take beta blocker and statin as prescribed DOS.   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     - intermittent GERD: Uses TUMS as needed.  Denies symptoms for past two months.  Consider use of RSI techniques with advanced airway maneuvers.       - Anesthesia considerations:  Refer to PAC assessment in anesthesia records  - EKG, CBC, BMP and T&S today    Arrival time, NPO, shower and medication instructions provided by nursing staff today.  Preparing For Your Surgery handout given.  Patient was discussed with Dr Meléndez.      Reviewed and Signed by PAC Mid-Level Provider/Resident  Mid-Level Provider/Resident: Rosanna BROOKS CNP  Date: 6/24/2019  Time: 8:29    Attending Anesthesiologist Anesthesia Assessment:        Anesthesiologist:   Date:   Time:   Pass/Fail:   Disposition:     PAC  Pharmacist Assessment:        Pharmacist:   Date:   Time:        CECILIA Aguilar CNP     I have seen and evaluated the patient myself and agree with the above assessment and plan.  I have explained the risks/benefits of anesthesia to the patient who understands and agrees to proceed.    Hamida Henao MD  Staff Anesthesiologist  Pager [phone]

## 2019-06-24 NOTE — H&P
Pre-Operative H & P     CC:  Preoperative exam to assess for increased cardiopulmonary risk while undergoing surgery and anesthesia.    Date of Encounter: 6/24/2019  Primary Care Physician:  Herbert Epstein  REASON: duodenal carcinoma.     HPI  Marilia Bean is a 74 year old female who presents for pre-operative H & P in preparation for Duodenal Resection Of Adenoma on 7/3/19 with Dr. Brtio at the [facility] under general anesthesia.  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 Brito for recent diagnosis of duodenal carcinoma.  Through the evaluation, the above procedure was recommended.      Past medical history is notable for: as above; CAD and aortic aneurysm s/p aortic aneurysm repair with vascular graft and single vessel coronary artery bypass by Dr. Soto (6/5/17); HTN; and HLD.     History is obtained from the patient and electronic health record.     Past Medical History  Past Medical History:   Diagnosis Date     Aortic aneurysm (H) 7/1/2007    see 7/07 [facility] report -follow yearly, treat high BP is occurs Problem list name updated by automated process. Provider to review     Aortic aneurysm of unspecified site without mention of rupture 7/2007    4.4 cm ascending aorta noted in 2007     Asymptomatic postmenopausal status (age-related) (natural)     on HRT  Prempro -weaning off 5/'2004     CAD (coronary artery disease)     LAD     Family history of Gardiner syndrome      Hyperlipidemia LDL goal <100 10/31/2010     Hypertension goal BP (blood pressure) < 140/90 2/9/2016     Leiomyoma of uterus, unspecified     Uterine fibroid     Lump or mass in breast 7/2004    rt. nodule - bx neg     Personal history of colonic polyps      Postmenopausal atrophic vaginitis 8/20/2006     Pulmonary nodule     incidental noted in 2007 during [facility]     Pure hypercholesterolemia     mild, diet - low cholesterol, low fat.10/06 start Lovastatin       Past Surgical  History  Past Surgical History:   Procedure Laterality Date     BYPASS GRAFT ARTERY CORONARY N/A 6/5/2017    Procedure: BYPASS GRAFT ARTERY CORONARY;;  Surgeon: Akshat Soto MD;  Location: [facility] OR     C DEXA INTERPRETATION, AXIAL  12/21/01    wnl. 7/2005 wnl but lower     COLONOSCOPY  05/07/07    Repeat in 1 year for surveillance     COLONOSCOPY  12/10/08    repeat 1 year  -see 1/2009 letter     COLONOSCOPY  03/31/10     COLONOSCOPY  10/31/2011    Procedure:COMBINED COLONOSCOPY, SINGLE BIOPSY/POLYPECTOMY BY BIOPSY; colonoscopy with polypectomy by biopsy; Surgeon:JESSICA GARCIA; Location:[facility]     COLONOSCOPY  12/6/2013    Procedure: COMBINED COLONOSCOPY, SINGLE BIOPSY/POLYPECTOMY BY BIOPSY;  Colonoscopy, Polypectomies;  Surgeon: Herbert Salinas MD;  Location: [facility]     COLONOSCOPY N/A 12/8/2015    Procedure: COLONOSCOPY;  Surgeon: Jared Carbajal MD;  Location: [facility]     COLONOSCOPY N/A 4/26/2017    Procedure: COMBINED COLONOSCOPY, SINGLE OR MULTIPLE BIOPSY/POLYPECTOMY BY BIOPSY;  Colonoscopy with polypectomies with forceps and snare;  Surgeon: Herbert Salinas MD;  Location:  GI     ESOPHAGOSCOPY, GASTROSCOPY, DUODENOSCOPY (EGD), COMBINED N/A 12/8/2015    Procedure: COMBINED ESOPHAGOSCOPY, GASTROSCOPY, DUODENOSCOPY (EGD), BIOPSY SINGLE OR MULTIPLE;  Surgeon: Jared Carbajal MD;  Location:  GI     HC BIOPSY BREAST, PERC NEEDLE CORE, WITH IMAGING  8/17/2004    Right     HC COLONOSCOPY THRU STOMA W BIOPSY/CAUTERY TUMOR/POLYP/LESION  1999,2002 2004 polyp - hyperplastic - repeat 5 years ?     HC COLONOSCOPY W/WO BRUSH/WASH  12/12/2005    Polypectomy.  Diverticulosis-minimal. Bx adenomatous and mucosal polyps - repeat 1 year     HC ECP WITH CATARACT SURGERY Bilateral 2015, 2016     HC LAPAROSCOPY, SURGICAL; APPENDECTOMY  10/31/2004     HC LAPAROSCOPY, SURGICAL; CHOLECYSTECTOMY  2000    Cholecystectomy, Laparoscopic     HC REVISE MEDIAN N/CARPAL TUNNEL SURG  10/01/10    left     [facility]  ENDOSCOPY, SIMPLE EXAM  03/31/10     HYSTERECTOMY, ELVIN  12/14/09    TAHBSO, MMK     REPAIR ANEURYSM ASCENDING AORTA N/A 6/5/2017    Procedure: REPAIR ANEURYSM ASCENDING AORTA;  Median Sternotomy, Ascending Aortic Aneurysm Repair, Coronary Artery Bypass Graft x1 on pump oxygenator;  Surgeon: Akshat Soto MD;  Location: [facility] OR       Hx of Blood transfusions/reactions: yes without reaction.     Hx of abnormal bleeding or anti-platelet use: ASA    Menstrual history: No LMP recorded. Patient has had a hysterectomy.    Steroid use in the last year: denies    Personal or FH with difficulty with Anesthesia:  denies    Prior to Admission Medications  Current Outpatient Medications   Medication Sig Dispense Refill     aspirin 81 MG EC tablet Take 81 mg by mouth every morning  90 tablet 3     atorvastatin (LIPITOR) 20 MG tablet Take 1 tablet (20 mg) by mouth every morning 90 tablet 3     losartan (COZAAR) 25 MG tablet Take 1 tablet (25 mg) by mouth daily (Patient taking differently: Take 25 mg by mouth every morning ) 90 tablet 3     metoprolol tartrate (LOPRESSOR) 25 MG tablet Take 1 tablet (25 mg) by mouth 2 times daily 180 tablet 3       Allergies  Allergies   Allergen Reactions     Contrast Dye Itching     Morphine Nausea     Reports that she did not vomit.        Social History  Social History     Socioeconomic History     Marital status:      Spouse name: Cain     Number of children: 4     Years of education: 12     Highest education level: Not on file   Occupational History     Occupation: HIMS clerk     Employer: [employer]     Comment: [employer]   Social Needs     Financial resource strain: Not on file     Food insecurity:     Worry: Not on file     Inability: Not on file     Transportation needs:     Medical: Not on file     Non-medical: Not on file   Tobacco Use     Smoking status: Never Smoker     Smokeless tobacco: Never Used   Substance and Sexual Activity     Alcohol use: Yes      Comment: rarely     Drug use: No     Sexual activity: Yes     Partners: Male     Comment: Post menopausal   Lifestyle     Physical activity:     Days per week: Not on file     Minutes per session: Not on file     Stress: Not on file   Relationships     Social connections:     Talks on phone: Not on file     Gets together: Not on file     Attends Adventism service: Not on file     Active member of club or organization: Not on file     Attends meetings of clubs or organizations: Not on file     Relationship status: Not on file     Intimate partner violence:     Fear of current or ex partner: Not on file     Emotionally abused: Not on file     Physically abused: Not on file     Forced sexual activity: Not on file   Other Topics Concern      Service No     Blood Transfusions No     Caffeine Concern Yes     Comment: coffee; 3c/d pop: 1c/wk     Occupational Exposure No     Hobby Hazards No     Sleep Concern Yes     Comment: c/o insomnia     Stress Concern No     Weight Concern Yes     Comment: desire wt loss     Special Diet No     Back Care No     Exercise No     Bike Helmet No     Seat Belt Yes     Self-Exams No     Parent/sibling w/ CABG, MI or angioplasty before 65F 55M? Not Asked   Social History Narrative    Lives with spouse. No domestic violence issues.       Family History  Family History   Problem Relation Age of Onset     Cancer Mother         Colon Cancer     Heart Disease Mother         MI     Osteoporosis Mother      Cancer Father         Colon Cancer     Heart Disease Father         Heart Disease/ Heart attacks     Diabetes Father         Adult Onset     Cancer Sister         Colon Cancer     Heart Disease Brother         Heart attacks at age 45     Breast Cancer Sister      Cancer Paternal Grandmother         Unknown type     Heart Disease Maternal Grandfather         MI     Diabetes Sister         Adult Onset     Diabetes Sister         Adult Onset     Diabetes Brother         Adult Onset      Heart Disease Brother         heart attack age 64     Cancer - colorectal Son         age 36, Gardiner syndrome             ROS/MED HX    ENT/Pulmonary:     (+)JARED risk factors hypertension, , . .   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 ,     Cardiovascular: Comment: Denies chest pain, SOB, palpitations, syncope, JAY, orthopnea, or PND    Ascending aortic aneurysm s/p repaired 6/5/2017.  CABG with LIMA to LAD done at the same time. Post-op atrial fibrillation treated with amiodarone and warfarin that eventually resolved.      (+) Dyslipidemia, hypertension--CAD, -CABG-date: single vessel LIMA-LAD, . Taking blood thinners : . . . :. dysrhythmias (Post CABG that resolved. ) a-fib, . 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  Flight of stairs at home that she goes up and down routinely without issues.  >4 METS   Hematologic:  - h/o blood transfusion with open heart surgery.      Musculoskeletal:   (+) arthritis (osteoarthrits- hands. ),  -       GI/Hepatic:     (+) GERD (Takes TUMS as needed.  has been in remission for past 2-3 months. ) Asymptomatic on medication, appendicitis (s/p appendectomy), cholecystitis/cholelithiasis (s/p cholecystectomy. ),       Renal/Genitourinary:  - ROS Renal section negative       Endo:  - neg endo ROS       Psychiatric:  - neg psychiatric ROS       Infectious Disease:  - neg infectious disease ROS       Malignancy:   (+) Malignancy History of Other  Other CA Adenoma of duodenum Active status post         Other: Comment: S/P ELVIN, BSO and MMK . 12/2009   (+) No chance of pregnancy C-spine cleared: Yes, no H/O Chronic Pain,no other significant disability                        PHYSICAL EXAM:   Mental Status/Neuro: A/A/O   Airway: Facies: Feasible  Mallampati: I  Mouth/Opening: Full  TM distance: > 6 cm  Neck ROM: Full   Respiratory: Auscultation: CTAB     Resp. Rate:  "Normal     Resp. Effort: Normal      CV: Rhythm: Regular  Rate: Age appropriate  Heart: Normal Sounds   Comments:        Temp: 98.4  F (36.9  C) Temp src: Oral BP: 134/78 Pulse: 59   Resp: 16 SpO2: 96 %         186 lbs 9.6 oz  5' 7.5\"   Body mass index is 28.79 kg/m .       Physical Exam  Constitutional: Awake, alert, cooperative, no apparent distress, and appears stated age.  Eyes: Pupils equal, round and reactive to light, extra ocular muscles intact, sclera clear, conjunctiva normal.  HENT: Normocephalic, oral pharynx with moist mucus membranes, upper and lower dentures. No goiter appreciated.   Respiratory: Clear to auscultation bilaterally, no crackles or wheezing.  Cardiovascular: Regular rate and rhythm, normal S1 and S2, and no murmur noted.  Carotids +2, no bruits. No edema. Palpable pulses to radial  DP and PT arteries.   GI: Normal bowel sounds, soft, non-distended, non-tender, no masses palpated, no hepatosplenomegaly.  Surgical scars: well healed from previous surgeries.   Lymph/Hematologic: No cervical lymphadenopathy and no supraclavicular lymphadenopathy.  Genitourinary:  na  Skin: Warm and dry.  No rashes at anticipated surgical site. Well healed surgical sternal scar.   Musculoskeletal: Full ROM of neck. There is no redness, warmth, or swelling of the joints. Gross motor strength is normal.    Neurologic: Awake, alert, oriented to name, place and time. Cranial nerves II-XII are grossly intact. Gait is normal.   Neuropsychiatric: Calm, cooperative. Normal affect.     Labs: (personally reviewed)  Lab Results   Component Value Date    WBC 5.9 06/24/2019     Lab Results   Component Value Date    RBC 4.74 06/24/2019     Lab Results   Component Value Date    HGB 14.1 06/24/2019     Lab Results   Component Value Date    HCT 42.5 06/24/2019     Lab Results   Component Value Date    MCV 90 06/24/2019     Lab Results   Component Value Date    MCH 29.7 06/24/2019     Lab Results   Component Value Date    MCHC " 33.2 06/24/2019     Lab Results   Component Value Date    RDW 12.6 06/24/2019     Lab Results   Component Value Date     06/24/2019       Last Comprehensive Metabolic Panel:  Sodium   Date Value Ref Range Status   06/24/2019 139 133 - 144 mmol/L Final     Potassium   Date Value Ref Range Status   06/24/2019 4.2 3.4 - 5.3 mmol/L Final     Chloride   Date Value Ref Range Status   06/24/2019 106 94 - 109 mmol/L Final     Carbon Dioxide   Date Value Ref Range Status   06/24/2019 29 20 - 32 mmol/L Final     Anion Gap   Date Value Ref Range Status   06/24/2019 4 3 - 14 mmol/L Final     Glucose   Date Value Ref Range Status   06/24/2019 95 70 - 99 mg/dL Final     Urea Nitrogen   Date Value Ref Range Status   06/24/2019 18 7 - 30 mg/dL Final     Creatinine   Date Value Ref Range Status   06/24/2019 0.94 0.52 - 1.04 mg/dL Final     GFR Estimate   Date Value Ref Range Status   06/24/2019 60 (L) >60 mL/min/[1.73_m2] Final     Comment:     Non  GFR Calc  Starting 12/18/2018, serum creatinine based estimated GFR (eGFR) will be   calculated using the Chronic Kidney Disease Epidemiology Collaboration   (CKD-EPI) equation.       Calcium   Date Value Ref Range Status   06/24/2019 9.4 8.5 - 10.1 mg/dL Final        Procedures   EKG: Personally reviewed but formal cardiology read pending: SB 51 bpm with possible left atrial enlargement.     Echocardiogram 3/16/17 this was done prior to aortic aneurysm repair  Interpretation Summary     The aortic Sinus(es) of Valsalva are mildly dilated at at 4.1 cm and the  ascending aorta is Moderately dilated at 5.0 cm (The ULNL = 3.7 cm). Compared  to the serial echos dated 3- and 1-8-2013, the ascending aorta has  progressively dilated from 4.2 cm => 4.6 cm => currently 5.0 cm. Close  continued and regular monitoring of this progressive ascending aortic  dilation/aneurysm is indicated.  The left ventricle is normal in size with borderline to mild concentric  left  ventricular hypertrophy.  The visual ejection fraction is estimated at 65-70% with Grade I or early  diastolic dysfunction.  There is trace mitral regurgitation.  There is trace aortic regurgitation.  Right ventricle systolic pressure estimate is noted below and normal.    Coronary angiogram 5/22/2017  Catheterization diagnosis:   1-vessel coronary artery disease (LAD), without left main lesion.     Carotid US, bilateral 4/14/17                                                                  Impression:     1. Right side:      Degree of stenosis: Normal  2. Left side:       Degree of stenosis: Normal    ASSESSMENT and PLAN  Marilia Bean is a 74 year old female scheduled to undergo Duodenal Resection Of Adenoma on 7/3/19 with Dr. Brito at the [facility] under general anesthesia.    She has the following specific operative considerations:   - METS:  >4. RCRI : Coronary Artery Disease (MI, positive stress test, angina, Qs on EKG).  0.9 % risk of major adverse cardiac event.  - JARED # of risks 2/8 = low risk  - VTE risk:  3%  - Risk of PONV score = 2-3.  If > 2, anti-emetic intervention recommended.      #  Cardiology - Single vessel CAD and ascending aortic aneurysm s/p CABG with LIMA to LAD and ascending aortic aneurysm repair on 6/5/2017 with Dr. Soto.  Post AF with RVR treated with amiodarone and warfarin now in remission.  No ongoing cardiac complaints.   Hold ASA for 7 days prior to surgery.  Hold ARB DOS.  Take beta blocker and statin as prescribed DOS.   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     - intermittent GERD: Uses TUMS as needed.  Denies symptoms for past two months.  Consider use of RSI techniques with advanced airway maneuvers.       - Anesthesia considerations:  Refer to PAC assessment in anesthesia records  - EKG, CBC, BMP and T&S today    Arrival time, NPO, shower and medication  instructions provided by nursing staff today.  Preparing For Your Surgery handout given.  Patient was discussed with Dr Meléndez.      CECILIA Aguilar CNP  Preoperative Assessment Center  [facility]  Clinic and Surgery Center  Phone: [phone]  Fax: [phone]

## 2019-06-24 NOTE — PATIENT INSTRUCTIONS
Preparing for Your Surgery      Name:  Marilia Bean   MRN:  [id number]   :  1945   Today's Date:  2019     Arriving for surgery:  Surgery date:  2019  Arrival time:  9:00 am  Please come to:       [facility] [address]  [address] [place]  [address]    - ? parking is available in front of the hospital      -    Please proceed to [address] on the 3rd floor. [phone]?     - ?If you are in need of directions, wheelchair or escort please stop at the Information Desk in the lobby.  Inform the information person that you are here for surgery; a wheelchair and escort to [address] will be provided.?     What can I eat or drink?  -  You may have solid food or milk products until 8 hours prior to your surgery.(midnight)  -  You may have water, apple juice or 7up/Sprite until 2 hours prior to your surgery.(until 9 am arrival time)    Which medicines can I take?        Stop Aspirin, vitamins and supplements one week prior to surgery.      Hold Ibuprofen for 24 hours and/or Naproxen for 48 hours prior to surgery.     -  Do NOT take these medications in the morning, the day of surgery:     Losartan (Cozaar)      -  Please take these medications the day of surgery:     Atorvastatin      Metoprolol       How do I prepare myself?  -  Take two showers: one the night before surgery; and one the morning of surgery.         Use Scrubcare or Hibiclens to wash from neck down.  You may use your own shampoo and conditioner. No other hair products.   -  Do NOT use lotion, powder, deodorant, or antiperspirant the day of your surgery.  -  Do NOT wear any makeup, fingernail polish or jewelry.  - Do not bring your own medications to the hospital, except for inhalers and eye drops.  -  Bring your ID and insurance card.    Questions or Concerns:  -If you have questions or concerns regarding the day of surgery, please call   The Pre Admission Nursing Office at [phone].        -For questions after surgery please call your surgeons office.           AFTER YOUR SURGERY  Breathing exercises   Breathing exercises help you recover faster. Take deep breaths and let the air out slowly. This will:     Help you wake up after surgery.    Help prevent complications like pneumonia.  Preventing complications will help you go home sooner.   We may give you a breathing device (incentive spirometer) to encourage you to breathe deeply.   Nausea and vomiting   You may feel sick to your stomach after surgery; if so, let your nurse know.    Pain control:  After surgery, you may have pain. Our goal is to help you manage your pain. Pain medicine will help you feel comfortable enough to do activities that will help you heal.  These activities may include breathing exercises, walking and physical therapy.   To help your health care team treat your pain we will ask: 1) If you have pain  2) where it is located 3) describe your pain in your words  Methods of pain control include medications given by mouth, vein or by nerve block for some surgeries.  Sequential Compression Device (SCD) or Pneumo Boots:  You may need to wear SCD S on your legs or feet. These are wraps connected to a machine that pumps in air and releases it. The repeated pumping helps prevent blood clots from forming.

## 2019-06-25 LAB — INTERPRETATION ECG - MUSE: NORMAL

## 2019-06-28 ENCOUNTER — PATIENT OUTREACH (OUTPATIENT)
Dept: SURGERY | Facility: CLINIC | Age: 74
End: 2019-06-28

## 2019-07-01 ENCOUNTER — PATIENT OUTREACH (OUTPATIENT)
Dept: ONCOLOGY | Facility: CLINIC | Age: 74
End: 2019-07-01

## 2019-07-01 DIAGNOSIS — C17.0 DUODENAL CANCER (H): Primary | ICD-10-CM

## 2019-07-01 NOTE — PROGRESS NOTES
Surgical Oncology RN Care Coordination Note:     Called and spoke with patient regarding need to arrange for a CT of the chest prior to her surgery. Informed her that for patients that have a known dx of cancer we do need to complete staging and that involves a CT of the chest.     Michelle Fitzgerald RN, BSN  Care Coordinator   [phone]

## 2019-07-01 NOTE — PROGRESS NOTES
Surgical Oncology RN Care Coordination Note:     Called and spoke with patient regarding oncology follow up recommendations. Informed her that we often recommend patients come for a consult initially with our GI providers here at the [facility] and then if patient are more comfortable following up closer to home we can arrange for that. Patient would like to proceed with scheduling a consult here at the [facility] and then would likely plan for following up at the [facility] long term.     Informed her we would work on getting her set up to meet with medical oncology provider at the end of the month as she will need to recover from upcoming surgery before starting any treatment etc. She agreed with plan and was ok with scheduling just setting up appointment and she will review on [facility].     Michelle Fitzgerald, RN, BSN  Care Coordinator   [phone]

## 2019-07-02 ENCOUNTER — HOSPITAL ENCOUNTER (OUTPATIENT)
Dept: CT IMAGING | Facility: CLINIC | Age: 74
Discharge: HOME | End: 2019-07-02
Attending: SURGERY | Admitting: SURGERY
Payer: MEDICARE

## 2019-07-02 DIAGNOSIS — C17.0 DUODENAL CANCER (H): ICD-10-CM

## 2019-07-02 PROCEDURE — 71250 CT THORAX DX C-: CPT

## 2019-07-03 ENCOUNTER — ANESTHESIA (OUTPATIENT)
Dept: SURGERY | Facility: CLINIC | Age: 74
DRG: 328 | End: 2019-07-03
Payer: MEDICARE

## 2019-07-03 ENCOUNTER — HOSPITAL ENCOUNTER (INPATIENT)
Facility: CLINIC | Age: 74
LOS: 6 days | Discharge: HOME | DRG: 328 | End: 2019-07-09
Attending: SURGERY | Admitting: SURGERY
Payer: MEDICARE

## 2019-07-03 DIAGNOSIS — C17.0 DUODENAL ADENOCARCINOMA (H): Primary | ICD-10-CM

## 2019-07-03 LAB
ABO + RH BLD: NORMAL
ABO + RH BLD: NORMAL
BLD GP AB SCN SERPL QL: NORMAL
BLOOD BANK CMNT PATIENT-IMP: NORMAL
BLOOD BANK CMNT PATIENT-IMP: NORMAL
GLUCOSE BLDC GLUCOMTR-MCNC: 191 MG/DL (ref 70–99)
GLUCOSE BLDC GLUCOMTR-MCNC: 90 MG/DL (ref 70–99)
SPECIMEN EXP DATE BLD: NORMAL

## 2019-07-03 PROCEDURE — 25000128 H RX IP 250 OP 636: Performed by: SURGERY

## 2019-07-03 PROCEDURE — 40000014 ZZH STATISTIC ARTERIAL MONITORING DAILY

## 2019-07-03 PROCEDURE — 40000275 ZZH STATISTIC RCP TIME EA 10 MIN

## 2019-07-03 PROCEDURE — 25000128 H RX IP 250 OP 636: Performed by: STUDENT IN AN ORGANIZED HEALTH CARE EDUCATION/TRAINING PROGRAM

## 2019-07-03 PROCEDURE — 40000171 ZZH STATISTIC PRE-PROCEDURE ASSESSMENT III: Performed by: SURGERY

## 2019-07-03 PROCEDURE — 37000009 ZZH ANESTHESIA TECHNICAL FEE, EACH ADDTL 15 MIN: Performed by: SURGERY

## 2019-07-03 PROCEDURE — 25000128 H RX IP 250 OP 636: Performed by: ANESTHESIOLOGY

## 2019-07-03 PROCEDURE — 36000062 ZZH SURGERY LEVEL 4 1ST 30 MIN - UMMC: Performed by: SURGERY

## 2019-07-03 PROCEDURE — 25000566 ZZH SEVOFLURANE, EA 15 MIN: Performed by: SURGERY

## 2019-07-03 PROCEDURE — 25000128 H RX IP 250 OP 636: Performed by: NURSE ANESTHETIST, CERTIFIED REGISTERED

## 2019-07-03 PROCEDURE — 25000128 H RX IP 250 OP 636: Performed by: NEUROLOGICAL SURGERY

## 2019-07-03 PROCEDURE — 25800030 ZZH RX IP 258 OP 636: Performed by: NEUROLOGICAL SURGERY

## 2019-07-03 PROCEDURE — 37000008 ZZH ANESTHESIA TECHNICAL FEE, 1ST 30 MIN: Performed by: SURGERY

## 2019-07-03 PROCEDURE — 25000125 ZZHC RX 250: Performed by: NURSE ANESTHETIST, CERTIFIED REGISTERED

## 2019-07-03 PROCEDURE — 25000132 ZZH RX MED GY IP 250 OP 250 PS 637: Performed by: STUDENT IN AN ORGANIZED HEALTH CARE EDUCATION/TRAINING PROGRAM

## 2019-07-03 PROCEDURE — 36000064 ZZH SURGERY LEVEL 4 EA 15 ADDTL MIN - UMMC: Performed by: SURGERY

## 2019-07-03 PROCEDURE — 71000015 ZZH RECOVERY PHASE 1 LEVEL 2 EA ADDTL HR: Performed by: SURGERY

## 2019-07-03 PROCEDURE — 71000014 ZZH RECOVERY PHASE 1 LEVEL 2 FIRST HR: Performed by: SURGERY

## 2019-07-03 PROCEDURE — 25000132 ZZH RX MED GY IP 250 OP 250 PS 637: Performed by: SURGERY

## 2019-07-03 PROCEDURE — 25800030 ZZH RX IP 258 OP 636: Performed by: STUDENT IN AN ORGANIZED HEALTH CARE EDUCATION/TRAINING PROGRAM

## 2019-07-03 PROCEDURE — 0DB90ZZ EXCISION OF DUODENUM, OPEN APPROACH: ICD-10-PCS | Performed by: SURGERY

## 2019-07-03 PROCEDURE — 25800030 ZZH RX IP 258 OP 636: Performed by: NURSE ANESTHETIST, CERTIFIED REGISTERED

## 2019-07-03 PROCEDURE — 27210794 ZZH OR GENERAL SUPPLY STERILE: Performed by: SURGERY

## 2019-07-03 PROCEDURE — 88309 TISSUE EXAM BY PATHOLOGIST: CPT | Performed by: SURGERY

## 2019-07-03 PROCEDURE — 12000001 ZZH R&B MED SURG/OB UMMC

## 2019-07-03 PROCEDURE — P9041 ALBUMIN (HUMAN),5%, 50ML: HCPCS | Performed by: NURSE ANESTHETIST, CERTIFIED REGISTERED

## 2019-07-03 PROCEDURE — 00000146 ZZHCL STATISTIC GLUCOSE BY METER IP

## 2019-07-03 PROCEDURE — 0DBA0ZZ EXCISION OF JEJUNUM, OPEN APPROACH: ICD-10-PCS | Performed by: SURGERY

## 2019-07-03 RX ORDER — LIDOCAINE HYDROCHLORIDE 20 MG/ML
INJECTION, SOLUTION INFILTRATION; PERINEURAL PRN
Status: DISCONTINUED | OUTPATIENT
Start: 2019-07-03 | End: 2019-07-03

## 2019-07-03 RX ORDER — SODIUM CHLORIDE, SODIUM LACTATE, POTASSIUM CHLORIDE, CALCIUM CHLORIDE 600; 310; 30; 20 MG/100ML; MG/100ML; MG/100ML; MG/100ML
INJECTION, SOLUTION INTRAVENOUS CONTINUOUS PRN
Status: DISCONTINUED | OUTPATIENT
Start: 2019-07-03 | End: 2019-07-03

## 2019-07-03 RX ORDER — ALBUMIN, HUMAN INJ 5% 5 %
SOLUTION INTRAVENOUS CONTINUOUS PRN
Status: DISCONTINUED | OUTPATIENT
Start: 2019-07-03 | End: 2019-07-03

## 2019-07-03 RX ORDER — EPHEDRINE SULFATE 50 MG/ML
INJECTION, SOLUTION INTRAMUSCULAR; INTRAVENOUS; SUBCUTANEOUS PRN
Status: DISCONTINUED | OUTPATIENT
Start: 2019-07-03 | End: 2019-07-03

## 2019-07-03 RX ORDER — SODIUM CHLORIDE, SODIUM LACTATE, POTASSIUM CHLORIDE, CALCIUM CHLORIDE 600; 310; 30; 20 MG/100ML; MG/100ML; MG/100ML; MG/100ML
INJECTION, SOLUTION INTRAVENOUS CONTINUOUS
Status: DISCONTINUED | OUTPATIENT
Start: 2019-07-03 | End: 2019-07-03 | Stop reason: HOSPADM

## 2019-07-03 RX ORDER — ONDANSETRON 2 MG/ML
INJECTION INTRAMUSCULAR; INTRAVENOUS PRN
Status: DISCONTINUED | OUTPATIENT
Start: 2019-07-03 | End: 2019-07-03

## 2019-07-03 RX ORDER — ONDANSETRON 2 MG/ML
4 INJECTION INTRAMUSCULAR; INTRAVENOUS EVERY 6 HOURS PRN
Status: DISCONTINUED | OUTPATIENT
Start: 2019-07-03 | End: 2019-07-09 | Stop reason: HOSPADM

## 2019-07-03 RX ORDER — HYDROMORPHONE HYDROCHLORIDE 1 MG/ML
.3-.5 INJECTION, SOLUTION INTRAMUSCULAR; INTRAVENOUS; SUBCUTANEOUS EVERY 5 MIN PRN
Status: DISCONTINUED | OUTPATIENT
Start: 2019-07-03 | End: 2019-07-03 | Stop reason: HOSPADM

## 2019-07-03 RX ORDER — LOSARTAN POTASSIUM 25 MG/1
25 TABLET ORAL EVERY MORNING
Status: DISCONTINUED | OUTPATIENT
Start: 2019-07-04 | End: 2019-07-06

## 2019-07-03 RX ORDER — ACETAMINOPHEN 325 MG/1
975 TABLET ORAL ONCE
Status: COMPLETED | OUTPATIENT
Start: 2019-07-03 | End: 2019-07-03

## 2019-07-03 RX ORDER — METOPROLOL TARTRATE 25 MG/1
25 TABLET, FILM COATED ORAL 2 TIMES DAILY
Status: DISCONTINUED | OUTPATIENT
Start: 2019-07-04 | End: 2019-07-06

## 2019-07-03 RX ORDER — FLUMAZENIL 0.1 MG/ML
0.2 INJECTION, SOLUTION INTRAVENOUS
Status: DISCONTINUED | OUTPATIENT
Start: 2019-07-03 | End: 2019-07-03 | Stop reason: HOSPADM

## 2019-07-03 RX ORDER — NALOXONE HYDROCHLORIDE 0.4 MG/ML
.1-.4 INJECTION, SOLUTION INTRAMUSCULAR; INTRAVENOUS; SUBCUTANEOUS
Status: DISCONTINUED | OUTPATIENT
Start: 2019-07-03 | End: 2019-07-03

## 2019-07-03 RX ORDER — PROPOFOL 10 MG/ML
INJECTION, EMULSION INTRAVENOUS PRN
Status: DISCONTINUED | OUTPATIENT
Start: 2019-07-03 | End: 2019-07-03

## 2019-07-03 RX ORDER — FENTANYL CITRATE 50 UG/ML
25-50 INJECTION, SOLUTION INTRAMUSCULAR; INTRAVENOUS
Status: DISCONTINUED | OUTPATIENT
Start: 2019-07-03 | End: 2019-07-03 | Stop reason: HOSPADM

## 2019-07-03 RX ORDER — DEXAMETHASONE SODIUM PHOSPHATE 10 MG/ML
INJECTION, SOLUTION INTRAMUSCULAR; INTRAVENOUS PRN
Status: DISCONTINUED | OUTPATIENT
Start: 2019-07-03 | End: 2019-07-03

## 2019-07-03 RX ORDER — LIDOCAINE 40 MG/G
CREAM TOPICAL
Status: DISCONTINUED | OUTPATIENT
Start: 2019-07-03 | End: 2019-07-09 | Stop reason: HOSPADM

## 2019-07-03 RX ORDER — DEXTROSE MONOHYDRATE, SODIUM CHLORIDE, AND POTASSIUM CHLORIDE 50; 1.49; 4.5 G/1000ML; G/1000ML; G/1000ML
INJECTION, SOLUTION INTRAVENOUS CONTINUOUS
Status: DISCONTINUED | OUTPATIENT
Start: 2019-07-03 | End: 2019-07-08

## 2019-07-03 RX ORDER — FENTANYL CITRATE 50 UG/ML
INJECTION, SOLUTION INTRAMUSCULAR; INTRAVENOUS PRN
Status: DISCONTINUED | OUTPATIENT
Start: 2019-07-03 | End: 2019-07-03

## 2019-07-03 RX ORDER — ONDANSETRON 4 MG/1
4 TABLET, ORALLY DISINTEGRATING ORAL EVERY 6 HOURS PRN
Status: DISCONTINUED | OUTPATIENT
Start: 2019-07-03 | End: 2019-07-09 | Stop reason: HOSPADM

## 2019-07-03 RX ORDER — LABETALOL HYDROCHLORIDE 5 MG/ML
10 INJECTION, SOLUTION INTRAVENOUS
Status: DISCONTINUED | OUTPATIENT
Start: 2019-07-03 | End: 2019-07-03 | Stop reason: HOSPADM

## 2019-07-03 RX ORDER — ONDANSETRON 4 MG/1
4 TABLET, ORALLY DISINTEGRATING ORAL EVERY 30 MIN PRN
Status: DISCONTINUED | OUTPATIENT
Start: 2019-07-03 | End: 2019-07-03 | Stop reason: HOSPADM

## 2019-07-03 RX ORDER — NALOXONE HYDROCHLORIDE 0.4 MG/ML
.1-.4 INJECTION, SOLUTION INTRAMUSCULAR; INTRAVENOUS; SUBCUTANEOUS
Status: DISCONTINUED | OUTPATIENT
Start: 2019-07-03 | End: 2019-07-03 | Stop reason: HOSPADM

## 2019-07-03 RX ORDER — NALOXONE HYDROCHLORIDE 0.4 MG/ML
.1-.4 INJECTION, SOLUTION INTRAMUSCULAR; INTRAVENOUS; SUBCUTANEOUS
Status: DISCONTINUED | OUTPATIENT
Start: 2019-07-03 | End: 2019-07-09 | Stop reason: HOSPADM

## 2019-07-03 RX ORDER — ONDANSETRON 2 MG/ML
4 INJECTION INTRAMUSCULAR; INTRAVENOUS EVERY 30 MIN PRN
Status: DISCONTINUED | OUTPATIENT
Start: 2019-07-03 | End: 2019-07-03 | Stop reason: HOSPADM

## 2019-07-03 RX ORDER — ATORVASTATIN CALCIUM 20 MG/1
20 TABLET, FILM COATED ORAL EVERY MORNING
Status: DISCONTINUED | OUTPATIENT
Start: 2019-07-04 | End: 2019-07-06

## 2019-07-03 RX ORDER — ERTAPENEM 1 G/1
1 INJECTION, POWDER, LYOPHILIZED, FOR SOLUTION INTRAMUSCULAR; INTRAVENOUS
Status: COMPLETED | OUTPATIENT
Start: 2019-07-03 | End: 2019-07-03

## 2019-07-03 RX ADMIN — ROCURONIUM BROMIDE 50 MG: 10 INJECTION INTRAVENOUS at 10:53

## 2019-07-03 RX ADMIN — ROCURONIUM BROMIDE 20 MG: 10 INJECTION INTRAVENOUS at 12:30

## 2019-07-03 RX ADMIN — SODIUM CHLORIDE, POTASSIUM CHLORIDE, SODIUM LACTATE AND CALCIUM CHLORIDE: 600; 310; 30; 20 INJECTION, SOLUTION INTRAVENOUS at 10:42

## 2019-07-03 RX ADMIN — Medication 5 MG: at 10:56

## 2019-07-03 RX ADMIN — HYDROMORPHONE HYDROCHLORIDE 0.2 MG: 1 INJECTION, SOLUTION INTRAMUSCULAR; INTRAVENOUS; SUBCUTANEOUS at 14:08

## 2019-07-03 RX ADMIN — DEXAMETHASONE SODIUM PHOSPHATE 4 MG: 10 INJECTION, SOLUTION INTRAMUSCULAR; INTRAVENOUS at 11:03

## 2019-07-03 RX ADMIN — ERTAPENEM SODIUM 1 G: 1 INJECTION, POWDER, LYOPHILIZED, FOR SOLUTION INTRAMUSCULAR; INTRAVENOUS at 11:00

## 2019-07-03 RX ADMIN — ONDANSETRON HYDROCHLORIDE 4 MG: 2 INJECTION, SOLUTION INTRAMUSCULAR; INTRAVENOUS at 17:43

## 2019-07-03 RX ADMIN — PROPOFOL 100 MG: 10 INJECTION, EMULSION INTRAVENOUS at 10:53

## 2019-07-03 RX ADMIN — FENTANYL CITRATE 100 MCG: 50 INJECTION, SOLUTION INTRAMUSCULAR; INTRAVENOUS at 11:31

## 2019-07-03 RX ADMIN — ONDANSETRON 4 MG: 2 INJECTION INTRAMUSCULAR; INTRAVENOUS at 12:59

## 2019-07-03 RX ADMIN — Medication 5 MG: at 11:08

## 2019-07-03 RX ADMIN — POTASSIUM CHLORIDE, DEXTROSE MONOHYDRATE AND SODIUM CHLORIDE: 150; 5; 450 INJECTION, SOLUTION INTRAVENOUS at 13:50

## 2019-07-03 RX ADMIN — SUGAMMADEX 200 MG: 100 INJECTION, SOLUTION INTRAVENOUS at 13:20

## 2019-07-03 RX ADMIN — FENTANYL CITRATE 50 MCG: 50 INJECTION, SOLUTION INTRAMUSCULAR; INTRAVENOUS at 10:11

## 2019-07-03 RX ADMIN — Medication 5 MG: at 11:22

## 2019-07-03 RX ADMIN — LIDOCAINE HYDROCHLORIDE 80 MG: 20 INJECTION, SOLUTION INFILTRATION; PERINEURAL at 10:53

## 2019-07-03 RX ADMIN — ROCURONIUM BROMIDE 20 MG: 10 INJECTION INTRAVENOUS at 11:38

## 2019-07-03 RX ADMIN — CHLORASEPTIC 1 ML: 1.5 LIQUID ORAL at 18:12

## 2019-07-03 RX ADMIN — ONDANSETRON 4 MG: 2 INJECTION INTRAMUSCULAR; INTRAVENOUS at 13:56

## 2019-07-03 RX ADMIN — ACETAMINOPHEN 975 MG: 325 TABLET, FILM COATED ORAL at 09:34

## 2019-07-03 RX ADMIN — HYDROMORPHONE HYDROCHLORIDE 0.3 MG: 1 INJECTION, SOLUTION INTRAMUSCULAR; INTRAVENOUS; SUBCUTANEOUS at 13:46

## 2019-07-03 RX ADMIN — Medication: at 13:53

## 2019-07-03 RX ADMIN — HYDROMORPHONE HYDROCHLORIDE 0.3 MG: 1 INJECTION, SOLUTION INTRAMUSCULAR; INTRAVENOUS; SUBCUTANEOUS at 14:20

## 2019-07-03 RX ADMIN — HYDROMORPHONE HYDROCHLORIDE 0.5 MG: 1 INJECTION, SOLUTION INTRAMUSCULAR; INTRAVENOUS; SUBCUTANEOUS at 13:03

## 2019-07-03 RX ADMIN — Medication 5 MG: at 11:45

## 2019-07-03 RX ADMIN — FENTANYL CITRATE 50 MCG: 50 INJECTION, SOLUTION INTRAMUSCULAR; INTRAVENOUS at 10:53

## 2019-07-03 RX ADMIN — BUPIVACAINE HYDROCHLORIDE: 7.5 INJECTION, SOLUTION EPIDURAL; RETROBULBAR at 14:11

## 2019-07-03 RX ADMIN — FENTANYL CITRATE 50 MCG: 50 INJECTION, SOLUTION INTRAMUSCULAR; INTRAVENOUS at 13:34

## 2019-07-03 RX ADMIN — ALBUMIN HUMAN: 0.05 INJECTION, SOLUTION INTRAVENOUS at 11:50

## 2019-07-03 RX ADMIN — SODIUM CHLORIDE, POTASSIUM CHLORIDE, SODIUM LACTATE AND CALCIUM CHLORIDE: 600; 310; 30; 20 INJECTION, SOLUTION INTRAVENOUS at 13:00

## 2019-07-03 ASSESSMENT — MIFFLIN-ST. JEOR: SCORE: 1375.56

## 2019-07-03 ASSESSMENT — ACTIVITIES OF DAILY LIVING (ADL)
ADLS_ACUITY_SCORE: 14
ADLS_ACUITY_SCORE: 16

## 2019-07-03 NOTE — ANESTHESIA POSTPROCEDURE EVALUATION
Anesthesia POST Procedure Evaluation    Patient: Marilia Bean   MRN:     [id number] Gender:   female   Age:    74 year old :      1945        Preoperative Diagnosis: Duodenal Adenoma   Procedure(s):  Resection of Distal Duodenal and proximal Jejunum   Postop Comments: No value filed.       Anesthesia Type:  General  No value filed.    Reportable Event: NO     PAIN: Uncomplicated   Sign Out status: Comfortable, Well controlled pain     PONV: No PONV   Sign Out status:  No Nausea or Vomiting     Neuro/Psych: Uneventful perioperative course   Sign Out Status: Preoperative baseline; Age appropriate mentation     Airway/Resp.: Uneventful perioperative course   Sign Out Status: Non labored breathing, age appropriate RR; Resp. Status within EXPECTED Parameters     CV: Uneventful perioperative course   Sign Out status: Appropriate BP and perfusion indices; Appropriate HR/Rhythm     Disposition:   Sign Out in:  PACU  Disposition:  Phase II; Home  Recovery Course: Uneventful  Follow-Up: Not required           Last Anesthesia Record Vitals:  CRNA VITALS  7/3/2019 1302 - 7/3/2019 1401      7/3/2019             Resp Rate (observed):  1  (Abnormal)           Last PACU Vitals:  Vitals Value Taken Time   /66 7/3/2019  1:40 PM   Temp 36.5  C (97.7  F) 7/3/2019  1:40 PM   Pulse 55 7/3/2019  1:40 PM   Resp 12 7/3/2019  1:40 PM   SpO2 100 % 7/3/2019  1:44 PM   Temp src     NIBP     Pulse     SpO2     Resp     Temp     Ht Rate     Temp 2     Vitals shown include unvalidated device data.      Electronically Signed By: Thea Kam MD, July 3, 2019, 2:01 PM

## 2019-07-03 NOTE — ANESTHESIA CARE TRANSFER NOTE
Patient: Marilia Bean    Procedure(s):  Resection of Distal Duodenal and proximal Jejunum    Diagnosis: Duodenal Adenoma  Diagnosis Additional Information: No value filed.    Anesthesia Type:   No value filed.     Note:  Airway :Face Mask  Patient transferred to:PACU  Comments: To PACU on supplemental O2 with + air exchange, placed on monitor. Report given to RN, questions answered, VSS, patient alert and comfortable.Handoff Report: Identifed the Patient, Identified the Reponsible Provider, Reviewed the pertinent medical history, Discussed the surgical course, Reviewed Intra-OP anesthesia mangement and issues during anesthesia, Set expectations for post-procedure period and Allowed opportunity for questions and acknowledgement of understanding      Vitals: (Last set prior to Anesthesia Care Transfer)    CRNA VITALS  7/3/2019 1302 - 7/3/2019 1337      7/3/2019             Resp Rate (observed):  1  (Abnormal)             Electronically Signed By: CECILIA Hyde CRNA  July 3, 2019  1:37 PM

## 2019-07-03 NOTE — BRIEF OP NOTE
[facility]    Brief Operative Note    Pre-operative diagnosis: Duodenal Adenoma  Post-operative diagnosis * No post-op diagnosis entered *  Procedure: Procedure(s):  Resection of Distal Duodenal and proximal Jejunum  Surgeon: Surgeon(s) and Role:     * Joaquin Brito MD - Primary     * Casimiro Belcher MD - Resident - Assisting     * Ruth Correa MD - Resident - Observing  Anesthesia: Combined General with Block   Estimated blood loss: Less than 100 ml  Drains: [place]  Specimens:   ID Type Source Tests Collected by Time Destination   A : Distal Duodenum, Proximal Jejunum Tissue Other SURGICAL PATHOLOGY EXAM Joaquin Brito MD 7/3/2019 12:21 PM      Findings:   None.  Complications: None.  Implants:  * No implants in log *

## 2019-07-03 NOTE — OR NURSING
Monitored pot for epidural placment procedure, pprocedure now completed, see flowsheets, pt tolerated procedure well, pt denies any adverse s/s from epidural procedure

## 2019-07-03 NOTE — ANESTHESIA PROCEDURE NOTES
Epidural Procedure Note    Staff:     Anesthesiologist:  Tomy Covarrubias MD    Resident/CRNA:  Jess Flores MD    Procedure performed by resident/CRNA in the presence of a teaching physician    Location: Pre-op     Procedure start time:  7/3/2019 10:10 AM     Procedure end time:  7/3/2019 10:20 AM   Pre-procedure checklist:   patient identified, IV checked, site marked, risks and benefits discussed, informed consent, monitors and equipment checked, pre-op evaluation, at physician/surgeon's request and post-op pain management      Correct Patient: Yes      Correct Position: Yes      Correct Site: Yes      Correct Procedure: Yes      Correct Laterality:  Yes    Site Marked:  Yes  Procedure:     Procedure:  Epidural catheter    ASA:  3    Diagnosis:  Perioperative pain management    Position:  Sitting    Sterile Prep: chloraprep      Insertion site:  T9-10    Local skin infiltration:  1% lidocaine    amount (mL):  3    Approach:  Midline    Needle gauge (G):  17    Needle Length (in):  3.5    Block Needle Type:  Touhy    Injection Technique:  LORT saline    RACHELLE at (cm):  6    Attempts:  2    Redirects:  1    Catheter gauge (G):  19    Catheter threaded easily: Yes      Threaded to cm at skin:  11    Threaded in epidural space (cm):  5    Paresthesias:  No    Aspiration negative for Heme or CSF: Yes      Test dose (mL):  3    Test dose time:  10:19    Test dose negative for signs of intravascular, subdural or intrathecal injection: Yes

## 2019-07-03 NOTE — PROGRESS NOTES
"POST OP CHECK  07/03/2019    Marilia Bean is a 74 year old female with h/o duodenal adenoma now POD #0 s/p resection of distal duodenal and proximal jejunum.    Pt reports pain is controlled. No new issues. Some nausea. Wanting ice chips. C/o some throat pain.     /53 (BP Location: Left arm)   Pulse 52   Temp 98  F (36.7  C) (Oral)   Resp 22   Ht 1.715 m (5' 7.5\")   Wt 83.5 kg (184 lb 1.4 oz)   SpO2 98%   BMI 28.41 kg/m    General: Alert, interactive, & in NAD  Resp: non labored.   Cardiac: Regular rate; extremities warm;  Abdomen: Soft, appropriately tender, nondistended  Incision: c/d/i withouth erythema, warmth, or discharge.   Extremities: No LE edema or obvious joint abnormalities    EBL <100cc      A/P: No acute post-op issues.   - chloraseptic spray for throat pain  - continue with PCA dilaudid for pain  - zofran for nausea    Ruth Correa MD  General Surgery PGY-1  [phone]      "

## 2019-07-04 ENCOUNTER — APPOINTMENT (OUTPATIENT)
Dept: GENERAL RADIOLOGY | Facility: CLINIC | Age: 74
DRG: 328 | End: 2019-07-04
Attending: SURGERY
Payer: MEDICARE

## 2019-07-04 LAB
ANION GAP SERPL CALCULATED.3IONS-SCNC: 6 MMOL/L (ref 3–14)
BUN SERPL-MCNC: 24 MG/DL (ref 7–30)
CALCIUM SERPL-MCNC: 8.3 MG/DL (ref 8.5–10.1)
CHLORIDE SERPL-SCNC: 108 MMOL/L (ref 94–109)
CO2 SERPL-SCNC: 25 MMOL/L (ref 20–32)
CREAT SERPL-MCNC: 1.01 MG/DL (ref 0.52–1.04)
ERYTHROCYTE [DISTWIDTH] IN BLOOD BY AUTOMATED COUNT: 13.2 % (ref 10–15)
GFR SERPL CREATININE-BSD FRML MDRD: 55 ML/MIN/{1.73_M2}
GLUCOSE SERPL-MCNC: 134 MG/DL (ref 70–99)
HCT VFR BLD AUTO: 35.1 % (ref 35–47)
HGB BLD-MCNC: 11.1 G/DL (ref 11.7–15.7)
MAGNESIUM SERPL-MCNC: 1.9 MG/DL (ref 1.6–2.3)
MCH RBC QN AUTO: 29.4 PG (ref 26.5–33)
MCHC RBC AUTO-ENTMCNC: 31.6 G/DL (ref 31.5–36.5)
MCV RBC AUTO: 93 FL (ref 78–100)
PLATELET # BLD AUTO: 221 10E9/L (ref 150–450)
POTASSIUM SERPL-SCNC: 4.1 MMOL/L (ref 3.4–5.3)
RBC # BLD AUTO: 3.78 10E12/L (ref 3.8–5.2)
SODIUM SERPL-SCNC: 139 MMOL/L (ref 133–144)
WBC # BLD AUTO: 16.7 10E9/L (ref 4–11)

## 2019-07-04 PROCEDURE — 25000128 H RX IP 250 OP 636: Performed by: NEUROLOGICAL SURGERY

## 2019-07-04 PROCEDURE — 12000001 ZZH R&B MED SURG/OB UMMC

## 2019-07-04 PROCEDURE — 80048 BASIC METABOLIC PNL TOTAL CA: CPT | Performed by: NEUROLOGICAL SURGERY

## 2019-07-04 PROCEDURE — 25800030 ZZH RX IP 258 OP 636: Performed by: NEUROLOGICAL SURGERY

## 2019-07-04 PROCEDURE — 74018 RADEX ABDOMEN 1 VIEW: CPT

## 2019-07-04 PROCEDURE — 25000132 ZZH RX MED GY IP 250 OP 250 PS 637: Performed by: STUDENT IN AN ORGANIZED HEALTH CARE EDUCATION/TRAINING PROGRAM

## 2019-07-04 PROCEDURE — 25000128 H RX IP 250 OP 636: Performed by: STUDENT IN AN ORGANIZED HEALTH CARE EDUCATION/TRAINING PROGRAM

## 2019-07-04 PROCEDURE — 83735 ASSAY OF MAGNESIUM: CPT | Performed by: NEUROLOGICAL SURGERY

## 2019-07-04 PROCEDURE — 85027 COMPLETE CBC AUTOMATED: CPT | Performed by: NEUROLOGICAL SURGERY

## 2019-07-04 PROCEDURE — 25800030 ZZH RX IP 258 OP 636: Performed by: STUDENT IN AN ORGANIZED HEALTH CARE EDUCATION/TRAINING PROGRAM

## 2019-07-04 PROCEDURE — 25000132 ZZH RX MED GY IP 250 OP 250 PS 637: Performed by: NEUROLOGICAL SURGERY

## 2019-07-04 PROCEDURE — 36415 COLL VENOUS BLD VENIPUNCTURE: CPT | Performed by: NEUROLOGICAL SURGERY

## 2019-07-04 RX ADMIN — ONDANSETRON HYDROCHLORIDE 4 MG: 2 INJECTION, SOLUTION INTRAMUSCULAR; INTRAVENOUS at 12:49

## 2019-07-04 RX ADMIN — SODIUM CHLORIDE, POTASSIUM CHLORIDE, SODIUM LACTATE AND CALCIUM CHLORIDE 1000 ML: 600; 310; 30; 20 INJECTION, SOLUTION INTRAVENOUS at 04:45

## 2019-07-04 RX ADMIN — BENZOCAINE, MENTHOL 1 LOZENGE: 15; 3.6 LOZENGE ORAL at 16:51

## 2019-07-04 RX ADMIN — POTASSIUM CHLORIDE, DEXTROSE MONOHYDRATE AND SODIUM CHLORIDE: 150; 5; 450 INJECTION, SOLUTION INTRAVENOUS at 15:12

## 2019-07-04 RX ADMIN — POTASSIUM CHLORIDE, DEXTROSE MONOHYDRATE AND SODIUM CHLORIDE: 150; 5; 450 INJECTION, SOLUTION INTRAVENOUS at 02:13

## 2019-07-04 RX ADMIN — ATORVASTATIN CALCIUM 20 MG: 20 TABLET, FILM COATED ORAL at 07:59

## 2019-07-04 RX ADMIN — BUPIVACAINE HYDROCHLORIDE: 7.5 INJECTION, SOLUTION EPIDURAL; RETROBULBAR at 14:02

## 2019-07-04 RX ADMIN — BENZOCAINE, MENTHOL 1 LOZENGE: 15; 3.6 LOZENGE ORAL at 22:32

## 2019-07-04 RX ADMIN — ONDANSETRON HYDROCHLORIDE 4 MG: 2 INJECTION, SOLUTION INTRAMUSCULAR; INTRAVENOUS at 02:14

## 2019-07-04 RX ADMIN — METOPROLOL TARTRATE 25 MG: 25 TABLET ORAL at 20:18

## 2019-07-04 RX ADMIN — ENOXAPARIN SODIUM 40 MG: 40 INJECTION SUBCUTANEOUS at 13:06

## 2019-07-04 RX ADMIN — Medication: at 15:16

## 2019-07-04 ASSESSMENT — ACTIVITIES OF DAILY LIVING (ADL)
ADLS_ACUITY_SCORE: 16
ADLS_ACUITY_SCORE: 17
ADLS_ACUITY_SCORE: 16
ADLS_ACUITY_SCORE: 17

## 2019-07-04 ASSESSMENT — MIFFLIN-ST. JEOR: SCORE: 1368.37

## 2019-07-04 NOTE — PLAN OF CARE
Vital signs:  Temp: 96.4  F (35.8  C) Temp src: Oral BP: 106/52 Pulse: 52 Heart Rate: 72 Resp: 16 SpO2: 98 % O2 Device: Nasal cannula with humidification Oxygen Delivery: 2 LPM     Activity: Has not gotten OOB yet. Able to bend knees. PCDs on.   Neuros: A&O x4.   Cardiac: WDL ex bradycardic at baseline.   Respiratory: WDL on 2L O2 via NC. Pulse ox on. Denies SOB.   GI/: Snell with low UOP. +BS, passing flatus, no BM.   Diet: NPO ex ice chips.   Lines: Right PIV infusing D5 1/2NS+20 KCl @ 75 mL/hr. Left PIV SL.   Incisions/Drains: Abdominal incision with primapore, scant drainage marked-no changed. Right ELOISA with small output. Snell intact. Right NG to LIS.  Pain/nausea: PCA dilaudid @ 0.1 mg q10min for pain control. Epidural @ 10 mL/hr. Pt c/o intermittent nausea, Zofran given x1, aromatherapy utilized. Sleeping between cares.  New changes this shift: Hypotensive with BP 90s/40s. Provider notified, new order for 1L LR bolus given. Snell output 60 mL from [id number], BP recheck 106/52.

## 2019-07-04 NOTE — PLAN OF CARE
"Blood pressure 130/69, pulse 52, temperature 96.1  F (35.6  C), temperature source Axillary, resp. rate 16, height 1.715 m (5' 7.5\"), weight 83.5 kg (184 lb 1.4 oz), SpO2 97 %.    Pt arrived to unit from PACU around 3.30 pm.  Procedure: Resection of Distal Duodenal and proximal Jejunum  Neuros: Drowsy. Answers orientation questions appropriately.   Cardiac: Bradycardic per baseline.  Respiratory:  Sating in mid to upper 90s on 2 L NC. Refused capnography. C/o throat feeling dry and sore. Coughed up scant amount blood steraked sputum. Humidified O2 applied.  GI/: Hypo BS. Zofran given  for nausea with relief in symptoms. Kaur in place with 38 ml/hr rick urine output (total 310 ml).  Diet: NPO with ice chips.  Skin: Right transverse  incision with scant drainage, dressing marked. No changes.   Activity: has not been out of bed yet. Feels nauseated with position changes.   LDA/drains: PIVs x2. Right infusing D5% with 0.45 NS and KCl at 75 ml/hr. ELOISA and kaur intact. NG to LIS with 200 ml brown drainage.   Pain: Incisional pain controlled with epidural at 10 ml/hr and PCA at 0.1 mg. PRN chrloraseptic spray ordered for sore throat but did not like the taste it, gagging.   Plan: Continue to monitor and notify MD with changes.  "

## 2019-07-04 NOTE — PLAN OF CARE
Attempted to ambulate patient x3 this shift. The first two times she was dizzy and nauseated when dangled. The third time, she was able to walk to outside of door and back. Very weak and needed assist of two. Used PCA only x1. Denies pain. Lungs clear. Abdominal dressing with scant dried blood. U/O improved sincethe last shift. NG output 200cc dark brown fluid. Taking occasional ice chip. VSS.

## 2019-07-04 NOTE — PROGRESS NOTES
REGIONAL ANESTHESIA PAIN SERVICE NOTE  SUBJECTIVE:  Interval History: Pt reports adequate pain control via Bupivicaine 0.125% infusion.  Denies any weakness, paresthesias, circumoral numbness, metallic taste or tinnitus.  Pt is ambulating minimally with assistance.  Patient is currently with no nausea or vomiting. Patient is currently on a NPO diet.       Clinically Aligned Pain Assessment (CAPA):   Comfort (How is your pain?): Comfortably manageable  Change in Pain (Since your last medication/intervention?): About the same  Pain Control (How are your pain treatments working?):  Fully effective pain control  Functioning (Are you able to do activities to get better?) : Can do most things, but pain gets in the way of some   Sleep (Does your pain management allow you to sleep or rest?): Normal sleep     Numerical Rating Scale:  1/10 at rest and 2/10 with movement.        Anticoagulation:  Lovenox 40mg q2h, last dose 1300 7/4/2019      OBJECTIVE:    Diagnostic:  Lab Results   Component Value Date    WBC 16.7 07/04/2019     Lab Results   Component Value Date    RBC 3.78 07/04/2019     Lab Results   Component Value Date    HGB 11.1 07/04/2019     Lab Results   Component Value Date    HCT 35.1 07/04/2019     Lab Results   Component Value Date     07/04/2019         Vitals:    Temp:  [35.1  C (95.2  F)-37.2  C (98.9  F)] 37.2  C (98.9  F)  Pulse:  [50-52] 52  Heart Rate:  [52-72] 71  Resp:  [12-22] 16  BP: ()/(43-71) 115/54  SpO2:  [94 %-100 %] 97 %    Exam:    Strength 5/5 and symmetric grossly in bilateral LE    Catheter site with dressing c/d/i, no tenderness, erythema, heme, edema      ASSESSMENT/PLAN:    Marilia Bean is a 74 year old female POD #1 s/p DUODENECTOMY and placement of Epidural T4-T5 catheter for analgesia.  Pt is receiving adequate analgesia with bupivacaine 0.125%%, total infusion 10mL/hour.  Pt is ambulating without difficulty.  No weakness or paresthesias, adequate sensory block.  No  evidence of adverse side effects associated with local anesthetic.     - continue current total infusion of 10mL/hour  - will continue to follow and adjust as needed  - discussed plan with attending anesthesiologist    Chan Aldridge MD  [facility]  7/4/2019 2:05 PM    24 hour Job Code Pager.  For in-house use only.     [place]:  * * [phone]  [place]: * * [phone]  Peds: * * [phone]      Chan Aldridge MD  Anesthesiology Resident CA2, PGY3  Pain Management Team

## 2019-07-04 NOTE — PROVIDER NOTIFICATION
Pt's BP 97/43, BP recheck 97/46. Denies dizziness/lightheadedness. Snell with 75 mL output from [id number]. Provider notified.

## 2019-07-04 NOTE — PROGRESS NOTES
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 No acute events overnight. Incisional pain controlled with PCA and epidural. Complaining of sore throat and discomfort due to NG tube. Passing flatus but no BM. Has not been ambulating yet. No chest pain, sob, fevers.      Objective  /55 (BP Location: Left arm)   Pulse 52   Temp 95.2  F (35.1  C) (Axillary)   Resp 16   Ht 1.715 m (5' 7.5\")   Wt 83.5 kg (184 lb 1.4 oz)   SpO2 97%   BMI 28.41 kg/m      General: AAOx4, NAD, lying comfortably in bed  CV: hemodynamically stable. Regular rate and rhythm  Pulm: Breathing comfortably  Abd: soft, mildly distended, bloated, tender to palpation by incision site. Incision c/d/i with minimal drainage evident through dressing   Extremities: No edema  Neuro: moving all extremities spontaneously without apparent deficit    Intake/Output Summary (Last 24 hours) at 7/4/2019 0908  Last data filed at 7/4/2019 0800  Gross per 24 hour   Intake 3416 ml   Output 1260 ml   Net 2156 ml     Most Recent 3 CBC's:  Recent Labs   Lab Test 06/24/19  0910 06/21/17  1646 06/17/17  0632   WBC 5.9 9.9 10.9   HGB 14.1 9.2* 8.1*   MCV 90 92 88    632* 412     Most Recent 3 BMP's:  Recent Labs   Lab Test 06/24/19  0910 10/17/18  0956 06/21/17  1646    141 136   POTASSIUM 4.2 4.0 4.1   CHLORIDE 106 104 99   CO2 29 30 29   BUN 18 19 13   CR 0.94 1.06* 1.10*   ANIONGAP 4 7 8   TARIQ 9.4 9.6 8.5   GLC 95 94 100*     Most Recent 2 LFT's:  Recent Labs   Lab Test 10/17/18  0956 06/21/17  1646   AST 25 61*   ALT 25 66*   ALKPHOS 103 155*   BILITOTAL 0.8 0.5     Most Recent 3 INR's:  Recent Labs   Lab Test 08/25/17 08/17/17 08/10/17   INR 1.8* 2.2* 1.6*     Assessment & Plan  74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   - hemodynamically stable. Continue PTA metoprolol, atorvastatin, and losartan. Hold off aspirin for now until hgb stable  - On room air. Encourage frequent use of IS  - NPO with ice chips for now. NG tube to stay in until POD 5. UGI study on POD 5 to rule out a leak prior to NG out. ELOISA to stay in until POD 3  - Continue kaur until POD 2. Replace electrolytes as needed  - encourage ambulation. Start lovenox if hgb stable.     Patient seen and discussed with chief resident and will be discussed with staff.    Mindy Hung MD  General Surgery PGY-2  [phone]

## 2019-07-04 NOTE — OP NOTE
Procedure Date: 07/03/2019      SURGEON:  Dr. Cory Brito      ASSISTANT:  Dr. Casimiro Belcher, PGY7      PREOPERATIVE DIAGNOSIS:  Duodenal adenocarcinoma.      POSTOPERATIVE DIAGNOSIS:  Duodenal adenocarcinoma.      PROCEDURES PERFORMED:   1.  Duodenal and proximal jejunal resection with duodenal jejunostomy.      FINDINGS:  The tattooed lesion was located in the third to fourth portion of the duodenum.  No evidence of metastatic disease.  No enlarged lymph nodes within the area.      ESTIMATED BLOOD LOSS:  100 mL.      ANESTHESIA:  General endotracheal tube anesthesia with epidural.      SPECIMEN:  Duodenum and proximal jejunum, 20 cm.      INDICATIONS:  Ms. Bean is a 74-year-old with Gardiner syndrome who was found to have a duodenal adenocarcinoma on a screening EGD.  The patient had undergone partial obstructive resection endoscopically with a malignancy near the margins of her resection.  Given that, a duodenal resection or possible Whipple were recommended.  The patient understood the procedure.  She understood the risks of bleeding, infection, possible leak from her bowel anastomosis and possible need for a Whipple procedure.  The patient gave written informed consent and proceeded to surgery.      DESCRIPTION OF PROCEDURE:  Ms. Bean was brought to the operating room.  A general endotracheal tube anesthesia was obtained.  The patient was placed in the supine position with her arms out.  The abdomen was prepped and draped in a normal sterile fashion.  A timeout was performed, confirming the correct patient, the correct site of surgery, that antibiotics had been given, that SCDs were on and functioning.  We began by performing a right upper quadrant incision 2 fingerbreadths below the right costal margin.  Incision was carried down through the subcutaneous tissue down to fascia.  Fascia was incised and further opened with electrocautery.  The muscle of the rectus abdominis was transected with the LigaSure device.   The posterior sheath was then opened with electrocautery over the surgeon's finger to prevent injury to the underlying viscera.  We initially encountered a significant amount of adhesions in the right upper quadrant, given the patient's previous cholecystectomy. These were taken down with electrocautery dissection. On inspection, there was no evidence of liver metastasis or other metastasis in the visible abdomen.  We assembled our bookwalter retracting device. We began the procedure by exposing the portal triad and the lateral aspect of the duodenum.  The duodenum was Kocherized,  allowing full mobilization of the duodenum off the retroperitoneum.  At this point, we were unable to find the tattooed lesion.  We then went to identify the ligament of Treitz below the transverse colon.  The patient had a series of omental adhesions down to the abdominal wall in the pelvis.  These were taken down bluntly.  There was no evidence of bleeding with this maneuver.  Eventually, we found the proximal bowel and ran this to the ligament of Treitz, where we were able to find the black tattoo right below the ligament of Treitz.  The duodenum was mobilized fully up to the ligament of Treitz through the lesser sac by retracting the colon caudaly.  Once we had found our tattoo distally on the duodenum, we then went to the other side of the ligament of Treitz, where we made our transection of the jejunum about 10 cm away from the tattooed margin.  This was done with a 55 Endo-DOMENICA stapler.  The mesentery of the small bowel was taken with the LigaSure device.  The ligament of Treitz was taken in the greater sac below the colon with electrocautery and with the LigaSure device.  We took care not to injure the SMV or the SMA in this process.  Once we had sufficiently mobilized this, we went back into the lesser sac.  We were able to fully mobilize the duodenum back into the lesser sac, bringing up our full specimen and our proximal  jejunum.  We took the rest of the duodenal mesentery and  the duodenum from lateral edge of the pancreas.  We took this up until we were sufficiently  proximal to the Fanny ink tattoo, but before we had encountered the ampulla.  Once we were sufficiently beyond this area and we felt we had sufficient margins, we used the Endo-DOMENICA to transect the duodenum at this location.  The specimen was then passed off the field.  We did open the specimen on the back table, and we were able to find the lesion that was within our specimen.  Specimen was then sent for pathology for review.      We then proceeded with our reconstruction.  To do this, we brought our proximal end of the jejunum, the staple line, through the ligament of Treitz defect that we had created so that it lay right near the proximal staple line of the duodenum.  We then carried out a side-to-side functional end-to-end anastomosis between the duodenum and jejunum, forming a duodenal jejunostomy.  To do this, we placed 2 stay sutures with a 3-0 silk.  We made enterotomies in both the jejunum and the duodenum.  These were opened with a clamp.  We then passed our stapler approximately to about 35 mm on the stapler.  Stapler was then fired.  The common enterotomy was closed with several 3-0 silks in interrupted fashion.  We used a 3-0 silk as a crotch stitch both proximally and distally and completed our anastomosis.  There was no evidence of leak at the end of the procedure.  We irrigated out the area.  We then used a 3-0 silk to place a suture between our colonic mesentery and the jejunum that we passed underneath to prevent any sliding of the jejunum distally to the ligament of Treitz.  We then placed several stitches up to the dc hepatis fat to cover our suture line and anastomosis.  We irrigated again and confirmed hemostasis.  We placed a 19-Welsh Ben drain in the lesser sac.  We removed our Bookwalter device and began closing the abdomen.  We  closed the posterior sheath with a #1 Vicryl x 2.  We then closed the anterior sheath with 0 looped PDS x 2.  The wound was again copiously irrigated.  Hemostasis was obtained with electrocautery, and skin was closed with a 4-0 Monocryl.  Steri-Strips were placed, and the wound was covered in the sterile fashion.  The patient was extubated without incident and brought to PACU.  Instrument and sponge counts were reported correct x 2.  Dr. Dr. Wayne was present and scrubbed for the entirety of the procedure.         KRISHNA WAYNE MD       As dictated by LISA MORIN MD            D: 2019   T: 2019   MT: GOMEZ      Name:     MARGIE PATINO   MRN:      [id number]        Account:        [id number]   :      1945           Procedure Date: 2019      Document: [id number]

## 2019-07-05 LAB
ANION GAP SERPL CALCULATED.3IONS-SCNC: 2 MMOL/L (ref 3–14)
BUN SERPL-MCNC: 16 MG/DL (ref 7–30)
CALCIUM SERPL-MCNC: 8.1 MG/DL (ref 8.5–10.1)
CHLORIDE SERPL-SCNC: 107 MMOL/L (ref 94–109)
CO2 SERPL-SCNC: 27 MMOL/L (ref 20–32)
CREAT SERPL-MCNC: 0.79 MG/DL (ref 0.52–1.04)
ERYTHROCYTE [DISTWIDTH] IN BLOOD BY AUTOMATED COUNT: 13.2 % (ref 10–15)
GFR SERPL CREATININE-BSD FRML MDRD: 73 ML/MIN/{1.73_M2}
GLUCOSE BLDC GLUCOMTR-MCNC: 123 MG/DL (ref 70–99)
GLUCOSE SERPL-MCNC: 135 MG/DL (ref 70–99)
HCT VFR BLD AUTO: 34.6 % (ref 35–47)
HGB BLD-MCNC: 10.6 G/DL (ref 11.7–15.7)
MAGNESIUM SERPL-MCNC: 2 MG/DL (ref 1.6–2.3)
MCH RBC QN AUTO: 28.7 PG (ref 26.5–33)
MCHC RBC AUTO-ENTMCNC: 30.6 G/DL (ref 31.5–36.5)
MCV RBC AUTO: 94 FL (ref 78–100)
PHOSPHATE SERPL-MCNC: 1.8 MG/DL (ref 2.5–4.5)
PHOSPHATE SERPL-MCNC: 3 MG/DL (ref 2.5–4.5)
PLATELET # BLD AUTO: 184 10E9/L (ref 150–450)
POTASSIUM SERPL-SCNC: 4.2 MMOL/L (ref 3.4–5.3)
RBC # BLD AUTO: 3.69 10E12/L (ref 3.8–5.2)
SODIUM SERPL-SCNC: 136 MMOL/L (ref 133–144)
WBC # BLD AUTO: 14.2 10E9/L (ref 4–11)

## 2019-07-05 PROCEDURE — 36415 COLL VENOUS BLD VENIPUNCTURE: CPT | Performed by: STUDENT IN AN ORGANIZED HEALTH CARE EDUCATION/TRAINING PROGRAM

## 2019-07-05 PROCEDURE — 12000001 ZZH R&B MED SURG/OB UMMC

## 2019-07-05 PROCEDURE — 40000275 ZZH STATISTIC RCP TIME EA 10 MIN

## 2019-07-05 PROCEDURE — 80048 BASIC METABOLIC PNL TOTAL CA: CPT | Performed by: STUDENT IN AN ORGANIZED HEALTH CARE EDUCATION/TRAINING PROGRAM

## 2019-07-05 PROCEDURE — 00000146 ZZHCL STATISTIC GLUCOSE BY METER IP

## 2019-07-05 PROCEDURE — 36415 COLL VENOUS BLD VENIPUNCTURE: CPT | Performed by: SURGERY

## 2019-07-05 PROCEDURE — 25000132 ZZH RX MED GY IP 250 OP 250 PS 637: Performed by: NEUROLOGICAL SURGERY

## 2019-07-05 PROCEDURE — 25000128 H RX IP 250 OP 636: Performed by: STUDENT IN AN ORGANIZED HEALTH CARE EDUCATION/TRAINING PROGRAM

## 2019-07-05 PROCEDURE — 85027 COMPLETE CBC AUTOMATED: CPT | Performed by: STUDENT IN AN ORGANIZED HEALTH CARE EDUCATION/TRAINING PROGRAM

## 2019-07-05 PROCEDURE — 25800030 ZZH RX IP 258 OP 636: Performed by: NEUROLOGICAL SURGERY

## 2019-07-05 PROCEDURE — 84100 ASSAY OF PHOSPHORUS: CPT | Performed by: STUDENT IN AN ORGANIZED HEALTH CARE EDUCATION/TRAINING PROGRAM

## 2019-07-05 PROCEDURE — 83735 ASSAY OF MAGNESIUM: CPT | Performed by: STUDENT IN AN ORGANIZED HEALTH CARE EDUCATION/TRAINING PROGRAM

## 2019-07-05 PROCEDURE — 84100 ASSAY OF PHOSPHORUS: CPT | Performed by: SURGERY

## 2019-07-05 PROCEDURE — 25000132 ZZH RX MED GY IP 250 OP 250 PS 637: Performed by: STUDENT IN AN ORGANIZED HEALTH CARE EDUCATION/TRAINING PROGRAM

## 2019-07-05 PROCEDURE — 25800030 ZZH RX IP 258 OP 636: Performed by: STUDENT IN AN ORGANIZED HEALTH CARE EDUCATION/TRAINING PROGRAM

## 2019-07-05 PROCEDURE — 25000128 H RX IP 250 OP 636: Performed by: NEUROLOGICAL SURGERY

## 2019-07-05 PROCEDURE — 25000125 ZZHC RX 250: Performed by: STUDENT IN AN ORGANIZED HEALTH CARE EDUCATION/TRAINING PROGRAM

## 2019-07-05 RX ADMIN — LOSARTAN POTASSIUM 25 MG: 25 TABLET ORAL at 09:51

## 2019-07-05 RX ADMIN — BENZOCAINE, MENTHOL 1 LOZENGE: 15; 3.6 LOZENGE ORAL at 20:46

## 2019-07-05 RX ADMIN — SODIUM PHOSPHATE, MONOBASIC, MONOHYDRATE AND SODIUM PHOSPHATE, DIBASIC, ANHYDROUS 20 MMOL: 276; 142 INJECTION, SOLUTION INTRAVENOUS at 16:11

## 2019-07-05 RX ADMIN — METOPROLOL TARTRATE 25 MG: 25 TABLET ORAL at 09:50

## 2019-07-05 RX ADMIN — ATORVASTATIN CALCIUM 20 MG: 20 TABLET, FILM COATED ORAL at 09:51

## 2019-07-05 RX ADMIN — BENZOCAINE, MENTHOL 1 LOZENGE: 15; 3.6 LOZENGE ORAL at 03:54

## 2019-07-05 RX ADMIN — BUPIVACAINE HYDROCHLORIDE: 7.5 INJECTION, SOLUTION EPIDURAL; RETROBULBAR at 14:50

## 2019-07-05 RX ADMIN — Medication: at 15:45

## 2019-07-05 RX ADMIN — POTASSIUM CHLORIDE, DEXTROSE MONOHYDRATE AND SODIUM CHLORIDE: 150; 5; 450 INJECTION, SOLUTION INTRAVENOUS at 21:47

## 2019-07-05 RX ADMIN — ENOXAPARIN SODIUM 40 MG: 40 INJECTION SUBCUTANEOUS at 11:49

## 2019-07-05 RX ADMIN — METOPROLOL TARTRATE 25 MG: 25 TABLET ORAL at 20:37

## 2019-07-05 ASSESSMENT — ACTIVITIES OF DAILY LIVING (ADL)
ADLS_ACUITY_SCORE: 16
ADLS_ACUITY_SCORE: 17
ADLS_ACUITY_SCORE: 16
ADLS_ACUITY_SCORE: 16

## 2019-07-05 NOTE — TELEPHONE ENCOUNTER
ONCOLOGY INTAKE: Records Information      APPT INFORMATION: 7/26/19 - [facility] - [facility] – [place], [place]  Referring provider:  Dr Brito  Referring provider s clinic:   - [facility] – [place], [place]  Reason for visit/diagnosis:  Duodenal CA  Has patient been notified of appointment date and time?: Yes - per Michelle pt will check Tout for appt info - no need to call    RECORDS INFORMATION:  Were the records received with the referral (via [employer])? No (internal referral)    Has patient been seen for any external appt for this diagnosis? No    If yes, where? NA    Has patient had any imaging or procedures outside of [place]  [place] for this condition? No      If Yes, where? NA    ADDITIONAL INFORMATION:  Scheduled via [email or web address] from Michelle HONEYCUTT - no need to call pt (she will check Tout for appt info)

## 2019-07-05 NOTE — PROGRESS NOTES
REGIONAL ANESTHESIA PAIN SERVICE EPIDURAL NOTE  Marilia Bean is a 74 year old female POD #2 s/p DUODENECTOMY and placement of T4-5 epidural catheter for pain management.      SUBJECTIVE  Interval History: Overnight events: none. Patient reports adequate pain control with epidural infusion and current  analgesic medications (see below).  Denies weakness, paresthesias, circumoral numbness, metallic taste or tinnitus.  Patient transfers to the chair with assistance.  Currently with NGT, denies nausea or vomiting.  Urinary catheter in place.     Clinically Aligned Pain Assessment (CAPA):  Comfort (How is your pain?): Comfortably manageable  Change in Pain (Since your last medication/intervention?): About the same  Pain Control (How are your pain treatments working?):  Partially effective pain control  Functioning (Are you able to do activities to get better?) : Can do most things, but pain gets in the way of some   Sleep (Does your pain management allow you to sleep or rest?): Awake with occasional pain     Pain Intensity using Numerical Rating Scale (NRS):    0/10 at rest and 3/10 with activity      Antithrombotic/Thrombolytic Therapy ordered:  Lovenox 40mg subcutaneous q 24hrs    Analgesic Medications:  Medications related to Pain Management (From now, onward)    Start     Dose/Rate Route Frequency Ordered Stop    07/03/19 1544  lidocaine 1 % 0.1-1 mL      0.1-1 mL Other EVERY 1 HOUR PRN 07/03/19 1544      07/03/19 1544  lidocaine (LMX4) cream       Topical EVERY 1 HOUR PRN 07/03/19 1544      07/03/19 1345  HYDROmorphone (DILAUDID) PCA 1 mg/mL OPIOID NAIVE       Intravenous CONTINUOUS 07/03/19 1341      07/03/19 1245  bupivacaine (MARCAINE) 0.125 % in sodium chloride 0.9 % 250 mL EPIDURAL Infusion      10 mL/hr  EPIDURAL CONTINUOUS 07/03/19 1232             OBJECTIVE  Lab Results:   Recent Labs   Lab Test 07/04/19  0838   WBC 16.7*   RBC 3.78*   HGB 11.1*   HCT 35.1   MCV 93   MCH 29.4   MCHC 31.6   RDW 13.2   PLT  "221       Lab Results   Component Value Date    INR 1.8 08/25/2017    INR 2.2 08/17/2017    INR 1.6 08/10/2017    INR 2.18 06/21/2017    INR 1.34 06/17/2017    INR 0.95 06/12/2017       Vitals:    Temp:  [95.2  F (35.1  C)-100.2  F (37.9  C)] 98.7  F (37.1  C)  Pulse:  [72] 72  Heart Rate:  [63-76] 63  Resp:  [16-18] 16  BP: (103-142)/(54-66) 110/56  SpO2:  [97 %-98 %] 97 %  /56 (BP Location: Left arm)   Pulse 72   Temp 98.7  F (37.1  C) (Oral)   Resp 16   Ht 1.715 m (5' 7.5\")   Wt 82.8 kg (182 lb 8 oz)   SpO2 97%   BMI 28.16 kg/m         Exam:   GEN: alert and no distress  NEURO/MSK: Strength B/L LE 5/5  and overall symmetric  SKIN: Epidural catheter site with dressing c/d/i, no tenderness, erythema, heme, edema     ASSESSMENT/PLAN:    Patient is receiving adequate analgesia with current multimodal therapy including T4-5 epidural catheter infusion of Bupivacaine 0.125% at 10mL/hr.  Pt transfers to the chair with assistance.  No evidence of adverse side effects related to local anesthetic. Urinary catheter in place.     - continue current epidural infusion Bupivacaine 0.125% at 10mL/hour, POD #2  - antithrombotic/thrombolytic therapy: ok to continue lovenox 40mg subcutaneous q 24hrs as ordered. Please contact [facility] (#[phone]) prior to any medication changes  - will continue to follow and adjust as needed    - discussed plan with attending anesthesiologist    CECILIA Gray CNP  [facility]  7/5/2019 7:25 AM    [facility] Contact Info (24 hour job code pager is the last 4 digits) For in-house use only:   [employer] phone: [place] [phone], [place] [phone], Peds [phone], then enter call-back number.    Text: Use [employer] on the Intranet <Paging/Directory> tab and enter Jobcode ID.   If no call back at any time, contact the hospital  and ask for RAPS attending or backup   "

## 2019-07-05 NOTE — PROVIDER NOTIFICATION
When flushing NG tube with water prior to giving meds, pt reported feeling water up in her throat and and gagged. NG placement was assessed with air bolus/auscultation and it sounded like placement was in stomach. Provider paged to see if placement wanted to be confirmed with XR. Suction off for now, will continue to monitor.

## 2019-07-05 NOTE — PLAN OF CARE
Alert and oriented x4, VS on 2L NC. NG to LIS, R ELOISA to bulb suction, Snell catheter patent, PCA with dilaudid at 0.1mg q10 minutes. Mild nausea overnight, did not request any antiemetics. Able to ambulate to chair x1 for shift. Continue plan of care.

## 2019-07-05 NOTE — PLAN OF CARE
"VS: /66 (BP Location: Left arm)   Pulse 72   Temp 99  F (37.2  C) (Oral)   Resp 18   Ht 1.715 m (5' 7.5\")   Wt 83.5 kg (184 lb 1.4 oz)   SpO2 98%   BMI 28.41 kg/m    Neuro: A&Ox4, no dizziness or lightheadedness reported this shift  Cardiac: intermittently bradycardic at baseline  Resp: lung sounds clear, no SOB reported, sating well on 2L NC, IS encouraged  GI: passing gas, bowel sounds hypoactive, no BM this shift  : kaur intact, adequate amount of rick urine   Skin: abdominal incision covered with primapore, drainage on dressing marked but not extended; preventative mepilex on coccyx  Pain/Nausea: lower abdominal pain well controlled with PCA dilaudid 0.1 and epidural at 10ml/hr; denies nausea  LDA: R PIV infusing IVMF and PCA, L PIV SL; NG tube to LIS with brown drainage; ELOISA drain to RLQ with bloody drainage  Diet: NPO except 1/2 cup ice chips per shift  Mobility: 1 assist, pt up in chair this shift   Labs: reviewed, WBC 16.7  Plan: continue plan of care     "

## 2019-07-05 NOTE — PLAN OF CARE
Patient walked with daughter in law this AM with good tolerance. She did express that she is disappointed with how weak she is. Using IS with encouragement. Taking in occasional ice chip. Lungs had some rales in L lower lobe. Good productive cough. Abdominal incision clean, dry and intact. ELOISA putting out small amount of bloody drainage. When patient took afternoon walk she became dizzy. She fainted and was eased to the floor. Several staff were able to assist her into WC and then a sling was used to lift her to the bed. VSS were stable and patient rebounded quickly. General Surgery team was paged. Rapid Response was called. Pt stable-see flow sheet for VS. Bg normal.

## 2019-07-05 NOTE — PROGRESS NOTES
Responded to Rapid Response page. Pt had LOC while walking with RN. Upon arrival, pt lying in bed on 2L NC, BS clear, VS as noted in flowsheet. FSBG taken, results within normal.    RN performed neuro exam which pt passed.     No further RT intervention needed at this time.    RT to continue to follow.

## 2019-07-05 NOTE — PROGRESS NOTES
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 No acute events overnight. Feels comfortable lying down but has pain when moving; had to use PCA more often this morning to \"catch up\" on her pain from overnight.  Slight nausea, no flatus yet.  Otherwise no new complaints.     Objective  /56 (BP Location: Left arm)   Pulse 72   Temp 98.7  F (37.1  C) (Oral)   Resp 16   Ht 1.715 m (5' 7.5\")   Wt 82.8 kg (182 lb 8 oz)   SpO2 97%   BMI 28.16 kg/m      General: AAOx4, NAD, lying comfortably in bed  CV: hemodynamically stable. Regular rate and rhythm  Pulm: Breathing comfortably  Abd: soft, distention improved since yesterday, tender to palpation by incision site. Incision c/d/i    Extremities: No edema  Neuro: moving all extremities spontaneously without apparent deficit    Intake/Output Summary (Last 24 hours) at 7/4/2019 0908  Last data filed at 7/4/2019 0800  Gross per 24 hour   Intake 3416 ml   Output 1260 ml   Net 2156 ml     Most Recent 3 CBC's:  Recent Labs   Lab Test 07/05/19  0732 07/04/19  0838 06/24/19  0910   WBC 14.2* 16.7* 5.9   HGB 10.6* 11.1* 14.1   MCV 94 93 90    221 245     Most Recent 3 BMP's:  Recent Labs   Lab Test 07/04/19  0838 06/24/19  0910 10/17/18  0956    139 141   POTASSIUM 4.1 4.2 4.0   CHLORIDE 108 106 104   CO2 25 29 30   BUN 24 18 19   CR 1.01 0.94 1.06*   ANIONGAP 6 4 7   TARIQ 8.3* 9.4 9.6   * 95 94     Most Recent 2 LFT's:  Recent Labs   Lab Test 10/17/18  0956 06/21/17  1646   AST 25 61*   ALT 25 66*   ALKPHOS 103 155*   BILITOTAL 0.8 0.5     Most Recent 3 INR's:  Recent Labs   Lab Test 08/25/17 08/17/17 08/10/17   INR 1.8* 2.2* 1.6*     Assessment & Plan  74 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   - Hemodynamically stable. Continue home regimen of BP meds  - Consider resuming aspirin when hgb stable  - On room air, use IS   - NPO with ice chips for now. NG tube to stay in until POD 5. UGI study on POD 5 to rule out a leak prior to NG out. ELOISA to stay in until POD 3.  F/u drain amylase.    - Snell out today, Replace electrolytes as needed  - Encourage ambulation. Lovenox    Patient seen and discussed with chief resident and will be discussed with staff.    Yulisa Diez MD  Surgery Resident PGY-1  Pg [phone]

## 2019-07-06 LAB
ALBUMIN SERPL-MCNC: 2.6 G/DL (ref 3.4–5)
ALP SERPL-CCNC: 92 U/L (ref 40–150)
ALT SERPL W P-5'-P-CCNC: 44 U/L (ref 0–50)
AMYLASE FLD-CCNC: 59 U/L
AMYLASE SERPL-CCNC: 30 U/L (ref 30–110)
ANION GAP SERPL CALCULATED.3IONS-SCNC: 4 MMOL/L (ref 3–14)
AST SERPL W P-5'-P-CCNC: 40 U/L (ref 0–45)
BILIRUB SERPL-MCNC: 0.9 MG/DL (ref 0.2–1.3)
BUN SERPL-MCNC: 14 MG/DL (ref 7–30)
CALCIUM SERPL-MCNC: 8.5 MG/DL (ref 8.5–10.1)
CHLORIDE SERPL-SCNC: 103 MMOL/L (ref 94–109)
CO2 SERPL-SCNC: 30 MMOL/L (ref 20–32)
CREAT SERPL-MCNC: 0.75 MG/DL (ref 0.52–1.04)
ERYTHROCYTE [DISTWIDTH] IN BLOOD BY AUTOMATED COUNT: 12.7 % (ref 10–15)
GFR SERPL CREATININE-BSD FRML MDRD: 79 ML/MIN/{1.73_M2}
GLUCOSE SERPL-MCNC: 120 MG/DL (ref 70–99)
HCT VFR BLD AUTO: 32.7 % (ref 35–47)
HGB BLD-MCNC: 10.3 G/DL (ref 11.7–15.7)
MCH RBC QN AUTO: 28.8 PG (ref 26.5–33)
MCHC RBC AUTO-ENTMCNC: 31.5 G/DL (ref 31.5–36.5)
MCV RBC AUTO: 91 FL (ref 78–100)
PHOSPHATE SERPL-MCNC: 2 MG/DL (ref 2.5–4.5)
PLATELET # BLD AUTO: 165 10E9/L (ref 150–450)
POTASSIUM SERPL-SCNC: 3.7 MMOL/L (ref 3.4–5.3)
PROT SERPL-MCNC: 6.2 G/DL (ref 6.8–8.8)
RBC # BLD AUTO: 3.58 10E12/L (ref 3.8–5.2)
SODIUM SERPL-SCNC: 137 MMOL/L (ref 133–144)
SPECIMEN SOURCE FLD: NORMAL
WBC # BLD AUTO: 12.5 10E9/L (ref 4–11)

## 2019-07-06 PROCEDURE — 25800030 ZZH RX IP 258 OP 636: Performed by: NEUROLOGICAL SURGERY

## 2019-07-06 PROCEDURE — 25000125 ZZHC RX 250: Performed by: STUDENT IN AN ORGANIZED HEALTH CARE EDUCATION/TRAINING PROGRAM

## 2019-07-06 PROCEDURE — 25000132 ZZH RX MED GY IP 250 OP 250 PS 637: Performed by: SURGERY

## 2019-07-06 PROCEDURE — 84100 ASSAY OF PHOSPHORUS: CPT | Performed by: SURGERY

## 2019-07-06 PROCEDURE — 84100 ASSAY OF PHOSPHORUS: CPT | Performed by: STUDENT IN AN ORGANIZED HEALTH CARE EDUCATION/TRAINING PROGRAM

## 2019-07-06 PROCEDURE — 36415 COLL VENOUS BLD VENIPUNCTURE: CPT | Performed by: STUDENT IN AN ORGANIZED HEALTH CARE EDUCATION/TRAINING PROGRAM

## 2019-07-06 PROCEDURE — 82150 ASSAY OF AMYLASE: CPT | Performed by: STUDENT IN AN ORGANIZED HEALTH CARE EDUCATION/TRAINING PROGRAM

## 2019-07-06 PROCEDURE — 85027 COMPLETE CBC AUTOMATED: CPT | Performed by: STUDENT IN AN ORGANIZED HEALTH CARE EDUCATION/TRAINING PROGRAM

## 2019-07-06 PROCEDURE — 25000132 ZZH RX MED GY IP 250 OP 250 PS 637: Performed by: NEUROLOGICAL SURGERY

## 2019-07-06 PROCEDURE — 12000001 ZZH R&B MED SURG/OB UMMC

## 2019-07-06 PROCEDURE — 25000128 H RX IP 250 OP 636: Performed by: STUDENT IN AN ORGANIZED HEALTH CARE EDUCATION/TRAINING PROGRAM

## 2019-07-06 PROCEDURE — 25800030 ZZH RX IP 258 OP 636: Performed by: STUDENT IN AN ORGANIZED HEALTH CARE EDUCATION/TRAINING PROGRAM

## 2019-07-06 PROCEDURE — 80053 COMPREHEN METABOLIC PANEL: CPT | Performed by: STUDENT IN AN ORGANIZED HEALTH CARE EDUCATION/TRAINING PROGRAM

## 2019-07-06 PROCEDURE — 25000128 H RX IP 250 OP 636: Performed by: NEUROLOGICAL SURGERY

## 2019-07-06 PROCEDURE — 25000132 ZZH RX MED GY IP 250 OP 250 PS 637: Performed by: STUDENT IN AN ORGANIZED HEALTH CARE EDUCATION/TRAINING PROGRAM

## 2019-07-06 RX ORDER — LOSARTAN POTASSIUM 25 MG/1
25 TABLET ORAL EVERY MORNING
Status: DISCONTINUED | OUTPATIENT
Start: 2019-07-07 | End: 2019-07-09 | Stop reason: HOSPADM

## 2019-07-06 RX ORDER — ATORVASTATIN CALCIUM 20 MG/1
20 TABLET, FILM COATED ORAL EVERY MORNING
Status: DISCONTINUED | OUTPATIENT
Start: 2019-07-07 | End: 2019-07-09 | Stop reason: HOSPADM

## 2019-07-06 RX ORDER — METOPROLOL TARTRATE 25 MG/1
25 TABLET, FILM COATED ORAL 2 TIMES DAILY
Status: DISCONTINUED | OUTPATIENT
Start: 2019-07-06 | End: 2019-07-09 | Stop reason: HOSPADM

## 2019-07-06 RX ORDER — KETOROLAC TROMETHAMINE 15 MG/ML
15 INJECTION, SOLUTION INTRAMUSCULAR; INTRAVENOUS EVERY 6 HOURS PRN
Status: DISCONTINUED | OUTPATIENT
Start: 2019-07-06 | End: 2019-07-06

## 2019-07-06 RX ADMIN — ENOXAPARIN SODIUM 40 MG: 40 INJECTION SUBCUTANEOUS at 10:54

## 2019-07-06 RX ADMIN — DOCUSATE SODIUM 286 ML: 50 LIQUID ORAL at 13:00

## 2019-07-06 RX ADMIN — POTASSIUM CHLORIDE, DEXTROSE MONOHYDRATE AND SODIUM CHLORIDE: 150; 5; 450 INJECTION, SOLUTION INTRAVENOUS at 11:17

## 2019-07-06 RX ADMIN — Medication: at 15:16

## 2019-07-06 RX ADMIN — METOPROLOL TARTRATE 25 MG: 25 TABLET ORAL at 08:31

## 2019-07-06 RX ADMIN — SODIUM PHOSPHATE, MONOBASIC, MONOHYDRATE AND SODIUM PHOSPHATE, DIBASIC, ANHYDROUS 15 MMOL: 276; 142 INJECTION, SOLUTION INTRAVENOUS at 15:59

## 2019-07-06 RX ADMIN — METOPROLOL TARTRATE 25 MG: 25 TABLET ORAL at 20:55

## 2019-07-06 RX ADMIN — Medication: at 12:22

## 2019-07-06 RX ADMIN — KETOROLAC TROMETHAMINE 15 MG: 15 INJECTION, SOLUTION INTRAMUSCULAR; INTRAVENOUS at 10:43

## 2019-07-06 RX ADMIN — ATORVASTATIN CALCIUM 20 MG: 20 TABLET, FILM COATED ORAL at 08:31

## 2019-07-06 ASSESSMENT — ACTIVITIES OF DAILY LIVING (ADL)
BATHING: 0-->INDEPENDENT
TOILETING: 0-->INDEPENDENT
TRANSFERRING: 0-->INDEPENDENT
DRESS: 0-->INDEPENDENT
ADLS_ACUITY_SCORE: 15
RETIRED_EATING: 0-->INDEPENDENT
SWALLOWING: 0-->SWALLOWS FOODS/LIQUIDS WITHOUT DIFFICULTY
ADLS_ACUITY_SCORE: 15
COGNITION: 0 - NO COGNITION ISSUES REPORTED
ADLS_ACUITY_SCORE: 15
ADLS_ACUITY_SCORE: 15
FALL_HISTORY_WITHIN_LAST_SIX_MONTHS: NO
RETIRED_COMMUNICATION: 0-->UNDERSTANDS/COMMUNICATES WITHOUT DIFFICULTY
AMBULATION: 0-->INDEPENDENT
ADLS_ACUITY_SCORE: 17
ADLS_ACUITY_SCORE: 17

## 2019-07-06 NOTE — PLAN OF CARE
"/66 (BP Location: Left arm)   Pulse 72   Temp 98.4  F (36.9  C) (Oral)   Resp 16   Ht 1.715 m (5' 7.5\")   Wt 82.8 kg (182 lb 8 oz)   SpO2 97%   BMI 28.16 kg/m      Activity: Pt up w/ assist of 1 to commode during shift   Neuros: A&O x4, calls appropriately. CMS intact   Cardiac: BP and HR stable. Pt denies chest pain   Respiratory: Pt denies shortness of breath. LS clear/diminished. 97% on 2 L NC   GI/: Pt unable to void spontaneously. Bladder scanned for 324. Straight cathed for 550 mL. Pt w/ hypoactive Bs, +Flatus. Pt reports some abdominal pressure but no BM during shift   Diet: NPO except ice chips   Skin: R transverse incision w/ steri strips, approximated   Lines: L hand infusing MIVF @ 75 mL/hr. NG to LIS w/ 550 output during shift   Drains: R ELOISA w/ 15 mL bloody output during shift. Amylase fluid collected and sent    Pain/nausea: Pain manageable w/out need to push PCA. No nausea during shift   Plan: Continue plan of care     "

## 2019-07-06 NOTE — PLAN OF CARE
"  Activity: Up to bedside commode with A1. Independently positioning in bed.    Neuro: A&Ox4, pleasant, able to make needs known.    GI/: +flatus, no BM yet, states \"Last time I needed a suppository to have a BM.\"  Snell was removed at 9:00am today, bladder scan yielded 300mL and patient was straight cathed for 500mL at 2100.    Diet: NPO x 1/2 cup ice chips per shift.    Incisions/Drains: Abdominal incision CDI, approximated and closed with steri strips. ELOISA drain with serosanguinous output. NG tube to LIS with 250mL out.    IV Access: PIV on left hand infusing D5% 1/2 NS with KCl at 75mL/hr.    Labs: Phos replaced for a level of 1.8; recheck yielded 3.0 level.    Vitals: Hypertensive, on 2LNC and vitally stable.    Pain: PCA dilaudid available, patient did not use this shift.  Epidural running at 10mL/hr.    New changes this shift: Straight cath performed at 2100.    Plan: Continue POC.    "

## 2019-07-06 NOTE — PROGRESS NOTES
REGIONAL ANESTHESIA PAIN SERVICE EPIDURAL NOTE  Marilia Bean is a 74 year old female POD #2 s/p DUODENECTOMY and placement of T4-5 epidural catheter for pain management.      SUBJECTIVE  Interval History: Overnight events: none. Patient reports adequate pain control with epidural infusion and current  analgesic medications (see below).  Denies weakness, paresthesias, circumoral numbness, metallic taste or tinnitus.  Patient transfers to the chair with assistance.  Currently with NGT, denies nausea or vomiting.  Urinary catheter in place.                 Clinically Aligned Pain Assessment (CAPA):  Comfort (How is your pain?): Comfortably manageable  Change in Pain (Since your last medication/intervention?): About the same  Pain Control (How are your pain treatments working?):  Partially effective pain control  Functioning (Are you able to do activities to get better?) : Can do most things, but pain gets in the way of some   Sleep (Does your pain management allow you to sleep or rest?): Awake with occasional pain                 Pain Intensity using Numerical Rating Scale (NRS):    0/10 at rest and 3/10 with activity        Antithrombotic/Thrombolytic Therapy ordered:  Lovenox 40mg subcutaneous q 24hrs     Analgesic Medications:              Medications related to Pain Management (From now, onward)     Start     Dose/Rate Route Frequency Ordered Stop     07/03/19 1544   lidocaine 1 % 0.1-1 mL      0.1-1 mL Other EVERY 1 HOUR PRN 07/03/19 1544       07/03/19 1544   lidocaine (LMX4) cream        Topical EVERY 1 HOUR PRN 07/03/19 1544       07/03/19 1345   HYDROmorphone (DILAUDID) PCA 1 mg/mL OPIOID NAIVE        Intravenous CONTINUOUS 07/03/19 1341       07/03/19 1245   bupivacaine (MARCAINE) 0.125 % in sodium chloride 0.9 % 250 mL EPIDURAL Infusion      10 mL/hr  EPIDURAL CONTINUOUS 07/03/19 1232               OBJECTIVE  Lab Results:       Recent Labs   Lab Test 07/04/19  0838   WBC 16.7*   RBC 3.78*   HGB 11.1*   HCT  "35.1   MCV 93   MCH 29.4   MCHC 31.6   RDW 13.2                  Lab Results   Component Value Date     INR 1.8 08/25/2017     INR 2.2 08/17/2017     INR 1.6 08/10/2017     INR 2.18 06/21/2017     INR 1.34 06/17/2017     INR 0.95 06/12/2017         Vitals:              Temp:  [95.2  F (35.1  C)-100.2  F (37.9  C)] 98.7  F (37.1  C)  Pulse:  [72] 72  Heart Rate:  [63-76] 63  Resp:  [16-18] 16  BP: (103-142)/(54-66) 110/56  SpO2:  [97 %-98 %] 97 %  /56 (BP Location: Left arm)   Pulse 72   Temp 98.7  F (37.1  C) (Oral)   Resp 16   Ht 1.715 m (5' 7.5\")   Wt 82.8 kg (182 lb 8 oz)   SpO2 97%   BMI 28.16 kg/m          Exam:   GEN: alert and no distress  NEURO/MSK: Strength B/L LE 5/5  and overall symmetric  SKIN: Epidural catheter site with dressing c/d/i, no tenderness, erythema, heme, edema       ASSESSMENT/PLAN:    Patient is receiving adequate analgesia with current multimodal therapy including T4-5 epidural catheter infusion of Bupivacaine 0.125% at 10mL/hr.  Pt transfers to the chair with assistance.  No evidence of adverse side effects related to local anesthetic. Urinary catheter in place.      - continue current epidural infusion Bupivacaine 0.125% at 10mL/hour, POD #2  - antithrombotic/thrombolytic therapy: ok to continue lovenox 40mg subcutaneous q 24hrs as ordered. Please contact [facility] (#[phone]) prior to any medication changes  - will continue to follow and adjust as needed     Robert Ramirez IV, MD  [facility]    "

## 2019-07-06 NOTE — PROVIDER NOTIFICATION
07/05/19 1400   Call Information   Date of Call 07/05/19   Time of Call 1441   Name of person requesting the team Pat   Title of person requesting team RN   RRT Arrival time 1445   Time RRT ended 1455   Reason for call   Type of RRT Adult   Primary reason for call Sudden or unanticipated change in patient condition   Was patient transferred from the ED, ICU, or PACU within last 24 hours prior to RRT call? No   SBAR   Situation pt was up with nurse walking felt things going dark and eyes rolled back per RN   Background pt with hx of Gardiner syndrome. duodenal polyps pos for CA, resection 2 days ago. also hx of HTN, HLD, CAD s/p CABG. aortic aneurysm checked yearly at [facility]. since 2007.    Notable History/Conditions Cardiac   Assessment pt is alert and oriented. Neuros intact. VSS. is currently on 2LO2, sats in low to mid 90's.   Interventions O2 per N/C or mask;Blood glucose   Patient Outcome   Patient Outcome Stabilized on unit   RRT Team   Date Attending Physician notified 07/05/19   Time Attending Physician notified 145   Lead RN Samaria AGOSTO   Post RRT Intervention Assessment   Post RRT Assessment Stable/Improved   Date Follow Up Done 07/05/19   Time Follow Up Done 7582

## 2019-07-06 NOTE — PROGRESS NOTES
REGIONAL ANESTHESIA PAIN SERVICE NOTE  SUBJECTIVE:  Interval History: Pt reports adequate pain control via Bupivicaine 0.125% infusion.  Denies any weakness, paresthesias, circumoral numbness, metallic taste or tinnitus.  Pt is ambulating minimally with assistance.  Patient is currently with no nausea or vomiting. Patient is currently on a NPO diet.  The primary team discontinued her epidural this AM at 845am, she was having 10/10 pain when the pain team was called at 10:40, the epidural was resumed until discussion with primary team was made about the plan to transition from the epidural. After discussion with the primary team, their concern the epidural was contributing to constipation and urinary retention. It is unlikely the epidural which has only local anesthetic would be the perpetrator of urinary retention and constipation, and increasing the PCA may further exacerbate this issue. Will provide an epidural holiday at this time while leaving the catheter in place to determine its contribution to the pain management and bowel function.       Clinically Aligned Pain Assessment (CAPA):   Comfort (How is your pain?): Comfortably manageable  Change in Pain (Since your last medication/intervention?): About the same  Pain Control (How are your pain treatments working?):  Fully effective pain control  Functioning (Are you able to do activities to get better?) : Can do most things, but pain gets in the way of some   Sleep (Does your pain management allow you to sleep or rest?): Normal sleep     Numerical Rating Scale:  1/10 at rest and 2/10 with movement.        Anticoagulation:  Lovenox 40mg q2h, last dose 1300 7/4/2019      OBJECTIVE:    Diagnostic:  Lab Results   Component Value Date    WBC 16.7 07/04/2019     Lab Results   Component Value Date    RBC 3.78 07/04/2019     Lab Results   Component Value Date    HGB 11.1 07/04/2019     Lab Results   Component Value Date    HCT 35.1 07/04/2019     Lab Results   Component  Value Date     07/04/2019         Vitals:    Temp:  [35.7  C (96.2  F)-36.9  C (98.4  F)] 36.6  C (97.8  F)  Heart Rate:  [60-72] 68  Resp:  [16] 16  BP: (103-165)/(48-72) 144/68  SpO2:  [92 %-98 %] 93 %    Exam:    Strength 5/5 and symmetric grossly in bilateral LE    Catheter site with dressing c/d/i, no tenderness, erythema, heme, edema      ASSESSMENT/PLAN:    Marilia Bean is a 74 year old female POD #3 s/p DUODENECTOMY and placement of Epidural T4-T5 catheter for analgesia.  Pt is receiving adequate analgesia with bupivacaine 0.125%%, total infusion 10mL/hour.  Pt is ambulating to the chair without difficulty.  No weakness or paresthesias, adequate sensory block.  No evidence of adverse side effects associated with local anesthetic.     - dicontinue current total infusion of 10mL/hour per primary team  - plan to leave catheter in place in event catheter is needed. If not will plan to discontinue upon re-evaluation of pain control tomorrow.   - will continue to follow and adjust as needed  - discussed plan with attending anesthesiologist      24 hour Job Code Pager.  For in-house use only.     [place]:  * * [phone]  [place]: * * [phone]  Peds: * * [phone]      Chan Aldridge MD  Regional Anesthesia Pain Service  Anesthesiology Resident CA2, PGY3

## 2019-07-06 NOTE — PLAN OF CARE
Pt unable to void again this AM. Epidural discontinued in hopes that she would be able to void and tolerate ambulation better. Pt in considerable pain after epidural discontinued. General Surgery team and anesthesia notified and it was restarted along with giving Toradol. Toradol very effective but only a one time dose that will not be repeated. PCA increased to 0.2mg Q2hr and this seemed to be sufficient to control pain. Pt able to void 200cc of urine at end of shift. Delay in getting KUB due to patient's allergy to contrast dye. Lungs clear. PLE given with only flecks of stool is results. Pt stated she passed a lot of gas and felt some relief.

## 2019-07-07 ENCOUNTER — APPOINTMENT (OUTPATIENT)
Dept: GENERAL RADIOLOGY | Facility: CLINIC | Age: 74
DRG: 328 | End: 2019-07-07
Attending: SURGERY
Payer: MEDICARE

## 2019-07-07 LAB
ANION GAP SERPL CALCULATED.3IONS-SCNC: 4 MMOL/L (ref 3–14)
BUN SERPL-MCNC: 16 MG/DL (ref 7–30)
CALCIUM SERPL-MCNC: 8.4 MG/DL (ref 8.5–10.1)
CHLORIDE SERPL-SCNC: 101 MMOL/L (ref 94–109)
CO2 SERPL-SCNC: 31 MMOL/L (ref 20–32)
CREAT SERPL-MCNC: 0.64 MG/DL (ref 0.52–1.04)
ERYTHROCYTE [DISTWIDTH] IN BLOOD BY AUTOMATED COUNT: 12.6 % (ref 10–15)
GFR SERPL CREATININE-BSD FRML MDRD: 88 ML/MIN/{1.73_M2}
GLUCOSE SERPL-MCNC: 140 MG/DL (ref 70–99)
HCT VFR BLD AUTO: 29.8 % (ref 35–47)
HGB BLD-MCNC: 9.5 G/DL (ref 11.7–15.7)
MAGNESIUM SERPL-MCNC: 2.1 MG/DL (ref 1.6–2.3)
MCH RBC QN AUTO: 29.4 PG (ref 26.5–33)
MCHC RBC AUTO-ENTMCNC: 31.9 G/DL (ref 31.5–36.5)
MCV RBC AUTO: 92 FL (ref 78–100)
PHOSPHATE SERPL-MCNC: 2.3 MG/DL (ref 2.5–4.5)
PLATELET # BLD AUTO: 221 10E9/L (ref 150–450)
POTASSIUM SERPL-SCNC: 3.6 MMOL/L (ref 3.4–5.3)
RBC # BLD AUTO: 3.23 10E12/L (ref 3.8–5.2)
SODIUM SERPL-SCNC: 136 MMOL/L (ref 133–144)
WBC # BLD AUTO: 11.7 10E9/L (ref 4–11)

## 2019-07-07 PROCEDURE — 84100 ASSAY OF PHOSPHORUS: CPT | Performed by: STUDENT IN AN ORGANIZED HEALTH CARE EDUCATION/TRAINING PROGRAM

## 2019-07-07 PROCEDURE — 25000128 H RX IP 250 OP 636: Performed by: STUDENT IN AN ORGANIZED HEALTH CARE EDUCATION/TRAINING PROGRAM

## 2019-07-07 PROCEDURE — 36415 COLL VENOUS BLD VENIPUNCTURE: CPT | Performed by: STUDENT IN AN ORGANIZED HEALTH CARE EDUCATION/TRAINING PROGRAM

## 2019-07-07 PROCEDURE — 25800030 ZZH RX IP 258 OP 636: Performed by: STUDENT IN AN ORGANIZED HEALTH CARE EDUCATION/TRAINING PROGRAM

## 2019-07-07 PROCEDURE — 83735 ASSAY OF MAGNESIUM: CPT | Performed by: STUDENT IN AN ORGANIZED HEALTH CARE EDUCATION/TRAINING PROGRAM

## 2019-07-07 PROCEDURE — 25000125 ZZHC RX 250: Performed by: STUDENT IN AN ORGANIZED HEALTH CARE EDUCATION/TRAINING PROGRAM

## 2019-07-07 PROCEDURE — 25800030 ZZH RX IP 258 OP 636: Performed by: NEUROLOGICAL SURGERY

## 2019-07-07 PROCEDURE — 85027 COMPLETE CBC AUTOMATED: CPT | Performed by: STUDENT IN AN ORGANIZED HEALTH CARE EDUCATION/TRAINING PROGRAM

## 2019-07-07 PROCEDURE — 25000132 ZZH RX MED GY IP 250 OP 250 PS 637: Performed by: STUDENT IN AN ORGANIZED HEALTH CARE EDUCATION/TRAINING PROGRAM

## 2019-07-07 PROCEDURE — 25000132 ZZH RX MED GY IP 250 OP 250 PS 637: Performed by: SURGERY

## 2019-07-07 PROCEDURE — 74241 XR UPPER GI WITH KUB: CPT

## 2019-07-07 PROCEDURE — 12000001 ZZH R&B MED SURG/OB UMMC

## 2019-07-07 PROCEDURE — 80048 BASIC METABOLIC PNL TOTAL CA: CPT | Performed by: STUDENT IN AN ORGANIZED HEALTH CARE EDUCATION/TRAINING PROGRAM

## 2019-07-07 RX ORDER — SIMETHICONE 80 MG
80 TABLET,CHEWABLE ORAL EVERY 6 HOURS PRN
Status: DISCONTINUED | OUTPATIENT
Start: 2019-07-07 | End: 2019-07-09 | Stop reason: HOSPADM

## 2019-07-07 RX ORDER — ASPIRIN 81 MG/1
81 TABLET ORAL EVERY MORNING
Status: DISCONTINUED | OUTPATIENT
Start: 2019-07-08 | End: 2019-07-07

## 2019-07-07 RX ORDER — HYDROMORPHONE HCL/0.9% NACL/PF 0.2MG/0.2
0.2 SYRINGE (ML) INTRAVENOUS
Status: DISCONTINUED | OUTPATIENT
Start: 2019-07-07 | End: 2019-07-07

## 2019-07-07 RX ORDER — ASPIRIN 81 MG/1
81 TABLET ORAL DAILY
Status: DISCONTINUED | OUTPATIENT
Start: 2019-07-08 | End: 2019-07-09 | Stop reason: HOSPADM

## 2019-07-07 RX ORDER — HYDROMORPHONE HYDROCHLORIDE 2 MG/1
2-4 TABLET ORAL
Status: DISCONTINUED | OUTPATIENT
Start: 2019-07-07 | End: 2019-07-09 | Stop reason: HOSPADM

## 2019-07-07 RX ORDER — OXYCODONE HYDROCHLORIDE 5 MG/1
5-10 TABLET ORAL EVERY 4 HOURS PRN
Status: DISCONTINUED | OUTPATIENT
Start: 2019-07-07 | End: 2019-07-07

## 2019-07-07 RX ORDER — METHOCARBAMOL 500 MG/1
500 TABLET, FILM COATED ORAL 4 TIMES DAILY PRN
Status: DISCONTINUED | OUTPATIENT
Start: 2019-07-07 | End: 2019-07-09 | Stop reason: HOSPADM

## 2019-07-07 RX ORDER — ACETAMINOPHEN 325 MG/1
975 TABLET ORAL 3 TIMES DAILY
Status: DISCONTINUED | OUTPATIENT
Start: 2019-07-07 | End: 2019-07-09 | Stop reason: HOSPADM

## 2019-07-07 RX ADMIN — SIMETHICONE CHEW TAB 80 MG 80 MG: 80 TABLET ORAL at 08:37

## 2019-07-07 RX ADMIN — ACETAMINOPHEN 975 MG: 325 TABLET, FILM COATED ORAL at 21:07

## 2019-07-07 RX ADMIN — ACETAMINOPHEN 650 MG: 325 SOLUTION ORAL at 08:38

## 2019-07-07 RX ADMIN — POTASSIUM CHLORIDE, DEXTROSE MONOHYDRATE AND SODIUM CHLORIDE: 150; 5; 450 INJECTION, SOLUTION INTRAVENOUS at 04:50

## 2019-07-07 RX ADMIN — SODIUM PHOSPHATE, MONOBASIC, MONOHYDRATE AND SODIUM PHOSPHATE, DIBASIC, ANHYDROUS 15 MMOL: 276; 142 INJECTION, SOLUTION INTRAVENOUS at 13:05

## 2019-07-07 RX ADMIN — ACETAMINOPHEN 975 MG: 325 TABLET, FILM COATED ORAL at 13:43

## 2019-07-07 RX ADMIN — ENOXAPARIN SODIUM 40 MG: 40 INJECTION SUBCUTANEOUS at 16:24

## 2019-07-07 RX ADMIN — POTASSIUM CHLORIDE, DEXTROSE MONOHYDRATE AND SODIUM CHLORIDE: 150; 5; 450 INJECTION, SOLUTION INTRAVENOUS at 22:34

## 2019-07-07 RX ADMIN — ATORVASTATIN CALCIUM 20 MG: 20 TABLET, FILM COATED ORAL at 08:40

## 2019-07-07 RX ADMIN — HYDROMORPHONE HYDROCHLORIDE 2 MG: 2 TABLET ORAL at 13:34

## 2019-07-07 RX ADMIN — SIMETHICONE CHEW TAB 80 MG 80 MG: 80 TABLET ORAL at 01:50

## 2019-07-07 RX ADMIN — METOPROLOL TARTRATE 25 MG: 25 TABLET ORAL at 08:40

## 2019-07-07 ASSESSMENT — ACTIVITIES OF DAILY LIVING (ADL)
ADLS_ACUITY_SCORE: 15

## 2019-07-07 ASSESSMENT — PAIN DESCRIPTION - DESCRIPTORS: DESCRIPTORS: CRAMPING

## 2019-07-07 NOTE — PROGRESS NOTES
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 No acute events overnight. Voiding spontaneously. Passing flatus. Pain well controlled. Ambulating    Objective  /67 (BP Location: Left arm)   Pulse 72   Temp 96  F (35.6  C) (Oral)   Resp 16   Ht 1.715 m (5' 7.5\")   Wt 82.8 kg (182 lb 8 oz)   SpO2 95%   BMI 28.16 kg/m      General: AAOx4, NAD, lying comfortably in bed  CV: hemodynamically stable. Regular rate and rhythm  Pulm: Breathing comfortably  Abd: soft, mild distension. tender to palpation by incision site. Incision c/d/i    Extremities: No edema  Neuro: moving all extremities spontaneously without apparent deficit      Intake/Output Summary (Last 24 hours) at 7/6/2019 0726  Last data filed at 7/6/2019 0600  Gross per 24 hour   Intake 1851.25 ml   Output 2255 ml   Net -403.75 ml         Most Recent 3 CBC's:  Recent Labs   Lab Test 07/07/19  0749 07/06/19  0700 07/05/19  0732   WBC 11.7* 12.5* 14.2*   HGB 9.5* 10.3* 10.6*   MCV 92 91 94    165 184     Most Recent 3 BMP's:  Recent Labs   Lab Test 07/07/19  0749 07/06/19  0700 07/05/19  0732    137 136   POTASSIUM 3.6 3.7 4.2   CHLORIDE 101 103 107   CO2 31 30 27   BUN 16 14 16   CR 0.64 0.75 0.79   ANIONGAP 4 4 2*   TARIQ 8.4* 8.5 8.1*   * 120* 135*     Most Recent 2 LFT's:  Recent Labs   Lab Test 07/06/19  0700 10/17/18  0956   AST 40 25   ALT 44 25   ALKPHOS 92 103   BILITOTAL 0.9 0.8     Most Recent 3 INR's:  Recent Labs   Lab Test 08/25/17 08/17/17 08/10/17   INR 1.8* 2.2* 1.6*     Assessment & Plan  74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 Transition to oral pain meds- scheduled tylenol, PRN oxycodone and dilaudid  - Restart ASA today.  - Encourage IS and " ambulation with assistance  - UGI study negative for leak. Drain amylase normal. Will discontinue NG tube.   - Enema today  - Okay to have sips of clears.      Patient seen and discussed with chief resident and will be discussed with staff.    Mindy Hung MD  General Surgery PGY-2  [phone]

## 2019-07-07 NOTE — PLAN OF CARE
VSS. Pt c/o pain managed using dilaudid PCA and PRN simethicone. Pt tolerating NPO ex ice chips; denies N/V. No BM/+gas. NG to LIS. UOP adequate. PIV running MIVF. ELOISA output minimal. Incision BIANCA. Pt up with 1 assist. PLAN: continue POC

## 2019-07-07 NOTE — PLAN OF CARE
Activity: Up to bedside commode with A1, independently positioning in bed.  Neuro: A&Ox4, pleasant, calls appropriately.  GI/: +flatus, no BM this shift, voided twice this shift without need for catheterization.  Diet: NPO  Incisions/Drains: NG tube with output per flowsheets, abdominal incision approximated with steri strips and BIANCA. ELOISA with serosanguinous output.  IV Access: PIV on left infusing D5% 1/2 NS with KCL at 75 mL/hr.  Labs: Phos replaced for 2.0, recheck in the AM.  Vitals: Vitally stable on 2LNC when sleeping.  Pain:  PCA dilaudid in use, good pain management.  New changes this shift: Epidural stopped this afternoon. Patient is now voiding spontaneously without difficulty.  Plan: Continue POC.

## 2019-07-07 NOTE — PROGRESS NOTES
REGIONAL ANESTHESIA PAIN SERVICE NOTE    Interval History: Epidural infusion held yesterday by primary team. Patient reports adequate pain control with dilaudid PCA.  Denies any weakness, paresthesias, circumoral numbness, metallic taste or tinnitus.  Pt is ambulating with assistance.  Patient is currently without nausea or vomiting. Patient is tolerating ice chips.       Clinically Aligned Pain Assessment (CAPA):   Comfort (How is your pain?): Comfortably manageable  Change in Pain (Since your last medication/intervention?): Getting better  Pain Control (How are your pain treatments working?):  Partially effective pain control  Functioning (Are you able to do activities to get better?) : Can do most things, but pain gets in the way of some   Sleep (Does your pain management allow you to sleep or rest?): Awake with occasional pain    Numerical Rating Scale: 3/10 at rest and 5/10 with movement.        Anticoagulation:  lovenox 40mg Qday - due at 11:00AM, nurse instructed to hold      OBJECTIVE:    Diagnostic:  Lab Results   Component Value Date    WBC 12.5 07/06/2019     Lab Results   Component Value Date    RBC 3.58 07/06/2019     Lab Results   Component Value Date    HGB 10.3 07/06/2019     Lab Results   Component Value Date    HCT 32.7 07/06/2019     Lab Results   Component Value Date     07/06/2019         Vitals:    Temp:  [35.8  C (96.5  F)-36.8  C (98.3  F)] 36.3  C (97.3  F)  Heart Rate:  [64-78] 78  Resp:  [16] 16  BP: (132-165)/(55-74) 132/74  SpO2:  [92 %-97 %] 96 %    Exam:   Strength 5/5 and symmetric grossly in bilateral LE  Catheter site with dressing c/d/i, no tenderness, erythema, heme, edema      ASSESSMENT/PLAN:    Marilia Bean is a 74 year old female POD #4 s/p DUODENECTOMY and placement of T4-5 epidural catheter for analgesia.  Pt's epidural has been off since yesterday, pain controlled on dilaudid PCA.  Pt is ambulating without difficulty.  No weakness or paresthesias.  No evidence of  adverse side effects associated with local anesthetic.     - removed epidural catheter today  - Holding 11AM dose of lovenox, RN instructed to resume at 4PM if no complications  - will continue to follow and adjust as needed  - discussed plan with attending anesthesiologist    Nancy Juarez MD  Regional Anesthesia Pain Service  7/7/2019 7:59 AM    24 hour Job Code Pager.  For in-house use only.     [place]:  * * [phone]  [place]: * * [phone]  Peds: * * [phone]  Enter call-back number and #

## 2019-07-07 NOTE — PLAN OF CARE
Pt had another large BM this AM. To X-ray for KUB and it was determined that she did not have a leak. NG removed and pt started on clears. Epidural removed. PCA discontinued. Pt started on oral Dilaudid. Simethicone given for bowel cramping. Pt up and down to BR and voiding in good amounts-not saving. ELOISA fluid dark red/50cc total for this shift. Phos replacement started.

## 2019-07-08 LAB
ALBUMIN SERPL-MCNC: 2.4 G/DL (ref 3.4–5)
ALP SERPL-CCNC: 119 U/L (ref 40–150)
ALT SERPL W P-5'-P-CCNC: 43 U/L (ref 0–50)
ANION GAP SERPL CALCULATED.3IONS-SCNC: 5 MMOL/L (ref 3–14)
AST SERPL W P-5'-P-CCNC: 28 U/L (ref 0–45)
BILIRUB SERPL-MCNC: 0.8 MG/DL (ref 0.2–1.3)
BUN SERPL-MCNC: 13 MG/DL (ref 7–30)
CALCIUM SERPL-MCNC: 7.9 MG/DL (ref 8.5–10.1)
CHLORIDE SERPL-SCNC: 103 MMOL/L (ref 94–109)
CO2 SERPL-SCNC: 29 MMOL/L (ref 20–32)
CREAT SERPL-MCNC: 0.71 MG/DL (ref 0.52–1.04)
ERYTHROCYTE [DISTWIDTH] IN BLOOD BY AUTOMATED COUNT: 12.6 % (ref 10–15)
GFR SERPL CREATININE-BSD FRML MDRD: 84 ML/MIN/{1.73_M2}
GLUCOSE SERPL-MCNC: 356 MG/DL (ref 70–99)
HCT VFR BLD AUTO: 26.9 % (ref 35–47)
HGB BLD-MCNC: 8.5 G/DL (ref 11.7–15.7)
MCH RBC QN AUTO: 28.8 PG (ref 26.5–33)
MCHC RBC AUTO-ENTMCNC: 31.6 G/DL (ref 31.5–36.5)
MCV RBC AUTO: 91 FL (ref 78–100)
PHOSPHATE SERPL-MCNC: 2.6 MG/DL (ref 2.5–4.5)
PLATELET # BLD AUTO: 211 10E9/L (ref 150–450)
POTASSIUM SERPL-SCNC: 4.2 MMOL/L (ref 3.4–5.3)
PROT SERPL-MCNC: 5.6 G/DL (ref 6.8–8.8)
RBC # BLD AUTO: 2.95 10E12/L (ref 3.8–5.2)
SODIUM SERPL-SCNC: 137 MMOL/L (ref 133–144)
WBC # BLD AUTO: 6.8 10E9/L (ref 4–11)

## 2019-07-08 PROCEDURE — 25000132 ZZH RX MED GY IP 250 OP 250 PS 637: Performed by: SURGERY

## 2019-07-08 PROCEDURE — 85027 COMPLETE CBC AUTOMATED: CPT | Performed by: STUDENT IN AN ORGANIZED HEALTH CARE EDUCATION/TRAINING PROGRAM

## 2019-07-08 PROCEDURE — 80053 COMPREHEN METABOLIC PANEL: CPT | Performed by: STUDENT IN AN ORGANIZED HEALTH CARE EDUCATION/TRAINING PROGRAM

## 2019-07-08 PROCEDURE — 84100 ASSAY OF PHOSPHORUS: CPT | Performed by: STUDENT IN AN ORGANIZED HEALTH CARE EDUCATION/TRAINING PROGRAM

## 2019-07-08 PROCEDURE — 25000128 H RX IP 250 OP 636: Performed by: STUDENT IN AN ORGANIZED HEALTH CARE EDUCATION/TRAINING PROGRAM

## 2019-07-08 PROCEDURE — 36415 COLL VENOUS BLD VENIPUNCTURE: CPT | Performed by: STUDENT IN AN ORGANIZED HEALTH CARE EDUCATION/TRAINING PROGRAM

## 2019-07-08 PROCEDURE — 12000001 ZZH R&B MED SURG/OB UMMC

## 2019-07-08 RX ADMIN — ACETAMINOPHEN 650 MG: 325 TABLET, FILM COATED ORAL at 08:04

## 2019-07-08 RX ADMIN — LOSARTAN POTASSIUM 25 MG: 25 TABLET ORAL at 08:04

## 2019-07-08 RX ADMIN — METOPROLOL TARTRATE 25 MG: 25 TABLET ORAL at 21:31

## 2019-07-08 RX ADMIN — ACETAMINOPHEN 975 MG: 325 TABLET, FILM COATED ORAL at 21:31

## 2019-07-08 RX ADMIN — METOPROLOL TARTRATE 25 MG: 25 TABLET ORAL at 08:04

## 2019-07-08 RX ADMIN — ATORVASTATIN CALCIUM 20 MG: 20 TABLET, FILM COATED ORAL at 08:04

## 2019-07-08 RX ADMIN — ASPIRIN 81 MG: 81 TABLET, COATED ORAL at 08:04

## 2019-07-08 RX ADMIN — ENOXAPARIN SODIUM 40 MG: 40 INJECTION SUBCUTANEOUS at 11:07

## 2019-07-08 RX ADMIN — ACETAMINOPHEN 650 MG: 325 TABLET, FILM COATED ORAL at 13:53

## 2019-07-08 ASSESSMENT — ACTIVITIES OF DAILY LIVING (ADL)
ADLS_ACUITY_SCORE: 15
ADLS_ACUITY_SCORE: 13
ADLS_ACUITY_SCORE: 13
ADLS_ACUITY_SCORE: 15
ADLS_ACUITY_SCORE: 13
ADLS_ACUITY_SCORE: 15

## 2019-07-08 ASSESSMENT — MIFFLIN-ST. JEOR: SCORE: 1379.26

## 2019-07-08 ASSESSMENT — PAIN DESCRIPTION - DESCRIPTORS: DESCRIPTORS: ACHING

## 2019-07-08 NOTE — PROGRESS NOTES
ELOISA removed this morning at 810 am by the team, and this time it is draining red blood and the dressing was changed.  Patient up independently in the room, had large BM, pain is tolerable with tylenol, took only 650 mg of that, /78. 143/79, tolerating full liquid diet, MIV discontinued,  will continue to monitor,

## 2019-07-08 NOTE — PLAN OF CARE
Alert and oriented x 4. Stated that she has pain only when she moves and did not want anything for pain yet. Incision with steri strips intact. Stated that she would call when she needed pain medication. Up to the BR with SBA and had BM, voiding and not saving. IV infusing at 75 ml/hr. Tolerating sips of clear liquids.

## 2019-07-08 NOTE — PLAN OF CARE
Activity: Ambulated in halls with A1. Independently positioning in bed.  Neuro: A&Ox4, pleasant, calls appropriately.  GI/: BM today, voiding spontaneously not saving.  Diet: Clears - sips only.  Good compliance, good tolerance.  Incisions/Drains: Abdominal incision CDI and steri stripped, approximated.  IV Access: PIV on left infusing MIVF at 75mL/hr  Labs: Phosphorous replaced today for level 2.3; recheck in AM  Vitals: Vitally stable on room air.  Pain: Medicated with scheduled tylenol, good pain management.  New changes this shift:  NG is out  Plan: Continue POC.

## 2019-07-08 NOTE — PROGRESS NOTES
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 No acute events overnight. Voiding spontaneously. Passing flatus. BM yesterday. Pain well controlled. Ambulating. Tolerating clears.    Objective  /66 (BP Location: Right arm)   Pulse 72   Temp 97.7  F (36.5  C) (Oral)   Resp 16   Ht 1.715 m (5' 7.5\")   Wt 82.8 kg (182 lb 8 oz)   SpO2 95%   BMI 28.16 kg/m      General: AAOx4, NAD, lying comfortably in bed  CV: hemodynamically stable. Regular rate and rhythm  Pulm: Breathing comfortably  Abd: soft, mild distension. tender to palpation by incision site. Incision c/d/i    Extremities: No edema  Neuro: moving all extremities spontaneously without apparent deficit      Intake/Output Summary (Last 24 hours) at 7/8/2019 0609  Last data filed at 7/7/2019 2300  Gross per 24 hour   Intake 1935 ml   Output 150 ml   Net 1785 ml     \  Most Recent 3 CBC's:  Recent Labs   Lab Test 07/07/19  0749 07/06/19  0700 07/05/19  0732   WBC 11.7* 12.5* 14.2*   HGB 9.5* 10.3* 10.6*   MCV 92 91 94    165 184     Most Recent 3 BMP's:  Recent Labs   Lab Test 07/07/19  0749 07/06/19  0700 07/05/19  0732    137 136   POTASSIUM 3.6 3.7 4.2   CHLORIDE 101 103 107   CO2 31 30 27   BUN 16 14 16   CR 0.64 0.75 0.79   ANIONGAP 4 4 2*   TARIQ 8.4* 8.5 8.1*   * 120* 135*     Most Recent 2 LFT's:  Recent Labs   Lab Test 07/06/19  0700 10/17/18  0956   AST 40 25   ALT 44 25   ALKPHOS 92 103   BILITOTAL 0.9 0.8     Most Recent 3 INR's:  Recent Labs   Lab Test 08/25/17 08/17/17 08/10/17   INR 1.8* 2.2* 1.6*     Assessment & Plan  74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    - Epidural capped. Not complaining of pain. Continue pain control with po tylenol and oxy prn.  - Encourage IS and ambulation " with assistance.  - UGI study negative for leak. NGT out.  - Drain amylase wnl. Remove ELOISA drain today.  - Full liquid diet today. IVF discontinued.   - Possibly discharge tomorrow.    Patient seen and discussed with chief resident and will be discussed with staff.    Ruth Correa MD  Surgery Resident, PGY1  Pager: [phone]

## 2019-07-08 NOTE — PROGRESS NOTES
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 She reports minimal pain except for when she lifts herself out of bed. She is drinking clear liquids, but not much, as she gets some cramping after drinking. She is having a bowel movement. She got up and walked around last night. Her NG tube has been removed.     Objective:  Vitals: Max temp 99.4 (96 - 99.4), HR 96 (74 - 96), BP recent 143/79 (101/60 - 132 - 74), RR 16, SpO2 95 room air    I/O:  Input: 2.18 L , 658.75  Output: 475 mL (150 urine, 100 NG, 225 drain, 1 missed stool), 70 mL (70 drain, 2 missed urine, 1 missed stool).     Physical Exam:  GENERAL: Alert and oriented, and interactive. No apparent distress.  EYES: Sclera non-icteric.   CHEST: Normal respiration without accessory muscle use.   ABDOMEN: Drain inserted on right side of abdomen. Abdomen is soft and non-tender to light palpation.   NEURO: Patient is aware of situation and conversational.  PSYCH: Pleasant affect.     Labs: Reviewed. Notable for:    Calcium 7.9 mg/dL- trending downward  BUN 13 mg/dL  Creatinine 0.71 mg/dL  Phosphorus 2.6 - trending upward  Hgb 8.5 - trending downward  WBC 6.8 - trending downward    Imaging: Upper GI XR w/ KUB reviewed from 7/7. Final results:     Impression:   1. No evidence of extraluminal contrast leakage.  2. Small bilateral pleural effusion and associated atelectasis.    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 Her pain is well controlled with current regimen. She is tolerating clear liquids and is ready to eat more food. Now that her NG tube has been removed, we can transition to full liquids today. Imaging results along with normal amylase drain fluid from 7/6 are reassuring to removed drain today. Decreasing WBC count along with stable vitals are reassuring for no infection.       Plan:  1. Pain is well managed. Can continue Tylenol PO  PRN.  2. Continue VTE prophylaxis with Lovenox  3. Begin full liquid diet today.   4. Drain removal today.   5. Encourage to continue ambulating today.   6. Goal of discharge tomorrow.      Resident/Fellow Attestation   I, Ruth Correa, was present with the medical student who participated in the service and in the documentation of the note.  I have verified the history and personally performed the physical exam and medical decision making.  I agree with the assessment and plan of care as documented in the note.        Ruth Correa MD  PGY1  Date of Service (when I saw the patient): 07/08/19

## 2019-07-08 NOTE — PROGRESS NOTES
Patient has been doing fine, vitals stable, PIV just fall out, old ELOISA site has been leaking and dressing has been changed twice now.  Tolerated full liquid and diet increased to regular diet, had BM X 2.  Voids in BR, tylenol adequate for pain. Right midline incision intact with steri-strips.  Patient went for walk with  in the hallway.  Continue to monitor.

## 2019-07-09 VITALS
RESPIRATION RATE: 16 BRPM | TEMPERATURE: 98.5 F | DIASTOLIC BLOOD PRESSURE: 73 MMHG | BODY MASS INDEX: 28.02 KG/M2 | HEART RATE: 72 BPM | HEIGHT: 68 IN | OXYGEN SATURATION: 94 % | SYSTOLIC BLOOD PRESSURE: 149 MMHG | WEIGHT: 184.9 LBS

## 2019-07-09 LAB
COPATH REPORT: NORMAL
GLUCOSE BLDC GLUCOMTR-MCNC: 109 MG/DL (ref 70–99)
PHOSPHATE SERPL-MCNC: 3.3 MG/DL (ref 2.5–4.5)

## 2019-07-09 PROCEDURE — 25000132 ZZH RX MED GY IP 250 OP 250 PS 637: Performed by: SURGERY

## 2019-07-09 PROCEDURE — 36415 COLL VENOUS BLD VENIPUNCTURE: CPT | Performed by: STUDENT IN AN ORGANIZED HEALTH CARE EDUCATION/TRAINING PROGRAM

## 2019-07-09 PROCEDURE — 84100 ASSAY OF PHOSPHORUS: CPT | Performed by: STUDENT IN AN ORGANIZED HEALTH CARE EDUCATION/TRAINING PROGRAM

## 2019-07-09 PROCEDURE — 00000146 ZZHCL STATISTIC GLUCOSE BY METER IP

## 2019-07-09 RX ORDER — ACETAMINOPHEN 325 MG/1
1000 TABLET ORAL EVERY 8 HOURS PRN
Qty: 25 TABLET | Refills: 0 | Status: SHIPPED | OUTPATIENT
Start: 2019-07-09 | End: 2020-04-07

## 2019-07-09 RX ADMIN — METOPROLOL TARTRATE 25 MG: 25 TABLET ORAL at 08:27

## 2019-07-09 RX ADMIN — LOSARTAN POTASSIUM 25 MG: 25 TABLET ORAL at 08:27

## 2019-07-09 RX ADMIN — ASPIRIN 81 MG: 81 TABLET, COATED ORAL at 08:27

## 2019-07-09 RX ADMIN — ATORVASTATIN CALCIUM 20 MG: 20 TABLET, FILM COATED ORAL at 08:27

## 2019-07-09 RX ADMIN — ACETAMINOPHEN 975 MG: 325 TABLET, FILM COATED ORAL at 08:27

## 2019-07-09 RX ADMIN — ACETAMINOPHEN 975 MG: 325 TABLET, FILM COATED ORAL at 14:58

## 2019-07-09 ASSESSMENT — ACTIVITIES OF DAILY LIVING (ADL)
ADLS_ACUITY_SCORE: 13

## 2019-07-09 NOTE — PROGRESS NOTES
"GENERAL SURGERY PROGRESS NOTE  07/09/2019    Patient: Marilia Bean  MRN: [id number]    Subjective  POD 6 from duodenal resection due to duodenal adenocarcinoma.   NAEO  Tried regular diet yesterday.  Feels more bloated.  Not nauseous.    Objective  /73 (BP Location: Left arm)   Pulse 72   Temp 98.5  F (36.9  C) (Oral)   Resp 16   Ht 1.715 m (5' 7.5\")   Wt 83.9 kg (184 lb 14.4 oz)   SpO2 94%   BMI 28.53 kg/m      General: AAOx4, NAD, lying comfortably in bed  CV: hemodynamically stable. Regular rate and rhythm  Pulm: Breathing comfortably  Abd: soft, moderate distension. tender to palpation by incision site. Incision c/d/i    Extremities: No edema  Neuro: moving all extremities spontaneously without apparent deficit    \  Most Recent 3 CBC's:  Recent Labs   Lab Test 07/08/19  0631 07/07/19  0749 07/06/19  0700   WBC 6.8 11.7* 12.5*   HGB 8.5* 9.5* 10.3*   MCV 91 92 91    221 165     Most Recent 3 BMP's:  Recent Labs   Lab Test 07/08/19  0631 07/07/19  0749 07/06/19  0700    136 137   POTASSIUM 4.2 3.6 3.7   CHLORIDE 103 101 103   CO2 29 31 30   BUN 13 16 14   CR 0.71 0.64 0.75   ANIONGAP 5 4 4   TARIQ 7.9* 8.4* 8.5   * 140* 120*     Most Recent 2 LFT's:  Recent Labs   Lab Test 07/08/19  0631 07/06/19  0700   AST 28 40   ALT 43 44   ALKPHOS 119 92   BILITOTAL 0.8 0.9     Most Recent 3 INR's:  Recent Labs   Lab Test 08/25/17 08/17/17 08/10/17   INR 1.8* 2.2* 1.6*       Assessment & Plan  74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    - Pain well controlled.  - Encourage IS and ambulation with assistance.  - Distended on exam.   - Back to thick liquid diet today. If she tolerates, then potentially she can discharge home tomorrow.      Patient seen and discussed with chief resident and will be discussed with staff.    Ruth Correa MD  Surgery Resident, PGY1  Pager: [phone]        "

## 2019-07-09 NOTE — PROGRESS NOTES
Medical Student Note    Subjective: Marilia Bean is a 74-year-old female POD 6 duodenal resection for adenocarcinoma. Last night she transitioned to a regular diet and tried eating a turkey sandwich. It felt really dry and like it was getting stuck. This morning she feels bloated. She does not have nausea. She had some fluid leaking from her drain site yesterday after the ELOISA drain was removed. Her pain is minimal.    Objective:  Vitals: Max temp 98.6 (96.8 - 98.6), HR 69 (69 - 96), /54 (124/67 - 143 - 79), RR 16, SpO2 96 on room air.    I/O:  Input: 898.75 mL  Output: 85 mL (85 drain, 4 uncharted urine, 3 uncharted stool)    Physical Exam:   GENERAL: Patient is alert and oriented, conversational. No apparent distress.   HEENT: No apparent jaundice or scleral icterus.  CHEST: Normal respirations without use of accessory muscles.  ABDOMEN: Drain site in RLQ. Bandage replaced. Soft abdomen with some distention.    Labs: Reviewed. Notable for:  WBC 6.8 trending downward  Hgb 8.5 trending downward  Ca 7.9 trending downward  Phos 2.6  Alb 2.4 trending downward  BUN 13  Creat 0.71    Assessment: 74-year-old female POD 6 duodenal adenocarcinoma resection. Pain is well managed. Solid food is causing some discomfort. Stable vitals and normal WBC count are reassuring that there is no infection. Voiding and having regular bowel movements.    Plan:  1. Continue pain management with Tylenol PRN  2. Keep to soft food diet for now to alleviate symptoms  3.Continue going for walks  4. Continue Lovenox prophylaxis  5. No antibiotics needed  6. Prepare for discharge today or tomorrow  7. We will reach out to Dr. Brito for outpatient follow-up

## 2019-07-09 NOTE — PLAN OF CARE
Patient remains A&O, pleasant and up ad tyson. Drain removal site dressing remains CDI. Incision with steri-strips WNL. Team rounding back later this afternoon if all going well will discharge to home.

## 2019-07-09 NOTE — PLAN OF CARE
"Activity: Up ambulating independently in room.  Neuro: A&Ox4, pleasant, improving outlook/mood.  GI/: Voiding spontaneously not saving, +BMx3 today.  Diet: Regular diet, moderate appetite, moderate tolerance.  Reports \"I haven't passed much gas since eating solid foods,\" but did report stool after lunch.  Incisions/Drains: Old ELOISA site is leaking, dressing reinforced and changed frequently. Abdominal incision approximated and intact.  IV Access: none  Labs: Hgb trending down to 8.5 but stable.  Vitals: WNL on room air.  Pain: Taking scheduled tylenol only, reports good pain management.  New changes this shift: Ambulating independently in halls and in room. Regular diet with moderate tolerance.  Plan: Continue POC.    "

## 2019-07-09 NOTE — PROVIDER NOTIFICATION
Came to meet patient who was holding a saturated ABD pad against her newly removed drain site, the small primipore placed over by team completely saturated and bleeding through bandage. Surgery team paged to notify.

## 2019-07-09 NOTE — DISCHARGE INSTRUCTIONS
Discharge Instructions   Activity  - No strenuous exercise for 3-4 weeks  - No lifting, pushing, pulling more than 15-20 pounds for 3-4 weeks  - Do not strain with bowel movements  - Do not drive until you can press the brake pedal quickly and fully without pain  - Do not operate a motor vehicle while taking narcotic pain medications    Incisions  - The type of wound closure used for your incision:  Steri-strips  - Do not scrub incisions or submerge wounds (e.g. bath, pool, hot tub, etc.) for 2 weeks or until the glue or steri-strips have come off  - Steri-strips will fall off on their own in approximately 7-10 days    Drain Care  - You do not have a drain.  Specific care/instructions:  Not Applicable    Medications  - Do not take any additional Tylenol (acetaminophen) while using a narcotic pain medication which includes acetaminophen  - Do not take more than 4,000mg of acetaminophen in any 24 hour period, as this can cause liver damage  - Take stool softeners such as Senna or Miralax while you are using narcotics, but stop if you develop diarrhea  - Wean yourself off of narcotic pain medications    Follow-Up:  - Call your primary care provider to touch base regarding your recent admission.    - Call or return sooner than your regularly scheduled visit if you develop any of the following: fever >101.5, uncontrolled pain, uncontrolled nausea or vomiting, as well as increased redness, swelling, or drainage from your wound.   -  A nurse from the [facility] will contact you 24 hours, or next business day, after your discharge from the hospital.  If you have questions or to schedule a follow up appointment please call the [facility] Surgery [facility] at [phone].  Call [phone] and ask to speak with the Surgery resident on call if you are having troubles in the evenings, at night, or on the weekend.  -  If you are receiving home care please inform your home care nurse of our contact number.

## 2019-07-09 NOTE — PLAN OF CARE
"Time: 2300-0730  Status: POD#6 for duodenal resection for duodenal adenocarcinoma   Vitals: vs stable on RA and afebrile.   /62 (BP Location: Left arm)   Pulse 72   Temp 96.8  F (36  C) (Oral)   Resp 16   Ht 1.715 m (5' 7.5\")   Wt 83.9 kg (184 lb 14.4 oz)   SpO2 94%   BMI 28.53 kg/m      Activity: independent in room   Pain: denied   Neuro:  A&Ox4, calm and cooperative with cares.   Cardiac: WNL, denied chest pain  Respiratory: on room air, O2 sat > 92%, LS clear, Deep breathing and coughing encouraged.   GI/: started regular diet feeling full. Denied nausea/vomiting.   Diet: .regular, tolerated well, but feeling full.   Skin: incision old ELOISA dressing CDI  LDAs: none   Labs: phosphorus recheck this morning   New change for this shift: feeling fullness after started regular diet yesterday.   Plan: .possible discharge, otherwise the care plan will continue     "

## 2019-07-09 NOTE — PLAN OF CARE
Surgery team will sign discharge orders when attending available. Patient is otherwise ready to discharge home. Dressing changed again before getting dressed- supplied provided for dressing changes. Tylenol given for pain management. Patient safe for discharge home with .

## 2019-07-10 ENCOUNTER — TELEPHONE (OUTPATIENT)
Dept: INTERNAL MEDICINE | Facility: CLINIC | Age: 74
End: 2019-07-10

## 2019-07-10 ENCOUNTER — TELEPHONE (OUTPATIENT)
Dept: TRANSPLANT | Facility: CLINIC | Age: 74
End: 2019-07-10

## 2019-07-10 NOTE — TELEPHONE ENCOUNTER
Patient called to schedule an appointment for a hospital follow-up or appeared on a report showing that they were recently discharged from the hospital.    Patient was admitted to :  [facility]  Discharged date: 7/09/19  Reason for hospital admission:  Duodenal Adenocarcinoma (H), Duodenal Adenoma  Does patient have future appointment scheduled with provider? Yes Dr BONNER  Date of future appointment:  7/26/19      This information will be used to help the care team plan for the patients upcoming visit.  The triage RN may determine that a follow up call is necessary and reach out to the patient via the phone number listed in the chart.     Please route this message on routine priority to the Triage RN pool.

## 2019-07-10 NOTE — TELEPHONE ENCOUNTER
Called Marilia to check on her post hospital discharge.  She is doing well.Had 2 bowel movements last night. Wound has steri strips.Eating /drinking/pain controlled. I will work on post op appointment with Dr Brito. She stated that no one discussed her pathology with her and that she was not told, but sees she has an oncology appointment 7/26. She would like any treatment donre at [facility]. I told her I would have Michelle Fitzgerald RN discuss this with her when she returns from vacation.  She has my direct phone number for any future issues.

## 2019-07-14 ENCOUNTER — HOSPITAL ENCOUNTER (EMERGENCY)
Facility: CLINIC | Age: 74
Discharge: HOME | End: 2019-07-14
Attending: EMERGENCY MEDICINE | Admitting: EMERGENCY MEDICINE
Payer: MEDICARE

## 2019-07-14 ENCOUNTER — APPOINTMENT (OUTPATIENT)
Dept: CT IMAGING | Facility: CLINIC | Age: 74
End: 2019-07-14
Attending: EMERGENCY MEDICINE
Payer: MEDICARE

## 2019-07-14 VITALS
SYSTOLIC BLOOD PRESSURE: 162 MMHG | RESPIRATION RATE: 18 BRPM | OXYGEN SATURATION: 98 % | WEIGHT: 176 LBS | TEMPERATURE: 97.1 F | BODY MASS INDEX: 27.16 KG/M2 | HEART RATE: 83 BPM | DIASTOLIC BLOOD PRESSURE: 89 MMHG

## 2019-07-14 DIAGNOSIS — R12 BURNING REFLUX: ICD-10-CM

## 2019-07-14 DIAGNOSIS — R53.1 WEAKNESS: ICD-10-CM

## 2019-07-14 DIAGNOSIS — R11.11 NON-INTRACTABLE VOMITING WITHOUT NAUSEA, UNSPECIFIED VOMITING TYPE: ICD-10-CM

## 2019-07-14 DIAGNOSIS — E86.0 DEHYDRATION: ICD-10-CM

## 2019-07-14 LAB
ALBUMIN SERPL-MCNC: 3.3 G/DL (ref 3.4–5)
ALP SERPL-CCNC: 154 U/L (ref 40–150)
ALT SERPL W P-5'-P-CCNC: 45 U/L (ref 0–50)
ANION GAP SERPL CALCULATED.3IONS-SCNC: 9 MMOL/L (ref 3–14)
AST SERPL W P-5'-P-CCNC: 34 U/L (ref 0–45)
BASOPHILS # BLD AUTO: 0 10E9/L (ref 0–0.2)
BASOPHILS NFR BLD AUTO: 0.2 %
BILIRUB SERPL-MCNC: 0.9 MG/DL (ref 0.2–1.3)
BUN SERPL-MCNC: 21 MG/DL (ref 7–30)
CALCIUM SERPL-MCNC: 10.2 MG/DL (ref 8.5–10.1)
CHLORIDE SERPL-SCNC: 100 MMOL/L (ref 94–109)
CO2 SERPL-SCNC: 30 MMOL/L (ref 20–32)
CREAT SERPL-MCNC: 1 MG/DL (ref 0.52–1.04)
DIFFERENTIAL METHOD BLD: ABNORMAL
EOSINOPHIL NFR BLD AUTO: 3.4 %
ERYTHROCYTE [DISTWIDTH] IN BLOOD BY AUTOMATED COUNT: 13 % (ref 10–15)
GFR SERPL CREATININE-BSD FRML MDRD: 55 ML/MIN/{1.73_M2}
GLUCOSE SERPL-MCNC: 100 MG/DL (ref 70–99)
HCT VFR BLD AUTO: 31.4 % (ref 35–47)
HGB BLD-MCNC: 10.3 G/DL (ref 11.7–15.7)
IMM GRANULOCYTES # BLD: 0.1 10E9/L (ref 0–0.4)
IMM GRANULOCYTES NFR BLD: 0.5 %
LIPASE SERPL-CCNC: 411 U/L (ref 73–393)
LYMPHOCYTES # BLD AUTO: 1.5 10E9/L (ref 0.8–5.3)
LYMPHOCYTES NFR BLD AUTO: 13.3 %
MAGNESIUM SERPL-MCNC: 2.6 MG/DL (ref 1.6–2.3)
MCH RBC QN AUTO: 30 PG (ref 26.5–33)
MCHC RBC AUTO-ENTMCNC: 32.8 G/DL (ref 31.5–36.5)
MCV RBC AUTO: 92 FL (ref 78–100)
MONOCYTES # BLD AUTO: 0.9 10E9/L (ref 0–1.3)
MONOCYTES NFR BLD AUTO: 7.9 %
NEUTROPHILS # BLD AUTO: 8.2 10E9/L (ref 1.6–8.3)
NEUTROPHILS NFR BLD AUTO: 74.7 %
NRBC # BLD AUTO: 0 10*3/UL
NRBC BLD AUTO-RTO: 0 /100
PHOSPHATE SERPL-MCNC: 4.4 MG/DL (ref 2.5–4.5)
PLATELET # BLD AUTO: 473 10E9/L (ref 150–450)
POTASSIUM SERPL-SCNC: 3.8 MMOL/L (ref 3.4–5.3)
PROT SERPL-MCNC: 7.4 G/DL (ref 6.8–8.8)
RBC # BLD AUTO: 3.43 10E12/L (ref 3.8–5.2)
SODIUM SERPL-SCNC: 139 MMOL/L (ref 133–144)
WBC # BLD AUTO: 11 10E9/L (ref 4–11)

## 2019-07-14 PROCEDURE — 80053 COMPREHEN METABOLIC PANEL: CPT | Performed by: EMERGENCY MEDICINE

## 2019-07-14 PROCEDURE — 84100 ASSAY OF PHOSPHORUS: CPT | Performed by: EMERGENCY MEDICINE

## 2019-07-14 PROCEDURE — 25000128 H RX IP 250 OP 636: Performed by: EMERGENCY MEDICINE

## 2019-07-14 PROCEDURE — 96374 THER/PROPH/DIAG INJ IV PUSH: CPT

## 2019-07-14 PROCEDURE — 83690 ASSAY OF LIPASE: CPT | Performed by: EMERGENCY MEDICINE

## 2019-07-14 PROCEDURE — 99285 EMERGENCY DEPT VISIT HI MDM: CPT | Mod: Z6 | Performed by: EMERGENCY MEDICINE

## 2019-07-14 PROCEDURE — 25000132 ZZH RX MED GY IP 250 OP 250 PS 637: Performed by: EMERGENCY MEDICINE

## 2019-07-14 PROCEDURE — 96361 HYDRATE IV INFUSION ADD-ON: CPT

## 2019-07-14 PROCEDURE — 74176 CT ABD & PELVIS W/O CONTRAST: CPT

## 2019-07-14 PROCEDURE — 99285 EMERGENCY DEPT VISIT HI MDM: CPT | Mod: 25

## 2019-07-14 PROCEDURE — 96375 TX/PRO/DX INJ NEW DRUG ADDON: CPT

## 2019-07-14 PROCEDURE — 25000125 ZZHC RX 250: Performed by: EMERGENCY MEDICINE

## 2019-07-14 PROCEDURE — 85025 COMPLETE CBC W/AUTO DIFF WBC: CPT | Performed by: EMERGENCY MEDICINE

## 2019-07-14 PROCEDURE — 83735 ASSAY OF MAGNESIUM: CPT | Performed by: EMERGENCY MEDICINE

## 2019-07-14 RX ORDER — ONDANSETRON 2 MG/ML
4 INJECTION INTRAMUSCULAR; INTRAVENOUS EVERY 30 MIN PRN
Status: DISCONTINUED | OUTPATIENT
Start: 2019-07-14 | End: 2019-07-14 | Stop reason: HOSPADM

## 2019-07-14 RX ORDER — SUCRALFATE 1 G/1
1 TABLET ORAL 4 TIMES DAILY PRN
Qty: 100 TABLET | Refills: 0 | Status: SHIPPED | OUTPATIENT
Start: 2019-07-14 | End: 2020-04-07

## 2019-07-14 RX ORDER — METOCLOPRAMIDE 10 MG/1
10 TABLET ORAL 3 TIMES DAILY PRN
Qty: 30 TABLET | Refills: 0 | Status: SHIPPED | OUTPATIENT
Start: 2019-07-14 | End: 2020-04-07

## 2019-07-14 RX ADMIN — SODIUM CHLORIDE 1000 ML: 9 INJECTION, SOLUTION INTRAVENOUS at 15:38

## 2019-07-14 RX ADMIN — ONDANSETRON 4 MG: 2 INJECTION INTRAMUSCULAR; INTRAVENOUS at 15:38

## 2019-07-14 RX ADMIN — FAMOTIDINE 20 MG: 10 INJECTION, SOLUTION INTRAVENOUS at 18:42

## 2019-07-14 RX ADMIN — LIDOCAINE HYDROCHLORIDE 30 ML: 20 SOLUTION ORAL; TOPICAL at 17:54

## 2019-07-14 RX ADMIN — SODIUM CHLORIDE 1000 ML: 9 INJECTION, SOLUTION INTRAVENOUS at 17:54

## 2019-07-14 NOTE — ED TRIAGE NOTES
Pt states she had her Duodenum removed on July 3rd.  Has had issues since.  Since Friday she has had sever heartburn and nausea and vomiting.

## 2019-07-14 NOTE — ED PROVIDER NOTES
"  History     Chief Complaint   Patient presents with     Nausea & Vomiting     Heartburn     The history is provided by the patient.     Marilia Bean is a 74 year old female who is twelve days S/P resection of distal duodenal and proximal jejunum who presents to the emergency department with nausea, vomiting, and heart burn. The procedure went well and the patient had no immediate complications. She had an NG tube in place for a few days after surgery, but had that removed and she was able to eat a turkey sandwich prior to discharge which she tolerated well. The patient was then discharged from the hospital on 7/9/16. She was feeling well after leaving the hospital, but started to get severe heart burn two days ago. The patient then started having several episodes of vomiting last night. She denies feeling nauseated, but states \"it just burns in my chest and then I keep burping and finally everything just comes up\". The patient ate vegetable soup prior to when the vomiting started, but she had not eaten anything for 24 hours prior to that. She tried to drink Ensure last night and again this morning, but has not been able to keep anything down. Patient reports feeling very weak. She has lost 12 lbs since leaving the hospital. She denies any pain anywhere. When she left the hospital, she was only taking Tylenol, but is not taking that anymore. Patient had a normal formed bowel movement yesterday and then another small bowel movement today. She reports urinating normally when she left the hospital, but has not urinated much over the last 24 hours because she is dehydrated. Patient does not have a fever. Patient had the small bowel resection due to cancer that was found during a scope in April. She has never had a bowel blockage.  She has history of Gardiner syndrome.    Allergies:  Allergies   Allergen Reactions     Contrast Dye Itching     Morphine Nausea     Reports that she did not vomit.        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 1/8/2013          HYPERLIPIDEMIA LDL GOAL <100 10/31/2010     Priority: Medium     Aortic aneurysm (H) 07/01/2007     Priority: Medium     see 7/07 Ba report -follow yearly, treat high BP is occurs  Problem list name updated by automated process. Provider to review       Postmenopausal atrophic vaginitis 08/20/2006     Priority: Medium     Asymptomatic postmenopausal status 05/18/2004     Priority: Medium     Problem list name updated by automated process. Provider to review          Past Medical History:    Past Medical History:   Diagnosis Date     Aortic aneurysm (H) 7/1/2007     Aortic aneurysm of unspecified site without mention of rupture 7/2007     Asymptomatic postmenopausal status (age-related) (natural)      CAD (coronary artery disease)      Family history of Gardiner syndrome      Hyperlipidemia LDL goal <100 10/31/2010     Hypertension goal BP (blood pressure) < 140/90 2/9/2016     Leiomyoma of uterus, unspecified      Lump or mass in breast 7/2004     Personal history of colonic polyps      Postmenopausal atrophic vaginitis 8/20/2006     Pulmonary nodule      Pure hypercholesterolemia        Past Surgical History:    Past Surgical History:   Procedure Laterality Date     BYPASS GRAFT ARTERY CORONARY N/A 6/5/2017    Procedure: BYPASS GRAFT ARTERY CORONARY;;  Surgeon: Akshat Soto MD;  Location: [facility]     C DEXA INTERPRETATION, AXIAL  12/21/01    wnl. 7/2005 wnl but lower     COLONOSCOPY  05/07/07    Repeat  in 1 year for surveillance     COLONOSCOPY  12/10/08    repeat 1 year  -see 1/2009 letter     COLONOSCOPY  03/31/10     COLONOSCOPY  10/31/2011    Procedure:COMBINED COLONOSCOPY, SINGLE BIOPSY/POLYPECTOMY BY BIOPSY; colonoscopy with polypectomy by biopsy; Surgeon:JESSICA GARCIA; Location:[facility]     COLONOSCOPY  12/6/2013    Procedure: COMBINED COLONOSCOPY, SINGLE BIOPSY/POLYPECTOMY BY BIOPSY;  Colonoscopy, Polypectomies;  Surgeon: Herbert Salinas MD;  Location: [facility]     COLONOSCOPY N/A 12/8/2015    Procedure: COLONOSCOPY;  Surgeon: Jared Carbajal MD;  Location: [facility]     COLONOSCOPY N/A 4/26/2017    Procedure: COMBINED COLONOSCOPY, SINGLE OR MULTIPLE BIOPSY/POLYPECTOMY BY BIOPSY;  Colonoscopy with polypectomies with forceps and snare;  Surgeon: Herbert Salinas MD;  Location:  GI     ESOPHAGOSCOPY, GASTROSCOPY, DUODENOSCOPY (EGD), COMBINED N/A 12/8/2015    Procedure: COMBINED ESOPHAGOSCOPY, GASTROSCOPY, DUODENOSCOPY (EGD), BIOPSY SINGLE OR MULTIPLE;  Surgeon: Jared Carbajal MD;  Location:  GI     HC BIOPSY BREAST, PERC NEEDLE CORE, WITH IMAGING  8/17/2004    Right     HC COLONOSCOPY THRU STOMA W BIOPSY/CAUTERY TUMOR/POLYP/LESION  1999,2002 2004 polyp - hyperplastic - repeat 5 years ?     HC COLONOSCOPY W/WO BRUSH/WASH  12/12/2005    Polypectomy.  Diverticulosis-minimal. Bx adenomatous and mucosal polyps - repeat 1 year     HC ECP WITH CATARACT SURGERY Bilateral 2015, 2016     HC LAPAROSCOPY, SURGICAL; APPENDECTOMY  10/31/2004     [facility] LAPAROSCOPY, SURGICAL; CHOLECYSTECTOMY  2000    Cholecystectomy, Laparoscopic     HC REVISE MEDIAN N/CARPAL TUNNEL SURG  10/01/10    left      [facility] ENDOSCOPY, SIMPLE EXAM  03/31/10     HYSTERECTOMY, ELVIN  12/14/09    TAHBSO, MMK     REPAIR ANEURYSM ASCENDING AORTA N/A 6/5/2017    Procedure: REPAIR ANEURYSM ASCENDING AORTA;  Median Sternotomy, Ascending Aortic Aneurysm Repair, Coronary Artery Bypass Graft x1 on pump oxygenator;  Surgeon: Akshat Soto  MD Mani;  Location: [facility]     RESECTION DUODENAL N/A 7/3/2019    Procedure: Resection of Distal Duodenal and proximal Jejunum;  Surgeon: Joaquin Brito MD;  Location: [facility]       Family History:    Family History   Problem Relation Age of Onset     Cancer Mother         Colon Cancer     Heart Disease Mother         MI     Osteoporosis Mother      Cancer Father         Colon Cancer     Heart Disease Father         Heart Disease/ Heart attacks     Diabetes Father         Adult Onset     Cancer Sister         Colon Cancer     Heart Disease Brother         Heart attacks at age 45     Breast Cancer Sister      Cancer Paternal Grandmother         Unknown type     Heart Disease Maternal Grandfather         MI     Diabetes Sister         Adult Onset     Diabetes Sister         Adult Onset     Diabetes Brother         Adult Onset     Heart Disease Brother         heart attack age 64     Cancer - colorectal Son         age 36, Gardiner syndrome       Social History:  Marital Status:   [2]  Social History     Tobacco Use     Smoking status: Never Smoker     Smokeless tobacco: Never Used   Substance Use Topics     Alcohol use: Yes     Comment: rarely     Drug use: No        Medications:      metoclopramide (REGLAN) 10 MG tablet   ranitidine (ZANTAC) 150 MG tablet   sucralfate (CARAFATE) 1 GM tablet   acetaminophen (TYLENOL) 325 MG tablet   aspirin 81 MG EC tablet   atorvastatin (LIPITOR) 20 MG tablet   losartan (COZAAR) 25 MG tablet   metoprolol tartrate (LOPRESSOR) 25 MG tablet         Review of Systems   All other ROS reviewed and are negative or non-contributory except as stated in HPI.    Physical Exam   BP: 108/75  Pulse: 93  Heart Rate: 90  Temp: 97.1  F (36.2  C)  Resp: 18  Weight: 79.8 kg (176 lb)  SpO2: 99 %      Physical Exam   Constitutional: She appears well-developed and well-nourished.   Very pleasant, thin, older female sitting in the bed   HENT:   Head: Normocephalic.   Nose: Nose normal.    Dry mucous membranes   Eyes: Pupils are equal, round, and reactive to light. Conjunctivae and EOM are normal. No scleral icterus.   Neck: Normal range of motion. Neck supple.   Cardiovascular: Normal rate, regular rhythm, normal heart sounds and intact distal pulses.   Sternal scar status post thoracic aortic repair   Pulmonary/Chest: Effort normal and breath sounds normal.   Abdominal: Soft.   Hypoactive bowel sounds.  Large right abdominal incision site is clean/dry/intact.  No significant tenderness   Musculoskeletal: Normal range of motion. She exhibits no edema.   Neurological: She is alert. She exhibits normal muscle tone.   Skin: Skin is warm and dry. She is not diaphoretic.   Psychiatric: She has a normal mood and affect. Her behavior is normal.   Vitals reviewed.      ED Course (with Medical Decision Making)    Pt seen and examined by me.  RN and EPIC notes reviewed.      Patient with burning epigastric and chest pain after eating or drinking something status post abdominal surgery.  She may just have severe reflux secondary to slow transit versus ileus versus obstruction versus other.  Plan IV, fluids, electrolytes, scan of the abdomen.    Patient's hemoglobin is slightly low, consistent with previous.  Her electrolytes show mild elevation in BUN/creatinine ratio and her lipase is borderline high but nothing significant.  Postoperative changes noted.  She does have some mildly distended stomach and may have possible stenosis at the anastomosis.  No obvious bowel obstruction.    These results were discussed with the patient.  She was able to keep the water down.  Her biggest concern was some burning pain.  She was given a GI cocktail and some Pepcid both of which helped her symptoms.  I suspect that she has some inflammation and possible narrowing postoperatively but I do not think she has a complete obstruction at this point.  I think the plan will be to try some Zantac and Carafate for the reflux and  some Reglan for nausea and hopefully to promote some movement of the bowels.  She needs to closely monitor her symptoms.  If she has any worsening I would be mostly concerned that she has a full obstruction or stenosis and she would need to have close follow-up with her surgeon.  At this point she is stable, we have rehydrated her.  I recommend that she continue to have a very soft diet but return at anytime for worsening, changes or concerns.        Procedures    Results for orders placed or performed during the hospital encounter of 07/14/19 (from the past 24 hour(s))   CBC with platelets differential   Result Value Ref Range    WBC 11.0 4.0 - 11.0 10e9/L    RBC Count 3.43 (L) 3.8 - 5.2 10e12/L    Hemoglobin 10.3 (L) 11.7 - 15.7 g/dL    Hematocrit 31.4 (L) 35.0 - 47.0 %    MCV 92 78 - 100 fl    MCH 30.0 26.5 - 33.0 pg    MCHC 32.8 31.5 - 36.5 g/dL    RDW 13.0 10.0 - 15.0 %    Platelet Count 473 (H) 150 - 450 10e9/L    Diff Method Automated Method     % Neutrophils 74.7 %    % Lymphocytes 13.3 %    % Monocytes 7.9 %    % Eosinophils 3.4 %    % Basophils 0.2 %    % Immature Granulocytes 0.5 %    Nucleated RBCs 0 0 /100    Absolute Neutrophil 8.2 1.6 - 8.3 10e9/L    Absolute Lymphocytes 1.5 0.8 - 5.3 10e9/L    Absolute Monocytes 0.9 0.0 - 1.3 10e9/L    Absolute Basophils 0.0 0.0 - 0.2 10e9/L    Abs Immature Granulocytes 0.1 0 - 0.4 10e9/L    Absolute Nucleated RBC 0.0    Comprehensive metabolic panel   Result Value Ref Range    Sodium 139 133 - 144 mmol/L    Potassium 3.8 3.4 - 5.3 mmol/L    Chloride 100 94 - 109 mmol/L    Carbon Dioxide 30 20 - 32 mmol/L    Anion Gap 9 3 - 14 mmol/L    Glucose 100 (H) 70 - 99 mg/dL    Urea Nitrogen 21 7 - 30 mg/dL    Creatinine 1.00 0.52 - 1.04 mg/dL    GFR Estimate 55 (L) >60 mL/min/[1.73_m2]    GFR Estimate If Black 64 >60 mL/min/[1.73_m2]    Calcium 10.2 (H) 8.5 - 10.1 mg/dL    Bilirubin Total 0.9 0.2 - 1.3 mg/dL    Albumin 3.3 (L) 3.4 - 5.0 g/dL    Protein Total 7.4 6.8 - 8.8  g/dL    Alkaline Phosphatase 154 (H) 40 - 150 U/L    ALT 45 0 - 50 U/L    AST 34 0 - 45 U/L   Lipase   Result Value Ref Range    Lipase 411 (H) 73 - 393 U/L   Magnesium   Result Value Ref Range    Magnesium 2.6 (H) 1.6 - 2.3 mg/dL   Phosphorus   Result Value Ref Range    Phosphorus 4.4 2.5 - 4.5 mg/dL   CT Abdomen Pelvis w/o Contrast    Narrative    CT ABDOMEN AND PELVIS WITHOUT CONTRAST 7/14/2019 4:40 PM     HISTORY: Postop day #11 status post partial duodenectomy.  Pain,  nausea, vomiting after eating.  Evaluate for obstruction versus ileus.    COMPARISON: June 20, 2019    TECHNIQUE: Axial CT images of the abdomen and pelvis from the  diaphragm to the symphysis pubis were acquired without IV contrast.  Radiation dose for this scan was reduced using automated exposure  control, adjustment of the mA and/or kV according to patient size, or  iterative reconstruction technique.    FINDINGS: Question some hyperdense material adjacent to the surgical  staple lines, this could be a small amount of postoperative bleeding  largely inferior to the liver. This also extends into the posterior  pararenal space. The liver, spleen, adrenal glands, kidneys, and  pancreas demonstrate no worrisome focal lesion in the absence of  intravenous contrast. Mildly fluid filled and distended stomach. No  dilated bowel noted. Small amount of hyperdense material in the pelvis  likely additional bleeding. No free air. Granuloma at the left base.  Mild fibrotic changes. No frankly destructive bony lesions.      Impression    IMPRESSION:  1. Probable mild hematoma in the right upper quadrant and pelvis  compatible with a small amount of postoperative bleeding.  2. Mildly distended stomach which is nonspecific. Stenosis or  obstruction at the anastomosis is a consideration in the differential.  No bowel obstruction demonstrated.    ZARA WERNER MD       Medications   0.9% sodium chloride BOLUS (0 mLs Intravenous Stopped 7/14/19 3821)   lidocaine  HCl (XYLOCAINE) 2 % 15 mL, alum & mag hydroxide-simethicone (MYLANTA ES/MAALOX  ES) 15 mL GI Cocktail (30 mLs Oral Given 7/14/19 1754)   0.9% sodium chloride BOLUS (0 mLs Intravenous Stopped 7/14/19 1915)       Assessments & Plan     I have reviewed the findings, diagnosis, plan and need for follow up with the patient.       Medication List      Started    metoclopramide 10 MG tablet  Commonly known as:  REGLAN  10 mg, Oral, 3 TIMES DAILY PRN     ranitidine 150 MG tablet  Commonly known as:  ZANTAC  150 mg, Oral, 2 TIMES DAILY     sucralfate 1 GM tablet  Commonly known as:  CARAFATE  1 g, Oral, 4 TIMES DAILY PRN            Final diagnoses:   Burning reflux   Non-intractable vomiting without nausea, unspecified vomiting type   Weakness   Dehydration     Disposition: Patient discharged home in stable condition.  Plan as above.  Return for concerns.      This document serves as a record of services personally performed by Maricruz Uribe MD. It was created on their behalf by Dede Dickey, a trained medical scribe. The creation of this record is based on the provider's personal observations and the statements of the patient. This document has been checked and approved by the attending provider.  Note: Chart documentation done in part with Dragon Voice Recognition software. Although reviewed after completion, some word and grammatical errors may remain.    7/14/2019   [facility] EMERGENCY DEPARTMENT     Maricruz Uribe MD  07/15/19 0223

## 2019-07-14 NOTE — ED AVS SNAPSHOT
[facility] Emergency Department  [address] [place] [address]  Phone:  [phone]  Fax:  [phone]                                    Marilia Bean   MRN: [id number]    Department:  [facility] Emergency Department   Date of Visit:  7/14/2019           After Visit Summary Signature Page    I have received my discharge instructions, and my questions have been answered. I have discussed any challenges I see with this plan with the nurse or doctor.    ..........................................................................................................................................  Patient/Patient Representative Signature      ..........................................................................................................................................  Patient Representative Print Name and Relationship to Patient    ..................................................               ................................................  Date                                   Time    ..........................................................................................................................................  Reviewed by Signature/Title    ...................................................              ..............................................  Date                                               Time          22EPIC Rev 08/18

## 2019-07-14 NOTE — DISCHARGE INSTRUCTIONS
Start Zantac twice daily.    Take Carafate/sucralfate if needed for any further burning reflux pain.    Try metoclopramide/Reglan if needed for nausea.  This also helps to get some movement in your bowels.    If you continue to have these symptoms, please call your surgeon or primary provider for close follow-up.  If you are unable to keep fluids down, you may need further IV fluids.  Return promptly to the ED at anytime for worsening, changes or concerns.    I hope that this starts to improve very quickly!!

## 2019-07-15 ENCOUNTER — TELEPHONE (OUTPATIENT)
Dept: TRANSPLANT | Facility: CLINIC | Age: 74
End: 2019-07-15

## 2019-07-15 ENCOUNTER — HOSPITAL ENCOUNTER (INPATIENT)
Facility: CLINIC | Age: 74
LOS: 29 days | Discharge: HOME HEALTH | DRG: 327 | End: 2019-08-13
Attending: SURGERY | Admitting: SURGERY
Payer: MEDICARE

## 2019-07-15 DIAGNOSIS — C17.0 DUODENAL ADENOCARCINOMA (H): ICD-10-CM

## 2019-07-15 DIAGNOSIS — R10.9 ABDOMINAL PAIN WITH VOMITING: Primary | ICD-10-CM

## 2019-07-15 DIAGNOSIS — R11.10 ABDOMINAL PAIN WITH VOMITING: Primary | ICD-10-CM

## 2019-07-15 DIAGNOSIS — K56.600 PARTIAL INTESTINAL OBSTRUCTION, UNSPECIFIED CAUSE (H): ICD-10-CM

## 2019-07-15 PROCEDURE — 25000132 ZZH RX MED GY IP 250 OP 250 PS 637: Performed by: STUDENT IN AN ORGANIZED HEALTH CARE EDUCATION/TRAINING PROGRAM

## 2019-07-15 PROCEDURE — 25800030 ZZH RX IP 258 OP 636: Performed by: STUDENT IN AN ORGANIZED HEALTH CARE EDUCATION/TRAINING PROGRAM

## 2019-07-15 PROCEDURE — 25000128 H RX IP 250 OP 636: Performed by: STUDENT IN AN ORGANIZED HEALTH CARE EDUCATION/TRAINING PROGRAM

## 2019-07-15 PROCEDURE — 40000141 ZZH STATISTIC PERIPHERAL IV START W/O US GUIDANCE

## 2019-07-15 PROCEDURE — 12000001 ZZH R&B MED SURG/OB UMMC

## 2019-07-15 PROCEDURE — 25000131 ZZH RX MED GY IP 250 OP 636 PS 637: Performed by: STUDENT IN AN ORGANIZED HEALTH CARE EDUCATION/TRAINING PROGRAM

## 2019-07-15 RX ORDER — MAGNESIUM SULFATE HEPTAHYDRATE 40 MG/ML
4 INJECTION, SOLUTION INTRAVENOUS EVERY 4 HOURS PRN
Status: DISCONTINUED | OUTPATIENT
Start: 2019-07-15 | End: 2019-07-20

## 2019-07-15 RX ORDER — POTASSIUM CL/LIDO/0.9 % NACL 10MEQ/0.1L
10 INTRAVENOUS SOLUTION, PIGGYBACK (ML) INTRAVENOUS
Status: DISCONTINUED | OUTPATIENT
Start: 2019-07-15 | End: 2019-07-20

## 2019-07-15 RX ORDER — LIDOCAINE 40 MG/G
CREAM TOPICAL
Status: DISCONTINUED | OUTPATIENT
Start: 2019-07-15 | End: 2019-07-19

## 2019-07-15 RX ORDER — ACETAMINOPHEN 325 MG/1
650 TABLET ORAL EVERY 4 HOURS PRN
Status: DISCONTINUED | OUTPATIENT
Start: 2019-07-15 | End: 2019-07-16

## 2019-07-15 RX ORDER — POTASSIUM CHLORIDE 750 MG/1
20-40 TABLET, EXTENDED RELEASE ORAL
Status: DISCONTINUED | OUTPATIENT
Start: 2019-07-15 | End: 2019-07-20

## 2019-07-15 RX ORDER — PROCHLORPERAZINE 25 MG
12.5 SUPPOSITORY, RECTAL RECTAL EVERY 12 HOURS PRN
Status: DISCONTINUED | OUTPATIENT
Start: 2019-07-15 | End: 2019-08-13 | Stop reason: HOSPADM

## 2019-07-15 RX ORDER — PROCHLORPERAZINE 25 MG
12.5 SUPPOSITORY, RECTAL RECTAL EVERY 12 HOURS PRN
Status: DISCONTINUED | OUTPATIENT
Start: 2019-07-15 | End: 2019-07-15

## 2019-07-15 RX ORDER — SUCRALFATE 1 G/1
1 TABLET ORAL 4 TIMES DAILY PRN
Status: DISCONTINUED | OUTPATIENT
Start: 2019-07-15 | End: 2019-07-16

## 2019-07-15 RX ORDER — METOPROLOL TARTRATE 25 MG/1
25 TABLET, FILM COATED ORAL 2 TIMES DAILY
Status: DISCONTINUED | OUTPATIENT
Start: 2019-07-15 | End: 2019-08-13 | Stop reason: HOSPADM

## 2019-07-15 RX ORDER — NALOXONE HYDROCHLORIDE 0.4 MG/ML
.1-.4 INJECTION, SOLUTION INTRAMUSCULAR; INTRAVENOUS; SUBCUTANEOUS
Status: DISCONTINUED | OUTPATIENT
Start: 2019-07-15 | End: 2019-08-13 | Stop reason: HOSPADM

## 2019-07-15 RX ORDER — ONDANSETRON 4 MG/1
4 TABLET, ORALLY DISINTEGRATING ORAL EVERY 6 HOURS PRN
Status: DISCONTINUED | OUTPATIENT
Start: 2019-07-15 | End: 2019-08-13 | Stop reason: HOSPADM

## 2019-07-15 RX ORDER — DEXTROSE MONOHYDRATE, SODIUM CHLORIDE, AND POTASSIUM CHLORIDE 50; 1.49; 4.5 G/1000ML; G/1000ML; G/1000ML
INJECTION, SOLUTION INTRAVENOUS CONTINUOUS
Status: DISCONTINUED | OUTPATIENT
Start: 2019-07-15 | End: 2019-07-16

## 2019-07-15 RX ORDER — ATORVASTATIN CALCIUM 10 MG/1
20 TABLET, FILM COATED ORAL EVERY MORNING
Status: DISCONTINUED | OUTPATIENT
Start: 2019-07-16 | End: 2019-07-16

## 2019-07-15 RX ORDER — POTASSIUM CHLORIDE 29.8 MG/ML
20 INJECTION INTRAVENOUS
Status: DISCONTINUED | OUTPATIENT
Start: 2019-07-15 | End: 2019-07-20

## 2019-07-15 RX ORDER — POTASSIUM CHLORIDE 7.45 MG/ML
10 INJECTION INTRAVENOUS
Status: DISCONTINUED | OUTPATIENT
Start: 2019-07-15 | End: 2019-07-20

## 2019-07-15 RX ORDER — POTASSIUM CHLORIDE 1.5 G/1.58G
20-40 POWDER, FOR SOLUTION ORAL
Status: DISCONTINUED | OUTPATIENT
Start: 2019-07-15 | End: 2019-07-20

## 2019-07-15 RX ORDER — PROCHLORPERAZINE MALEATE 5 MG
5 TABLET ORAL EVERY 6 HOURS PRN
Status: DISCONTINUED | OUTPATIENT
Start: 2019-07-15 | End: 2019-07-15

## 2019-07-15 RX ORDER — METOCLOPRAMIDE 10 MG/1
10 TABLET ORAL 3 TIMES DAILY PRN
Status: DISCONTINUED | OUTPATIENT
Start: 2019-07-15 | End: 2019-07-15

## 2019-07-15 RX ORDER — OXYCODONE HYDROCHLORIDE 5 MG/1
5-10 TABLET ORAL
Status: DISCONTINUED | OUTPATIENT
Start: 2019-07-15 | End: 2019-08-13 | Stop reason: HOSPADM

## 2019-07-15 RX ORDER — LOSARTAN POTASSIUM 25 MG/1
25 TABLET ORAL EVERY MORNING
Status: DISCONTINUED | OUTPATIENT
Start: 2019-07-16 | End: 2019-08-13 | Stop reason: HOSPADM

## 2019-07-15 RX ORDER — ONDANSETRON 4 MG/1
4 TABLET, ORALLY DISINTEGRATING ORAL EVERY 6 HOURS PRN
Status: DISCONTINUED | OUTPATIENT
Start: 2019-07-15 | End: 2019-07-15

## 2019-07-15 RX ORDER — ONDANSETRON 2 MG/ML
4 INJECTION INTRAMUSCULAR; INTRAVENOUS EVERY 6 HOURS PRN
Status: DISCONTINUED | OUTPATIENT
Start: 2019-07-15 | End: 2019-07-15

## 2019-07-15 RX ORDER — PROCHLORPERAZINE MALEATE 5 MG
5 TABLET ORAL EVERY 6 HOURS PRN
Status: DISCONTINUED | OUTPATIENT
Start: 2019-07-15 | End: 2019-08-13 | Stop reason: HOSPADM

## 2019-07-15 RX ORDER — ASPIRIN 81 MG/1
81 TABLET ORAL EVERY MORNING
Status: DISCONTINUED | OUTPATIENT
Start: 2019-07-16 | End: 2019-07-16

## 2019-07-15 RX ORDER — ONDANSETRON 2 MG/ML
4 INJECTION INTRAMUSCULAR; INTRAVENOUS EVERY 6 HOURS PRN
Status: DISCONTINUED | OUTPATIENT
Start: 2019-07-15 | End: 2019-08-13 | Stop reason: HOSPADM

## 2019-07-15 RX ADMIN — PROCHLORPERAZINE EDISYLATE 5 MG: 5 INJECTION INTRAMUSCULAR; INTRAVENOUS at 23:20

## 2019-07-15 RX ADMIN — ONDANSETRON 4 MG: 4 TABLET, ORALLY DISINTEGRATING ORAL at 20:44

## 2019-07-15 RX ADMIN — ACETAMINOPHEN 650 MG: 325 TABLET, FILM COATED ORAL at 22:13

## 2019-07-15 RX ADMIN — POTASSIUM CHLORIDE, DEXTROSE MONOHYDRATE AND SODIUM CHLORIDE: 150; 5; 450 INJECTION, SOLUTION INTRAVENOUS at 22:14

## 2019-07-15 RX ADMIN — RANITIDINE 150 MG: 150 TABLET ORAL at 22:13

## 2019-07-15 ASSESSMENT — PAIN DESCRIPTION - DESCRIPTORS
DESCRIPTORS: ACHING
DESCRIPTORS: ACHING

## 2019-07-15 ASSESSMENT — MIFFLIN-ST. JEOR: SCORE: 1328.24

## 2019-07-15 NOTE — LETTER
Transition Communication Hand-off for Care Transitions to Next Level of Care Provider    Name: Marilia Bean  : 1945  MRN #: [id number]  Primary Care Provider: Herbert Epstein     Primary Clinic: [address] [place] [id number]     Reason for Hospitalization:  nausea & vomiting  poss bowel obstruction post procedure  Abdominal pain with vomiting  Bowel obstruction (H)  Admit Date/Time: 7/15/2019  8:00 PM  Discharge Date: 19  Payor Source: Payor: MEDICA / Plan: MEDICA PRIME SOLUTION / Product Type: Indemnity /     74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 Course c/b perianastomitic hematoma and stricture. Now s/p GJ tube placement by GI .     Discharge Plan: Home with home infusion services and clinic follow-up as recommended.        Discharge Needs Assessment:  Needs      Most Recent Value   Equipment Currently Used at Home  none          Follow-up plan:    Future Appointments   Date Time Provider Department Center   2019  2:00 PM [facility]       Any outstanding tests or procedures:        Referrals     Future Labs/Procedures    Home infusion referral     Comments:    Your provider has referred you to: [facility] [phone]   [email or web address]    Local Address (if different from home address): [place] staying locally [place] [facility] [place]    Anticipated Length of Therapy: 99 mo  PICC cares, IV hydration and enteral feeds  Home Infusion Pharmacist to adjust therapy based on labs and clinical assessments: Yes    Labs:    Draw BMP daily.  May draw labs from Venous Catheter: Yes  Home Infusion Pharmacist to order labs based on therapy type and clinical assessments: Yes  Call/Fax Lab Results to: PCP    Agency Staff to assess nursing needs for Infusion Therapy.    Access Device Management:  IV Access Type: PICC  Flush with Heparin and Normal Saline IVP PRN and routine  site care (per agency protocol) to maintain access device? Yes            Joanna Munoz, RN, BSN, PHN  Care Coordinator       AVS/Discharge Summary is the source of truth; this is a helpful guide for improved communication of patient story

## 2019-07-15 NOTE — TELEPHONE ENCOUNTER
"Call from Marilia about 09:30. See Epic notes re ER visit this am. She took meds with water and threw everything up. She states she feels \"fine \" now but wants to know what she should do. It seems that she has not eaten much beyond full liquids since her discharge from hospital. I will review her CT from yesterday with Dr Brito once he is out of surgery today.  "

## 2019-07-16 ENCOUNTER — APPOINTMENT (OUTPATIENT)
Dept: GENERAL RADIOLOGY | Facility: CLINIC | Age: 74
DRG: 327 | End: 2019-07-16
Attending: SURGERY
Payer: MEDICARE

## 2019-07-16 LAB
ALBUMIN SERPL-MCNC: 3 G/DL (ref 3.4–5)
ALP SERPL-CCNC: 131 U/L (ref 40–150)
ALT SERPL W P-5'-P-CCNC: 35 U/L (ref 0–50)
ANION GAP SERPL CALCULATED.3IONS-SCNC: 6 MMOL/L (ref 3–14)
AST SERPL W P-5'-P-CCNC: 29 U/L (ref 0–45)
BILIRUB SERPL-MCNC: 1.1 MG/DL (ref 0.2–1.3)
BUN SERPL-MCNC: 24 MG/DL (ref 7–30)
CALCIUM SERPL-MCNC: 8.9 MG/DL (ref 8.5–10.1)
CHLORIDE SERPL-SCNC: 100 MMOL/L (ref 94–109)
CO2 SERPL-SCNC: 28 MMOL/L (ref 20–32)
CREAT SERPL-MCNC: 0.89 MG/DL (ref 0.52–1.04)
ERYTHROCYTE [DISTWIDTH] IN BLOOD BY AUTOMATED COUNT: 12.9 % (ref 10–15)
GFR SERPL CREATININE-BSD FRML MDRD: 64 ML/MIN/{1.73_M2}
GLUCOSE SERPL-MCNC: 128 MG/DL (ref 70–99)
HCT VFR BLD AUTO: 30.7 % (ref 35–47)
HGB BLD-MCNC: 9.5 G/DL (ref 11.7–15.7)
MAGNESIUM SERPL-MCNC: 2.6 MG/DL (ref 1.6–2.3)
MCH RBC QN AUTO: 29 PG (ref 26.5–33)
MCHC RBC AUTO-ENTMCNC: 30.9 G/DL (ref 31.5–36.5)
MCV RBC AUTO: 94 FL (ref 78–100)
PHOSPHATE SERPL-MCNC: 3 MG/DL (ref 2.5–4.5)
PLATELET # BLD AUTO: 445 10E9/L (ref 150–450)
POTASSIUM SERPL-SCNC: 4 MMOL/L (ref 3.4–5.3)
PROT SERPL-MCNC: 7 G/DL (ref 6.8–8.8)
RBC # BLD AUTO: 3.28 10E12/L (ref 3.8–5.2)
SODIUM SERPL-SCNC: 135 MMOL/L (ref 133–144)
WBC # BLD AUTO: 16.1 10E9/L (ref 4–11)

## 2019-07-16 PROCEDURE — 36415 COLL VENOUS BLD VENIPUNCTURE: CPT | Performed by: STUDENT IN AN ORGANIZED HEALTH CARE EDUCATION/TRAINING PROGRAM

## 2019-07-16 PROCEDURE — 83735 ASSAY OF MAGNESIUM: CPT | Performed by: STUDENT IN AN ORGANIZED HEALTH CARE EDUCATION/TRAINING PROGRAM

## 2019-07-16 PROCEDURE — 40000141 ZZH STATISTIC PERIPHERAL IV START W/O US GUIDANCE

## 2019-07-16 PROCEDURE — 25000132 ZZH RX MED GY IP 250 OP 250 PS 637: Performed by: SURGERY

## 2019-07-16 PROCEDURE — 25000128 H RX IP 250 OP 636: Performed by: STUDENT IN AN ORGANIZED HEALTH CARE EDUCATION/TRAINING PROGRAM

## 2019-07-16 PROCEDURE — 84100 ASSAY OF PHOSPHORUS: CPT | Performed by: STUDENT IN AN ORGANIZED HEALTH CARE EDUCATION/TRAINING PROGRAM

## 2019-07-16 PROCEDURE — 85027 COMPLETE CBC AUTOMATED: CPT | Performed by: STUDENT IN AN ORGANIZED HEALTH CARE EDUCATION/TRAINING PROGRAM

## 2019-07-16 PROCEDURE — 80053 COMPREHEN METABOLIC PANEL: CPT | Performed by: STUDENT IN AN ORGANIZED HEALTH CARE EDUCATION/TRAINING PROGRAM

## 2019-07-16 PROCEDURE — 40000986 XR ABDOMEN PORT 1 VW

## 2019-07-16 PROCEDURE — 25000125 ZZHC RX 250: Performed by: STUDENT IN AN ORGANIZED HEALTH CARE EDUCATION/TRAINING PROGRAM

## 2019-07-16 PROCEDURE — 25800030 ZZH RX IP 258 OP 636: Performed by: SURGERY

## 2019-07-16 PROCEDURE — 25800030 ZZH RX IP 258 OP 636: Performed by: STUDENT IN AN ORGANIZED HEALTH CARE EDUCATION/TRAINING PROGRAM

## 2019-07-16 PROCEDURE — 25000132 ZZH RX MED GY IP 250 OP 250 PS 637: Performed by: STUDENT IN AN ORGANIZED HEALTH CARE EDUCATION/TRAINING PROGRAM

## 2019-07-16 PROCEDURE — 12000001 ZZH R&B MED SURG/OB UMMC

## 2019-07-16 RX ORDER — LIDOCAINE HYDROCHLORIDE 20 MG/ML
JELLY TOPICAL ONCE
Status: COMPLETED | OUTPATIENT
Start: 2019-07-16 | End: 2019-07-16

## 2019-07-16 RX ORDER — SODIUM CHLORIDE, SODIUM LACTATE, POTASSIUM CHLORIDE, CALCIUM CHLORIDE 600; 310; 30; 20 MG/100ML; MG/100ML; MG/100ML; MG/100ML
INJECTION, SOLUTION INTRAVENOUS CONTINUOUS
Status: DISCONTINUED | OUTPATIENT
Start: 2019-07-16 | End: 2019-07-18

## 2019-07-16 RX ORDER — SUCRALFATE ORAL 1 G/10ML
1 SUSPENSION ORAL 4 TIMES DAILY PRN
Status: DISCONTINUED | OUTPATIENT
Start: 2019-07-16 | End: 2019-08-01

## 2019-07-16 RX ADMIN — SODIUM CHLORIDE, POTASSIUM CHLORIDE, SODIUM LACTATE AND CALCIUM CHLORIDE 500 ML: 600; 310; 30; 20 INJECTION, SOLUTION INTRAVENOUS at 11:07

## 2019-07-16 RX ADMIN — METOPROLOL TARTRATE 25 MG: 25 TABLET ORAL at 19:46

## 2019-07-16 RX ADMIN — RANITIDINE HYDROCHLORIDE 50 MG: 25 INJECTION INTRAMUSCULAR; INTRAVENOUS at 19:45

## 2019-07-16 RX ADMIN — BENZOCAINE, MENTHOL 1 LOZENGE: 15; 3.6 LOZENGE ORAL at 15:14

## 2019-07-16 RX ADMIN — RANITIDINE HYDROCHLORIDE 50 MG: 25 INJECTION INTRAMUSCULAR; INTRAVENOUS at 14:02

## 2019-07-16 RX ADMIN — LOSARTAN POTASSIUM 25 MG: 25 TABLET ORAL at 10:58

## 2019-07-16 RX ADMIN — ACETAMINOPHEN 650 MG: 325 SOLUTION ORAL at 16:05

## 2019-07-16 RX ADMIN — ONDANSETRON 4 MG: 2 INJECTION INTRAMUSCULAR; INTRAVENOUS at 06:08

## 2019-07-16 RX ADMIN — SODIUM CHLORIDE, POTASSIUM CHLORIDE, SODIUM LACTATE AND CALCIUM CHLORIDE 1000 ML: 600; 310; 30; 20 INJECTION, SOLUTION INTRAVENOUS at 15:15

## 2019-07-16 RX ADMIN — ACETAMINOPHEN 650 MG: 325 SOLUTION ORAL at 20:30

## 2019-07-16 RX ADMIN — SODIUM CHLORIDE, POTASSIUM CHLORIDE, SODIUM LACTATE AND CALCIUM CHLORIDE: 600; 310; 30; 20 INJECTION, SOLUTION INTRAVENOUS at 11:08

## 2019-07-16 RX ADMIN — BENZOCAINE, MENTHOL 1 LOZENGE: 15; 3.6 LOZENGE ORAL at 18:25

## 2019-07-16 RX ADMIN — METOPROLOL TARTRATE 25 MG: 25 TABLET ORAL at 10:59

## 2019-07-16 RX ADMIN — LIDOCAINE HYDROCHLORIDE: 20 JELLY TOPICAL at 07:00

## 2019-07-16 ASSESSMENT — ACTIVITIES OF DAILY LIVING (ADL)
ADLS_ACUITY_SCORE: 13

## 2019-07-16 ASSESSMENT — PAIN DESCRIPTION - DESCRIPTORS
DESCRIPTORS: SORE
DESCRIPTORS: SORE

## 2019-07-16 ASSESSMENT — MIFFLIN-ST. JEOR: SCORE: 1311.46

## 2019-07-16 NOTE — PROGRESS NOTES
Nursing Focus: Admission  D: Arrived at 2020 from admissions via walking. Patient accompanied by family. Admitted for ongoing nausea/vomiting/abdominal bloating.   I: Admission process began.  Patient oriented to room, enviroment, call light.  Md. notified of patients arrival on unit.   A: Vital signs stable, afebrile.  Patient stable at this time.  Complaining of abdominal bloating and discomfort. Reports ongoing nausea.    P: Implement plan of care when available. Continue to monitor patient. Nursing interventions as appropriate. Notify SURGERY MD with changes in pt status.

## 2019-07-16 NOTE — PROGRESS NOTES
"0008: MD team OK to insert NG, reports duodenal resection is not a contraindication for insertion.   0045: Attempted per MD order, unable to advance pass back of L nostril, tried x2, pt reports too painful, \"burning\". Pt upset, wants sedation, refuses insertion for the night. Pt reports wishes to be without NG tonight, encourage pt to discuss with MD tomorrow AM about numbing options. Talked to charge nurse, float nurse, and 7B charge nurse; per policy, recent upper GI resection is a contraindication for blind placement.     Reason for RN to not continue insertion: Pt is not symptomatic right now. Pt wants to wait till AM and sleep overnight. MD aware of situation. Also for MD to consider imaging per protocol.     Continue with POC.   "

## 2019-07-16 NOTE — H&P
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  She was discharged 7/9 after her duodenal resection and was able to tolerate food that day.  She returned home but began to have emesis with food.  Now having emesis after any PO intake including water; denies dysphagia, but feels that her food sits in her stomach for a while and then causes reflux and emesis.  Feels weak and dehydrated.  Decreased UOP and very small bowel movements 3 and 1 days prior to arrival; not passing flatus.  Also notes L shoulder pain when her stomach is full, and RLQ aching pain yesterday and today that is presently resolved.  Went to the ED yesterday and received 2 IVF boluses.     ROS:  The remainder of the complete ROS was negative unless noted in the HPI.    Past Medical History:  Past Medical History:   Diagnosis Date     Aortic aneurysm (H) 7/1/2007    see 7/07 Ba report -follow yearly, treat high BP is occurs Problem list name updated by automated process. Provider to review     Aortic aneurysm of unspecified site without mention of rupture 7/2007    4.4 cm ascending aorta noted in 2007     Asymptomatic postmenopausal status (age-related) (natural)     on HRT  Prempro -weaning off 5/'2004     CAD (coronary artery disease)     LAD     Family history of Gardiner syndrome      Hyperlipidemia LDL goal <100 10/31/2010     Hypertension goal BP (blood pressure) < 140/90 2/9/2016     Leiomyoma of uterus, unspecified     Uterine fibroid     Lump or mass in breast 7/2004    rt. nodule - bx neg     Personal history of colonic polyps      Postmenopausal atrophic " vaginitis 8/20/2006     Pulmonary nodule     incidental noted in 2007 during garcia     Pure hypercholesterolemia     mild, diet - low cholesterol, low fat.10/06 start Lovastatin       Past Surgical History:  Past Surgical History:   Procedure Laterality Date     BYPASS GRAFT ARTERY CORONARY N/A 6/5/2017    Procedure: BYPASS GRAFT ARTERY CORONARY;;  Surgeon: Akshat Soto MD;  Location: [facility] OR     C DEXA INTERPRETATION, AXIAL  12/21/01    wnl. 7/2005 wnl but lower     COLONOSCOPY  05/07/07    Repeat in 1 year for surveillance     COLONOSCOPY  12/10/08    repeat 1 year  -see 1/2009 letter     COLONOSCOPY  03/31/10     COLONOSCOPY  10/31/2011    Procedure:COMBINED COLONOSCOPY, SINGLE BIOPSY/POLYPECTOMY BY BIOPSY; colonoscopy with polypectomy by biopsy; Surgeon:JESSICA GARCIA; Location: GI     COLONOSCOPY  12/6/2013    Procedure: COMBINED COLONOSCOPY, SINGLE BIOPSY/POLYPECTOMY BY BIOPSY;  Colonoscopy, Polypectomies;  Surgeon: Herbert Salinas MD;  Location:  GI     COLONOSCOPY N/A 12/8/2015    Procedure: COLONOSCOPY;  Surgeon: Jared Carbajal MD;  Location:  GI     COLONOSCOPY N/A 4/26/2017    Procedure: COMBINED COLONOSCOPY, SINGLE OR MULTIPLE BIOPSY/POLYPECTOMY BY BIOPSY;  Colonoscopy with polypectomies with forceps and snare;  Surgeon: Herbert Salinas MD;  Location:  GI     ESOPHAGOSCOPY, GASTROSCOPY, DUODENOSCOPY (EGD), COMBINED N/A 12/8/2015    Procedure: COMBINED ESOPHAGOSCOPY, GASTROSCOPY, DUODENOSCOPY (EGD), BIOPSY SINGLE OR MULTIPLE;  Surgeon: Jared Carbajal MD;  Location: [facility] BIOPSY BREAST, PERC NEEDLE CORE, WITH IMAGING  8/17/2004    Right     HC COLONOSCOPY THRU STOMA W BIOPSY/CAUTERY TUMOR/POLYP/LESION  1999,2002 2004 polyp - hyperplastic - repeat 5 years ?     HC COLONOSCOPY W/WO BRUSH/WASH  12/12/2005    Polypectomy.  Diverticulosis-minimal. Bx adenomatous and mucosal polyps - repeat 1 year     HC ECP WITH CATARACT SURGERY Bilateral 2015, 2016       LAPAROSCOPY, SURGICAL; APPENDECTOMY  10/31/2004     HC LAPAROSCOPY, SURGICAL; CHOLECYSTECTOMY  2000    Cholecystectomy, Laparoscopic     HC REVISE MEDIAN N/CARPAL TUNNEL SURG  10/01/10    left     HC UGI ENDOSCOPY, SIMPLE EXAM  03/31/10     HYSTERECTOMY, ELVIN  12/14/09    TAHBSO, MMK     REPAIR ANEURYSM ASCENDING AORTA N/A 6/5/2017    Procedure: REPAIR ANEURYSM ASCENDING AORTA;  Median Sternotomy, Ascending Aortic Aneurysm Repair, Coronary Artery Bypass Graft x1 on pump oxygenator;  Surgeon: Akshat Soto MD;  Location: [facility] OR     RESECTION DUODENAL N/A 7/3/2019    Procedure: Resection of Distal Duodenal and proximal Jejunum;  Surgeon: Joaquin Brito MD;  Location: [facility] OR       Allergies:     Allergies   Allergen Reactions     Contrast Dye Itching     Morphine Nausea     Reports that she did not vomit.        Medications:    No current facility-administered medications on file prior to encounter.   Current Outpatient Medications on File Prior to Encounter:  acetaminophen (TYLENOL) 325 MG tablet Take 3 tablets (975 mg) by mouth every 8 hours as needed for mild pain   aspirin 81 MG EC tablet Take 81 mg by mouth every morning    atorvastatin (LIPITOR) 20 MG tablet Take 1 tablet (20 mg) by mouth every morning   losartan (COZAAR) 25 MG tablet Take 1 tablet (25 mg) by mouth daily (Patient taking differently: Take 25 mg by mouth every morning )   metoclopramide (REGLAN) 10 MG tablet Take 1 tablet (10 mg) by mouth 3 times daily as needed (nausea)   metoprolol tartrate (LOPRESSOR) 25 MG tablet Take 1 tablet (25 mg) by mouth 2 times daily   ranitidine (ZANTAC) 150 MG tablet Take 1 tablet (150 mg) by mouth 2 times daily   sucralfate (CARAFATE) 1 GM tablet Take 1 tablet (1 g) by mouth 4 times daily as needed (reflux symptoms)       Social History:  Social History     Tobacco Use     Smoking status: Never Smoker     Smokeless tobacco: Never Used   Substance Use Topics     Alcohol use: Yes     Comment: rarely  "    Drug use: No      Denies tobacco and illicit drug use; rare alcohol     Family History:  Family History   Problem Relation Age of Onset     Cancer Mother         Colon Cancer     Heart Disease Mother         MI     Osteoporosis Mother      Cancer Father         Colon Cancer     Heart Disease Father         Heart Disease/ Heart attacks     Diabetes Father         Adult Onset     Cancer Sister         Colon Cancer     Heart Disease Brother         Heart attacks at age 45     Breast Cancer Sister      Cancer Paternal Grandmother         Unknown type     Heart Disease Maternal Grandfather         MI     Diabetes Sister         Adult Onset     Diabetes Sister         Adult Onset     Diabetes Brother         Adult Onset     Heart Disease Brother         heart attack age 64     Cancer - colorectal Son         age 36, Gardiner syndrome       Exam:  /74 (BP Location: Right arm)   Pulse 99   Temp 98.2  F (36.8  C) (Oral)   Resp 18   Ht 1.702 m (5' 7\")   Wt 79.6 kg (175 lb 6.4 oz)   SpO2 97%   BMI 27.47 kg/m    General: Alert, interactive, & in NAD  Resp: breathing non-labored  Cardiac: Regular rate; extremities warm  Abdomen: Soft, faintly tender RLQ otherwise nontender, distended, tympanic to percussion.  Extremities: No LE edema or obvious joint abnormalities  Skin: Warm and dry, no jaundice or rash    Labs:  Recent Labs   Lab 07/14/19  1515   WBC 11.0   HGB 10.3*   MCV 92   *      POTASSIUM 3.8   CHLORIDE 100   CO2 30   BUN 21   CR 1.00   ANIONGAP 9   TARIQ 10.2*   *   ALBUMIN 3.3*   PROTTOTAL 7.4   BILITOTAL 0.9   ALKPHOS 154*   ALT 45   AST 34   LIPASE 411*       Imaging:   CT from OSH reviewed.  Read is as follows:    Narrative     CT ABDOMEN AND PELVIS WITHOUT CONTRAST 7/14/2019 4:40 PM      HISTORY: Postop day #11 status post partial duodenectomy.  Pain,  nausea, vomiting after eating.  Evaluate for obstruction versus ileus.     COMPARISON: June 20, 2019     TECHNIQUE: Axial CT images " of the abdomen and pelvis from the  diaphragm to the symphysis pubis were acquired without IV contrast.  Radiation dose for this scan was reduced using automated exposure  control, adjustment of the mA and/or kV according to patient size, or  iterative reconstruction technique.     FINDINGS: Question some hyperdense material adjacent to the surgical  staple lines, this could be a small amount of postoperative bleeding  largely inferior to the liver. This also extends into the posterior  pararenal space. The liver, spleen, adrenal glands, kidneys, and  pancreas demonstrate no worrisome focal lesion in the absence of  intravenous contrast. Mildly fluid filled and distended stomach. No  dilated bowel noted. Small amount of hyperdense material in the pelvis  likely additional bleeding. No free air. Granuloma at the left base.  Mild fibrotic changes. No frankly destructive bony lesions.        Impression     IMPRESSION:  1. Probable mild hematoma in the right upper quadrant and pelvis  compatible with a small amount of postoperative bleeding.  2. Mildly distended stomach which is nonspecific. Stenosis or  obstruction at the anastomosis is a consideration in the differential.  No bowel obstruction demonstrated.     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  DDx includes SBO, ileus, stricture at anastomosis.       Plan:  - Keep NPO for now  - mIVF  - NGT   - Follow labs   - Symptomatic care     Discussed with Dr. Brito, surgery attending.  -------------------        Yulisa Diez MD  Surgery Resident PGY-1  Pg [phone]  9:46 PM, 7/15/2019

## 2019-07-16 NOTE — PLAN OF CARE
AVSS. Pt resting comfortably most of day. NG to LIS. 650 green/bile out this shift. Pt reports sore throat related to NG tube. Lozenges and hurricane spray ordered. 500 ml bolus given today. Additional liter currently infusing. Pt voiding spont. Urine concentrated. No bm. Denies passing gas. No nausea meds given this shift. Tolerating ice chips for comfort. Up ad tyson. May need assistance managing line. Continue with poc.     Writer spoke to Dr Brito this afternoon about charting I/O's and discussion about NG insertion overnight. Writer informed MD that there seemed to be a lot of miscommunication regarding safety of inserting NG. Will also notify next nurse about strict I/O recording.

## 2019-07-16 NOTE — PLAN OF CARE
[id number]: Alert and oriented x4. Reports abdominal discomfort, Tylenol PO x1 given. Reports nausea, Compazine IV x1 and Zofran IV x1 given; ongoing nausea per pt; however pt slept overnight without issues. Last emesis was at 2330, otherwise no emesis overnight. MD notified overnight and aware of NG situation; successfully placed NG tube at bedside this am by MD and XR ordered. Discussed situation with float RN and charge RN. Orders to spot check oxygen, pt remains above 92% on RA. Continues on MIVF. Independent. Continue with POC.     Between 6649-5302: pt slept; no complaints of nausea or vomiting; denies worsening pain; unchanged abdominal distention. Pt clearly states did not want NG tube till discussed with MD this AM about options of sedation for NG placement. Explained situation to multiple MDs (x3) since midnight.

## 2019-07-16 NOTE — PROGRESS NOTES
Patient questions NG being placed at bedside. At 2135, talked to Cinthia Rankin, General Surg resident OK to place at bedside for decompression.     At 2300, talked to rodrigo RN, per understanding, NG cannot be done at bedside due to duodenal resection from 7/9/19. MD notified at 2300. Talked to MD at 2306, per understanding, OK to do it but will discuss situation with other MD's.

## 2019-07-16 NOTE — PROGRESS NOTES
Surgery Progress Note  07/16/2019       Subjective:  - BRIANNE overnight.     Objective:  Temp:  [97.6  F (36.4  C)-99.3  F (37.4  C)] 99.3  F (37.4  C)  Pulse:  [92-99] 92  Resp:  [18] 18  BP: (112-147)/(61-75) 113/64  SpO2:  [92 %-97 %] 92 %    No intake/output data recorded.      Gen: Awake, alert, NAD  Resp: NLB on RA  Ext: WWP, no edema     Labs:  Recent Labs   Lab 07/14/19  1515   WBC 11.0   HGB 10.3*   *       Recent Labs   Lab 07/14/19  1515 07/09/19  0628     --    POTASSIUM 3.8  --    CHLORIDE 100  --    CO2 30  --    BUN 21  --    CR 1.00  --    *  --    TARIQ 10.2*  --    MAG 2.6*  --    PHOS 4.4 3.3       Imaging:  No new imaging.     Assessment/Plan:   74 year old female with history of aortic aneurysm repair, HTN, HLD, and Gardiner syndrome, who is s/p recent duodenal resection (7/3), now presenting with emesis and intolerance of PO intake.    - Continue NPO, mIVF  - Place NG tube if continued emesis  - Symptomatic management       Seen, examined, and discussed with chief resident, who will discuss with staff.  - - - - - - - - - - - - - - - - - -  Tal Mckeon, MS4  General Surgery

## 2019-07-16 NOTE — PROGRESS NOTES
Surgery Progress Note  07/16/2019       Subjective:  - BRIANNE overnight.  - Unable to receive NGT overnight 2/2 pain and nursing concern for placing at bedside due to recent anastomosis  - More emesis overnight      Objective:  Temp:  [97.6  F (36.4  C)-99.3  F (37.4  C)] 99.3  F (37.4  C)  Pulse:  [92-99] 92  Resp:  [18] 18  BP: (112-147)/(61-75) 113/64  SpO2:  [92 %-97 %] 92 %    No intake/output data recorded.      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  DDx includes SBO, ileus, stricture at anastomosis.       - NPO, mIVF   - NGT placement today with topical UroJet  - Follow labs   - Symptomatic care      Seen, examined, and discussed with chief resident, who will discuss with staff.    Yulisa Diez MD  Surgery Resident PGY-1  Pg [phone]

## 2019-07-17 LAB
ALBUMIN SERPL-MCNC: 2.8 G/DL (ref 3.4–5)
ALP SERPL-CCNC: 121 U/L (ref 40–150)
ALT SERPL W P-5'-P-CCNC: 28 U/L (ref 0–50)
ANION GAP SERPL CALCULATED.3IONS-SCNC: 7 MMOL/L (ref 3–14)
AST SERPL W P-5'-P-CCNC: 27 U/L (ref 0–45)
BILIRUB SERPL-MCNC: 1.1 MG/DL (ref 0.2–1.3)
BUN SERPL-MCNC: 20 MG/DL (ref 7–30)
CALCIUM SERPL-MCNC: 8.9 MG/DL (ref 8.5–10.1)
CHLORIDE SERPL-SCNC: 102 MMOL/L (ref 94–109)
CO2 SERPL-SCNC: 29 MMOL/L (ref 20–32)
CREAT SERPL-MCNC: 0.9 MG/DL (ref 0.52–1.04)
ERYTHROCYTE [DISTWIDTH] IN BLOOD BY AUTOMATED COUNT: 12.7 % (ref 10–15)
GFR SERPL CREATININE-BSD FRML MDRD: 63 ML/MIN/{1.73_M2}
GLUCOSE SERPL-MCNC: 94 MG/DL (ref 70–99)
HCT VFR BLD AUTO: 30.3 % (ref 35–47)
HGB BLD-MCNC: 9.4 G/DL (ref 11.7–15.7)
MAGNESIUM SERPL-MCNC: 2.2 MG/DL (ref 1.6–2.3)
MCH RBC QN AUTO: 29 PG (ref 26.5–33)
MCHC RBC AUTO-ENTMCNC: 31 G/DL (ref 31.5–36.5)
MCV RBC AUTO: 94 FL (ref 78–100)
PHOSPHATE SERPL-MCNC: 2.9 MG/DL (ref 2.5–4.5)
PLATELET # BLD AUTO: 435 10E9/L (ref 150–450)
POTASSIUM SERPL-SCNC: 3.8 MMOL/L (ref 3.4–5.3)
PROT SERPL-MCNC: 6.7 G/DL (ref 6.8–8.8)
RBC # BLD AUTO: 3.24 10E12/L (ref 3.8–5.2)
SODIUM SERPL-SCNC: 138 MMOL/L (ref 133–144)
WBC # BLD AUTO: 14.2 10E9/L (ref 4–11)

## 2019-07-17 PROCEDURE — 85027 COMPLETE CBC AUTOMATED: CPT | Performed by: STUDENT IN AN ORGANIZED HEALTH CARE EDUCATION/TRAINING PROGRAM

## 2019-07-17 PROCEDURE — 25000132 ZZH RX MED GY IP 250 OP 250 PS 637: Performed by: STUDENT IN AN ORGANIZED HEALTH CARE EDUCATION/TRAINING PROGRAM

## 2019-07-17 PROCEDURE — 83735 ASSAY OF MAGNESIUM: CPT | Performed by: STUDENT IN AN ORGANIZED HEALTH CARE EDUCATION/TRAINING PROGRAM

## 2019-07-17 PROCEDURE — 36415 COLL VENOUS BLD VENIPUNCTURE: CPT | Performed by: STUDENT IN AN ORGANIZED HEALTH CARE EDUCATION/TRAINING PROGRAM

## 2019-07-17 PROCEDURE — 25800030 ZZH RX IP 258 OP 636: Performed by: STUDENT IN AN ORGANIZED HEALTH CARE EDUCATION/TRAINING PROGRAM

## 2019-07-17 PROCEDURE — 84100 ASSAY OF PHOSPHORUS: CPT | Performed by: STUDENT IN AN ORGANIZED HEALTH CARE EDUCATION/TRAINING PROGRAM

## 2019-07-17 PROCEDURE — 25000132 ZZH RX MED GY IP 250 OP 250 PS 637: Performed by: SURGERY

## 2019-07-17 PROCEDURE — 25000125 ZZHC RX 250: Performed by: STUDENT IN AN ORGANIZED HEALTH CARE EDUCATION/TRAINING PROGRAM

## 2019-07-17 PROCEDURE — 25000128 H RX IP 250 OP 636: Performed by: STUDENT IN AN ORGANIZED HEALTH CARE EDUCATION/TRAINING PROGRAM

## 2019-07-17 PROCEDURE — 80053 COMPREHEN METABOLIC PANEL: CPT | Performed by: STUDENT IN AN ORGANIZED HEALTH CARE EDUCATION/TRAINING PROGRAM

## 2019-07-17 PROCEDURE — 25800030 ZZH RX IP 258 OP 636: Performed by: NEUROLOGICAL SURGERY

## 2019-07-17 PROCEDURE — 12000001 ZZH R&B MED SURG/OB UMMC

## 2019-07-17 RX ADMIN — SODIUM CHLORIDE, POTASSIUM CHLORIDE, SODIUM LACTATE AND CALCIUM CHLORIDE 880 ML: 600; 310; 30; 20 INJECTION, SOLUTION INTRAVENOUS at 22:05

## 2019-07-17 RX ADMIN — RANITIDINE HYDROCHLORIDE 50 MG: 25 INJECTION INTRAMUSCULAR; INTRAVENOUS at 12:22

## 2019-07-17 RX ADMIN — SODIUM CHLORIDE, POTASSIUM CHLORIDE, SODIUM LACTATE AND CALCIUM CHLORIDE 1000 ML: 600; 310; 30; 20 INJECTION, SOLUTION INTRAVENOUS at 06:39

## 2019-07-17 RX ADMIN — METOPROLOL TARTRATE 25 MG: 25 TABLET ORAL at 08:25

## 2019-07-17 RX ADMIN — ACETAMINOPHEN 650 MG: 325 SOLUTION ORAL at 19:18

## 2019-07-17 RX ADMIN — METOPROLOL TARTRATE 25 MG: 25 TABLET ORAL at 19:19

## 2019-07-17 RX ADMIN — BENZOCAINE 1 ML: 220 SPRAY, METERED PERIODONTAL at 08:49

## 2019-07-17 RX ADMIN — ACETAMINOPHEN 650 MG: 325 SOLUTION ORAL at 01:08

## 2019-07-17 RX ADMIN — RANITIDINE HYDROCHLORIDE 50 MG: 25 INJECTION INTRAMUSCULAR; INTRAVENOUS at 19:18

## 2019-07-17 RX ADMIN — SODIUM CHLORIDE, POTASSIUM CHLORIDE, SODIUM LACTATE AND CALCIUM CHLORIDE: 600; 310; 30; 20 INJECTION, SOLUTION INTRAVENOUS at 03:06

## 2019-07-17 RX ADMIN — LOSARTAN POTASSIUM 25 MG: 25 TABLET ORAL at 08:25

## 2019-07-17 RX ADMIN — PROCHLORPERAZINE EDISYLATE 5 MG: 5 INJECTION INTRAMUSCULAR; INTRAVENOUS at 08:24

## 2019-07-17 RX ADMIN — SODIUM CHLORIDE, POTASSIUM CHLORIDE, SODIUM LACTATE AND CALCIUM CHLORIDE: 600; 310; 30; 20 INJECTION, SOLUTION INTRAVENOUS at 12:27

## 2019-07-17 RX ADMIN — RANITIDINE HYDROCHLORIDE 50 MG: 25 INJECTION INTRAMUSCULAR; INTRAVENOUS at 04:24

## 2019-07-17 RX ADMIN — BENZOCAINE, MENTHOL 1 LOZENGE: 15; 3.6 LOZENGE ORAL at 06:38

## 2019-07-17 RX ADMIN — SODIUM CHLORIDE, POTASSIUM CHLORIDE, SODIUM LACTATE AND CALCIUM CHLORIDE 460 ML: 600; 310; 30; 20 INJECTION, SOLUTION INTRAVENOUS at 13:55

## 2019-07-17 ASSESSMENT — ACTIVITIES OF DAILY LIVING (ADL)
ADLS_ACUITY_SCORE: 13

## 2019-07-17 ASSESSMENT — MIFFLIN-ST. JEOR: SCORE: 1320.07

## 2019-07-17 ASSESSMENT — PAIN DESCRIPTION - DESCRIPTORS: DESCRIPTORS: SORE

## 2019-07-17 NOTE — PLAN OF CARE
Afebrile, VSS. C/o R sided throat pain, Tylenol given x2 and pt also using Cepacol lozenges. Denies nausea. 1L bolus completed this shift. Good UOP, pt states she is not passing gas. NG to LIS w/ large output this shift. Pt eating ice chips for comfort. Up and ambulating in christy independently. Continue to monitor.

## 2019-07-17 NOTE — PROGRESS NOTES
Surgery Progress Note  07/17/2019       Subjective:  - BRIANNE overnight.  - Feels the same overall  - Some lower abdominal pressure  - No flatus yet   - Eating ice chips      Objective:  Temp:  [96.3  F (35.7  C)-99.4  F (37.4  C)] 98.4  F (36.9  C)  Pulse:  [67-89] 82  Heart Rate:  [88] 88  Resp:  [16] 16  BP: (117-155)/(61-75) 155/74  SpO2:  [92 %-94 %] 92 %    I/O last 3 completed shifts:  In: 2111.67 [P.O.:60; I.V.:1041.67; NG/GT:10; IV Piggyback:1000]  Out: 4700 [Urine:950; Emesis/NG output:3750]      Gen: Awake, alert, NAD  Resp: NLB on RA  Abd: soft, distended, nontender, NGT in place   Incision: from previous surgery, c/d/i   Ext: WWP, no edema     Labs:  Recent Labs   Lab 07/17/19  0509 07/16/19  0553 07/14/19  1515   WBC 14.2* 16.1* 11.0   HGB 9.4* 9.5* 10.3*    445 473*       Recent Labs   Lab 07/17/19  0509 07/16/19  0553 07/14/19  1515    135 139   POTASSIUM 3.8 4.0 3.8   CHLORIDE 102 100 100   CO2 29 28 30   BUN 20 24 21   CR 0.90 0.89 1.00   GLC 94 128* 100*   TARIQ 8.9 8.9 10.2*   MAG 2.2 2.6* 2.6*   PHOS 2.9 3.0 4.4       Imaging:  None new      Assessment/Plan:   74 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  DDx includes SBO, ileus, stricture at anastomosis.       - NPO, mIVF, replace NGT output 1:1  - Follow labs   - Symptomatic care      Seen, examined, and discussed with chief resident, who will discuss with staff.    Yulisa Diez MD  Surgery Resident PGY-1  Pg [phone]

## 2019-07-17 NOTE — PLAN OF CARE
Pt c/o of acid reflex and nausea this am. Compazine and schedule zantac is helping. Up ambulating in the hallway. Absent bowel sounds all quadrants. No flatulence this shift. 1:1 NG output & LR IV fluid correction 460ml given for dayshift. Pt on strict I/O's. Continue with NPO and NG to LIS.    Pt with sore throat from NG tube. She tried hurricane spray and did not like it.

## 2019-07-17 NOTE — PLAN OF CARE
Patients  NG  has large output.New order for bolus this am started at 0639.Much discomfort from NG tube.Takes tylenol and ice chips to relieve pain.Had about 60 ml  of ice chips.Passed gas at 0645.Continue to monitor.

## 2019-07-17 NOTE — PROGRESS NOTES
[facility]          OUTPATIENT PHYSICAL THERAPY ORTHOPEDIC EVALUATION  PLAN OF TREATMENT FOR OUTPATIENT REHABILITATION  (COMPLETE FOR INITIAL CLAIMS ONLY)  Patient's Last Name, First Name, M.I.  YOB: 1945  Marilia Bean    Provider s Name:  [facility]   Medical Record No.  [id number]   Start of Care Date:  01/08/19   Onset Date:  11/08/18   Type:     _X__PT   ___OT   ___SLP Medical Diagnosis:  Bilateral low back pain with left-sided sciatica, unspecified chronicity     PT Diagnosis:  Radiating L LE symtoms d/t decrease hip ER and anterior pelvic position   Visits from SOC:  1      _________________________________________________________________________________  Plan of Treatment/Functional Goals:  manual therapy, neuromuscular re-education, strengthening, stretching  Other interventions as needed  Hot packs  Other modalities as needed  Goals  Goal Identifier: 1  Goal Description: Patient will improve JOLYNN score by at least 10 points in order to demonstrate functional improvements.  Target Date: 02/05/19    Goal Identifier: 2  Goal Description: Patient will report ability to sit in car and  kitchen for 30-45 minutes without increased LE symptoms.   Target Date: 02/05/19    Goal Identifier: 3  Goal Description: Patient will demonstrate HEP and proper pelvic position to determine understanding of home program for safe discharge.   Target Date: 02/05/19             Therapy Frequency:  1 time/week  Predicted Duration of Therapy Intervention:  4 weeks    Nivia Olivas PT                 I CERTIFY THE NEED FOR THESE SERVICES FURNISHED UNDER        THIS PLAN OF TREATMENT AND WHILE UNDER MY CARE     (Physician co-signature of this document indicates review and certification of the therapy plan).                       Certification Date From:  01/08/19   Certification Date To:  02/05/19    Referring Provider:  Herbert Epstein MD    Initial  Assessment        See Epic Evaluation Start of Care Date: 01/08/19                 Thank you for your referral.     Nivia Olivas PT, DPT    [facility]    O: [phone]  E: [email or web address]                                                              Home

## 2019-07-18 ENCOUNTER — APPOINTMENT (OUTPATIENT)
Dept: GENERAL RADIOLOGY | Facility: CLINIC | Age: 74
DRG: 327 | End: 2019-07-18
Attending: SURGERY
Payer: MEDICARE

## 2019-07-18 LAB
ANION GAP SERPL CALCULATED.3IONS-SCNC: 9 MMOL/L (ref 3–14)
BUN SERPL-MCNC: 17 MG/DL (ref 7–30)
CALCIUM SERPL-MCNC: 8.8 MG/DL (ref 8.5–10.1)
CHLORIDE SERPL-SCNC: 103 MMOL/L (ref 94–109)
CO2 SERPL-SCNC: 24 MMOL/L (ref 20–32)
CREAT SERPL-MCNC: 0.75 MG/DL (ref 0.52–1.04)
ERYTHROCYTE [DISTWIDTH] IN BLOOD BY AUTOMATED COUNT: 12.5 % (ref 10–15)
GFR SERPL CREATININE-BSD FRML MDRD: 78 ML/MIN/{1.73_M2}
GLUCOSE BLDC GLUCOMTR-MCNC: 76 MG/DL (ref 70–99)
GLUCOSE BLDC GLUCOMTR-MCNC: 78 MG/DL (ref 70–99)
GLUCOSE SERPL-MCNC: 70 MG/DL (ref 70–99)
HCT VFR BLD AUTO: 29.9 % (ref 35–47)
HGB BLD-MCNC: 9.2 G/DL (ref 11.7–15.7)
MAGNESIUM SERPL-MCNC: 1.9 MG/DL (ref 1.6–2.3)
MCH RBC QN AUTO: 28.7 PG (ref 26.5–33)
MCHC RBC AUTO-ENTMCNC: 30.8 G/DL (ref 31.5–36.5)
MCV RBC AUTO: 93 FL (ref 78–100)
PHOSPHATE SERPL-MCNC: 2.9 MG/DL (ref 2.5–4.5)
PLATELET # BLD AUTO: 446 10E9/L (ref 150–450)
POTASSIUM SERPL-SCNC: 3.6 MMOL/L (ref 3.4–5.3)
RBC # BLD AUTO: 3.21 10E12/L (ref 3.8–5.2)
SODIUM SERPL-SCNC: 137 MMOL/L (ref 133–144)
TRIGL SERPL-MCNC: 85 MG/DL
WBC # BLD AUTO: 14.1 10E9/L (ref 4–11)

## 2019-07-18 PROCEDURE — 84100 ASSAY OF PHOSPHORUS: CPT | Performed by: STUDENT IN AN ORGANIZED HEALTH CARE EDUCATION/TRAINING PROGRAM

## 2019-07-18 PROCEDURE — 27211414 ZZ H ADHESIVE SKIN CLOSURE, DERMABOND

## 2019-07-18 PROCEDURE — 85027 COMPLETE CBC AUTOMATED: CPT | Performed by: STUDENT IN AN ORGANIZED HEALTH CARE EDUCATION/TRAINING PROGRAM

## 2019-07-18 PROCEDURE — 25000128 H RX IP 250 OP 636: Performed by: STUDENT IN AN ORGANIZED HEALTH CARE EDUCATION/TRAINING PROGRAM

## 2019-07-18 PROCEDURE — 12000001 ZZH R&B MED SURG/OB UMMC

## 2019-07-18 PROCEDURE — 83735 ASSAY OF MAGNESIUM: CPT | Performed by: STUDENT IN AN ORGANIZED HEALTH CARE EDUCATION/TRAINING PROGRAM

## 2019-07-18 PROCEDURE — 3E0436Z INTRODUCTION OF NUTRITIONAL SUBSTANCE INTO CENTRAL VEIN, PERCUTANEOUS APPROACH: ICD-10-PCS | Performed by: SURGERY

## 2019-07-18 PROCEDURE — 40000141 ZZH STATISTIC PERIPHERAL IV START W/O US GUIDANCE

## 2019-07-18 PROCEDURE — 36415 COLL VENOUS BLD VENIPUNCTURE: CPT | Performed by: STUDENT IN AN ORGANIZED HEALTH CARE EDUCATION/TRAINING PROGRAM

## 2019-07-18 PROCEDURE — 00000146 ZZHCL STATISTIC GLUCOSE BY METER IP

## 2019-07-18 PROCEDURE — 25800030 ZZH RX IP 258 OP 636: Performed by: STUDENT IN AN ORGANIZED HEALTH CARE EDUCATION/TRAINING PROGRAM

## 2019-07-18 PROCEDURE — 74018 RADEX ABDOMEN 1 VIEW: CPT

## 2019-07-18 PROCEDURE — 25000125 ZZHC RX 250: Performed by: SURGERY

## 2019-07-18 PROCEDURE — 27211389 ZZ H KIT, 5 FR DL BIOFLO OPEN ENDED PICC

## 2019-07-18 PROCEDURE — 40000986 XR CHEST PORT 1 VW

## 2019-07-18 PROCEDURE — 84478 ASSAY OF TRIGLYCERIDES: CPT | Performed by: STUDENT IN AN ORGANIZED HEALTH CARE EDUCATION/TRAINING PROGRAM

## 2019-07-18 PROCEDURE — 25000132 ZZH RX MED GY IP 250 OP 250 PS 637: Performed by: STUDENT IN AN ORGANIZED HEALTH CARE EDUCATION/TRAINING PROGRAM

## 2019-07-18 PROCEDURE — 25800030 ZZH RX IP 258 OP 636: Performed by: NEUROLOGICAL SURGERY

## 2019-07-18 PROCEDURE — 25000132 ZZH RX MED GY IP 250 OP 250 PS 637: Performed by: SURGERY

## 2019-07-18 PROCEDURE — 25000125 ZZHC RX 250: Performed by: STUDENT IN AN ORGANIZED HEALTH CARE EDUCATION/TRAINING PROGRAM

## 2019-07-18 PROCEDURE — 36569 INSJ PICC 5 YR+ W/O IMAGING: CPT

## 2019-07-18 PROCEDURE — 80048 BASIC METABOLIC PNL TOTAL CA: CPT | Performed by: STUDENT IN AN ORGANIZED HEALTH CARE EDUCATION/TRAINING PROGRAM

## 2019-07-18 RX ORDER — HEPARIN SODIUM,PORCINE 10 UNIT/ML
2-5 VIAL (ML) INTRAVENOUS
Status: COMPLETED | OUTPATIENT
Start: 2019-07-18 | End: 2019-08-03

## 2019-07-18 RX ORDER — ACETAMINOPHEN 650 MG/1
650 SUPPOSITORY RECTAL EVERY 4 HOURS PRN
Status: DISCONTINUED | OUTPATIENT
Start: 2019-07-18 | End: 2019-07-20

## 2019-07-18 RX ORDER — DEXTROSE MONOHYDRATE 25 G/50ML
25-50 INJECTION, SOLUTION INTRAVENOUS
Status: DISCONTINUED | OUTPATIENT
Start: 2019-07-18 | End: 2019-08-13 | Stop reason: HOSPADM

## 2019-07-18 RX ORDER — LIDOCAINE 40 MG/G
CREAM TOPICAL
Status: DISCONTINUED | OUTPATIENT
Start: 2019-07-18 | End: 2019-08-13 | Stop reason: HOSPADM

## 2019-07-18 RX ORDER — NICOTINE POLACRILEX 4 MG
15-30 LOZENGE BUCCAL
Status: DISCONTINUED | OUTPATIENT
Start: 2019-07-18 | End: 2019-08-13 | Stop reason: HOSPADM

## 2019-07-18 RX ADMIN — POTASSIUM CHLORIDE: 2 INJECTION, SOLUTION, CONCENTRATE INTRAVENOUS at 19:52

## 2019-07-18 RX ADMIN — BENZOCAINE, MENTHOL 1 LOZENGE: 15; 3.6 LOZENGE ORAL at 11:52

## 2019-07-18 RX ADMIN — METOPROLOL TARTRATE 25 MG: 25 TABLET ORAL at 19:52

## 2019-07-18 RX ADMIN — ONDANSETRON 4 MG: 2 INJECTION INTRAMUSCULAR; INTRAVENOUS at 01:51

## 2019-07-18 RX ADMIN — RANITIDINE HYDROCHLORIDE 50 MG: 25 INJECTION INTRAMUSCULAR; INTRAVENOUS at 19:52

## 2019-07-18 RX ADMIN — LIDOCAINE HYDROCHLORIDE 1 ML: 10 INJECTION, SOLUTION INFILTRATION; PERINEURAL at 18:36

## 2019-07-18 RX ADMIN — BENZOCAINE, MENTHOL 1 LOZENGE: 15; 3.6 LOZENGE ORAL at 16:07

## 2019-07-18 RX ADMIN — SODIUM CHLORIDE, POTASSIUM CHLORIDE, SODIUM LACTATE AND CALCIUM CHLORIDE 860 ML: 600; 310; 30; 20 INJECTION, SOLUTION INTRAVENOUS at 14:05

## 2019-07-18 RX ADMIN — LOSARTAN POTASSIUM 25 MG: 25 TABLET ORAL at 08:44

## 2019-07-18 RX ADMIN — RANITIDINE HYDROCHLORIDE 50 MG: 25 INJECTION INTRAMUSCULAR; INTRAVENOUS at 11:52

## 2019-07-18 RX ADMIN — I.V. FAT EMULSION 250 ML: 20 EMULSION INTRAVENOUS at 19:52

## 2019-07-18 RX ADMIN — SODIUM CHLORIDE, POTASSIUM CHLORIDE, SODIUM LACTATE AND CALCIUM CHLORIDE 1000 ML: 600; 310; 30; 20 INJECTION, SOLUTION INTRAVENOUS at 07:13

## 2019-07-18 RX ADMIN — SUCRALFATE 1 G: 1 SUSPENSION ORAL at 07:36

## 2019-07-18 RX ADMIN — SODIUM CHLORIDE, POTASSIUM CHLORIDE, SODIUM LACTATE AND CALCIUM CHLORIDE 400 ML: 600; 310; 30; 20 INJECTION, SOLUTION INTRAVENOUS at 22:02

## 2019-07-18 RX ADMIN — RANITIDINE HYDROCHLORIDE 50 MG: 25 INJECTION INTRAMUSCULAR; INTRAVENOUS at 03:29

## 2019-07-18 RX ADMIN — SODIUM CHLORIDE, POTASSIUM CHLORIDE, SODIUM LACTATE AND CALCIUM CHLORIDE: 600; 310; 30; 20 INJECTION, SOLUTION INTRAVENOUS at 06:21

## 2019-07-18 RX ADMIN — METOPROLOL TARTRATE 25 MG: 25 TABLET ORAL at 08:44

## 2019-07-18 RX ADMIN — SODIUM CHLORIDE, POTASSIUM CHLORIDE, SODIUM LACTATE AND CALCIUM CHLORIDE: 600; 310; 30; 20 INJECTION, SOLUTION INTRAVENOUS at 08:43

## 2019-07-18 RX ADMIN — ACETAMINOPHEN 650 MG: 325 SOLUTION ORAL at 06:20

## 2019-07-18 ASSESSMENT — ACTIVITIES OF DAILY LIVING (ADL)
ADLS_ACUITY_SCORE: 13

## 2019-07-18 ASSESSMENT — PAIN DESCRIPTION - DESCRIPTORS: DESCRIPTORS: SORE

## 2019-07-18 ASSESSMENT — MIFFLIN-ST. JEOR: SCORE: 1316.9

## 2019-07-18 NOTE — PLAN OF CARE
Patient experiencing much nausea after replacements of NG returns  at end of shift and requests to speak to Team about this.Patient felt bloated at midnight so went for a walk and felt slightly better.Zofran given for nausea .Afebrile OVSS but bp slightly elevated but below parameters for notification Continue to monitor

## 2019-07-18 NOTE — PROGRESS NOTES
"CLINICAL NUTRITION SERVICES - ASSESSMENT NOTE     Nutrition Prescription    RECOMMENDATIONS FOR MDs/PROVIDERS TO ORDER:  Recommend continuing MIVF's since PN vol will not meet 100% of pt's hydration needs, especially in light of high NGT output.    Malnutrition Status:    Severe malnutrition in the context of acute illness    Recommendations already ordered by Registered Dietitian (RD):  - Order lab add on to check TG  - Orders entered to initiate CPN with goal vol of 1440 ml/day with 130 g Dex daily (442 kcal), 85 AA daily (340 kcal) and 250 ml of 20% IV lipids daily = 1282 kcals/day (20 kcal/kg/day), 1.3 g PRO/kg/day, GIR 1.4 with 39% kcals from Fat. Goal dex is 240 g to meet 25 kcals/kg). Dosing wt = 65 kg    Future/Additional Recommendations:  Recommend daily checks of K+, Mg and PO4 levels to monitor for refeeding.      REASON FOR ASSESSMENT  Marilia Bean is a/an 74 year old female assessed by the dietitian for Pharmacy/Nutrition to Start and Manage PN    NUTRITION HISTORY  Per chart review, pt recently hospitalized from 7/3 thru 7/9. Pt with h/o Gardiner syndrome and is s/p resection of distal duodenal and proximal jejunum on 7/3/19. Readmitted on 7/15 for ongoing N/V and abd bloating which is concerning for SBO vs ileus vs stricture at anastomosis.     - Per visit with pt and spouse, informed by pt that she has had minimal po intakes since last Saturday (7/13) d/t vomiting.    CURRENT NUTRITION ORDERS  Diet: NPO since 7/15. NGT (placed on 7/16) to LIS.    LABS  K+, Mg and PO4 WNL. Pt identify to be at risk for refeeding d/t lack of po intakes for the past week.    MEDICATIONS  Pt receiving IVF's at 75 ml/hr. Due to high NGT output (approximately 2 liters out as of noon today), may need to consider increasing volume pending ongoing NGT output.    ANTHROPOMETRICS  Height: 170.2 cm (5' 7\")  Most Recent Weight: 78.4 kg (172 lb 14.4 oz) - today's (lowest wt this admission), Admit wt = 79.6 kg (175 lbs) on 7/15. "   IBW: 61 kg  BMI: Overweight BMI 25-29.9 (based on wt of 78.4 kg)  Weight History: Pt reports her wt is down from previous admit wt of 184 lb on 7/3 to current wt of 172 lbs. Wt loss of 12 lbs (6.5% loss) x past 2 weeks.  Wt Readings from Last 10 Encounters:   07/18/19 78.4 kg (172 lb 14.4 oz)   07/14/19 79.8 kg (176 lb)   07/08/19 83.9 kg (184 lb 14.4 oz)   06/24/19 84.6 kg (186 lb 9.6 oz)   06/20/19 84.7 kg (186 lb 11.2 oz)   09/19/18 83 kg (183 lb)   08/25/17 77.6 kg (171 lb)   07/06/17 77 kg (169 lb 12.8 oz)   06/29/17 78 kg (172 lb)   06/21/17 78 kg (172 lb)     Dosing Weight: 65 kg (adjusted based on lowest wt of 78.4 kg on 7/18 and IBW)    ASSESSED NUTRITION NEEDS  Estimated Energy Needs: 2477-4137 kcals/day (25 - 30 kcals/kg)  Justification: Maintenance  Estimated Protein Needs:78-98 grams protein/day (1.2 - 1.5 grams of pro/kg)  Justification: Repletion  Estimated Fluid Needs: (1 mL/kcal)   Justification: Maintenance    PHYSICAL FINDINGS  See malnutrition section below.    MALNUTRITION  % Intake: </= 50% for >/= 5 days (severe)  % Weight Loss: > 2% in 1 week (severe)  Subcutaneous Fat Loss: Facial region:  Mild  Muscle Loss: Temporal: Mild  Fluid Accumulation/Edema: None noted  Malnutrition Diagnosis: Severe malnutrition in the context of acute illness    NUTRITION DIAGNOSIS  Inadequate protein-energy intake related to altered GI sx secondary to N/V inhibiting po and PN therapy ordered, but no access obtained yet for initiation of PN at this time as evidenced by Wt loss of 12 lbs (6.5% loss) x past 2 weeks with minimal po intakes for past week and NPO status since 7/15.      INTERVENTIONS  Implementation  Nutrition Education: Provided education on plan for PN therapy to pt and spouse  Parenteral Nutrition/IV Fluids - Initiate     Goals  Total avg nutritional intake to meet a minimum of 25 kcal/kg and 1.2 g PRO/kg daily (per dosing wt 65 kg).     Monitoring/Evaluation  Progress toward goals will be  monitored and evaluated per protocol.  Gay Poole RD,LD  Unit pager [phone]

## 2019-07-18 NOTE — PROGRESS NOTES
Surgery Progress Note  07/18/2019       Subjective:  - BRIANNE overnight.  - Felt very nauseous with IVF boluses   - Gagging and choking with NGT, feels very uncomfortable lying down   -  yesterday      Objective:  Temp:  [97.1  F (36.2  C)-100  F (37.8  C)] 97.7  F (36.5  C)  Pulse:  [89-90] 90  Heart Rate:  [86-97] 88  Resp:  [18] 18  BP: (153-169)/(73-91) 162/73  SpO2:  [92 %-96 %] 92 %    I/O last 3 completed shifts:  In: 4925 [P.O.:180; I.V.:3315; NG/GT:90; IV Piggyback:1340]  Out: 3925 [Urine:1550; Emesis/NG output:2375]      Gen: Awake, alert, NAD  Resp: NLB on RA  Abd: soft, distended, nontender, NGT in place   Incision: from previous surgery, c/d/i   Ext: WWP, no edema     Labs:  Recent Labs   Lab 07/18/19  0513 07/17/19  0509 07/16/19  0553   WBC 14.1* 14.2* 16.1*   HGB 9.2* 9.4* 9.5*    435 445       Recent Labs   Lab 07/18/19  0513 07/17/19  0509 07/16/19  0553    138 135   POTASSIUM 3.6 3.8 4.0   CHLORIDE 103 102 100   CO2 24 29 28   BUN 17 20 24   CR 0.75 0.90 0.89   GLC 70 94 128*   TARIQ 8.8 8.9 8.9   MAG 1.9 2.2 2.6*   PHOS 2.9 2.9 3.0       Imaging:  None new      Assessment/Plan:   74 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  Likely 2/2 edema at anastomosis.    - NPO, mIVF, replace NGT output 1:1 (slow down administration rate)   - Will discuss possible TPN initiation with Dr. Brito   - Strict NPO, avoid medications through NGT   - Follow labs   - Symptomatic care      Seen, examined, and discussed with chief resident, who will discuss with staff.    Yulisa Diez MD  Surgery Resident PGY-1  Pg [phone]

## 2019-07-18 NOTE — PLAN OF CARE
Tmax 100. OVSS. Tylenol x1 for throat pain. Denies nausea. Adequate UOP, pt had small BM this evening. NG to LIS w/ 1000mL output this shift. 880mL bolus currently infusing for 1:1 replacement. Pt showered this evening independently. Up in room ad tyson. Continue to monitor.

## 2019-07-18 NOTE — PLAN OF CARE
Pt not passing gas or had a BM today. Pt staying active and ambulating in the hallway. BS 70 this am. MD Yulisa Diez notified. No additional intervention needed. Continue to monitor. BS recheck 78 at noon. Abd x-ray completed. Waiting for PICC line placement this evening for TPN nutrition. NG to LIS. NG output 860ml and LR  860ml fluid correction infusing slowly over 3 hrs via PIV.

## 2019-07-19 ENCOUNTER — APPOINTMENT (OUTPATIENT)
Dept: GENERAL RADIOLOGY | Facility: CLINIC | Age: 74
DRG: 327 | End: 2019-07-19
Attending: SURGERY
Payer: MEDICARE

## 2019-07-19 LAB
ALBUMIN SERPL-MCNC: 2.6 G/DL (ref 3.4–5)
ALP SERPL-CCNC: 107 U/L (ref 40–150)
ALT SERPL W P-5'-P-CCNC: 21 U/L (ref 0–50)
ANION GAP SERPL CALCULATED.3IONS-SCNC: 5 MMOL/L (ref 3–14)
AST SERPL W P-5'-P-CCNC: 28 U/L (ref 0–45)
BILIRUB DIRECT SERPL-MCNC: 0.2 MG/DL (ref 0–0.2)
BILIRUB SERPL-MCNC: 1 MG/DL (ref 0.2–1.3)
BUN SERPL-MCNC: 18 MG/DL (ref 7–30)
CALCIUM SERPL-MCNC: 8.3 MG/DL (ref 8.5–10.1)
CHLORIDE SERPL-SCNC: 102 MMOL/L (ref 94–109)
CO2 SERPL-SCNC: 27 MMOL/L (ref 20–32)
CREAT SERPL-MCNC: 0.63 MG/DL (ref 0.52–1.04)
ERYTHROCYTE [DISTWIDTH] IN BLOOD BY AUTOMATED COUNT: 12.7 % (ref 10–15)
GFR SERPL CREATININE-BSD FRML MDRD: 88 ML/MIN/{1.73_M2}
GLUCOSE BLDC GLUCOMTR-MCNC: 122 MG/DL (ref 70–99)
GLUCOSE BLDC GLUCOMTR-MCNC: 132 MG/DL (ref 70–99)
GLUCOSE BLDC GLUCOMTR-MCNC: 133 MG/DL (ref 70–99)
GLUCOSE BLDC GLUCOMTR-MCNC: 148 MG/DL (ref 70–99)
GLUCOSE SERPL-MCNC: 148 MG/DL (ref 70–99)
HCT VFR BLD AUTO: 28.8 % (ref 35–47)
HGB BLD-MCNC: 8.8 G/DL (ref 11.7–15.7)
INR PPP: 1.14 (ref 0.86–1.14)
LACTATE BLD-SCNC: 0.8 MMOL/L (ref 0.7–2)
LACTATE BLD-SCNC: NORMAL MMOL/L (ref 0.7–2)
MAGNESIUM SERPL-MCNC: 2.1 MG/DL (ref 1.6–2.3)
MCH RBC QN AUTO: 28.8 PG (ref 26.5–33)
MCHC RBC AUTO-ENTMCNC: 30.6 G/DL (ref 31.5–36.5)
MCV RBC AUTO: 94 FL (ref 78–100)
PHOSPHATE SERPL-MCNC: 2.4 MG/DL (ref 2.5–4.5)
PLATELET # BLD AUTO: 443 10E9/L (ref 150–450)
POTASSIUM SERPL-SCNC: 3.4 MMOL/L (ref 3.4–5.3)
PREALB SERPL IA-MCNC: 11 MG/DL (ref 15–45)
PROT SERPL-MCNC: 6.5 G/DL (ref 6.8–8.8)
RBC # BLD AUTO: 3.06 10E12/L (ref 3.8–5.2)
SODIUM SERPL-SCNC: 134 MMOL/L (ref 133–144)
WBC # BLD AUTO: 13.4 10E9/L (ref 4–11)

## 2019-07-19 PROCEDURE — 25000132 ZZH RX MED GY IP 250 OP 250 PS 637: Performed by: STUDENT IN AN ORGANIZED HEALTH CARE EDUCATION/TRAINING PROGRAM

## 2019-07-19 PROCEDURE — 25000131 ZZH RX MED GY IP 250 OP 636 PS 637: Performed by: STUDENT IN AN ORGANIZED HEALTH CARE EDUCATION/TRAINING PROGRAM

## 2019-07-19 PROCEDURE — 82248 BILIRUBIN DIRECT: CPT | Performed by: SURGERY

## 2019-07-19 PROCEDURE — 25000132 ZZH RX MED GY IP 250 OP 250 PS 637: Performed by: SURGERY

## 2019-07-19 PROCEDURE — 83735 ASSAY OF MAGNESIUM: CPT | Performed by: SURGERY

## 2019-07-19 PROCEDURE — 12000001 ZZH R&B MED SURG/OB UMMC

## 2019-07-19 PROCEDURE — 80053 COMPREHEN METABOLIC PANEL: CPT | Performed by: SURGERY

## 2019-07-19 PROCEDURE — 83605 ASSAY OF LACTIC ACID: CPT

## 2019-07-19 PROCEDURE — 00000146 ZZHCL STATISTIC GLUCOSE BY METER IP

## 2019-07-19 PROCEDURE — 85027 COMPLETE CBC AUTOMATED: CPT | Performed by: SURGERY

## 2019-07-19 PROCEDURE — 36592 COLLECT BLOOD FROM PICC: CPT | Performed by: SURGERY

## 2019-07-19 PROCEDURE — 25800030 ZZH RX IP 258 OP 636: Performed by: STUDENT IN AN ORGANIZED HEALTH CARE EDUCATION/TRAINING PROGRAM

## 2019-07-19 PROCEDURE — 25000125 ZZHC RX 250: Performed by: SURGERY

## 2019-07-19 PROCEDURE — 25000125 ZZHC RX 250: Performed by: STUDENT IN AN ORGANIZED HEALTH CARE EDUCATION/TRAINING PROGRAM

## 2019-07-19 PROCEDURE — 84134 ASSAY OF PREALBUMIN: CPT | Performed by: SURGERY

## 2019-07-19 PROCEDURE — 40000986 XR ABDOMEN PORT 1 VW

## 2019-07-19 PROCEDURE — 85610 PROTHROMBIN TIME: CPT | Performed by: SURGERY

## 2019-07-19 PROCEDURE — 25000128 H RX IP 250 OP 636: Performed by: STUDENT IN AN ORGANIZED HEALTH CARE EDUCATION/TRAINING PROGRAM

## 2019-07-19 PROCEDURE — 85027 COMPLETE CBC AUTOMATED: CPT | Performed by: NEUROLOGICAL SURGERY

## 2019-07-19 PROCEDURE — 84100 ASSAY OF PHOSPHORUS: CPT | Performed by: SURGERY

## 2019-07-19 RX ORDER — BISACODYL 10 MG
10 SUPPOSITORY, RECTAL RECTAL DAILY PRN
Status: DISCONTINUED | OUTPATIENT
Start: 2019-07-19 | End: 2019-08-13 | Stop reason: HOSPADM

## 2019-07-19 RX ADMIN — LOSARTAN POTASSIUM 25 MG: 25 TABLET ORAL at 09:04

## 2019-07-19 RX ADMIN — BISACODYL 10 MG: 10 SUPPOSITORY RECTAL at 15:28

## 2019-07-19 RX ADMIN — I.V. FAT EMULSION 250 ML: 20 EMULSION INTRAVENOUS at 20:27

## 2019-07-19 RX ADMIN — POTASSIUM PHOSPHATE, MONOBASIC AND POTASSIUM PHOSPHATE, DIBASIC 15 MMOL: 224; 236 INJECTION, SOLUTION INTRAVENOUS at 17:51

## 2019-07-19 RX ADMIN — BENZOCAINE 1 ML: 220 SPRAY, METERED PERIODONTAL at 09:10

## 2019-07-19 RX ADMIN — METOPROLOL TARTRATE 25 MG: 25 TABLET ORAL at 09:04

## 2019-07-19 RX ADMIN — POTASSIUM CHLORIDE: 2 INJECTION, SOLUTION, CONCENTRATE INTRAVENOUS at 20:27

## 2019-07-19 RX ADMIN — SODIUM CHLORIDE, POTASSIUM CHLORIDE, SODIUM LACTATE AND CALCIUM CHLORIDE 800 ML: 600; 310; 30; 20 INJECTION, SOLUTION INTRAVENOUS at 22:20

## 2019-07-19 RX ADMIN — RANITIDINE HYDROCHLORIDE 50 MG: 25 INJECTION INTRAMUSCULAR; INTRAVENOUS at 03:52

## 2019-07-19 RX ADMIN — ONDANSETRON 4 MG: 4 TABLET, ORALLY DISINTEGRATING ORAL at 18:58

## 2019-07-19 RX ADMIN — SODIUM CHLORIDE, POTASSIUM CHLORIDE, SODIUM LACTATE AND CALCIUM CHLORIDE 1000 ML: 600; 310; 30; 20 INJECTION, SOLUTION INTRAVENOUS at 06:49

## 2019-07-19 RX ADMIN — BENZOCAINE, MENTHOL 1 LOZENGE: 15; 3.6 LOZENGE ORAL at 12:07

## 2019-07-19 RX ADMIN — BENZOCAINE, MENTHOL 1 LOZENGE: 15; 3.6 LOZENGE ORAL at 01:08

## 2019-07-19 RX ADMIN — RANITIDINE HYDROCHLORIDE 50 MG: 25 INJECTION INTRAMUSCULAR; INTRAVENOUS at 20:28

## 2019-07-19 RX ADMIN — BENZOCAINE, MENTHOL 1 LOZENGE: 15; 3.6 LOZENGE ORAL at 20:46

## 2019-07-19 RX ADMIN — METOPROLOL TARTRATE 25 MG: 25 TABLET ORAL at 20:28

## 2019-07-19 RX ADMIN — SODIUM CHLORIDE, POTASSIUM CHLORIDE, SODIUM LACTATE AND CALCIUM CHLORIDE 900 ML: 600; 310; 30; 20 INJECTION, SOLUTION INTRAVENOUS at 14:22

## 2019-07-19 RX ADMIN — RANITIDINE HYDROCHLORIDE 50 MG: 25 INJECTION INTRAMUSCULAR; INTRAVENOUS at 12:07

## 2019-07-19 ASSESSMENT — ACTIVITIES OF DAILY LIVING (ADL)
ADLS_ACUITY_SCORE: 13

## 2019-07-19 ASSESSMENT — MIFFLIN-ST. JEOR: SCORE: 1317.81

## 2019-07-19 ASSESSMENT — PAIN DESCRIPTION - DESCRIPTORS
DESCRIPTORS: SORE

## 2019-07-19 NOTE — PLAN OF CARE
Patient called in nurse 2330 about hearing whistling noises from  NG and feeling nauseous.Ng appeared to be  partially pulled out and was not functioning.Percy Anthony  advanced NG to original rossi and on-call  Notified  for needed X-ray.Initial X-ray Ng appeared not advanced to stomach enough so that was done and another X-ray repeated and before  we used it again called on-call radiologist  for approval which was given so NG suction hooked up and first few minutes 750 ml of NG fluids  was collected in container.Glucose at 0200 = 133.Patient on q 6 hr glucose  for 72 hrs as TPN/Lipids started on pms.Patient complaining of tickle in back of throat so therefore she has frequent coughing.Patient declined any intervention for this.Continue to monitor

## 2019-07-19 NOTE — PROGRESS NOTES
Surgery Progress Note  07/19/2019       Subjective:  - BRIANNE overnight.  - Tube slipped overnight   - Feels output decreased from NG  - Not passing flatus (last day before yesterday)  - Presently sitting at bedside  - Got PICC and TPN overnight     Objective:  Temp:  [96.9  F (36.1  C)-99.4  F (37.4  C)] 98.8  F (37.1  C)  Pulse:  [91-94] 91  Heart Rate:  [] 86  Resp:  [16-20] 16  BP: (127-168)/(72-84) 146/72  SpO2:  [91 %-94 %] 91 %    I/O last 3 completed shifts:  In: 4476.83 [P.O.:280; I.V.:600; NG/GT:60; IV Piggyback:3285]  Out: 3340 [Urine:800; Emesis/NG output:2540]      Gen: Awake, alert, NAD  Resp: NLB on RA  Abd: soft, distended, nontender, NGT in place   Incision: from previous surgery, c/d/i   Ext: WWP, no edema     Labs:  Recent Labs   Lab 07/18/19  0513 07/17/19  0509 07/16/19  0553   WBC 14.1* 14.2* 16.1*   HGB 9.2* 9.4* 9.5*    435 445       Recent Labs   Lab 07/18/19  0513 07/17/19  0509 07/16/19  0553    138 135   POTASSIUM 3.6 3.8 4.0   CHLORIDE 103 102 100   CO2 24 29 28   BUN 17 20 24   CR 0.75 0.90 0.89   GLC 70 94 128*   TARIQ 8.8 8.9 8.9   MAG 1.9 2.2 2.6*   PHOS 2.9 2.9 3.0       Imaging:  None new      Assessment/Plan:   74 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  Likely 2/2 edema at anastomosis.    - NPO, mIVF, replace NGT output 1:1  - TPN via PICC   - Follow labs   - Symptomatic care including suppository      Seen, examined, and discussed with chief resident, who will discuss with staff.    Yulisa Diez MD  Surgery Resident PGY-1  Pg [phone]

## 2019-07-19 NOTE — PLAN OF CARE
Afebrile, VSS. Continues to c/o throat discomfort, using Cepacol lozenges PRN. PICC placed this evening in IR. Pt started TPN/Lipids this evening, BG 76 prior to starting. Next BG due at 0200. NG to LIS w/ 500mL of output, replaced 1:1 w/ LR. Adequate UOP this shift, pt not passing gas. Continue to monitor.

## 2019-07-19 NOTE — PROGRESS NOTES
CLINICAL NUTRITION SERVICES     Nutrition Prescription    RECOMMENDATIONS FOR MDs/PROVIDERS TO ORDER:  Continue to monitor and replace electrolytes PRN per high replacement protocol.     Malnutrition Status:    Severe malnutrition in the context of acute illness    Future/Additional Recommendations:  Monitor K+, Mg++, and Phos levels for refeeding with advancement of dextrose to goal     NEW FINDINGS   Per discussion with PharmD, plan to advance pt's dex today by 50 g (initially started at 130 g dex on 7/18). This will provide 180 g Dex daily (612 kcal), 85 AA daily (340 kcal) and 250 ml of 20% IV lipids daily = 1452 kcals/day (22 kcal/kg/day), 1.3 g PRO/kg/day, GIR 1.9 with 34% kcals from Fat    Labs:   K+ 3.4 (WNL)  Phos 2.4 (L) - PharmD to contact team to replace as well as PRN phos replacement orders  Magnesium 2.1 (WNL)  Glucose 148    Interventions  Collaboration with other providers (PharmD), team notified by PharmD to order PRN phosphorus replacement    Parenteral Nutrition/IV Fluids - Modify composition (advance dex)    Alonzo Webster, MS, RD, LD  7A/6D Weekday Pager [phone]

## 2019-07-19 NOTE — PLAN OF CARE
6404-9862: Alert and oriented x4. Slight tachycardia, 's. OVSS, afebrile. Reports mild throat pain, denies other pain. Reports nausea when have to take pills, no vomiting. BG q6hrs for TPN, BG check 143. MD ordered insulin in afternoon, BG checks q4hrs with insulin administration per order. BG at 1600 was 122, no insulin needed. Phos 2.4, patient started infusing phos at 1730 over 4hrs. Pt had 900cc NG output; replaced with LR via IV over 3hrs. NG LIS. Tolerated well. Pt reports feeling constipated, wanted to try suppository, given without issues, no BM, a mucous discharge. Pt reports feeling glad she tried it. Will reconsider tomorrow. Independent with assist. Continue with POC.      Nausea at 1900: SL Zofran given.

## 2019-07-20 PROBLEM — K56.609 BOWEL OBSTRUCTION (H): Status: ACTIVE | Noted: 2019-07-20

## 2019-07-20 LAB
ANION GAP SERPL CALCULATED.3IONS-SCNC: 8 MMOL/L (ref 3–14)
BUN SERPL-MCNC: 19 MG/DL (ref 7–30)
CALCIUM SERPL-MCNC: 8.1 MG/DL (ref 8.5–10.1)
CHLORIDE SERPL-SCNC: 102 MMOL/L (ref 94–109)
CO2 SERPL-SCNC: 26 MMOL/L (ref 20–32)
CREAT SERPL-MCNC: 0.58 MG/DL (ref 0.52–1.04)
GFR SERPL CREATININE-BSD FRML MDRD: >90 ML/MIN/{1.73_M2}
GLUCOSE BLDC GLUCOMTR-MCNC: 109 MG/DL (ref 70–99)
GLUCOSE BLDC GLUCOMTR-MCNC: 126 MG/DL (ref 70–99)
GLUCOSE BLDC GLUCOMTR-MCNC: 126 MG/DL (ref 70–99)
GLUCOSE BLDC GLUCOMTR-MCNC: 129 MG/DL (ref 70–99)
GLUCOSE BLDC GLUCOMTR-MCNC: 134 MG/DL (ref 70–99)
GLUCOSE BLDC GLUCOMTR-MCNC: 145 MG/DL (ref 70–99)
GLUCOSE BLDC GLUCOMTR-MCNC: 151 MG/DL (ref 70–99)
GLUCOSE SERPL-MCNC: 135 MG/DL (ref 70–99)
MAGNESIUM SERPL-MCNC: 1.9 MG/DL (ref 1.6–2.3)
PHOSPHATE SERPL-MCNC: 3.5 MG/DL (ref 2.5–4.5)
POTASSIUM SERPL-SCNC: 3.3 MMOL/L (ref 3.4–5.3)
POTASSIUM SERPL-SCNC: 3.7 MMOL/L (ref 3.4–5.3)
SODIUM SERPL-SCNC: 136 MMOL/L (ref 133–144)

## 2019-07-20 PROCEDURE — 25000125 ZZHC RX 250: Performed by: STUDENT IN AN ORGANIZED HEALTH CARE EDUCATION/TRAINING PROGRAM

## 2019-07-20 PROCEDURE — 25000131 ZZH RX MED GY IP 250 OP 636 PS 637: Performed by: STUDENT IN AN ORGANIZED HEALTH CARE EDUCATION/TRAINING PROGRAM

## 2019-07-20 PROCEDURE — 25000128 H RX IP 250 OP 636: Performed by: STUDENT IN AN ORGANIZED HEALTH CARE EDUCATION/TRAINING PROGRAM

## 2019-07-20 PROCEDURE — 25000132 ZZH RX MED GY IP 250 OP 250 PS 637: Performed by: STUDENT IN AN ORGANIZED HEALTH CARE EDUCATION/TRAINING PROGRAM

## 2019-07-20 PROCEDURE — 12000001 ZZH R&B MED SURG/OB UMMC

## 2019-07-20 PROCEDURE — 25000132 ZZH RX MED GY IP 250 OP 250 PS 637: Performed by: SURGERY

## 2019-07-20 PROCEDURE — 40000802 ZZH SITE CHECK

## 2019-07-20 PROCEDURE — 84132 ASSAY OF SERUM POTASSIUM: CPT | Performed by: SURGERY

## 2019-07-20 PROCEDURE — 80048 BASIC METABOLIC PNL TOTAL CA: CPT | Performed by: SURGERY

## 2019-07-20 PROCEDURE — 83735 ASSAY OF MAGNESIUM: CPT | Performed by: SURGERY

## 2019-07-20 PROCEDURE — 36592 COLLECT BLOOD FROM PICC: CPT | Performed by: SURGERY

## 2019-07-20 PROCEDURE — 25000125 ZZHC RX 250: Performed by: SURGERY

## 2019-07-20 PROCEDURE — 00000146 ZZHCL STATISTIC GLUCOSE BY METER IP

## 2019-07-20 PROCEDURE — 84100 ASSAY OF PHOSPHORUS: CPT | Performed by: SURGERY

## 2019-07-20 PROCEDURE — 25800030 ZZH RX IP 258 OP 636: Performed by: STUDENT IN AN ORGANIZED HEALTH CARE EDUCATION/TRAINING PROGRAM

## 2019-07-20 RX ORDER — POTASSIUM CHLORIDE 29.8 MG/ML
20 INJECTION INTRAVENOUS
Status: DISCONTINUED | OUTPATIENT
Start: 2019-07-20 | End: 2019-07-23

## 2019-07-20 RX ORDER — MAGNESIUM SULFATE HEPTAHYDRATE 40 MG/ML
4 INJECTION, SOLUTION INTRAVENOUS EVERY 4 HOURS PRN
Status: DISCONTINUED | OUTPATIENT
Start: 2019-07-20 | End: 2019-07-23

## 2019-07-20 RX ORDER — POTASSIUM CHLORIDE 1.5 G/1.58G
20-40 POWDER, FOR SOLUTION ORAL
Status: DISCONTINUED | OUTPATIENT
Start: 2019-07-20 | End: 2019-07-23

## 2019-07-20 RX ORDER — POTASSIUM CL/LIDO/0.9 % NACL 10MEQ/0.1L
10 INTRAVENOUS SOLUTION, PIGGYBACK (ML) INTRAVENOUS
Status: DISCONTINUED | OUTPATIENT
Start: 2019-07-20 | End: 2019-07-23

## 2019-07-20 RX ORDER — POTASSIUM CHLORIDE 750 MG/1
20-40 TABLET, EXTENDED RELEASE ORAL
Status: DISCONTINUED | OUTPATIENT
Start: 2019-07-20 | End: 2019-07-23

## 2019-07-20 RX ORDER — POTASSIUM CHLORIDE 7.45 MG/ML
10 INJECTION INTRAVENOUS
Status: DISCONTINUED | OUTPATIENT
Start: 2019-07-20 | End: 2019-07-23

## 2019-07-20 RX ORDER — ACETAMINOPHEN 325 MG/1
650 TABLET ORAL EVERY 6 HOURS PRN
Status: DISCONTINUED | OUTPATIENT
Start: 2019-07-20 | End: 2019-08-13 | Stop reason: HOSPADM

## 2019-07-20 RX ADMIN — RANITIDINE HYDROCHLORIDE 50 MG: 25 INJECTION INTRAMUSCULAR; INTRAVENOUS at 04:47

## 2019-07-20 RX ADMIN — I.V. FAT EMULSION 250 ML: 20 EMULSION INTRAVENOUS at 20:19

## 2019-07-20 RX ADMIN — METOPROLOL TARTRATE 25 MG: 25 TABLET ORAL at 20:11

## 2019-07-20 RX ADMIN — POTASSIUM CHLORIDE 20 MEQ: 29.8 INJECTION, SOLUTION INTRAVENOUS at 11:48

## 2019-07-20 RX ADMIN — Medication 2 G: at 13:22

## 2019-07-20 RX ADMIN — POTASSIUM CHLORIDE: 2 INJECTION, SOLUTION, CONCENTRATE INTRAVENOUS at 20:19

## 2019-07-20 RX ADMIN — BISACODYL 10 MG: 10 SUPPOSITORY RECTAL at 16:05

## 2019-07-20 RX ADMIN — RANITIDINE HYDROCHLORIDE 50 MG: 25 INJECTION INTRAMUSCULAR; INTRAVENOUS at 13:22

## 2019-07-20 RX ADMIN — ACETAMINOPHEN 325 MG: 325 TABLET, FILM COATED ORAL at 03:16

## 2019-07-20 RX ADMIN — POTASSIUM CHLORIDE 20 MEQ: 29.8 INJECTION, SOLUTION INTRAVENOUS at 17:34

## 2019-07-20 RX ADMIN — INSULIN ASPART 1 UNITS: 100 INJECTION, SOLUTION INTRAVENOUS; SUBCUTANEOUS at 04:47

## 2019-07-20 RX ADMIN — RANITIDINE HYDROCHLORIDE 50 MG: 25 INJECTION INTRAMUSCULAR; INTRAVENOUS at 20:15

## 2019-07-20 RX ADMIN — BENZOCAINE, MENTHOL 1 LOZENGE: 15; 3.6 LOZENGE ORAL at 20:11

## 2019-07-20 RX ADMIN — LOSARTAN POTASSIUM 25 MG: 25 TABLET ORAL at 09:04

## 2019-07-20 RX ADMIN — SODIUM CHLORIDE, POTASSIUM CHLORIDE, SODIUM LACTATE AND CALCIUM CHLORIDE 800 ML: 600; 310; 30; 20 INJECTION, SOLUTION INTRAVENOUS at 21:55

## 2019-07-20 RX ADMIN — METOPROLOL TARTRATE 25 MG: 25 TABLET ORAL at 09:05

## 2019-07-20 RX ADMIN — SODIUM CHLORIDE, POTASSIUM CHLORIDE, SODIUM LACTATE AND CALCIUM CHLORIDE 750 ML: 600; 310; 30; 20 INJECTION, SOLUTION INTRAVENOUS at 14:38

## 2019-07-20 RX ADMIN — SODIUM CHLORIDE, POTASSIUM CHLORIDE, SODIUM LACTATE AND CALCIUM CHLORIDE 800 ML: 600; 310; 30; 20 INJECTION, SOLUTION INTRAVENOUS at 06:43

## 2019-07-20 RX ADMIN — POTASSIUM CHLORIDE 20 MEQ: 29.8 INJECTION, SOLUTION INTRAVENOUS at 10:12

## 2019-07-20 RX ADMIN — ONDANSETRON 4 MG: 2 INJECTION INTRAMUSCULAR; INTRAVENOUS at 10:19

## 2019-07-20 ASSESSMENT — ACTIVITIES OF DAILY LIVING (ADL)
ADLS_ACUITY_SCORE: 13

## 2019-07-20 ASSESSMENT — MIFFLIN-ST. JEOR: SCORE: 1321.89

## 2019-07-20 ASSESSMENT — PAIN DESCRIPTION - DESCRIPTORS: DESCRIPTORS: HEADACHE

## 2019-07-20 NOTE — PLAN OF CARE
4346-9253: Tmax 100.1, other VSS on RA. Headache relieved with 325 mg PO tylenol x1. Lozenge provided for sore throat. Denies nausea. NG remains to LIS; 800 ml LR bolus given x2 per orders to replace NG output. Pt tolerates clamping of NG with pills well. BG q4h; 132, 126, 151 and corrected per sliding scale. Voiding adequately. Hypoactive BS, no flatus, no BM. NPO + ice chips. Up independently. Encouraging ambulation. Continue with POC.

## 2019-07-20 NOTE — PROGRESS NOTES
Surgery Progress Note  07/20/2019       Subjective:  - BRIANNE overnight.  - Not passing flatus  - Ambulating, feels strong, does not think she needs PT     Objective:  Temp:  [97.6  F (36.4  C)-100.1  F (37.8  C)] 99.3  F (37.4  C)  Pulse:  [] 69  Heart Rate:  [79-91] 83  Resp:  [16-20] 20  BP: (124-147)/(61-77) 147/77  SpO2:  [92 %-97 %] 93 %    I/O last 3 completed shifts:  In: 4571.09 [P.O.:460; I.V.:20; NG/GT:20; IV Piggyback:2500]  Out: 4150 [Urine:1650; Emesis/NG output:2500]      Gen: Awake, alert, NAD  Resp: NLB on RA  Abd: soft, distended, nontender, NGT in place   Incision: from previous surgery, c/d/i   Ext: WWP, no edema     Labs:  Recent Labs   Lab 07/19/19  0600 07/18/19  0513 07/17/19  0509   WBC 13.4* 14.1* 14.2*   HGB 8.8* 9.2* 9.4*    446 435       Recent Labs   Lab 07/20/19  0754 07/19/19  0621 07/18/19  0513    134 137   POTASSIUM 3.3* 3.4 3.6   CHLORIDE 102 102 103   CO2 26 27 24   BUN 19 18 17   CR 0.58 0.63 0.75   * 148* 70   TARIQ 8.1* 8.3* 8.8   MAG 1.9 2.1 1.9   PHOS 3.5 2.4* 2.9       Imaging:  None new      Assessment/Plan:   74 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  Likely 2/2 edema at anastomosis.    - NPO, mIVF, replace NGT output 1:1 while output remains high  - Continue TPN via PICC   - Follow labs   - Symptomatic care   - If not opening up by Monday, could obtain UGI vs CT with PO contrast      Seen, examined, and discussed with chief resident, who will discuss with staff.    Yulisa Diez MD  Surgery Resident PGY-1  Pg [phone]

## 2019-07-21 LAB
ANION GAP SERPL CALCULATED.3IONS-SCNC: 6 MMOL/L (ref 3–14)
BUN SERPL-MCNC: 20 MG/DL (ref 7–30)
CALCIUM SERPL-MCNC: 8.2 MG/DL (ref 8.5–10.1)
CHLORIDE SERPL-SCNC: 104 MMOL/L (ref 94–109)
CO2 SERPL-SCNC: 25 MMOL/L (ref 20–32)
CREAT SERPL-MCNC: 0.62 MG/DL (ref 0.52–1.04)
GFR SERPL CREATININE-BSD FRML MDRD: 88 ML/MIN/{1.73_M2}
GLUCOSE BLDC GLUCOMTR-MCNC: 117 MG/DL (ref 70–99)
GLUCOSE BLDC GLUCOMTR-MCNC: 119 MG/DL (ref 70–99)
GLUCOSE BLDC GLUCOMTR-MCNC: 122 MG/DL (ref 70–99)
GLUCOSE BLDC GLUCOMTR-MCNC: 127 MG/DL (ref 70–99)
GLUCOSE BLDC GLUCOMTR-MCNC: 150 MG/DL (ref 70–99)
GLUCOSE SERPL-MCNC: 138 MG/DL (ref 70–99)
MAGNESIUM SERPL-MCNC: 2.4 MG/DL (ref 1.6–2.3)
PHOSPHATE SERPL-MCNC: 3 MG/DL (ref 2.5–4.5)
POTASSIUM SERPL-SCNC: 3.9 MMOL/L (ref 3.4–5.3)
SODIUM SERPL-SCNC: 135 MMOL/L (ref 133–144)

## 2019-07-21 PROCEDURE — 25000128 H RX IP 250 OP 636: Performed by: STUDENT IN AN ORGANIZED HEALTH CARE EDUCATION/TRAINING PROGRAM

## 2019-07-21 PROCEDURE — 84100 ASSAY OF PHOSPHORUS: CPT | Performed by: SURGERY

## 2019-07-21 PROCEDURE — 36592 COLLECT BLOOD FROM PICC: CPT | Performed by: SURGERY

## 2019-07-21 PROCEDURE — 25000132 ZZH RX MED GY IP 250 OP 250 PS 637: Performed by: STUDENT IN AN ORGANIZED HEALTH CARE EDUCATION/TRAINING PROGRAM

## 2019-07-21 PROCEDURE — 25000125 ZZHC RX 250: Performed by: SURGERY

## 2019-07-21 PROCEDURE — 80048 BASIC METABOLIC PNL TOTAL CA: CPT | Performed by: SURGERY

## 2019-07-21 PROCEDURE — 25000132 ZZH RX MED GY IP 250 OP 250 PS 637: Performed by: SURGERY

## 2019-07-21 PROCEDURE — 00000146 ZZHCL STATISTIC GLUCOSE BY METER IP

## 2019-07-21 PROCEDURE — 83735 ASSAY OF MAGNESIUM: CPT | Performed by: SURGERY

## 2019-07-21 PROCEDURE — 25800030 ZZH RX IP 258 OP 636: Performed by: STUDENT IN AN ORGANIZED HEALTH CARE EDUCATION/TRAINING PROGRAM

## 2019-07-21 PROCEDURE — 12000001 ZZH R&B MED SURG/OB UMMC

## 2019-07-21 RX ADMIN — POTASSIUM CHLORIDE: 2 INJECTION, SOLUTION, CONCENTRATE INTRAVENOUS at 20:47

## 2019-07-21 RX ADMIN — METOPROLOL TARTRATE 25 MG: 25 TABLET ORAL at 20:32

## 2019-07-21 RX ADMIN — METOPROLOL TARTRATE 25 MG: 25 TABLET ORAL at 08:19

## 2019-07-21 RX ADMIN — BISACODYL 10 MG: 10 SUPPOSITORY RECTAL at 15:18

## 2019-07-21 RX ADMIN — ACETAMINOPHEN 650 MG: 325 TABLET, FILM COATED ORAL at 20:27

## 2019-07-21 RX ADMIN — SODIUM CHLORIDE, POTASSIUM CHLORIDE, SODIUM LACTATE AND CALCIUM CHLORIDE 800 ML: 600; 310; 30; 20 INJECTION, SOLUTION INTRAVENOUS at 22:59

## 2019-07-21 RX ADMIN — SUCRALFATE 1 G: 1 SUSPENSION ORAL at 20:27

## 2019-07-21 RX ADMIN — SODIUM CHLORIDE, POTASSIUM CHLORIDE, SODIUM LACTATE AND CALCIUM CHLORIDE 900 ML: 600; 310; 30; 20 INJECTION, SOLUTION INTRAVENOUS at 05:33

## 2019-07-21 RX ADMIN — SODIUM CHLORIDE, POTASSIUM CHLORIDE, SODIUM LACTATE AND CALCIUM CHLORIDE 1000 ML: 600; 310; 30; 20 INJECTION, SOLUTION INTRAVENOUS at 15:19

## 2019-07-21 RX ADMIN — I.V. FAT EMULSION 250 ML: 20 EMULSION INTRAVENOUS at 20:46

## 2019-07-21 RX ADMIN — BENZOCAINE, MENTHOL 1 LOZENGE: 15; 3.6 LOZENGE ORAL at 17:28

## 2019-07-21 RX ADMIN — BENZOCAINE, MENTHOL 1 LOZENGE: 15; 3.6 LOZENGE ORAL at 11:36

## 2019-07-21 RX ADMIN — RANITIDINE HYDROCHLORIDE 50 MG: 25 INJECTION INTRAMUSCULAR; INTRAVENOUS at 03:33

## 2019-07-21 RX ADMIN — LOSARTAN POTASSIUM 25 MG: 25 TABLET ORAL at 08:19

## 2019-07-21 RX ADMIN — POTASSIUM CHLORIDE 20 MEQ: 29.8 INJECTION, SOLUTION INTRAVENOUS at 08:19

## 2019-07-21 RX ADMIN — RANITIDINE HYDROCHLORIDE 50 MG: 25 INJECTION INTRAMUSCULAR; INTRAVENOUS at 12:55

## 2019-07-21 RX ADMIN — ACETAMINOPHEN 650 MG: 325 TABLET, FILM COATED ORAL at 02:27

## 2019-07-21 ASSESSMENT — ACTIVITIES OF DAILY LIVING (ADL)
ADLS_ACUITY_SCORE: 13

## 2019-07-21 ASSESSMENT — PAIN DESCRIPTION - DESCRIPTORS
DESCRIPTORS: ACHING

## 2019-07-21 NOTE — PROGRESS NOTES
Surgery Progress Note  07/21/2019       Subjective:  - BRIANNE overnight.  - Not passing flatus     Objective:  Temp:  [97.4  F (36.3  C)-99.6  F (37.6  C)] 99.3  F (37.4  C)  Pulse:  [77-92] 77  Resp:  [16-20] 18  BP: (127-162)/(70-80) 148/74  SpO2:  [93 %-96 %] 95 %    I/O last 3 completed shifts:  In: 3797.96 [P.O.:100; I.V.:210; IV Piggyback:2450]  Out: 4800 [Urine:2350; Emesis/NG output:2450]      Gen: Awake, alert, NAD  Resp: NLB on RA  Abd: soft, distended, nontender, NGT in place   Incision: from previous surgery, c/d/i   Ext: WWP, no edema     Labs:  Recent Labs   Lab 07/19/19  0600 07/18/19  0513 07/17/19  0509   WBC 13.4* 14.1* 14.2*   HGB 8.8* 9.2* 9.4*    446 435       Recent Labs   Lab 07/21/19  0549 07/20/19  1549 07/20/19  0754 07/19/19  0621     --  136 134   POTASSIUM 3.9 3.7 3.3* 3.4   CHLORIDE 104  --  102 102   CO2 25  --  26 27   BUN 20  --  19 18   CR 0.62  --  0.58 0.63   *  --  135* 148*   TARIQ 8.2*  --  8.1* 8.3*   MAG 2.4*  --  1.9 2.1   PHOS 3.0  --  3.5 2.4*       Imaging:  None new      Assessment/Plan:   74 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  Likely 2/2 edema at anastomosis.    - NPO, mIVF, continue to replace NGT output 1:1  - TPN via PICC   - Follow labs   - Symptomatic care     Seen, examined, and discussed with chief resident, who will discuss with staff.    Yulisa Diez MD  Surgery Resident PGY-1  Pg [phone]

## 2019-07-21 NOTE — PLAN OF CARE
"  /76 (BP Location: Left arm)   Pulse 86   Temp 98.9  F (37.2  C) (Axillary)   Resp 18   Ht 1.702 m (5' 7\")   Wt 78.9 kg (174 lb)   SpO2 96%   BMI 27.25 kg/m      Time of Care 3227-3548  Reason for admission: Unable to tolerate PO, nausea/vomiting/abdominal pain. Recent distal duodenal and proximal jejunal resection.   Vitals: Hypertensive, 140-160s/70-80. T-max 99.3. On room air.   Activity: Up ad tyson.   Pain: Pt c/o headache and sore throat. Gave PRN tylenol and tessalon pearls. Scheduled Robitussin.   Neuro: A&Ox4.  Respiratory:  LS clear, denies SOB/chest pain.   GI/: Hypoactive BS, passing very little gas. NGT to LIS with green/brown output. 1:1 LR bolus replacement given this AM. Voiding spontaneously, LERMA.   Diet: NPO ex ice chips.   Lines: DL PICC infusing TPN/lipids per POC. LR bolus currently infusing.   Skin/Wounds: Intact. Scattered bruising.   Plan: Possible x-ray vs CT tomorrow pending pt's status.      Continue to monitor and follow POC.    Sheree Strickland RN on 7/21/2019 at 5:47 AM    "

## 2019-07-21 NOTE — PLAN OF CARE
"7212-0259 hours: Afebrile, vital signs stable. On room air. Complaints of bilateral lower quadrant abdominal \"discomfort\" today that has persistent but not changed throughout the shift. Intermittent nausea. Declines any interventions at this time. Dulcolax suppository administered this afternoon. No bowel movement yet. Adequately voiding. Walking in hallway several times today. Visitors this afternoon. Potassium replaced for a lab value of 3.3. Recheck is scheduled for tomorrow morning. Blood sugars have been stable all day. No insulin required. On TPN infusing at 60 mL/hr. LR bolus 1000 mL given for 1:1 output replacement from NG tube. Plan for Upper GI KUB tomorrow.  "

## 2019-07-21 NOTE — PLAN OF CARE
"8030-3254 hours: Afebrile, vital signs stable. On room air with adequate oxygenation. Complaints of right upper quadrant \"discomfort\" this morning, but declined any intervention. No bowel sounds this morning, but patient was able to pass gas this afternoon. Suppository given without any success. NG to LIS. Replaced output with LR bolus of 1:1 ratio. 750 mL bolus administered. Up in hallway several times. Nasal and throat congestion this evening. Declined interventions. Potassium replaced x 2 today. Magnesium replaced. Rechecks tomorrow morning are scheduled. Blood sugars were below 140 mg/dL today. No insulin requirements at this time.   "

## 2019-07-22 ENCOUNTER — APPOINTMENT (OUTPATIENT)
Dept: CT IMAGING | Facility: CLINIC | Age: 74
DRG: 327 | End: 2019-07-22
Attending: SURGERY
Payer: MEDICARE

## 2019-07-22 ENCOUNTER — APPOINTMENT (OUTPATIENT)
Dept: GENERAL RADIOLOGY | Facility: CLINIC | Age: 74
DRG: 327 | End: 2019-07-22
Attending: SURGERY
Payer: MEDICARE

## 2019-07-22 LAB
ALBUMIN SERPL-MCNC: 2.4 G/DL (ref 3.4–5)
ALP SERPL-CCNC: 170 U/L (ref 40–150)
ALT SERPL W P-5'-P-CCNC: 62 U/L (ref 0–50)
ANION GAP SERPL CALCULATED.3IONS-SCNC: 7 MMOL/L (ref 3–14)
AST SERPL W P-5'-P-CCNC: 56 U/L (ref 0–45)
BILIRUB SERPL-MCNC: 1 MG/DL (ref 0.2–1.3)
BUN SERPL-MCNC: 20 MG/DL (ref 7–30)
CALCIUM SERPL-MCNC: 8.6 MG/DL (ref 8.5–10.1)
CHLORIDE SERPL-SCNC: 100 MMOL/L (ref 94–109)
CO2 SERPL-SCNC: 26 MMOL/L (ref 20–32)
CREAT SERPL-MCNC: 0.64 MG/DL (ref 0.52–1.04)
ERYTHROCYTE [DISTWIDTH] IN BLOOD BY AUTOMATED COUNT: 13 % (ref 10–15)
GFR SERPL CREATININE-BSD FRML MDRD: 88 ML/MIN/{1.73_M2}
GLUCOSE BLDC GLUCOMTR-MCNC: 118 MG/DL (ref 70–99)
GLUCOSE BLDC GLUCOMTR-MCNC: 122 MG/DL (ref 70–99)
GLUCOSE BLDC GLUCOMTR-MCNC: 129 MG/DL (ref 70–99)
GLUCOSE BLDC GLUCOMTR-MCNC: 138 MG/DL (ref 70–99)
GLUCOSE BLDC GLUCOMTR-MCNC: 140 MG/DL (ref 70–99)
GLUCOSE SERPL-MCNC: 143 MG/DL (ref 70–99)
HCT VFR BLD AUTO: 29.8 % (ref 35–47)
HGB BLD-MCNC: 9.3 G/DL (ref 11.7–15.7)
INR PPP: 1.08 (ref 0.86–1.14)
MAGNESIUM SERPL-MCNC: 2.2 MG/DL (ref 1.6–2.3)
MCH RBC QN AUTO: 28 PG (ref 26.5–33)
MCHC RBC AUTO-ENTMCNC: 31.2 G/DL (ref 31.5–36.5)
MCV RBC AUTO: 90 FL (ref 78–100)
PHOSPHATE SERPL-MCNC: 3.4 MG/DL (ref 2.5–4.5)
PLATELET # BLD AUTO: 468 10E9/L (ref 150–450)
POTASSIUM SERPL-SCNC: 3.7 MMOL/L (ref 3.4–5.3)
PREALB SERPL IA-MCNC: 13 MG/DL (ref 15–45)
PROT SERPL-MCNC: 6.5 G/DL (ref 6.8–8.8)
RBC # BLD AUTO: 3.32 10E12/L (ref 3.8–5.2)
SODIUM SERPL-SCNC: 134 MMOL/L (ref 133–144)
WBC # BLD AUTO: 10.2 10E9/L (ref 4–11)

## 2019-07-22 PROCEDURE — 40000986 XR ABDOMEN PORT 1 VW

## 2019-07-22 PROCEDURE — 36592 COLLECT BLOOD FROM PICC: CPT | Performed by: SURGERY

## 2019-07-22 PROCEDURE — 74176 CT ABD & PELVIS W/O CONTRAST: CPT

## 2019-07-22 PROCEDURE — 12000001 ZZH R&B MED SURG/OB UMMC

## 2019-07-22 PROCEDURE — 25000132 ZZH RX MED GY IP 250 OP 250 PS 637: Performed by: STUDENT IN AN ORGANIZED HEALTH CARE EDUCATION/TRAINING PROGRAM

## 2019-07-22 PROCEDURE — 25000125 ZZHC RX 250: Performed by: SURGERY

## 2019-07-22 PROCEDURE — 85610 PROTHROMBIN TIME: CPT | Performed by: SURGERY

## 2019-07-22 PROCEDURE — 85027 COMPLETE CBC AUTOMATED: CPT | Performed by: STUDENT IN AN ORGANIZED HEALTH CARE EDUCATION/TRAINING PROGRAM

## 2019-07-22 PROCEDURE — 84134 ASSAY OF PREALBUMIN: CPT | Performed by: SURGERY

## 2019-07-22 PROCEDURE — 84100 ASSAY OF PHOSPHORUS: CPT | Performed by: SURGERY

## 2019-07-22 PROCEDURE — 00000146 ZZHCL STATISTIC GLUCOSE BY METER IP

## 2019-07-22 PROCEDURE — C9113 INJ PANTOPRAZOLE SODIUM, VIA: HCPCS | Performed by: STUDENT IN AN ORGANIZED HEALTH CARE EDUCATION/TRAINING PROGRAM

## 2019-07-22 PROCEDURE — 83735 ASSAY OF MAGNESIUM: CPT | Performed by: SURGERY

## 2019-07-22 PROCEDURE — 80053 COMPREHEN METABOLIC PANEL: CPT | Performed by: SURGERY

## 2019-07-22 PROCEDURE — 25000132 ZZH RX MED GY IP 250 OP 250 PS 637: Performed by: SURGERY

## 2019-07-22 PROCEDURE — 25000128 H RX IP 250 OP 636: Performed by: STUDENT IN AN ORGANIZED HEALTH CARE EDUCATION/TRAINING PROGRAM

## 2019-07-22 PROCEDURE — 25800030 ZZH RX IP 258 OP 636: Performed by: STUDENT IN AN ORGANIZED HEALTH CARE EDUCATION/TRAINING PROGRAM

## 2019-07-22 PROCEDURE — 36592 COLLECT BLOOD FROM PICC: CPT | Performed by: STUDENT IN AN ORGANIZED HEALTH CARE EDUCATION/TRAINING PROGRAM

## 2019-07-22 RX ADMIN — PANTOPRAZOLE SODIUM 40 MG: 40 INJECTION, POWDER, FOR SOLUTION INTRAVENOUS at 20:43

## 2019-07-22 RX ADMIN — METOPROLOL TARTRATE 25 MG: 25 TABLET ORAL at 08:16

## 2019-07-22 RX ADMIN — POTASSIUM CHLORIDE 20 MEQ: 29.8 INJECTION, SOLUTION INTRAVENOUS at 09:18

## 2019-07-22 RX ADMIN — PANTOPRAZOLE SODIUM 40 MG: 40 INJECTION, POWDER, FOR SOLUTION INTRAVENOUS at 08:17

## 2019-07-22 RX ADMIN — METOPROLOL TARTRATE 25 MG: 25 TABLET ORAL at 21:25

## 2019-07-22 RX ADMIN — ONDANSETRON 4 MG: 2 INJECTION INTRAMUSCULAR; INTRAVENOUS at 02:43

## 2019-07-22 RX ADMIN — LOSARTAN POTASSIUM 25 MG: 25 TABLET ORAL at 08:16

## 2019-07-22 RX ADMIN — SODIUM CHLORIDE, POTASSIUM CHLORIDE, SODIUM LACTATE AND CALCIUM CHLORIDE 1000 ML: 600; 310; 30; 20 INJECTION, SOLUTION INTRAVENOUS at 14:24

## 2019-07-22 RX ADMIN — SUCRALFATE 1 G: 1 SUSPENSION ORAL at 12:00

## 2019-07-22 RX ADMIN — INSULIN ASPART 1 UNITS: 100 INJECTION, SOLUTION INTRAVENOUS; SUBCUTANEOUS at 08:52

## 2019-07-22 RX ADMIN — I.V. FAT EMULSION 250 ML: 20 EMULSION INTRAVENOUS at 21:23

## 2019-07-22 RX ADMIN — POTASSIUM CHLORIDE: 2 INJECTION, SOLUTION, CONCENTRATE INTRAVENOUS at 21:22

## 2019-07-22 RX ADMIN — ONDANSETRON 4 MG: 2 INJECTION INTRAMUSCULAR; INTRAVENOUS at 12:00

## 2019-07-22 RX ADMIN — SODIUM CHLORIDE, POTASSIUM CHLORIDE, SODIUM LACTATE AND CALCIUM CHLORIDE 950 ML: 600; 310; 30; 20 INJECTION, SOLUTION INTRAVENOUS at 06:05

## 2019-07-22 RX ADMIN — SUCRALFATE 1 G: 1 SUSPENSION ORAL at 01:08

## 2019-07-22 RX ADMIN — SODIUM CHLORIDE, POTASSIUM CHLORIDE, SODIUM LACTATE AND CALCIUM CHLORIDE 1000 ML: 600; 310; 30; 20 INJECTION, SOLUTION INTRAVENOUS at 21:23

## 2019-07-22 ASSESSMENT — MIFFLIN-ST. JEOR: SCORE: 1319.62

## 2019-07-22 ASSESSMENT — ACTIVITIES OF DAILY LIVING (ADL)
ADLS_ACUITY_SCORE: 13

## 2019-07-22 ASSESSMENT — PAIN DESCRIPTION - DESCRIPTORS
DESCRIPTORS: ACHING
DESCRIPTORS: ACHING

## 2019-07-22 NOTE — PROGRESS NOTES
"  Care Coordinator Progress Note    Admission Date/Time:  7/15/2019  Attending MD:  Joaquin Brito,*    Data  Chart reviewed, discussed with interdisciplinary team.   Patient was admitted for: Inability to tolerate PO.    Per chart review, patient currently has NGT and is receiving TPN via PICC line.     Writer requested a benefit check with [facility] to inquire about TPN coverage. Writer received the following response from [facility], \"Pt has coverage for TPN, IV hydration and skilled nursing. 80% up to oop $4000 (met $251 so far).    Per team, patient is still putting out al ot via NG and team will re-assess patient later this week to determine if patient will need TPN at discharge.     RNCC will continue to follow.      Plan  Anticipated Discharge Date:  TBD  Anticipated Discharge Plan:  TBD  Joanna Munoz, RN, BSN, PHN  Care Coordinator             "

## 2019-07-22 NOTE — PROGRESS NOTES
Surgery Progress Note  07/22/2019       Subjective:  - BRIANNE overnight.  - Slept poorly  - Mild nausea, no complaints of belly pain     Objective:  Temp:  [96.4  F (35.8  C)-98.4  F (36.9  C)] 98.4  F (36.9  C)  Pulse:  [91] 91  Heart Rate:  [77-86] 81  Resp:  [18-20] 18  BP: (134-155)/(77-85) 134/79  SpO2:  [94 %-96 %] 94 %    I/O last 3 completed shifts:  In: 3687.25 [P.O.:120; I.V.:60; IV Piggyback:1750]  Out: 4000 [Urine:1250; Emesis/NG output:2750]      Gen: Awake, alert, NAD  Resp: NLB on RA  Abd: soft, distended, nontender, NGT in place   Incision: from previous surgery, c/d/i   Ext: WWP, no edema     Labs:  Recent Labs   Lab 07/22/19  0629 07/19/19  0600 07/18/19  0513   WBC 10.2 13.4* 14.1*   HGB 9.3* 8.8* 9.2*   * 443 446       Recent Labs   Lab 07/22/19  0523 07/21/19  0549 07/20/19  1549 07/20/19  0754    135  --  136   POTASSIUM 3.7 3.9 3.7 3.3*   CHLORIDE 100 104  --  102   CO2 26 25  --  26   BUN 20 20  --  19   CR 0.64 0.62  --  0.58   * 138*  --  135*   TARIQ 8.6 8.2*  --  8.1*   MAG 2.2 2.4*  --  1.9   PHOS 3.4 3.0  --  3.5       Imaging:  None new      Assessment/Plan:   74 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  Likely 2/2 edema at anastomosis.    - CT with *NG contrast to assess upper GI anastomosis  - Add IV protonix   - continue NPO, mIVF, continue to replace NGT output 1:1  - continue TPN via PICC   - Follow labs   - Symptomatic care     Seen, examined, and discussed with chief resident, who will discuss with staff.    Karin Rico MD  Surgery Resident PGY-1  Pg [phone]

## 2019-07-22 NOTE — PLAN OF CARE
9620-0823: VSS on room air, afebrile. C/o sinus pressure pain and esophageal severe reflux pain. PRN tylenol x1 and PRN carafate x2 given for pain and discomfort. C/o nausea once (was dry-heaving), relief with PRN zofran. 750 ml LR given at 2200 for 1:1 NG output replacement and 950 ml LR given at 0600. Continuous TPN infusing at 60 ml/hr. Lipids infusing at 20.8ml/hr. Up ad tyson ambulating hallways. Good urine output. Not passing gas or BM. XR upper GI with KUB scheduled for this AM. Pt was refusing scheduled zantac - she believed it was making her more nauseas and increased her reflux sx. After talking to the MD she will retry the new increased dose. Pt also to have the HOB at least 30 degrees or higher at all times.

## 2019-07-22 NOTE — PLAN OF CARE
Afebrile, vital signs stable. On room air. Intermittent nausea and reflux. NG to LIS. CT scan this afternoon with oral contrast via NG. Patient tolerated approximately 300 mL (half) of contrast before going to CT scan with increased reflux and nausea. Zofran and carafate given with some improvement. Blood sugars are stable. Insulin given this morning. On continuous TPN at 60 mL/hr. Liter bolus given for 1:1 NG output. NG contents are more dark brown/green in color. Continue to monitor changes. Patient eats ice chips very sparingly. Potassium replaced with recheck scheduled for tomorrow. Patient does report postnasal drip. She is concerned for sinus infection as drainage is now thick green vs thick clear in color and she has had sinus pressure x 2 nights. MD team notified. Also reports frustration with the fact that she does not seem to be getting better and is very anxious to hear the results of CT scan and next steps.

## 2019-07-23 LAB
ANION GAP SERPL CALCULATED.3IONS-SCNC: 7 MMOL/L (ref 3–14)
BUN SERPL-MCNC: 19 MG/DL (ref 7–30)
CALCIUM SERPL-MCNC: 8.8 MG/DL (ref 8.5–10.1)
CHLORIDE SERPL-SCNC: 103 MMOL/L (ref 94–109)
CO2 SERPL-SCNC: 25 MMOL/L (ref 20–32)
CREAT SERPL-MCNC: 0.69 MG/DL (ref 0.52–1.04)
ERYTHROCYTE [DISTWIDTH] IN BLOOD BY AUTOMATED COUNT: 13.2 % (ref 10–15)
GFR SERPL CREATININE-BSD FRML MDRD: 85 ML/MIN/{1.73_M2}
GLUCOSE BLDC GLUCOMTR-MCNC: 108 MG/DL (ref 70–99)
GLUCOSE BLDC GLUCOMTR-MCNC: 112 MG/DL (ref 70–99)
GLUCOSE BLDC GLUCOMTR-MCNC: 124 MG/DL (ref 70–99)
GLUCOSE BLDC GLUCOMTR-MCNC: 127 MG/DL (ref 70–99)
GLUCOSE BLDC GLUCOMTR-MCNC: 95 MG/DL (ref 70–99)
GLUCOSE SERPL-MCNC: 125 MG/DL (ref 70–99)
HCT VFR BLD AUTO: 29.8 % (ref 35–47)
HGB BLD-MCNC: 9.1 G/DL (ref 11.7–15.7)
MAGNESIUM SERPL-MCNC: 2.1 MG/DL (ref 1.6–2.3)
MCH RBC QN AUTO: 28.3 PG (ref 26.5–33)
MCHC RBC AUTO-ENTMCNC: 30.5 G/DL (ref 31.5–36.5)
MCV RBC AUTO: 93 FL (ref 78–100)
PHOSPHATE SERPL-MCNC: 4.1 MG/DL (ref 2.5–4.5)
PLATELET # BLD AUTO: 447 10E9/L (ref 150–450)
POTASSIUM SERPL-SCNC: 4.2 MMOL/L (ref 3.4–5.3)
RBC # BLD AUTO: 3.21 10E12/L (ref 3.8–5.2)
SODIUM SERPL-SCNC: 136 MMOL/L (ref 133–144)
WBC # BLD AUTO: 9.5 10E9/L (ref 4–11)

## 2019-07-23 PROCEDURE — 80048 BASIC METABOLIC PNL TOTAL CA: CPT | Performed by: STUDENT IN AN ORGANIZED HEALTH CARE EDUCATION/TRAINING PROGRAM

## 2019-07-23 PROCEDURE — C9113 INJ PANTOPRAZOLE SODIUM, VIA: HCPCS | Performed by: STUDENT IN AN ORGANIZED HEALTH CARE EDUCATION/TRAINING PROGRAM

## 2019-07-23 PROCEDURE — 25000128 H RX IP 250 OP 636: Performed by: STUDENT IN AN ORGANIZED HEALTH CARE EDUCATION/TRAINING PROGRAM

## 2019-07-23 PROCEDURE — 00000146 ZZHCL STATISTIC GLUCOSE BY METER IP

## 2019-07-23 PROCEDURE — 84100 ASSAY OF PHOSPHORUS: CPT | Performed by: STUDENT IN AN ORGANIZED HEALTH CARE EDUCATION/TRAINING PROGRAM

## 2019-07-23 PROCEDURE — 40000558 ZZH STATISTIC CVC DRESSING CHANGE

## 2019-07-23 PROCEDURE — 25800030 ZZH RX IP 258 OP 636: Performed by: SURGERY

## 2019-07-23 PROCEDURE — 85027 COMPLETE CBC AUTOMATED: CPT | Performed by: STUDENT IN AN ORGANIZED HEALTH CARE EDUCATION/TRAINING PROGRAM

## 2019-07-23 PROCEDURE — 36592 COLLECT BLOOD FROM PICC: CPT | Performed by: STUDENT IN AN ORGANIZED HEALTH CARE EDUCATION/TRAINING PROGRAM

## 2019-07-23 PROCEDURE — 25000125 ZZHC RX 250: Performed by: SURGERY

## 2019-07-23 PROCEDURE — 12000001 ZZH R&B MED SURG/OB UMMC

## 2019-07-23 PROCEDURE — 83735 ASSAY OF MAGNESIUM: CPT | Performed by: STUDENT IN AN ORGANIZED HEALTH CARE EDUCATION/TRAINING PROGRAM

## 2019-07-23 PROCEDURE — 25800030 ZZH RX IP 258 OP 636: Performed by: STUDENT IN AN ORGANIZED HEALTH CARE EDUCATION/TRAINING PROGRAM

## 2019-07-23 PROCEDURE — 25000132 ZZH RX MED GY IP 250 OP 250 PS 637: Performed by: SURGERY

## 2019-07-23 PROCEDURE — 25000132 ZZH RX MED GY IP 250 OP 250 PS 637: Performed by: STUDENT IN AN ORGANIZED HEALTH CARE EDUCATION/TRAINING PROGRAM

## 2019-07-23 RX ORDER — POTASSIUM CHLORIDE 7.45 MG/ML
10 INJECTION INTRAVENOUS
Status: DISCONTINUED | OUTPATIENT
Start: 2019-07-23 | End: 2019-08-06

## 2019-07-23 RX ORDER — MAGNESIUM SULFATE HEPTAHYDRATE 40 MG/ML
4 INJECTION, SOLUTION INTRAVENOUS EVERY 4 HOURS PRN
Status: DISCONTINUED | OUTPATIENT
Start: 2019-07-23 | End: 2019-08-06

## 2019-07-23 RX ORDER — POTASSIUM CHLORIDE 1.5 G/1.58G
20-40 POWDER, FOR SOLUTION ORAL
Status: DISCONTINUED | OUTPATIENT
Start: 2019-07-23 | End: 2019-08-06

## 2019-07-23 RX ORDER — POTASSIUM CHLORIDE 29.8 MG/ML
20 INJECTION INTRAVENOUS
Status: DISCONTINUED | OUTPATIENT
Start: 2019-07-23 | End: 2019-08-06

## 2019-07-23 RX ORDER — POTASSIUM CL/LIDO/0.9 % NACL 10MEQ/0.1L
10 INTRAVENOUS SOLUTION, PIGGYBACK (ML) INTRAVENOUS
Status: DISCONTINUED | OUTPATIENT
Start: 2019-07-23 | End: 2019-08-06

## 2019-07-23 RX ORDER — POTASSIUM CHLORIDE 750 MG/1
20-40 TABLET, EXTENDED RELEASE ORAL
Status: DISCONTINUED | OUTPATIENT
Start: 2019-07-23 | End: 2019-08-06

## 2019-07-23 RX ADMIN — SODIUM CHLORIDE, POTASSIUM CHLORIDE, SODIUM LACTATE AND CALCIUM CHLORIDE 700 ML: 600; 310; 30; 20 INJECTION, SOLUTION INTRAVENOUS at 06:10

## 2019-07-23 RX ADMIN — SODIUM CHLORIDE, POTASSIUM CHLORIDE, SODIUM LACTATE AND CALCIUM CHLORIDE 1000 ML: 600; 310; 30; 20 INJECTION, SOLUTION INTRAVENOUS at 18:58

## 2019-07-23 RX ADMIN — METOPROLOL TARTRATE 25 MG: 25 TABLET ORAL at 19:24

## 2019-07-23 RX ADMIN — BENZOCAINE, MENTHOL 1 LOZENGE: 15; 3.6 LOZENGE ORAL at 06:49

## 2019-07-23 RX ADMIN — I.V. FAT EMULSION 250 ML: 20 EMULSION INTRAVENOUS at 19:29

## 2019-07-23 RX ADMIN — PANTOPRAZOLE SODIUM 40 MG: 40 INJECTION, POWDER, FOR SOLUTION INTRAVENOUS at 19:24

## 2019-07-23 RX ADMIN — PANTOPRAZOLE SODIUM 40 MG: 40 INJECTION, POWDER, FOR SOLUTION INTRAVENOUS at 08:14

## 2019-07-23 RX ADMIN — LOSARTAN POTASSIUM 25 MG: 25 TABLET ORAL at 08:14

## 2019-07-23 RX ADMIN — SODIUM CHLORIDE, POTASSIUM CHLORIDE, SODIUM LACTATE AND CALCIUM CHLORIDE 1000 ML: 600; 310; 30; 20 INJECTION, SOLUTION INTRAVENOUS at 23:00

## 2019-07-23 RX ADMIN — POTASSIUM CHLORIDE: 2 INJECTION, SOLUTION, CONCENTRATE INTRAVENOUS at 19:29

## 2019-07-23 RX ADMIN — SODIUM CHLORIDE, POTASSIUM CHLORIDE, SODIUM LACTATE AND CALCIUM CHLORIDE 900 ML: 600; 310; 30; 20 INJECTION, SOLUTION INTRAVENOUS at 14:57

## 2019-07-23 RX ADMIN — METOPROLOL TARTRATE 25 MG: 25 TABLET ORAL at 08:14

## 2019-07-23 ASSESSMENT — MIFFLIN-ST. JEOR: SCORE: 1318.26

## 2019-07-23 ASSESSMENT — ACTIVITIES OF DAILY LIVING (ADL)
ADLS_ACUITY_SCORE: 13

## 2019-07-23 ASSESSMENT — PAIN DESCRIPTION - DESCRIPTORS: DESCRIPTORS: SORE

## 2019-07-23 NOTE — PLAN OF CARE
Pt afebrile, VSS. Stated that reflux/nausea is much improved after NG was adjusted during the evening shift. C/o negligible pain, sore throat from NG, PRN cepacol lozenge given. LR bolus given.  and 114, no insulin given. TNP and lipids infusing. NG set to low intermittent suction, 700 mL output. Voiding adequately. No BM this shift. Continue with plan of care.

## 2019-07-23 NOTE — PLAN OF CARE
4536-1967    AVSS, denies N/V, pain, dizziness and SOB. Independent. NGT to intermittent medium suction. Large amount NG output. NGT pulled about 2 inch (noticed at bedside report 1500). Dr notified, Xray done, Dr asked to advance 2 inch (to marked area) in to nostril. Another Xray done. On continuous TPN at 60 mL/hr. 1000 LR bolus given (Q shift order). PICC's both caps changed, flush well and blood return noticed. Continue monitoring.

## 2019-07-23 NOTE — PROGRESS NOTES
Surgery Progress Note  07/23/2019       Subjective:  - BRIANNE overnight.  - Complains of mild pain with swallowing  - No abdominal pain     Objective:  Temp:  [96.7  F (35.9  C)-99.1  F (37.3  C)] 98.5  F (36.9  C)  Pulse:  [73] 73  Heart Rate:  [76-88] 88  Resp:  [18] 18  BP: (141-163)/(69-81) 141/81  SpO2:  [92 %-97 %] 97 %    I/O last 3 completed shifts:  In: 2131.63 [I.V.:100; NG/GT:30; IV Piggyback:800]  Out: 3950 [Urine:1850; Emesis/NG output:2100]      Gen: Awake, alert, NAD  Resp: NLB on RA  Abd: soft, nondistended, nontender, NGT in place   Incision: from previous surgery, c/d/i   Ext: WWP     Labs:  Recent Labs   Lab 07/23/19  0525 07/22/19  0629 07/19/19  0600   WBC 9.5 10.2 13.4*   HGB 9.1* 9.3* 8.8*    468* 443       Recent Labs   Lab 07/23/19  0525 07/22/19  0523 07/21/19  0549    134 135   POTASSIUM 4.2 3.7 3.9   CHLORIDE 103 100 104   CO2 25 26 25   BUN 19 20 20   CR 0.69 0.64 0.62   * 143* 138*   TARIQ 8.8 8.6 8.2*   MAG 2.1 2.2 2.4*   PHOS 4.1 3.4 3.0       Imaging:  CT with contrast: No anastomotic leak, right pericolic fluid collection and with possible hematoma.     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  Likely 2/2 edema at anastomosis.    - Replace electrolytes as needed.    - Continue NPO, mIVF, continue to replace NGT output 1:1  - Continue TPN via PICC   - Follow labs.  - Symptomatic care     Seen, examined, and discussed with chief resident, who will discuss with staff.    Karin Rico MD  Surgery Resident PGY-1  Pg [phone]

## 2019-07-23 NOTE — PLAN OF CARE
Afebrile, vital signs stable. On room air. Reports improved pain that began yesterday evening after NG tube advancement. Continues to have intermittent nausea and reflux, but declines interventions. Ice chips very sparingly. Tpn infusing at 60 mL/hr. Continues on q4h blood sugars. Blood sugar checks were 125 mg/dL and 127 mg/dL. PICC dressing changed today due to wetness after shower. LR bolus infusing for 1:1 NG output.

## 2019-07-24 LAB
ANION GAP SERPL CALCULATED.3IONS-SCNC: 5 MMOL/L (ref 3–14)
BUN SERPL-MCNC: 17 MG/DL (ref 7–30)
CALCIUM SERPL-MCNC: 8.5 MG/DL (ref 8.5–10.1)
CHLORIDE SERPL-SCNC: 102 MMOL/L (ref 94–109)
CO2 SERPL-SCNC: 25 MMOL/L (ref 20–32)
CREAT SERPL-MCNC: 0.7 MG/DL (ref 0.52–1.04)
ERYTHROCYTE [DISTWIDTH] IN BLOOD BY AUTOMATED COUNT: 13.2 % (ref 10–15)
GFR SERPL CREATININE-BSD FRML MDRD: 85 ML/MIN/{1.73_M2}
GLUCOSE BLDC GLUCOMTR-MCNC: 121 MG/DL (ref 70–99)
GLUCOSE BLDC GLUCOMTR-MCNC: 121 MG/DL (ref 70–99)
GLUCOSE BLDC GLUCOMTR-MCNC: 124 MG/DL (ref 70–99)
GLUCOSE BLDC GLUCOMTR-MCNC: 128 MG/DL (ref 70–99)
GLUCOSE BLDC GLUCOMTR-MCNC: 133 MG/DL (ref 70–99)
GLUCOSE BLDC GLUCOMTR-MCNC: 96 MG/DL (ref 70–99)
GLUCOSE SERPL-MCNC: 134 MG/DL (ref 70–99)
HCT VFR BLD AUTO: 29 % (ref 35–47)
HGB BLD-MCNC: 8.8 G/DL (ref 11.7–15.7)
MAGNESIUM SERPL-MCNC: 2.1 MG/DL (ref 1.6–2.3)
MCH RBC QN AUTO: 28 PG (ref 26.5–33)
MCHC RBC AUTO-ENTMCNC: 30.3 G/DL (ref 31.5–36.5)
MCV RBC AUTO: 92 FL (ref 78–100)
PHOSPHATE SERPL-MCNC: 3.6 MG/DL (ref 2.5–4.5)
PLATELET # BLD AUTO: 451 10E9/L (ref 150–450)
POTASSIUM SERPL-SCNC: 4 MMOL/L (ref 3.4–5.3)
RBC # BLD AUTO: 3.14 10E12/L (ref 3.8–5.2)
SODIUM SERPL-SCNC: 133 MMOL/L (ref 133–144)
WBC # BLD AUTO: 8.7 10E9/L (ref 4–11)

## 2019-07-24 PROCEDURE — 12000001 ZZH R&B MED SURG/OB UMMC

## 2019-07-24 PROCEDURE — 25000132 ZZH RX MED GY IP 250 OP 250 PS 637: Performed by: STUDENT IN AN ORGANIZED HEALTH CARE EDUCATION/TRAINING PROGRAM

## 2019-07-24 PROCEDURE — 80048 BASIC METABOLIC PNL TOTAL CA: CPT | Performed by: SURGERY

## 2019-07-24 PROCEDURE — 25800030 ZZH RX IP 258 OP 636: Performed by: STUDENT IN AN ORGANIZED HEALTH CARE EDUCATION/TRAINING PROGRAM

## 2019-07-24 PROCEDURE — 25000132 ZZH RX MED GY IP 250 OP 250 PS 637: Performed by: SURGERY

## 2019-07-24 PROCEDURE — 84100 ASSAY OF PHOSPHORUS: CPT | Performed by: SURGERY

## 2019-07-24 PROCEDURE — 40000802 ZZH SITE CHECK

## 2019-07-24 PROCEDURE — 00000146 ZZHCL STATISTIC GLUCOSE BY METER IP

## 2019-07-24 PROCEDURE — 25000128 H RX IP 250 OP 636: Performed by: STUDENT IN AN ORGANIZED HEALTH CARE EDUCATION/TRAINING PROGRAM

## 2019-07-24 PROCEDURE — 83735 ASSAY OF MAGNESIUM: CPT | Performed by: SURGERY

## 2019-07-24 PROCEDURE — C9113 INJ PANTOPRAZOLE SODIUM, VIA: HCPCS | Performed by: STUDENT IN AN ORGANIZED HEALTH CARE EDUCATION/TRAINING PROGRAM

## 2019-07-24 PROCEDURE — 25000125 ZZHC RX 250: Performed by: SURGERY

## 2019-07-24 PROCEDURE — 85027 COMPLETE CBC AUTOMATED: CPT | Performed by: SURGERY

## 2019-07-24 PROCEDURE — 36592 COLLECT BLOOD FROM PICC: CPT | Performed by: SURGERY

## 2019-07-24 RX ADMIN — LOSARTAN POTASSIUM 25 MG: 25 TABLET ORAL at 08:10

## 2019-07-24 RX ADMIN — SODIUM CHLORIDE, POTASSIUM CHLORIDE, SODIUM LACTATE AND CALCIUM CHLORIDE 700 ML: 600; 310; 30; 20 INJECTION, SOLUTION INTRAVENOUS at 14:01

## 2019-07-24 RX ADMIN — METOPROLOL TARTRATE 25 MG: 25 TABLET ORAL at 08:10

## 2019-07-24 RX ADMIN — PANTOPRAZOLE SODIUM 40 MG: 40 INJECTION, POWDER, FOR SOLUTION INTRAVENOUS at 20:03

## 2019-07-24 RX ADMIN — I.V. FAT EMULSION 250 ML: 20 EMULSION INTRAVENOUS at 20:06

## 2019-07-24 RX ADMIN — ACETAMINOPHEN 650 MG: 325 TABLET, FILM COATED ORAL at 16:08

## 2019-07-24 RX ADMIN — SODIUM CHLORIDE, POTASSIUM CHLORIDE, SODIUM LACTATE AND CALCIUM CHLORIDE 850 ML: 600; 310; 30; 20 INJECTION, SOLUTION INTRAVENOUS at 22:38

## 2019-07-24 RX ADMIN — METOPROLOL TARTRATE 25 MG: 25 TABLET ORAL at 20:17

## 2019-07-24 RX ADMIN — SODIUM CHLORIDE, POTASSIUM CHLORIDE, SODIUM LACTATE AND CALCIUM CHLORIDE 1000 ML: 600; 310; 30; 20 INJECTION, SOLUTION INTRAVENOUS at 06:16

## 2019-07-24 RX ADMIN — BENZOCAINE, MENTHOL 1 LOZENGE: 15; 3.6 LOZENGE ORAL at 06:23

## 2019-07-24 RX ADMIN — PANTOPRAZOLE SODIUM 40 MG: 40 INJECTION, POWDER, FOR SOLUTION INTRAVENOUS at 08:10

## 2019-07-24 RX ADMIN — ACETAMINOPHEN 650 MG: 325 TABLET, FILM COATED ORAL at 10:07

## 2019-07-24 RX ADMIN — POTASSIUM CHLORIDE: 2 INJECTION, SOLUTION, CONCENTRATE INTRAVENOUS at 20:06

## 2019-07-24 ASSESSMENT — ACTIVITIES OF DAILY LIVING (ADL)
ADLS_ACUITY_SCORE: 13

## 2019-07-24 ASSESSMENT — MIFFLIN-ST. JEOR: SCORE: 1320.98

## 2019-07-24 ASSESSMENT — PAIN DESCRIPTION - DESCRIPTORS
DESCRIPTORS: SORE
DESCRIPTORS: SORE

## 2019-07-24 NOTE — PROGRESS NOTES
Surgery Progress Note  07/24/2019       Subjective:  - BRIANNE overnight.  - Endorses a lot of urination last night.  - Denies flatus.     Objective:  Temp:  [97.4  F (36.3  C)-98.5  F (36.9  C)] 97.4  F (36.3  C)  Pulse:  [76-84] 84  Heart Rate:  [76-82] 76  Resp:  [16-18] 16  BP: (125-162)/(68-80) 156/80  SpO2:  [94 %-97 %] 95 %    I/O last 3 completed shifts:  In: 5859.2 [I.V.:20; IV Piggyback:3900]  Out: 1700 [Urine:800; Emesis/NG output:900]      Gen: Awake, alert, NAD  Resp: NLB on RA  Abd: soft, nondistended, mild TTP periumbilical, NGT in place  Incision: from previous surgery, c/d/i   Ext: WWP     Labs:  Recent Labs   Lab 07/24/19  0527 07/23/19  0525 07/22/19  0629   WBC 8.7 9.5 10.2   HGB 8.8* 9.1* 9.3*   * 447 468*       Recent Labs   Lab 07/24/19  0527 07/23/19  0525 07/22/19  0523    136 134   POTASSIUM 4.0 4.2 3.7   CHLORIDE 102 103 100   CO2 25 25 26   BUN 17 19 20   CR 0.70 0.69 0.64   * 125* 143*   TARIQ 8.5 8.8 8.6   MAG 2.1 2.1 2.2   PHOS 3.6 4.1 3.4       Imaging:  CT with contrast: No anastomotic leak, right pericolic fluid collection and with possible hematoma.     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  Likely 2/2 edema at anastomosis.     - Plan to keep NG until return of bowel function.  - Replace electrolytes as needed.    - Continue NPO, mIVF, continue to replace NGT output 1:1  - Continue TPN via PICC   - Follow labs.  - Symptomatic care     Seen, examined, and discussed with chief resident, who will discuss with staff.    Karin Rico MD  Surgery Resident PGY-1  Pg [phone]

## 2019-07-24 NOTE — PLAN OF CARE
Pt afebrile, VSS. A&Ox4. BGs in 120s, no insulin given. TPN and lipids infusing. NG at low intermittent suction, output 1050 mL, LR bolus given 1:1 output. Voiding adequately. No BM this shift. C/o slight pain in throat, PRN cepacol lozenge given. No nausea. Slept in between cares. Continue with plan of care.

## 2019-07-24 NOTE — PLAN OF CARE
"6158-6986 hours: Afebrile, vital signs stable. On room air with adequate oxygenation. Reports fatigue with increased urination overnight. No bowel sounds. 700 mL output via NG tube replaced with 700 mL LR bolus over 2 hours. Walking in hallway independently. Tylenol given x 2 for headache and left sided sinus congestion. Continues to use ice chips very sparingly.     4090-4731 hours: Patient developed some bowel sounds on the left side of her abdomen only. Patient reports that she notices some \"gurgling\" when she is taking ice chips.   "

## 2019-07-25 ENCOUNTER — APPOINTMENT (OUTPATIENT)
Dept: GENERAL RADIOLOGY | Facility: CLINIC | Age: 74
DRG: 327 | End: 2019-07-25
Attending: SURGERY
Payer: MEDICARE

## 2019-07-25 LAB
ANION GAP SERPL CALCULATED.3IONS-SCNC: 7 MMOL/L (ref 3–14)
BUN SERPL-MCNC: 18 MG/DL (ref 7–30)
CALCIUM SERPL-MCNC: 9.2 MG/DL (ref 8.5–10.1)
CHLORIDE SERPL-SCNC: 104 MMOL/L (ref 94–109)
CO2 SERPL-SCNC: 24 MMOL/L (ref 20–32)
CREAT SERPL-MCNC: 0.61 MG/DL (ref 0.52–1.04)
ERYTHROCYTE [DISTWIDTH] IN BLOOD BY AUTOMATED COUNT: 13.3 % (ref 10–15)
GFR SERPL CREATININE-BSD FRML MDRD: 89 ML/MIN/{1.73_M2}
GLUCOSE BLDC GLUCOMTR-MCNC: 119 MG/DL (ref 70–99)
GLUCOSE BLDC GLUCOMTR-MCNC: 126 MG/DL (ref 70–99)
GLUCOSE BLDC GLUCOMTR-MCNC: 128 MG/DL (ref 70–99)
GLUCOSE BLDC GLUCOMTR-MCNC: 132 MG/DL (ref 70–99)
GLUCOSE BLDC GLUCOMTR-MCNC: 133 MG/DL (ref 70–99)
GLUCOSE BLDC GLUCOMTR-MCNC: 138 MG/DL (ref 70–99)
GLUCOSE SERPL-MCNC: 133 MG/DL (ref 70–99)
HCT VFR BLD AUTO: 30.6 % (ref 35–47)
HGB BLD-MCNC: 9.3 G/DL (ref 11.7–15.7)
MAGNESIUM SERPL-MCNC: 2 MG/DL (ref 1.6–2.3)
MCH RBC QN AUTO: 27.5 PG (ref 26.5–33)
MCHC RBC AUTO-ENTMCNC: 30.4 G/DL (ref 31.5–36.5)
MCV RBC AUTO: 91 FL (ref 78–100)
PHOSPHATE SERPL-MCNC: 3.9 MG/DL (ref 2.5–4.5)
PLATELET # BLD AUTO: 459 10E9/L (ref 150–450)
POTASSIUM SERPL-SCNC: 4.5 MMOL/L (ref 3.4–5.3)
RBC # BLD AUTO: 3.38 10E12/L (ref 3.8–5.2)
SODIUM SERPL-SCNC: 135 MMOL/L (ref 133–144)
WBC # BLD AUTO: 9.3 10E9/L (ref 4–11)

## 2019-07-25 PROCEDURE — 25000132 ZZH RX MED GY IP 250 OP 250 PS 637: Performed by: STUDENT IN AN ORGANIZED HEALTH CARE EDUCATION/TRAINING PROGRAM

## 2019-07-25 PROCEDURE — 12000001 ZZH R&B MED SURG/OB UMMC

## 2019-07-25 PROCEDURE — C9113 INJ PANTOPRAZOLE SODIUM, VIA: HCPCS | Performed by: STUDENT IN AN ORGANIZED HEALTH CARE EDUCATION/TRAINING PROGRAM

## 2019-07-25 PROCEDURE — 80048 BASIC METABOLIC PNL TOTAL CA: CPT | Performed by: STUDENT IN AN ORGANIZED HEALTH CARE EDUCATION/TRAINING PROGRAM

## 2019-07-25 PROCEDURE — 00000146 ZZHCL STATISTIC GLUCOSE BY METER IP

## 2019-07-25 PROCEDURE — 25000128 H RX IP 250 OP 636: Performed by: STUDENT IN AN ORGANIZED HEALTH CARE EDUCATION/TRAINING PROGRAM

## 2019-07-25 PROCEDURE — 85027 COMPLETE CBC AUTOMATED: CPT | Performed by: STUDENT IN AN ORGANIZED HEALTH CARE EDUCATION/TRAINING PROGRAM

## 2019-07-25 PROCEDURE — 25000132 ZZH RX MED GY IP 250 OP 250 PS 637: Performed by: SURGERY

## 2019-07-25 PROCEDURE — 25000125 ZZHC RX 250: Performed by: SURGERY

## 2019-07-25 PROCEDURE — 74018 RADEX ABDOMEN 1 VIEW: CPT

## 2019-07-25 PROCEDURE — 84100 ASSAY OF PHOSPHORUS: CPT | Performed by: STUDENT IN AN ORGANIZED HEALTH CARE EDUCATION/TRAINING PROGRAM

## 2019-07-25 PROCEDURE — 25800030 ZZH RX IP 258 OP 636: Performed by: STUDENT IN AN ORGANIZED HEALTH CARE EDUCATION/TRAINING PROGRAM

## 2019-07-25 PROCEDURE — 40000986 XR ABDOMEN PORT 1 VW

## 2019-07-25 PROCEDURE — 83735 ASSAY OF MAGNESIUM: CPT | Performed by: STUDENT IN AN ORGANIZED HEALTH CARE EDUCATION/TRAINING PROGRAM

## 2019-07-25 RX ADMIN — POTASSIUM CHLORIDE: 2 INJECTION, SOLUTION, CONCENTRATE INTRAVENOUS at 20:27

## 2019-07-25 RX ADMIN — ACETAMINOPHEN 650 MG: 325 TABLET, FILM COATED ORAL at 20:27

## 2019-07-25 RX ADMIN — BENZOCAINE, MENTHOL 1 LOZENGE: 15; 3.6 LOZENGE ORAL at 06:23

## 2019-07-25 RX ADMIN — SODIUM CHLORIDE, POTASSIUM CHLORIDE, SODIUM LACTATE AND CALCIUM CHLORIDE 650 ML: 600; 310; 30; 20 INJECTION, SOLUTION INTRAVENOUS at 22:31

## 2019-07-25 RX ADMIN — POTASSIUM CHLORIDE 20 MEQ: 400 INJECTION, SOLUTION INTRAVENOUS at 01:46

## 2019-07-25 RX ADMIN — METOPROLOL TARTRATE 25 MG: 25 TABLET ORAL at 08:54

## 2019-07-25 RX ADMIN — LOSARTAN POTASSIUM 25 MG: 25 TABLET ORAL at 08:53

## 2019-07-25 RX ADMIN — PANTOPRAZOLE SODIUM 40 MG: 40 INJECTION, POWDER, FOR SOLUTION INTRAVENOUS at 20:28

## 2019-07-25 RX ADMIN — METOPROLOL TARTRATE 25 MG: 25 TABLET ORAL at 20:27

## 2019-07-25 RX ADMIN — SUCRALFATE 1 G: 1 SUSPENSION ORAL at 18:33

## 2019-07-25 RX ADMIN — PANTOPRAZOLE SODIUM 40 MG: 40 INJECTION, POWDER, FOR SOLUTION INTRAVENOUS at 08:53

## 2019-07-25 RX ADMIN — ACETAMINOPHEN 650 MG: 325 TABLET, FILM COATED ORAL at 12:25

## 2019-07-25 RX ADMIN — I.V. FAT EMULSION 250 ML: 20 EMULSION INTRAVENOUS at 20:27

## 2019-07-25 RX ADMIN — SODIUM CHLORIDE, POTASSIUM CHLORIDE, SODIUM LACTATE AND CALCIUM CHLORIDE 800 ML: 600; 310; 30; 20 INJECTION, SOLUTION INTRAVENOUS at 13:34

## 2019-07-25 RX ADMIN — SODIUM CHLORIDE, POTASSIUM CHLORIDE, SODIUM LACTATE AND CALCIUM CHLORIDE 550 ML: 600; 310; 30; 20 INJECTION, SOLUTION INTRAVENOUS at 06:18

## 2019-07-25 ASSESSMENT — ACTIVITIES OF DAILY LIVING (ADL)
ADLS_ACUITY_SCORE: 13

## 2019-07-25 ASSESSMENT — PAIN DESCRIPTION - DESCRIPTORS
DESCRIPTORS: SORE
DESCRIPTORS: ACHING;SORE
DESCRIPTORS: ACHING;SORE

## 2019-07-25 ASSESSMENT — MIFFLIN-ST. JEOR: SCORE: 1303.29

## 2019-07-25 NOTE — PROGRESS NOTES
Surgery Progress Note  07/25/2019       Subjective:  - BRIANNE overnight.  - Denies any flatus.  - No complaints of discomfort.    Objective:  Temp:  [96.1  F (35.6  C)-98  F (36.7  C)] 97.3  F (36.3  C)  Pulse:  [75-83] 78  Heart Rate:  [73] 73  Resp:  [16-18] 18  BP: (114-144)/(64-79) 125/75  SpO2:  [95 %-98 %] 95 %    I/O last 3 completed shifts:  In: 3322.2 [I.V.:70; IV Piggyback:2100]  Out: 3000 [Urine:900; Emesis/NG output:2100]      Gen: Awake, alert, NAD  Resp: NLB on RA  Abd: soft, nondistended, mild TTP periumbilical, NGT in place  Incision: from previous surgery, c/d/i   Ext: WWP     Labs:  Recent Labs   Lab 07/25/19  0441 07/24/19  0527 07/23/19  0525   WBC 9.3 8.7 9.5   HGB 9.3* 8.8* 9.1*   * 451* 447       Recent Labs   Lab 07/25/19  0441 07/24/19  0527 07/23/19  0525    133 136   POTASSIUM 4.5 4.0 4.2   CHLORIDE 104 102 103   CO2 24 25 25   BUN 18 17 19   CR 0.61 0.70 0.69   * 134* 125*   TARIQ 9.2 8.5 8.8   MAG 2.0 2.1 2.1   PHOS 3.9 3.6 4.1       Imaging:  No new imaging.     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  Likely 2/2 edema at anastomosis.     - Plan to keep NG until return of bowel function.  - Replace electrolytes as needed.    - Continue NPO, mIVF, continue to replace NGT output 1:1  - Continue TPN via PICC   - Follow labs.  - Symptomatic care.  - Oncology appointment in 2 weeks.     Seen, examined, and discussed with chief resident, who will discuss with staff.    Karin Rico MD  Surgery Resident PGY-1  [phone]

## 2019-07-25 NOTE — PROGRESS NOTES
CLINICAL NUTRITION SERVICES - REASSESSMENT NOTE     Nutrition Prescription    RECOMMENDATIONS FOR MDs/PROVIDERS TO ORDER:  Recommend to continue to monitor NGT output and need to increase IVF's to help prevent dehydration.volume depletion.     Malnutrition Status:    Non-severe malnutrition in the context of acute illness    Recommendations already ordered by Registered Dietitian (RD):  Continue PN as ordered     Future/Additional Recommendations:  None at this time     EVALUATION OF THE PROGRESS TOWARD GOALS   Diet: Remains NPO for oral intakes    Nutrition Support: PN initiated on 7/18 with goal vol of 1440 ml/day with 130 g Dex, 85 AA and 250 ml of 20% IV lipids daily with adv to gaol dex of 240 g on 7/21 which meets 1656 kcals (25 kcals/kg), 1.3 g PRO/kg/day, GIR 2.6 with 30% kcals from Fat.      Intakes:  Ave PN intakes received x past 7 days = 925 ml which provides 1064 kcals (16 kcals/kg) and 55 g PRO (0.8 g PRO/kg)     NEW FINDINGS   Weight: 77.1 kg ( today), 79.6 kg (7/15 - admit); Wt loss of 2.5 kg (>3% loss), question d/t fluids losses secondary to NGT output vs inadequate PN intakes    MALNUTRITION  % Intake: < 75% for >/= 1 week (non-severe)  % Weight Loss: > 2% in 1 week (severe)  Subcutaneous Fat Loss: Facial region: Mild  Muscle Loss: Temporal: Mild  Fluid Accumulation/Edema: None noted  Malnutrition Diagnosis: Non-severe malnutrition in the context of acute illness    Previous Goals   Total avg nutritional intake to meet a minimum of 25 kcal/kg and 1.2 g PRO/kg daily (per dosing wt 65 kg).    Evaluation: Not met    Previous Nutrition Diagnosis  Inadequate protein-energy intake related to altered GI sx secondary to N/V inhibiting po and PN therapy ordered, but no access obtained yet for initiation of PN at this time as evidenced by Wt loss of 12 lbs (6.5% loss) x past 2 weeks with minimal po intakes for past week and NPO status since 7/15.      Evaluation: Improving    CURRENT NUTRITION  DIAGNOSIS  Inadequate protein-energy intake related to dependent on PN, d/t inability to consume oral intakes, but goal PN infusion vol not consistently met as evidenced by e PN intakes received x past 7 days meeting only 16 kcals/kg and 0.8 g PRO/kg and wt loss of > 3% loss over past week.     INTERVENTIONS  Implementation  Nutrition education for recommended modifications - reviewed ongoing need for PN support d/t continued NPO status    Goals  1. Total avg nutritional intake to meet a minimum of 25 kcal/kg and 1.2 g PRO/kg daily (per dosing wt 65 kg).  2. Wt not to decline < 77 kg    Monitoring/Evaluation  Progress toward goals will be monitored and evaluated per protocol.    Gay Poole RD,LD  Unit pager [phone]

## 2019-07-25 NOTE — PLAN OF CARE
Time of care 0504-5748:    Pt afebrile, VSS. Negligible pain in throat, declined any intervention. No nausea or heartburn. 850 mL output from 8548-5943; 1:1 LR bolus replaced. Output from 2230 to 0630 550 mL, LR bolus replaced. Potassium recheck 3.3, 20 mEq replaced. BG stable, no insulin given overnight. Up independently. Voiding adequately. Continue with plan care.

## 2019-07-25 NOTE — PLAN OF CARE
AVSS, afebrile. Given tylenol x 1 for headache. Denies nausea/emesis. NG to LIS with 800 output, LR bolus replaced.   BG stable, no insulin given. Up independently. Voiding adequately. Continue with plan care.

## 2019-07-26 ENCOUNTER — PRE VISIT (OUTPATIENT)
Dept: ONCOLOGY | Facility: CLINIC | Age: 74
End: 2019-07-26

## 2019-07-26 ENCOUNTER — ONCOLOGY VISIT (OUTPATIENT)
Dept: ONCOLOGY | Facility: CLINIC | Age: 74
DRG: 327 | End: 2019-07-26
Attending: INTERNAL MEDICINE
Payer: MEDICARE

## 2019-07-26 DIAGNOSIS — Z53.9 ERRONEOUS ENCOUNTER--DISREGARD: Primary | ICD-10-CM

## 2019-07-26 LAB
ANION GAP SERPL CALCULATED.3IONS-SCNC: 9 MMOL/L (ref 3–14)
BUN SERPL-MCNC: 22 MG/DL (ref 7–30)
CALCIUM SERPL-MCNC: 8.8 MG/DL (ref 8.5–10.1)
CHLORIDE SERPL-SCNC: 101 MMOL/L (ref 94–109)
CO2 SERPL-SCNC: 24 MMOL/L (ref 20–32)
CREAT SERPL-MCNC: 0.73 MG/DL (ref 0.52–1.04)
ERYTHROCYTE [DISTWIDTH] IN BLOOD BY AUTOMATED COUNT: 13.4 % (ref 10–15)
GFR SERPL CREATININE-BSD FRML MDRD: 81 ML/MIN/{1.73_M2}
GLUCOSE BLDC GLUCOMTR-MCNC: 118 MG/DL (ref 70–99)
GLUCOSE BLDC GLUCOMTR-MCNC: 125 MG/DL (ref 70–99)
GLUCOSE BLDC GLUCOMTR-MCNC: 132 MG/DL (ref 70–99)
GLUCOSE BLDC GLUCOMTR-MCNC: 134 MG/DL (ref 70–99)
GLUCOSE BLDC GLUCOMTR-MCNC: 138 MG/DL (ref 70–99)
GLUCOSE BLDC GLUCOMTR-MCNC: 138 MG/DL (ref 70–99)
GLUCOSE BLDC GLUCOMTR-MCNC: 160 MG/DL (ref 70–99)
GLUCOSE SERPL-MCNC: 133 MG/DL (ref 70–99)
HCT VFR BLD AUTO: 31.8 % (ref 35–47)
HGB BLD-MCNC: 9.6 G/DL (ref 11.7–15.7)
MAGNESIUM SERPL-MCNC: 2.3 MG/DL (ref 1.6–2.3)
MCH RBC QN AUTO: 27.7 PG (ref 26.5–33)
MCHC RBC AUTO-ENTMCNC: 30.2 G/DL (ref 31.5–36.5)
MCV RBC AUTO: 92 FL (ref 78–100)
PHOSPHATE SERPL-MCNC: 3.8 MG/DL (ref 2.5–4.5)
PLATELET # BLD AUTO: 471 10E9/L (ref 150–450)
POTASSIUM SERPL-SCNC: 4.2 MMOL/L (ref 3.4–5.3)
RBC # BLD AUTO: 3.47 10E12/L (ref 3.8–5.2)
SODIUM SERPL-SCNC: 134 MMOL/L (ref 133–144)
WBC # BLD AUTO: 11.3 10E9/L (ref 4–11)

## 2019-07-26 PROCEDURE — 82962 GLUCOSE BLOOD TEST: CPT

## 2019-07-26 PROCEDURE — C9113 INJ PANTOPRAZOLE SODIUM, VIA: HCPCS | Performed by: STUDENT IN AN ORGANIZED HEALTH CARE EDUCATION/TRAINING PROGRAM

## 2019-07-26 PROCEDURE — 85027 COMPLETE CBC AUTOMATED: CPT | Performed by: SURGERY

## 2019-07-26 PROCEDURE — 80048 BASIC METABOLIC PNL TOTAL CA: CPT | Performed by: SURGERY

## 2019-07-26 PROCEDURE — 84100 ASSAY OF PHOSPHORUS: CPT | Performed by: SURGERY

## 2019-07-26 PROCEDURE — 36592 COLLECT BLOOD FROM PICC: CPT | Performed by: SURGERY

## 2019-07-26 PROCEDURE — 12000001 ZZH R&B MED SURG/OB UMMC

## 2019-07-26 PROCEDURE — 25000128 H RX IP 250 OP 636: Performed by: STUDENT IN AN ORGANIZED HEALTH CARE EDUCATION/TRAINING PROGRAM

## 2019-07-26 PROCEDURE — 25000125 ZZHC RX 250: Performed by: SURGERY

## 2019-07-26 PROCEDURE — 83735 ASSAY OF MAGNESIUM: CPT | Performed by: SURGERY

## 2019-07-26 PROCEDURE — 25800030 ZZH RX IP 258 OP 636: Performed by: STUDENT IN AN ORGANIZED HEALTH CARE EDUCATION/TRAINING PROGRAM

## 2019-07-26 PROCEDURE — 25000132 ZZH RX MED GY IP 250 OP 250 PS 637: Performed by: STUDENT IN AN ORGANIZED HEALTH CARE EDUCATION/TRAINING PROGRAM

## 2019-07-26 RX ADMIN — PANTOPRAZOLE SODIUM 40 MG: 40 INJECTION, POWDER, FOR SOLUTION INTRAVENOUS at 19:43

## 2019-07-26 RX ADMIN — INSULIN ASPART 1 UNITS: 100 INJECTION, SOLUTION INTRAVENOUS; SUBCUTANEOUS at 08:37

## 2019-07-26 RX ADMIN — PANTOPRAZOLE SODIUM 40 MG: 40 INJECTION, POWDER, FOR SOLUTION INTRAVENOUS at 08:27

## 2019-07-26 RX ADMIN — METOPROLOL TARTRATE 25 MG: 25 TABLET ORAL at 20:01

## 2019-07-26 RX ADMIN — SODIUM CHLORIDE, POTASSIUM CHLORIDE, SODIUM LACTATE AND CALCIUM CHLORIDE 900 ML: 600; 310; 30; 20 INJECTION, SOLUTION INTRAVENOUS at 16:20

## 2019-07-26 RX ADMIN — LOSARTAN POTASSIUM 25 MG: 25 TABLET ORAL at 08:27

## 2019-07-26 RX ADMIN — SODIUM CHLORIDE, POTASSIUM CHLORIDE, SODIUM LACTATE AND CALCIUM CHLORIDE 575 ML: 600; 310; 30; 20 INJECTION, SOLUTION INTRAVENOUS at 05:51

## 2019-07-26 RX ADMIN — POTASSIUM CHLORIDE: 2 INJECTION, SOLUTION, CONCENTRATE INTRAVENOUS at 19:43

## 2019-07-26 RX ADMIN — I.V. FAT EMULSION 250 ML: 20 EMULSION INTRAVENOUS at 19:43

## 2019-07-26 RX ADMIN — METOPROLOL TARTRATE 25 MG: 25 TABLET ORAL at 08:27

## 2019-07-26 ASSESSMENT — MIFFLIN-ST. JEOR: SCORE: 1303.74

## 2019-07-26 ASSESSMENT — ACTIVITIES OF DAILY LIVING (ADL)
ADLS_ACUITY_SCORE: 13

## 2019-07-26 ASSESSMENT — PAIN DESCRIPTION - DESCRIPTORS: DESCRIPTORS: ACHING

## 2019-07-26 NOTE — LETTER
7/26/2019       RE: Marilia Bean  [address]  [facility] [address]     Dear Colleague,    Thank you for referring your patient, Marilia Bean, to the [facility]. Please see a copy of my visit note below.      This encounter was opened in error. Please disregard.    Again, thank you for allowing me to participate in the care of your patient.      Sincerely,    Andrew Romero MD

## 2019-07-26 NOTE — PLAN OF CARE
"Blood pressure 126/71, pulse 85, temperature 98.4  F (36.9  C), temperature source Oral, resp. rate 18, height 1.702 m (5' 7\"), weight 77.1 kg (170 lb), SpO2 96 %.    A/O x 4. Neuro's intact. Denied having pain. NPO except med's. BS checked received sliding scale as ordered.NG to LIS. Tolerate 30 ml of water via NG. PICC line infusing TPN and LR TKO. Patient received bolus LR per NG output( see flow sheet). Up ad  tyson. No c/o nausea this shift.Passing gas, No BM. Voiding spontaneous. Continue to monitor and follow POC.  "

## 2019-07-26 NOTE — PROGRESS NOTES
Surgery note    No major changes. Passing flatus. No increased pain. No nausea or vomiting.    AF, VS WNL  NL bilious output    NAD laying in bed  MMM  Soft, non-distended, non-tender  WWP    Labs: WBC of 11.3. Cr of 0.73.    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 Attempting conservative management.   -NPO/NGT  -TPN  -Continue to monitor  -PPx elkin Belcher  PGY 7

## 2019-07-26 NOTE — PLAN OF CARE
Pt afebrile, VSS. A&Ox4. C/o of negligible pain in throat, declined medication intervention, requested ice chips. Reported feeling slightly nauseous, declined antiemetic. NG set to low intermittent suction, NG output 575 mL, 1:1 bolus LR infusing. BG 130s, no insulin given. Bowel sounds in left quads, Pt reported passing gas once yesterday. TPN and lipids infusing. Pt slept between cares. Continue with plan of care.

## 2019-07-26 NOTE — PLAN OF CARE
AVSS. C/o abdominal and throat pain. Tylenol x 1 with relief. NG tube was pulled out approximately 5 cm. Advanced and xray confirmed placement. 650 ml output from NG, LR bolus infusing over 3 hours of equal volume.  and 119, no insulin given per sliding scale. TPN and lipids infusing. Continue with POC.

## 2019-07-26 NOTE — PROGRESS NOTES
Pt sitting with head in her hands upon arrival.    Interventions// Pt was taken to an alcove in the hallway, and songs on the guitar were played.    Outcomes// Pts affect brightened significantly with conversation and hymns. 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  She sang along on one song, and would have wanted more music, but she stated she was becoming nauseous with the suction off.

## 2019-07-26 NOTE — LETTER
Date:August 13, 2019      Patient was self referred, no letter generated. Do not send.        [facility] Health Information

## 2019-07-27 LAB
ANION GAP SERPL CALCULATED.3IONS-SCNC: 8 MMOL/L (ref 3–14)
BUN SERPL-MCNC: 23 MG/DL (ref 7–30)
CALCIUM SERPL-MCNC: 8.8 MG/DL (ref 8.5–10.1)
CHLORIDE SERPL-SCNC: 103 MMOL/L (ref 94–109)
CO2 SERPL-SCNC: 23 MMOL/L (ref 20–32)
CREAT SERPL-MCNC: 0.76 MG/DL (ref 0.52–1.04)
GFR SERPL CREATININE-BSD FRML MDRD: 77 ML/MIN/{1.73_M2}
GLUCOSE BLDC GLUCOMTR-MCNC: 123 MG/DL (ref 70–99)
GLUCOSE BLDC GLUCOMTR-MCNC: 130 MG/DL (ref 70–99)
GLUCOSE BLDC GLUCOMTR-MCNC: 130 MG/DL (ref 70–99)
GLUCOSE BLDC GLUCOMTR-MCNC: 132 MG/DL (ref 70–99)
GLUCOSE BLDC GLUCOMTR-MCNC: 133 MG/DL (ref 70–99)
GLUCOSE BLDC GLUCOMTR-MCNC: 133 MG/DL (ref 70–99)
GLUCOSE BLDC GLUCOMTR-MCNC: 142 MG/DL (ref 70–99)
GLUCOSE SERPL-MCNC: 131 MG/DL (ref 70–99)
MAGNESIUM SERPL-MCNC: 2.3 MG/DL (ref 1.6–2.3)
PHOSPHATE SERPL-MCNC: 3.7 MG/DL (ref 2.5–4.5)
POTASSIUM SERPL-SCNC: 4.7 MMOL/L (ref 3.4–5.3)
SODIUM SERPL-SCNC: 134 MMOL/L (ref 133–144)

## 2019-07-27 PROCEDURE — 83735 ASSAY OF MAGNESIUM: CPT | Performed by: STUDENT IN AN ORGANIZED HEALTH CARE EDUCATION/TRAINING PROGRAM

## 2019-07-27 PROCEDURE — 12000001 ZZH R&B MED SURG/OB UMMC

## 2019-07-27 PROCEDURE — 25000128 H RX IP 250 OP 636: Performed by: STUDENT IN AN ORGANIZED HEALTH CARE EDUCATION/TRAINING PROGRAM

## 2019-07-27 PROCEDURE — 25800030 ZZH RX IP 258 OP 636: Performed by: STUDENT IN AN ORGANIZED HEALTH CARE EDUCATION/TRAINING PROGRAM

## 2019-07-27 PROCEDURE — 40000802 ZZH SITE CHECK

## 2019-07-27 PROCEDURE — 25000125 ZZHC RX 250: Performed by: SURGERY

## 2019-07-27 PROCEDURE — 84100 ASSAY OF PHOSPHORUS: CPT | Performed by: STUDENT IN AN ORGANIZED HEALTH CARE EDUCATION/TRAINING PROGRAM

## 2019-07-27 PROCEDURE — 00000146 ZZHCL STATISTIC GLUCOSE BY METER IP

## 2019-07-27 PROCEDURE — 25000132 ZZH RX MED GY IP 250 OP 250 PS 637: Performed by: STUDENT IN AN ORGANIZED HEALTH CARE EDUCATION/TRAINING PROGRAM

## 2019-07-27 PROCEDURE — 80048 BASIC METABOLIC PNL TOTAL CA: CPT | Performed by: STUDENT IN AN ORGANIZED HEALTH CARE EDUCATION/TRAINING PROGRAM

## 2019-07-27 PROCEDURE — 36592 COLLECT BLOOD FROM PICC: CPT | Performed by: STUDENT IN AN ORGANIZED HEALTH CARE EDUCATION/TRAINING PROGRAM

## 2019-07-27 PROCEDURE — C9113 INJ PANTOPRAZOLE SODIUM, VIA: HCPCS | Performed by: STUDENT IN AN ORGANIZED HEALTH CARE EDUCATION/TRAINING PROGRAM

## 2019-07-27 RX ADMIN — SODIUM CHLORIDE, POTASSIUM CHLORIDE, SODIUM LACTATE AND CALCIUM CHLORIDE 500 ML: 600; 310; 30; 20 INJECTION, SOLUTION INTRAVENOUS at 02:15

## 2019-07-27 RX ADMIN — SODIUM CHLORIDE, POTASSIUM CHLORIDE, SODIUM LACTATE AND CALCIUM CHLORIDE 500 ML: 600; 310; 30; 20 INJECTION, SOLUTION INTRAVENOUS at 21:57

## 2019-07-27 RX ADMIN — INSULIN ASPART 1 UNITS: 100 INJECTION, SOLUTION INTRAVENOUS; SUBCUTANEOUS at 21:55

## 2019-07-27 RX ADMIN — LOSARTAN POTASSIUM 25 MG: 25 TABLET ORAL at 08:52

## 2019-07-27 RX ADMIN — METOPROLOL TARTRATE 25 MG: 25 TABLET ORAL at 08:52

## 2019-07-27 RX ADMIN — ACETAMINOPHEN 650 MG: 325 TABLET, FILM COATED ORAL at 21:29

## 2019-07-27 RX ADMIN — SODIUM CHLORIDE, POTASSIUM CHLORIDE, SODIUM LACTATE AND CALCIUM CHLORIDE 800 ML: 600; 310; 30; 20 INJECTION, SOLUTION INTRAVENOUS at 14:31

## 2019-07-27 RX ADMIN — ACETAMINOPHEN 650 MG: 325 TABLET, FILM COATED ORAL at 15:30

## 2019-07-27 RX ADMIN — SODIUM CHLORIDE, POTASSIUM CHLORIDE, SODIUM LACTATE AND CALCIUM CHLORIDE 250 ML: 600; 310; 30; 20 INJECTION, SOLUTION INTRAVENOUS at 07:06

## 2019-07-27 RX ADMIN — PANTOPRAZOLE SODIUM 40 MG: 40 INJECTION, POWDER, FOR SOLUTION INTRAVENOUS at 08:51

## 2019-07-27 RX ADMIN — PANTOPRAZOLE SODIUM 40 MG: 40 INJECTION, POWDER, FOR SOLUTION INTRAVENOUS at 20:21

## 2019-07-27 RX ADMIN — POTASSIUM CHLORIDE: 2 INJECTION, SOLUTION, CONCENTRATE INTRAVENOUS at 20:22

## 2019-07-27 RX ADMIN — I.V. FAT EMULSION 250 ML: 20 EMULSION INTRAVENOUS at 20:21

## 2019-07-27 RX ADMIN — ACETAMINOPHEN 650 MG: 325 TABLET, FILM COATED ORAL at 09:27

## 2019-07-27 RX ADMIN — METOPROLOL TARTRATE 25 MG: 25 TABLET ORAL at 21:29

## 2019-07-27 ASSESSMENT — ACTIVITIES OF DAILY LIVING (ADL)
ADLS_ACUITY_SCORE: 14
ADLS_ACUITY_SCORE: 13
ADLS_ACUITY_SCORE: 13
ADLS_ACUITY_SCORE: 14
ADLS_ACUITY_SCORE: 13
ADLS_ACUITY_SCORE: 13

## 2019-07-27 ASSESSMENT — MIFFLIN-ST. JEOR: SCORE: 1301.48

## 2019-07-27 ASSESSMENT — PAIN DESCRIPTION - DESCRIPTORS
DESCRIPTORS: ACHING
DESCRIPTORS: BURNING
DESCRIPTORS: ACHING;CONSTANT
DESCRIPTORS: ACHING;SORE
DESCRIPTORS: ACHING

## 2019-07-27 NOTE — PLAN OF CARE
Afebrile. OVSS. NG to low intermittent suction, flushed with 30 ml of water x1. Denies pain. Denies n/v. TPN/lipids infusing into right PICC. Evening BG was 118. Voiding adequately. No stool. Up independently. Continue plan of care.           Problem: Adult Inpatient Plan of Care  Goal: Plan of Care Review  7/26/2019 2250 by Rhoda Lema RN  Outcome: No Change  Flowsheets (Taken 7/26/2019 2250)  Plan of Care Reviewed With: patient  Progress: no change     Problem: Adult Inpatient Plan of Care  Goal: Patient-Specific Goal (Individualization)  7/26/2019 2250 by Rhoda Lema RN  Outcome: No Change     Problem: Adult Inpatient Plan of Care  Goal: Absence of Hospital-Acquired Illness or Injury  7/26/2019 2250 by Rhoda Lema RN  Outcome: No Change     Problem: Adult Inpatient Plan of Care  Goal: Optimal Comfort and Wellbeing  7/26/2019 2250 by Rhoda Lema RN  Outcome: No Change     Problem: Adult Inpatient Plan of Care  Goal: Readiness for Transition of Care  7/26/2019 2250 by Rhoda Lema RN  Outcome: No Change     Problem: Nausea and Vomiting  Goal: Fluid and Electrolyte Balance  7/26/2019 2250 by Rhoda Lema RN  Outcome: No Change

## 2019-07-27 NOTE — PLAN OF CARE
A&Ox4. VSS. Reported heartburn and generalized abdominal discomfort. No other reports of pain. Intermittent nausea. Declined interventions. Intermittent SOB. Low to intermittent suction, 600 mL of output. NPO except meds and ice chips. PICC line infusing TPN and LR TKO. B and 130; no insulin given. voiding but no stool. Up independently. Continue with POC.

## 2019-07-27 NOTE — PLAN OF CARE
7698-2709: Alert and oriented x4. BP soft at 1600, consider holding evening dose of Metoprolol if continues to be low. OVSS, afebrile. Patient reports throat pain, Tylenol given, pt request to be offered Tylenol when it is due to managed pain. Pain improves with Tylenol. During 3449-2817: pt had 800cc output, 800cc replaced over 3 hrs at 1430. Patient on strict I/O. HOB at 30-45 degrees. NPO with ice chips and sips of water with meds. Pt tolerating well. Independent. Continue with POC.

## 2019-07-27 NOTE — PROGRESS NOTES
Surgery Progress Note  07/27/2019       Subjective:  No acute events overnight. NG tube continues to put out > 2000 ml per 24 hrs. Passing small flatus but no BM. UOP not recorded in the charts.     Objective:  Temp:  [95.8  F (35.4  C)-99.1  F (37.3  C)] 96.8  F (36  C)  Pulse:  [85-97] 91  Heart Rate:  [88] 88  Resp:  [16-18] 16  BP: (110-134)/(63-74) 134/71  SpO2:  [93 %-96 %] 95 %    I/O last 3 completed shifts:  In: 1728.25 [P.O.:100; I.V.:70; NG/GT:30; IV Piggyback:500]  Out: 1700 [Emesis/NG output:1700]      Gen: Awake, alert, NAD  Resp: NLB on RA  Abd: soft, nondistended, mild TTP periumbilical, NGT in place  Incision: from previous surgery, c/d/i   Ext: WWP     Labs:  Recent Labs   Lab 07/26/19  0524 07/25/19  0441 07/24/19  0527   WBC 11.3* 9.3 8.7   HGB 9.6* 9.3* 8.8*   * 459* 451*       Recent Labs   Lab 07/27/19  0654 07/26/19  0524 07/25/19  0441    134 135   POTASSIUM 4.7 4.2 4.5   CHLORIDE 103 101 104   CO2 23 24 24   BUN 23 22 18   CR 0.76 0.73 0.61   * 133* 133*   TARIQ 8.8 8.8 9.2   MAG 2.3 2.3 2.0   PHOS 3.7 3.8 3.9       Imaging:  No new imaging.     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  Likely 2/2 edema at anastomosis.     - Plan to keep NG until return of bowel function.  - Replace electrolytes as needed.    - Continue NPO, mIVF, continue to replace NGT output 1:1  - Continue TPN via PICC   - Follow labs.  - Symptomatic care.  - Oncology appointment in 2 weeks.     Seen, examined, and discussed with chief resident, who will discuss with staff.    Mindy Hung MD  General Surgery PGY-2  [phone]

## 2019-07-28 ENCOUNTER — APPOINTMENT (OUTPATIENT)
Dept: GENERAL RADIOLOGY | Facility: CLINIC | Age: 74
DRG: 327 | End: 2019-07-28
Attending: SURGERY
Payer: MEDICARE

## 2019-07-28 LAB
ANION GAP SERPL CALCULATED.3IONS-SCNC: 9 MMOL/L (ref 3–14)
BUN SERPL-MCNC: 25 MG/DL (ref 7–30)
CALCIUM SERPL-MCNC: 8.7 MG/DL (ref 8.5–10.1)
CHLORIDE SERPL-SCNC: 103 MMOL/L (ref 94–109)
CO2 SERPL-SCNC: 22 MMOL/L (ref 20–32)
CREAT SERPL-MCNC: 0.71 MG/DL (ref 0.52–1.04)
ERYTHROCYTE [DISTWIDTH] IN BLOOD BY AUTOMATED COUNT: 13.5 % (ref 10–15)
GFR SERPL CREATININE-BSD FRML MDRD: 83 ML/MIN/{1.73_M2}
GLUCOSE BLDC GLUCOMTR-MCNC: 119 MG/DL (ref 70–99)
GLUCOSE BLDC GLUCOMTR-MCNC: 122 MG/DL (ref 70–99)
GLUCOSE BLDC GLUCOMTR-MCNC: 128 MG/DL (ref 70–99)
GLUCOSE BLDC GLUCOMTR-MCNC: 129 MG/DL (ref 70–99)
GLUCOSE BLDC GLUCOMTR-MCNC: 129 MG/DL (ref 70–99)
GLUCOSE BLDC GLUCOMTR-MCNC: 134 MG/DL (ref 70–99)
GLUCOSE SERPL-MCNC: 132 MG/DL (ref 70–99)
HCT VFR BLD AUTO: 31.4 % (ref 35–47)
HGB BLD-MCNC: 9.5 G/DL (ref 11.7–15.7)
MAGNESIUM SERPL-MCNC: 2.3 MG/DL (ref 1.6–2.3)
MCH RBC QN AUTO: 27.3 PG (ref 26.5–33)
MCHC RBC AUTO-ENTMCNC: 30.3 G/DL (ref 31.5–36.5)
MCV RBC AUTO: 90 FL (ref 78–100)
PHOSPHATE SERPL-MCNC: 3.5 MG/DL (ref 2.5–4.5)
PLATELET # BLD AUTO: 430 10E9/L (ref 150–450)
POTASSIUM SERPL-SCNC: 4.2 MMOL/L (ref 3.4–5.3)
RBC # BLD AUTO: 3.48 10E12/L (ref 3.8–5.2)
SODIUM SERPL-SCNC: 135 MMOL/L (ref 133–144)
WBC # BLD AUTO: 11.8 10E9/L (ref 4–11)

## 2019-07-28 PROCEDURE — 00000146 ZZHCL STATISTIC GLUCOSE BY METER IP

## 2019-07-28 PROCEDURE — C9113 INJ PANTOPRAZOLE SODIUM, VIA: HCPCS | Performed by: STUDENT IN AN ORGANIZED HEALTH CARE EDUCATION/TRAINING PROGRAM

## 2019-07-28 PROCEDURE — 80048 BASIC METABOLIC PNL TOTAL CA: CPT | Performed by: STUDENT IN AN ORGANIZED HEALTH CARE EDUCATION/TRAINING PROGRAM

## 2019-07-28 PROCEDURE — 36592 COLLECT BLOOD FROM PICC: CPT | Performed by: STUDENT IN AN ORGANIZED HEALTH CARE EDUCATION/TRAINING PROGRAM

## 2019-07-28 PROCEDURE — 40000986 XR ABDOMEN PORT 1 VW

## 2019-07-28 PROCEDURE — 25000128 H RX IP 250 OP 636: Performed by: STUDENT IN AN ORGANIZED HEALTH CARE EDUCATION/TRAINING PROGRAM

## 2019-07-28 PROCEDURE — 25000125 ZZHC RX 250: Performed by: SURGERY

## 2019-07-28 PROCEDURE — 25000132 ZZH RX MED GY IP 250 OP 250 PS 637: Performed by: STUDENT IN AN ORGANIZED HEALTH CARE EDUCATION/TRAINING PROGRAM

## 2019-07-28 PROCEDURE — 25800030 ZZH RX IP 258 OP 636: Performed by: STUDENT IN AN ORGANIZED HEALTH CARE EDUCATION/TRAINING PROGRAM

## 2019-07-28 PROCEDURE — 40000802 ZZH SITE CHECK

## 2019-07-28 PROCEDURE — 83735 ASSAY OF MAGNESIUM: CPT | Performed by: STUDENT IN AN ORGANIZED HEALTH CARE EDUCATION/TRAINING PROGRAM

## 2019-07-28 PROCEDURE — 74018 RADEX ABDOMEN 1 VIEW: CPT

## 2019-07-28 PROCEDURE — 84100 ASSAY OF PHOSPHORUS: CPT | Performed by: STUDENT IN AN ORGANIZED HEALTH CARE EDUCATION/TRAINING PROGRAM

## 2019-07-28 PROCEDURE — 85027 COMPLETE CBC AUTOMATED: CPT | Performed by: STUDENT IN AN ORGANIZED HEALTH CARE EDUCATION/TRAINING PROGRAM

## 2019-07-28 PROCEDURE — 12000001 ZZH R&B MED SURG/OB UMMC

## 2019-07-28 RX ADMIN — METOPROLOL TARTRATE 25 MG: 25 TABLET ORAL at 21:24

## 2019-07-28 RX ADMIN — ACETAMINOPHEN 650 MG: 325 TABLET, FILM COATED ORAL at 14:34

## 2019-07-28 RX ADMIN — POTASSIUM CHLORIDE: 2 INJECTION, SOLUTION, CONCENTRATE INTRAVENOUS at 19:54

## 2019-07-28 RX ADMIN — SODIUM CHLORIDE, POTASSIUM CHLORIDE, SODIUM LACTATE AND CALCIUM CHLORIDE 600 ML: 600; 310; 30; 20 INJECTION, SOLUTION INTRAVENOUS at 14:09

## 2019-07-28 RX ADMIN — METOPROLOL TARTRATE 25 MG: 25 TABLET ORAL at 08:36

## 2019-07-28 RX ADMIN — SODIUM CHLORIDE, POTASSIUM CHLORIDE, SODIUM LACTATE AND CALCIUM CHLORIDE 600 ML: 600; 310; 30; 20 INJECTION, SOLUTION INTRAVENOUS at 21:25

## 2019-07-28 RX ADMIN — SODIUM CHLORIDE, POTASSIUM CHLORIDE, SODIUM LACTATE AND CALCIUM CHLORIDE 600 ML: 600; 310; 30; 20 INJECTION, SOLUTION INTRAVENOUS at 06:17

## 2019-07-28 RX ADMIN — I.V. FAT EMULSION 250 ML: 20 EMULSION INTRAVENOUS at 19:54

## 2019-07-28 RX ADMIN — LOSARTAN POTASSIUM 25 MG: 25 TABLET ORAL at 08:36

## 2019-07-28 RX ADMIN — PANTOPRAZOLE SODIUM 40 MG: 40 INJECTION, POWDER, FOR SOLUTION INTRAVENOUS at 08:36

## 2019-07-28 RX ADMIN — PANTOPRAZOLE SODIUM 40 MG: 40 INJECTION, POWDER, FOR SOLUTION INTRAVENOUS at 19:54

## 2019-07-28 RX ADMIN — ACETAMINOPHEN 650 MG: 325 TABLET, FILM COATED ORAL at 21:24

## 2019-07-28 RX ADMIN — ACETAMINOPHEN 650 MG: 325 TABLET, FILM COATED ORAL at 08:40

## 2019-07-28 ASSESSMENT — PAIN DESCRIPTION - DESCRIPTORS
DESCRIPTORS: ACHING
DESCRIPTORS: ACHING;CONSTANT
DESCRIPTORS: ACHING
DESCRIPTORS: ACHING;DISCOMFORT
DESCRIPTORS: ACHING;DISCOMFORT
DESCRIPTORS: ACHING

## 2019-07-28 ASSESSMENT — ACTIVITIES OF DAILY LIVING (ADL)
ADLS_ACUITY_SCORE: 13

## 2019-07-28 ASSESSMENT — MIFFLIN-ST. JEOR: SCORE: 1304.65

## 2019-07-28 NOTE — PLAN OF CARE
1292-2290: Alert and oriented x4. BP soft at 1600, consider holding evening dose of Metoprolol if continues to be low. OVSS, afebrile. Patient reports throat pain, Tylenol given, pt request to be offered Tylenol when it is due to managed pain. Pain improves with Tylenol. During 4599-9736: pt had 800cc output, 800cc replaced over 3 hrs at 1430. Patient on strict I/O. HOB at 30-45 degrees. NPO with ice chips and sips of water with meds. Pt tolerating well. Independent. Continue with POC.

## 2019-07-28 NOTE — PROGRESS NOTES
Surgery Progress Note  07/28/2019       Subjective:  No acute events overnight. NG tube continues to put out 3200 ml per 24 hrs. Passing small flatus but no BM. UOP not recorded in the charts.     Objective:  Temp:  [96.3  F (35.7  C)-98.4  F (36.9  C)] 96.3  F (35.7  C)  Pulse:  [] 100  Heart Rate:  [104] 104  Resp:  [16-18] 16  BP: ()/(63-79) 127/75  SpO2:  [95 %-97 %] 96 %    I/O last 3 completed shifts:  In: 1110 [P.O.:280; I.V.:20; NG/GT:90]  Out: 3310 [Urine:1410; Emesis/NG output:1900]      Gen: Awake, alert, NAD  Resp: NLB on RA  Abd: soft, nondistended, mild TTP periumbilical, NGT in place  Incision: from previous surgery, c/d/i   Ext: WWP     Labs:  Recent Labs   Lab 07/28/19  0541 07/26/19  0524 07/25/19  0441   WBC 11.8* 11.3* 9.3   HGB 9.5* 9.6* 9.3*    471* 459*       Recent Labs   Lab 07/28/19  0541 07/27/19  0654 07/26/19  0524    134 134   POTASSIUM 4.2 4.7 4.2   CHLORIDE 103 103 101   CO2 22 23 24   BUN 25 23 22   CR 0.71 0.76 0.73   * 131* 133*   TARIQ 8.7 8.8 8.8   MAG 2.3 2.3 2.3   PHOS 3.5 3.7 3.8       Imaging:  No new imaging.     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  Likely 2/2 edema at anastomosis.     - Plan to keep NG until return of bowel function.  - Replace electrolytes as needed.    - Continue NPO, mIVF, continue to replace NGT output 1:1  - Continue TPN via PICC   - Follow labs.  - Symptomatic care.  - Oncology appointment in 2 weeks.     Seen, examined, and discussed with chief resident, who will discuss with staff.    Mindy Hung MD  General Surgery PGY-2  [phone]

## 2019-07-28 NOTE — PROGRESS NOTES
Assisted resident to advance NG tube 5-6cm, taped and marked. Notified evening RN about change. Resident reports will order repeat CXR. Notify MD if additional advancing is needed. Thanks. Continue with POC.

## 2019-07-28 NOTE — PLAN OF CARE
"1900-0730    /75 (BP Location: Left arm)   Pulse 100   Temp 96.3  F (35.7  C) (Oral)   Resp 16   Ht 1.702 m (5' 7\")   Wt 76.9 kg (169 lb 8 oz)   SpO2 96%   BMI 26.55 kg/m      lAert and oriented x4. VSS. Pleasant. Patient reports throat pain, Tylenol given with some relief. Pt c/o heartburn. Pt asked for Maalox. MD paged and responded. No new orders at this time. Pt went for walk and heartburn resolved. Patient on strict I/O. NG to LIS. Replacing NG output with IVF per orders. HOB at 30-45 degrees. NPO with ice chips and sips of water with meds. Pt tolerating well. 1 unit of Novolog given for BS that was not in parameters. MD notified. No new orders. BS recheck and was WNL.  Independent. Continue with POC.  "

## 2019-07-28 NOTE — PLAN OF CARE
8416-7079: Max temp 99, OVSS, afebrile. Reports throat pain/back pain improved this AM, takes Tylenol to manage pain. Denies nausea. Had an episode of gag and coughing with taking meds, pt reports a medication was stuck at back of throat. Educated pt that medications can come in suspension form or can crush some medications; pt declines any changes at this time. Encourage pt to take more water with pills to make sure pill goes down; pt is already sitting up 90 degrees and tucking chin when swallowing. NG stoped for 45 mins post med consumption. Pt had 600cc output this shift, LR bolus of 600cc given. XR shows NG tube needs advancement, MD paged, reports MD will advance NG. NG continues at LIS. Continues strickt I/O, pt aware. Independent. Ambulates in halls. Continue with POC.     MD paged again at 1445, reports will be coming to advance NG tube and repeat XR soon.

## 2019-07-28 NOTE — PROGRESS NOTES
Seth MCLAUGHLIN completed this AM, results are in. Recommends advancement, no note of how much per radiologist. Paged MD [phone]. Reports MD will come up and advance NG and secure it. Continue with POC.

## 2019-07-29 ENCOUNTER — APPOINTMENT (OUTPATIENT)
Dept: CT IMAGING | Facility: CLINIC | Age: 74
DRG: 327 | End: 2019-07-29
Attending: SURGERY
Payer: MEDICARE

## 2019-07-29 LAB
ALBUMIN SERPL-MCNC: 2.6 G/DL (ref 3.4–5)
ALP SERPL-CCNC: 283 U/L (ref 40–150)
ALT SERPL W P-5'-P-CCNC: 56 U/L (ref 0–50)
ANION GAP SERPL CALCULATED.3IONS-SCNC: 7 MMOL/L (ref 3–14)
AST SERPL W P-5'-P-CCNC: 35 U/L (ref 0–45)
BILIRUB SERPL-MCNC: 0.8 MG/DL (ref 0.2–1.3)
BUN SERPL-MCNC: 22 MG/DL (ref 7–30)
CALCIUM SERPL-MCNC: 8.9 MG/DL (ref 8.5–10.1)
CHLORIDE SERPL-SCNC: 103 MMOL/L (ref 94–109)
CO2 SERPL-SCNC: 25 MMOL/L (ref 20–32)
CREAT SERPL-MCNC: 0.76 MG/DL (ref 0.52–1.04)
ERYTHROCYTE [DISTWIDTH] IN BLOOD BY AUTOMATED COUNT: 13.6 % (ref 10–15)
GFR SERPL CREATININE-BSD FRML MDRD: 78 ML/MIN/{1.73_M2}
GLUCOSE BLDC GLUCOMTR-MCNC: 117 MG/DL (ref 70–99)
GLUCOSE BLDC GLUCOMTR-MCNC: 122 MG/DL (ref 70–99)
GLUCOSE BLDC GLUCOMTR-MCNC: 126 MG/DL (ref 70–99)
GLUCOSE BLDC GLUCOMTR-MCNC: 160 MG/DL (ref 70–99)
GLUCOSE BLDC GLUCOMTR-MCNC: 170 MG/DL (ref 70–99)
GLUCOSE BLDC GLUCOMTR-MCNC: 176 MG/DL (ref 70–99)
GLUCOSE SERPL-MCNC: 124 MG/DL (ref 70–99)
HCT VFR BLD AUTO: 31.8 % (ref 35–47)
HGB BLD-MCNC: 9.6 G/DL (ref 11.7–15.7)
INR PPP: 1.07 (ref 0.86–1.14)
MAGNESIUM SERPL-MCNC: 2.4 MG/DL (ref 1.6–2.3)
MCH RBC QN AUTO: 27.6 PG (ref 26.5–33)
MCHC RBC AUTO-ENTMCNC: 30.2 G/DL (ref 31.5–36.5)
MCV RBC AUTO: 91 FL (ref 78–100)
PHOSPHATE SERPL-MCNC: 3.8 MG/DL (ref 2.5–4.5)
PLATELET # BLD AUTO: 422 10E9/L (ref 150–450)
POTASSIUM SERPL-SCNC: 4.3 MMOL/L (ref 3.4–5.3)
PREALB SERPL IA-MCNC: 25 MG/DL (ref 15–45)
PROT SERPL-MCNC: 7.3 G/DL (ref 6.8–8.8)
RBC # BLD AUTO: 3.48 10E12/L (ref 3.8–5.2)
SODIUM SERPL-SCNC: 135 MMOL/L (ref 133–144)
WBC # BLD AUTO: 9 10E9/L (ref 4–11)

## 2019-07-29 PROCEDURE — 84100 ASSAY OF PHOSPHORUS: CPT | Performed by: SURGERY

## 2019-07-29 PROCEDURE — C9113 INJ PANTOPRAZOLE SODIUM, VIA: HCPCS | Performed by: STUDENT IN AN ORGANIZED HEALTH CARE EDUCATION/TRAINING PROGRAM

## 2019-07-29 PROCEDURE — 25000128 H RX IP 250 OP 636: Performed by: STUDENT IN AN ORGANIZED HEALTH CARE EDUCATION/TRAINING PROGRAM

## 2019-07-29 PROCEDURE — 00000146 ZZHCL STATISTIC GLUCOSE BY METER IP

## 2019-07-29 PROCEDURE — 85610 PROTHROMBIN TIME: CPT | Performed by: SURGERY

## 2019-07-29 PROCEDURE — 83735 ASSAY OF MAGNESIUM: CPT | Performed by: SURGERY

## 2019-07-29 PROCEDURE — 84134 ASSAY OF PREALBUMIN: CPT | Performed by: SURGERY

## 2019-07-29 PROCEDURE — 25800030 ZZH RX IP 258 OP 636: Performed by: STUDENT IN AN ORGANIZED HEALTH CARE EDUCATION/TRAINING PROGRAM

## 2019-07-29 PROCEDURE — 74177 CT ABD & PELVIS W/CONTRAST: CPT

## 2019-07-29 PROCEDURE — 25000131 ZZH RX MED GY IP 250 OP 636 PS 637: Performed by: STUDENT IN AN ORGANIZED HEALTH CARE EDUCATION/TRAINING PROGRAM

## 2019-07-29 PROCEDURE — 25000132 ZZH RX MED GY IP 250 OP 250 PS 637: Performed by: STUDENT IN AN ORGANIZED HEALTH CARE EDUCATION/TRAINING PROGRAM

## 2019-07-29 PROCEDURE — 36592 COLLECT BLOOD FROM PICC: CPT | Performed by: SURGERY

## 2019-07-29 PROCEDURE — 85027 COMPLETE CBC AUTOMATED: CPT | Performed by: SURGERY

## 2019-07-29 PROCEDURE — 25000125 ZZHC RX 250: Performed by: SURGERY

## 2019-07-29 PROCEDURE — 80053 COMPREHEN METABOLIC PANEL: CPT | Performed by: SURGERY

## 2019-07-29 PROCEDURE — 12000001 ZZH R&B MED SURG/OB UMMC

## 2019-07-29 RX ORDER — METHYLPREDNISOLONE SODIUM SUCCINATE 125 MG/2ML
40 INJECTION, POWDER, LYOPHILIZED, FOR SOLUTION INTRAMUSCULAR; INTRAVENOUS EVERY 4 HOURS
Status: COMPLETED | OUTPATIENT
Start: 2019-07-29 | End: 2019-07-29

## 2019-07-29 RX ORDER — METHYLPREDNISOLONE SODIUM SUCCINATE 40 MG/ML
40 INJECTION, POWDER, LYOPHILIZED, FOR SOLUTION INTRAMUSCULAR; INTRAVENOUS EVERY 4 HOURS
Status: CANCELLED | OUTPATIENT
Start: 2019-07-29 | End: 2019-07-29

## 2019-07-29 RX ORDER — DIPHENHYDRAMINE HYDROCHLORIDE 50 MG/ML
50 INJECTION INTRAMUSCULAR; INTRAVENOUS ONCE
Status: CANCELLED | OUTPATIENT
Start: 2019-07-29 | End: 2019-07-29

## 2019-07-29 RX ORDER — DIPHENHYDRAMINE HYDROCHLORIDE 50 MG/ML
50 INJECTION INTRAMUSCULAR; INTRAVENOUS ONCE
Status: COMPLETED | OUTPATIENT
Start: 2019-07-29 | End: 2019-07-29

## 2019-07-29 RX ORDER — LIDOCAINE 40 MG/G
CREAM TOPICAL
Status: DISCONTINUED | OUTPATIENT
Start: 2019-07-29 | End: 2019-07-31 | Stop reason: HOSPADM

## 2019-07-29 RX ORDER — TRAZODONE HYDROCHLORIDE 50 MG/1
50 TABLET, FILM COATED ORAL ONCE
Status: DISCONTINUED | OUTPATIENT
Start: 2019-07-29 | End: 2019-08-13 | Stop reason: HOSPADM

## 2019-07-29 RX ORDER — IOPAMIDOL 755 MG/ML
104 INJECTION, SOLUTION INTRAVASCULAR ONCE
Status: COMPLETED | OUTPATIENT
Start: 2019-07-29 | End: 2019-07-29

## 2019-07-29 RX ORDER — LIDOCAINE 40 MG/G
CREAM TOPICAL
Status: CANCELLED | OUTPATIENT
Start: 2019-07-29

## 2019-07-29 RX ADMIN — METOPROLOL TARTRATE 25 MG: 25 TABLET ORAL at 08:18

## 2019-07-29 RX ADMIN — LOSARTAN POTASSIUM 25 MG: 25 TABLET ORAL at 08:18

## 2019-07-29 RX ADMIN — INSULIN ASPART 1 UNITS: 100 INJECTION, SOLUTION INTRAVENOUS; SUBCUTANEOUS at 20:54

## 2019-07-29 RX ADMIN — SODIUM CHLORIDE, POTASSIUM CHLORIDE, SODIUM LACTATE AND CALCIUM CHLORIDE 400 ML: 600; 310; 30; 20 INJECTION, SOLUTION INTRAVENOUS at 22:42

## 2019-07-29 RX ADMIN — PANTOPRAZOLE SODIUM 40 MG: 40 INJECTION, POWDER, FOR SOLUTION INTRAVENOUS at 20:54

## 2019-07-29 RX ADMIN — SODIUM CHLORIDE, POTASSIUM CHLORIDE, SODIUM LACTATE AND CALCIUM CHLORIDE 700 ML: 600; 310; 30; 20 INJECTION, SOLUTION INTRAVENOUS at 06:45

## 2019-07-29 RX ADMIN — SODIUM CHLORIDE, POTASSIUM CHLORIDE, SODIUM LACTATE AND CALCIUM CHLORIDE 750 ML: 600; 310; 30; 20 INJECTION, SOLUTION INTRAVENOUS at 15:06

## 2019-07-29 RX ADMIN — PANTOPRAZOLE SODIUM 40 MG: 40 INJECTION, POWDER, FOR SOLUTION INTRAVENOUS at 08:17

## 2019-07-29 RX ADMIN — METHYLPREDNISOLONE SODIUM SUCCINATE 37.5 MG: 125 INJECTION, POWDER, LYOPHILIZED, FOR SOLUTION INTRAMUSCULAR; INTRAVENOUS at 16:10

## 2019-07-29 RX ADMIN — METOPROLOL TARTRATE 25 MG: 25 TABLET ORAL at 20:54

## 2019-07-29 RX ADMIN — ACETAMINOPHEN 650 MG: 325 TABLET, FILM COATED ORAL at 05:48

## 2019-07-29 RX ADMIN — I.V. FAT EMULSION 250 ML: 20 EMULSION INTRAVENOUS at 20:55

## 2019-07-29 RX ADMIN — DIPHENHYDRAMINE HYDROCHLORIDE 50 MG: 50 INJECTION, SOLUTION INTRAMUSCULAR; INTRAVENOUS at 17:32

## 2019-07-29 RX ADMIN — ACETAMINOPHEN 650 MG: 325 TABLET, FILM COATED ORAL at 12:02

## 2019-07-29 RX ADMIN — POTASSIUM CHLORIDE: 2 INJECTION, SOLUTION, CONCENTRATE INTRAVENOUS at 20:55

## 2019-07-29 RX ADMIN — IOPAMIDOL 104 ML: 755 INJECTION, SOLUTION INTRAVENOUS at 18:10

## 2019-07-29 RX ADMIN — METHYLPREDNISOLONE SODIUM SUCCINATE 37.5 MG: 125 INJECTION, POWDER, LYOPHILIZED, FOR SOLUTION INTRAMUSCULAR; INTRAVENOUS at 12:01

## 2019-07-29 ASSESSMENT — ACTIVITIES OF DAILY LIVING (ADL)
ADLS_ACUITY_SCORE: 13

## 2019-07-29 ASSESSMENT — PAIN DESCRIPTION - DESCRIPTORS
DESCRIPTORS: ACHING;CONSTANT
DESCRIPTORS: ACHING;CONSTANT
DESCRIPTORS: ACHING;SORE
DESCRIPTORS: ACHING;CONSTANT

## 2019-07-29 ASSESSMENT — MIFFLIN-ST. JEOR: SCORE: 1301.02

## 2019-07-29 NOTE — PLAN OF CARE
1669-7963: AVSS on RA. Throat pain r/t NG tube persists. Solumedrol, benadryl, and PRN zofran given prior to PO contrast, tolerated fairly. CT AP completed, results pending. NG tube to LIS, 750 ml green output. Per evening MD, for [id number] LR bolus, will exclude 600 ml intake of contrast. , not corrected per sliding scale (no insulin pen, requested new one from pharmacy and will recheck). Voiding adequately. Up independently. Continue with POC.

## 2019-07-29 NOTE — PROGRESS NOTES
Surgery Progress Note  07/29/2019       Subjective:  No acute events overnight. Denies any nausea.    Objective:  Temp:  [96.7  F (35.9  C)-99.2  F (37.3  C)] 98.5  F (36.9  C)  Pulse:  [] 102  Heart Rate:  [82-88] 88  Resp:  [16-18] 18  BP: (118-140)/(67-74) 140/74  SpO2:  [95 %-97 %] 97 %    I/O last 3 completed shifts:  In: 1540 [P.O.:250; NG/GT:90; IV Piggyback:1200]  Out: 3550 [Urine:1350; Emesis/NG output:2200]      Gen: Awake, alert, NAD  Resp: NLB on RA  Abd: soft, nondistended, mild TTP periumbilical, NGT in place  Incision: from previous surgery, c/d/i   Ext: WWP     Labs:  Recent Labs   Lab 07/29/19  0526 07/28/19  0541 07/26/19  0524   WBC 9.0 11.8* 11.3*   HGB 9.6* 9.5* 9.6*    430 471*       Recent Labs   Lab 07/29/19  0526 07/28/19  0541 07/27/19  0654    135 134   POTASSIUM 4.3 4.2 4.7   CHLORIDE 103 103 103   CO2 25 22 23   BUN 22 25 23   CR 0.76 0.71 0.76   * 132* 131*   TARIQ 8.9 8.7 8.8   MAG 2.4* 2.3 2.3   PHOS 3.8 3.5 3.7       Imaging:  No new imaging.     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  Likely 2/2 edema at anastomosis. NG tube still continues to have a lot of output.    - Plan to keep NG until return of bowel function.  - GI consult for possible endoscopic treatment.  - Replace electrolytes as needed.    - Continue NPO, mIVF, continue to replace NGT output 1:1  - Continue TPN via PICC   - Follow labs.  - Symptomatic care.  - Oncology appointment in 2 weeks.    UPDATE: Will obtain CT with IV and oral contrast per GI recommendations.     Seen, examined, and discussed with chief resident, who will discuss with staff.    Kairn Rico MD  General Surgery PGY-1  [phone]

## 2019-07-29 NOTE — PLAN OF CARE
VSS; afebrile. Pt. complains of moderate throat pain r/t NG tube; 650 mg acetaminophen given at 1145 with noted improvement. NG connected to low intermittent suction with 750 mL output this shift; LR bolus infusing. Pt. scheduled for CT at 1800; first dose of methylprednisolone administered at 1200 per MAR. Up independently. Continue with POC.

## 2019-07-29 NOTE — PLAN OF CARE
Pt vitals stable.  NG to LIS and has good placement and verified per Xray. NPO TPN/lipids running in PICC. Tolerates small sips H20 with meds.   Hypo bowel sounds and is passing small amount of flatus. Pt occasional nausea but relieved by NG tube. C/O pain in neck/throat area, Tylenol PRN.  600cc output per NG so RN replaced fluid with 600 ml bolus of LR per MD orders. Pt family here visiting so she was up in hallway walking with them. Tolerate activity well.  Pleasant and makes needs known. Will continue to monitor NG output.

## 2019-07-29 NOTE — PROGRESS NOTES
Surgery Progress Note  07/29/2019       Subjective:  - BRIANNE overnight.  - NG came out a little, was advanced and Xray was replaced  - No N/V    Objective:  Temp: [96.3 - 99.2] 98   HR: [] 88  RR: 16  BP (118-150) / (67-75) 131/73   SpO2: 95% on RA    I/O  In: 1.6L   Out: 3.3L [Urine 860mL, NG 2.5]   Last shift  In: -  Out: 1.2L [Urine 900 mL, NG 300mL]     Gen: Awake, alert, NAD  Resp: NLB on RA  Abd: soft, non distended, non tender. NG in place  Ext: WWP, no gross edema     Labs:  WBC: 9.0 (11.8 7/28)  Hgb: 9.6 ( 9.5 7/28)  Cr: .76 (.71 7/28)  Alkaline Phosphatase: 283  ALT: 56    Imaging:  Xray to recheck NG advancement. Confirmed in place.      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 Now ileus/anastomic stricture.     Plan:   - Talk to GI about potential endoscopic treatments  - Continue symptomatic care  - Awaiting bowel function     Seen, examined, and discussed with chief resident, who will discuss with staff.  - - - - - - - - - - - - - - - - - -  Jadyn Lewis MS 3  General Surgery

## 2019-07-29 NOTE — CONSULTS
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 GI has been consulted for possible endoscopic treatment.  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 Biopsies with no evidence of residual adenoma and/or adenocarcinoma. CT A/P from 7/22 showed increased adjacent fluid collections tracking into the right pericolic gutter which could represent a residual/enalrging hematoma/edema. Patient remains NPO, with NGT to suction, TPN and symptomatic care.    # Personal history of colon polyps:  Last surveillance colonoscopy 4/30/2019 notable for 3 right colon polyps, completely resected, negative for adenocarcinoma.     Recommendations  - Please obtain a CT A/P with oral and IV contrast (premedicate for contrast allergy)  - Would consider endoscopic evaluation and treatment (dilation/stenting) pending CT results   - Agree with NPO status, NG suction and supportive care for now  - Discussed with surgical team  - GI will continue to follow with you    Gastroenterology follow up recommendations: TBD      Patient care plan discussed with Dr. Mann, GI staff physician.   Thank you for involving us in this patient's care. Please do not hesitate to contact the GI service with any questions or concerns.      Asif Lockhart MD  GI Fellow/Pancreaticobiliary Service  Pager [phone]   -------------------------------------------------------------------------------------------------------------------   History is obtained from the patient and the medical record.          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 GI has been consulted for possible endoscopic treatment.  Patient presents 5 days after procedure with vomiting and PO intolerance. She was also feeling weak, bloated and constipated. Denies abdominal pain, diarrhea, worsening distension, fevers or chills. Reports improvement in symptoms with supportive care, denies abd pain, N/V, distension, yesterday passed gas, no BM yet.             Past Medical History:   Reviewed and edited as appropriate  Past Medical History:   Diagnosis Date     Aortic aneurysm (H) 7/1/2007    see 7/07 [place] report -follow yearly, treat high BP is occurs Problem list name updated by automated process. Provider to review     Aortic aneurysm of unspecified site without mention of rupture 7/2007    4.4 cm ascending aorta noted in 2007     Asymptomatic postmenopausal status (age-related) (natural)     on HRT  Prempro -weaning off 5/'2004     CAD (coronary artery disease)     LAD     Family history of Gardiner syndrome      Hyperlipidemia LDL goal <100 10/31/2010     Hypertension goal BP (blood pressure) < 140/90 2/9/2016     Leiomyoma of uterus, unspecified     Uterine fibroid     Lump or mass in breast 7/2004    rt. nodule - bx neg     Personal history of colonic polyps      Postmenopausal atrophic vaginitis 8/20/2006     Pulmonary nodule     incidental noted in 2007 during [place]     Pure hypercholesterolemia     mild, diet - low cholesterol,  low fat.10/06 start Lovastatin            Past Surgical History:   Reviewed and edited as appropriate   Past Surgical History:   Procedure Laterality Date     BYPASS GRAFT ARTERY CORONARY N/A 6/5/2017    Procedure: BYPASS GRAFT ARTERY CORONARY;;  Surgeon: Akshat Soto MD;  Location: [facility] OR     C DEXA INTERPRETATION, AXIAL  12/21/01    wnl. 7/2005 wnl but lower     COLONOSCOPY  05/07/07    Repeat in 1 year for surveillance     COLONOSCOPY  12/10/08    repeat 1 year  -see 1/2009 letter     COLONOSCOPY  03/31/10     COLONOSCOPY  10/31/2011    Procedure:COMBINED COLONOSCOPY, SINGLE BIOPSY/POLYPECTOMY BY BIOPSY; colonoscopy with polypectomy by biopsy; Surgeon:JESSICA GARCIA; Location:[facility]     COLONOSCOPY  12/6/2013    Procedure: COMBINED COLONOSCOPY, SINGLE BIOPSY/POLYPECTOMY BY BIOPSY;  Colonoscopy, Polypectomies;  Surgeon: Herbert Salinas MD;  Location: [facility]     COLONOSCOPY N/A 12/8/2015    Procedure: COLONOSCOPY;  Surgeon: Jared Carbajal MD;  Location:  GI     COLONOSCOPY N/A 4/26/2017    Procedure: COMBINED COLONOSCOPY, SINGLE OR MULTIPLE BIOPSY/POLYPECTOMY BY BIOPSY;  Colonoscopy with polypectomies with forceps and snare;  Surgeon: Herbert Salinas MD;  Location:  GI     ESOPHAGOSCOPY, GASTROSCOPY, DUODENOSCOPY (EGD), COMBINED N/A 12/8/2015    Procedure: COMBINED ESOPHAGOSCOPY, GASTROSCOPY, DUODENOSCOPY (EGD), BIOPSY SINGLE OR MULTIPLE;  Surgeon: Jared Carbajal MD;  Location:  GI      BIOPSY BREAST, PERC NEEDLE CORE, WITH IMAGING  8/17/2004    Right     [facility] COLONOSCOPY THRU STOMA W BIOPSY/CAUTERY TUMOR/POLYP/LESION  1999,2002 2004 polyp - hyperplastic - repeat 5 years ?     HC COLONOSCOPY W/WO BRUSH/WASH  12/12/2005    Polypectomy.  Diverticulosis-minimal. Bx adenomatous and mucosal polyps - repeat 1 year     HC ECP WITH CATARACT SURGERY Bilateral 2015, 2016     HC LAPAROSCOPY, SURGICAL; APPENDECTOMY  10/31/2004     HC LAPAROSCOPY, SURGICAL; CHOLECYSTECTOMY  2000     Cholecystectomy, Laparoscopic     HC REVISE MEDIAN N/CARPAL TUNNEL SURG  10/01/10    left     [facility] ENDOSCOPY, SIMPLE EXAM  03/31/10     HYSTERECTOMY, ELVIN  12/14/09    TAHBSO, MMK     REPAIR ANEURYSM ASCENDING AORTA N/A 6/5/2017    Procedure: REPAIR ANEURYSM ASCENDING AORTA;  Median Sternotomy, Ascending Aortic Aneurysm Repair, Coronary Artery Bypass Graft x1 on pump oxygenator;  Surgeon: Akshat Soto MD;  Location: [facility]     RESECTION DUODENAL N/A 7/3/2019    Procedure: Resection of Distal Duodenal and proximal Jejunum;  Surgeon: Joaquin Brito MD;  Location: [facility]            Previous Endoscopy:     Results for orders placed or performed during the hospital encounter of 04/26/17   COLONOSCOPY   Result Value Ref Range    COLONOSCOPY       [address] [address] [phone]     Endoscopy Department  _______________________________________________________________________________  Patient Name: Marilia Bean            Procedure Date: 4/26/2017 8:57 AM  MRN: [id number]                       Account Number: [id number]  YOB: 1945              Admit Type: Outpatient  Age: 72                               Room: Room 1  Gender: Female                        Note Status: Finalized  Attending MD: Herbert Salinas MD      Total Sedation Time:   _______________________________________________________________________________     Procedure:           Colonoscopy  Indications:         High risk colon cancer surveillance: Personal history of                        colonic polyps, Family history of hereditary                        nonpolyposis colorectal cancer in 1st-degree relative  Providers:           Herbert Salinas MD  Referring MD:        Herbert Epstein MD  Medici raza:           Midazolam 5 mg IV, Fentanyl 50 micrograms IV  Complications:       No immediate  complications.  _______________________________________________________________________________  Procedure:           Pre-Anesthesia Assessment:                       - ASA Grade Assessment: II - A patient with mild                        systemic disease.                       After obtaining informed consent, the colonoscope was                        passed under direct vision. Throughout the procedure,                        the patient's blood pressure, pulse, and oxygen                        saturations were monitored continuously. The Colonoscope                        was introduced through the anus and advanced to the                        cecum, identified by appendiceal orifice and ileocecal                        valve. The quality of the bowel preparation was good.                                                                                   Findings:       Two sessile poly ps were found in the descending colon. The polyps were 2        mm in size. These were biopsied with a cold large-capacity forceps for        histology. Estimated blood loss was minimal.       A sessile polyp was found in the descending colon. The polyp was 4 mm in        size. The polyp was removed with a cold snare. Resection and retrieval        were complete. Estimated blood loss was minimal.       A few small-mouthed diverticula were found in the sigmoid colon.       The exam was otherwise without abnormality.                                                                                   Impression:          - Two 2 mm polyps in the descending colon. Biopsied in                        total                       - One 4 mm polyp in the descending colon. Resected and                        retrieved.                       - Mild diverticulosis in the sigmoid colon, uncomplicated                       - The examination was otherwise normal.                       - History of an a denoma.                       - History of  HNPCC                       - No gastric nor pancreatic cancer in family                       - Last EGD 2015  Recommendation:      - Await pathology results.                       - Repeat colonoscopy in 2 years for surveillance.                       - Return to primary care physician PRN.                                                                                     __________________  Herbert Salinas MD  4/26/2017 9:40 AM  I was physically present for the entire viewing portion of the exam.Herbert Salinas MD  Number of Addenda: 0    Note Initiated On: 4/26/2017 8:57 AM              Social History:   Reviewed and edited as appropriate  Social History     Socioeconomic History     Marital status:      Spouse name: Cain     Number of children: 4     Years of education: 12     Highest education level: Not on file   Occupational History     Occupation: HIMS clerk     Employer: [employer]     Comment: [employer]   Social Needs     Financial resource strain: Not on file     Food insecurity:     Worry: Not on file     Inability: Not on file     Transportation needs:     Medical: Not on file     Non-medical: Not on file   Tobacco Use     Smoking status: Never Smoker     Smokeless tobacco: Never Used   Substance and Sexual Activity     Alcohol use: Yes     Comment: rarely     Drug use: No     Sexual activity: Yes     Partners: Male     Comment: Post menopausal   Lifestyle     Physical activity:     Days per week: Not on file     Minutes per session: Not on file     Stress: Not on file   Relationships     Social connections:     Talks on phone: Not on file     Gets together: Not on file     Attends Synagogue service: Not on file     Active member of club or organization: Not on file     Attends meetings of clubs or organizations: Not on file     Relationship status: Not on file     Intimate partner violence:     Fear of current or ex partner: Not on file     Emotionally abused: Not on file      Physically abused: Not on file     Forced sexual activity: Not on file   Other Topics Concern      Service No     Blood Transfusions No     Caffeine Concern Yes     Comment: coffee; 3c/d pop: 1c/wk     Occupational Exposure No     Hobby Hazards No     Sleep Concern Yes     Comment: c/o insomnia     Stress Concern No     Weight Concern Yes     Comment: desire wt loss     Special Diet No     Back Care No     Exercise No     Bike Helmet No     Seat Belt Yes     Self-Exams No     Parent/sibling w/ CABG, MI or angioplasty before 65F 55M? Not Asked   Social History Narrative    Lives with spouse. No domestic violence issues.            Family History:   Reviewed and edited as appropriate  Family History   Problem Relation Age of Onset     Cancer Mother         Colon Cancer     Heart Disease Mother         MI     Osteoporosis Mother      Cancer Father         Colon Cancer     Heart Disease Father         Heart Disease/ Heart attacks     Diabetes Father         Adult Onset     Cancer Sister         Colon Cancer     Heart Disease Brother         Heart attacks at age 45     Breast Cancer Sister      Cancer Paternal Grandmother         Unknown type     Heart Disease Maternal Grandfather         MI     Diabetes Sister         Adult Onset     Diabetes Sister         Adult Onset     Diabetes Brother         Adult Onset     Heart Disease Brother         heart attack age 64     Cancer - colorectal Son         age 36, Gardiner syndrome           Allergies:   Reviewed and edited as appropriate     Allergies   Allergen Reactions     Contrast Dye Itching     Morphine Nausea     Reports that she did not vomit.             Medications:     Current Facility-Administered Medications   Medication     acetaminophen (TYLENOL) tablet 650 mg     benzocaine 20% (HURRICAINE/TOPEX) 20 % spray 0.5-1 mL     benzocaine-menthol (CEPACOL) 15-3.6 MG lozenge 1 lozenge     bisacodyl (DULCOLAX) Suppository 10 mg     dextrose 10 % 1,000 mL infusion      glucose gel 15-30 g    Or     dextrose 50 % injection 25-50 mL    Or     glucagon injection 1 mg     diphenhydrAMINE (BENADRYL) injection 50 mg     heparin lock flush 10 UNIT/ML injection 2-5 mL     insulin aspart (NovoLOG) inj (RAPID ACTING)     lactated ringers BOLUS 250-1,000 mL     lidocaine (LMX4) cream     lidocaine (LMX4) cream     lidocaine 1 % 0.1-1 mL     lidocaine 1 % 1 mL     lipids (INTRALIPID) 20 % infusion 250 mL     losartan (COZAAR) tablet 25 mg     magnesium sulfate 2 g in NS intermittent infusion ([facility]ar[facility] or [facility] Cmpd)     magnesium sulfate 4 g in 100 mL sterile water (premade)     methylPREDNISolone sodium succinate (solu-MEDROL) injection 37.5 mg     metoprolol tartrate (LOPRESSOR) tablet 25 mg     naloxone (NARCAN) injection 0.1-0.4 mg     ondansetron (ZOFRAN-ODT) ODT tab 4 mg    Or     ondansetron (ZOFRAN) injection 4 mg     oxyCODONE (ROXICODONE) tablet 5-10 mg     pantoprazole (PROTONIX) 40 mg IV push injection     parenteral nutrition - ADULT compounded formula     parenteral nutrition - ADULT compounded formula     potassium chloride (KLOR-CON) Packet 20-40 mEq     potassium chloride 10 mEq in 100 mL intermittent infusion with 10 mg lidocaine     potassium chloride 10 mEq in 100 mL sterile water intermittent infusion (premix)     potassium chloride 20 mEq in 50 mL intermittent infusion     potassium chloride ER (K-DUR/KLOR-CON M) CR tablet 20-40 mEq     potassium phosphate 10 mmol in D5W 250 mL intermittent infusion     potassium phosphate 15 mmol in D5W 250 mL intermittent infusion     potassium phosphate 20 mmol in D5W 250 mL intermittent infusion     potassium phosphate 20 mmol in D5W 500 mL intermittent infusion     potassium phosphate 25 mmol in D5W 500 mL intermittent infusion     prochlorperazine (COMPAZINE) injection 5 mg    Or     prochlorperazine (COMPAZINE) tablet 5 mg    Or     prochlorperazine (COMPAZINE) Suppository 12.5 mg     sodium chloride (PF) 0.9% PF flush 10 mL      "sodium chloride (PF) 0.9% PF flush 10-20 mL     sodium chloride (PF) 0.9% PF flush 3 mL     sodium chloride (PF) 0.9% PF flush 3 mL     sodium chloride (PF) 0.9% PF flush 3 mL     sodium chloride (PF) 0.9% PF flush 3 mL     sucralfate (CARAFATE) suspension 1 g     traZODone (DESYREL) tablet 50 mg             Review of Systems:   A complete review of systems was performed and is negative except as noted in the HPI           Physical Exam:   /74 (BP Location: Left arm)   Pulse 102   Temp 98.5  F (36.9  C) (Oral)   Resp 18   Ht 1.702 m (5' 7\")   Wt 76.8 kg (169 lb 6.4 oz)   SpO2 97%   BMI 26.53 kg/m    Wt:   Wt Readings from Last 2 Encounters:   07/29/19 76.8 kg (169 lb 6.4 oz)   07/14/19 79.8 kg (176 lb)      Constitutional: cooperative, pleasant, not dyspneic/diaphoretic, no acute distress  Eyes: Sclera anicteric/injected  Ears/nose/mouth/throat: +NGT  Neck: supple, thyroid normal size  CV: No edema  Respiratory: Unlabored breathing  Lymph: No axillary, submandibular, supraclavicular or inguinal lymphadenopathy  Abd:  Nondistended, +bs, no hepatosplenomegaly, nontender, no peritoneal signs  Skin: warm, perfused, no jaundice  Neuro: AAO x 3, No asterixis  Psych: Normal affect  t         Data:   Labs and imaging below were independently reviewed and interpreted    BMP  Recent Labs   Lab 07/29/19 0526 07/28/19  0541 07/27/19  0654 07/26/19  0524    135 134 134   POTASSIUM 4.3 4.2 4.7 4.2   CHLORIDE 103 103 103 101   TARIQ 8.9 8.7 8.8 8.8   CO2 25 22 23 24   BUN 22 25 23 22   CR 0.76 0.71 0.76 0.73   * 132* 131* 133*     CBC  Recent Labs   Lab 07/29/19  0526 07/28/19  0541 07/26/19  0524 07/25/19  0441   WBC 9.0 11.8* 11.3* 9.3   RBC 3.48* 3.48* 3.47* 3.38*   HGB 9.6* 9.5* 9.6* 9.3*   HCT 31.8* 31.4* 31.8* 30.6*   MCV 91 90 92 91   MCH 27.6 27.3 27.7 27.5   MCHC 30.2* 30.3* 30.2* 30.4*   RDW 13.6 13.5 13.4 13.3    430 471* 459*     INR  Recent Labs   Lab 07/29/19  0526   INR 1.07 "     LFTs  Recent Labs   Lab 07/29/19  0526   ALKPHOS 283*   AST 35   ALT 56*   BILITOTAL 0.8   PROTTOTAL 7.3   ALBUMIN 2.6*      PANCNo lab results found in last 7 days.    Imaging

## 2019-07-29 NOTE — PLAN OF CARE
"A&OX4. VSS. Denied N/V and SOB. Throat pain \"back to normal\" post advancement of NG tube.  mg Tylenol given x1 for pain control. Requested sleeping medication. MD notified. One time dose trazodone ordered. Not given b/c pt sleeping. NPO. Low to intermittent suction with 700 CC output. LR bolus given. Calls appropriately and up ad tyson. Continue with POC.  "

## 2019-07-30 LAB
ANION GAP SERPL CALCULATED.3IONS-SCNC: 8 MMOL/L (ref 3–14)
BUN SERPL-MCNC: 27 MG/DL (ref 7–30)
CALCIUM SERPL-MCNC: 9.3 MG/DL (ref 8.5–10.1)
CHLORIDE SERPL-SCNC: 104 MMOL/L (ref 94–109)
CO2 SERPL-SCNC: 26 MMOL/L (ref 20–32)
CREAT SERPL-MCNC: 0.77 MG/DL (ref 0.52–1.04)
ERYTHROCYTE [DISTWIDTH] IN BLOOD BY AUTOMATED COUNT: 13.5 % (ref 10–15)
GFR SERPL CREATININE-BSD FRML MDRD: 76 ML/MIN/{1.73_M2}
GLUCOSE BLDC GLUCOMTR-MCNC: 114 MG/DL (ref 70–99)
GLUCOSE BLDC GLUCOMTR-MCNC: 117 MG/DL (ref 70–99)
GLUCOSE BLDC GLUCOMTR-MCNC: 117 MG/DL (ref 70–99)
GLUCOSE BLDC GLUCOMTR-MCNC: 140 MG/DL (ref 70–99)
GLUCOSE BLDC GLUCOMTR-MCNC: 140 MG/DL (ref 70–99)
GLUCOSE BLDC GLUCOMTR-MCNC: 179 MG/DL (ref 70–99)
GLUCOSE SERPL-MCNC: 115 MG/DL (ref 70–99)
HCT VFR BLD AUTO: 30.8 % (ref 35–47)
HGB BLD-MCNC: 9.4 G/DL (ref 11.7–15.7)
LACTATE BLD-SCNC: 1 MMOL/L (ref 0.7–2)
MCH RBC QN AUTO: 27.2 PG (ref 26.5–33)
MCHC RBC AUTO-ENTMCNC: 30.5 G/DL (ref 31.5–36.5)
MCV RBC AUTO: 89 FL (ref 78–100)
PLATELET # BLD AUTO: 458 10E9/L (ref 150–450)
POTASSIUM SERPL-SCNC: 4.6 MMOL/L (ref 3.4–5.3)
RBC # BLD AUTO: 3.45 10E12/L (ref 3.8–5.2)
SODIUM SERPL-SCNC: 138 MMOL/L (ref 133–144)
WBC # BLD AUTO: 13.5 10E9/L (ref 4–11)

## 2019-07-30 PROCEDURE — 40000558 ZZH STATISTIC CVC DRESSING CHANGE

## 2019-07-30 PROCEDURE — C9113 INJ PANTOPRAZOLE SODIUM, VIA: HCPCS | Performed by: STUDENT IN AN ORGANIZED HEALTH CARE EDUCATION/TRAINING PROGRAM

## 2019-07-30 PROCEDURE — 80048 BASIC METABOLIC PNL TOTAL CA: CPT | Performed by: STUDENT IN AN ORGANIZED HEALTH CARE EDUCATION/TRAINING PROGRAM

## 2019-07-30 PROCEDURE — 40000802 ZZH SITE CHECK

## 2019-07-30 PROCEDURE — 25000125 ZZHC RX 250: Performed by: SURGERY

## 2019-07-30 PROCEDURE — 25000128 H RX IP 250 OP 636: Performed by: STUDENT IN AN ORGANIZED HEALTH CARE EDUCATION/TRAINING PROGRAM

## 2019-07-30 PROCEDURE — 83605 ASSAY OF LACTIC ACID: CPT

## 2019-07-30 PROCEDURE — 25800030 ZZH RX IP 258 OP 636: Performed by: STUDENT IN AN ORGANIZED HEALTH CARE EDUCATION/TRAINING PROGRAM

## 2019-07-30 PROCEDURE — 36592 COLLECT BLOOD FROM PICC: CPT | Performed by: STUDENT IN AN ORGANIZED HEALTH CARE EDUCATION/TRAINING PROGRAM

## 2019-07-30 PROCEDURE — 85027 COMPLETE CBC AUTOMATED: CPT | Performed by: STUDENT IN AN ORGANIZED HEALTH CARE EDUCATION/TRAINING PROGRAM

## 2019-07-30 PROCEDURE — 25000132 ZZH RX MED GY IP 250 OP 250 PS 637: Performed by: STUDENT IN AN ORGANIZED HEALTH CARE EDUCATION/TRAINING PROGRAM

## 2019-07-30 PROCEDURE — 12000001 ZZH R&B MED SURG/OB UMMC

## 2019-07-30 PROCEDURE — 00000146 ZZHCL STATISTIC GLUCOSE BY METER IP

## 2019-07-30 RX ADMIN — INSULIN ASPART 1 UNITS: 100 INJECTION, SOLUTION INTRAVENOUS; SUBCUTANEOUS at 11:58

## 2019-07-30 RX ADMIN — PANTOPRAZOLE SODIUM 40 MG: 40 INJECTION, POWDER, FOR SOLUTION INTRAVENOUS at 08:08

## 2019-07-30 RX ADMIN — METOPROLOL TARTRATE 25 MG: 25 TABLET ORAL at 19:37

## 2019-07-30 RX ADMIN — INSULIN ASPART 1 UNITS: 100 INJECTION, SOLUTION INTRAVENOUS; SUBCUTANEOUS at 00:51

## 2019-07-30 RX ADMIN — ACETAMINOPHEN 650 MG: 325 TABLET, FILM COATED ORAL at 12:04

## 2019-07-30 RX ADMIN — PANTOPRAZOLE SODIUM 40 MG: 40 INJECTION, POWDER, FOR SOLUTION INTRAVENOUS at 19:45

## 2019-07-30 RX ADMIN — ACETAMINOPHEN 650 MG: 325 TABLET, FILM COATED ORAL at 04:27

## 2019-07-30 RX ADMIN — I.V. FAT EMULSION 250 ML: 20 EMULSION INTRAVENOUS at 19:44

## 2019-07-30 RX ADMIN — ACETAMINOPHEN 650 MG: 325 TABLET, FILM COATED ORAL at 19:37

## 2019-07-30 RX ADMIN — SODIUM CHLORIDE, POTASSIUM CHLORIDE, SODIUM LACTATE AND CALCIUM CHLORIDE 300 ML: 600; 310; 30; 20 INJECTION, SOLUTION INTRAVENOUS at 05:59

## 2019-07-30 RX ADMIN — SODIUM CHLORIDE, POTASSIUM CHLORIDE, SODIUM LACTATE AND CALCIUM CHLORIDE 325 ML: 600; 310; 30; 20 INJECTION, SOLUTION INTRAVENOUS at 21:51

## 2019-07-30 RX ADMIN — SODIUM CHLORIDE, POTASSIUM CHLORIDE, SODIUM LACTATE AND CALCIUM CHLORIDE 640 ML: 600; 310; 30; 20 INJECTION, SOLUTION INTRAVENOUS at 14:00

## 2019-07-30 RX ADMIN — METOPROLOL TARTRATE 25 MG: 25 TABLET ORAL at 08:07

## 2019-07-30 RX ADMIN — INSULIN ASPART 1 UNITS: 100 INJECTION, SOLUTION INTRAVENOUS; SUBCUTANEOUS at 04:33

## 2019-07-30 RX ADMIN — LOSARTAN POTASSIUM 25 MG: 25 TABLET ORAL at 08:08

## 2019-07-30 RX ADMIN — POTASSIUM CHLORIDE: 2 INJECTION, SOLUTION, CONCENTRATE INTRAVENOUS at 19:44

## 2019-07-30 ASSESSMENT — ACTIVITIES OF DAILY LIVING (ADL)
ADLS_ACUITY_SCORE: 13

## 2019-07-30 ASSESSMENT — PAIN DESCRIPTION - DESCRIPTORS: DESCRIPTORS: ACHING;SORE

## 2019-07-30 ASSESSMENT — MIFFLIN-ST. JEOR: SCORE: 1300.12

## 2019-07-30 NOTE — PROGRESS NOTES
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 GI has been consulted for possible endoscopic treatment.  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 Biopsies with no evidence of residual adenoma and/or adenocarcinoma. CT A/P from 7/29 with IV and oral contrast without anastomotic leak, possible anastomotic stricture. Planning for endoscopic evaluation, possible stenting, tomorrow.     # Personal history of colon polyps:  Last surveillance colonoscopy 4/30/2019 notable for 3 right colon polyps, completely resected, negative for adenocarcinoma.      Recommendations  - EGD tomorrow under GA  - Agree with NPO status, NG suction and supportive care for now  - Discussed with surgical team  - GI will continue to follow with you    The patient was discussed and plan agreed upon with GI staff, Dr Mann.      Janae Ponce PA-C   Advanced Endoscopy/Pancreaticobiliary [facility]  [facility]  Pager *[phone]  _______________________________________________________________      Subjective: Nursing notes and 24hr events reviewed. Patient seen and examined at 1030. Patient reports that she is doing okay. Main complaint is of sinus pain from NGT. No nausea or vomiting with NG to LIS. Passing gas 1-2x per day, no BM. Minimal abdominal pain.    ROS:   4 pt ROS negative unless noted in subjective.     Objective:  Blood pressure 119/65,  "pulse 86, temperature 97.8  F (36.6  C), temperature source Oral, resp. rate 18, height 1.702 m (5' 7\"), weight 76.7 kg (169 lb 3.2 oz), SpO2 98 %.  Gen: Sitting in bed. Appears comfortable  HEENT: NCAT. Conjunctiva clear. Sclera anicteric NGT with green output  CV: RRR, Peripheral perfusion intact  Resp: normal work of breathing  Abd: Soft, NT, ND, no guarding or rebound, BS hypoactive  Msk: no gross deformity  Skin:  no jaundice  Ext: warm, well perfused   Neuro: grossly normal  Mental status/Psych: A&O. Asks/answers questions appropriately       Date 07/30/19 0700 - 07/31/19 0659   Shift 9828-0315 5648-9537 5021-6852 24 Hour Total   INTAKE   P.O. 50   50   I.V. 20   20   Shift Total(mL/kg) 70(0.91)   70(0.91)   OUTPUT   Urine 150   150   Shift Total(mL/kg) 150(1.95)   150(1.95)   Weight (kg) 76.75 76.75 76.75 76.75         LABS:  BMP  Recent Labs   Lab 07/30/19  0621 07/29/19  0526 07/28/19  0541 07/27/19  0654    135 135 134   POTASSIUM 4.6 4.3 4.2 4.7   CHLORIDE 104 103 103 103   TARIQ 9.3 8.9 8.7 8.8   CO2 26 25 22 23   BUN 27 22 25 23   CR 0.77 0.76 0.71 0.76   * 124* 132* 131*     CBC  Recent Labs   Lab 07/30/19  0621 07/29/19  0526 07/28/19  0541 07/26/19  0524   WBC 13.5* 9.0 11.8* 11.3*   RBC 3.45* 3.48* 3.48* 3.47*   HGB 9.4* 9.6* 9.5* 9.6*   HCT 30.8* 31.8* 31.4* 31.8*   MCV 89 91 90 92   MCH 27.2 27.6 27.3 27.7   MCHC 30.5* 30.2* 30.3* 30.2*   RDW 13.5 13.6 13.5 13.4   * 422 430 471*     INR  Recent Labs   Lab 07/29/19  0526   INR 1.07     LFTs  Recent Labs   Lab 07/29/19  0526   ALKPHOS 283*   AST 35   ALT 56*   BILITOTAL 0.8   PROTTOTAL 7.3   ALBUMIN 2.6*      PANCNo lab results found in last 7 days.      IMAGING:  EXAMINATION: CT ABDOMEN PELVIS W CONTRAST, 7/29/2019 6:34 PM     TECHNIQUE:  Helical CT images from the lung bases through the  symphysis pubis were obtained with oral and IV contrast.  Coronal and  sagittal reformatted images were generated at a workstation for  further " assessment.     CONTRAST:  104 ml Isovue 370.     COMPARISON: CT abdomen 7/22/2019 and CT abdomen 7/14/2019     HISTORY: Nausea, vomiting; Continued output from NG tube.  Please  evaluate extrinsic/intrinsic causes from anastomotic site.     FINDINGS:     Lines and tubes: Enteric tube with tip and sidehole in the stomach     Lung bases: No consolidation or pleural effusion. Mild subsegmental  bibasilar atelectasis. Unchanged nodular density in the left lower  lobe, measuring 1 x 1 cm.     Abdomen and pelvis:     Mildly distended stomach. Postoperative changes from duodenojejunal  anastomosis. Redemonstration of multiloculated ill marginated  collection adjacent to the operative site with some rim enhancement.  There is a subcapsular component to the collection along the posterior  segment 6 of the liver, measuring 10 x 3.5 cm with extension medial to  ascending colon and a loculated component along the lateral conal  fascia, measuring 12 x 24 mm (series 3, image 192); There is no  extravasation of oral contrast into the multiloculated collection and  no abnormal dilatation of the bowel loops. Mild colonic diverticulosis  with no diverticulitis.     Liver: No suspicious liver lesions. Portal veins appear patent  Gallbladder: Surgically absent  Spleen: Normal size.  Pancreas: No suspicious pancreatic lesions. The pancreatic duct is not  dilated.  Adrenal glands: No adrenal nodules  Kidneys: No hydronephrosis or obstructing renal stones.    Bladder / Pelvic organs: Unremarkable.  Lymph nodes: No retroperitoneal, mesenteric, or pelvic  lymphadenopathy.  Vessels: No infrarenal aortic aneurysm.      Bones and soft tissues: No aggressive osseous lesion.                                                                      IMPRESSION:      1. Postoperative changes of duodenojejunal anastomosis with  multiloculated collection, with some rim enhancement possibly evolving  hematoma, abscess, and/or biloma or combination thereof  adjacent to  the surgical bed, not significantly changed in size compared to CT  dated 7/22/2019, accounting for difference in technique. Hida scan may  be helpful.  2. No extravasation of oral contrast to suggest anastomotic leak.  3. Diverticulosis without diverticulitis.  4. Unchanged pulmonary nodules. Recommend attention on follow-up.

## 2019-07-30 NOTE — PLAN OF CARE
1900 - 0700  VS stable, afebrile. Pt c/o throat pain - tylenol administered. Pt denied SOB/n/v. TPN and lipids infusing - BS checked q4h, corrected appropriately. NG to LIS - Bolus infused when ordered, 400cc at 2200 and 300cc at 0600. Triggered sepsis, ordered lactic and VS will be taken per protocol. No acute events, continue POC.

## 2019-07-30 NOTE — PROGRESS NOTES
Surgery Progress Note  07/30/2019       Subjective:  No acute events overnight. Says she slept well and feels well.    Objective:  Temp:  [95.9  F (35.5  C)-98.1  F (36.7  C)] 95.9  F (35.5  C)  Pulse:  [82-86] 86  Heart Rate:  [] 100  Resp:  [18] 18  BP: ()/(60-73) 113/62  SpO2:  [92 %-98 %] 96 %    I/O last 3 completed shifts:  In: 5468.39 [P.O.:250; I.V.:20; NG/GT:600; IV Piggyback:2150]  Out: 3900 [Urine:2150; Emesis/NG output:1750]      Gen: Awake, alert, NAD  Resp: NLB on RA  Abd: soft, nondistended, mild TTP periumbilical, NGT in place  Incision: from previous surgery, c/d/i   Ext: WWP     Labs:  Recent Labs   Lab 07/30/19 0621 07/29/19  0526 07/28/19  0541   WBC 13.5* 9.0 11.8*   HGB 9.4* 9.6* 9.5*   * 422 430       Recent Labs   Lab 07/30/19 0621 07/29/19  0526 07/28/19  0541 07/27/19  0654    135 135 134   POTASSIUM 4.6 4.3 4.2 4.7   CHLORIDE 104 103 103 103   CO2 26 25 22 23   BUN 27 22 25 23   CR 0.77 0.76 0.71 0.76   * 124* 132* 131*   TARIQ 9.3 8.9 8.7 8.8   MAG  --  2.4* 2.3 2.3   PHOS  --  3.8 3.5 3.7       Imaging:  No new imaging.     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  Likely 2/2 edema at anastomosis. NG tube still continues to have a lot of output.    - EGD tomorrow per GI recommendation.  - Plan to keep NG until return of bowel function.  - Replace electrolytes as needed.    - Continue NPO, mIVF, continue to replace NGT output 1:1  - Continue TPN via PICC   - Follow labs.  - Symptomatic care.     Seen, examined, and discussed with chief resident, who will discuss with staff.    Karin Rico MD  General Surgery PGY-1  [phone]

## 2019-07-30 NOTE — PLAN OF CARE
"/62 (BP Location: Left arm)   Pulse 86   Temp 95.9  F (35.5  C) (Oral)   Resp 18   Ht 1.702 m (5' 7\")   Wt 76.7 kg (169 lb 3.2 oz)   SpO2 96%   BMI 26.50 kg/m      Shift: 5172-5932   Reason for Admission: Abdominal pain w/ vomiting- SBO  VS: VSS on RA  Neuros: A/Ox4, able to make needs known.  GI/: No BM since 7/17, passing gas. Voiding adequately  Diet: NPO. BG q4h  Drains/Lines: R. PICC- infusing TPN @ 60 mL/hr, NGT to low intermittent suction  Activity: Pt up ad tyson  Pain/Nausea: Denies nausea. C/o of throat pain d/t NGT- PRN Tylenol given?  Social:  at bedside?   Plan of care: Possible EGD for stent placement tomorrow. Will continue to monitor and follow POC.   "

## 2019-07-31 ENCOUNTER — ANESTHESIA EVENT (OUTPATIENT)
Dept: SURGERY | Facility: CLINIC | Age: 74
DRG: 327 | End: 2019-07-31
Payer: MEDICARE

## 2019-07-31 ENCOUNTER — APPOINTMENT (OUTPATIENT)
Dept: GENERAL RADIOLOGY | Facility: CLINIC | Age: 74
DRG: 327 | End: 2019-07-31
Attending: INTERNAL MEDICINE
Payer: MEDICARE

## 2019-07-31 ENCOUNTER — ANESTHESIA (OUTPATIENT)
Dept: SURGERY | Facility: CLINIC | Age: 74
DRG: 327 | End: 2019-07-31
Payer: MEDICARE

## 2019-07-31 ENCOUNTER — RESULTS ONLY (OUTPATIENT)
Dept: GASTROENTEROLOGY | Facility: CLINIC | Age: 74
End: 2019-07-31

## 2019-07-31 LAB
ANION GAP SERPL CALCULATED.3IONS-SCNC: 8 MMOL/L (ref 3–14)
BUN SERPL-MCNC: 28 MG/DL (ref 7–30)
CALCIUM SERPL-MCNC: 8.7 MG/DL (ref 8.5–10.1)
CHLORIDE SERPL-SCNC: 104 MMOL/L (ref 94–109)
CO2 SERPL-SCNC: 26 MMOL/L (ref 20–32)
CREAT SERPL-MCNC: 0.8 MG/DL (ref 0.52–1.04)
ERYTHROCYTE [DISTWIDTH] IN BLOOD BY AUTOMATED COUNT: 13.8 % (ref 10–15)
GFR SERPL CREATININE-BSD FRML MDRD: 73 ML/MIN/{1.73_M2}
GLUCOSE BLDC GLUCOMTR-MCNC: 111 MG/DL (ref 70–99)
GLUCOSE BLDC GLUCOMTR-MCNC: 114 MG/DL (ref 70–99)
GLUCOSE BLDC GLUCOMTR-MCNC: 114 MG/DL (ref 70–99)
GLUCOSE BLDC GLUCOMTR-MCNC: 121 MG/DL (ref 70–99)
GLUCOSE BLDC GLUCOMTR-MCNC: 176 MG/DL (ref 70–99)
GLUCOSE SERPL-MCNC: 113 MG/DL (ref 70–99)
HCT VFR BLD AUTO: 32.1 % (ref 35–47)
HGB BLD-MCNC: 9.6 G/DL (ref 11.7–15.7)
MAGNESIUM SERPL-MCNC: 2.1 MG/DL (ref 1.6–2.3)
MCH RBC QN AUTO: 27.7 PG (ref 26.5–33)
MCHC RBC AUTO-ENTMCNC: 29.9 G/DL (ref 31.5–36.5)
MCV RBC AUTO: 93 FL (ref 78–100)
PHOSPHATE SERPL-MCNC: 3.5 MG/DL (ref 2.5–4.5)
PLATELET # BLD AUTO: 431 10E9/L (ref 150–450)
POTASSIUM SERPL-SCNC: 4.3 MMOL/L (ref 3.4–5.3)
RBC # BLD AUTO: 3.46 10E12/L (ref 3.8–5.2)
SODIUM SERPL-SCNC: 137 MMOL/L (ref 133–144)
UPPER EUS: NORMAL
WBC # BLD AUTO: 10.6 10E9/L (ref 4–11)

## 2019-07-31 PROCEDURE — 36000053 ZZH SURGERY LEVEL 2 EA 15 ADDTL MIN - UMMC: Performed by: INTERNAL MEDICINE

## 2019-07-31 PROCEDURE — 25800030 ZZH RX IP 258 OP 636: Performed by: STUDENT IN AN ORGANIZED HEALTH CARE EDUCATION/TRAINING PROGRAM

## 2019-07-31 PROCEDURE — 25000125 ZZHC RX 250: Performed by: NURSE ANESTHETIST, CERTIFIED REGISTERED

## 2019-07-31 PROCEDURE — 00000146 ZZHCL STATISTIC GLUCOSE BY METER IP

## 2019-07-31 PROCEDURE — 37000009 ZZH ANESTHESIA TECHNICAL FEE, EACH ADDTL 15 MIN: Performed by: INTERNAL MEDICINE

## 2019-07-31 PROCEDURE — 25000128 H RX IP 250 OP 636: Performed by: STUDENT IN AN ORGANIZED HEALTH CARE EDUCATION/TRAINING PROGRAM

## 2019-07-31 PROCEDURE — C1876 STENT, NON-COA/NON-COV W/DEL: HCPCS | Performed by: INTERNAL MEDICINE

## 2019-07-31 PROCEDURE — 36592 COLLECT BLOOD FROM PICC: CPT | Performed by: SURGERY

## 2019-07-31 PROCEDURE — 25000132 ZZH RX MED GY IP 250 OP 250 PS 637: Performed by: STUDENT IN AN ORGANIZED HEALTH CARE EDUCATION/TRAINING PROGRAM

## 2019-07-31 PROCEDURE — 40000277 XR SURGERY CARM FLUORO LESS THAN 5 MIN W STILLS: Mod: TC

## 2019-07-31 PROCEDURE — 0D9970Z DRAINAGE OF DUODENUM WITH DRAINAGE DEVICE, VIA NATURAL OR ARTIFICIAL OPENING: ICD-10-PCS | Performed by: INTERNAL MEDICINE

## 2019-07-31 PROCEDURE — 80048 BASIC METABOLIC PNL TOTAL CA: CPT | Performed by: SURGERY

## 2019-07-31 PROCEDURE — 25000565 ZZH ISOFLURANE, EA 15 MIN: Performed by: INTERNAL MEDICINE

## 2019-07-31 PROCEDURE — 25000125 ZZHC RX 250: Performed by: SURGERY

## 2019-07-31 PROCEDURE — 36000051 ZZH SURGERY LEVEL 2 1ST 30 MIN - UMMC: Performed by: INTERNAL MEDICINE

## 2019-07-31 PROCEDURE — 71000014 ZZH RECOVERY PHASE 1 LEVEL 2 FIRST HR: Performed by: INTERNAL MEDICINE

## 2019-07-31 PROCEDURE — 40000170 ZZH STATISTIC PRE-PROCEDURE ASSESSMENT II: Performed by: INTERNAL MEDICINE

## 2019-07-31 PROCEDURE — 25800030 ZZH RX IP 258 OP 636: Performed by: NURSE ANESTHETIST, CERTIFIED REGISTERED

## 2019-07-31 PROCEDURE — 83735 ASSAY OF MAGNESIUM: CPT | Performed by: SURGERY

## 2019-07-31 PROCEDURE — 25000128 H RX IP 250 OP 636: Performed by: ANESTHESIOLOGY

## 2019-07-31 PROCEDURE — 37000008 ZZH ANESTHESIA TECHNICAL FEE, 1ST 30 MIN: Performed by: INTERNAL MEDICINE

## 2019-07-31 PROCEDURE — 27210794 ZZH OR GENERAL SUPPLY STERILE: Performed by: INTERNAL MEDICINE

## 2019-07-31 PROCEDURE — 40000802 ZZH SITE CHECK

## 2019-07-31 PROCEDURE — 12000001 ZZH R&B MED SURG/OB UMMC

## 2019-07-31 PROCEDURE — C1769 GUIDE WIRE: HCPCS | Performed by: INTERNAL MEDICINE

## 2019-07-31 PROCEDURE — 84100 ASSAY OF PHOSPHORUS: CPT | Performed by: SURGERY

## 2019-07-31 PROCEDURE — 25500064 ZZH RX 255 OP 636: Performed by: INTERNAL MEDICINE

## 2019-07-31 PROCEDURE — 27211024 ZZHC OR SUPPLY OTHER OPNP: Performed by: INTERNAL MEDICINE

## 2019-07-31 PROCEDURE — C1877 STENT, NON-COAT/COV W/O DEL: HCPCS | Performed by: INTERNAL MEDICINE

## 2019-07-31 PROCEDURE — 25000125 ZZHC RX 250: Performed by: INTERNAL MEDICINE

## 2019-07-31 PROCEDURE — 25000128 H RX IP 250 OP 636: Performed by: NURSE ANESTHETIST, CERTIFIED REGISTERED

## 2019-07-31 PROCEDURE — C9113 INJ PANTOPRAZOLE SODIUM, VIA: HCPCS | Performed by: STUDENT IN AN ORGANIZED HEALTH CARE EDUCATION/TRAINING PROGRAM

## 2019-07-31 PROCEDURE — 0DHA7UZ INSERTION OF FEEDING DEVICE INTO JEJUNUM, VIA NATURAL OR ARTIFICIAL OPENING: ICD-10-PCS | Performed by: INTERNAL MEDICINE

## 2019-07-31 PROCEDURE — 85027 COMPLETE CBC AUTOMATED: CPT | Performed by: SURGERY

## 2019-07-31 DEVICE — STENT ZIMMON BILIARY 07FRX03CM DBL PIGTAIL ZSO-7-3 G22159
Type: IMPLANTABLE DEVICE | Site: BILE DUCT | Status: NON-FUNCTIONAL
Removed: 2019-08-12

## 2019-07-31 DEVICE — STENT ESU AXIOS W/DEL SYS 10MMX10MM 10.8FR 138CM M00553640: Type: IMPLANTABLE DEVICE | Status: FUNCTIONAL

## 2019-07-31 RX ORDER — METOPROLOL TARTRATE 1 MG/ML
1-2 INJECTION, SOLUTION INTRAVENOUS EVERY 5 MIN PRN
Status: DISCONTINUED | OUTPATIENT
Start: 2019-07-31 | End: 2019-07-31 | Stop reason: HOSPADM

## 2019-07-31 RX ORDER — LIDOCAINE 40 MG/G
CREAM TOPICAL
Status: DISCONTINUED | OUTPATIENT
Start: 2019-07-31 | End: 2019-07-31 | Stop reason: HOSPADM

## 2019-07-31 RX ORDER — ONDANSETRON 2 MG/ML
4 INJECTION INTRAMUSCULAR; INTRAVENOUS EVERY 30 MIN PRN
Status: DISCONTINUED | OUTPATIENT
Start: 2019-07-31 | End: 2019-07-31 | Stop reason: HOSPADM

## 2019-07-31 RX ORDER — FENTANYL CITRATE 50 UG/ML
25-50 INJECTION, SOLUTION INTRAMUSCULAR; INTRAVENOUS
Status: DISCONTINUED | OUTPATIENT
Start: 2019-07-31 | End: 2019-07-31 | Stop reason: HOSPADM

## 2019-07-31 RX ORDER — LIDOCAINE HYDROCHLORIDE 20 MG/ML
INJECTION, SOLUTION INFILTRATION; PERINEURAL PRN
Status: DISCONTINUED | OUTPATIENT
Start: 2019-07-31 | End: 2019-07-31

## 2019-07-31 RX ORDER — MEPERIDINE HYDROCHLORIDE 25 MG/ML
12.5 INJECTION INTRAMUSCULAR; INTRAVENOUS; SUBCUTANEOUS
Status: DISCONTINUED | OUTPATIENT
Start: 2019-07-31 | End: 2019-07-31 | Stop reason: HOSPADM

## 2019-07-31 RX ORDER — SODIUM CHLORIDE, SODIUM LACTATE, POTASSIUM CHLORIDE, CALCIUM CHLORIDE 600; 310; 30; 20 MG/100ML; MG/100ML; MG/100ML; MG/100ML
INJECTION, SOLUTION INTRAVENOUS CONTINUOUS PRN
Status: DISCONTINUED | OUTPATIENT
Start: 2019-07-31 | End: 2019-07-31

## 2019-07-31 RX ORDER — HYDRALAZINE HYDROCHLORIDE 20 MG/ML
2.5-5 INJECTION INTRAMUSCULAR; INTRAVENOUS EVERY 10 MIN PRN
Status: DISCONTINUED | OUTPATIENT
Start: 2019-07-31 | End: 2019-07-31 | Stop reason: HOSPADM

## 2019-07-31 RX ORDER — ONDANSETRON 4 MG/1
4 TABLET, ORALLY DISINTEGRATING ORAL EVERY 30 MIN PRN
Status: DISCONTINUED | OUTPATIENT
Start: 2019-07-31 | End: 2019-07-31 | Stop reason: HOSPADM

## 2019-07-31 RX ORDER — HYDROMORPHONE HYDROCHLORIDE 1 MG/ML
.3-.5 INJECTION, SOLUTION INTRAMUSCULAR; INTRAVENOUS; SUBCUTANEOUS EVERY 10 MIN PRN
Status: DISCONTINUED | OUTPATIENT
Start: 2019-07-31 | End: 2019-07-31 | Stop reason: HOSPADM

## 2019-07-31 RX ORDER — FENTANYL CITRATE 50 UG/ML
INJECTION, SOLUTION INTRAMUSCULAR; INTRAVENOUS PRN
Status: DISCONTINUED | OUTPATIENT
Start: 2019-07-31 | End: 2019-07-31

## 2019-07-31 RX ORDER — NALOXONE HYDROCHLORIDE 0.4 MG/ML
.1-.4 INJECTION, SOLUTION INTRAMUSCULAR; INTRAVENOUS; SUBCUTANEOUS
Status: DISCONTINUED | OUTPATIENT
Start: 2019-07-31 | End: 2019-07-31 | Stop reason: HOSPADM

## 2019-07-31 RX ORDER — IOPAMIDOL 510 MG/ML
INJECTION, SOLUTION INTRAVASCULAR PRN
Status: DISCONTINUED | OUTPATIENT
Start: 2019-07-31 | End: 2019-07-31 | Stop reason: HOSPADM

## 2019-07-31 RX ORDER — PROPOFOL 10 MG/ML
INJECTION, EMULSION INTRAVENOUS PRN
Status: DISCONTINUED | OUTPATIENT
Start: 2019-07-31 | End: 2019-07-31

## 2019-07-31 RX ORDER — FLUMAZENIL 0.1 MG/ML
0.2 INJECTION, SOLUTION INTRAVENOUS
Status: ACTIVE | OUTPATIENT
Start: 2019-07-31 | End: 2019-08-01

## 2019-07-31 RX ORDER — ONDANSETRON 2 MG/ML
INJECTION INTRAMUSCULAR; INTRAVENOUS PRN
Status: DISCONTINUED | OUTPATIENT
Start: 2019-07-31 | End: 2019-07-31

## 2019-07-31 RX ORDER — NALOXONE HYDROCHLORIDE 0.4 MG/ML
.1-.4 INJECTION, SOLUTION INTRAMUSCULAR; INTRAVENOUS; SUBCUTANEOUS
Status: ACTIVE | OUTPATIENT
Start: 2019-07-31 | End: 2019-08-01

## 2019-07-31 RX ORDER — ALBUTEROL SULFATE 0.83 MG/ML
2.5 SOLUTION RESPIRATORY (INHALATION) EVERY 4 HOURS PRN
Status: DISCONTINUED | OUTPATIENT
Start: 2019-07-31 | End: 2019-07-31 | Stop reason: HOSPADM

## 2019-07-31 RX ORDER — SODIUM CHLORIDE, SODIUM LACTATE, POTASSIUM CHLORIDE, CALCIUM CHLORIDE 600; 310; 30; 20 MG/100ML; MG/100ML; MG/100ML; MG/100ML
INJECTION, SOLUTION INTRAVENOUS CONTINUOUS
Status: DISCONTINUED | OUTPATIENT
Start: 2019-07-31 | End: 2019-07-31 | Stop reason: HOSPADM

## 2019-07-31 RX ORDER — DEXAMETHASONE SODIUM PHOSPHATE 4 MG/ML
INJECTION, SOLUTION INTRA-ARTICULAR; INTRALESIONAL; INTRAMUSCULAR; INTRAVENOUS; SOFT TISSUE PRN
Status: DISCONTINUED | OUTPATIENT
Start: 2019-07-31 | End: 2019-07-31

## 2019-07-31 RX ORDER — DIMENHYDRINATE 50 MG/ML
25 INJECTION, SOLUTION INTRAMUSCULAR; INTRAVENOUS
Status: DISCONTINUED | OUTPATIENT
Start: 2019-07-31 | End: 2019-07-31 | Stop reason: HOSPADM

## 2019-07-31 RX ADMIN — I.V. FAT EMULSION 250 ML: 20 EMULSION INTRAVENOUS at 20:35

## 2019-07-31 RX ADMIN — PROPOFOL 100 MG: 10 INJECTION, EMULSION INTRAVENOUS at 13:40

## 2019-07-31 RX ADMIN — LIDOCAINE HYDROCHLORIDE 100 MG: 20 INJECTION, SOLUTION INFILTRATION; PERINEURAL at 13:40

## 2019-07-31 RX ADMIN — PHENYLEPHRINE HYDROCHLORIDE 100 MCG: 10 INJECTION INTRAVENOUS at 14:41

## 2019-07-31 RX ADMIN — SODIUM CHLORIDE, POTASSIUM CHLORIDE, SODIUM LACTATE AND CALCIUM CHLORIDE: 600; 310; 30; 20 INJECTION, SOLUTION INTRAVENOUS at 13:05

## 2019-07-31 RX ADMIN — PANTOPRAZOLE SODIUM 40 MG: 40 INJECTION, POWDER, FOR SOLUTION INTRAVENOUS at 09:14

## 2019-07-31 RX ADMIN — METOPROLOL TARTRATE 25 MG: 25 TABLET ORAL at 09:14

## 2019-07-31 RX ADMIN — LOSARTAN POTASSIUM 25 MG: 25 TABLET ORAL at 09:14

## 2019-07-31 RX ADMIN — ONDANSETRON 4 MG: 2 INJECTION INTRAMUSCULAR; INTRAVENOUS at 14:39

## 2019-07-31 RX ADMIN — ONDANSETRON 4 MG: 2 INJECTION INTRAMUSCULAR; INTRAVENOUS at 15:19

## 2019-07-31 RX ADMIN — PHENYLEPHRINE HYDROCHLORIDE 200 MCG: 10 INJECTION INTRAVENOUS at 14:15

## 2019-07-31 RX ADMIN — POTASSIUM CHLORIDE 60 ML/HR: 2 INJECTION, SOLUTION, CONCENTRATE INTRAVENOUS at 13:22

## 2019-07-31 RX ADMIN — PHENYLEPHRINE HYDROCHLORIDE 200 MCG: 10 INJECTION INTRAVENOUS at 14:00

## 2019-07-31 RX ADMIN — ONDANSETRON 4 MG: 2 INJECTION INTRAMUSCULAR; INTRAVENOUS at 23:27

## 2019-07-31 RX ADMIN — PROPOFOL 100 MG: 10 INJECTION, EMULSION INTRAVENOUS at 13:22

## 2019-07-31 RX ADMIN — SODIUM CHLORIDE, POTASSIUM CHLORIDE, SODIUM LACTATE AND CALCIUM CHLORIDE 500 ML: 600; 310; 30; 20 INJECTION, SOLUTION INTRAVENOUS at 06:32

## 2019-07-31 RX ADMIN — SUGAMMADEX 150 MG: 100 INJECTION, SOLUTION INTRAVENOUS at 14:45

## 2019-07-31 RX ADMIN — PHENYLEPHRINE HYDROCHLORIDE 200 MCG: 10 INJECTION INTRAVENOUS at 13:55

## 2019-07-31 RX ADMIN — POTASSIUM CHLORIDE: 2 INJECTION, SOLUTION, CONCENTRATE INTRAVENOUS at 19:17

## 2019-07-31 RX ADMIN — FENTANYL CITRATE 50 MCG: 50 INJECTION, SOLUTION INTRAMUSCULAR; INTRAVENOUS at 14:09

## 2019-07-31 RX ADMIN — INSULIN ASPART 1 UNITS: 100 INJECTION, SOLUTION INTRAVENOUS; SUBCUTANEOUS at 20:34

## 2019-07-31 RX ADMIN — DEXAMETHASONE SODIUM PHOSPHATE 6 MG: 4 INJECTION, SOLUTION INTRA-ARTICULAR; INTRALESIONAL; INTRAMUSCULAR; INTRAVENOUS; SOFT TISSUE at 13:50

## 2019-07-31 RX ADMIN — ROCURONIUM BROMIDE 40 MG: 10 INJECTION INTRAVENOUS at 13:41

## 2019-07-31 RX ADMIN — PANTOPRAZOLE SODIUM 40 MG: 40 INJECTION, POWDER, FOR SOLUTION INTRAVENOUS at 20:34

## 2019-07-31 RX ADMIN — FENTANYL CITRATE 50 MCG: 50 INJECTION, SOLUTION INTRAMUSCULAR; INTRAVENOUS at 13:38

## 2019-07-31 RX ADMIN — METOPROLOL TARTRATE 25 MG: 25 TABLET ORAL at 20:35

## 2019-07-31 RX ADMIN — PHENYLEPHRINE HYDROCHLORIDE 100 MCG: 10 INJECTION INTRAVENOUS at 13:42

## 2019-07-31 RX ADMIN — PHENYLEPHRINE HYDROCHLORIDE 100 MCG: 10 INJECTION INTRAVENOUS at 14:30

## 2019-07-31 RX ADMIN — PHENYLEPHRINE HYDROCHLORIDE 200 MCG: 10 INJECTION INTRAVENOUS at 13:45

## 2019-07-31 ASSESSMENT — ACTIVITIES OF DAILY LIVING (ADL)
ADLS_ACUITY_SCORE: 13

## 2019-07-31 ASSESSMENT — LIFESTYLE VARIABLES: TOBACCO_USE: 0

## 2019-07-31 ASSESSMENT — PAIN DESCRIPTION - DESCRIPTORS: DESCRIPTORS: HEADACHE

## 2019-07-31 ASSESSMENT — ENCOUNTER SYMPTOMS: DYSRHYTHMIAS: 1

## 2019-07-31 NOTE — PROGRESS NOTES
SPIRITUAL HEALTH SERVICES  SPIRITUAL ASSESSMENT Progress Note  [facility] 7D     REFERRAL SOURCE: Length of stay    I met with patient Ibis Bean to introduce myself and explain the role of spiritual health services. Ibis identifies herself as Sabianism and is active in her local dilan community in [place], [place]. Her  has been calling her every day and visiting regularly. At this time, Ibis feels her spiritual needs are being met through her own . I explained how ibis can request a  visit in the future, if her needs change.    PLAN: No follow up is anticipate at this time.    Lynsey OrnelasSalisbury  Oncology   Pager [phone]    [facility] remains available 24/7 for emergent requests/referrals, either by having the switchboard page the on-call  or by entering an ASAP/STAT consult in Epic (this will also page the on-call ).

## 2019-07-31 NOTE — PLAN OF CARE
VS stable, afebrile. Pt c/o headache, relieved with sleep. Pt denied SOB. Pt stated she experienced nausea at a moment, later relieved with rest. BS WDL, no correction necessary. 500cc NG output, 500cc bolus given at 0600. No significant events this shift, pt eager for EGD. Continue POC.

## 2019-07-31 NOTE — PLAN OF CARE
Vitals stable,denied pain or discomfort.NTG patent,drainage was greenish color.Pt passed some gas and has small mucous stool output.Pt left for  GI procedure at 1150,not back on floor at time of report.

## 2019-07-31 NOTE — ANESTHESIA CARE TRANSFER NOTE
Patient: Marilia Bean    Procedure(s):  Endoscopic ultrasound with cystoduodenostomy, stent placement x2, stent dilation, and nasojejunal tube placement    Diagnosis: Bowel Obstruction  Diagnosis Additional Information: No value filed.    Anesthesia Type:   No value filed.     Note:  Airway :Face Mask  Patient transferred to:PACU  Comments: VSS. Report to RN. Patient arousable. 146/76. 100%. HR 71.Handoff Report: Identifed the Patient, Identified the Reponsible Provider, Reviewed the pertinent medical history, Discussed the surgical course, Reviewed Intra-OP anesthesia mangement and issues during anesthesia, Set expectations for post-procedure period and Allowed opportunity for questions and acknowledgement of understanding      Vitals: (Last set prior to Anesthesia Care Transfer)    CRNA VITALS  7/31/2019 1424 - 7/31/2019 1501      7/31/2019             NIBP:  146/76    Pulse:  70    SpO2:  100 %                Electronically Signed By: CECILIA Bermudez CRNA  July 31, 2019  3:01 PM

## 2019-07-31 NOTE — ANESTHESIA PREPROCEDURE EVALUATION
Anesthesia Pre-Procedure Evaluation    Patient: Marilia Bean   MRN:     [id number] Gender:   female   Age:    74 year old :      1945        Preoperative Diagnosis: * No surgery found *        Past Medical History:   Diagnosis Date     Aortic aneurysm (H) 2007    see  [place] report -follow yearly, treat high BP is occurs Problem list name updated by automated process. Provider to review     Aortic aneurysm of unspecified site without mention of rupture 2007    4.4 cm ascending aorta noted in      Asymptomatic postmenopausal status (age-related) (natural)     on HRT  Prempro -weaning off      CAD (coronary artery disease)     LAD     Family history of Gardiner syndrome      Hyperlipidemia LDL goal <100 10/31/2010     Hypertension goal BP (blood pressure) < 140/90 2016     Leiomyoma of uterus, unspecified     Uterine fibroid     Lump or mass in breast 2004    rt. nodule - bx neg     Personal history of colonic polyps      Postmenopausal atrophic vaginitis 2006     Pulmonary nodule     incidental noted in  during [place]     Pure hypercholesterolemia     mild, diet - low cholesterol, low fat.10/06 start Lovastatin      Past Surgical History:   Procedure Laterality Date     BYPASS GRAFT ARTERY CORONARY N/A 2017    Procedure: BYPASS GRAFT ARTERY CORONARY;;  Surgeon: Akshat Soto MD;  Location: [facility] OR     C DEXA INTERPRETATION, AXIAL  01    wnl. 2005 wnl but lower     COLONOSCOPY  07    Repeat in 1 year for surveillance     COLONOSCOPY  12/10/08    repeat 1 year  -see 2009 letter     COLONOSCOPY  03/31/10     COLONOSCOPY  10/31/2011    Procedure:COMBINED COLONOSCOPY, SINGLE BIOPSY/POLYPECTOMY BY BIOPSY; colonoscopy with polypectomy by biopsy; Surgeon:JESSICA GARCIA; Location: GI     COLONOSCOPY  2013    Procedure: COMBINED COLONOSCOPY, SINGLE BIOPSY/POLYPECTOMY BY BIOPSY;  Colonoscopy, Polypectomies;  Surgeon: Herbert Salinas MD;  Location:   GI     COLONOSCOPY N/A 12/8/2015    Procedure: COLONOSCOPY;  Surgeon: Jared Carbajal MD;  Location:  GI     COLONOSCOPY N/A 4/26/2017    Procedure: COMBINED COLONOSCOPY, SINGLE OR MULTIPLE BIOPSY/POLYPECTOMY BY BIOPSY;  Colonoscopy with polypectomies with forceps and snare;  Surgeon: Herbert Salinas MD;  Location:  GI     ESOPHAGOSCOPY, GASTROSCOPY, DUODENOSCOPY (EGD), COMBINED N/A 12/8/2015    Procedure: COMBINED ESOPHAGOSCOPY, GASTROSCOPY, DUODENOSCOPY (EGD), BIOPSY SINGLE OR MULTIPLE;  Surgeon: Jared Carbajal MD;  Location:  GI     HC BIOPSY BREAST, PERC NEEDLE CORE, WITH IMAGING  8/17/2004    Right     HC COLONOSCOPY THRU STOMA W BIOPSY/CAUTERY TUMOR/POLYP/LESION  1999,2002 2004 polyp - hyperplastic - repeat 5 years ?     HC COLONOSCOPY W/WO BRUSH/WASH  12/12/2005    Polypectomy.  Diverticulosis-minimal. Bx adenomatous and mucosal polyps - repeat 1 year     HC ECP WITH CATARACT SURGERY Bilateral 2015, 2016     HC LAPAROSCOPY, SURGICAL; APPENDECTOMY  10/31/2004     HC LAPAROSCOPY, SURGICAL; CHOLECYSTECTOMY  2000    Cholecystectomy, Laparoscopic     HC REVISE MEDIAN N/CARPAL TUNNEL SURG  10/01/10    left     HC [facility] ENDOSCOPY, SIMPLE EXAM  03/31/10     HYSTERECTOMY, ELVIN  12/14/09    TAHBSO, MMK     REPAIR ANEURYSM ASCENDING AORTA N/A 6/5/2017    Procedure: REPAIR ANEURYSM ASCENDING AORTA;  Median Sternotomy, Ascending Aortic Aneurysm Repair, Coronary Artery Bypass Graft x1 on pump oxygenator;  Surgeon: Akshat Soto MD;  Location: [facility] OR     RESECTION DUODENAL N/A 7/3/2019    Procedure: Resection of Distal Duodenal and proximal Jejunum;  Surgeon: Joaquin Brito MD;  Location: [facility] OR          Anesthesia Evaluation     . Pt has had prior anesthetic. Type: General and MAC    No history of anesthetic complications          ROS/MED HX    ENT/Pulmonary:     (+)JARED risk factors hypertension, , . .   (-) tobacco use   Neurologic:     (+)neuropathy - left foot r/t sciatica  nerve. ,     Cardiovascular: Comment: Denies chest pain, SOB, palpitations, syncope, JAY, orthopnea, or PND    Ascending aortic aneurysm s/p repaired 6/5/2017.  CABG with LIMA to LAD done at the same time. Post-op atrial fibrillation treated with amiodarone and warfarin that eventually resolved.      (+) Dyslipidemia, hypertension--CAD, -CABG-date: single vessel LIMA-LAD, . Taking blood thinners : . . . :. dysrhythmias (Post CABG that resolved. ) a-fib, . Previous cardiac testing Echodate:5/25/17results:The Ejection Fraction is estimated at 60-65%.date: results:ECG reviewed date:6/24/19 results:Sinus bradycardia 51  Possible Left atrial enlargement  When compared with ECG of 13-JUN-2017 01:41,  Vent. 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  Flight of stairs at home that she goes up and down routinely without issues.  >4 METS   Hematologic:     (+) Anemia (Hb=9.6), History of Transfusion (During open heart surgery 2017) no previous transfusion reaction -      Musculoskeletal:   (+) arthritis (osteoarthrits- hands. ),  -       GI/Hepatic:     (+) GERD (Takes TUMS as needed.  has been in remission for past 2-3 months. ) Asymptomatic on medication, appendicitis (s/p appendectomy), cholecystitis/cholelithiasis (s/p cholecystectomy. ),       Renal/Genitourinary:  - ROS Renal section negative       Endo:  - neg endo ROS       Psychiatric:  - neg psychiatric ROS       Infectious Disease:  - neg infectious disease ROS       Malignancy:   (+) Malignancy History of Other  Other CA Adenoma of duodenum Active status post         Other: Comment: S/P ELVIN, BSO and MMK . 12/2009   (+) No chance of pregnancy C-spine cleared: Yes, no H/O Chronic Pain,no other significant disability                        PHYSICAL EXAM:   Mental Status/Neuro: A/A/O   Airway:  "Facies: Feasible  Mallampati: II  Mouth/Opening: Full  TM distance: < 6 cm  Neck ROM: Full   Respiratory: Auscultation: CTAB     Resp. Rate: Normal     Resp. Effort: Normal      CV: Rhythm: Regular  Rate: Age appropriate  Heart: Normal Sounds   Comments: NG in situ-pt states it is VERY irritating and she has to hold onto it to keep it from putting pressure on her sinus cavity.     Dental: Dentures  Dentures: Upper; Lower                Lab Results   Component Value Date    WBC 10.6 07/31/2019    HGB 9.6 (L) 07/31/2019    HCT 32.1 (L) 07/31/2019     07/31/2019     07/31/2019    POTASSIUM 4.3 07/31/2019    CHLORIDE 104 07/31/2019    CO2 26 07/31/2019    BUN 28 07/31/2019    CR 0.80 07/31/2019     (H) 07/31/2019    TARIQ 8.7 07/31/2019    PHOS 3.5 07/31/2019    MAG 2.1 07/31/2019    ALBUMIN 2.6 (L) 07/29/2019    PROTTOTAL 7.3 07/29/2019    ALT 56 (H) 07/29/2019    AST 35 07/29/2019    ALKPHOS 283 (H) 07/29/2019    BILITOTAL 0.8 07/29/2019    LIPASE 411 (H) 07/14/2019    AMYLASE 30 07/06/2019    PTT 33 06/12/2017    INR 1.07 07/29/2019    FIBR 269 06/06/2017    TSH 4.97 (H) 06/13/2017       Preop Vitals  BP Readings from Last 3 Encounters:   07/31/19 126/73   07/14/19 162/89   07/09/19 149/73    Pulse Readings from Last 3 Encounters:   07/30/19 87   07/14/19 83   07/04/19 72      Resp Readings from Last 3 Encounters:   07/31/19 16   07/14/19 18   07/09/19 16    SpO2 Readings from Last 3 Encounters:   07/31/19 97%   07/14/19 98%   07/09/19 94%      Temp Readings from Last 1 Encounters:   07/31/19 36.1  C (96.9  F) (Oral)    Ht Readings from Last 1 Encounters:   07/15/19 1.702 m (5' 7\")      Wt Readings from Last 1 Encounters:   07/30/19 76.7 kg (169 lb 3.2 oz)    Estimated body mass index is 26.5 kg/m  as calculated from the following:    Height as of 7/15/19: 1.702 m (5' 7\").    Weight as of 7/30/19: 76.7 kg (169 lb 3.2 oz).     LDA:  PICC Double Lumen 07/18/19 Right Basilic (Active)   Site Assessment " WDL 7/31/2019 12:30 AM   External Cath Length (cm) 2 cm 7/23/2019 10:00 AM   Extremity Circumference (cm) 30.5 cm 7/18/2019  6:00 PM   Dressing Intervention Transparent 7/30/2019  4:15 PM   Dressing Change Due 08/06/19 7/30/2019  1:00 PM   PICC Comment statlock 7/28/2019  4:00 PM   Lumen A - Color GRAY 7/31/2019 12:30 AM   Lumen A - Status infusing 7/31/2019 12:30 AM   Lumen A - Cap Change Due 07/30/19 7/31/2019 12:30 AM   Lumen B - Color PURPLE 7/31/2019 12:30 AM   Lumen B - Status infusing 7/31/2019 12:30 AM   Lumen B - Cap Change Due 07/30/19 7/31/2019 12:30 AM   Extravasation? No 7/31/2019 12:30 AM   Line Necessity Yes, meets criteria 7/29/2019  4:00 PM   Number of days: 13       NG/OG Tube Nasogastric Right nostril (Active)   Site Description WDL 7/31/2019 12:30 AM   Status Low continuous suction 7/31/2019 12:30 AM   Drainage Appearance WDL;Green 7/31/2019 12:30 AM   Placement Check [place] unchanged 7/31/2019 12:30 AM   Intake (ml) 120 ml 7/30/2019  9:43 PM   Flush/Free Water (mL) 30 mL 7/28/2019  9:33 PM   Residual (mL) 100 mL 7/28/2019  9:33 PM   Container Amount 825 mL 7/30/2019  9:43 PM   Output (ml) 500 ml 7/31/2019  6:59 AM   Number of days: 15            Assessment: Elective due to   ASA SCORE: 3    H&P: History and physical reviewed and following examination; no interval change.        - H&P complete; Preop Testing complete; Consents complete  Smoking Status:  NO Active use of Tobacco/UNKNOWN Tobacco use status   NPO Status: NPO Appropriate     Plan:   Anes. Type:  General   Pre-Medication: None   Induction:  IV (RSI)   Airway: ETT; Oral   Access/Monitoring: PIV   Maintenance: Balanced     Postop Plan:   Postop Pain: Opioids  Postop Sedation/Airway: Not planned  Disposition: Inpatient/Admit     PONV Management:   Adult Risk Factors: Female, Non-Smoker, Postop Opioids   Prevention: Ondansetron, Dexamethasone     CONSENT: Direct conversation   Plan and risks discussed with: Patient   Blood Products:  Consent Deferred (Minimal Blood Loss)       Comments for Plan/Consent:  Replace NG tube on opposite side while pt is asleep

## 2019-07-31 NOTE — PLAN OF CARE
"/63 (BP Location: Left arm)   Pulse 86   Temp 96.1  F (35.6  C) (Oral)   Resp 16   Ht 1.702 m (5' 7\")   Wt 76.7 kg (169 lb 3.2 oz)   SpO2 96%   BMI 26.50 kg/m      VSS. Afebrile. LS clear, on room air. Denies nausea. Pain present in sinuses related to her Ng, managing with PRN Tylenol. Strict I/Os. HOB remains elevated at 30-45 degrees. NG output 325, replaced with scheduled bolus. Patient is upadlib. Able to make needs known. TPN/Lipids infusing. Will continue with POC.   "

## 2019-07-31 NOTE — PROGRESS NOTES
Subjective:  - BRIANEN overnight.  - No N/V  - No flatus or bowel movement     Objective:  Temp:  [95.8  F (35.4  C)-97.8  F (36.6  C)] 97.4  F (36.3  C)  Heart Rate:  [] 100  Resp: 16  BP: ()/(60-66) 114/64  SpO2:  [95 %-99 %] 95 % on RA     I/O  In: 3.4L   Out: 1.9L [Urine 1.4 L,  mL]      Gen: Awake, alert, NAD  Resp: NLB on RA  Abd: soft, non distended, non tender. NG in place  Ext: WWP, no gross edema     Labs:  WBC: 13.6 (13.5 7/30)  Hgb: 9.6 ( 9.4 7/30)  Cr: .80 (.77 7/30)     Imaging:  None new     Assessment/Plan:   74 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     Plan:   - EGD today, possible aspiration of fluid collection  - Continue NG suction until bowel function returns  - Continue NPO, IVF, and replace NGT output 1:1  - Continue TPN via PICC line  - Continue symptomatic care     Seen, examined, and discussed with chief resident, who will discuss with staff.  - - - - - - - - - - - - - - - - - -  Karin Rico, PGY-1  General Surgery  [phone]

## 2019-07-31 NOTE — ANESTHESIA POSTPROCEDURE EVALUATION
Anesthesia POST Procedure Evaluation    Patient: Marilia Bean   MRN:     [id number] Gender:   female   Age:    74 year old :      1945        Preoperative Diagnosis: Bowel Obstruction   Procedure(s):  Endoscopic ultrasound with cystoduodenostomy, stent placement x2, stent dilation, and nasojejunal tube placement   Postop Comments: No value filed.       Anesthesia Type:  Not documented  No value filed.    Reportable Event: NO     PAIN: Uncomplicated   Sign Out status: Comfortable, Well controlled pain     PONV: No PONV   Sign Out status:  No Nausea or Vomiting     Neuro/Psych: Uneventful perioperative course   Sign Out Status: Preoperative baseline; Age appropriate mentation     Airway/Resp.: Uneventful perioperative course   Sign Out Status: Non labored breathing, age appropriate RR; Resp. Status within EXPECTED Parameters     CV: Uneventful perioperative course   Sign Out status: Appropriate BP and perfusion indices; Appropriate HR/Rhythm     Disposition:   Sign Out in:  PACU  Disposition:  Phase II; Home  Recovery Course: Uneventful  Follow-Up: Not required           Last Anesthesia Record Vitals:  CRNA VITALS  2019 1424 - 2019 1507      2019             NIBP:  146/76    Pulse:  70    SpO2:  100 %          Last PACU Vitals:  Vitals Value Taken Time   /76 2019  3:00 PM   Temp     Pulse 73 2019  2:58 PM   Resp     SpO2 99 % 2019  3:05 PM   Temp src     NIBP 146/76 2019  3:00 PM   Pulse 70 2019  3:00 PM   SpO2 100 % 2019  3:00 PM   Resp     Temp     Ht Rate     Temp 2     Vitals shown include unvalidated device data.      Electronically Signed By: Constanza Conway MD, 2019, 3:07 PM

## 2019-07-31 NOTE — BRIEF OP NOTE
[facility] Brief Operative Note    Pre-operative diagnosis: Bowel Obstruction   Post-operative diagnosis Perianastomotic hematoma   Procedure: Procedure(s):  Endoscopic ultrasound with cystoduodenostomy, stent placement x2, stent dilation, and nasojejunal tube placement   Surgeon: BENNY SALCIDO MD   Assistants(s): Conner Fountain MD   Estimated blood loss: None    Specimens: None                Findings:   - Uncomplicated removal (without replacement at this                        time) of the NG tube                        - Perianastomotic hematoma now accesssed by creation of                        a cystoduodenostomy secured by a 10mm Axios postdilated                        to 8mm with stent in stent Zimmon; drianage was minimal                        as the collection was mostly clot                        - Anastomotic compression (by the hematoma) and                        inflammatory stenosis (healthy mucosa) managed by the                        drainage as well as placement of an NJ tube   Recommendation:      - General anesthesia recovery with return to the floor                        when appropriate                        - NJ may be used immediately per nutrition                        recommendations with synchronous down cycle of TPN; we                        would limit oral intake to ice chips for now to ensure                        competency of drainage along NJ                        - Repeat upper endsocopy in two weeks at which time                        cavity will be cleaned endoscopically                        - Avoid antithrombotics for at least three days                        - The findings and recommendations were discussed with                        the patient and their family       Conner Fountain MD  Interventional Endoscopy Fellow

## 2019-08-01 ENCOUNTER — APPOINTMENT (OUTPATIENT)
Dept: GENERAL RADIOLOGY | Facility: CLINIC | Age: 74
DRG: 327 | End: 2019-08-01
Attending: SURGERY
Payer: MEDICARE

## 2019-08-01 LAB
ANION GAP SERPL CALCULATED.3IONS-SCNC: 8 MMOL/L (ref 3–14)
BUN SERPL-MCNC: 28 MG/DL (ref 7–30)
CALCIUM SERPL-MCNC: 9 MG/DL (ref 8.5–10.1)
CHLORIDE SERPL-SCNC: 102 MMOL/L (ref 94–109)
CO2 SERPL-SCNC: 24 MMOL/L (ref 20–32)
CREAT SERPL-MCNC: 0.77 MG/DL (ref 0.52–1.04)
ERYTHROCYTE [DISTWIDTH] IN BLOOD BY AUTOMATED COUNT: 13.6 % (ref 10–15)
GFR SERPL CREATININE-BSD FRML MDRD: 76 ML/MIN/{1.73_M2}
GLUCOSE BLDC GLUCOMTR-MCNC: 132 MG/DL (ref 70–99)
GLUCOSE BLDC GLUCOMTR-MCNC: 132 MG/DL (ref 70–99)
GLUCOSE BLDC GLUCOMTR-MCNC: 150 MG/DL (ref 70–99)
GLUCOSE BLDC GLUCOMTR-MCNC: 152 MG/DL (ref 70–99)
GLUCOSE BLDC GLUCOMTR-MCNC: 156 MG/DL (ref 70–99)
GLUCOSE BLDC GLUCOMTR-MCNC: 157 MG/DL (ref 70–99)
GLUCOSE SERPL-MCNC: 150 MG/DL (ref 70–99)
HCT VFR BLD AUTO: 33 % (ref 35–47)
HGB BLD-MCNC: 9.9 G/DL (ref 11.7–15.7)
MAGNESIUM SERPL-MCNC: 1.8 MG/DL (ref 1.6–2.3)
MCH RBC QN AUTO: 27.4 PG (ref 26.5–33)
MCHC RBC AUTO-ENTMCNC: 30 G/DL (ref 31.5–36.5)
MCV RBC AUTO: 91 FL (ref 78–100)
PHOSPHATE SERPL-MCNC: 3.4 MG/DL (ref 2.5–4.5)
PLATELET # BLD AUTO: 442 10E9/L (ref 150–450)
POTASSIUM SERPL-SCNC: 4.1 MMOL/L (ref 3.4–5.3)
RBC # BLD AUTO: 3.61 10E12/L (ref 3.8–5.2)
SODIUM SERPL-SCNC: 134 MMOL/L (ref 133–144)
WBC # BLD AUTO: 14.3 10E9/L (ref 4–11)

## 2019-08-01 PROCEDURE — 84100 ASSAY OF PHOSPHORUS: CPT | Performed by: STUDENT IN AN ORGANIZED HEALTH CARE EDUCATION/TRAINING PROGRAM

## 2019-08-01 PROCEDURE — C9113 INJ PANTOPRAZOLE SODIUM, VIA: HCPCS | Performed by: STUDENT IN AN ORGANIZED HEALTH CARE EDUCATION/TRAINING PROGRAM

## 2019-08-01 PROCEDURE — 00000146 ZZHCL STATISTIC GLUCOSE BY METER IP

## 2019-08-01 PROCEDURE — 25000125 ZZHC RX 250: Performed by: SURGERY

## 2019-08-01 PROCEDURE — 27210429 ZZH NUTRITION PRODUCT INTERMEDIATE LITER

## 2019-08-01 PROCEDURE — 25800030 ZZH RX IP 258 OP 636: Performed by: STUDENT IN AN ORGANIZED HEALTH CARE EDUCATION/TRAINING PROGRAM

## 2019-08-01 PROCEDURE — 83735 ASSAY OF MAGNESIUM: CPT | Performed by: STUDENT IN AN ORGANIZED HEALTH CARE EDUCATION/TRAINING PROGRAM

## 2019-08-01 PROCEDURE — 25000128 H RX IP 250 OP 636: Performed by: STUDENT IN AN ORGANIZED HEALTH CARE EDUCATION/TRAINING PROGRAM

## 2019-08-01 PROCEDURE — 80048 BASIC METABOLIC PNL TOTAL CA: CPT | Performed by: STUDENT IN AN ORGANIZED HEALTH CARE EDUCATION/TRAINING PROGRAM

## 2019-08-01 PROCEDURE — 40000986 XR ABDOMEN PORT 1 VW

## 2019-08-01 PROCEDURE — 25000132 ZZH RX MED GY IP 250 OP 250 PS 637: Performed by: STUDENT IN AN ORGANIZED HEALTH CARE EDUCATION/TRAINING PROGRAM

## 2019-08-01 PROCEDURE — 12000001 ZZH R&B MED SURG/OB UMMC

## 2019-08-01 PROCEDURE — 36592 COLLECT BLOOD FROM PICC: CPT | Performed by: STUDENT IN AN ORGANIZED HEALTH CARE EDUCATION/TRAINING PROGRAM

## 2019-08-01 PROCEDURE — 85027 COMPLETE CBC AUTOMATED: CPT | Performed by: STUDENT IN AN ORGANIZED HEALTH CARE EDUCATION/TRAINING PROGRAM

## 2019-08-01 RX ADMIN — INSULIN ASPART 1 UNITS: 100 INJECTION, SOLUTION INTRAVENOUS; SUBCUTANEOUS at 08:28

## 2019-08-01 RX ADMIN — INSULIN ASPART 1 UNITS: 100 INJECTION, SOLUTION INTRAVENOUS; SUBCUTANEOUS at 12:36

## 2019-08-01 RX ADMIN — SODIUM CHLORIDE, POTASSIUM CHLORIDE, SODIUM LACTATE AND CALCIUM CHLORIDE 350 ML: 600; 310; 30; 20 INJECTION, SOLUTION INTRAVENOUS at 22:38

## 2019-08-01 RX ADMIN — POTASSIUM CHLORIDE: 2 INJECTION, SOLUTION, CONCENTRATE INTRAVENOUS at 20:38

## 2019-08-01 RX ADMIN — METOPROLOL TARTRATE 25 MG: 25 TABLET ORAL at 09:25

## 2019-08-01 RX ADMIN — INSULIN ASPART 1 UNITS: 100 INJECTION, SOLUTION INTRAVENOUS; SUBCUTANEOUS at 05:00

## 2019-08-01 RX ADMIN — PANTOPRAZOLE SODIUM 40 MG: 40 INJECTION, POWDER, FOR SOLUTION INTRAVENOUS at 20:38

## 2019-08-01 RX ADMIN — SODIUM CHLORIDE, POTASSIUM CHLORIDE, SODIUM LACTATE AND CALCIUM CHLORIDE 1000 ML: 600; 310; 30; 20 INJECTION, SOLUTION INTRAVENOUS at 10:36

## 2019-08-01 RX ADMIN — I.V. FAT EMULSION 250 ML: 20 EMULSION INTRAVENOUS at 20:39

## 2019-08-01 RX ADMIN — ACETAMINOPHEN 650 MG: 325 TABLET, FILM COATED ORAL at 23:29

## 2019-08-01 RX ADMIN — ONDANSETRON 4 MG: 2 INJECTION INTRAMUSCULAR; INTRAVENOUS at 06:10

## 2019-08-01 RX ADMIN — PROCHLORPERAZINE EDISYLATE 5 MG: 5 INJECTION INTRAMUSCULAR; INTRAVENOUS at 08:12

## 2019-08-01 RX ADMIN — METOPROLOL TARTRATE 25 MG: 25 TABLET ORAL at 20:38

## 2019-08-01 RX ADMIN — SODIUM CHLORIDE, POTASSIUM CHLORIDE, SODIUM LACTATE AND CALCIUM CHLORIDE 800 ML: 600; 310; 30; 20 INJECTION, SOLUTION INTRAVENOUS at 16:04

## 2019-08-01 RX ADMIN — INSULIN ASPART 1 UNITS: 100 INJECTION, SOLUTION INTRAVENOUS; SUBCUTANEOUS at 00:22

## 2019-08-01 RX ADMIN — LOSARTAN POTASSIUM 25 MG: 25 TABLET ORAL at 09:25

## 2019-08-01 RX ADMIN — PANTOPRAZOLE SODIUM 40 MG: 40 INJECTION, POWDER, FOR SOLUTION INTRAVENOUS at 09:25

## 2019-08-01 ASSESSMENT — ACTIVITIES OF DAILY LIVING (ADL)
ADLS_ACUITY_SCORE: 13

## 2019-08-01 ASSESSMENT — MIFFLIN-ST. JEOR: SCORE: 1299.66

## 2019-08-01 NOTE — PLAN OF CARE
2182-1252: AVSS on RA. Denies pain. Pt vomited early in shift, compazine given x1. NG tube replaced, verified with x-ray and immediate 700 ml output when placed to LIS. 1 L LR bolus given, plus additional 800 ml LR bolus for 1:1 replacement of NG output. TPN continues via PICC. TF initiated via NJ at 1315 at 10 ml/hr, tolerating well. TF to increase to 20 ml/hr at 0115. Goal rate is 50 ml/hr. , 156, 132; corrected per sliding scale orders. Voiding adequately. Still no BM, pt reports passing very minimal flatus; bowel sounds not audible/hypoactive. Up independently, continue to encourage ambulation. Continue with POC.

## 2019-08-01 NOTE — PROGRESS NOTES
CLINICAL NUTRITION SERVICES - REASSESSMENT NOTE     Nutrition Prescription    RECOMMENDATIONS FOR MDs/PROVIDERS TO ORDER:  Recommend to continue giving pt MIVF's with transition to TF since H20 flushes are only for tube patency during adv to goal rate.    Malnutrition Status:    Non-severe malnutrition in the context of acute illness      Recommendations already ordered by Registered Dietitian (RD):  - Continue PN as ordered  - Order lab add on to check Mg and PO4  - Obtain current wt   - Enter orders to initiate TF with Nutren 1.5 @ 10 ml/hr x 12 hrs and if tolerated, then adv rate by 10 ml every 8 hrs to goal of 50 ml/hr. Regimen to provide 1200 mL, 1800 kcals (28 kcal/kg/day), 82 g PRO (1.3 g/kg/day), 912 mL H2O, 211 g CHO and no fiber daily.  - Order daily multivitamin/mineral (Certavite 15 ml/day) via TF to help ensure micronutrient needs are met due potential for interruptions to infusion, once pt is off PN  -  H2O flushes of 30 ml every 4 hrs.    Future/Additional Recommendations:  - If pt able to tolerate initiation of TF x 12 hrs without GI intolerance, recommend discontinuing lipids and then reduce PN vol in half and let bag run and then discontinue PN therapy.      EVALUATION OF THE PROGRESS TOWARD GOALS   Diet: Remains NPO for oral intakes    Nutrition Support: Pt remains dependent on PN support d/t slow return of bowel function with following regimen; goal vol of 1440 ml/day with 240 g Dex, 85 AA and 250 ml of 20% IV lipids daily which provides 1656 kcals (25 kcals/kg),  1.3 g PRO/kg/day, GIR 2.6 with 30% kcals from Fat.        Intake: Ave PN intakes received x 6 days (no PN recorded on 7/28 per review of I&O's) = 1261 ml which 1450 kcals (22 kcals/kg) and 74 g PRO (1 g/kg)      NEW FINDINGS   MD consult received for TF per MNT  - NJT tube placed yesterday during OR procedure and NGT removed.  - Plan is to replace NGT today d/t episodes of vomiting this am per RN report .    Weight: 76.7 kg (7/30), 77.1  kg (7/25), 79.6 kg (7/15 - admit); Wt continues to trend downward since admit, suspect d/t fluids losses with NGT output, however, no new wt has been obtained x past 2 days to reassess. Would expect wt to increase secondary to pt receiving IV bolus flush today.    Labs:  K+ WNL, but no Mg or PO4 obtained today    GI:  - No BM yet this admission.     MALNUTRITION (Limited d/t visitors)   % Intake: Decreased intake does not meet criteria, but falls short of meeting RD's goal for PN therapy  % Weight Loss: Unable to reassess d/t no new wt x 2 days  Subcutaneous Fat Loss: Facial region: Mild  Muscle Loss: Temporal: Mild  Fluid Accumulation/Edema: None noted  Malnutrition Diagnosis: Non-severe malnutrition in the context of acute illness    Previous Goals   1. Total avg nutritional intake to meet a minimum of 25 kcal/kg and 1.2 g PRO/kg daily (per dosing wt 65 kg).    Evaluation: Not met    2. Wt not to decline < 77 kg    Evaluation: Unable to evaluate d/t no current wt x 2 days    Previous Nutrition Diagnosis  Inadequate protein-energy intake related to dependent on PN, d/t inability to consume oral intakes, but goal PN infusion vol not consistently met as evidenced by e PN intakes received x past 7 days meeting only 16 kcals/kg and 0.8 g PRO/kg and wt loss of > 3% loss over past week.     Evaluation: Improving    CURRENT NUTRITION DIAGNOSIS  Inadequate protein-energy intake related to goal PN infusion vol not met and now transitioning to TF therapy, but not yet initiated as evidenced by Ave PN intakes received x 6 days meeting only 22 kcals/kg and 1 g PRO/kg and no TF intakes received at this time.      INTERVENTIONS  Implementation  Collaboration with Pharm D regarding tentative plan to wean PN therapy off tomorrow providers  Collaborated with Surgery and GI regarding transitioning to enteral feedings via NJT  Met with pt briefly (d/t arrival of visitors) to discuss plan to transitioning off PN and starting on  TF.  Enteral Nutrition - Initiate  Feeding tube flush    Goals  Total avg nutritional intake to meet a minimum of 25 kcal/kg and 1.2 g PRO/kg daily (per dosing wt 65 kg).    Monitoring/Evaluation  Progress toward goals will be monitored and evaluated per protocol.    Gay Poole RD,LD  Unit pager [phone]

## 2019-08-01 NOTE — PROGRESS NOTES
Subjective:  - BRIANNE overnight.  - No N/V  - Passed gas, no BM since procedure  - Ambulating in halls     Objective:  Temp:  [96.5  F (35.8  C)-98.7  F (37.1  C)] 96.5  F (35.8  C)  Pulse:  [87] 87  Heart Rate:  [] 84  Resp:  [14-20] 16  BP: (110-154)/(62-80) 128/71  SpO2:  [93 %-100 %] 97 %    I/O last 3 completed shifts:  In: 2341.73 [I.V.:568.33; IV Piggyback:500]  Out: 2430 [Urine:1925; Emesis/NG output:500; Blood:5]      Gen: Awake, alert, NAD  Resp: NLB on RA  Abd: soft, non tender, non distended  Ext: WWP, no gross edema    Labs:  Recent Labs   Lab 07/31/19  0438 07/30/19  0621 07/29/19  0526   WBC 10.6 13.5* 9.0   HGB 9.6* 9.4* 9.6*    458* 422       Recent Labs   Lab 07/31/19  0438 07/30/19  0621 07/29/19  0526 07/28/19  0541    138 135 135   POTASSIUM 4.3 4.6 4.3 4.2   CHLORIDE 104 104 103 103   CO2 26 26 25 22   BUN 28 27 22 25   CR 0.80 0.77 0.76 0.71   * 115* 124* 132*   TARIQ 8.7 9.3 8.9 8.7   MAG 2.1  --  2.4* 2.3   PHOS 3.5  --  3.8 3.5       Imaging:  None new     Assessment/Plan:   74 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    POD1 s/p endoscopic ultrasound with cystoduodenostomy stent placement  x 2, stent dilatation and NJ tube placement. NG tube removed and NJ placed.     Plan:   - Start tube feeds today at 20 ml/hr through NJ  - Ok to advance 10 ml q6 to goal  - Once at goal can stop TPN  - Continue NPO, replace lytes as needed     Seen, examined, and discussed with chief resident, who will discuss with staff.  - - - - - - - - - - - - - - - - - -  Kavin Hill MD  PGY-1, General Surgery

## 2019-08-01 NOTE — PLAN OF CARE
1530 to 1930:Pt. had endoscopic ultrasound with cystoduodenostomy stent placement  x 2, stent dilatation and NJ tube placemat.NG tube removed.pt is alert and oriented x 4 denies pain and nausea ,up with SBA had l adequate urinary output,passing gas ,no BM.LS clear ,BS hypoactive.VSS.TPN infusing 60 ml/hr PLAN: to transfer to tube feeding tomorrow and possible discharge home tomorrow.

## 2019-08-01 NOTE — PLAN OF CARE
1900 - 0700  VS stable, afebrile. Pt c/o abdominal discomfort/fullness throughout shift, c/o nausea - zofran 4mg given x2. Pt denied passing gas/BM. Pt ambulated couple times through shift. Refused capno, monitored closely through night. BS checks q4, corrected as appropriate. TPN and lipids infusing as ordered. Pt slept for most of night. No significant events, continue POC.

## 2019-08-01 NOTE — PHARMACY-CONSULT NOTE
Pharmacy Tube Feeding Consult    Medication reviewed for administration by feeding tube and for potential food/drug interactions.    Recommendation: Recommend discontinuing sucralfate as this interacts with enteral tube feeding and patient has not used in a week.    Pharmacy will continue to follow as new medications are ordered.    Elena Hall, Pharm D  August 1, 2019

## 2019-08-01 NOTE — PROGRESS NOTES
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 GI has been consulted for possible endoscopic treatment.    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 Biopsies with no evidence of residual adenoma and/or adenocarcinoma. CT A/P from 7/29 with IV and oral contrast without anastomotic leak, possible anastomotic stricture. S/p EGD/EUS 7/31 with findings of external compression of the anastomoses by og-anastomotic hematoma (healthy mucosa internally), which was drained with 10mm Axios stent. NG removed and NJ placed to facilitate nutrition with the hope that GOO resolves with drainage of hematoma. Patient with nausea and vomiting this morning so NG was also replaced.      # Personal history of colon polyps:  Last surveillance colonoscopy 4/30/2019 notable for 3 right colon polyps, completely resected, negative for adenocarcinoma.      Recommendations  - Advance tube feeds as tolerated (dietitian following)  - Wean TPN as able  - NG to LIS once position confirmed on AXR  - Consider PEG-J placement in coming days if no improvement in GOO sx ([facility] team happy to place)  - Discussed with surgical team  - GI will continue to follow with you    The patient was discussed and plan agreed upon with GI staff, Dr Mann.      Janae Ponce PA-C   [facility]  "Endoscopy/Pancreaticobiliary [facility]  [facility]  Pager *[phone]  _______________________________________________________________      Subjective: Nursing notes and 24hr events reviewed. Patient seen and examined at 1145. Patient reports nausea and vomiting this morning. NG replaced. TF not started yet. Denies abdominal pain    ROS:   4 pt ROS negative unless noted in subjective.     Objective:  Blood pressure 139/65, pulse 87, temperature 97.2  F (36.2  C), temperature source Oral, resp. rate 16, height 1.702 m (5' 7\"), weight 76.7 kg (169 lb 3.2 oz), SpO2 94 %.  Gen: Sitting in bed. Appears comfortable  HEENT: NCAT. Conjunctiva clear. Sclera anicteric NG/NJ  Msk: no gross deformity  Skin:  no jaundice  Ext: warm, well perfused   Neuro: grossly normal  Mental status/Psych: A&O. Asks/answers questions appropriately       Date 07/30/19 0700 - 07/31/19 0659   Shift 3028-9179 9762-7192 8919-3024 24 Hour Total   INTAKE   P.O. 50   50   I.V. 20   20   Shift Total(mL/kg) 70(0.91)   70(0.91)   OUTPUT   Urine 150   150   Shift Total(mL/kg) 150(1.95)   150(1.95)   Weight (kg) 76.75 76.75 76.75 76.75         LABS:  BMP  Recent Labs   Lab 08/01/19  0503 07/31/19 0438 07/30/19  0621 07/29/19  0526    137 138 135   POTASSIUM 4.1 4.3 4.6 4.3   CHLORIDE 102 104 104 103   TARIQ 9.0 8.7 9.3 8.9   CO2 24 26 26 25   BUN 28 28 27 22   CR 0.77 0.80 0.77 0.76   * 113* 115* 124*     CBC  Recent Labs   Lab 08/01/19  0503 07/31/19  0438 07/30/19  0621 07/29/19  0526   WBC 14.3* 10.6 13.5* 9.0   RBC 3.61* 3.46* 3.45* 3.48*   HGB 9.9* 9.6* 9.4* 9.6*   HCT 33.0* 32.1* 30.8* 31.8*   MCV 91 93 89 91   MCH 27.4 27.7 27.2 27.6   MCHC 30.0* 29.9* 30.5* 30.2*   RDW 13.6 13.8 13.5 13.6    431 458* 422     INR  Recent Labs   Lab 07/29/19  0526   INR 1.07     LFTs  Recent Labs   Lab 07/29/19  0526   ALKPHOS 283*   AST 35   ALT 56*   BILITOTAL 0.8   PROTTOTAL 7.3   ALBUMIN 2.6*      PANCNo lab results found in " last 7 days.      IMAGING:  EXAMINATION: CT ABDOMEN PELVIS W CONTRAST, 7/29/2019 6:34 PM     TECHNIQUE:  Helical CT images from the lung bases through the  symphysis pubis were obtained with oral and IV contrast.  Coronal and  sagittal reformatted images were generated at a workstation for  further assessment.     CONTRAST:  104 ml Isovue 370.     COMPARISON: CT abdomen 7/22/2019 and CT abdomen 7/14/2019     HISTORY: Nausea, vomiting; Continued output from NG tube.  Please  evaluate extrinsic/intrinsic causes from anastomotic site.     FINDINGS:     Lines and tubes: Enteric tube with tip and sidehole in the stomach     Lung bases: No consolidation or pleural effusion. Mild subsegmental  bibasilar atelectasis. Unchanged nodular density in the left lower  lobe, measuring 1 x 1 cm.     Abdomen and pelvis:     Mildly distended stomach. Postoperative changes from duodenojejunal  anastomosis. Redemonstration of multiloculated ill marginated  collection adjacent to the operative site with some rim enhancement.  There is a subcapsular component to the collection along the posterior  segment 6 of the liver, measuring 10 x 3.5 cm with extension medial to  ascending colon and a loculated component along the lateral conal  fascia, measuring 12 x 24 mm (series 3, image 192); There is no  extravasation of oral contrast into the multiloculated collection and  no abnormal dilatation of the bowel loops. Mild colonic diverticulosis  with no diverticulitis.     Liver: No suspicious liver lesions. Portal veins appear patent  Gallbladder: Surgically absent  Spleen: Normal size.  Pancreas: No suspicious pancreatic lesions. The pancreatic duct is not  dilated.  Adrenal glands: No adrenal nodules  Kidneys: No hydronephrosis or obstructing renal stones.    Bladder / Pelvic organs: Unremarkable.  Lymph nodes: No retroperitoneal, mesenteric, or pelvic  lymphadenopathy.  Vessels: No infrarenal aortic aneurysm.      Bones and soft tissues: No  aggressive osseous lesion.                                                                      IMPRESSION:      1. Postoperative changes of duodenojejunal anastomosis with  multiloculated collection, with some rim enhancement possibly evolving  hematoma, abscess, and/or biloma or combination thereof adjacent to  the surgical bed, not significantly changed in size compared to CT  dated 7/22/2019, accounting for difference in technique. Hida scan may  be helpful.  2. No extravasation of oral contrast to suggest anastomotic leak.  3. Diverticulosis without diverticulitis.  4. Unchanged pulmonary nodules. Recommend attention on follow-up.

## 2019-08-02 ENCOUNTER — HOSPITAL ENCOUNTER (INPATIENT)
Facility: CLINIC | Age: 74
End: 2019-08-02
Attending: INTERNAL MEDICINE | Admitting: INTERNAL MEDICINE
Payer: MEDICARE

## 2019-08-02 ENCOUNTER — PATIENT OUTREACH (OUTPATIENT)
Dept: GASTROENTEROLOGY | Facility: CLINIC | Age: 74
End: 2019-08-02

## 2019-08-02 DIAGNOSIS — C17.0 DUODENAL ADENOCARCINOMA (H): Primary | ICD-10-CM

## 2019-08-02 LAB
ANION GAP SERPL CALCULATED.3IONS-SCNC: 6 MMOL/L (ref 3–14)
ANION GAP SERPL CALCULATED.3IONS-SCNC: NORMAL MMOL/L (ref 6–17)
BUN SERPL-MCNC: 27 MG/DL (ref 7–30)
BUN SERPL-MCNC: NORMAL MG/DL (ref 7–30)
CALCIUM SERPL-MCNC: 8.6 MG/DL (ref 8.5–10.1)
CALCIUM SERPL-MCNC: NORMAL MG/DL (ref 8.5–10.1)
CHLORIDE SERPL-SCNC: 103 MMOL/L (ref 94–109)
CHLORIDE SERPL-SCNC: NORMAL MMOL/L (ref 94–109)
CO2 SERPL-SCNC: 25 MMOL/L (ref 20–32)
CO2 SERPL-SCNC: NORMAL MMOL/L (ref 20–32)
CREAT SERPL-MCNC: 0.66 MG/DL (ref 0.52–1.04)
CREAT SERPL-MCNC: NORMAL MG/DL (ref 0.52–1.04)
ERYTHROCYTE [DISTWIDTH] IN BLOOD BY AUTOMATED COUNT: 13.9 % (ref 10–15)
ERYTHROCYTE [DISTWIDTH] IN BLOOD BY AUTOMATED COUNT: NORMAL % (ref 10–15)
GFR SERPL CREATININE-BSD FRML MDRD: 87 ML/MIN/{1.73_M2}
GFR SERPL CREATININE-BSD FRML MDRD: NORMAL ML/MIN/{1.73_M2}
GLUCOSE BLDC GLUCOMTR-MCNC: 108 MG/DL (ref 70–99)
GLUCOSE BLDC GLUCOMTR-MCNC: 118 MG/DL (ref 70–99)
GLUCOSE BLDC GLUCOMTR-MCNC: 127 MG/DL (ref 70–99)
GLUCOSE BLDC GLUCOMTR-MCNC: 134 MG/DL (ref 70–99)
GLUCOSE SERPL-MCNC: 129 MG/DL (ref 70–99)
GLUCOSE SERPL-MCNC: NORMAL MG/DL (ref 70–99)
HCT VFR BLD AUTO: 32.3 % (ref 35–47)
HCT VFR BLD AUTO: NORMAL % (ref 35–47)
HGB BLD-MCNC: 9.6 G/DL (ref 11.7–15.7)
HGB BLD-MCNC: NORMAL G/DL (ref 11.7–15.7)
MAGNESIUM SERPL-MCNC: 2 MG/DL (ref 1.6–2.3)
MAGNESIUM SERPL-MCNC: NORMAL MG/DL (ref 1.6–2.3)
MCH RBC QN AUTO: 27.4 PG (ref 26.5–33)
MCH RBC QN AUTO: NORMAL PG (ref 26.5–33)
MCHC RBC AUTO-ENTMCNC: 29.7 G/DL (ref 31.5–36.5)
MCHC RBC AUTO-ENTMCNC: NORMAL G/DL (ref 31.5–36.5)
MCV RBC AUTO: 92 FL (ref 78–100)
MCV RBC AUTO: NORMAL FL (ref 78–100)
PHOSPHATE SERPL-MCNC: 3 MG/DL (ref 2.5–4.5)
PHOSPHATE SERPL-MCNC: NORMAL MG/DL (ref 2.5–4.5)
PLATELET # BLD AUTO: 403 10E9/L (ref 150–450)
PLATELET # BLD AUTO: NORMAL 10E9/L (ref 150–450)
POTASSIUM SERPL-SCNC: 4.3 MMOL/L (ref 3.4–5.3)
POTASSIUM SERPL-SCNC: NORMAL MMOL/L (ref 3.4–5.3)
RBC # BLD AUTO: 3.51 10E12/L (ref 3.8–5.2)
RBC # BLD AUTO: NORMAL 10E12/L (ref 3.8–5.2)
SODIUM SERPL-SCNC: 134 MMOL/L (ref 133–144)
SODIUM SERPL-SCNC: NORMAL MMOL/L (ref 133–144)
WBC # BLD AUTO: 14.3 10E9/L (ref 4–11)
WBC # BLD AUTO: NORMAL 10E9/L (ref 4–11)

## 2019-08-02 PROCEDURE — C9113 INJ PANTOPRAZOLE SODIUM, VIA: HCPCS | Performed by: STUDENT IN AN ORGANIZED HEALTH CARE EDUCATION/TRAINING PROGRAM

## 2019-08-02 PROCEDURE — 80048 BASIC METABOLIC PNL TOTAL CA: CPT | Performed by: SURGERY

## 2019-08-02 PROCEDURE — 25000132 ZZH RX MED GY IP 250 OP 250 PS 637: Performed by: STUDENT IN AN ORGANIZED HEALTH CARE EDUCATION/TRAINING PROGRAM

## 2019-08-02 PROCEDURE — 27210429 ZZH NUTRITION PRODUCT INTERMEDIATE LITER

## 2019-08-02 PROCEDURE — 25000128 H RX IP 250 OP 636: Performed by: STUDENT IN AN ORGANIZED HEALTH CARE EDUCATION/TRAINING PROGRAM

## 2019-08-02 PROCEDURE — 25800030 ZZH RX IP 258 OP 636: Performed by: STUDENT IN AN ORGANIZED HEALTH CARE EDUCATION/TRAINING PROGRAM

## 2019-08-02 PROCEDURE — 00000146 ZZHCL STATISTIC GLUCOSE BY METER IP

## 2019-08-02 PROCEDURE — 36592 COLLECT BLOOD FROM PICC: CPT | Performed by: SURGERY

## 2019-08-02 PROCEDURE — 85027 COMPLETE CBC AUTOMATED: CPT | Performed by: SURGERY

## 2019-08-02 PROCEDURE — 84100 ASSAY OF PHOSPHORUS: CPT | Performed by: SURGERY

## 2019-08-02 PROCEDURE — 83735 ASSAY OF MAGNESIUM: CPT | Performed by: SURGERY

## 2019-08-02 PROCEDURE — 12000001 ZZH R&B MED SURG/OB UMMC

## 2019-08-02 RX ADMIN — PANTOPRAZOLE SODIUM 40 MG: 40 INJECTION, POWDER, FOR SOLUTION INTRAVENOUS at 21:06

## 2019-08-02 RX ADMIN — ACETAMINOPHEN 650 MG: 325 TABLET, FILM COATED ORAL at 05:08

## 2019-08-02 RX ADMIN — SODIUM CHLORIDE, POTASSIUM CHLORIDE, SODIUM LACTATE AND CALCIUM CHLORIDE 1000 ML: 600; 310; 30; 20 INJECTION, SOLUTION INTRAVENOUS at 21:22

## 2019-08-02 RX ADMIN — ONDANSETRON 4 MG: 2 INJECTION INTRAMUSCULAR; INTRAVENOUS at 05:07

## 2019-08-02 RX ADMIN — LOSARTAN POTASSIUM 25 MG: 25 TABLET ORAL at 08:27

## 2019-08-02 RX ADMIN — SODIUM CHLORIDE, POTASSIUM CHLORIDE, SODIUM LACTATE AND CALCIUM CHLORIDE 550 ML: 600; 310; 30; 20 INJECTION, SOLUTION INTRAVENOUS at 06:15

## 2019-08-02 RX ADMIN — METOPROLOL TARTRATE 25 MG: 25 TABLET ORAL at 21:15

## 2019-08-02 RX ADMIN — SODIUM CHLORIDE, POTASSIUM CHLORIDE, SODIUM LACTATE AND CALCIUM CHLORIDE 1000 ML: 600; 310; 30; 20 INJECTION, SOLUTION INTRAVENOUS at 08:35

## 2019-08-02 RX ADMIN — PANTOPRAZOLE SODIUM 40 MG: 40 INJECTION, POWDER, FOR SOLUTION INTRAVENOUS at 08:26

## 2019-08-02 RX ADMIN — SODIUM CHLORIDE, POTASSIUM CHLORIDE, SODIUM LACTATE AND CALCIUM CHLORIDE 500 ML: 600; 310; 30; 20 INJECTION, SOLUTION INTRAVENOUS at 13:45

## 2019-08-02 RX ADMIN — METOPROLOL TARTRATE 25 MG: 25 TABLET ORAL at 08:27

## 2019-08-02 ASSESSMENT — ACTIVITIES OF DAILY LIVING (ADL)
ADLS_ACUITY_SCORE: 13

## 2019-08-02 NOTE — PROGRESS NOTES
Subjective:  - No acute events overnight.  - No pain.  - Mild nausea with tube feeds.  - No BM.     Objective:  Temp:  [96.5  F (35.8  C)-98.7  F (37.1  C)] 96.5  F (35.8  C)  Pulse:  [87] 87  Heart Rate:  [] 84  Resp:  [14-20] 16  BP: (110-154)/(62-80) 128/71  SpO2:  [93 %-100 %] 97 %    I/O last 3 completed shifts:  In: 2341.73 [I.V.:568.33; IV Piggyback:500]  Out: 2430 [Urine:1925; Emesis/NG output:500; Blood:5]      Gen: Awake, alert, NAD  Resp: NLB on RA  Abd: soft, non tender, non distended  Ext: WWP, no gross edema    Labs:  Recent Labs   Lab 07/31/19  0438 07/30/19  0621 07/29/19  0526   WBC 10.6 13.5* 9.0   HGB 9.6* 9.4* 9.6*    458* 422       Recent Labs   Lab 07/31/19  0438 07/30/19  0621 07/29/19  0526 07/28/19  0541    138 135 135   POTASSIUM 4.3 4.6 4.3 4.2   CHLORIDE 104 104 103 103   CO2 26 26 25 22   BUN 28 27 22 25   CR 0.80 0.77 0.76 0.71   * 115* 124* 132*   TARIQ 8.7 9.3 8.9 8.7   MAG 2.1  --  2.4* 2.3   PHOS 3.5  --  3.8 3.5       Imaging:  None new     Assessment/Plan:   74 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    POD2 s/p endoscopic ultrasound with cystoduodenostomy stent placement  x 2, stent dilatation and NJ tube placement. NG tube removed and NJ placed.     Plan:   - Continue to flush NG as needed with air.  - Continue feeds at 20 ml/hr through NJ  - Ok to advance 10 ml q6 to goal  - Once at goal can stop TPN  - Continue NPO, replace lytes as needed     Seen, examined, and discussed with chief resident, who will discuss with staff.  - - - - - - - - - - - - - - - - - -  Karin Rico MD  PGY-1, General Surgery

## 2019-08-02 NOTE — PROGRESS NOTES
EGD with Dr. Mann in two weeks. (per inpatient team)  Diagnosis: Keyla-anastomotic hematoma s/p cystoduodenostomy     Marilia currently inpatient. Orders placed.

## 2019-08-02 NOTE — PLAN OF CARE
3111-5001: AVSS, afebrile. Denies pain, nausea. Pt ambulated in halls. Pt continuing TPN and TF. Per Dietician, TPN will be stopped at 2000. Continue on increasing TF 10cc q8hrs. Pt tolerating TF at 20cc; next increase at 1700. NG LIS, 500cc OP, pt receiving IV fluid replacement over 2hrs. Tolerating well. Void appropriately. No BM, passing gas this AM. BG q4hrs check. Independent. Continue with POC.

## 2019-08-02 NOTE — PLAN OF CARE
2167-3522: VSS on RA, afebrile. Pt up independently using bedside commode overnight with good output. C/o sore throat - tylenol given x2 with adequate relief. TF increased to 20cc/hr around 0130, but was pt unable to tolerate without nausea. TF decreased back to 10cc/hr - goal 50cc. May increase by 10cc Q8hr if tolerating rate of 20cc/hr. Zofran given x1 with adequate relief. Replacing NG output 1:1 with LR Q8hr. , 127. Slept between cares, continue to monitor.

## 2019-08-02 NOTE — PROGRESS NOTES
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 GI has been consulted for possible endoscopic treatment.  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 Biopsies with no evidence of residual adenoma and/or adenocarcinoma. CT A/P from 7/29 with IV and oral contrast without anastomotic leak, possible anastomotic stricture. S/p EGD/EUS 7/31 with findings of external compression of the anastomoses by og-anastomotic hematoma (healthy mucosa internally), which was drained with 10mm Axios stent. NG removed and NJ placed to facilitate nutrition with the hope that GOO resolves with drainage of hematoma.      # Personal history of colon polyps:  Last surveillance colonoscopy 4/30/2019 notable for 3 right colon polyps, completely resected, negative for adenocarcinoma.      Recommendations  - Advance tube feeds as tolerated (dietitian following)  - Wean TPN as able  - NG to LIS  - Consider PEG-J placement in coming days if no improvement in GOO sx ([facility] team happy to place)  - Avoid antithrombotics for at least one more day  - GI will sign off, please call for questions    Outpatient GI recommendations: Repeat upper endsocopy in two weeks with Dr. Mann at which time cavity will be cleaned endoscopically.    The patient was discussed and plan agreed upon with GI staff,   "Clifford.      Asif Lockhart MD  GI fellow  #[id number]  _______________________________________________________________      Subjective: Nursing notes and 24hr events reviewed. Patient seen and examined. Patient reports that she is doing okay. No abdominal pain, nausea, vomiting, passing gas, no BM.     ROS:   4 pt ROS negative unless noted in subjective.     Objective:  Blood pressure 111/59, pulse 91, temperature 97.2  F (36.2  C), temperature source Oral, resp. rate 16, height 1.702 m (5' 7\"), weight 76.7 kg (169 lb 1.6 oz), SpO2 97 %.  Gen: Sitting in bed. Appears comfortable  HEENT: NCAT. Conjunctiva clear. Sclera anicteric NGT with green output  CV: RRR, Peripheral perfusion intact  Resp: normal work of breathing  Abd: Soft, NT, ND, no guarding or rebound, BS hypoactive  Msk: no gross deformity  Skin:  no jaundice  Ext: warm, well perfused   Neuro: grossly normal  Mental status/Psych: A&O. Asks/answers questions appropriately       Date 07/30/19 0700 - 07/31/19 0659   Shift 5045-3757 2628-7161 0144-5148 24 Hour Total   INTAKE   P.O. 50   50   I.V. 20   20   Shift Total(mL/kg) 70(0.91)   70(0.91)   OUTPUT   Urine 150   150   Shift Total(mL/kg) 150(1.95)   150(1.95)   Weight (kg) 76.75 76.75 76.75 76.75         LABS:  BMP  Recent Labs   Lab 08/02/19  0727 08/02/19  0600 08/01/19  0503 07/31/19  0438    Unsatisfactory specimen - possible contamination 134 137   POTASSIUM 4.3 Unsatisfactory specimen - possible contamination 4.1 4.3   CHLORIDE 103 Unsatisfactory specimen - possible contamination 102 104   TARIQ 8.6 Unsatisfactory specimen - possible contamination 9.0 8.7   CO2 25 Unsatisfactory specimen - possible contamination 24 26   BUN 27 Unsatisfactory specimen - possible contamination 28 28   CR 0.66 Unsatisfactory specimen - possible contamination 0.77 0.80   * Unsatisfactory specimen - possible contamination 150* 113*     CBC  Recent Labs   Lab 08/02/19  0600 08/01/19  0503 07/31/19  0438 " 07/30/19  0621   WBC Unsatisfactory specimen - possible contamination 14.3* 10.6 13.5*   RBC Unsatisfactory specimen - possible contamination 3.61* 3.46* 3.45*   HGB Unsatisfactory specimen - possible contamination 9.9* 9.6* 9.4*   HCT Unsatisfactory specimen - possible contamination 33.0* 32.1* 30.8*   MCV Unsatisfactory specimen - possible contamination 91 93 89   MCH Unsatisfactory specimen - possible contamination 27.4 27.7 27.2   MCHC Unsatisfactory specimen - possible contamination 30.0* 29.9* 30.5*   RDW Unsatisfactory specimen - possible contamination 13.6 13.8 13.5   PLT Unsatisfactory specimen - possible contamination 442 431 458*     INR  Recent Labs   Lab 07/29/19  0526   INR 1.07     LFTs  Recent Labs   Lab 07/29/19  0526   ALKPHOS 283*   AST 35   ALT 56*   BILITOTAL 0.8   PROTTOTAL 7.3   ALBUMIN 2.6*      PANCNo lab results found in last 7 days.      IMAGING:  EXAMINATION: CT ABDOMEN PELVIS W CONTRAST, 7/29/2019 6:34 PM     TECHNIQUE:  Helical CT images from the lung bases through the  symphysis pubis were obtained with oral and IV contrast.  Coronal and  sagittal reformatted images were generated at a workstation for  further assessment.     CONTRAST:  104 ml Isovue 370.     COMPARISON: CT abdomen 7/22/2019 and CT abdomen 7/14/2019     HISTORY: Nausea, vomiting; Continued output from NG tube.  Please  evaluate extrinsic/intrinsic causes from anastomotic site.     FINDINGS:     Lines and tubes: Enteric tube with tip and sidehole in the stomach     Lung bases: No consolidation or pleural effusion. Mild subsegmental  bibasilar atelectasis. Unchanged nodular density in the left lower  lobe, measuring 1 x 1 cm.     Abdomen and pelvis:     Mildly distended stomach. Postoperative changes from duodenojejunal  anastomosis. Redemonstration of multiloculated ill marginated  collection adjacent to the operative site with some rim enhancement.  There is a subcapsular component to the collection along the  posterior  segment 6 of the liver, measuring 10 x 3.5 cm with extension medial to  ascending colon and a loculated component along the lateral conal  fascia, measuring 12 x 24 mm (series 3, image 192); There is no  extravasation of oral contrast into the multiloculated collection and  no abnormal dilatation of the bowel loops. Mild colonic diverticulosis  with no diverticulitis.     Liver: No suspicious liver lesions. Portal veins appear patent  Gallbladder: Surgically absent  Spleen: Normal size.  Pancreas: No suspicious pancreatic lesions. The pancreatic duct is not  dilated.  Adrenal glands: No adrenal nodules  Kidneys: No hydronephrosis or obstructing renal stones.    Bladder / Pelvic organs: Unremarkable.  Lymph nodes: No retroperitoneal, mesenteric, or pelvic  lymphadenopathy.  Vessels: No infrarenal aortic aneurysm.      Bones and soft tissues: No aggressive osseous lesion.                                                                      IMPRESSION:      1. Postoperative changes of duodenojejunal anastomosis with  multiloculated collection, with some rim enhancement possibly evolving  hematoma, abscess, and/or biloma or combination thereof adjacent to  the surgical bed, not significantly changed in size compared to CT  dated 7/22/2019, accounting for difference in technique. Hida scan may  be helpful.  2. No extravasation of oral contrast to suggest anastomotic leak.  3. Diverticulosis without diverticulitis.  4. Unchanged pulmonary nodules. Recommend attention on follow-up.

## 2019-08-02 NOTE — PROGRESS NOTES
Surgery Progress Note  08/02/2019       Subjective:  - BRIANNE overnight.  - Vomiting yesterday, NG replaced. Xray confirmed.  - Immediate 700mL output with NG placement  - Passing gas, no BM  - Nausea with tube feeds     Objective:  Temp:  [95.9  F (35.5  C)-97.9  F (36.6  C)] 97.9  F (36.6  C)  Pulse:  [77-91] 77  Heart Rate:  [87-93] 89  Resp:  [16-20] 20  BP: ()/(55-66) 116/58  SpO2:  [94 %-98 %] 96 %    I/O last 3 completed shifts:  In: 3559.6 [I.V.:20; IV Piggyback:1800]  Out: 1550 [Urine:600; Emesis/NG output:950]      Gen: Awake, alert, NAD  Resp: NLB on RA  Abd: soft, non tender, non distended  Ext: WWP, no gross edema     Labs:  Recent Labs   Lab 08/01/19  0503 07/31/19  0438 07/30/19  0621   WBC 14.3* 10.6 13.5*   HGB 9.9* 9.6* 9.4*    431 458*       Recent Labs   Lab 08/01/19  0503 07/31/19  0438 07/30/19  0621 07/29/19  0526    137 138 135   POTASSIUM 4.1 4.3 4.6 4.3   CHLORIDE 102 104 104 103   CO2 24 26 26 25   BUN 28 28 27 22   CR 0.77 0.80 0.77 0.76   * 113* 115* 124*   TARIQ 9.0 8.7 9.3 8.9   MAG 1.8 2.1  --  2.4*   PHOS 3.4 3.5  --  3.8       Imaging:  Xray: The NG tube tip and sidehole projected over the stomach     Assessment/Plan:   74 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     POD2 s/p endoscopic ultrasound with cystoduodenostomy stent placement  x 2, stent dilatation and NJ tube placement. NG tube removed and NJ placed.    Plan:   - Continue to flush NG several times throughout the day  - Continue tube feeds today at 20 ml/hr through NJ  - Ok to advance 10 ml q6 to goal of 50 ml/hr  - Once at goal can stop TPN  - Continue NPO, replace lytes as needed  - Continue replacement NGT output 1:1     Seen, examined, and discussed with chief resident, who will discuss with staff.  - - - - - - - - - - - - - - - - - -  Jadyn  Joshua MS3  General Surgery

## 2019-08-03 LAB
ANION GAP SERPL CALCULATED.3IONS-SCNC: 7 MMOL/L (ref 3–14)
BUN SERPL-MCNC: 24 MG/DL (ref 7–30)
CALCIUM SERPL-MCNC: 8.7 MG/DL (ref 8.5–10.1)
CHLORIDE SERPL-SCNC: 102 MMOL/L (ref 94–109)
CO2 SERPL-SCNC: 24 MMOL/L (ref 20–32)
CREAT SERPL-MCNC: 0.71 MG/DL (ref 0.52–1.04)
ERYTHROCYTE [DISTWIDTH] IN BLOOD BY AUTOMATED COUNT: 13.9 % (ref 10–15)
GFR SERPL CREATININE-BSD FRML MDRD: 84 ML/MIN/{1.73_M2}
GLUCOSE BLDC GLUCOMTR-MCNC: 102 MG/DL (ref 70–99)
GLUCOSE BLDC GLUCOMTR-MCNC: 105 MG/DL (ref 70–99)
GLUCOSE BLDC GLUCOMTR-MCNC: 115 MG/DL (ref 70–99)
GLUCOSE BLDC GLUCOMTR-MCNC: 116 MG/DL (ref 70–99)
GLUCOSE BLDC GLUCOMTR-MCNC: 119 MG/DL (ref 70–99)
GLUCOSE SERPL-MCNC: 118 MG/DL (ref 70–99)
HCT VFR BLD AUTO: 29.3 % (ref 35–47)
HGB BLD-MCNC: 8.9 G/DL (ref 11.7–15.7)
MCH RBC QN AUTO: 27.3 PG (ref 26.5–33)
MCHC RBC AUTO-ENTMCNC: 30.4 G/DL (ref 31.5–36.5)
MCV RBC AUTO: 90 FL (ref 78–100)
PLATELET # BLD AUTO: 357 10E9/L (ref 150–450)
POTASSIUM SERPL-SCNC: 3.9 MMOL/L (ref 3.4–5.3)
RBC # BLD AUTO: 3.26 10E12/L (ref 3.8–5.2)
SODIUM SERPL-SCNC: 133 MMOL/L (ref 133–144)
WBC # BLD AUTO: 12 10E9/L (ref 4–11)

## 2019-08-03 PROCEDURE — 27210429 ZZH NUTRITION PRODUCT INTERMEDIATE LITER

## 2019-08-03 PROCEDURE — 25800030 ZZH RX IP 258 OP 636: Performed by: STUDENT IN AN ORGANIZED HEALTH CARE EDUCATION/TRAINING PROGRAM

## 2019-08-03 PROCEDURE — 36592 COLLECT BLOOD FROM PICC: CPT | Performed by: STUDENT IN AN ORGANIZED HEALTH CARE EDUCATION/TRAINING PROGRAM

## 2019-08-03 PROCEDURE — 00000146 ZZHCL STATISTIC GLUCOSE BY METER IP

## 2019-08-03 PROCEDURE — 85027 COMPLETE CBC AUTOMATED: CPT | Performed by: STUDENT IN AN ORGANIZED HEALTH CARE EDUCATION/TRAINING PROGRAM

## 2019-08-03 PROCEDURE — 25000128 H RX IP 250 OP 636: Performed by: STUDENT IN AN ORGANIZED HEALTH CARE EDUCATION/TRAINING PROGRAM

## 2019-08-03 PROCEDURE — 40000802 ZZH SITE CHECK

## 2019-08-03 PROCEDURE — 25000132 ZZH RX MED GY IP 250 OP 250 PS 637: Performed by: STUDENT IN AN ORGANIZED HEALTH CARE EDUCATION/TRAINING PROGRAM

## 2019-08-03 PROCEDURE — C9113 INJ PANTOPRAZOLE SODIUM, VIA: HCPCS | Performed by: STUDENT IN AN ORGANIZED HEALTH CARE EDUCATION/TRAINING PROGRAM

## 2019-08-03 PROCEDURE — 12000001 ZZH R&B MED SURG/OB UMMC

## 2019-08-03 PROCEDURE — 80048 BASIC METABOLIC PNL TOTAL CA: CPT | Performed by: STUDENT IN AN ORGANIZED HEALTH CARE EDUCATION/TRAINING PROGRAM

## 2019-08-03 RX ORDER — SIMETHICONE 80 MG
80 TABLET,CHEWABLE ORAL EVERY 6 HOURS PRN
Status: DISCONTINUED | OUTPATIENT
Start: 2019-08-03 | End: 2019-08-13 | Stop reason: HOSPADM

## 2019-08-03 RX ADMIN — LOSARTAN POTASSIUM 25 MG: 25 TABLET ORAL at 09:26

## 2019-08-03 RX ADMIN — SIMETHICONE CHEW TAB 80 MG 80 MG: 80 TABLET ORAL at 20:57

## 2019-08-03 RX ADMIN — SODIUM CHLORIDE, POTASSIUM CHLORIDE, SODIUM LACTATE AND CALCIUM CHLORIDE 800 ML: 600; 310; 30; 20 INJECTION, SOLUTION INTRAVENOUS at 06:13

## 2019-08-03 RX ADMIN — SODIUM CHLORIDE, POTASSIUM CHLORIDE, SODIUM LACTATE AND CALCIUM CHLORIDE 1000 ML: 600; 310; 30; 20 INJECTION, SOLUTION INTRAVENOUS at 16:08

## 2019-08-03 RX ADMIN — PANTOPRAZOLE SODIUM 40 MG: 40 INJECTION, POWDER, FOR SOLUTION INTRAVENOUS at 09:26

## 2019-08-03 RX ADMIN — SODIUM CHLORIDE, POTASSIUM CHLORIDE, SODIUM LACTATE AND CALCIUM CHLORIDE 1000 ML: 600; 310; 30; 20 INJECTION, SOLUTION INTRAVENOUS at 09:26

## 2019-08-03 RX ADMIN — SODIUM CHLORIDE, POTASSIUM CHLORIDE, SODIUM LACTATE AND CALCIUM CHLORIDE 800 ML: 600; 310; 30; 20 INJECTION, SOLUTION INTRAVENOUS at 22:00

## 2019-08-03 RX ADMIN — METOPROLOL TARTRATE 25 MG: 25 TABLET ORAL at 19:50

## 2019-08-03 RX ADMIN — SODIUM CHLORIDE, POTASSIUM CHLORIDE, SODIUM LACTATE AND CALCIUM CHLORIDE 850 ML: 600; 310; 30; 20 INJECTION, SOLUTION INTRAVENOUS at 13:36

## 2019-08-03 RX ADMIN — PANTOPRAZOLE SODIUM 40 MG: 40 INJECTION, POWDER, FOR SOLUTION INTRAVENOUS at 19:50

## 2019-08-03 RX ADMIN — Medication 5 ML: at 05:59

## 2019-08-03 RX ADMIN — METOPROLOL TARTRATE 25 MG: 25 TABLET ORAL at 09:26

## 2019-08-03 ASSESSMENT — PAIN DESCRIPTION - DESCRIPTORS: DESCRIPTORS: CRAMPING

## 2019-08-03 ASSESSMENT — ACTIVITIES OF DAILY LIVING (ADL)
ADLS_ACUITY_SCORE: 13

## 2019-08-03 ASSESSMENT — MIFFLIN-ST. JEOR: SCORE: 1314.63

## 2019-08-03 NOTE — PROGRESS NOTES
Surgery note    Doing ok. Had some bloating and her feeds were held for a bit. Now back on 30cc/hr. No other issues.    AF, VS WNL  UOP: 600/2.0  N    NAD laying in bed   NG with bilious output  Abd soft, mildly distended, non-tender  WWP    Labs: Na of 133. WBC of 12. Hgb of 8.9.    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 Course c/b perianastomitic hematoma and stricture.   -NG to suction  -NJ feeds to goal today  -Daily 1L bolus plus replacements.   -Likely plan for GI consult on Monday for potential GJ placement  -Lovenox ppx    Casimiro Belcher  PGY 7

## 2019-08-03 NOTE — PLAN OF CARE
2300- 0700    AVSS, on RA, denies N/V, pain, dizziness, SOB. A&O x4. Feeding tube increased from 20 mL/hr to 30 mL/hr at 0500. Max , no insulin given. 800 mL LR running for 800 mL NG output. Continue monitoring.

## 2019-08-03 NOTE — PROGRESS NOTES
Pt c/o feeling really full and uncomfortable, denies nausea. Asking to reduce her TF rate, rate decreased to 30cc/hr. Pt had just completed scheduled water flush of 150cc. Pt had 400cc since last recorded at 1330.   Pt offered suppository since she denies flatus or bm, bs hypoactive. Physician Dimple neal to request simethicone prn as well as inform her of status of tube feeding rate.   Will continue to monitor and continue POC.

## 2019-08-03 NOTE — PLAN OF CARE
VSS, afebrile. Denies pain.  Intermittent nausea when TF increased or flushed. TF running at 40 ml/hr.  Next increase after 7:30 pm to goal of 50 ml/hr. NG to LIS; output this shift: 850ml, LR bolus of 850ml running. BGs 118 and 116, no sliding scale insulin given. POCT BGs Q4H until 8/4 at 1300, next due at 1600. Plan for G-J tube placement early next week. Voiding spontaneously, up independently. Will continue with POC.

## 2019-08-03 NOTE — PLAN OF CARE
"AF, VSS.  Up ad tyson. 16-17:00:Abdomen no nausea or discomfort with tube feed at 20 ml/hr.  At 17:00 tube feeding rate increased to 30 ml/hr.  At 18:30 pt called nursing reporting abdominal pressure, no nausea.  Abdomen distended but soft, no change from baseline. Tube feeding stopped, pt up for walk and sat on toilet, no stool.  Reported a \"little gas\" earlier. At about 21:00 pt reporting pressure resolved, tube feeding restarted at 20 ml/hr, next rate increase at 05:00 as tolerated.  NG, LIS suction except after meds and when up for a walk. 525 ml, green output from NG, replaced with LR cc/cc over 2 hours at end of shift, see emar.  Stable.  Continue to monitor pt tolerance of feeding.   "

## 2019-08-04 LAB
ANION GAP SERPL CALCULATED.3IONS-SCNC: 8 MMOL/L (ref 3–14)
BUN SERPL-MCNC: 19 MG/DL (ref 7–30)
CALCIUM SERPL-MCNC: 8.2 MG/DL (ref 8.5–10.1)
CHLORIDE SERPL-SCNC: 105 MMOL/L (ref 94–109)
CO2 SERPL-SCNC: 22 MMOL/L (ref 20–32)
CREAT SERPL-MCNC: 0.7 MG/DL (ref 0.52–1.04)
GFR SERPL CREATININE-BSD FRML MDRD: 85 ML/MIN/{1.73_M2}
GLUCOSE BLDC GLUCOMTR-MCNC: 106 MG/DL (ref 70–99)
GLUCOSE BLDC GLUCOMTR-MCNC: 111 MG/DL (ref 70–99)
GLUCOSE BLDC GLUCOMTR-MCNC: 114 MG/DL (ref 70–99)
GLUCOSE BLDC GLUCOMTR-MCNC: 117 MG/DL (ref 70–99)
GLUCOSE BLDC GLUCOMTR-MCNC: 128 MG/DL (ref 70–99)
GLUCOSE BLDC GLUCOMTR-MCNC: 97 MG/DL (ref 70–99)
GLUCOSE SERPL-MCNC: 117 MG/DL (ref 70–99)
POTASSIUM SERPL-SCNC: 3.8 MMOL/L (ref 3.4–5.3)
SODIUM SERPL-SCNC: 135 MMOL/L (ref 133–144)

## 2019-08-04 PROCEDURE — C9113 INJ PANTOPRAZOLE SODIUM, VIA: HCPCS | Performed by: STUDENT IN AN ORGANIZED HEALTH CARE EDUCATION/TRAINING PROGRAM

## 2019-08-04 PROCEDURE — 25000132 ZZH RX MED GY IP 250 OP 250 PS 637: Performed by: STUDENT IN AN ORGANIZED HEALTH CARE EDUCATION/TRAINING PROGRAM

## 2019-08-04 PROCEDURE — 00000146 ZZHCL STATISTIC GLUCOSE BY METER IP

## 2019-08-04 PROCEDURE — 25000128 H RX IP 250 OP 636: Performed by: STUDENT IN AN ORGANIZED HEALTH CARE EDUCATION/TRAINING PROGRAM

## 2019-08-04 PROCEDURE — 25800030 ZZH RX IP 258 OP 636: Performed by: STUDENT IN AN ORGANIZED HEALTH CARE EDUCATION/TRAINING PROGRAM

## 2019-08-04 PROCEDURE — 36592 COLLECT BLOOD FROM PICC: CPT | Performed by: STUDENT IN AN ORGANIZED HEALTH CARE EDUCATION/TRAINING PROGRAM

## 2019-08-04 PROCEDURE — 12000001 ZZH R&B MED SURG/OB UMMC

## 2019-08-04 PROCEDURE — 80048 BASIC METABOLIC PNL TOTAL CA: CPT | Performed by: STUDENT IN AN ORGANIZED HEALTH CARE EDUCATION/TRAINING PROGRAM

## 2019-08-04 RX ADMIN — PROCHLORPERAZINE EDISYLATE 10 MG: 5 INJECTION INTRAMUSCULAR; INTRAVENOUS at 01:43

## 2019-08-04 RX ADMIN — PANTOPRAZOLE SODIUM 40 MG: 40 INJECTION, POWDER, FOR SOLUTION INTRAVENOUS at 19:53

## 2019-08-04 RX ADMIN — SODIUM CHLORIDE, POTASSIUM CHLORIDE, SODIUM LACTATE AND CALCIUM CHLORIDE 700 ML: 600; 310; 30; 20 INJECTION, SOLUTION INTRAVENOUS at 22:15

## 2019-08-04 RX ADMIN — SODIUM CHLORIDE, POTASSIUM CHLORIDE, SODIUM LACTATE AND CALCIUM CHLORIDE 750 ML: 600; 310; 30; 20 INJECTION, SOLUTION INTRAVENOUS at 14:29

## 2019-08-04 RX ADMIN — METOPROLOL TARTRATE 25 MG: 25 TABLET ORAL at 19:53

## 2019-08-04 RX ADMIN — PANTOPRAZOLE SODIUM 40 MG: 40 INJECTION, POWDER, FOR SOLUTION INTRAVENOUS at 09:16

## 2019-08-04 RX ADMIN — SIMETHICONE CHEW TAB 80 MG 80 MG: 80 TABLET ORAL at 13:44

## 2019-08-04 RX ADMIN — METOPROLOL TARTRATE 25 MG: 25 TABLET ORAL at 09:16

## 2019-08-04 RX ADMIN — SIMETHICONE CHEW TAB 80 MG 80 MG: 80 TABLET ORAL at 01:42

## 2019-08-04 RX ADMIN — SODIUM CHLORIDE, POTASSIUM CHLORIDE, SODIUM LACTATE AND CALCIUM CHLORIDE 1000 ML: 600; 310; 30; 20 INJECTION, SOLUTION INTRAVENOUS at 10:21

## 2019-08-04 RX ADMIN — OXYCODONE HYDROCHLORIDE 5 MG: 5 TABLET ORAL at 16:52

## 2019-08-04 RX ADMIN — ACETAMINOPHEN 650 MG: 325 TABLET, FILM COATED ORAL at 16:52

## 2019-08-04 RX ADMIN — SODIUM CHLORIDE, POTASSIUM CHLORIDE, SODIUM LACTATE AND CALCIUM CHLORIDE 950 ML: 600; 310; 30; 20 INJECTION, SOLUTION INTRAVENOUS at 06:31

## 2019-08-04 RX ADMIN — LOSARTAN POTASSIUM 25 MG: 25 TABLET ORAL at 09:16

## 2019-08-04 ASSESSMENT — ACTIVITIES OF DAILY LIVING (ADL)
ADLS_ACUITY_SCORE: 13

## 2019-08-04 NOTE — PROVIDER NOTIFICATION
2045  Paged surg crosscover at number [id number]. Pt requesting Simethicone for gas pain, previous nurse had requested earlier this evening and provider had said she would look into it no orders have been placed. Paged to follow up on request.     2047   Obtained order.

## 2019-08-04 NOTE — PROGRESS NOTES
Surgery note    Feels ok. Had tf placed to goal just now. Did feel full last night but improved with flatus.     AF, VS WNL  UOP: 800/1.5  N/1.7    NAD laying in bed   NG with bilious output  Abd soft, mildly distended, non-tender  WWP    Labs: Na of 135. Bicarb of 22.    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 Course c/b perianastomitic hematoma and stricture.   -NG to suction  -NJ feeds to goal  -Daily 1L bolus plus replacements.   -Will discuss timing of GJ placement today with GI  -Lovenox ppx    Casimiro Belcher  PGY 7

## 2019-08-04 NOTE — PROGRESS NOTES
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  She has malnutrition and is planned for GJ-tube placement.    Platelet count from 8/3/2019 is 357. Last INR 1.07 on 7/29/2019     Discussed this with patient today, she is amenable to plan.      PLAN:  -- NPO tonight for GJ-tube placement - added to schedule  -- Hold all anticoagulation tonight  -- Check INR in the AM      Discussed with Dr. Horton.    Deo Lundberg  GI fellow  PGY-5

## 2019-08-04 NOTE — PLAN OF CARE
[id number]  VSS on RA. AOx4 up ind. Pt complaining of gas pain. Pt requested to try to increase TF again after receiving PRN dose of Simethicone. TF now at 40 with goal rate of 50. NG to suction. NJ running the TF. Pt NPO except for meds. BG checked q4hr with no insulin given. LR boluses to equal output from NG. Plan for GI consul Monday.

## 2019-08-04 NOTE — PROGRESS NOTES
Pt c/o facial pain r/t nj, and ng tube. Pain relieved with prn oxycodone and tylenol x1. Pt still reports feeling full with TF at goal of 50cc/hr. BS hypo +flatus.   Pt will be NPO ( stop TF) after MN for planned G/J placement . Will be placed in OR. Awaiting orders to be placed for NPO status as well as surgical scrubs from MD [facility]. Will continue to monitor and continue POC.   Karen Dukes

## 2019-08-04 NOTE — PLAN OF CARE
[id number]    AVSS, on RA, denies pain, dizziness, SOB. C/O nausea, and bloating (ole). IV compazine 5 mg x1 given for nausea, simethicone 80 mg chewable tablet given for feeling of fullness (ole). NG clamped for about 40 minutes after oral medication, pt tolerated. Stable BG, no insulin given. Tube Feeding increased to 50 mL/hr (to the GOAL) at 0500. NG output is 950, and replaced with  mL, over about 3.5 hours. Continue monitoring.

## 2019-08-04 NOTE — PLAN OF CARE
[id number]: AVSS on RA. Denies pain. Nausea/gas discomfort managed with simethicone x1. NJ with TF running at goal rate of 50 ml/hr. NG to LIS with 750 ml green output, replaced with LR bolus per orders, in addition to daily 1-L LR bolus. BG q4h, 117 and 111, no correction needed. Voiding adequately. No BM, passing a little flatus today. Up ambulating in halls. Plan is for GJ tube placement tomorrow (currently unscheduled), awaiting orders for stopping TF for NPO status, MD paged. Continue with POC.

## 2019-08-05 ENCOUNTER — ANESTHESIA (OUTPATIENT)
Dept: SURGERY | Facility: CLINIC | Age: 74
DRG: 327 | End: 2019-08-05
Payer: MEDICARE

## 2019-08-05 ENCOUNTER — APPOINTMENT (OUTPATIENT)
Dept: GENERAL RADIOLOGY | Facility: CLINIC | Age: 74
DRG: 327 | End: 2019-08-05
Attending: INTERNAL MEDICINE
Payer: MEDICARE

## 2019-08-05 ENCOUNTER — ANESTHESIA EVENT (OUTPATIENT)
Dept: SURGERY | Facility: CLINIC | Age: 74
DRG: 327 | End: 2019-08-05
Payer: MEDICARE

## 2019-08-05 LAB
ALBUMIN SERPL-MCNC: 2.4 G/DL (ref 3.4–5)
ALP SERPL-CCNC: 212 U/L (ref 40–150)
ALT SERPL W P-5'-P-CCNC: 38 U/L (ref 0–50)
ANION GAP SERPL CALCULATED.3IONS-SCNC: 7 MMOL/L (ref 3–14)
AST SERPL W P-5'-P-CCNC: 21 U/L (ref 0–45)
BILIRUB SERPL-MCNC: 0.7 MG/DL (ref 0.2–1.3)
BUN SERPL-MCNC: 18 MG/DL (ref 7–30)
CALCIUM SERPL-MCNC: 8.6 MG/DL (ref 8.5–10.1)
CHLORIDE SERPL-SCNC: 102 MMOL/L (ref 94–109)
CO2 SERPL-SCNC: 25 MMOL/L (ref 20–32)
CREAT SERPL-MCNC: 0.69 MG/DL (ref 0.52–1.04)
GFR SERPL CREATININE-BSD FRML MDRD: 85 ML/MIN/{1.73_M2}
GLUCOSE BLDC GLUCOMTR-MCNC: 101 MG/DL (ref 70–99)
GLUCOSE BLDC GLUCOMTR-MCNC: 103 MG/DL (ref 70–99)
GLUCOSE BLDC GLUCOMTR-MCNC: 129 MG/DL (ref 70–99)
GLUCOSE BLDC GLUCOMTR-MCNC: 96 MG/DL (ref 70–99)
GLUCOSE SERPL-MCNC: 91 MG/DL (ref 70–99)
INR PPP: 1.1 (ref 0.86–1.14)
MAGNESIUM SERPL-MCNC: 1.8 MG/DL (ref 1.6–2.3)
PHOSPHATE SERPL-MCNC: 3.3 MG/DL (ref 2.5–4.5)
POTASSIUM SERPL-SCNC: 3.7 MMOL/L (ref 3.4–5.3)
PREALB SERPL IA-MCNC: 22 MG/DL (ref 15–45)
PROT SERPL-MCNC: 6.3 G/DL (ref 6.8–8.8)
SODIUM SERPL-SCNC: 134 MMOL/L (ref 133–144)
UPPER GI ENDOSCOPY: NORMAL

## 2019-08-05 PROCEDURE — C1769 GUIDE WIRE: HCPCS | Performed by: INTERNAL MEDICINE

## 2019-08-05 PROCEDURE — 36000053 ZZH SURGERY LEVEL 2 EA 15 ADDTL MIN - UMMC: Performed by: INTERNAL MEDICINE

## 2019-08-05 PROCEDURE — 25000132 ZZH RX MED GY IP 250 OP 250 PS 637: Performed by: STUDENT IN AN ORGANIZED HEALTH CARE EDUCATION/TRAINING PROGRAM

## 2019-08-05 PROCEDURE — 25000128 H RX IP 250 OP 636: Performed by: INTERNAL MEDICINE

## 2019-08-05 PROCEDURE — 71000012 ZZH RECOVERY PHASE 1 LEVEL 1 FIRST HR: Performed by: INTERNAL MEDICINE

## 2019-08-05 PROCEDURE — 25800030 ZZH RX IP 258 OP 636: Performed by: INTERNAL MEDICINE

## 2019-08-05 PROCEDURE — 36592 COLLECT BLOOD FROM PICC: CPT | Performed by: SURGERY

## 2019-08-05 PROCEDURE — 37000008 ZZH ANESTHESIA TECHNICAL FEE, 1ST 30 MIN: Performed by: INTERNAL MEDICINE

## 2019-08-05 PROCEDURE — 25000125 ZZHC RX 250: Performed by: INTERNAL MEDICINE

## 2019-08-05 PROCEDURE — 25800030 ZZH RX IP 258 OP 636: Performed by: NURSE ANESTHETIST, CERTIFIED REGISTERED

## 2019-08-05 PROCEDURE — 25800030 ZZH RX IP 258 OP 636: Performed by: STUDENT IN AN ORGANIZED HEALTH CARE EDUCATION/TRAINING PROGRAM

## 2019-08-05 PROCEDURE — 27210429 ZZH NUTRITION PRODUCT INTERMEDIATE LITER

## 2019-08-05 PROCEDURE — 85610 PROTHROMBIN TIME: CPT | Performed by: SURGERY

## 2019-08-05 PROCEDURE — 27210794 ZZH OR GENERAL SUPPLY STERILE: Performed by: INTERNAL MEDICINE

## 2019-08-05 PROCEDURE — 25000128 H RX IP 250 OP 636: Performed by: STUDENT IN AN ORGANIZED HEALTH CARE EDUCATION/TRAINING PROGRAM

## 2019-08-05 PROCEDURE — 25000128 H RX IP 250 OP 636: Performed by: ANESTHESIOLOGY

## 2019-08-05 PROCEDURE — 37000009 ZZH ANESTHESIA TECHNICAL FEE, EACH ADDTL 15 MIN: Performed by: INTERNAL MEDICINE

## 2019-08-05 PROCEDURE — 40000170 ZZH STATISTIC PRE-PROCEDURE ASSESSMENT II: Performed by: INTERNAL MEDICINE

## 2019-08-05 PROCEDURE — 25000125 ZZHC RX 250: Performed by: NURSE ANESTHETIST, CERTIFIED REGISTERED

## 2019-08-05 PROCEDURE — 40000277 XR SURGERY CARM FLUORO LESS THAN 5 MIN W STILLS: Mod: TC

## 2019-08-05 PROCEDURE — 71000013 ZZH RECOVERY PHASE 1 LEVEL 1 EA ADDTL HR: Performed by: INTERNAL MEDICINE

## 2019-08-05 PROCEDURE — 36000055 ZZH SURGERY LEVEL 2 W FLUORO 1ST 30 MIN - UMMC: Performed by: INTERNAL MEDICINE

## 2019-08-05 PROCEDURE — 80053 COMPREHEN METABOLIC PANEL: CPT | Performed by: SURGERY

## 2019-08-05 PROCEDURE — 0DHA3UZ INSERTION OF FEEDING DEVICE INTO JEJUNUM, PERCUTANEOUS APPROACH: ICD-10-PCS | Performed by: INTERNAL MEDICINE

## 2019-08-05 PROCEDURE — C1894 INTRO/SHEATH, NON-LASER: HCPCS | Performed by: INTERNAL MEDICINE

## 2019-08-05 PROCEDURE — 25500064 ZZH RX 255 OP 636: Performed by: INTERNAL MEDICINE

## 2019-08-05 PROCEDURE — 84100 ASSAY OF PHOSPHORUS: CPT | Performed by: SURGERY

## 2019-08-05 PROCEDURE — 84134 ASSAY OF PREALBUMIN: CPT | Performed by: SURGERY

## 2019-08-05 PROCEDURE — 25000132 ZZH RX MED GY IP 250 OP 250 PS 637: Performed by: INTERNAL MEDICINE

## 2019-08-05 PROCEDURE — 27211024 ZZHC OR SUPPLY OTHER OPNP: Performed by: INTERNAL MEDICINE

## 2019-08-05 PROCEDURE — 12000001 ZZH R&B MED SURG/OB UMMC

## 2019-08-05 PROCEDURE — 25000128 H RX IP 250 OP 636: Performed by: NURSE ANESTHETIST, CERTIFIED REGISTERED

## 2019-08-05 PROCEDURE — C9113 INJ PANTOPRAZOLE SODIUM, VIA: HCPCS | Performed by: INTERNAL MEDICINE

## 2019-08-05 PROCEDURE — 00000146 ZZHCL STATISTIC GLUCOSE BY METER IP

## 2019-08-05 PROCEDURE — 25000566 ZZH SEVOFLURANE, EA 15 MIN: Performed by: INTERNAL MEDICINE

## 2019-08-05 PROCEDURE — 83735 ASSAY OF MAGNESIUM: CPT | Performed by: SURGERY

## 2019-08-05 RX ORDER — FENTANYL CITRATE 50 UG/ML
INJECTION, SOLUTION INTRAMUSCULAR; INTRAVENOUS PRN
Status: DISCONTINUED | OUTPATIENT
Start: 2019-08-05 | End: 2019-08-05

## 2019-08-05 RX ORDER — LIDOCAINE 40 MG/G
CREAM TOPICAL
Status: DISCONTINUED | OUTPATIENT
Start: 2019-08-05 | End: 2019-08-05 | Stop reason: HOSPADM

## 2019-08-05 RX ORDER — HYDROMORPHONE HYDROCHLORIDE 1 MG/ML
.3-.5 INJECTION, SOLUTION INTRAMUSCULAR; INTRAVENOUS; SUBCUTANEOUS EVERY 5 MIN PRN
Status: DISCONTINUED | OUTPATIENT
Start: 2019-08-05 | End: 2019-08-05 | Stop reason: HOSPADM

## 2019-08-05 RX ORDER — ONDANSETRON 2 MG/ML
4 INJECTION INTRAMUSCULAR; INTRAVENOUS EVERY 30 MIN PRN
Status: DISCONTINUED | OUTPATIENT
Start: 2019-08-05 | End: 2019-08-05 | Stop reason: HOSPADM

## 2019-08-05 RX ORDER — PROPOFOL 10 MG/ML
INJECTION, EMULSION INTRAVENOUS PRN
Status: DISCONTINUED | OUTPATIENT
Start: 2019-08-05 | End: 2019-08-05

## 2019-08-05 RX ORDER — ONDANSETRON 2 MG/ML
INJECTION INTRAMUSCULAR; INTRAVENOUS PRN
Status: DISCONTINUED | OUTPATIENT
Start: 2019-08-05 | End: 2019-08-05

## 2019-08-05 RX ORDER — LABETALOL 20 MG/4 ML (5 MG/ML) INTRAVENOUS SYRINGE
10
Status: DISCONTINUED | OUTPATIENT
Start: 2019-08-05 | End: 2019-08-05 | Stop reason: HOSPADM

## 2019-08-05 RX ORDER — FENTANYL CITRATE 50 UG/ML
25-50 INJECTION, SOLUTION INTRAMUSCULAR; INTRAVENOUS
Status: DISCONTINUED | OUTPATIENT
Start: 2019-08-05 | End: 2019-08-05 | Stop reason: HOSPADM

## 2019-08-05 RX ORDER — IOPAMIDOL 510 MG/ML
INJECTION, SOLUTION INTRAVASCULAR PRN
Status: DISCONTINUED | OUTPATIENT
Start: 2019-08-05 | End: 2019-08-05 | Stop reason: HOSPADM

## 2019-08-05 RX ORDER — ONDANSETRON 4 MG/1
4 TABLET, ORALLY DISINTEGRATING ORAL EVERY 30 MIN PRN
Status: DISCONTINUED | OUTPATIENT
Start: 2019-08-05 | End: 2019-08-05 | Stop reason: HOSPADM

## 2019-08-05 RX ORDER — DIMENHYDRINATE 50 MG/ML
25 INJECTION, SOLUTION INTRAMUSCULAR; INTRAVENOUS
Status: DISCONTINUED | OUTPATIENT
Start: 2019-08-05 | End: 2019-08-05 | Stop reason: HOSPADM

## 2019-08-05 RX ORDER — SODIUM CHLORIDE, SODIUM LACTATE, POTASSIUM CHLORIDE, CALCIUM CHLORIDE 600; 310; 30; 20 MG/100ML; MG/100ML; MG/100ML; MG/100ML
INJECTION, SOLUTION INTRAVENOUS CONTINUOUS
Status: DISCONTINUED | OUTPATIENT
Start: 2019-08-05 | End: 2019-08-05 | Stop reason: HOSPADM

## 2019-08-05 RX ORDER — GLYCOPYRROLATE 0.2 MG/ML
INJECTION, SOLUTION INTRAMUSCULAR; INTRAVENOUS PRN
Status: DISCONTINUED | OUTPATIENT
Start: 2019-08-05 | End: 2019-08-05

## 2019-08-05 RX ORDER — NALOXONE HYDROCHLORIDE 0.4 MG/ML
.1-.4 INJECTION, SOLUTION INTRAMUSCULAR; INTRAVENOUS; SUBCUTANEOUS
Status: ACTIVE | OUTPATIENT
Start: 2019-08-05 | End: 2019-08-06

## 2019-08-05 RX ORDER — HEPARIN SODIUM,PORCINE 10 UNIT/ML
5-10 VIAL (ML) INTRAVENOUS EVERY 24 HOURS
Status: DISCONTINUED | OUTPATIENT
Start: 2019-08-05 | End: 2019-08-05

## 2019-08-05 RX ORDER — SODIUM CHLORIDE, SODIUM LACTATE, POTASSIUM CHLORIDE, CALCIUM CHLORIDE 600; 310; 30; 20 MG/100ML; MG/100ML; MG/100ML; MG/100ML
INJECTION, SOLUTION INTRAVENOUS CONTINUOUS PRN
Status: DISCONTINUED | OUTPATIENT
Start: 2019-08-05 | End: 2019-08-05

## 2019-08-05 RX ORDER — CEFAZOLIN SODIUM 2 G/100ML
INJECTION, SOLUTION INTRAVENOUS PRN
Status: DISCONTINUED | OUTPATIENT
Start: 2019-08-05 | End: 2019-08-05

## 2019-08-05 RX ORDER — HEPARIN SODIUM,PORCINE 10 UNIT/ML
5-10 VIAL (ML) INTRAVENOUS
Status: DISCONTINUED | OUTPATIENT
Start: 2019-08-05 | End: 2019-08-05

## 2019-08-05 RX ADMIN — PANTOPRAZOLE SODIUM 40 MG: 40 INJECTION, POWDER, FOR SOLUTION INTRAVENOUS at 13:44

## 2019-08-05 RX ADMIN — FENTANYL CITRATE 50 MCG: 50 INJECTION, SOLUTION INTRAMUSCULAR; INTRAVENOUS at 10:02

## 2019-08-05 RX ADMIN — PHENYLEPHRINE HYDROCHLORIDE 100 MCG: 10 INJECTION INTRAVENOUS at 09:33

## 2019-08-05 RX ADMIN — FENTANYL CITRATE 50 MCG: 50 INJECTION, SOLUTION INTRAMUSCULAR; INTRAVENOUS at 10:39

## 2019-08-05 RX ADMIN — PHENYLEPHRINE HYDROCHLORIDE 100 MCG: 10 INJECTION INTRAVENOUS at 09:30

## 2019-08-05 RX ADMIN — SODIUM CHLORIDE, POTASSIUM CHLORIDE, SODIUM LACTATE AND CALCIUM CHLORIDE: 600; 310; 30; 20 INJECTION, SOLUTION INTRAVENOUS at 09:09

## 2019-08-05 RX ADMIN — SODIUM CHLORIDE, POTASSIUM CHLORIDE, SODIUM LACTATE AND CALCIUM CHLORIDE 700 ML: 600; 310; 30; 20 INJECTION, SOLUTION INTRAVENOUS at 05:46

## 2019-08-05 RX ADMIN — ONDANSETRON 4 MG: 2 INJECTION INTRAMUSCULAR; INTRAVENOUS at 18:27

## 2019-08-05 RX ADMIN — ROCURONIUM BROMIDE 15 MG: 10 INJECTION INTRAVENOUS at 10:01

## 2019-08-05 RX ADMIN — FENTANYL CITRATE 50 MCG: 50 INJECTION, SOLUTION INTRAMUSCULAR; INTRAVENOUS at 10:58

## 2019-08-05 RX ADMIN — SODIUM CHLORIDE, POTASSIUM CHLORIDE, SODIUM LACTATE AND CALCIUM CHLORIDE 300 ML: 600; 310; 30; 20 INJECTION, SOLUTION INTRAVENOUS at 13:44

## 2019-08-05 RX ADMIN — PHENYLEPHRINE HYDROCHLORIDE 100 MCG: 10 INJECTION INTRAVENOUS at 10:31

## 2019-08-05 RX ADMIN — PROPOFOL 130 MG: 10 INJECTION, EMULSION INTRAVENOUS at 09:18

## 2019-08-05 RX ADMIN — GLYCOPYRROLATE 0.1 MG: 0.2 INJECTION, SOLUTION INTRAMUSCULAR; INTRAVENOUS at 09:18

## 2019-08-05 RX ADMIN — FENTANYL CITRATE 50 MCG: 50 INJECTION, SOLUTION INTRAMUSCULAR; INTRAVENOUS at 09:17

## 2019-08-05 RX ADMIN — PHENYLEPHRINE HYDROCHLORIDE 100 MCG: 10 INJECTION INTRAVENOUS at 09:39

## 2019-08-05 RX ADMIN — METOPROLOL TARTRATE 25 MG: 25 TABLET ORAL at 13:45

## 2019-08-05 RX ADMIN — SIMETHICONE CHEW TAB 80 MG 80 MG: 80 TABLET ORAL at 02:29

## 2019-08-05 RX ADMIN — POTASSIUM CHLORIDE 20 MEQ: 400 INJECTION, SOLUTION INTRAVENOUS at 17:12

## 2019-08-05 RX ADMIN — ALTEPLASE 2 MG: 2.2 INJECTION, POWDER, LYOPHILIZED, FOR SOLUTION INTRAVENOUS at 16:05

## 2019-08-05 RX ADMIN — CEFAZOLIN SODIUM 2 G: 2 INJECTION, SOLUTION INTRAVENOUS at 09:35

## 2019-08-05 RX ADMIN — FENTANYL CITRATE 50 MCG: 50 INJECTION INTRAMUSCULAR; INTRAVENOUS at 11:17

## 2019-08-05 RX ADMIN — LOSARTAN POTASSIUM 25 MG: 25 TABLET ORAL at 13:45

## 2019-08-05 RX ADMIN — PROCHLORPERAZINE EDISYLATE 5 MG: 5 INJECTION INTRAMUSCULAR; INTRAVENOUS at 02:29

## 2019-08-05 RX ADMIN — ROCURONIUM BROMIDE 35 MG: 10 INJECTION INTRAVENOUS at 09:19

## 2019-08-05 RX ADMIN — FENTANYL CITRATE 25 MCG: 50 INJECTION INTRAMUSCULAR; INTRAVENOUS at 11:10

## 2019-08-05 RX ADMIN — SODIUM CHLORIDE, POTASSIUM CHLORIDE, SODIUM LACTATE AND CALCIUM CHLORIDE 500 ML: 600; 310; 30; 20 INJECTION, SOLUTION INTRAVENOUS at 21:32

## 2019-08-05 RX ADMIN — SUGAMMADEX 200 MG: 100 INJECTION, SOLUTION INTRAVENOUS at 10:42

## 2019-08-05 RX ADMIN — PANTOPRAZOLE SODIUM 40 MG: 40 INJECTION, POWDER, FOR SOLUTION INTRAVENOUS at 20:22

## 2019-08-05 RX ADMIN — ONDANSETRON 4 MG: 2 INJECTION INTRAMUSCULAR; INTRAVENOUS at 10:39

## 2019-08-05 RX ADMIN — Medication 2 G: at 16:07

## 2019-08-05 RX ADMIN — PHENYLEPHRINE HYDROCHLORIDE 50 MCG: 10 INJECTION INTRAVENOUS at 09:18

## 2019-08-05 RX ADMIN — SIMETHICONE CHEW TAB 80 MG 80 MG: 80 TABLET ORAL at 20:22

## 2019-08-05 RX ADMIN — FENTANYL CITRATE 50 MCG: 50 INJECTION, SOLUTION INTRAMUSCULAR; INTRAVENOUS at 10:05

## 2019-08-05 RX ADMIN — METOPROLOL TARTRATE 25 MG: 25 TABLET ORAL at 20:22

## 2019-08-05 RX ADMIN — HYDROMORPHONE HYDROCHLORIDE 0.3 MG: 1 INJECTION, SOLUTION INTRAMUSCULAR; INTRAVENOUS; SUBCUTANEOUS at 11:34

## 2019-08-05 RX ADMIN — PHENYLEPHRINE HYDROCHLORIDE 100 MCG: 10 INJECTION INTRAVENOUS at 09:54

## 2019-08-05 ASSESSMENT — ACTIVITIES OF DAILY LIVING (ADL)
ADLS_ACUITY_SCORE: 13
ADLS_ACUITY_SCORE: 14
ADLS_ACUITY_SCORE: 13

## 2019-08-05 ASSESSMENT — PAIN DESCRIPTION - DESCRIPTORS: DESCRIPTORS: ACHING

## 2019-08-05 NOTE — ANESTHESIA PREPROCEDURE EVALUATION
"Anesthesia Pre-Procedure Evaluation  HPI -   From ED admission:   Marilia Bean is a 74 year old female who is twelve days S/P resection of distal duodenal and proximal jejunum who presents to the emergency department with nausea, vomiting, and heart burn. The procedure went well and the patient had no immediate complications. She had an NG tube in place for a few days after surgery, but had that removed and she was able to eat a turkey sandwich prior to discharge which she tolerated well. The patient was then discharged from the hospital on 16. She was feeling well after leaving the hospital, but started to get severe heart burn two days ago. The patient then started having several episodes of vomiting last night. She denies feeling nauseated, but states \"it just burns in my chest and then I keep burping and finally everything just comes up\".   Patient: Marilia Bean   MRN:     [id number] Gender:   female   Age:    74 year old :      1945        Preoperative Diagnosis: Ostruction   Procedure(s):  ESOPHAGOGASTRODUODENOSCOPY (EGD)  CREATION, GASTROJEJUNOSTOMY, PERCUTANEOUS, ENDOSCOPIC     Past Medical History:   Diagnosis Date     Aortic aneurysm (H) 2007    see  Ba report -follow yearly, treat high BP is occurs Problem list name updated by automated process. Provider to review     Aortic aneurysm of unspecified site without mention of rupture 2007    4.4 cm ascending aorta noted in      Asymptomatic postmenopausal status (age-related) (natural)     on HRT  Prempro -weaning off      CAD (coronary artery disease)     LAD     Family history of Gardiner syndrome      Hyperlipidemia LDL goal <100 10/31/2010     Hypertension goal BP (blood pressure) < 140/90 2016     Leiomyoma of uterus, unspecified     Uterine fibroid     Lump or mass in breast 2004    rt. nodule - bx neg     Personal history of colonic polyps      Postmenopausal atrophic vaginitis 2006     Pulmonary nodule  "    incidental noted in 2007 during [place]     Pure hypercholesterolemia     mild, diet - low cholesterol, low fat.10/06 start Lovastatin      Past Surgical History:   Procedure Laterality Date     BYPASS GRAFT ARTERY CORONARY N/A 6/5/2017    Procedure: BYPASS GRAFT ARTERY CORONARY;;  Surgeon: Akshat Soto MD;  Location: [facility]     C DEXA INTERPRETATION, AXIAL  12/21/01    wnl. 7/2005 wnl but lower     COLONOSCOPY  05/07/07    Repeat in 1 year for surveillance     COLONOSCOPY  12/10/08    repeat 1 year  -see 1/2009 letter     COLONOSCOPY  03/31/10     COLONOSCOPY  10/31/2011    Procedure:COMBINED COLONOSCOPY, SINGLE BIOPSY/POLYPECTOMY BY BIOPSY; colonoscopy with polypectomy by biopsy; Surgeon:JESSICA GARCIA; Location:[facility]     COLONOSCOPY  12/6/2013    Procedure: COMBINED COLONOSCOPY, SINGLE BIOPSY/POLYPECTOMY BY BIOPSY;  Colonoscopy, Polypectomies;  Surgeon: Herbert Salinas MD;  Location: [facility]     COLONOSCOPY N/A 12/8/2015    Procedure: COLONOSCOPY;  Surgeon: Jared Carbajal MD;  Location: [facility]     COLONOSCOPY N/A 4/26/2017    Procedure: COMBINED COLONOSCOPY, SINGLE OR MULTIPLE BIOPSY/POLYPECTOMY BY BIOPSY;  Colonoscopy with polypectomies with forceps and snare;  Surgeon: Herbert Salinas MD;  Location:  GI     ESOPHAGOSCOPY, GASTROSCOPY, DUODENOSCOPY (EGD), COMBINED N/A 12/8/2015    Procedure: COMBINED ESOPHAGOSCOPY, GASTROSCOPY, DUODENOSCOPY (EGD), BIOPSY SINGLE OR MULTIPLE;  Surgeon: Jared Carbajal MD;  Location:  GI     ESOPHAGOSCOPY, GASTROSCOPY, DUODENOSCOPY (EGD), COMBINED N/A 7/31/2019    Procedure: Endoscopic ultrasound with cystoduodenostomy, stent placement x2, stent dilation, and nasojejunal tube placement;  Surgeon: Ford Mann MD;  Location: [facility]     HC BIOPSY BREAST, PERC NEEDLE CORE, WITH IMAGING  8/17/2004    Right     HC COLONOSCOPY THRU STOMA W BIOPSY/CAUTERY TUMOR/POLYP/LESION  1999,2002    2004 polyp - hyperplastic - repeat 5 years ?     HC  COLONOSCOPY W/WO BRUSH/WASH  12/12/2005    Polypectomy.  Diverticulosis-minimal. Bx adenomatous and mucosal polyps - repeat 1 year     HC ECP WITH CATARACT SURGERY Bilateral 2015, 2016     HC LAPAROSCOPY, SURGICAL; APPENDECTOMY  10/31/2004     HC LAPAROSCOPY, SURGICAL; CHOLECYSTECTOMY  2000    Cholecystectomy, Laparoscopic     HC REVISE MEDIAN N/CARPAL TUNNEL SURG  10/01/10    left     [facility] U[facility] ENDOSCOPY, SIMPLE EXAM  03/31/10     HYSTERECTOMY, ELVIN  12/14/09    TAHBSO, MMK     REPAIR ANEURYSM ASCENDING AORTA N/A 6/5/2017    Procedure: REPAIR ANEURYSM ASCENDING AORTA;  Median Sternotomy, Ascending Aortic Aneurysm Repair, Coronary Artery Bypass Graft x1 on pump oxygenator;  Surgeon: Akshat Soto MD;  Location: [facility]     RESECTION DUODENAL N/A 7/3/2019    Procedure: Resection of Distal Duodenal and proximal Jejunum;  Surgeon: Joaquin Brito MD;  Location: [facility]           ROS/MED HX     ENT/Pulmonary:     (+)JARED risk factors hypertension, , . .   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 ,     Cardiovascular: Comment: Denies chest pain, SOB, palpitations, syncope, JAY, orthopnea, or PND    Ascending aortic aneurysm s/p repaired 6/5/2017.  CABG with LIMA to LAD done at the same time. Post-op atrial fibrillation treated with amiodarone and warfarin that eventually resolved.      (+) Dyslipidemia, hypertension--CAD, -CABG-date: single vessel LIMA-LAD, . Taking blood thinners : . . . :. dysrhythmias (Post CABG that resolved. ) a-fib, . Previous cardiac testing Echodate:5/25/17results:The Ejection Fraction is estimated at 60-65%.date: results:ECG reviewed date:6/24/19 results:Sinus bradycardia 51  Possible Left atrial enlargement  When compared with ECG of 13-JUN-2017 01:41,  Vent. 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  Flight of stairs at home that she goes up and down routinely without issues.  >4 METS   Hematologic:     (+) Anemia (Hb=9.6), History of Transfusion (During open heart surgery 2017) no previous transfusion reaction -       Musculoskeletal:   (+) arthritis (osteoarthrits- hands. ),  -        GI/Hepatic:     (+) GERD (Takes TUMS as needed.  has been in remission for past 2-3 months. ) Asymptomatic on medication, appendicitis (s/p appendectomy), cholecystitis/cholelithiasis (s/p cholecystectomy. ),        Renal/Genitourinary:  - ROS Renal section negative        Endo:  - neg endo ROS        Psychiatric:  - neg psychiatric ROS        Infectious Disease:  - neg infectious disease ROS        Malignancy:   (+) Malignancy History of Other  Other CA Adenoma of duodenum Active status post          Other: Comment: S/P ELVIN, BSO and MMK . 12/2009   (+) No chance of pregnancy C-spine cleared: Yes, no H/O Chronic Pain,no other significant disability                                 PHYSICAL EXAM:   Mental Status/Neuro: A/A/O   Airway: Facies: Feasible  Mallampati: II  Mouth/Opening: Full  TM distance: < 6 cm  Neck ROM: Full   Respiratory: Auscultation: CTAB     Resp. Rate: Normal     Resp. Effort: Normal      CV: Rhythm: Regular  Rate: Age appropriate  Heart: Normal Sounds   Comments: NG in situ-pt states it is VERY irritating and she has to hold onto it to keep it from putting pressure on her sinus cavity.            NIKKIG FV AN PHYSICAL EXAM    LABS:  CBC:   Lab Results   Component Value Date    WBC 12.0 (H) 08/03/2019    WBC 14.3 (H) 08/02/2019    HGB 8.9 (L) 08/03/2019    HGB 9.6 (L) 08/02/2019    HCT 29.3 (L) 08/03/2019    HCT 32.3 (L) 08/02/2019     08/03/2019     08/02/2019     BMP:   Lab Results   Component Value Date     08/05/2019     08/04/2019    POTASSIUM 3.7 08/05/2019    POTASSIUM 3.8 08/04/2019    CHLORIDE 102 08/05/2019    CHLORIDE 105 08/04/2019    CO2 25  "08/05/2019    CO2 22 08/04/2019    BUN 18 08/05/2019    BUN 19 08/04/2019    CR 0.69 08/05/2019    CR 0.70 08/04/2019    GLC 91 08/05/2019     (H) 08/04/2019     COAGS:   Lab Results   Component Value Date    PTT 33 06/12/2017    INR 1.10 08/05/2019    FIBR 269 06/06/2017     POC:   Lab Results   Component Value Date     (H) 08/05/2019     OTHER:   Lab Results   Component Value Date    PH 7.32 (L) 06/06/2017    LACT 1.0 07/30/2019    A1C 5.8 06/07/2017    TARIQ 8.6 08/05/2019    PHOS 3.3 08/05/2019    MAG 1.8 08/05/2019    ALBUMIN 2.4 (L) 08/05/2019    PROTTOTAL 6.3 (L) 08/05/2019    ALT 38 08/05/2019    AST 21 08/05/2019    ALKPHOS 212 (H) 08/05/2019    BILITOTAL 0.7 08/05/2019    LIPASE 411 (H) 07/14/2019    AMYLASE 30 07/06/2019    TSH 4.97 (H) 06/13/2017        Preop Vitals    BP Readings from Last 3 Encounters:   08/05/19 138/68   07/14/19 162/89   07/09/19 149/73    Pulse Readings from Last 3 Encounters:   08/05/19 95   07/14/19 83   07/04/19 72      Resp Readings from Last 3 Encounters:   08/05/19 16   07/14/19 18   07/09/19 16    SpO2 Readings from Last 3 Encounters:   08/05/19 95%   07/14/19 98%   07/09/19 94%      Temp Readings from Last 1 Encounters:   08/05/19 37.2  C (99  F) (Oral)    Ht Readings from Last 1 Encounters:   07/15/19 1.702 m (5' 7\")      Wt Readings from Last 1 Encounters:   08/03/19 78.2 kg (172 lb 6.4 oz)    Estimated body mass index is 27 kg/m  as calculated from the following:    Height as of this encounter: 1.702 m (5' 7\").    Weight as of this encounter: 78.2 kg (172 lb 6.4 oz).     LDA:  PICC Double Lumen 07/18/19 Right Basilic (Active)   Site Assessment WDL 8/5/2019  2:00 AM   External Cath Length (cm) 2 cm 7/31/2019 11:00 AM   Extremity Circumference (cm) 30.5 cm 7/18/2019  6:00 PM   Dressing Intervention Chlorhexidine patch;Transparent 8/4/2019  3:00 PM   Dressing Change Due 08/06/19 8/4/2019  9:15 AM   PICC Comment [place] 8/3/2019  1:41 PM   Lumen A - Color GRAY " 8/5/2019  2:00 AM   Lumen A - Status saline locked 8/5/2019  2:00 AM   Lumen A - Cap Change Due 08/05/19 8/5/2019  2:00 AM   Lumen B - Color PURPLE 8/5/2019  2:00 AM   Lumen B - Status infusing 8/5/2019  2:00 AM   Lumen B - Cap Change Due 08/05/19 8/5/2019  2:00 AM   Extravasation? No 8/5/2019  2:00 AM   Line Necessity Yes, meets criteria 8/4/2019  9:15 AM   Number of days: 18       NG/OG Tube Nasogastric 16 fr Left nostril (Active)   Site Description [facility] 8/5/2019  2:00 AM   Status Suction-low intermittent 8/5/2019  2:00 AM   Drainage Appearance [facility] 8/5/2019  2:00 AM   Placement Check [place] unchanged 8/5/2019  2:00 AM   [place] (cm marking) at nare/mouth 26 cm 8/4/2019  9:15 AM   Intake (ml) 30 ml 8/3/2019  6:38 PM   Flush/Free Water (mL) 150 mL 8/4/2019  6:00 PM   Container Amount 0 mL 8/5/2019  5:49 AM   Output (ml) 700 ml 8/5/2019  5:49 AM   Number of days: 4       Gastrostomy/Enterostomy Nasoenteric 1 12 fr Nasojejunal Tube Right Nare (Active)   Site Description [facility] 8/5/2019  2:00 AM   External Length (cm marking) 130 cm 7/31/2019  7:55 PM   Status - Jejunostomy Continuous Enteral Feedings 8/4/2019  8:00 PM   Dressing Status Normal: Clean, Dry & Intact 8/5/2019  2:00 AM   Flush/Free Water (mL) 40 mL 8/3/2019  7:50 AM   Number of days: 5        Assessment:   ASA SCORE: 3    H&P: History and physical reviewed and following examination; no interval change.   Smoking Status:  Non-Smoker/Unknown   NPO Status: NPO Appropriate     Plan:   Anes. Type:  General   Pre-Medication: None   Induction:  IV (Standard)   Airway: ETT; Oral   Access/Monitoring: PIV   Maintenance: Balanced     Postop Plan:   Postop Pain: Opioids  Postop Sedation/Airway: Not planned  Disposition: Inpatient/Admit     PONV Management:   Adult Risk Factors: Female, Non-Smoker, Postop Opioids   Prevention: Ondansetron, Dexamethasone     CONSENT: Direct conversation   Plan and risks discussed with: Patient   Blood Products: Consent Deferred (Minimal  Blood Loss)       Comments for Plan/Consent:  History and physical assessed; Patient examined.  I have reviewed and agree with this pre-op assessment and anesthetic plan.  Patient and family also agree.   Risks and alternatives presented and discussed.   AQA.  -rcp                   Bernard Meléndez MD

## 2019-08-05 NOTE — OP NOTE
Upper GI Endoscopy 08/05/2019  9:17 AM [facility], [address] [place] [address] [phone]     Endoscopy Department   _______________________________________________________________________________   Patient Name: Marilia Bean            Procedure Date: 8/5/2019 9:17 AM   MRN: [id number]                       Account Number: [id number]   YOB: 1945              Admit Type: Inpatient   Age: 74                               Room: OR   Gender: Female                        Note Status: Finalized   Attending MD: Ford Mann MD   Total Sedation Time:   _______________________________________________________________________________       Procedure:           Upper GI endoscopy   Indications:         Place PEG-J   Providers:           Ford Mann MD, Asif Lockhart MD   Patient Profile:     Ms Bean is a 73yo woman with Gardiner status post surgical                        duodenal resection with primary duodenododen(or                        jejun)ostomy created for which we created a                        cystoduodenostomy (from the bulb) to access and                        ultimately drain a perianastomotic hematoma and placed a                        nasojejunostomy tube to bridge a related inflammatory                        stenosis. She has since required replacement of the                        nasogastric tube and with failure to progress over the                        weeked she proceeds to upper endoscopy for placement of                        a gastrojejunostomy tube with gastropexy.   Referring MD:        Joaquin Brito MD   Medicines:           General Anesthesia, Ancef 2000 mg IV   Complications:       No immediate complications.   _______________________________________________________________________________   Procedure:           Pre-Anesthesia Assessment:                        - Prior to the procedure, a History and Physical  was                        performed, and patient medications and allergies were                        reviewed. The patient is competent. The risks and                        benefits of the procedure and the sedation options and                        risks were discussed with the patient. All questions                        were answered and informed consent was obtained. Patient                        identification and proposed procedure were verified by                        the nurse in the pre-procedure area. Mental Status                        Examination: alert and oriented. Airway Examination:                        Mallampati Class II (the uvula but not tonsillar pillars                        visualized). Respiratory Examination: clear to                        auscultation. CV Examination: normal. ASA Grade                        Assessment: II - A patient with mild systemic disease.                        After reviewing the risks and benefits, the patient was                        deemed in satisfactory condition to undergo the                        procedure. The anesthesia plan was to use general                        anesthesia. Immediately prior to administration of                        medications, the patient was re-assessed for adequacy to                        receive sedatives. The heart rate, respiratory rate,                        oxygen saturations, blood pressure, adequacy of                        pulmonary ventilation, and response to care were                        monitored throughout the procedure. The physical status                        of the patient was re-assessed after the procedure.                        After obtaining informed consent, the endoscope was                        passed under direct vision. Throughout the procedure,                        the patient's blood pressure, pulse, and oxygen                        saturations were monitored  continuously. The gastroscope                        was introduced through the mouth, and advanced to the                        second part of duodenum. The upper GI endoscopy was                        accomplished without difficulty. The patient tolerated                        the procedure well.                                                                                     Findings:        The patient was supine and  films demonstrated well postioned        nasogastric and nasojejunal tubes along with an Axios stent with stent        in stent pig tail stent in the RUQ. The nasal tubes were removed. The        esophagus was unremarkable as was the stomach. The bulb was partially        occupied with the Axios and Zimmon stent. The lumen beyond (and likely        involving) the anastomosis was narrowed though with normal overlying        mucosa, most consistent with inflammation and or extrinsic compression.        The stomach was insufflated to appose gastric and abdominal walls. A        site was located in the antrum of the stomach with excellent        transillumination, manual external pressure and fluoroscopy for        placement. The abdominal wall was marked and prepped in a sterile        manner. The area was anesthetized with 1 mL of 1% lidocaine. The trocar        needle was introduced through the abdominal wall and into the stomach        under both fluoroscopic and direct endoscopic view. A snare was        introduced through the endoscope and opened in the gastric lumen. The        guide wire was passed through the trocar and into the open snare. The        snare was closed around the guide wire. The endoscope and snare were        removed, pulling the wire out through the mouth. A skin incision was        made at the site of needle insertion. The externally removable 24 Fr KATIE        PEG gastrostomy tube was lubricated. The G-tube was tied to the guide        wire and pulled through the  "mouth and into the stomach. The trocar        needle was removed, and the gastrostomy tube was pulled out from the        stomach through the skin. Next we triangulated 3 T tags around the        gastrostomy site and deployed these, pushing each external button snug.        We then removed the gastrostomy tube over a wire, exchanging the tube        with a 22 Fr sheath dilator. We then endoscopically guided the sheath        across the pylorus and using fluoroscopy passed an 0.035\" Glidewire deep        in the jejunum across the stenosis. However, the stenosis would not        allow the passage of the 18F one piece 45cm long gastrojejunostomy tube        over the wire through the sheath. The wire was removed along with the        sheath and a pediatric gastrostomy tube was passed across the fresh        gastrostomy. The layers were appreciated and likely CO2 was then allowd        to escape within the abdominal cavity, confirmed by fluoroscopy. We        passed the pediatric gastroscope across the stensosis and exchanged for        am 0.035\" Glidewire over which the one piece GJ was passed. Given the        free gas we did not attempt signficant manipulation of the tube, and        suspected a gastric curl, however the tip was passed the stenosis as        demonstrated by contrast injection. The final position of thetube was        confirmed by fluoroscopy, and skin marking noted to be 4 cm at the        external bumper. The final tension and compression of the abdominal wall        by the tube and external bumper were checked and revealed that the        bumper was moderately tight and mildly deforming the skin. The feeding        tube was capped, and the tube site cleaned and dressed.                                                                                     Impression:          - Existing NG and NJ tubes removed                        - Successful placement of a one piece 18F 45cm                       "  gastrojejunostomy tube with adjunct gastropexy and                        tip seen in what is now the proximal jejunum                        - Well positioned and expanded Axios with stent in stent                        Zimmon not manipulated   Recommendation:      - General anesthesia recovery with return to the floor                        when appropriate                        - Hold antithrombotics for three days if feasible (risks                        versus clear benefit of avoiding bleeding across fresh                        gastrostomy)                        - G tube to gravity now and intermittently as symptoms                        suggest; meds to G tube only                        - J tube may be utilized for feeds as per nutrition                        consult, ensuring slow rate rising to goal as tolerated                        and flushing with water between usage                        - Stomach curl of the jejunostomy tube is suspected                        however the tip is beyond the stenosis, with reflux of                        tube feeds to the gastrostomy, the tube may be                        exchanged, preferably at a point when the tract is more                        mature                        - Repeat endoscopy in 7-10 days to clear the contents of                        the cavity and consider stent removal; this will require                        GJ tube exchange                        - The findings and recommendations were discussed with                        the patient and their family                                                                                       electronically signed by CHUY Mann

## 2019-08-05 NOTE — PROVIDER NOTIFICATION
This note also relates to the following rows which could not be included:  Respiratory Monitoring (EtCO2) - Cannot attach notes to unvalidated device data

## 2019-08-05 NOTE — DISCHARGE SUMMARY
[facility]  Discharge Summary  General Surgery     Marilia Bean MRN# [id number]   YOB: 1945 Age: 74 year old     Date of Admission:  7/15/2019  Date of Discharge::  8/13/2019  Admitting Physician:  Joaquin Brito MD  Discharge Physician:  Joaquin Brito MD  Primary Care Physician:        Herbert Epstein          Admission Diagnoses:   nausea & vomiting  poss bowel obstruction post procedure  Abdominal pain with vomiting  Bowel obstruction (H)            Discharge Diagnosis:   There are no discharge diagnoses documented for the most recent discharge.            Procedures:   Procedure(s):  ESOPHAGOGASTRODUODENOSCOPY (EGD)  Gastrojejunostomy tube placement with gastropexy    Upper GI endoscopy  -Replace PEG tube, walled off collection post endoscopic transluminal drainage, stent removal          Consultations:   VASCULAR ACCESS CARE ADULT IP CONSULT  VASCULAR ACCESS CARE ADULT IP CONSULT  VASCULAR ACCESS ADULT IP CONSULT  PHARMACY/NUTRITION TO START AND MANAGE TPN  VASCULAR ACCESS CARE ADULT IP CONSULT  PHARMACY IP CONSULT  VASCULAR ACCESS CARE ADULT IP CONSULT  GI LUMINAL ADULT IP CONSULT  NUTRITION SERVICES ADULT IP CONSULT  PHARMACY IP CONSULT  PATIENT LEARNING CENTER IP CONSULT  PATIENT LEARNING CENTER IP CONSULT  PHYSICAL THERAPY ADULT IP CONSULT          Brief History of Illness:   Marilia Bean is a 74 year old female with a medical history of aortic aneurysm (repaired with graft on 6/5/2017), coronary artery disease (one vessel coronary artery bypass graft on 6/5/2017), hypertension, hyperlipidemia, and Gardiner syndrome, 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 She was discharged from the hospital on post-operative day 6 and was able to tolerate food at that time. However, after returning home, she began having post-prandial emesis and intermittent abdominal pain, so she returned to the Emergency Department on  7/14/2019. She was resuscitated with intravenous fluids, her symptoms were treated medically, and she was discharged from the Emergency Department able to tolerate oral hydration. The following day, the patient continued having emesis with oral intake, and it was recommended that she be admitted to the hospital.            Hospital Course:   On admission (7/15/2019), she was treated for a small bowel obstruction with a nasogastric tube, intravenous fluid hydration, and symptomatic medical management. On hospital day 4, the patient was started on total parenteral nutrition due to continued inability to tolerate oral nutrition. Gastroenterology was consulted on 7/29/2019 to assess for possible endoscopic treatment, and they recommended a CT scan of the abdomen and pelvis with oral and intravenous contrast. This scan did not show evidence of an anastomotic leak, but it did provide evidence of a potential stricture at the anastomotic site and surrounding fluid collection. Thus, on 7/31/2019, the patient underwent an endoscopic ultrasound with cystoduodenostomy, stent placement x2, and nasojejunal tube placement without complication. A perianastomotic hematoma was also drained at that time. She began tube feeds and was weaned off of total parenteral nutrition however her obstructive symptoms failed to improve, so a gastrojejunostomy tube with gastropexy was placed by Gastroenterology on 8/5/2019. She was started on tube feeds via the J-limb and was tolerating it well. She continued to have high G-tube output and was replaced appropriately with IV fluids. She was found to have tube feeds draining from her G-tube and an abdominal X-Ray showed the J-limb was coiled in her stomach. Gastroenterology was to perform her stent removal of her cystoduodenostomy on 8/14 however decided to do it and GJ tube replacement on 8/12. She tolerated the procedure well and the remainder of her course was unremarkable. On the day of  discharge, she was tolerating her tube feeds at goal, her pain was well controlled, she was voiding spontaneously, and ambulating independently. Patient with follow up in [facility] clinic tomorrow, 8/14/19 for further care instructions and with Dr. Brito in clinic in 1 week.         Imaging Studies:     Results for orders placed or performed during the hospital encounter of 07/15/19   XR Abdomen Port 1 View    Narrative    Exam: XR ABDOMEN PORT 1 VW, 7/16/2019 7:28 AM    Indication: positioning of ng tube placement    Comparison: CT abdomen pelvis 7/14/2019.    Findings: Single supine abdominal radiograph. Nasogastric tube with  sidehole at the gastroesophageal junction, tip in the high gastric  body. Surgical clips projecting over the low mediastinum, left cardiac  border, and gallbladder fossa. Surgical sutures projecting over the  right mid abdomen. Median sternotomy wires. Minimal bowel gas. No  portal venous gas.  Limited evaluation of the lungs fields due to  overpenetration. The most inferior portions of the abdomen one  collimated out of the field-of-view.      Impression    Impression:   1. Nasogastric tube with sidehole at the GE junction, recommend  advancing approximately 8 cm.  2. Minimal abdominal bowel gas.    I have personally reviewed the examination and initial interpretation  and I agree with the findings.    STEFAN WHALEY MD   XR Abdomen Port 1 View    Narrative    Exam: XR ABDOMEN PORT 1 VW, 7/16/2019 11:12 AM    Indication: positioning of ng tube placement    Comparison: 10/16/2019    Findings:   Single frontal view of the abdomen. Right upper quadrant  cholecystectomy clips. Enteric tube tip and sidehole project over the  proximal stomach. Surgical changes of CABG.      Impression    Impression: Enteric tube tip and sidehole project over the stomach.    ELGIN GILLESPIE,    XR Abdomen Port 1 View    Narrative    EXAM: XR ABDOMEN PORT 1 VW  7/18/2019 10:38 AM      HISTORY: evaluate NG tube  position    COMPARISON: 7/16/2019    FINDINGS: Semiupright AP radiograph of the abdomen. Please note that  the full abdomen is not included in the field-of-view. Gastric tube  tip projects over the stomach, however sidehole projects above the  level of the gastroesophageal junction. Surgical clips in the right  upper quadrant and near the diaphragmatic hiatus. Paucity of bowel  gas. No portal venous gas. Lung bases are clear.       Impression    IMPRESSION:   1. Gastric tube sidehole is above the gastroesophageal junction.  Recommend advancing 5 cm.  2. Paucity of bowel gas.    I have personally reviewed the examination and initial interpretation  and I agree with the findings.    STEFAN WHALEY MD   XR Chest Port 1 View    Narrative    EXAM: XR CHEST PORT 1 VW  7/18/2019 6:25 PM      HISTORY: PICC position    COMPARISON: CT 7/2/2019    FINDINGS: Semiupright AP radiograph of the chest. Right PICC line tip  projects over the low SVC. Gastric tube sidehole projects above the  gastric esophageal junction, possibly tip as well. The trachea is  midline. The cardiomediastinal silhouette is within normal limits. No  pleural effusion or pneumothorax. Mild patchy and streaky left basilar  opacities. Upper abdomen is unremarkable. Left costophrenic angle  nodule, better characterized on comparison CT.      Impression    IMPRESSION:   1. Right PICC line tip projects over the low SVC.  2. Gastric tube sidehole projects above the gastroesophageal junction.  Recommend advancing 10 cm.  3. Mild left basilar opacities suggesting atelectasis.    I have personally reviewed the examination and initial interpretation  and I agree with the findings.    SAJI MATA MD   XR Abdomen Port 1 View    Narrative    Exam: XR ABDOMEN PORT 1 VW, 7/19/2019 12:49 AM    Indication: NGT placement    Comparison: 7/18/2019    Findings:   Limited frontal view of upper abdomen and lower chest. The sidehole of  NG tube projects over the stomach.  Cholecystectomy.  Postoperative  changes of CABG. Lung bases are better evaluated on same-day dedicated  chest x-ray. No dilated loops of bowel. No portal venous gas and no  pneumatosis. Median sternotomy wires.      Impression    Impression: The sidehole of NG tube projects over the stomach.     I have personally reviewed the examination and initial interpretation  and I agree with the findings.    SAJI MATA MD   CT Abdomen Pelvis w/o Contrast    Narrative    EXAMINATION: CT ABDOMEN PELVIS W/O CONTRAST, 7/22/2019 10:30 AM    TECHNIQUE:  Helical CT images from the lung bases through the  symphysis pubis were obtained with oral contrast and without IV  contrast. Contrast dose: iohexol (OMNIPAQUE) solution 50 mL    COMPARISON: 7/14/2019. 6/20/2019.    HISTORY: Pt w/ recent duodenojejuno anastomosis readmitted for  inability to tolerate food/liquids. pt continues to endorse abdominal  pain and having high Ng output. Need CT ab/pelvis w/ NG contrast to  eval for leak or anastomotic stricture    FINDINGS:  LUNG BASES: Images are degraded secondary to motion artifact. There is  redemonstration of a 1 cm x 1 cm left lower lobe nodule, likely  calcified granuloma. There are at least 2 adjacent subcentimeter solid  nodules measuring up to approximately 5 mm. Additional 3 mm oval  nodule in the peripheral right lower lobe. Bibasilar dependent  atelectasis. Cardiomegaly. Gastric tube with tip terminating in the  gastric body.    ABDOMEN: Postoperative changes from duodenojejunal anastomosis. There  is increased size of the known fluid collection adjacent to the suture  lines. The remaining fluid collection adjacent to the suture line  measures approximately 3.7 x 3.4 cm and appears continuous with the  duodenum (series 5, image 27). There is additional fluid tracking  along the right paracolic gutter. There is mild associated fatty  stranding and reactive lymphadenopathy. There is no extravasation of  oral contrast into  the peritoneum.    Unremarkable hepatic parenchyma, no intrahepatic biliary ductal  dilatation. Prominent common bile duct, likely reservoir effect.  Status post cholecystectomy. Fatty infiltration of the pancreas. There  is a large amount of oral contrast within the gastric body. The  spleen, adrenals, and kidneys are unremarkable on this noncontrast  exam. Normal caliber small bowel proximal and distal to the surgical  anastomosis. Scattered diverticulosis without acute diverticulitis.  Surgically absent appendix.    Mild atherosclerotic disease of the abdominal aorta, and proximal  iliac arteries. No abdominal aortic aneurysm. Ureters are  unremarkable.    PELVIS: No suspicious pelvic mass. Multiple pelvic phleboliths. The  bladder is unremarkable without bladder wall thickening. No suspicious  inguinal or pelvic lymphadenopathy.    BONES: Degenerative changes of the visualized thoracolumbar spine with  loss of the typical lumbar lordosis. Scattered Schmorl's nodes. Grade  1 retrolisthesis of L5 on S1. Osteopenic appearance of the bones. No  suspicious osseous lesions. Degenerative cystic changes of the  proximal femoral necks. No acute osseous abnormality.      Impression    IMPRESSION:   1.  Postoperative changes from duodenal jejunal anastomosis with  increased adjacent fluid collections tracking into the right pericolic  gutter as described above. No extravasation of oral contrast to  suggest anastomotic leak. This may represent residual, enlarging  postoperative hematoma. The patient's improving white blood cell count  makes abscess formation less likely.  2.  Prominent common bile duct, likely reservoir effect from  cholecystectomy.  3.  Diverticulosis without acute diverticulitis.  4.  Prominent abdominal mesenteric lymph nodes, likely reactive.  5.  Multiple pulmonary nodules as above, agree with recommendation of  the CT of the chest of 7/2/2019 which asked for a 6 month follow-up.       I have  personally reviewed the examination and initial interpretation  and I agree with the findings.    PRANAV GARDNER MD   XR Abdomen Port 1 View    Narrative    Single view of the abdomen    Comparisons: 7/19/2019    HISTORY: Evaluate NG tube placement    FINDINGS: Nasogastric tube is in place with the tip in the stomach and  sidehole above the gastroesophageal junction. Left abdomen is excluded  from the field-of-view. Relative paucity of bowel gas. Small amount of  gas seen in the ascending colon. Lung bases are clear.      Impression    IMPRESSION:  1. Nasogastric tube with tip in the stomach and sidehole above the  gastroesophageal junction. Consider advancing.  2. Nonspecific bowel gas pattern with relative paucity of small bowel  gas.    JENIFER MILLER MD   XR Abdomen Port 1 View    Narrative    Single view of the abdomen    Comparisons: Same date at 1629    HISTORY: Reevaluation of NG tube after readjustment.    FINDINGS: Nasogastric tube is been advanced with tip and sidehole in  the stomach. Relative paucity of small bowel and colonic gas. Mild  blunting of the left costophrenic angle.      Impression    IMPRESSION:  1. Nasogastric tube in good position.  2. Nonspecific bowel gas pattern with paucity of gas.    JENIFER MILLER MD   XR Abdomen Port 1 View    Narrative    Exam: XR ABDOMEN PORT 1 VW, 7/25/2019 6:26 PM    Indication: X Ray to please evaluate for NG tube placement (potential  dislodgement). Thank you.    Comparison: X-ray 7/22/2019.    Findings:   AP supine view of the abdomen. Sidehole of the enteric gastric tube  projects over the gastroesophageal junction. Paucity of bowel gas.  Visualized lung parenchyma is unremarkable. Surgical clips in the  right upper quadrant.      Impression    Impression: Sidehole of the enteric gastric tube projects over the  gastroesophageal junction. Recommend 8 to 10 cm advancement.    I have personally reviewed the examination and initial  interpretation  and I agree with the findings.    DAYANARA HAIDER MD   X-ray Abdomen flat port    Narrative    Exam: XR ABDOMEN PORT 1 VW, 7/25/2019 7:45 PM    Indication: Evaulate NG placement after advancement per previous x  ray. Thanks!    Comparison: None    Findings:   Semiupright portable frontal x-ray of the abdomen. Parts of the lower  abdomen are not included. Cholecystectomy clips in the right upper  quadrant; visualization of intact appearing sternal wires. Enteric  tube tip and sidehole projects over the stomach.      Impression    Impression: Enteric tube tip and sidehole projects over the stomach.     I have personally reviewed the examination and initial interpretation  and I agree with the findings.    DAYANARA HAIDER MD   XR Abdomen Port 1 View    Narrative    Exam: XR ABDOMEN PORT 1 VW, 7/28/2019 9:08 AM    Indication: please evaluate for NG tube placement    Comparison: Abdominal radiograph 7/25/2019    Findings:   Frontal x-ray of the abdomen. Parts of the left and lower abdomen are  not included. Surgical clips projecting over the right upper quadrant.  Partial visualization of median sternotomy wires. Surgical clips  projecting over the mediastinum. Enteric tube sidehole projects over  the gastroesophageal junction and tip projects over the stomach. No  pneumatosis or portal venous gas.      Impression    Impression: Enteric tube sidehole projects over the gastroesophageal  junction and tip projects over the stomach. Recommend advancement.    I have personally reviewed the examination and initial interpretation  and I agree with the findings.    ELGIN GILLESPIE,    XR Abdomen Port 1 View    Narrative    Exam: XR ABDOMEN PORT 1 VW, 7/28/2019 4:03 PM    Indication: to reevaluate for NG tube placement    Comparison: None    Findings:   Frontal x-ray of the chest. Partially visualized intact appearing  sternal wires. Surgical clips in the right upper quadrant. Enteric  tube tip and sidehole  projecting over the stomach. Parts of the lower  abdomen not included. No pneumatosis or portal venous gas.      Impression    Impression: Enteric tube tip and sidehole projects over the stomach.     I have personally reviewed the examination and initial interpretation  and I agree with the findings.    PATRICK SNYDER MD   CT Abdomen Pelvis w Contrast    Narrative    EXAMINATION: CT ABDOMEN PELVIS W CONTRAST, 7/29/2019 6:34 PM    TECHNIQUE:  Helical CT images from the lung bases through the  symphysis pubis were obtained with oral and IV contrast.  Coronal and  sagittal reformatted images were generated at a workstation for  further assessment.    CONTRAST:  104 ml Isovue 370.    COMPARISON: CT abdomen 7/22/2019 and CT abdomen 7/14/2019    HISTORY: Nausea, vomiting; Continued output from NG tube.  Please  evaluate extrinsic/intrinsic causes from anastomotic site.    FINDINGS:    Lines and tubes: Enteric tube with tip and sidehole in the stomach    Lung bases: No consolidation or pleural effusion. Mild subsegmental  bibasilar atelectasis. Unchanged nodular density in the left lower  lobe, measuring 1 x 1 cm.    Abdomen and pelvis:    Mildly distended stomach. Postoperative changes from duodenojejunal  anastomosis. Redemonstration of multiloculated ill marginated  collection adjacent to the operative site with some rim enhancement.  There is a subcapsular component to the collection along the posterior  segment 6 of the liver, measuring 10 x 3.5 cm with extension medial to  ascending colon and a loculated component along the lateral conal  fascia, measuring 12 x 24 mm (series 3, image 192); There is no  extravasation of oral contrast into the multiloculated collection and  no abnormal dilatation of the bowel loops. Mild colonic diverticulosis  with no diverticulitis.    Liver: No suspicious liver lesions. Portal veins appear patent  Gallbladder: Surgically absent  Spleen: Normal size.  Pancreas: No suspicious  pancreatic lesions. The pancreatic duct is not  dilated.  Adrenal glands: No adrenal nodules  Kidneys: No hydronephrosis or obstructing renal stones.    Bladder / Pelvic organs: Unremarkable.  Lymph nodes: No retroperitoneal, mesenteric, or pelvic  lymphadenopathy.  Vessels: No infrarenal aortic aneurysm.     Bones and soft tissues: No aggressive osseous lesion.      Impression    IMPRESSION:     1. Postoperative changes of duodenojejunal anastomosis with  multiloculated collection, with some rim enhancement possibly evolving  hematoma, abscess, and/or biloma or combination thereof adjacent to  the surgical bed, not significantly changed in size compared to CT  dated 7/22/2019, accounting for difference in technique. Hida scan may  be helpful.  2. No extravasation of oral contrast to suggest anastomotic leak.  3. Diverticulosis without diverticulitis.  4. Unchanged pulmonary nodules. Recommend attention on follow-up.    I have personally reviewed the examination and initial interpretation  and I agree with the findings.    ALVIN CALLES MD   XR Surgery FRANK Fluoro L/T 5 Min w Stills    Narrative    This exam was marked as non-reportable because it will not be read by a   radiologist or a [facility] non-radiologist provider.             XR Abdomen Port 1 View    Narrative    Examination:  XR ABDOMEN PORT 1 VW 8/1/2019 11:28 AM     Comparison: X-ray 7/20/2019    History: please evaluate for NG tube placement.    Findings: Supine view the abdomen. Portions of the lower abdomen were  collimated out of the field-of-view. Surgical clips over the right  upper abdomen. The feeding tube tip is likely in the proximal jejunum,  but is below the level of the film. Surgical material in the right  lower quadrant. Cystoduodenostomy stent in the right lower abdomen. NG  tube tip and sidehole projected over the stomach. Surgical clips in  the left upper abdomen. Partially visualized median sternotomy wires.  No dilated loops of  bowel, pneumatosis, or portal venous gas.  The  lung bases are unremarkable.      Impression    Impression: The NG tube tip and sidehole projected over the stomach.     I have personally reviewed the examination and initial interpretation  and I agree with the findings.    STEFAN WHALEY MD   XR Surgery FRANK Fluoro L/T 5 Min w Stills    Narrative    This exam was marked as non-reportable because it will not be read by a   radiologist or a [facility] non-radiologist provider.                        Medications Prior to Admission:     Medications Prior to Admission   Medication Sig Dispense Refill Last Dose     acetaminophen (TYLENOL) 325 MG tablet Take 3 tablets (975 mg) by mouth every 8 hours as needed for mild pain 25 tablet 0 7/14/2019 at Unknown time     aspirin 81 MG EC tablet Take 81 mg by mouth every morning  90 tablet 3 7/15/2019 at 0800     atorvastatin (LIPITOR) 20 MG tablet Take 1 tablet (20 mg) by mouth every morning 90 tablet 3 7/15/2019 at 0800     losartan (COZAAR) 25 MG tablet Take 1 tablet (25 mg) by mouth daily (Patient taking differently: Take 25 mg by mouth every morning ) 90 tablet 3 7/15/2019 at 0800     metoclopramide (REGLAN) 10 MG tablet Take 1 tablet (10 mg) by mouth 3 times daily as needed (nausea) 30 tablet 0 7/15/2019 at 0800     metoprolol tartrate (LOPRESSOR) 25 MG tablet Take 1 tablet (25 mg) by mouth 2 times daily 180 tablet 3 7/15/2019 at 0800     ranitidine (ZANTAC) 150 MG tablet Take 1 tablet (150 mg) by mouth 2 times daily 60 tablet 0 7/15/2019 at 0800     sucralfate (CARAFATE) 1 GM tablet Take 1 tablet (1 g) by mouth 4 times daily as needed (reflux symptoms) 100 tablet 0 7/14/2019 at Unknown time              Discharge Medications:     Current Discharge Medication List      START taking these medications    Details   ondansetron (ZOFRAN-ODT) 4 MG ODT tab Take 1 tablet (4 mg) by mouth every 6 hours as needed for nausea or vomiting  Qty: 20 tablet, Refills: 0    Associated  Diagnoses: Partial intestinal obstruction, unspecified cause (H)      pantoprazole (PROTONIX) 40 MG EC tablet Take 1 tablet (40 mg) by mouth every morning (before breakfast)  Qty: 30 tablet, Refills: 0    Associated Diagnoses: Partial intestinal obstruction, unspecified cause (H)      polyethylene glycol (MIRALAX/GLYCOLAX) packet 17 g by Per J Tube route daily as needed for constipation  Qty: 30 packet, Refills: 0    Associated Diagnoses: Partial intestinal obstruction, unspecified cause (H)      simethicone (MYLICON) 80 MG chewable tablet Take 1 tablet (80 mg) by mouth every 6 hours as needed for cramping  Qty: 30 tablet, Refills: 0    Associated Diagnoses: Partial intestinal obstruction, unspecified cause (H)         CONTINUE these medications which have NOT CHANGED    Details   acetaminophen (TYLENOL) 325 MG tablet Take 3 tablets (975 mg) by mouth every 8 hours as needed for mild pain  Qty: 25 tablet, Refills: 0    Associated Diagnoses: Duodenal adenocarcinoma (H)      aspirin 81 MG EC tablet Take 81 mg by mouth every morning   Qty: 90 tablet, Refills: 3    Associated Diagnoses: ASCVD (arteriosclerotic cardiovascular disease)      atorvastatin (LIPITOR) 20 MG tablet Take 1 tablet (20 mg) by mouth every morning  Qty: 90 tablet, Refills: 3    Comments: Profile  Associated Diagnoses: Hyperlipidemia LDL goal <100      losartan (COZAAR) 25 MG tablet Take 1 tablet (25 mg) by mouth daily  Qty: 90 tablet, Refills: 3    Comments: Profile  Associated Diagnoses: Hypertension goal BP (blood pressure) < 140/90; ASCVD (arteriosclerotic cardiovascular disease)      metoclopramide (REGLAN) 10 MG tablet Take 1 tablet (10 mg) by mouth 3 times daily as needed (nausea)  Qty: 30 tablet, Refills: 0      metoprolol tartrate (LOPRESSOR) 25 MG tablet Take 1 tablet (25 mg) by mouth 2 times daily  Qty: 180 tablet, Refills: 3    Comments: Profile  Associated Diagnoses: S/P CABG (coronary artery bypass graft); S/P aortic aneurysm repair       ranitidine (ZANTAC) 150 MG tablet Take 1 tablet (150 mg) by mouth 2 times daily  Qty: 60 tablet, Refills: 0      sucralfate (CARAFATE) 1 GM tablet Take 1 tablet (1 g) by mouth 4 times daily as needed (reflux symptoms)  Qty: 100 tablet, Refills: 0                     Medications Discontinued or Adjusted During This Hospitalization:   No change           Antibiotics Prescribed at Discharge:   None prescribed           Day of Discharge Physical Exam:   Temp:  [96.1  F (35.6  C)-98.5  F (36.9  C)] 96.5  F (35.8  C)  Pulse:  [79-90] 80  Heart Rate:  [] 76  Resp:  [14-18] 16  BP: (106-159)/(54-83) 111/54  SpO2:  [91 %-100 %] 96 %    General: awake, alert, no acute distress, laying comfortably in bed   CV: warm, well perfused   Pulm: breathing comfortably on room air   Abdomen: soft, non-distended, non-tender, GJ site clean, dry and intact   Extremities: no edema, moving all extremities spontaneously and without apparent deficit            Final Pathology Result:   No new pathology           Discharge Instructions and Follow-Up:     Discharge Procedure Orders   Home infusion referral   Referral Priority: Routine   Number of Visits Requested: 1     Discharge Instructions   Order Comments: Discharge Instructions  Activity  - No activity restrictions    Drain Care  - You have a GJ tube and a PICC line.  Specific care/instructions: You received education on how to care for your GJ tube and PICC line. You will also have further instructions regarding care tomorrow, 8/14/19 at our [facility] clinic appointment.     Medications  - Do not take any additional Tylenol (acetaminophen) while using a narcotic pain medication which includes acetaminophen  - Do not take more than 4,000mg of acetaminophen in any 24 hour period, as this can cause liver damage  - Take stool softeners such as Senna or Miralax while you are using narcotics, but stop if you develop diarrhea  - Wean yourself off of narcotic pain medications    Follow-Up:  -  Call your primary care provider to touch base regarding your recent admission.     - Call or return sooner than your regularly scheduled visit if you develop any of the following: fever >101.5, uncontrolled pain, uncontrolled nausea or vomiting, as well as increased redness, swelling, or drainage from your wound.   -  A nurse from the General Surgery Clinic will contact you 24 hours, or next business day, after your discharge from the hospital.  If you have questions or to schedule a follow up appointment please call the General Surgery Clinic at [phone].  Call [phone] and ask to speak with the Surgery resident on call if you are having troubles in the evenings, at night, or on the weekend.  -  If you are receiving home care please inform your home care nurse of our contact number.     Adult [facility] Follow-up and recommended labs and tests   Order Comments: [facility] in the [facility] ([facility]) at [address] [place] [address], Floor 2, [address] for labs and assessment tomorrow, 8/14/19 at 2:00 PM.  Labs to be drawn everyday: BMP    Follow up with your surgeon, Dr. Brito, 1 week after discharge.    Call Melissa Vale at [phone] with any new onset or dramatic increase in pain, or difficulty obtaining or taking your medications.  If after hours, please call [phone] and an on call coordinator will assist you.    Appointments on [place] and/or [facility] (with [facility] or [facility] provider or service). Call [phone] if you haven't heard regarding these appointments within 7 days of discharge.              Home Health Care:     Arranged           Discharge Disposition:     Discharged to home      Condition at discharge: Stable    Patient was seen, examined, and discussed on day of discharge with chief resident, who discussed with staff surgeon.    I have personally reviewed and agree with the medical student's discharge summary.    Kavin Hill MD  PGY-1, General Surgery  Pager:  [phone]

## 2019-08-05 NOTE — PROGRESS NOTES
"Subjective:  - No acute events overnight.  - No pain.  - Mild nausea with tube feeds.  - No BM.     Objective:  Vital signs:  Temp: 99  F (37.2  C) Temp src: Oral BP: 138/68 Pulse: 95 Heart Rate: 91 Resp: 16 SpO2: 95 % O2 Device: None (Room air) Oxygen Delivery: 2 LPM Height: 170.2 cm (5' 7\") Weight: 78.2 kg (172 lb 6.4 oz)  Estimated body mass index is 27 kg/m  as calculated from the following:    Height as of this encounter: 1.702 m (5' 7\").    Weight as of this encounter: 78.2 kg (172 lb 6.4 oz).    I/O last 3 completed shifts:  In: 2670 [P.O.:50; I.V.:20; NG/GT:150; IV Piggyback:1750]  Out: 3100 [Urine:950; Emesis/NG output:2150]  NAD laying in bed   NG with bilious output  Abd soft, distended, non-tender  WWP    Labs:  Recent Labs   Lab Test 08/05/19  0459 08/04/19  0551    135   POTASSIUM 3.7 3.8   CHLORIDE 102 105   CO2 25 22   ANIONGAP 7 8   GLC 91 117*   BUN 18 19   CR 0.69 0.70   TARIQ 8.6 8.2*     CBC RESULTS:   Recent Labs   Lab Test 08/03/19  0602   WBC 12.0*   RBC 3.26*   HGB 8.9*   HCT 29.3*   MCV 90   MCH 27.3   MCHC 30.4*   RDW 13.9          Imaging:  None new     Assessment/Plan:   A/P: 74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 Course c/b perianastomitic hematoma and stricture.   -NG to suction  -NJ feeds to goal  -Daily 1L bolus plus replacements.   -To go to GI for GJ placement today  -Lovenox ppx     Seen, examined, and discussed with chief resident, who will discuss with staff.  - - - - - - - - - - - - - - - - - -  Kavin Hill MD  PGY-1, General Surgery  [phone]       "

## 2019-08-05 NOTE — ANESTHESIA CARE TRANSFER NOTE
Patient: Marilia Bean    Procedure(s):  ESOPHAGOGASTRODUODENOSCOPY (EGD)  CREATION, GASTROJEJUNOSTOMY, PERCUTANEOUS, ENDOSCOPIC    Diagnosis: Ostruction  Diagnosis Additional Information: No value filed.    Anesthesia Type:   General     Note:  Airway :Face Mask  Patient transferred to:PACU  Comments: To PACU with CRNA, RN. Pt alert, responsive, VSS on O2 per FM. Report and care to PACU RN  Handoff Report: Identifed the Patient, Identified the Reponsible Provider, Reviewed the pertinent medical history, Discussed the surgical course, Reviewed Intra-OP anesthesia mangement and issues during anesthesia, Set expectations for post-procedure period and Allowed opportunity for questions and acknowledgement of understanding      Vitals: (Last set prior to Anesthesia Care Transfer)    CRNA VITALS  8/5/2019 1021 - 8/5/2019 1101      8/5/2019             Resp Rate (observed):  2  (Abnormal)                 Electronically Signed By: CECILIA Plaza CRNA  August 5, 2019  11:01 AM

## 2019-08-05 NOTE — PLAN OF CARE
[id number]  Neuro: A/Ox4.  Vitals: AVSS.   Respiratory: O2 sats stable on RA  GI/: voiding spontaneously. No BM this shift, passing gas, hypoactive BS. Last BM 7/17  Diet/appetite: NPO. TF at goal of 50mL/hr. TF to be turned off at 0000 for Procedure tomorrow.   Activity:  Independent  Pain: Denies this shift.  Skin: no new deficits noted.  LDA's: L nostril NJ to LIS, R NG with TF at 50mL/hr. Double lumen PICC infusing 1:1 LR replacement.   Scrub completed.   Plan: G-J tube placement tomorrow. Continue with POC. Notify primary team with changes.

## 2019-08-05 NOTE — PROGRESS NOTES
CLINICAL NUTRITION SERVICES -  BRIEF NOTE     Nutrition Prescription    RECOMMENDATIONS FOR MDs/PROVIDERS TO ORDER:  Recommend to continue MIVF's for hydration with venting G tube.    Recommendations already ordered by Registered Dietitian (RD):  - Modified TF orders to reflect new route for feeding via J tube and to resume TF with Nutren 1.5 @ 10 ml/hr x 4-6 hrs with adv by 10 ml every 4-6 hrs per pt's tolerance to goal of 50 ml/hr.           Per chart review and RN report, pt had a G-J tube placed by GI today. G tube to gravity and J tube ready for feeding.  Intervention: Spoke with Primary team to confirm that J tube ready to use.   Modified TF orders as outlined above.  Gay Poole RD,LD  Unit pager [phone]

## 2019-08-05 NOTE — ANESTHESIA POSTPROCEDURE EVALUATION
Anesthesia POST Procedure Evaluation    Patient: Marilia Bean   MRN:     [id number] Gender:   female   Age:    74 year old :      1945        Preoperative Diagnosis: Ostruction   Procedure(s):  ESOPHAGOGASTRODUODENOSCOPY (EGD)  Gastrojejunostomy tube placement with gastropexy   Postop Comments: No value filed.       Anesthesia Type:  Not documented  General    Reportable Event: NO     PAIN: Uncomplicated   Sign Out status: Comfortable, Well controlled pain     PONV: No PONV   Sign Out status:  No Nausea or Vomiting     Neuro/Psych: Uneventful perioperative course   Sign Out Status: Preoperative baseline; Age appropriate mentation     Airway/Resp.: Uneventful perioperative course   Sign Out Status: Non labored breathing, age appropriate RR; Resp. Status within EXPECTED Parameters     CV: Uneventful perioperative course   Sign Out status: Appropriate BP and perfusion indices; Appropriate HR/Rhythm     Disposition:   Sign Out in:  PACU  Disposition:  Phase II; Home  Recovery Course: Uneventful  Follow-Up: Not required     Comments/Narrative:  Patient awake to voice, clear a/w.  Stable VS.  No new or current issues evident at this time.  OK out per PACU protocol.  --rcp                       Last Anesthesia Record Vitals:  CRNA VITALS  2019 1021 - 2019 1112      2019             Resp Rate (observed):  2  (Abnormal)           Last PACU Vitals:  Vitals Value Taken Time   /80 2019 11:10 AM   Temp 37  C (98.6  F) 2019 11:00 AM   Pulse 88 2019 11:10 AM   Resp     SpO2 100 % 2019 11:11 AM   Temp src     NIBP     Pulse     SpO2     Resp     Temp     Ht Rate     Temp 2     Vitals shown include unvalidated device data.      Electronically Signed By: Bernard Meléndez MD, 2019, 11:12 AM

## 2019-08-05 NOTE — PROGRESS NOTES
Care Coordinator - Discharge Planning    Admission Date/Time:  7/15/2019  Attending MD:  Joaquin Brito,*     Data  Date of initial CC assessment: 7/22/19  Chart reviewed, discussed with interdisciplinary team.   Patient was admitted for: No diagnosis found.     Assessment   Full assessment completed in previous note    Coordination of Care and Referrals: Provided patient/family with options for Home Infusion.  Per MD team, patient will likely be ready for discharge on Thursday. She is down for  G/J tube placement today. Has had feeds at goal per NJ prior to tube placement today. Earlier in stay, it was thought she would need TPN at discharge. Referral had been made to [facility]. This RNCC updated I today that patient will have Enteral instead of TPN. Per benefit check, she has coverage for home feeds.        Plan  Anticipated Discharge Date:  8/8/19  Anticipated Discharge Plan:  Home with G/J tube    Leif Ibarra RN Care Coordinator [phone]

## 2019-08-05 NOTE — PLAN OF CARE
Focus: Post GJ tube placement  D:Pt went down to OR @ ~ 08:30am for GJ tube placement. She return back to the floor at ~ 13:00. Pt drowsy but arouses to voice. Pt stated feeling tired and pain was in good control Abd G-J tube site with small amount of bloody drainage. G-tube connect to kaur bag for gravity drainage and GJ tube is clamp. Nutrition consult notified and waiting for recommendations from nutrition consult at what rate to restart tube feeding. Purple lumen in PICC is occluded. Will need alteplase this evening. Pt also needing potassium and magnesium replacement. . Teaching done with pt regarding fall precaution. Pt instructed not to get up by herself. Wait for nursing staff to assist walking to the bathroom

## 2019-08-05 NOTE — PLAN OF CARE
[id number]    AVSS, on RA, denies pain, dizziness, SOB. C/O nausea, and bloating (ole). IV compazine 5 mg x1 given for nausea, simethicone 80 mg chewable tablet given for feeling of fullness (ole). Stable BG, no insulin given. Tube Feeding stopped at 0000 for procedure (Esophagogastroduodenoscopy, and Gastrojejunostomy creation). NG output is 700 mL, and replaced with  mL, over about 2.5 hours. Continue monitoring.

## 2019-08-06 LAB
ANION GAP SERPL CALCULATED.3IONS-SCNC: 11 MMOL/L (ref 3–14)
BUN SERPL-MCNC: 13 MG/DL (ref 7–30)
CALCIUM SERPL-MCNC: 8.5 MG/DL (ref 8.5–10.1)
CHLORIDE SERPL-SCNC: 98 MMOL/L (ref 94–109)
CO2 SERPL-SCNC: 23 MMOL/L (ref 20–32)
CREAT SERPL-MCNC: 0.66 MG/DL (ref 0.52–1.04)
GFR SERPL CREATININE-BSD FRML MDRD: 87 ML/MIN/{1.73_M2}
GLUCOSE BLDC GLUCOMTR-MCNC: 111 MG/DL (ref 70–99)
GLUCOSE BLDC GLUCOMTR-MCNC: 115 MG/DL (ref 70–99)
GLUCOSE BLDC GLUCOMTR-MCNC: 122 MG/DL (ref 70–99)
GLUCOSE BLDC GLUCOMTR-MCNC: 132 MG/DL (ref 70–99)
GLUCOSE BLDC GLUCOMTR-MCNC: 140 MG/DL (ref 70–99)
GLUCOSE SERPL-MCNC: 95 MG/DL (ref 70–99)
POTASSIUM SERPL-SCNC: 3.8 MMOL/L (ref 3.4–5.3)
SODIUM SERPL-SCNC: 132 MMOL/L (ref 133–144)

## 2019-08-06 PROCEDURE — 25000132 ZZH RX MED GY IP 250 OP 250 PS 637: Performed by: INTERNAL MEDICINE

## 2019-08-06 PROCEDURE — 12000001 ZZH R&B MED SURG/OB UMMC

## 2019-08-06 PROCEDURE — 25000128 H RX IP 250 OP 636: Performed by: INTERNAL MEDICINE

## 2019-08-06 PROCEDURE — 27210429 ZZH NUTRITION PRODUCT INTERMEDIATE LITER

## 2019-08-06 PROCEDURE — 25800030 ZZH RX IP 258 OP 636: Performed by: INTERNAL MEDICINE

## 2019-08-06 PROCEDURE — C9113 INJ PANTOPRAZOLE SODIUM, VIA: HCPCS | Performed by: INTERNAL MEDICINE

## 2019-08-06 PROCEDURE — 25800030 ZZH RX IP 258 OP 636: Performed by: STUDENT IN AN ORGANIZED HEALTH CARE EDUCATION/TRAINING PROGRAM

## 2019-08-06 PROCEDURE — 36592 COLLECT BLOOD FROM PICC: CPT | Performed by: INTERNAL MEDICINE

## 2019-08-06 PROCEDURE — 25000128 H RX IP 250 OP 636: Performed by: STUDENT IN AN ORGANIZED HEALTH CARE EDUCATION/TRAINING PROGRAM

## 2019-08-06 PROCEDURE — 80048 BASIC METABOLIC PNL TOTAL CA: CPT | Performed by: INTERNAL MEDICINE

## 2019-08-06 PROCEDURE — 25000132 ZZH RX MED GY IP 250 OP 250 PS 637: Performed by: STUDENT IN AN ORGANIZED HEALTH CARE EDUCATION/TRAINING PROGRAM

## 2019-08-06 PROCEDURE — 00000146 ZZHCL STATISTIC GLUCOSE BY METER IP

## 2019-08-06 PROCEDURE — 40000558 ZZH STATISTIC CVC DRESSING CHANGE

## 2019-08-06 RX ORDER — POTASSIUM CHLORIDE 750 MG/1
20-40 TABLET, EXTENDED RELEASE ORAL
Status: DISCONTINUED | OUTPATIENT
Start: 2019-08-06 | End: 2019-08-13 | Stop reason: HOSPADM

## 2019-08-06 RX ORDER — POTASSIUM CHLORIDE 1.5 G/1.58G
20-40 POWDER, FOR SOLUTION ORAL
Status: DISCONTINUED | OUTPATIENT
Start: 2019-08-06 | End: 2019-08-13 | Stop reason: HOSPADM

## 2019-08-06 RX ORDER — POTASSIUM CHLORIDE 29.8 MG/ML
20 INJECTION INTRAVENOUS
Status: DISCONTINUED | OUTPATIENT
Start: 2019-08-06 | End: 2019-08-13 | Stop reason: HOSPADM

## 2019-08-06 RX ORDER — POLYETHYLENE GLYCOL 3350 17 G/17G
17 POWDER, FOR SOLUTION ORAL DAILY
Status: DISCONTINUED | OUTPATIENT
Start: 2019-08-06 | End: 2019-08-10

## 2019-08-06 RX ORDER — MAGNESIUM SULFATE HEPTAHYDRATE 40 MG/ML
4 INJECTION, SOLUTION INTRAVENOUS EVERY 4 HOURS PRN
Status: DISCONTINUED | OUTPATIENT
Start: 2019-08-06 | End: 2019-08-13 | Stop reason: HOSPADM

## 2019-08-06 RX ORDER — POTASSIUM CHLORIDE 7.45 MG/ML
10 INJECTION INTRAVENOUS
Status: DISCONTINUED | OUTPATIENT
Start: 2019-08-06 | End: 2019-08-13 | Stop reason: HOSPADM

## 2019-08-06 RX ORDER — POTASSIUM CL/LIDO/0.9 % NACL 10MEQ/0.1L
10 INTRAVENOUS SOLUTION, PIGGYBACK (ML) INTRAVENOUS
Status: DISCONTINUED | OUTPATIENT
Start: 2019-08-06 | End: 2019-08-13 | Stop reason: HOSPADM

## 2019-08-06 RX ADMIN — ENOXAPARIN SODIUM 40 MG: 40 INJECTION SUBCUTANEOUS at 09:56

## 2019-08-06 RX ADMIN — SODIUM CHLORIDE, POTASSIUM CHLORIDE, SODIUM LACTATE AND CALCIUM CHLORIDE 1000 ML: 600; 310; 30; 20 INJECTION, SOLUTION INTRAVENOUS at 22:01

## 2019-08-06 RX ADMIN — SODIUM CHLORIDE, POTASSIUM CHLORIDE, SODIUM LACTATE AND CALCIUM CHLORIDE 1000 ML: 600; 310; 30; 20 INJECTION, SOLUTION INTRAVENOUS at 10:00

## 2019-08-06 RX ADMIN — LOSARTAN POTASSIUM 25 MG: 25 TABLET ORAL at 09:56

## 2019-08-06 RX ADMIN — SIMETHICONE CHEW TAB 80 MG 80 MG: 80 TABLET ORAL at 02:31

## 2019-08-06 RX ADMIN — SODIUM CHLORIDE, POTASSIUM CHLORIDE, SODIUM LACTATE AND CALCIUM CHLORIDE 850 ML: 600; 310; 30; 20 INJECTION, SOLUTION INTRAVENOUS at 05:52

## 2019-08-06 RX ADMIN — SODIUM CHLORIDE, POTASSIUM CHLORIDE, SODIUM LACTATE AND CALCIUM CHLORIDE 525 ML: 600; 310; 30; 20 INJECTION, SOLUTION INTRAVENOUS at 13:43

## 2019-08-06 RX ADMIN — PANTOPRAZOLE SODIUM 40 MG: 40 INJECTION, POWDER, FOR SOLUTION INTRAVENOUS at 09:58

## 2019-08-06 RX ADMIN — POTASSIUM CHLORIDE 20 MEQ: 400 INJECTION, SOLUTION INTRAVENOUS at 09:55

## 2019-08-06 RX ADMIN — METOPROLOL TARTRATE 25 MG: 25 TABLET ORAL at 09:57

## 2019-08-06 RX ADMIN — METOPROLOL TARTRATE 25 MG: 25 TABLET ORAL at 20:00

## 2019-08-06 RX ADMIN — POLYETHYLENE GLYCOL 3350 17 G: 17 POWDER, FOR SOLUTION ORAL at 09:54

## 2019-08-06 RX ADMIN — PROCHLORPERAZINE EDISYLATE 5 MG: 5 INJECTION INTRAMUSCULAR; INTRAVENOUS at 02:31

## 2019-08-06 RX ADMIN — SODIUM CHLORIDE, POTASSIUM CHLORIDE, SODIUM LACTATE AND CALCIUM CHLORIDE 1000 ML: 600; 310; 30; 20 INJECTION, SOLUTION INTRAVENOUS at 16:30

## 2019-08-06 RX ADMIN — PANTOPRAZOLE SODIUM 40 MG: 40 INJECTION, POWDER, FOR SOLUTION INTRAVENOUS at 20:01

## 2019-08-06 ASSESSMENT — ACTIVITIES OF DAILY LIVING (ADL)
ADLS_ACUITY_SCORE: 13

## 2019-08-06 ASSESSMENT — MIFFLIN-ST. JEOR: SCORE: 1301.02

## 2019-08-06 NOTE — PROGRESS NOTES
"Subjective:  No acute events overnight. Had GJ placed by IR yesterday. Had some fullness with TF yesterday so they were held overnight. This AM feeling better. Slightly high output from G-tube.     Objective:  Vital signs:  Temp: 95.9  F (35.5  C) Temp src: Oral BP: 133/63 Pulse: 78 Heart Rate: 79 Resp: 16 SpO2: 96 % O2 Device: None (Room air) Oxygen Delivery: 1 LPM Height: 170.2 cm (5' 7\") Weight: 76.8 kg (169 lb 6.4 oz)  Estimated body mass index is 26.53 kg/m  as calculated from the following:    Height as of this encounter: 1.702 m (5' 7\").    Weight as of this encounter: 76.8 kg (169 lb 6.4 oz).    I/O last 3 completed shifts:  In: 960 [P.O.:100; I.V.:420; IV Piggyback:300]  Out: 2095 [Urine:900; Emesis/NG output:1190; Blood:5]  NAD laying in bed   G-tube with bilious output, J-tube with feeds running  Abd soft, distended, non-tender  WWP    Labs:  Recent Labs   Lab Test 08/05/19  0459 08/04/19  0551    135   POTASSIUM 3.7 3.8   CHLORIDE 102 105   CO2 25 22   ANIONGAP 7 8   GLC 91 117*   BUN 18 19   CR 0.69 0.70   TARIQ 8.6 8.2*     CBC RESULTS:   Recent Labs   Lab Test 08/03/19  0602   WBC 12.0*   RBC 3.26*   HGB 8.9*   HCT 29.3*   MCV 90   MCH 27.3   MCHC 30.4*   RDW 13.9          Imaging:  None new     Assessment/Plan:   A/P: 74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 Course c/b perianastomitic hematoma and stricture. Now s/p GJ tube placement by GI 8/6.    -G tube to gravity, ok for meds  -J tube for feeds to goal  -Daily 1L bolus plus 2:1 replacement of G tube output  -Mirilax via G tube daily  -Lovenox ppx restarted     Seen, examined, and discussed with chief resident, who will discuss with staff.  - - - - - - - - - - - - - - - - - -  Kavin Hill MD  PGY-1, General Surgery  [phone]       "

## 2019-08-06 NOTE — PLAN OF CARE
Pt alert and oriented x4. Pt VSS. Pt having slight pain at tube site. Pt's tube dressing site cleaned and new dressing put on. Pt given zofran for nausea and simethicone for gas. Pt's blood sugar stable. No coverage needed. Pt's tube feedings restarted @1630. Pt reported discomfort. Tube feedings stopped @1800 due to discomfort. Tube feeding restarted at 2030 and then stopped again @2130.  Tube feeding currently running @10ml/hr. Pt's K+ 3.7, 20 mEq given/ Recheck in morning. Pt's magnesium replaced with recheck in morning. Pt given 500ml bolus for 500ml output for ng tube. Continue to monitor.

## 2019-08-06 NOTE — PROGRESS NOTES
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 GI has been consulted for possible endoscopic treatment.     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 Biopsies with no evidence of residual adenoma and/or adenocarcinoma. CT A/P from 7/29 with IV and oral contrast without anastomotic leak, possible anastomotic stricture. S/p EGD/EUS 7/31 with findings of external compression of the anastomoses by keyla-anastomotic hematoma (healthy mucosa internally), which was drained with 10mm Axios stent. Now s/p GJ placement for ongoing GOO and nutrition. Working on advancing TF.     Recommendations  - Advance tube feeds as tolerated (dietitian following)  - G tube to gravity prn  - Consider pharmacy consultation for discussion about meds through G tube vs J tube vs IV  - T tags to be removed within 2 weeks if they have not fallen off already  - Discussed with surgical team  - GI will continue to follow with you    The patient was discussed and plan agreed upon with GI staff, Dr Horton.      Janae Ponce PA-C   Advanced [facility]oscopy/Pancreaticobiliary [facility]  [facility]  Pager *[phone]  _______________________________________________________________      Subjective: Nursing notes and 24hr events reviewed. Patient seen and examined at 1030. Patient reports mild pain at GJ  "tube site. Feels bloating with advancement of tube feeds. No BM yet today but is passing gas. No fevers.    ROS:   4 pt ROS negative unless noted in subjective.     Objective:  Blood pressure 133/63, pulse 78, temperature 95.9  F (35.5  C), temperature source Oral, resp. rate 16, height 1.702 m (5' 7\"), weight 76.8 kg (169 lb 6.4 oz), SpO2 96 %.  Gen: Sitting in bed. Appears comfortable  HEENT: NCAT. Conjunctiva clear. Sclera anicteric   CV: RRR, Peripheral perfusion intact  Resp: normal work of breathing  Abd: Soft, RUQ incision CDI, +BS, mild distension. G tube with three t-tags with dried blood, tender around tube but no drainage  Msk: no gross deformity  Skin:  no jaundice  Ext: warm, well perfused  Neuro: grossly normal  Mental status/Psych: A&O. Asks/answers questions appropriately    Date 08/06/19 0700 - 08/07/19 0659   Shift 9265-1785 8111-4706 [id number] 24 Hour Total   INTAKE   Shift Total(mL/kg)       OUTPUT   Urine 200   200   Emesis/NG output 600   600   Shift Total(mL/kg) 800(10.41)   800(10.41)   Weight (kg) 76.84 76.84 76.84 76.84       LABS:  BMP  Recent Labs   Lab 08/06/19  0506 08/05/19  0459 08/04/19  0551 08/03/19  0602   * 134 135 133   POTASSIUM 3.8 3.7 3.8 3.9   CHLORIDE 98 102 105 102   TARIQ 8.5 8.6 8.2* 8.7   CO2 23 25 22 24   BUN 13 18 19 24   CR 0.66 0.69 0.70 0.71   GLC 95 91 117* 118*     CBC  Recent Labs   Lab 08/03/19  0602 08/02/19  0727 08/01/19  0503   WBC 12.0* 14.3* 14.3*   RBC 3.26* 3.51* 3.61*   HGB 8.9* 9.6* 9.9*   HCT 29.3* 32.3* 33.0*   MCV 90 92 91   MCH 27.3 27.4 27.4   MCHC 30.4* 29.7* 30.0*   RDW 13.9 13.9 13.6    403 442     INR  Recent Labs   Lab 08/05/19  0459   INR 1.10     LFTs  Recent Labs   Lab 08/05/19  0459   ALKPHOS 212*   AST 21   ALT 38   BILITOTAL 0.7   PROTTOTAL 6.3*   ALBUMIN 2.4*      PANCNo lab results found in last 7 days.      IMAGING:  Reviewed    "

## 2019-08-06 NOTE — PROGRESS NOTES
"Subjective:  - No acute events overnight.  - Doing \"fine,\" some distension after GJ placement yesterday.  - No flatus or BM.  - Feeds were held overnight due to discomfort.     Objective:  Vital signs:  Temp: 98.4  F (36.9  C) Temp src: Oral BP: 120/65 Pulse: 78 Heart Rate: 85 Resp: 18 SpO2: 93 % O2 Device: None (Room air) Oxygen Delivery: 1 LPM Height: 170.2 cm (5' 7\") Weight: 76.8 kg (169 lb 6.4 oz)  Estimated body mass index is 26.53 kg/m  as calculated from the following:    Height as of this encounter: 1.702 m (5' 7\").    Weight as of this encounter: 76.8 kg (169 lb 6.4 oz).    I/O last 3 completed shifts:  In: 940 [P.O.:100; I.V.:420; IV Piggyback:300]  Out: 2095 [Urine:900; Emesis/NG output:1190; Blood:5]    Exam:  NAD laying in bed   NG and NJ tubes removed  New GJ with green, semi-cloudy output  Abd soft, distended, non-tender  WWP    Labs:  Recent Labs   Lab Test 08/05/19  0459 08/04/19  0551    135   POTASSIUM 3.7 3.8   CHLORIDE 102 105   CO2 25 22   ANIONGAP 7 8   GLC 91 117*   BUN 18 19   CR 0.69 0.70   TARIQ 8.6 8.2*     CBC RESULTS:   Recent Labs   Lab Test 08/03/19  0602   WBC 12.0*   RBC 3.26*   HGB 8.9*   HCT 29.3*   MCV 90   MCH 27.3   MCHC 30.4*   RDW 13.9          Imaging:  None new     Assessment/Plan:   A/P: 74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 Course c/b perianastomitic hematoma and stricture.   -GJ placement 8/5. G tube to gravity and for meds. J tube may be used for feeds.  -Increase IVF to daily 2L bolus plus replacements.   -Lovenox ppx  -Miralax daily with feeds     Seen, examined, and discussed with ***  - - - - - - - - - - - - - - - - - -  Becka Denise MS3     "

## 2019-08-06 NOTE — PLAN OF CARE
Potassium replacement for potassium level of 3.8. Recheck for tomorrow morning. Pt states mild nausea when administering medications through the g-tube. Declines antiemetics. Pt currently NPO. Pt received 1L bolus (one time order for today) of LR for low urine output per MD. Tube feed at 20 ml/hr. Pt tolerating without n/v/d. Plan to increase tube feeding rate to 30 at 13:30. Pt intake and output should be strictly monitored.G-tube output was 525 ml, LR bolus given.     Boyd Schroeder RN on 8/6/2019 at 2:45 PM

## 2019-08-06 NOTE — PLAN OF CARE
[id number]    AVSS, on RA, denies dizziness, SOB. C/O nausea, abdominal discomfort. PRN compazine and simethicone given. Pt tolerated 10 mL/hr TF formula, and at 0530 increased to 20 mL/hr 850 GJ output and replaced with 850 mL LR.  ordered another bolus bag of LR this morning during rounding. The LR bolus should be administered after 1:1 replacement done. Continue monitoring.

## 2019-08-07 ENCOUNTER — HOME INFUSION (PRE-WILLOW HOME INFUSION) (OUTPATIENT)
Dept: PHARMACY | Facility: CLINIC | Age: 74
End: 2019-08-07

## 2019-08-07 ENCOUNTER — APPOINTMENT (OUTPATIENT)
Dept: GENERAL RADIOLOGY | Facility: CLINIC | Age: 74
DRG: 327 | End: 2019-08-07
Attending: SURGERY
Payer: MEDICARE

## 2019-08-07 LAB
ANION GAP SERPL CALCULATED.3IONS-SCNC: 7 MMOL/L (ref 3–14)
BUN SERPL-MCNC: 12 MG/DL (ref 7–30)
CALCIUM SERPL-MCNC: 8.3 MG/DL (ref 8.5–10.1)
CHLORIDE SERPL-SCNC: 102 MMOL/L (ref 94–109)
CO2 SERPL-SCNC: 27 MMOL/L (ref 20–32)
CREAT SERPL-MCNC: 0.65 MG/DL (ref 0.52–1.04)
ERYTHROCYTE [DISTWIDTH] IN BLOOD BY AUTOMATED COUNT: 13.6 % (ref 10–15)
GFR SERPL CREATININE-BSD FRML MDRD: 87 ML/MIN/{1.73_M2}
GLUCOSE BLDC GLUCOMTR-MCNC: 117 MG/DL (ref 70–99)
GLUCOSE BLDC GLUCOMTR-MCNC: 122 MG/DL (ref 70–99)
GLUCOSE BLDC GLUCOMTR-MCNC: 124 MG/DL (ref 70–99)
GLUCOSE BLDC GLUCOMTR-MCNC: 128 MG/DL (ref 70–99)
GLUCOSE BLDC GLUCOMTR-MCNC: 130 MG/DL (ref 70–99)
GLUCOSE BLDC GLUCOMTR-MCNC: 135 MG/DL (ref 70–99)
GLUCOSE SERPL-MCNC: 153 MG/DL (ref 70–99)
HCT VFR BLD AUTO: 30.4 % (ref 35–47)
HGB BLD-MCNC: 9.3 G/DL (ref 11.7–15.7)
INTERPRETATION ECG - MUSE: NORMAL
MAGNESIUM SERPL-MCNC: 1.8 MG/DL (ref 1.6–2.3)
MCH RBC QN AUTO: 27.2 PG (ref 26.5–33)
MCHC RBC AUTO-ENTMCNC: 30.6 G/DL (ref 31.5–36.5)
MCV RBC AUTO: 89 FL (ref 78–100)
PHOSPHATE SERPL-MCNC: 2.5 MG/DL (ref 2.5–4.5)
PLATELET # BLD AUTO: 413 10E9/L (ref 150–450)
POTASSIUM SERPL-SCNC: 4 MMOL/L (ref 3.4–5.3)
RBC # BLD AUTO: 3.42 10E12/L (ref 3.8–5.2)
SODIUM SERPL-SCNC: 136 MMOL/L (ref 133–144)
TROPONIN I SERPL-MCNC: <0.015 UG/L (ref 0–0.04)
WBC # BLD AUTO: 11.3 10E9/L (ref 4–11)

## 2019-08-07 PROCEDURE — 25000128 H RX IP 250 OP 636: Performed by: INTERNAL MEDICINE

## 2019-08-07 PROCEDURE — 25800030 ZZH RX IP 258 OP 636: Performed by: STUDENT IN AN ORGANIZED HEALTH CARE EDUCATION/TRAINING PROGRAM

## 2019-08-07 PROCEDURE — 84100 ASSAY OF PHOSPHORUS: CPT | Performed by: STUDENT IN AN ORGANIZED HEALTH CARE EDUCATION/TRAINING PROGRAM

## 2019-08-07 PROCEDURE — 25000125 ZZHC RX 250: Performed by: STUDENT IN AN ORGANIZED HEALTH CARE EDUCATION/TRAINING PROGRAM

## 2019-08-07 PROCEDURE — 83735 ASSAY OF MAGNESIUM: CPT | Performed by: STUDENT IN AN ORGANIZED HEALTH CARE EDUCATION/TRAINING PROGRAM

## 2019-08-07 PROCEDURE — 25800030 ZZH RX IP 258 OP 636: Performed by: INTERNAL MEDICINE

## 2019-08-07 PROCEDURE — 85027 COMPLETE CBC AUTOMATED: CPT | Performed by: PHYSICIAN ASSISTANT

## 2019-08-07 PROCEDURE — 00000146 ZZHCL STATISTIC GLUCOSE BY METER IP

## 2019-08-07 PROCEDURE — 93010 ELECTROCARDIOGRAM REPORT: CPT | Performed by: INTERNAL MEDICINE

## 2019-08-07 PROCEDURE — 25000132 ZZH RX MED GY IP 250 OP 250 PS 637: Performed by: INTERNAL MEDICINE

## 2019-08-07 PROCEDURE — 71045 X-RAY EXAM CHEST 1 VIEW: CPT

## 2019-08-07 PROCEDURE — 25000132 ZZH RX MED GY IP 250 OP 250 PS 637: Performed by: STUDENT IN AN ORGANIZED HEALTH CARE EDUCATION/TRAINING PROGRAM

## 2019-08-07 PROCEDURE — 36592 COLLECT BLOOD FROM PICC: CPT | Performed by: STUDENT IN AN ORGANIZED HEALTH CARE EDUCATION/TRAINING PROGRAM

## 2019-08-07 PROCEDURE — 84484 ASSAY OF TROPONIN QUANT: CPT | Performed by: STUDENT IN AN ORGANIZED HEALTH CARE EDUCATION/TRAINING PROGRAM

## 2019-08-07 PROCEDURE — 80048 BASIC METABOLIC PNL TOTAL CA: CPT | Performed by: STUDENT IN AN ORGANIZED HEALTH CARE EDUCATION/TRAINING PROGRAM

## 2019-08-07 PROCEDURE — 93005 ELECTROCARDIOGRAM TRACING: CPT

## 2019-08-07 PROCEDURE — C9113 INJ PANTOPRAZOLE SODIUM, VIA: HCPCS | Performed by: INTERNAL MEDICINE

## 2019-08-07 PROCEDURE — 12000001 ZZH R&B MED SURG/OB UMMC

## 2019-08-07 PROCEDURE — 36592 COLLECT BLOOD FROM PICC: CPT | Performed by: PHYSICIAN ASSISTANT

## 2019-08-07 PROCEDURE — 25000128 H RX IP 250 OP 636: Performed by: STUDENT IN AN ORGANIZED HEALTH CARE EDUCATION/TRAINING PROGRAM

## 2019-08-07 RX ORDER — PANTOPRAZOLE SODIUM 40 MG/1
40 TABLET, DELAYED RELEASE ORAL
Status: DISCONTINUED | OUTPATIENT
Start: 2019-08-08 | End: 2019-08-13 | Stop reason: HOSPADM

## 2019-08-07 RX ADMIN — SODIUM CHLORIDE, POTASSIUM CHLORIDE, SODIUM LACTATE AND CALCIUM CHLORIDE 1000 ML: 600; 310; 30; 20 INJECTION, SOLUTION INTRAVENOUS at 09:47

## 2019-08-07 RX ADMIN — LOSARTAN POTASSIUM 25 MG: 25 TABLET ORAL at 09:48

## 2019-08-07 RX ADMIN — METOPROLOL TARTRATE 25 MG: 25 TABLET ORAL at 09:48

## 2019-08-07 RX ADMIN — PANTOPRAZOLE SODIUM 40 MG: 40 INJECTION, POWDER, FOR SOLUTION INTRAVENOUS at 09:48

## 2019-08-07 RX ADMIN — METOPROLOL TARTRATE 25 MG: 25 TABLET ORAL at 20:02

## 2019-08-07 RX ADMIN — ONDANSETRON 4 MG: 2 INJECTION INTRAMUSCULAR; INTRAVENOUS at 00:19

## 2019-08-07 RX ADMIN — POTASSIUM CHLORIDE 20 MEQ: 400 INJECTION, SOLUTION INTRAVENOUS at 11:00

## 2019-08-07 RX ADMIN — ENOXAPARIN SODIUM 40 MG: 40 INJECTION SUBCUTANEOUS at 09:47

## 2019-08-07 RX ADMIN — Medication 2 G: at 12:30

## 2019-08-07 RX ADMIN — POLYETHYLENE GLYCOL 3350 17 G: 17 POWDER, FOR SOLUTION ORAL at 09:47

## 2019-08-07 RX ADMIN — SIMETHICONE CHEW TAB 80 MG 80 MG: 80 TABLET ORAL at 00:23

## 2019-08-07 RX ADMIN — POTASSIUM PHOSPHATE, MONOBASIC AND POTASSIUM PHOSPHATE, DIBASIC 10 MMOL: 224; 236 INJECTION, SOLUTION INTRAVENOUS at 18:33

## 2019-08-07 ASSESSMENT — ACTIVITIES OF DAILY LIVING (ADL)
ADLS_ACUITY_SCORE: 13

## 2019-08-07 ASSESSMENT — MIFFLIN-ST. JEOR: SCORE: 1298.3

## 2019-08-07 ASSESSMENT — PAIN DESCRIPTION - DESCRIPTORS: DESCRIPTORS: ACHING;PRESSURE

## 2019-08-07 NOTE — PROGRESS NOTES
"General Surgery Progress Note    Subjective:  Had diffuse chest pain last night, troponin negative. No new complaints. Had a BM last night.     Objective:  Vital signs:  Temp: 97.7  F (36.5  C) Temp src: Oral BP: 104/51 Pulse: 101 Heart Rate: 87 Resp: 16 SpO2: 93 % O2 Device: None (Room air) Oxygen Delivery: 1 LPM Height: 170.2 cm (5' 7\") Weight: 76.6 kg (168 lb 12.8 oz)  Estimated body mass index is 26.44 kg/m  as calculated from the following:    Height as of this encounter: 1.702 m (5' 7\").    Weight as of this encounter: 76.6 kg (168 lb 12.8 oz).    I/O last 3 completed shifts:  In: 4015 [P.O.:50; I.V.:75; NG/GT:50; IV Piggyback:3000]  Out: 3200 [Urine:1500; Emesis/NG output:1700]  NAD laying in bed   G-tube in place, no drainage around tube site. J-tube with feeds running at goal of 50 mL/hr.  Abd soft, distended, non-tender  WWP    Labs:  Recent Labs   Lab Test 08/05/19  0459 08/04/19  0551    135   POTASSIUM 3.7 3.8   CHLORIDE 102 105   CO2 25 22   ANIONGAP 7 8   GLC 91 117*   BUN 18 19   CR 0.69 0.70   TARIQ 8.6 8.2*     CBC RESULTS:   Last Comprehensive Metabolic Panel:  Sodium   Date Value Ref Range Status   08/07/2019 136 133 - 144 mmol/L Final     Potassium   Date Value Ref Range Status   08/07/2019 4.0 3.4 - 5.3 mmol/L Final     Chloride   Date Value Ref Range Status   08/07/2019 102 94 - 109 mmol/L Final     Carbon Dioxide   Date Value Ref Range Status   08/07/2019 27 20 - 32 mmol/L Final     Anion Gap   Date Value Ref Range Status   08/07/2019 7 3 - 14 mmol/L Final     Glucose   Date Value Ref Range Status   08/07/2019 153 (H) 70 - 99 mg/dL Final     Urea Nitrogen   Date Value Ref Range Status   08/07/2019 12 7 - 30 mg/dL Final     Creatinine   Date Value Ref Range Status   08/07/2019 0.65 0.52 - 1.04 mg/dL Final     GFR Estimate   Date Value Ref Range Status   08/07/2019 87 >60 mL/min/[1.73_m2] Final     Comment:     Non  GFR Calc  Starting 12/18/2018, serum creatinine based " estimated GFR (eGFR) will be   calculated using the Chronic Kidney Disease Epidemiology Collaboration   (CKD-EPI) equation.       Calcium   Date Value Ref Range Status   08/07/2019 8.3 (L) 8.5 - 10.1 mg/dL Final     Imaging:  None new.     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 Course c/b perianastomitic hematoma and stricture. Now s/p GJ tube placement by GI 8/6.    -Switch all meds to oral, except miralax through J tube.  -Continue J tube feeds to goal.  -Daily 1L bolus of LR.  -Plan for discharge Friday.     Seen, examined, and discussed with chief resident, who will discuss with staff.  - - - - - - - - - - - - - - - - - -  Karin Rico MD  PGY-1, General Surgery  [phone]

## 2019-08-07 NOTE — PROGRESS NOTES
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 GI has been consulted for possible endoscopic treatment.     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 Biopsies with no evidence of residual adenoma and/or adenocarcinoma. CT A/P from 7/29 with IV and oral contrast without anastomotic leak, possible anastomotic stricture. S/p EGD/EUS 7/31 with findings of external compression of the anastomoses by keyla-anastomotic hematoma (healthy mucosa internally), which was drained with 10mm Axios stent. Now s/p GJ placement for ongoing GOO and nutrition. Working on advancing TF.     Recommendations  - Advance tube feeds as tolerated (dietitian following)  - G tube to gravity prn  - PLC appt prior to discharge for GJ tube teaching  - Consider pharmacy consultation for discussion about meds through G tube vs J tube vs IV  - T tags to be removed within 2 weeks if they have not fallen off already (may be removed at procedure next week)     Discussed with surgical team - GI will follow peripherally, please call with questions    GI follow up: EGD 8/14 with planned evacuation of hematoma and consider removal of Axios stent (scheduled)    The patient was discussed and plan agreed upon with GI staff, Dr Horton.      Janae Ponce PA-C   Advanced Endoscopy/Pancreaticobiliary JOANA  [facility]  "[facility]  Pager *[phone]  _______________________________________________________________      Subjective: Nursing notes and 24hr events reviewed. Patient seen and examined at 1030. Patient reports mild pain at GJ tube site. Feels bloating with advancement of tube feeds. No BM yet today but is passing gas. No fevers.    ROS:   4 pt ROS negative unless noted in subjective.     Objective:  Blood pressure 104/51, pulse 101, temperature 97.7  F (36.5  C), temperature source Oral, resp. rate 16, height 1.702 m (5' 7\"), weight 76.6 kg (168 lb 12.8 oz), SpO2 93 %.  Gen: Sitting in bed. Appears comfortable  HEENT: NCAT. Conjunctiva clear. Sclera anicteric   CV: RRR, Peripheral perfusion intact  Resp: normal work of breathing  Abd: Soft, RUQ incision CDI, +BS, mild distension. G tube with three t-tags with dried blood, tender around tube but no drainage  Msk: no gross deformity  Skin:  no jaundice  Ext: warm, well perfused  Neuro: grossly normal  Mental status/Psych: A&O. Asks/answers questions appropriately    Date 08/06/19 0700 - 08/07/19 0659   Shift 5819-9177 0199-2299 7012-4460 24 Hour Total   INTAKE   Shift Total(mL/kg)       OUTPUT   Urine 200   200   Emesis/NG output 600   600   Shift Total(mL/kg) 800(10.41)   800(10.41)   Weight (kg) 76.84 76.84 76.84 76.84       LABS:  BMP  Recent Labs   Lab 08/07/19  0325 08/06/19  0506 08/05/19  0459 08/04/19  0551    132* 134 135   POTASSIUM 4.0 3.8 3.7 3.8   CHLORIDE 102 98 102 105   TARIQ 8.3* 8.5 8.6 8.2*   CO2 27 23 25 22   BUN 12 13 18 19   CR 0.65 0.66 0.69 0.70   * 95 91 117*     CBC  Recent Labs   Lab 08/03/19  0602 08/02/19  0727 08/01/19  0503   WBC 12.0* 14.3* 14.3*   RBC 3.26* 3.51* 3.61*   HGB 8.9* 9.6* 9.9*   HCT 29.3* 32.3* 33.0*   MCV 90 92 91   MCH 27.3 27.4 27.4   MCHC 30.4* 29.7* 30.0*   RDW 13.9 13.9 13.6    403 442     INR  Recent Labs   Lab 08/05/19  0459   INR 1.10     LFTs  Recent Labs   Lab 08/05/19  0459   ALKPHOS 212* "   AST 21   ALT 38   BILITOTAL 0.7   PROTTOTAL 6.3*   ALBUMIN 2.4*      PANCNo lab results found in last 7 days.      IMAGING:  Reviewed

## 2019-08-07 NOTE — PROGRESS NOTES
Care Coordinator - Discharge Planning    Admission Date/Time:  7/15/2019  Attending MD:  Joaquin Brito,*     Data  Date of initial CC assessment:  7/22/19  Chart reviewed, discussed with interdisciplinary team.   Patient was admitted for:   1. Abdominal pain with vomiting    2. Duodenal adenocarcinoma (H)      Assessment   Concerns with insurance coverage for discharge needs: None.  Current Living Situation: Patient lives with spouse.  Support System: Supportive and Involved  Services Involved: none PTA  Transportation at Discharge: Car and Family or friend will provide  Transportation to Medical Appointments:    - Name of caregiver:   Barriers to Discharge: medical stability and a plan for enteral feeds    Coordination of Care and Referrals: Provided patient/family with options for Home Infusion.  Per MD team, patient will likely be ready for discharge late tomorrow. She had G/J tube placed 2 days ago and will go home with enteral feeds. Met with patient to discuss referral to home infusion agency. This had been discussed prior when she was potentially needing TPN. She would like referral to [facility]. A benefit check has been done and she has coverage. She was relieved to know this but is concerned about getting education prior to discharge. Explained that an order for PLC will be placed. She would like her sister, Aminta at the appointment. This was included on PLC appointment.     Plan  Anticipated Discharge Date: Th 88 vs Fri 8/9  Anticipated Discharge Plan:  Home with Home Infusion    Leif Ibarra RN Care Coordinator [phone]

## 2019-08-07 NOTE — PLAN OF CARE
"Pt tolerating Nutren 1.5 continuous TF well at goal rate 50ml/hr via J-tube. Pt received electrolyte replacements, potassium (level 4) and magnesium (level 1.8). Lab has been order to recheck  potassium and magnesium for tomorrow Thurs AM lab. Pt will need potassium phos replacement this evening. [facility] appointment for enteral tube feeding and PICC line teaching for Thur @ 14:30 with her sister, Aminta.  G-tube output to gravity drainage. Output is 1,050ml dark green color for dayshift. Initially tt received LR bolus over 2.5 hrs and an hour later infusion she c/o of \"cramping chest pain\" did not radiate anywhere and lasting about 3 min. LR bolus rate decrease to run over 4 hrs. Chest  pain quickly resolved on its own. Later with activity and movement, pt c/o of another chest pain and that event completely resolved as well. MD. Rico  notified of chest pain. Waiting for chest x-ray. Pt will not be getting any g-tube output and 1:1 LR fluid replacement.   "

## 2019-08-07 NOTE — PLAN OF CARE
Alert and oriented X 4. AVSS. C/o bilateral chest pain/pressure, palpitations and lightheadedness while standing at nursing station and expressed concern about fluid overload between Tube feeding and IV hydration. Denied SOB or nausea. ECG done. LR infusion rate decreased from 300 mL/hr to 150 mL/hr. Pt reported relief shortly after. Tolerated remainder of the LR bolus at lower rate. Physician updated. Tube feeding at goal rate of 50 mL/hr. C/o burping. No gas per rectum. She reports having a bowel movement yesterday. 1100 mL of bilious output from G-tube this shift. She will received scheduled daily bolus. Sleeping in between cares.

## 2019-08-07 NOTE — PLAN OF CARE
AVSS.  Pt now on feeding tube, goal rate 50mL/hr.  Pt advanced from 30 to 40mL/hr today at 1900.  1,000mL LR daily bolus given + 1,000mL LR given for G tube output replacement.  Pt had reduced nausea today and BMx1.  Pt has questions regarding discharge, wants to know about fluid needs and G tube while at home.

## 2019-08-07 NOTE — PROGRESS NOTES
Care Coordinator - Discharge Planning    Admission Date/Time:  7/15/2019  Attending MD:  Joaquin Brito,*     Data  Date of initial CC assessment:  8/7/19  Chart reviewed, discussed with interdisciplinary team.   Patient was admitted for:   1. Abdominal pain with vomiting    2. Duodenal adenocarcinoma (H)        Coordination of Care and Referrals:   This RN CC received updates from team later today. The plan is now for patient to discharge on Friday. She will stay locally for 1 night so she can come to the [facility] ([facility]) for assessment and fluids on Saturday. Then she will go home to [facility]. Home infusion has been ordered for enteral feeds and IV hydration (I have informed team that patient does not have to come to [facility] for fluids, this can be done by home infusion (they will check with their attending for his plan). I attempted to meet with patient to find out what her local address will be. She is not in room at this time.      Plan  Anticipated Discharge Date:  Friday  Anticipated Discharge Plan:  Home with Home Infusion    Leif Ibarra RN Care Coordinator [phone]

## 2019-08-08 LAB
ANION GAP SERPL CALCULATED.3IONS-SCNC: 6 MMOL/L (ref 3–14)
BUN SERPL-MCNC: 16 MG/DL (ref 7–30)
CALCIUM SERPL-MCNC: 8.7 MG/DL (ref 8.5–10.1)
CHLORIDE SERPL-SCNC: 104 MMOL/L (ref 94–109)
CO2 SERPL-SCNC: 25 MMOL/L (ref 20–32)
CREAT SERPL-MCNC: 0.69 MG/DL (ref 0.52–1.04)
GFR SERPL CREATININE-BSD FRML MDRD: 86 ML/MIN/{1.73_M2}
GLUCOSE BLDC GLUCOMTR-MCNC: 104 MG/DL (ref 70–99)
GLUCOSE BLDC GLUCOMTR-MCNC: 110 MG/DL (ref 70–99)
GLUCOSE BLDC GLUCOMTR-MCNC: 118 MG/DL (ref 70–99)
GLUCOSE BLDC GLUCOMTR-MCNC: 118 MG/DL (ref 70–99)
GLUCOSE BLDC GLUCOMTR-MCNC: 122 MG/DL (ref 70–99)
GLUCOSE SERPL-MCNC: 124 MG/DL (ref 70–99)
MAGNESIUM SERPL-MCNC: 2.4 MG/DL (ref 1.6–2.3)
PHOSPHATE SERPL-MCNC: 3.7 MG/DL (ref 2.5–4.5)
POTASSIUM SERPL-SCNC: 4.4 MMOL/L (ref 3.4–5.3)
SODIUM SERPL-SCNC: 135 MMOL/L (ref 133–144)

## 2019-08-08 PROCEDURE — 36592 COLLECT BLOOD FROM PICC: CPT | Performed by: INTERNAL MEDICINE

## 2019-08-08 PROCEDURE — 25000128 H RX IP 250 OP 636: Performed by: STUDENT IN AN ORGANIZED HEALTH CARE EDUCATION/TRAINING PROGRAM

## 2019-08-08 PROCEDURE — 25000132 ZZH RX MED GY IP 250 OP 250 PS 637: Performed by: STUDENT IN AN ORGANIZED HEALTH CARE EDUCATION/TRAINING PROGRAM

## 2019-08-08 PROCEDURE — 25000132 ZZH RX MED GY IP 250 OP 250 PS 637: Performed by: INTERNAL MEDICINE

## 2019-08-08 PROCEDURE — 80048 BASIC METABOLIC PNL TOTAL CA: CPT | Performed by: INTERNAL MEDICINE

## 2019-08-08 PROCEDURE — 12000001 ZZH R&B MED SURG/OB UMMC

## 2019-08-08 PROCEDURE — 84100 ASSAY OF PHOSPHORUS: CPT | Performed by: INTERNAL MEDICINE

## 2019-08-08 PROCEDURE — 83735 ASSAY OF MAGNESIUM: CPT | Performed by: INTERNAL MEDICINE

## 2019-08-08 PROCEDURE — 40000802 ZZH SITE CHECK

## 2019-08-08 PROCEDURE — 25800030 ZZH RX IP 258 OP 636: Performed by: STUDENT IN AN ORGANIZED HEALTH CARE EDUCATION/TRAINING PROGRAM

## 2019-08-08 PROCEDURE — 00000146 ZZHCL STATISTIC GLUCOSE BY METER IP

## 2019-08-08 RX ADMIN — METOPROLOL TARTRATE 25 MG: 25 TABLET ORAL at 19:39

## 2019-08-08 RX ADMIN — METOPROLOL TARTRATE 25 MG: 25 TABLET ORAL at 08:21

## 2019-08-08 RX ADMIN — SODIUM CHLORIDE, POTASSIUM CHLORIDE, SODIUM LACTATE AND CALCIUM CHLORIDE 1000 ML: 600; 310; 30; 20 INJECTION, SOLUTION INTRAVENOUS at 11:49

## 2019-08-08 RX ADMIN — ENOXAPARIN SODIUM 40 MG: 40 INJECTION SUBCUTANEOUS at 08:24

## 2019-08-08 RX ADMIN — PANTOPRAZOLE SODIUM 40 MG: 40 TABLET, DELAYED RELEASE ORAL at 08:21

## 2019-08-08 RX ADMIN — POLYETHYLENE GLYCOL 3350 17 G: 17 POWDER, FOR SOLUTION ORAL at 08:52

## 2019-08-08 RX ADMIN — SODIUM CHLORIDE, POTASSIUM CHLORIDE, SODIUM LACTATE AND CALCIUM CHLORIDE 1000 ML: 600; 310; 30; 20 INJECTION, SOLUTION INTRAVENOUS at 08:10

## 2019-08-08 RX ADMIN — LOSARTAN POTASSIUM 25 MG: 25 TABLET ORAL at 08:21

## 2019-08-08 ASSESSMENT — ACTIVITIES OF DAILY LIVING (ADL)
ADLS_ACUITY_SCORE: 13

## 2019-08-08 ASSESSMENT — MIFFLIN-ST. JEOR: SCORE: 1290.14

## 2019-08-08 NOTE — PROGRESS NOTES
Addendum  1430  Home Infusion  Marilia is discharging today and going home on her continuous enteral therapy.   She needs a hook up of the home enteral pump and some additional review.  Met with Marilia and Cain at bedside once supplies arrived.   Had Cain program the pump, set up and prime the tubing and load pump and bag into the backpack.  Instructed in 8-12 hr hang time for formula and to use a new bag q 24 hrs.  Provided information about supplies and supply delivery, storage of formula,  and 24/7 availability of [facility] and [facility] staff.     I reinforced water flushing for both patency and to maintain hydration.   Marilia will be seen in clinic tomorrow and will get her IVFs there then return home.  [facility] will see Marilia on Thurs for SOC and to teach the IVF administration.  Marilia and Cain verbalized understanding of information given.  Cain demonstrated very good technique and good understanding of set up. Stated he feels comfortable with managing the feeding independently.  Marilia is ready for discharge from [facility] perspective.    Sarahi Matos RN ADRIEN  [facility] Liaison  [phone]     Home Infusion  Marilia is expected to discharge within a couple of days and will be going home on continuous enteral feeds and possibly IVFs.  She has never done home enteral or IV therapy before however she and her  Cain and her Marilia's sister have an appt for some initial teaching with PLC nurse tomorrow for the enteral and on Fri for the IV fluids if needed.  Met with Marilia and Cain (Ian Go was also present) at bedside and provided information about I services.  Explained about administration method for the TF via the Infinity pump with backpack for mobility.  Informed her that either I or another I nurse (depending on the day) will assist with hook up of TF at the hospital prior to discharge and she will have a SNV at home the following day.   Informed them  about supplies and delivery of supplies, storage of formula, plan for SNV and 24/7 availability of I staff while on enteral therapy.   Marilia lives in [place], [place] so arrangements have been made for [facility] ([phone]) to provide home nursing services.  Marilia and Cain verbalized understanding of all information given.   Cain and Marilia's sister Aminta will be the primary CGs and are willing and able to learn and manage home enteral and IV therapies.     Questions answered.  Will continue to follow and revisit pt more details once discharge is confirmed.    Sarahi Matos RN ADRIEN  [facility] Liaison  [phone]

## 2019-08-08 NOTE — PLAN OF CARE
[id number]    AVSS, on RA, denies pain, dizziness, SOB, and vomiting. Pt nauseated occasionally. No nausea medication required. The GJ tube intact, TF running at its goal (50 mL/hr). low output of G tube, and different color liquid compared to days ago. 1:1 LR replacement discontinued this morning. Bolus of LR 1000 mL ordered. Continue monitoring.

## 2019-08-08 NOTE — PLAN OF CARE
[id number]  VSS. A&Ox4. Up independently. G tube with green output. J tube with TF @50ml/hr, 60ml water flushes q4h. Voiding small amounts- 150ml x2. Bladder scanned 0 after void. Total of 2L LR given this shift via PICC. Passing gas but no BM. Gets miralax via J tube. NPO, can have 1 cup ice chips every shift. No popsicles.  Blood sugars low 100s. Plan for [facility] at 1430 for PICC and GJ tube teaching. Pt back from PLC at 5pm. PICC SL'd. Water flushes increased to 90ml q3h via J tube.

## 2019-08-08 NOTE — CONSULTS
Family was seen for instructions on IV fluid administration and care of PICC line. Elroy and Keisha were able to demonstrate all skills needed. Sister and daughter in law there for support. They were given all the written material for the class.

## 2019-08-08 NOTE — CONSULTS
GJ tube education completed with Marilia and her  Elroy. Her sister and niece daughter in law were present as they are both nurses and able to help as needed. Did well with all skills.    Literature given: Handwashing and Skin Care, Tube Feeding at home Tube   Using the Enteralite Infinity Pump for Tube Feeding,

## 2019-08-08 NOTE — PROGRESS NOTES
"General Surgery Progress Note    Subjective:  Sitting in bed, complains of pain with fast infusion of fluids.     Objective:  Vital signs:  Temp: 95.2  F (35.1  C) Temp src: Oral BP: 102/61   Heart Rate: 91 Resp: 18 SpO2: 94 % O2 Device: None (Room air) Oxygen Delivery: 1 LPM Height: 170.2 cm (5' 7\") Weight: 76.6 kg (168 lb 12.8 oz)  Estimated body mass index is 26.44 kg/m  as calculated from the following:    Height as of this encounter: 1.702 m (5' 7\").    Weight as of this encounter: 76.6 kg (168 lb 12.8 oz).    I/O last 3 completed shifts:  In: 2820 [P.O.:30; I.V.:350; NG/GT:300; IV Piggyback:1000]  Out: 5325 [Urine:1550; Emesis/NG output:3775]  NAD laying in bed   G-tube in place, no drainage around tube site. J-tube with feeds running at goal of 50 mL/hr.  Abd soft, distended, non-tender  WWP    Last Comprehensive Metabolic Panel:  Sodium   Date Value Ref Range Status   08/07/2019 136 133 - 144 mmol/L Final     Potassium   Date Value Ref Range Status   08/07/2019 4.0 3.4 - 5.3 mmol/L Final     Chloride   Date Value Ref Range Status   08/07/2019 102 94 - 109 mmol/L Final     Carbon Dioxide   Date Value Ref Range Status   08/07/2019 27 20 - 32 mmol/L Final     Anion Gap   Date Value Ref Range Status   08/07/2019 7 3 - 14 mmol/L Final     Glucose   Date Value Ref Range Status   08/07/2019 153 (H) 70 - 99 mg/dL Final     Urea Nitrogen   Date Value Ref Range Status   08/07/2019 12 7 - 30 mg/dL Final     Creatinine   Date Value Ref Range Status   08/07/2019 0.65 0.52 - 1.04 mg/dL Final     GFR Estimate   Date Value Ref Range Status   08/07/2019 87 >60 mL/min/[1.73_m2] Final     Comment:     Non  GFR Calc  Starting 12/18/2018, serum creatinine based estimated GFR (eGFR) will be   calculated using the Chronic Kidney Disease Epidemiology Collaboration   (CKD-EPI) equation.       Calcium   Date Value Ref Range Status   08/07/2019 8.3 (L) 8.5 - 10.1 mg/dL Final     Imaging:  None " new.     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 Course c/b perianastomitic hematoma and stricture. Now s/p GJ tube placement by GI 8/6.    -2 L bolus of LR today.  -Nutrition to see if can increase free water in feeds.  -Plan for GJ education later today.  -Working with care coordinator to set up Sip-C clinic.  -Plan for discharge Friday.    Seen, examined, and discussed with chief resident, who will discuss with staff.  - - - - - - - - - - - - - - - - - -  Karin Rico MD  PGY-1, General Surgery  [phone]

## 2019-08-08 NOTE — PROGRESS NOTES
CLINICAL NUTRITION SERVICES - REASSESSMENT NOTE     Nutrition Prescription    RECOMMENDATIONS FOR MDs/PROVIDERS TO ORDER:  Recommend to continue with order for pt to received 1L bolus of LR daily to help maintain hydration d/t increase losses from venting G tube    Malnutrition Status:    Unable to determine at this time    Recommendations already ordered by Registered Dietitian (RD):  - Continue TF as ordered  - Increase H20 flush order with TF to 90 ml every 3 hrs (provides additional 720 ml of free H20per day). Total free H20 from TF plus H20 flushes = 1632 ml per day.    Future/Additional Recommendations:  Ability to transition to cyclic feeds at 60 ml/hr x 20 hrs to allow time off TF.     EVALUATION OF THE PROGRESS TOWARD GOALS   Diet: Remains NPO for oral intakes d/t SBO    Nutrition Support: Transition from PN therapy to TF therapy on 8/1 via NJT and then via Jejunostomy (placed on 8/5) with Nutren 1.5 with goal rate of 50 ml/hr (reached on 8/7).. Regimen provides 1200 mL, 1800 kcals (28 kcal/kg/day), 82 g PRO (1.3 g/kg/day), 912 mL H2O, 211 g CHO and no fiber daily.    Intake: Ave TF intakes received x past 7 days = 544 ml which provides 816 kcals (13 kcals/kg) and 37 g PRO (0.5 g/kg)     NEW FINDINGS   Weight: 75.8 kg (today) - lowest wt this admission, 76.7 kg (8/1), 79.6 kg (7/15 - admit); Wt continues to trend downward over past week with loss of 1.2%. Since admit, pt with overall wt loss 4.8% x past month. Unclear if wt loss d/t inadequate nutritional intakes on TF vs fluid losses  secondary to NGT/G tube outputs over past week (approximately 1700 ml on average over past week).  - Per chart review, pt receives 1L bolus of LR.    MALNUTRITION (Limited d/t pt off floor for [facility] teaching)  % Intake: </= 50% for >/= 5 days (severe)  % Weight Loss: Up to 1-2% in 1 week (non-severe)  Subcutaneous Fat Loss: Unable to reassess  Muscle Loss: Unable to reassess  Fluid Accumulation/Edema: Unable to  reassess  Malnutrition Diagnosis: Unable to determine due to unable to complete nutrition physical reassessment.    Previous Goals   Total avg nutritional intake to meet a minimum of 25 kcal/kg and 1.2 g PRO/kg daily (per dosing wt 65 kg).    Evaluation: Not met    Previous Nutrition Diagnosis  Inadequate protein-energy intake related to goal PN infusion vol not met and now transitioning to TF therapy, but not yet initiated as evidenced by Ave PN intakes received x 6 days meeting only 22 kcals/kg and 1 g PRO/kg and no TF intakes received at this time.      Evaluation: Declining    CURRENT NUTRITION DIAGNOSIS  Inadequate protein-energy intake related to remains dependent on nutritional support via J tube d/t altered GI function secondary to SBO, but goal TF infusion volume not met as evidenced by NPO status for oral intakes, ave TF intakes received x past 7 days meeting only 13 kcals/kg and 0.5 g PRO/kg and wt loss of 1.2% over past week with overall wt loss 4.8% x past month.      INTERVENTIONS  Implementation  - Collaboration with Provider regarding current TF regimen and need for increase in fluid requirements via TF vs ongoing need for IVF replacement pending G tube output.  - Modified H20 flush order with TF as outlined above.    Goals  Total avg nutritional intake to meet a minimum of 25 kcal/kg and 1.2 g PRO/kg daily (per dosing wt 65 kg).    Monitoring/Evaluation  Progress toward goals will be monitored and evaluated per protocol.    Gay Poole RD,LD  Unit pager [phone]

## 2019-08-08 NOTE — PLAN OF CARE
A/Ox4; VSS. Received 10mmol of phosphate for lab of 2.5; recheck tomorrow AM. Denies pain & no chest pain this shift. Ambulated several times in christy; up independently. Tube feeds continue via jejunum at 50 mL/hr. Pt. concerned that G tube drainage has become light and milky in color - similar to tube feeds. Provider notified & will pass on to day team. Continue with POC.

## 2019-08-08 NOTE — PROGRESS NOTES
Care Coordinator - Discharge Planning    Admission Date/Time:  7/15/2019  Attending MD:  Joaquin Brito,*     Data  Date of initial CC assessment:  8/8/19  Chart reviewed, discussed with interdisciplinary team.   Patient was admitted for:   1. Abdominal pain with vomiting    2. Duodenal adenocarcinoma (H)         Assessment   Full assessment completed in previous note    Coordination of Care and Referrals: Per team, patient will discharge tomorrow. Dr. Brito would like patient to be seen in the [facility] clinic on Saturday, August 10th for a follow-up and teaching appointment. Writer was asked to arrange this.     Writer requested an appointment via in-basket to the [employer]  pool box.     Writer met with the patient to review the above. Patient relayed that she would be staying locally tomorrow night as she has to come in Saturday for the appointment. Patient reports she will be staying at: [address] [place] [id number]. Writer then presented patient with the Important Message from Medicare document, obtained patient's signature and placed a copy in patient's chart. Patient had no further questions or concerns at this time. RNCC will follow as needed.     Addendum: Per surgery team, patient is putting out a lot of output from g-tube, therefore will not discharge today, more likely on Monday. Writer sent another in-basket request to [facility] clinic for follow-up appointment per Dr. Brito. Appointment scheduled 8/13 at 4pm. AVS updated.       Plan  Anticipated Discharge Date:  8/12/19  Anticipated Discharge Plan:  Home with home infusion services and clinic follow-up as recommended.     CTS Handoff completed:  NO (will send when patient discharges)    Joanna Munoz, RN, BSN, PHN  Care Coordinator

## 2019-08-09 LAB
ANION GAP SERPL CALCULATED.3IONS-SCNC: 8 MMOL/L (ref 3–14)
BUN SERPL-MCNC: 24 MG/DL (ref 7–30)
CALCIUM SERPL-MCNC: 9.1 MG/DL (ref 8.5–10.1)
CHLORIDE SERPL-SCNC: 103 MMOL/L (ref 94–109)
CO2 SERPL-SCNC: 25 MMOL/L (ref 20–32)
CREAT SERPL-MCNC: 0.75 MG/DL (ref 0.52–1.04)
GFR SERPL CREATININE-BSD FRML MDRD: 79 ML/MIN/{1.73_M2}
GLUCOSE BLDC GLUCOMTR-MCNC: 130 MG/DL (ref 70–99)
GLUCOSE SERPL-MCNC: 117 MG/DL (ref 70–99)
MAGNESIUM SERPL-MCNC: 2.5 MG/DL (ref 1.6–2.3)
PHOSPHATE SERPL-MCNC: 3.7 MG/DL (ref 2.5–4.5)
POTASSIUM SERPL-SCNC: 4.3 MMOL/L (ref 3.4–5.3)
SODIUM SERPL-SCNC: 135 MMOL/L (ref 133–144)

## 2019-08-09 PROCEDURE — 25000128 H RX IP 250 OP 636: Performed by: STUDENT IN AN ORGANIZED HEALTH CARE EDUCATION/TRAINING PROGRAM

## 2019-08-09 PROCEDURE — 00000146 ZZHCL STATISTIC GLUCOSE BY METER IP

## 2019-08-09 PROCEDURE — 36592 COLLECT BLOOD FROM PICC: CPT | Performed by: INTERNAL MEDICINE

## 2019-08-09 PROCEDURE — 12000001 ZZH R&B MED SURG/OB UMMC

## 2019-08-09 PROCEDURE — 25800030 ZZH RX IP 258 OP 636: Performed by: STUDENT IN AN ORGANIZED HEALTH CARE EDUCATION/TRAINING PROGRAM

## 2019-08-09 PROCEDURE — 25000128 H RX IP 250 OP 636: Performed by: INTERNAL MEDICINE

## 2019-08-09 PROCEDURE — 84100 ASSAY OF PHOSPHORUS: CPT | Performed by: INTERNAL MEDICINE

## 2019-08-09 PROCEDURE — 25000132 ZZH RX MED GY IP 250 OP 250 PS 637: Performed by: STUDENT IN AN ORGANIZED HEALTH CARE EDUCATION/TRAINING PROGRAM

## 2019-08-09 PROCEDURE — 27210429 ZZH NUTRITION PRODUCT INTERMEDIATE LITER

## 2019-08-09 PROCEDURE — 25000132 ZZH RX MED GY IP 250 OP 250 PS 637: Performed by: INTERNAL MEDICINE

## 2019-08-09 PROCEDURE — 83735 ASSAY OF MAGNESIUM: CPT | Performed by: INTERNAL MEDICINE

## 2019-08-09 PROCEDURE — 80048 BASIC METABOLIC PNL TOTAL CA: CPT | Performed by: INTERNAL MEDICINE

## 2019-08-09 RX ADMIN — PANTOPRAZOLE SODIUM 40 MG: 40 TABLET, DELAYED RELEASE ORAL at 09:00

## 2019-08-09 RX ADMIN — PROCHLORPERAZINE EDISYLATE 5 MG: 5 INJECTION INTRAMUSCULAR; INTRAVENOUS at 00:53

## 2019-08-09 RX ADMIN — SODIUM CHLORIDE, POTASSIUM CHLORIDE, SODIUM LACTATE AND CALCIUM CHLORIDE 1000 ML: 600; 310; 30; 20 INJECTION, SOLUTION INTRAVENOUS at 06:36

## 2019-08-09 RX ADMIN — LOSARTAN POTASSIUM 25 MG: 25 TABLET ORAL at 09:00

## 2019-08-09 RX ADMIN — PROCHLORPERAZINE EDISYLATE 5 MG: 5 INJECTION INTRAMUSCULAR; INTRAVENOUS at 22:30

## 2019-08-09 RX ADMIN — METOPROLOL TARTRATE 25 MG: 25 TABLET ORAL at 09:00

## 2019-08-09 RX ADMIN — METOPROLOL TARTRATE 25 MG: 25 TABLET ORAL at 20:25

## 2019-08-09 RX ADMIN — SODIUM CHLORIDE, POTASSIUM CHLORIDE, SODIUM LACTATE AND CALCIUM CHLORIDE 1000 ML: 600; 310; 30; 20 INJECTION, SOLUTION INTRAVENOUS at 16:11

## 2019-08-09 RX ADMIN — ENOXAPARIN SODIUM 40 MG: 40 INJECTION SUBCUTANEOUS at 08:59

## 2019-08-09 RX ADMIN — SIMETHICONE CHEW TAB 80 MG 80 MG: 80 TABLET ORAL at 00:54

## 2019-08-09 RX ADMIN — POLYETHYLENE GLYCOL 3350 17 G: 17 POWDER, FOR SOLUTION ORAL at 08:59

## 2019-08-09 ASSESSMENT — ACTIVITIES OF DAILY LIVING (ADL)
ADLS_ACUITY_SCORE: 13

## 2019-08-09 ASSESSMENT — MIFFLIN-ST. JEOR: SCORE: 1277.89

## 2019-08-09 NOTE — PLAN OF CARE
[id number]  AVSS, on RA, denies pain, dizziness, SOB, and vomiting. Pt nauseated occasionally. Compazine x1 given . The GJ tube intact, TF running at its goal (50 mL/hr), no complain of fullness. Bolus of LR 1000 mL ordered at 0615, will be infusing till about 1030. Pt most likely will stay in hospital over the weekend. Continue monitoring.

## 2019-08-09 NOTE — PLAN OF CARE
"/71 (BP Location: Left arm)   Pulse 101   Temp 97.3  F (36.3  C) (Oral)   Resp 18   Ht 1.702 m (5' 7\")   Wt 75.8 kg (167 lb)   SpO2 96%   BMI 26.16 kg/m      6524-7516: VSS. Denies pain/nausea. Ind in room. 150cc UOP during 4 hrs. Passing gas with small, hard BM during shift. NPO with allowance of 8oz ice chips per shift. R PICC SL. TF running at 50cc/hr and flushed with 90cc warm water at 2000. Next flush due at 2300. PEG to gravity putting out ~800cc of green drainage. BS Q4H. Will continue with POC.   "

## 2019-08-09 NOTE — PROGRESS NOTES
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 GI has been consulted for possible endoscopic treatment.     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 Biopsies with no evidence of residual adenoma and/or adenocarcinoma. CT A/P from 7/29 with IV and oral contrast without anastomotic leak, possible anastomotic stricture. S/p EGD/EUS 7/31 with findings of external compression of the anastomoses by og-anastomotic hematoma (healthy mucosa internally), which was drained with 10mm Axios stent. Now s/p GJ placement for ongoing GOO and nutrition.     Recommendations  - Continue tube feeds as tolerated (dietitian following)  - G tube to gravity prn  - T tags to be removed within 2 weeks if they have not fallen off already (may be removed at procedure next week)     The inpatient advanced endoscopy/pancreaticobiliary service will sign off at this time. Please call or repost with questions or if status changes. Thank you for allowing us to participate in the care of this patient.    GI follow up: EGD 8/14 with planned evacuation of hematoma and consider removal of Axios stent (scheduled, patient notified of time/date). Patient likely to discharge Monday 8/12 and stay in the [place] prior to procedure.    The patient was discussed and plan agreed upon with GI staff, Dr Gamaliel Cardoso  "IRENA Ponce PA-C   Advanced Endoscopy/Pancreaticobiliary [facility]  [facility]  Pager *[phone]  _______________________________________________________________      Subjective: Nursing notes and 24hr events reviewed. Patient seen and examined at 1000. Patient reports that she is doing okay. Had PLC appt yesterday for GJ and PICC line. No new symptoms.    ROS:   4 pt ROS negative unless noted in subjective.     Objective:  Blood pressure 118/74, pulse 101, temperature 97.6  F (36.4  C), temperature source Oral, resp. rate 14, height 1.702 m (5' 7\"), weight 74.5 kg (164 lb 4.8 oz), SpO2 97 %.  Gen: Sitting in bed. Appears comfortable  Eyes: Sclera anicteric   Resp: normal work of breathing  Abd: Soft, G tube with three t-tags with dried blood, tender around tube but no drainage G output yellow/bilious  Msk: no gross deformity  Skin:  no jaundice  Ext: warm, well perfused  Neuro: grossly normal  Mental status/Psych: A&O. Asks/answers questions appropriately    Date 08/06/19 0700 - 08/07/19 0659   Shift 5944-2515 4604-0252 8761-4116 24 Hour Total   INTAKE   Shift Total(mL/kg)       OUTPUT   Urine 200   200   Emesis/NG output 600   600   Shift Total(mL/kg) 800(10.41)   800(10.41)   Weight (kg) 76.84 76.84 76.84 76.84       LABS:  BMP  Recent Labs   Lab 08/09/19  0452 08/08/19  0544 08/07/19  0325 08/06/19  0506    135 136 132*   POTASSIUM 4.3 4.4 4.0 3.8   CHLORIDE 103 104 102 98   TARIQ 9.1 8.7 8.3* 8.5   CO2 25 25 27 23   BUN 24 16 12 13   CR 0.75 0.69 0.65 0.66   * 124* 153* 95     CBC  Recent Labs   Lab 08/03/19  0602 08/02/19  0727 08/01/19  0503   WBC 12.0* 14.3* 14.3*   RBC 3.26* 3.51* 3.61*   HGB 8.9* 9.6* 9.9*   HCT 29.3* 32.3* 33.0*   MCV 90 92 91   MCH 27.3 27.4 27.4   MCHC 30.4* 29.7* 30.0*   RDW 13.9 13.9 13.6    403 442     INR  Recent Labs   Lab 08/05/19  0459   INR 1.10     LFTs  Recent Labs   Lab 08/05/19  0459   ALKPHOS 212*   AST 21   ALT 38   BILITOTAL 0.7 "   PROTTOTAL 6.3*   ALBUMIN 2.4*      PANCNo lab results found in last 7 days.      IMAGING:  Reviewed

## 2019-08-09 NOTE — PROGRESS NOTES
"General Surgery Progress Note    Subjective:  NAEO. Had GJ teaching yesterday. Complaining of some hand cramping this morning but overall doing well. Noted to have high G-tube output still but denies any significant PO intake.     Objective:  Vital signs:  Temp: 97.6  F (36.4  C) Temp src: Oral BP: 118/74   Heart Rate: 110 Resp: 14 SpO2: 97 % O2 Device: None (Room air) Oxygen Delivery: 1 LPM Height: 170.2 cm (5' 7\") Weight: 74.5 kg (164 lb 4.8 oz)  Estimated body mass index is 25.73 kg/m  as calculated from the following:    Height as of this encounter: 1.702 m (5' 7\").    Weight as of this encounter: 74.5 kg (164 lb 4.8 oz).    I/O last 3 completed shifts:  In: 3470 [P.O.:50; NG/GT:420; IV Piggyback:2000]  Out: 4500 [Urine:850; Emesis/NG output:3650]  NAD laying in bed   G-tube in place, non-bilious, gastric fluid draining. J-tube with feeds running at goal of 50 mL/hr.  Abd soft, distended, non-tender  WWP    Labs:  Recent Labs   Lab Test 08/05/19  0459 08/04/19  0551    135   POTASSIUM 3.7 3.8   CHLORIDE 102 105   CO2 25 22   ANIONGAP 7 8   GLC 91 117*   BUN 18 19   CR 0.69 0.70   TARIQ 8.6 8.2*      CBC RESULTS:   Recent Labs   Lab Test 08/07/19  0945   WBC 11.3*   RBC 3.42*   HGB 9.3*   HCT 30.4*   MCV 89   MCH 27.2   MCHC 30.6*   RDW 13.6          Imaging:  None new.     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 Course c/b perianastomitic hematoma and stricture. Now s/p GJ tube placement by GI 8/6. Overall doing well, however still with high G-tube output. Setting up discharge and follow-up.    -Continue J tube feeds to goal.  -Daily 1L bolus of LR, additional 1L bolus this AM due to output  -Planned to discharge today, however will discuss further given high G-tube output  -When discharged, will follow-up in ATC following day  -Will follow-up with Dr. Brito in clinic one week post discharge     Seen, examined, and discussed with chief resident, " who will discuss with staff.  - - - - - - - - - - - - - - - - - -  Kavin Hill MD  PGY-1, General Surgery  [phone]

## 2019-08-09 NOTE — PROGRESS NOTES
This is a recent snapshot of the patient's [facility] medical record.  For current drug dose and complete information and questions, call [phone] or In Basket pool, [facility] ([id number])  CSN Number:  [id number]

## 2019-08-09 NOTE — PLAN OF CARE
Pt will not be discharge today d/t large volume of g-tube output. Day shift g-tube output is 1150ml. No n/v or abd pain. Pt is NPO except meds. Pt will be discharge with continuous enteral feeding and picc line. Nursing had pt do g-tube cleaning by herself and had pt give herself tube feeding flushes & medication down j-tube. Continue to have pt practice and demonstrate g-tube cares while in the hospital.

## 2019-08-10 ENCOUNTER — APPOINTMENT (OUTPATIENT)
Dept: GENERAL RADIOLOGY | Facility: CLINIC | Age: 74
DRG: 327 | End: 2019-08-10
Attending: SURGERY
Payer: MEDICARE

## 2019-08-10 LAB
ANION GAP SERPL CALCULATED.3IONS-SCNC: 8 MMOL/L (ref 3–14)
BUN SERPL-MCNC: 32 MG/DL (ref 7–30)
CALCIUM SERPL-MCNC: 9.6 MG/DL (ref 8.5–10.1)
CHLORIDE SERPL-SCNC: 101 MMOL/L (ref 94–109)
CO2 SERPL-SCNC: 26 MMOL/L (ref 20–32)
CREAT SERPL-MCNC: 0.82 MG/DL (ref 0.52–1.04)
GFR SERPL CREATININE-BSD FRML MDRD: 71 ML/MIN/{1.73_M2}
GLUCOSE BLDC GLUCOMTR-MCNC: 74 MG/DL (ref 70–99)
GLUCOSE SERPL-MCNC: 107 MG/DL (ref 70–99)
POTASSIUM SERPL-SCNC: 4.7 MMOL/L (ref 3.4–5.3)
SODIUM SERPL-SCNC: 135 MMOL/L (ref 133–144)

## 2019-08-10 PROCEDURE — 25000132 ZZH RX MED GY IP 250 OP 250 PS 637: Performed by: STUDENT IN AN ORGANIZED HEALTH CARE EDUCATION/TRAINING PROGRAM

## 2019-08-10 PROCEDURE — 25800030 ZZH RX IP 258 OP 636: Performed by: STUDENT IN AN ORGANIZED HEALTH CARE EDUCATION/TRAINING PROGRAM

## 2019-08-10 PROCEDURE — 25000128 H RX IP 250 OP 636: Performed by: STUDENT IN AN ORGANIZED HEALTH CARE EDUCATION/TRAINING PROGRAM

## 2019-08-10 PROCEDURE — 80048 BASIC METABOLIC PNL TOTAL CA: CPT | Performed by: INTERNAL MEDICINE

## 2019-08-10 PROCEDURE — 40000986 XR ABDOMEN PORT 1 VW

## 2019-08-10 PROCEDURE — 40000802 ZZH SITE CHECK

## 2019-08-10 PROCEDURE — 25000131 ZZH RX MED GY IP 250 OP 636 PS 637: Performed by: INTERNAL MEDICINE

## 2019-08-10 PROCEDURE — 00000146 ZZHCL STATISTIC GLUCOSE BY METER IP

## 2019-08-10 PROCEDURE — 25000132 ZZH RX MED GY IP 250 OP 250 PS 637: Performed by: INTERNAL MEDICINE

## 2019-08-10 PROCEDURE — 27210429 ZZH NUTRITION PRODUCT INTERMEDIATE LITER

## 2019-08-10 PROCEDURE — 36592 COLLECT BLOOD FROM PICC: CPT | Performed by: INTERNAL MEDICINE

## 2019-08-10 PROCEDURE — 12000001 ZZH R&B MED SURG/OB UMMC

## 2019-08-10 RX ORDER — POLYETHYLENE GLYCOL 3350 17 G/17G
17 POWDER, FOR SOLUTION ORAL DAILY PRN
Status: DISCONTINUED | OUTPATIENT
Start: 2019-08-10 | End: 2019-08-13 | Stop reason: HOSPADM

## 2019-08-10 RX ADMIN — LOSARTAN POTASSIUM 25 MG: 25 TABLET ORAL at 09:08

## 2019-08-10 RX ADMIN — METOPROLOL TARTRATE 25 MG: 25 TABLET ORAL at 21:07

## 2019-08-10 RX ADMIN — ONDANSETRON 4 MG: 4 TABLET, ORALLY DISINTEGRATING ORAL at 04:32

## 2019-08-10 RX ADMIN — SODIUM CHLORIDE, POTASSIUM CHLORIDE, SODIUM LACTATE AND CALCIUM CHLORIDE 1000 ML: 600; 310; 30; 20 INJECTION, SOLUTION INTRAVENOUS at 15:13

## 2019-08-10 RX ADMIN — PANTOPRAZOLE SODIUM 40 MG: 40 TABLET, DELAYED RELEASE ORAL at 09:08

## 2019-08-10 RX ADMIN — ENOXAPARIN SODIUM 40 MG: 40 INJECTION SUBCUTANEOUS at 09:00

## 2019-08-10 RX ADMIN — SODIUM CHLORIDE, POTASSIUM CHLORIDE, SODIUM LACTATE AND CALCIUM CHLORIDE 2000 ML: 600; 310; 30; 20 INJECTION, SOLUTION INTRAVENOUS at 06:41

## 2019-08-10 RX ADMIN — METOPROLOL TARTRATE 25 MG: 25 TABLET ORAL at 09:08

## 2019-08-10 ASSESSMENT — ACTIVITIES OF DAILY LIVING (ADL)
ADLS_ACUITY_SCORE: 13

## 2019-08-10 ASSESSMENT — MIFFLIN-ST. JEOR: SCORE: 1285.6

## 2019-08-10 NOTE — PROGRESS NOTES
"General Surgery Progress Note    Subjective:  NAEO. Continues to have high G-tube output. Complaining of some bloating and nausea with free water flushes. Had multiple BMs.     Objective:  Vital signs:  Temp: 97.6  F (36.4  C) Temp src: Oral BP: 114/69   Heart Rate: 105 Resp: 16 SpO2: 94 % O2 Device: None (Room air) Oxygen Delivery: 1 LPM Height: 170.2 cm (5' 7\") Weight: 74.5 kg (164 lb 4.8 oz)  Estimated body mass index is 25.73 kg/m  as calculated from the following:    Height as of this encounter: 1.702 m (5' 7\").    Weight as of this encounter: 74.5 kg (164 lb 4.8 oz).    I/O last 3 completed shifts:  In: 3050 [I.V.:30; NG/GT:570; IV Piggyback:2000]  Out: 3500 [Urine:300; Emesis/NG output:3200]  NAD laying in bed   G-tube in place, non-bilious, gastric fluid draining. J-tube with feeds running at goal of 50 mL/hr.  Abd soft, distended, non-tender  WWP    Labs:  Recent Labs   Lab Test 08/05/19  0459 08/04/19  0551    135   POTASSIUM 3.7 3.8   CHLORIDE 102 105   CO2 25 22   ANIONGAP 7 8   GLC 91 117*   BUN 18 19   CR 0.69 0.70   TARIQ 8.6 8.2*      CBC RESULTS:   Recent Labs   Lab Test 08/07/19  0945   WBC 11.3*   RBC 3.42*   HGB 9.3*   HCT 30.4*   MCV 89   MCH 27.2   MCHC 30.6*   RDW 13.6          Imaging:  None new.     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 Course c/b perianastomitic hematoma and stricture. Now s/p GJ tube placement by GI 8/6. Overall doing well, however still with high G-tube output. Setting up discharge and follow-up.    -Continue J tube feeds to goal.  -Daily 1L bolus of LR, additional 2L bolus this AM due to output  -Will keep over the weekend and monitor fluid status  -AXR to ensure J-tube in proper positioning and not flipped back into stomach  -Will discuss with nutrition decreasing amount of FWF and increasing frequency  -When discharged, will follow-up in ATC following day  -Will follow-up with Dr. Brito in clinic one " week post discharge     Seen, examined, and discussed with chief resident, who will discuss with staff.  - - - - - - - - - - - - - - - - - -  Kavin Hill MD  PGY-1, General Surgery  [phone]

## 2019-08-10 NOTE — PLAN OF CARE
AVSS on RA, afebrile. Denies pain, SOB. Pt had 1 episode of gagging and dry heaving, felt as though there was phlegm stuck in her throat. This quickly resolved and pt did not vomit. Compazine given x1. J tube continues with TF at goal rate of 50mL/hr, flushed q3hr with 90mL of water. Pt performed flush with minimal assistance. G tube continues with large amount of output, total of 1100 mL this shift. Pt reported 1 loose stool and 1 formed stool today. Continue to monitor.

## 2019-08-10 NOTE — PLAN OF CARE
[id number]: AVSS on RA. Denies pain. Complains of intermittent nausea with J tube water flushes, attempted to premedicate with zofran x1 with relief. Pt also feels that free water flushes result in bowel urgency, requested that flushes be decreased in amount and frequency to alleviate some nausea and urgency. Flushed x2 this shift with 90 ml water. Educated on goal of getting more water intake with increased flushes, but pt reports difficulty tolerating high volume and frequency. Discussed this with MD team this AM, will attempt to adjust. TF via J tube at goal rate of 50 ml/hr, tolerating feeds well. G tube to gravity with 850 ml thick yellow output. Low urine output, MD placed orders for 2L LR bolus this AM in addition to scheduled bolus. Abdominal x-ray ordered. Watery BM x2. Up independently. Continue with POC.

## 2019-08-11 ENCOUNTER — APPOINTMENT (OUTPATIENT)
Dept: PHYSICAL THERAPY | Facility: CLINIC | Age: 74
DRG: 327 | End: 2019-08-11
Attending: SURGERY
Payer: MEDICARE

## 2019-08-11 ENCOUNTER — ANESTHESIA EVENT (OUTPATIENT)
Dept: SURGERY | Facility: CLINIC | Age: 74
DRG: 327 | End: 2019-08-11
Payer: MEDICARE

## 2019-08-11 LAB
ANION GAP SERPL CALCULATED.3IONS-SCNC: 9 MMOL/L (ref 3–14)
BUN SERPL-MCNC: 26 MG/DL (ref 7–30)
CALCIUM SERPL-MCNC: 9.2 MG/DL (ref 8.5–10.1)
CHLORIDE SERPL-SCNC: 102 MMOL/L (ref 94–109)
CO2 SERPL-SCNC: 26 MMOL/L (ref 20–32)
CREAT SERPL-MCNC: 0.88 MG/DL (ref 0.52–1.04)
GFR SERPL CREATININE-BSD FRML MDRD: 64 ML/MIN/{1.73_M2}
GLUCOSE BLDC GLUCOMTR-MCNC: 73 MG/DL (ref 70–99)
GLUCOSE BLDC GLUCOMTR-MCNC: 80 MG/DL (ref 70–99)
GLUCOSE BLDC GLUCOMTR-MCNC: 86 MG/DL (ref 70–99)
GLUCOSE SERPL-MCNC: 90 MG/DL (ref 70–99)
LACTATE BLD-SCNC: 1.1 MMOL/L (ref 0.7–2)
POTASSIUM SERPL-SCNC: 4.1 MMOL/L (ref 3.4–5.3)
SODIUM SERPL-SCNC: 137 MMOL/L (ref 133–144)

## 2019-08-11 PROCEDURE — 83605 ASSAY OF LACTIC ACID: CPT | Performed by: SURGERY

## 2019-08-11 PROCEDURE — 25000132 ZZH RX MED GY IP 250 OP 250 PS 637: Performed by: STUDENT IN AN ORGANIZED HEALTH CARE EDUCATION/TRAINING PROGRAM

## 2019-08-11 PROCEDURE — 97161 PT EVAL LOW COMPLEX 20 MIN: CPT | Mod: GP

## 2019-08-11 PROCEDURE — 25000132 ZZH RX MED GY IP 250 OP 250 PS 637: Performed by: INTERNAL MEDICINE

## 2019-08-11 PROCEDURE — 36592 COLLECT BLOOD FROM PICC: CPT | Performed by: INTERNAL MEDICINE

## 2019-08-11 PROCEDURE — 80048 BASIC METABOLIC PNL TOTAL CA: CPT | Performed by: INTERNAL MEDICINE

## 2019-08-11 PROCEDURE — 12000001 ZZH R&B MED SURG/OB UMMC

## 2019-08-11 PROCEDURE — 25800030 ZZH RX IP 258 OP 636: Performed by: STUDENT IN AN ORGANIZED HEALTH CARE EDUCATION/TRAINING PROGRAM

## 2019-08-11 PROCEDURE — 27210429 ZZH NUTRITION PRODUCT INTERMEDIATE LITER

## 2019-08-11 PROCEDURE — 40000802 ZZH SITE CHECK

## 2019-08-11 PROCEDURE — 00000146 ZZHCL STATISTIC GLUCOSE BY METER IP

## 2019-08-11 PROCEDURE — 97530 THERAPEUTIC ACTIVITIES: CPT | Mod: GP

## 2019-08-11 PROCEDURE — 83605 ASSAY OF LACTIC ACID: CPT

## 2019-08-11 PROCEDURE — 25000128 H RX IP 250 OP 636: Performed by: STUDENT IN AN ORGANIZED HEALTH CARE EDUCATION/TRAINING PROGRAM

## 2019-08-11 RX ORDER — SODIUM CHLORIDE, SODIUM LACTATE, POTASSIUM CHLORIDE, CALCIUM CHLORIDE 600; 310; 30; 20 MG/100ML; MG/100ML; MG/100ML; MG/100ML
INJECTION, SOLUTION INTRAVENOUS CONTINUOUS
Status: DISCONTINUED | OUTPATIENT
Start: 2019-08-11 | End: 2019-08-12

## 2019-08-11 RX ADMIN — SODIUM CHLORIDE, POTASSIUM CHLORIDE, SODIUM LACTATE AND CALCIUM CHLORIDE: 600; 310; 30; 20 INJECTION, SOLUTION INTRAVENOUS at 13:17

## 2019-08-11 RX ADMIN — LOSARTAN POTASSIUM 25 MG: 25 TABLET ORAL at 08:21

## 2019-08-11 RX ADMIN — SODIUM CHLORIDE, POTASSIUM CHLORIDE, SODIUM LACTATE AND CALCIUM CHLORIDE 1000 ML: 600; 310; 30; 20 INJECTION, SOLUTION INTRAVENOUS at 15:14

## 2019-08-11 RX ADMIN — SODIUM CHLORIDE, POTASSIUM CHLORIDE, SODIUM LACTATE AND CALCIUM CHLORIDE: 600; 310; 30; 20 INJECTION, SOLUTION INTRAVENOUS at 18:11

## 2019-08-11 RX ADMIN — SODIUM CHLORIDE, POTASSIUM CHLORIDE, SODIUM LACTATE AND CALCIUM CHLORIDE 1000 ML: 600; 310; 30; 20 INJECTION, SOLUTION INTRAVENOUS at 09:23

## 2019-08-11 RX ADMIN — METOPROLOL TARTRATE 25 MG: 25 TABLET ORAL at 08:21

## 2019-08-11 RX ADMIN — PROCHLORPERAZINE MALEATE 5 MG: 5 TABLET ORAL at 02:13

## 2019-08-11 RX ADMIN — PANTOPRAZOLE SODIUM 40 MG: 40 TABLET, DELAYED RELEASE ORAL at 08:21

## 2019-08-11 ASSESSMENT — ACTIVITIES OF DAILY LIVING (ADL)
ADLS_ACUITY_SCORE: 13

## 2019-08-11 ASSESSMENT — MIFFLIN-ST. JEOR: SCORE: 1274.26

## 2019-08-11 NOTE — PROGRESS NOTES
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   She underwent gastrojejunostomy tube with adjunct gastropexy on 8/5/2019, however since the placement of that tube has had issues with the TF from the J portion refluxing back into the gastric bag. She has been NPO and has not had TF since 8/10 AM.   - Plan for NPO at [facility] for GJ replacement with Dr. Mann; add-on for OR on 8/12   * Will consider clearing the contents of the cavity and will consider stent removal at this time  - Hold enoxaparin pending procedure     Danae Sandy MD (Lizzie)  Gastroenterology/Hepatology Fellow  [phone]  -------------------------------------------------------------------------------------------------------------------         Physical Exam:   BP (!) 84/54 (BP Location: Left arm)   Pulse 92   Temp 97.6  F (36.4  C) (Oral)   Resp 20   Ht 1.702 m (5' 7\")   Wt 74.2 kg (163 lb 8 oz)   SpO2 97%   BMI 25.61 kg/m    Wt:   Wt Readings from Last 2 Encounters:   08/11/19 74.2 kg (163 lb 8 oz)   07/14/19 79.8 kg (176 lb)      Constitutional:  no acute distress  Eyes: Sclera anicteric  CV: RRR normal S1/S2.   Respiratory: CTAB  Abd: Nondistended, non-tender. GJ tube in place  Skin: warm, perfused  Neuro: AAO x 3  Psych: Normal affect         Data:   Labs and imaging below were independently reviewed and interpreted    BMP  Recent Labs   Lab 08/11/19  0545 08/10/19  0630 08/09/19  0452 08/08/19  0544    135 135 135   POTASSIUM 4.1 4.7 4.3 4.4   CHLORIDE 102 101 103 104   TARIQ 9.2 9.6 9.1 8.7   CO2 26 26 25 25   BUN 26 32* 24 16   CR 0.88 0.82 0.75 0.69   GLC 90 107* 117* 124*     CBC  Recent Labs   Lab 08/07/19  0945   WBC 11.3*   RBC 3.42*   HGB 9.3*   HCT 30.4*   MCV 89   MCH " 27.2   MCHC 30.6*   RDW 13.6        INR  Recent Labs   Lab 08/05/19  0459   INR 1.10     LFTs  Recent Labs   Lab 08/05/19  0459   ALKPHOS 212*   AST 21   ALT 38   BILITOTAL 0.7   PROTTOTAL 6.3*   ALBUMIN 2.4*      Abdominal X-ray 8/11/2019:  1. Percutaneous gastrostomy tube which loops within the fundus of the  stomach and the tip projects at pylorus or just postpyloric.  Consider  repositioning.  2. Stable postsurgical changes.  3. Nonobstructed bowel gas pattern.

## 2019-08-11 NOTE — PLAN OF CARE
1123-5628: Mildly tachycardic, other VSS on RA. Denies pain or nausea. GJ site C/D/I, dressing changed with small amount thick, yellow drainage. G tube to gravity with 400 ml thick yellow/green output. J tube clamped. PICC patent. Voiding adequately. Up independently, ambulating frequently. Continue with POC.

## 2019-08-11 NOTE — ANESTHESIA PREPROCEDURE EVALUATION
Anesthesia Pre-Procedure Evaluation    Patient: Marilia Bean   MRN:     [id number] Gender:   female   Age:    74 year old :      1945        Preoperative Diagnosis: duodenal-jeujonostomy anastomosis   Procedure(s):  ENDOSCOPIC RETROGRADE CHOLANGIOPANCREATOGRAPHY  ESOPHAGOGASTRODUODENOSCOPY (EGD) with GJ placement     Past Medical History:   Diagnosis Date     Aortic aneurysm (H) 2007    see  [place] report -follow yearly, treat high BP is occurs Problem list name updated by automated process. Provider to review     Aortic aneurysm of unspecified site without mention of rupture 2007    4.4 cm ascending aorta noted in      Asymptomatic postmenopausal status (age-related) (natural)     on HRT  Prempro -weaning off      CAD (coronary artery disease)     LAD     Family history of Gardiner syndrome      Hyperlipidemia LDL goal <100 10/31/2010     Hypertension goal BP (blood pressure) < 140/90 2016     Leiomyoma of uterus, unspecified     Uterine fibroid     Lump or mass in breast 2004    rt. nodule - bx neg     Personal history of colonic polyps      Postmenopausal atrophic vaginitis 2006     Pulmonary nodule     incidental noted in  during [place]     Pure hypercholesterolemia     mild, diet - low cholesterol, low fat.10/06 start Lovastatin      Past Surgical History:   Procedure Laterality Date     BYPASS GRAFT ARTERY CORONARY N/A 2017    Procedure: BYPASS GRAFT ARTERY CORONARY;;  Surgeon: Akshat Soto MD;  [facility]: [facility]     C DEXA INTERPRETATION, AXIAL  01    wnl. 2005 wnl but lower     COLONOSCOPY  07    Repeat in 1 year for surveillance     COLONOSCOPY  12/10/08    repeat 1 year  -see 2009 letter     COLONOSCOPY  03/31/10     COLONOSCOPY  10/31/2011    Procedure:COMBINED COLONOSCOPY, SINGLE BIOPSY/POLYPECTOMY BY BIOPSY; colonoscopy with polypectomy by biopsy; Surgeon:JESSICA GARCIA; Location: GI     COLONOSCOPY  2013    Procedure: COMBINED  COLONOSCOPY, SINGLE BIOPSY/POLYPECTOMY BY BIOPSY;  Colonoscopy, Polypectomies;  Surgeon: Herbert Salinas MD;  Location: [facility]     COLONOSCOPY N/A 12/8/2015    Procedure: COLONOSCOPY;  Surgeon: Jared Carbajal MD;  Location: [facility]     COLONOSCOPY N/A 4/26/2017    Procedure: COMBINED COLONOSCOPY, SINGLE OR MULTIPLE BIOPSY/POLYPECTOMY BY BIOPSY;  Colonoscopy with polypectomies with forceps and snare;  Surgeon: Herbert Salinas MD;  Location: [facility]     ENDOSCOPIC INSERTION TUBE JEJUNOSTOMY N/A 8/5/2019    Procedure: Gastrojejunostomy tube placement with gastropexy;  Surgeon: Ford Mann MD;  [facility]     ESOPHAGOSCOPY, GASTROSCOPY, DUODENOSCOPY (EGD), COMBINED N/A 12/8/2015    Procedure: COMBINED ESOPHAGOSCOPY, GASTROSCOPY, DUODENOSCOPY (EGD), BIOPSY SINGLE OR MULTIPLE;  Surgeon: Jared Carbajal MD;  Location:  GI     ESOPHAGOSCOPY, GASTROSCOPY, DUODENOSCOPY (EGD), COMBINED N/A 7/31/2019    Procedure: Endoscopic ultrasound with cystoduodenostomy, stent placement x2, stent dilation, and nasojejunal tube placement;  Surgeon: Ford Mann MD;  [facility]     ESOPHAGOSCOPY, GASTROSCOPY, DUODENOSCOPY (EGD), COMBINED N/A 8/5/2019    Procedure: ESOPHAGOGASTRODUODENOSCOPY (EGD);  Surgeon: Ford Mann MD;  [facility]     HC BIOPSY BREAST, PERC NEEDLE CORE, WITH IMAGING  8/17/2004    Right     HC COLONOSCOPY THRU STOMA W BIOPSY/CAUTERY TUMOR/POLYP/LESION  1999,2002 2004 polyp - hyperplastic - repeat 5 years ?     HC COLONOSCOPY W/WO BRUSH/WASH  12/12/2005    Polypectomy.  Diverticulosis-minimal. Bx adenomatous and mucosal polyps - repeat 1 year     HC ECP WITH CATARACT SURGERY Bilateral 2015, 2016     HC LAPAROSCOPY, SURGICAL; APPENDECTOMY  10/31/2004     HC LAPAROSCOPY, SURGICAL; CHOLECYSTECTOMY  2000    Cholecystectomy, Laparoscopic     HC REVISE MEDIAN N/CARPAL TUNNEL SURG  10/01/10    left     HC [facility] ENDOSCOPY, SIMPLE EXAM  03/31/10     HYSTERECTOMY,  ELVIN  12/14/09    JESSE BOOTH     REPAIR ANEURYSM ASCENDING AORTA N/A 6/5/2017    Procedure: REPAIR ANEURYSM ASCENDING AORTA;  Median Sternotomy, Ascending Aortic Aneurysm Repair, Coronary Artery Bypass Graft x1 on pump oxygenator;  Surgeon: Akshat Soto MD;  [facility]     RESECTION DUODENAL N/A 7/3/2019    Procedure: Resection of Distal Duodenal and proximal Jejunum;  Surgeon: Joaquin Brito MD;  [facility]          Anesthesia Evaluation     . Pt has had prior anesthetic. Type: General    No history of anesthetic complications          ROS/MED HX    ENT/Pulmonary:  - neg pulmonary ROS     Neurologic:  - neg neurologic ROS     Cardiovascular: Comment: Thoracic aneurysm s/p repair    (+) hypertension--CAD, --. : . . . :. . Previous cardiac testing date:results:date: results:ECG reviewed date:8/7/19 results:SR date: results:          METS/Exercise Tolerance:  3 - Able to walk 1-2 blocks without stopping   Hematologic:  - neg hematologic  ROS       Musculoskeletal:  - neg musculoskeletal ROS       GI/Hepatic:     (+) Other GI/Hepatic Duodenal CA s/p duodenal resection and reenastamosis      Renal/Genitourinary:  - ROS Renal section negative       Endo:  - neg endo ROS       Psychiatric:  - neg psychiatric ROS       Infectious Disease:  - neg infectious disease ROS       Malignancy:   (+) Malignancy History of GI  GI CA status post Surgery,         Other:    (+) No chance of pregnancy no H/O Chronic Pain,                       PHYSICAL EXAM:   Mental Status/Neuro: A/A/O   Airway: Facies: Feasible  Mallampati: I  Mouth/Opening: Full  TM distance: > 6 cm  Neck ROM: Full   Respiratory: Auscultation: CTAB     Resp. Rate: Normal     Resp. Effort: Normal      CV: Rhythm: Regular  Heart: Normal Sounds  Edema: None   Comments:      Dental: Endentulous; Dentures  Dentures: Upper; Lower; Complete                LABS:  CBC:   Lab Results   Component Value Date    WBC 11.3 (H) 08/07/2019    WBC 12.0  "(H) 08/03/2019    HGB 9.3 (L) 08/07/2019    HGB 8.9 (L) 08/03/2019    HCT 30.4 (L) 08/07/2019    HCT 29.3 (L) 08/03/2019     08/07/2019     08/03/2019     BMP:   Lab Results   Component Value Date     08/11/2019     08/10/2019    POTASSIUM 4.1 08/11/2019    POTASSIUM 4.7 08/10/2019    CHLORIDE 102 08/11/2019    CHLORIDE 101 08/10/2019    CO2 26 08/11/2019    CO2 26 08/10/2019    BUN 26 08/11/2019    BUN 32 (H) 08/10/2019    CR 0.88 08/11/2019    CR 0.82 08/10/2019    GLC 90 08/11/2019     (H) 08/10/2019     COAGS:   Lab Results   Component Value Date    PTT 33 06/12/2017    INR 1.10 08/05/2019    FIBR 269 06/06/2017     POC:   Lab Results   Component Value Date    BGM 80 08/11/2019     OTHER:   Lab Results   Component Value Date    PH 7.32 (L) 06/06/2017    LACT 1.0 07/30/2019    A1C 5.8 06/07/2017    TARIQ 9.2 08/11/2019    PHOS 3.7 08/09/2019    MAG 2.5 (H) 08/09/2019    ALBUMIN 2.4 (L) 08/05/2019    PROTTOTAL 6.3 (L) 08/05/2019    ALT 38 08/05/2019    AST 21 08/05/2019    ALKPHOS 212 (H) 08/05/2019    BILITOTAL 0.7 08/05/2019    LIPASE 411 (H) 07/14/2019    AMYLASE 30 07/06/2019    TSH 4.97 (H) 06/13/2017        Preop Vitals    BP Readings from Last 3 Encounters:   08/11/19 115/59   07/14/19 162/89   07/09/19 149/73    Pulse Readings from Last 3 Encounters:   08/11/19 92   07/14/19 83   07/04/19 72      Resp Readings from Last 3 Encounters:   08/11/19 20   07/14/19 18   07/09/19 16    SpO2 Readings from Last 3 Encounters:   08/11/19 95%   07/14/19 98%   07/09/19 94%      Temp Readings from Last 1 Encounters:   08/11/19 36.4  C (97.6  F) (Oral)    Ht Readings from Last 1 Encounters:   07/15/19 1.702 m (5' 7\")      Wt Readings from Last 1 Encounters:   08/11/19 74.2 kg (163 lb 8 oz)    Estimated body mass index is 25.61 kg/m  as calculated from the following:    Height as of this encounter: 1.702 m (5' 7\").    Weight as of this encounter: 74.2 kg (163 lb 8 oz).     LDA:  PICC Double " Lumen 07/18/19 Right Basilic (Active)   Site Assessment WDL 8/11/2019  8:00 AM   External Cath Length (cm) 1 cm 8/8/2019 10:00 AM   Extremity Circumference (cm) 30.5 cm 7/18/2019  6:00 PM   Dressing Intervention Transparent 8/11/2019  8:00 AM   Dressing Change Due 08/13/19 8/11/2019  8:00 AM   PICC Comment statlock 8/8/2019 10:00 AM   Lumen A - Color GRAY 8/11/2019  8:00 AM   Lumen A - Status saline locked 8/11/2019  8:00 AM   Lumen A - Cap Change Due 08/13/19 8/11/2019  8:00 AM   Lumen B - Color PURPLE 8/11/2019  8:00 AM   Lumen B - Status saline locked 8/11/2019  8:00 AM   Lumen B - Cap Change Due 08/13/19 8/11/2019  8:00 AM   Extravasation? No 8/11/2019  8:00 AM   Line Necessity Yes, meets criteria 8/11/2019  8:00 AM   Number of days: 24       Gastrostomy/Enterostomy Gastrojejunostomy RUQ 18 fr KATIE Gastric-Jejunal feeding tube  lot #[id number] (Active)   Site Description WDL except;Eccymosis;Leaking at site 8/11/2019  8:00 AM   Site care cleansed with soap and water 8/11/2019  8:00 AM   Drainage Appearance Thick;Yellow;Tan;Dark Red 8/11/2019  8:00 AM   External Length (cm marking) 4 cm 8/9/2019  8:00 AM   Status - Gastrostomy Open to gravity drainage 8/11/2019  8:00 AM   Status - Jejunostomy Clamped 8/11/2019  8:00 AM   Dressing Status Dressing Changed;Drainage - Copious 8/11/2019  8:00 AM   Dressing Change Due 08/12/19 8/11/2019  8:00 AM   Intake (ml) 120 ml 8/8/2019  8:00 AM   Flush/Free Water (mL) 90 mL 8/10/2019  5:15 AM   Output (ml) 425 ml 8/11/2019  1:06 PM   Number of days: 6        Assessment:   ASA SCORE: 3    H&P: History and physical reviewed and following examination; no interval change.         Plan:   Anes. Type:  General   Pre-Medication: None   Induction:  IV (Standard)   Airway: ETT; Oral   Access/Monitoring: PIV; 2nd PIV   Maintenance: Balanced     Postop Plan:   Postop Pain: Opioids  Postop Sedation/Airway: Not planned     PONV Management:   Adult Risk Factors: Female, Postop Opioids    Prevention: Ondansetron, Dexamethasone     CONSENT: Direct conversation   Plan and risks discussed with: Patient   Blood Products: Consent Deferred (Minimal Blood Loss)                   Cali Simon DO

## 2019-08-11 NOTE — PLAN OF CARE
5870-1586 hours: Afebrile. Reports lightheadedness with standing. Orthostatic blood pressures are positive. On room air with adequate oxygenation. Bolus given this morning over 4 hours followed by continuous IVF and a second bolus this afternoon. Patient has been intermittently symptomatic with lightheadedness. Sepsis protocol was triggered, lactic acid was 1.1. G-tube draining thick, yellow drainage at g-tube site. MD paged about this. Skin breakdown at g-tube site due to drainage, barrier cream applied. G-tube to gravity with thin green liquid contents this morning transitioning to thicker this afternoon. Urine output is also low, will continue to monitor closely. Blood sugars obtained to monitor as patient's tube feeds are off due to coiled j tube. J tube is currently clamped. Plan for procedure tomorrow for exchange and possible stent removal. Lovenox held for tomorrow. picc line is occluded, request for TPA sent to MD team.

## 2019-08-11 NOTE — PROGRESS NOTES
Surgery Progress Note  08/11/2019       Subjective:  BRIANNE overnight. Patient reports no nausea or abdominal pain. Patient continues to have bowel movements.      Objective:  Temp:  [96.7  F (35.9  C)-99.3  F (37.4  C)] 98.4  F (36.9  C)  Pulse:  [80-95] 95  Heart Rate:  [] 95  Resp:  [16-20] 20  BP: (101-137)/(70-91) 101/71  SpO2:  [94 %-98 %] 94 %    I/O last 3 completed shifts:  In: 310 [P.O.:230; I.V.:30]  Out: 2175 [Urine:350; Emesis/NG output:1825]      Gen: Awake, alert, NAD  Resp: NLB on RA  Abd: soft, non-distended, non-tender  Ext: WWP, no edema     Labs:  Recent Labs   Lab 08/07/19  0945   WBC 11.3*   HGB 9.3*          Recent Labs   Lab 08/11/19  0545 08/10/19  0630 08/09/19  0452 08/08/19  0544 08/07/19  0325    135 135 135 136   POTASSIUM 4.1 4.7 4.3 4.4 4.0   CHLORIDE 102 101 103 104 102   CO2 26 26 25 25 27   BUN 26 32* 24 16 12   CR 0.88 0.82 0.75 0.69 0.65   GLC 90 107* 117* 124* 153*   TARIQ 9.2 9.6 9.1 8.7 8.3*   MAG  --   --  2.5* 2.4* 1.8   PHOS  --   --  3.7 3.7 2.5       Imaging:  Abdominal xray shows GJ-tube at or just past the pylorus.     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 Course c/b perianastomitic hematoma and stricture. Now s/p GJ tube placement by GI 8/6. Overall doing well, however still with high G-tube output.     -GI to see Monday to discuss/plan repositioning the GJ tube  -Will plan to re-start tube feeds after GJ tube repositioned tomorrow, 20ml/hr advance 10ml Q6 as tolerating  -2L LR bolus replacement fluid today and 125 ml/hr LR maintenance  -When discharged, will follow-up in ATC following day  -Will follow-up with Dr. Brito in clinic one week post discharge    Seen, examined, and discussed with chief resident, who will discuss with staff.  - - - - - - - - - - - - - - - - - -  Hernando Eric MS3  General Surgery    Agree with medical student assessment and plan. It is prudent that feeds go past the anastomosis,  appreciate GI recommendations. Seen, examined, and discussed this patient with chief resident, who will discuss with staff.    Karin Rico MD  PGY-1 General Surgery  [id number]

## 2019-08-11 NOTE — PLAN OF CARE
A/Ox4; VSS. G tube draining to gravity; J tube clamped. Denies pain. C/o nausea & received PRN compazine x1. Slept most of the night. Continue with POC.

## 2019-08-11 NOTE — PLAN OF CARE
Discharge Planner PT  PT 7D  Patient plan for discharge: Home with assist  Current status: PT order for evaluation and treatment acknowledged. PT evaluation completed and treatment initiated. LE strength is 5/5. Pt transfers independently. Pt demonstrated orthostatic hypotension and tachycardia this AM. Sitting /77 and ; standing BP 85/57 and  with symptoms of lightheadedness and weakness. Pt rested supine for at least 4 min and symptoms passed. After rest, supine /73 and , sitting /76 and ; standing BP 89/56 and . No symptoms standing. Deferred ambulation and exercise activity now due to orthostasis. Pt not receiving IV fluids at this time. Will plan to see pt again when she is hydrated.  Barriers to return to prior living situation: Medical condition, orthostatic hypotension.  Recommendations for discharge: Home with assist  Rationale for recommendations: Anticipate pt will be functionally independent when orthostasis resolved and will be able to discharge home safely.       Entered by: Bernard Zaragoza 08/11/2019 9:23 AM

## 2019-08-11 NOTE — PROGRESS NOTES
08/11/19 0840   Quick Adds   Type of Visit Initial PT Evaluation   Living Environment   Lives With spouse   Living Arrangements house   Home Accessibility stairs to enter home;stairs within home   Number of Stairs, Main Entrance 3   Stair Railings, Main Entrance none   Number of Stairs, Within Home, Primary other (see comments)  (15)   Stair Railings, Within Home, Primary railing on right side (ascending)   Transportation Anticipated car, drives self   Living Environment Comment Lives in a rambler style; tub/shower   Self-Care   Usual Activity Tolerance excellent   Current Activity Tolerance moderate   Regular Exercise No   Equipment Currently Used at Home none   Activity/Exercise/Self-Care Comment 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 GI has been consulted for possible endoscopic treatment. Seen today to evaluate for discharge home.   Precautions/Limitations other (see comments)  (Orthostatic hypotension due to decreased fluid intake)   General  Observations Pt orthostatic this AM and not receiving IV fluid now. See vitals flowsheet. Pt symptomatic during standing with lightheadedness and weakness.   General Info Comments Activity: ambulate with assist   Cognitive Status Examination   Orientation orientation to person, place and time   Level of Consciousness alert   Follows Commands and Answers Questions 100% of the time   Personal Safety and Judgment intact   Memory intact   Pain Assessment   Patient Currently in Pain No   Integumentary/Edema   Integumentary/Edema no deficits were identifed   Posture    Posture Not impaired   Range of Motion (ROM)   ROM Comment LE ROM is normal bilaterally   Strength   Strength Comments LE strength is 5/5 bilaterally   Bed Mobility   Bed Mobility Comments Independent   Transfer Skills   Transfer Comments Independent; orthostatic hypotension when sitting and standing with symptoms when standing   Gait   Gait Comments Deferred during evaluation due to orthostatic hypotension. Pt states she is ambulating independently > 1000' when not orthostatic.   Balance   Balance Comments Normal   General Therapy Interventions   Planned Therapy Interventions strengthening;risk factor education;home program guidelines;progressive activity/exercise   Intervention Comments Orthostatic hypotension limits activity now. Anticipate pt will be independent when not orthostatic.   Clinical Impression   Criteria for Skilled Therapeutic Intervention yes, treatment indicated   PT Diagnosis Impaired functional mobility   Influenced by the following impairments s/p SBO, orthostatic hypothension   Functional limitations due to impairments Standing, ambulation   Clinical Presentation Evolving/Changing   Clinical Presentation Rationale Orthostatic hypotension limits activity; anticipate independent functional mobility when orthostasis is managed.   Clinical Decision Making (Complexity) Low complexity   Therapy Frequency 4x/week   Predicted Duration of  "Therapy Intervention (days/wks) 3 days   Anticipated Discharge Disposition Home with Assist   Risk & Benefits of therapy have been explained Yes   Patient, Family & other staff in agreement with plan of care Yes   [facility] TM \"6 Clicks\"   2016, Trustees of [facility], under license to [employer].  All rights reserved.   6 Clicks Short Forms Basic Mobility Inpatient Short Form   [facility]  \"6 Clicks\" V.2 Basic Mobility Inpatient Short Form   1. Turning from your back to your side while in a flat bed without using bedrails? 4 - None   2. Moving from lying on your back to sitting on the side of a flat bed without using bedrails? 4 - None   3. Moving to and from a bed to a chair (including a wheelchair)? 4 - None   4. Standing up from a chair using your arms (e.g., wheelchair, or bedside chair)? 4 - None   5. To walk in hospital room? 4 - None   6. Climbing 3-5 steps with a railing? 4 - None   Basic Mobility Raw Score (Score out of 24.Lower scores equate to lower levels of function) 24   Total Evaluation Time   Total Evaluation Time (Minutes) 8     "

## 2019-08-11 NOTE — PLAN OF CARE
[id number] hours: Afebrile, vital signs stable. On room air with adequate oxygenation. Denies pain or nausea this shift. G-tube to gravity with thick, tan liquid this morning. Abdominal x-ray showed that tube was coiled. Per MD team, TF stopped for the day and GI is aware. G-tube transitioned to dark green contents with less output. LR bolus infused this afternoon after the overnight bolus was completed. Family at bedside this afternoon. Patient was out walking most of the day. Request for PT consult for deconditioning. Voiding spontaneously. Bowel movement this morning. Miralax held. Showered.

## 2019-08-12 ENCOUNTER — APPOINTMENT (OUTPATIENT)
Dept: GENERAL RADIOLOGY | Facility: CLINIC | Age: 74
DRG: 327 | End: 2019-08-12
Attending: SURGERY
Payer: MEDICARE

## 2019-08-12 ENCOUNTER — ANESTHESIA (OUTPATIENT)
Dept: SURGERY | Facility: CLINIC | Age: 74
DRG: 327 | End: 2019-08-12
Payer: MEDICARE

## 2019-08-12 LAB
ALBUMIN SERPL-MCNC: 2.8 G/DL (ref 3.4–5)
ALP SERPL-CCNC: 240 U/L (ref 40–150)
ALT SERPL W P-5'-P-CCNC: 98 U/L (ref 0–50)
ANION GAP SERPL CALCULATED.3IONS-SCNC: 10 MMOL/L (ref 3–14)
AST SERPL W P-5'-P-CCNC: 58 U/L (ref 0–45)
BILIRUB SERPL-MCNC: 0.8 MG/DL (ref 0.2–1.3)
BUN SERPL-MCNC: 25 MG/DL (ref 7–30)
CALCIUM SERPL-MCNC: 9.2 MG/DL (ref 8.5–10.1)
CHLORIDE SERPL-SCNC: 100 MMOL/L (ref 94–109)
CO2 SERPL-SCNC: 25 MMOL/L (ref 20–32)
CREAT SERPL-MCNC: 0.78 MG/DL (ref 0.52–1.04)
ERYTHROCYTE [DISTWIDTH] IN BLOOD BY AUTOMATED COUNT: 14 % (ref 10–15)
GFR SERPL CREATININE-BSD FRML MDRD: 74 ML/MIN/{1.73_M2}
GLUCOSE BLDC GLUCOMTR-MCNC: 73 MG/DL (ref 70–99)
GLUCOSE BLDC GLUCOMTR-MCNC: 90 MG/DL (ref 70–99)
GLUCOSE BLDC GLUCOMTR-MCNC: 97 MG/DL (ref 70–99)
GLUCOSE SERPL-MCNC: 77 MG/DL (ref 70–99)
HCT VFR BLD AUTO: 32.8 % (ref 35–47)
HGB BLD-MCNC: 9.8 G/DL (ref 11.7–15.7)
INR PPP: 1.12 (ref 0.86–1.14)
MAGNESIUM SERPL-MCNC: 2.1 MG/DL (ref 1.6–2.3)
MCH RBC QN AUTO: 26.9 PG (ref 26.5–33)
MCHC RBC AUTO-ENTMCNC: 29.9 G/DL (ref 31.5–36.5)
MCV RBC AUTO: 90 FL (ref 78–100)
PHOSPHATE SERPL-MCNC: 3.8 MG/DL (ref 2.5–4.5)
PLATELET # BLD AUTO: 374 10E9/L (ref 150–450)
POTASSIUM SERPL-SCNC: 3.8 MMOL/L (ref 3.4–5.3)
PREALB SERPL IA-MCNC: 26 MG/DL (ref 15–45)
PROT SERPL-MCNC: 7.3 G/DL (ref 6.8–8.8)
RBC # BLD AUTO: 3.64 10E12/L (ref 3.8–5.2)
SODIUM SERPL-SCNC: 135 MMOL/L (ref 133–144)
WBC # BLD AUTO: 9.3 10E9/L (ref 4–11)

## 2019-08-12 PROCEDURE — 25000132 ZZH RX MED GY IP 250 OP 250 PS 637: Performed by: STUDENT IN AN ORGANIZED HEALTH CARE EDUCATION/TRAINING PROGRAM

## 2019-08-12 PROCEDURE — 0DHA3UZ INSERTION OF FEEDING DEVICE INTO JEJUNUM, PERCUTANEOUS APPROACH: ICD-10-PCS | Performed by: INTERNAL MEDICINE

## 2019-08-12 PROCEDURE — 71000014 ZZH RECOVERY PHASE 1 LEVEL 2 FIRST HR: Performed by: INTERNAL MEDICINE

## 2019-08-12 PROCEDURE — 36000055 ZZH SURGERY LEVEL 2 W FLUORO 1ST 30 MIN - UMMC: Performed by: INTERNAL MEDICINE

## 2019-08-12 PROCEDURE — 0D998ZZ DRAINAGE OF DUODENUM, VIA NATURAL OR ARTIFICIAL OPENING ENDOSCOPIC: ICD-10-PCS | Performed by: INTERNAL MEDICINE

## 2019-08-12 PROCEDURE — 37000008 ZZH ANESTHESIA TECHNICAL FEE, 1ST 30 MIN: Performed by: INTERNAL MEDICINE

## 2019-08-12 PROCEDURE — 25000566 ZZH SEVOFLURANE, EA 15 MIN: Performed by: INTERNAL MEDICINE

## 2019-08-12 PROCEDURE — 36592 COLLECT BLOOD FROM PICC: CPT | Performed by: INTERNAL MEDICINE

## 2019-08-12 PROCEDURE — C1769 GUIDE WIRE: HCPCS | Performed by: INTERNAL MEDICINE

## 2019-08-12 PROCEDURE — 25800030 ZZH RX IP 258 OP 636: Performed by: NURSE ANESTHETIST, CERTIFIED REGISTERED

## 2019-08-12 PROCEDURE — 25000125 ZZHC RX 250: Performed by: NURSE ANESTHETIST, CERTIFIED REGISTERED

## 2019-08-12 PROCEDURE — 25000128 H RX IP 250 OP 636: Performed by: INTERNAL MEDICINE

## 2019-08-12 PROCEDURE — 25000132 ZZH RX MED GY IP 250 OP 250 PS 637: Performed by: INTERNAL MEDICINE

## 2019-08-12 PROCEDURE — 40000277 XR SURGERY CARM FLUORO LESS THAN 5 MIN W STILLS: Mod: TC

## 2019-08-12 PROCEDURE — 25000128 H RX IP 250 OP 636: Performed by: STUDENT IN AN ORGANIZED HEALTH CARE EDUCATION/TRAINING PROGRAM

## 2019-08-12 PROCEDURE — 25000128 H RX IP 250 OP 636: Performed by: NURSE ANESTHETIST, CERTIFIED REGISTERED

## 2019-08-12 PROCEDURE — 84100 ASSAY OF PHOSPHORUS: CPT | Performed by: INTERNAL MEDICINE

## 2019-08-12 PROCEDURE — C1726 CATH, BAL DIL, NON-VASCULAR: HCPCS | Performed by: INTERNAL MEDICINE

## 2019-08-12 PROCEDURE — 85027 COMPLETE CBC AUTOMATED: CPT | Performed by: SURGERY

## 2019-08-12 PROCEDURE — C1894 INTRO/SHEATH, NON-LASER: HCPCS | Performed by: INTERNAL MEDICINE

## 2019-08-12 PROCEDURE — 25500064 ZZH RX 255 OP 636: Performed by: INTERNAL MEDICINE

## 2019-08-12 PROCEDURE — 25000125 ZZHC RX 250: Performed by: INTERNAL MEDICINE

## 2019-08-12 PROCEDURE — 84134 ASSAY OF PREALBUMIN: CPT | Performed by: INTERNAL MEDICINE

## 2019-08-12 PROCEDURE — 12000001 ZZH R&B MED SURG/OB UMMC

## 2019-08-12 PROCEDURE — 25800030 ZZH RX IP 258 OP 636: Performed by: STUDENT IN AN ORGANIZED HEALTH CARE EDUCATION/TRAINING PROGRAM

## 2019-08-12 PROCEDURE — 0DPD8UZ REMOVAL OF FEEDING DEVICE FROM LOWER INTESTINAL TRACT, VIA NATURAL OR ARTIFICIAL OPENING ENDOSCOPIC: ICD-10-PCS | Performed by: INTERNAL MEDICINE

## 2019-08-12 PROCEDURE — 80053 COMPREHEN METABOLIC PANEL: CPT | Performed by: INTERNAL MEDICINE

## 2019-08-12 PROCEDURE — 83735 ASSAY OF MAGNESIUM: CPT | Performed by: INTERNAL MEDICINE

## 2019-08-12 PROCEDURE — 85610 PROTHROMBIN TIME: CPT | Performed by: INTERNAL MEDICINE

## 2019-08-12 PROCEDURE — 27210794 ZZH OR GENERAL SUPPLY STERILE: Performed by: INTERNAL MEDICINE

## 2019-08-12 PROCEDURE — 40000170 ZZH STATISTIC PRE-PROCEDURE ASSESSMENT II: Performed by: INTERNAL MEDICINE

## 2019-08-12 PROCEDURE — 00000146 ZZHCL STATISTIC GLUCOSE BY METER IP

## 2019-08-12 PROCEDURE — 37000009 ZZH ANESTHESIA TECHNICAL FEE, EACH ADDTL 15 MIN: Performed by: INTERNAL MEDICINE

## 2019-08-12 PROCEDURE — 36000053 ZZH SURGERY LEVEL 2 EA 15 ADDTL MIN - UMMC: Performed by: INTERNAL MEDICINE

## 2019-08-12 RX ORDER — SODIUM CHLORIDE, SODIUM LACTATE, POTASSIUM CHLORIDE, CALCIUM CHLORIDE 600; 310; 30; 20 MG/100ML; MG/100ML; MG/100ML; MG/100ML
INJECTION, SOLUTION INTRAVENOUS CONTINUOUS PRN
Status: DISCONTINUED | OUTPATIENT
Start: 2019-08-12 | End: 2019-08-12

## 2019-08-12 RX ORDER — LIDOCAINE 40 MG/G
CREAM TOPICAL
Status: DISCONTINUED | OUTPATIENT
Start: 2019-08-12 | End: 2019-08-12 | Stop reason: HOSPADM

## 2019-08-12 RX ORDER — LIDOCAINE HYDROCHLORIDE 20 MG/ML
INJECTION, SOLUTION INFILTRATION; PERINEURAL PRN
Status: DISCONTINUED | OUTPATIENT
Start: 2019-08-12 | End: 2019-08-12

## 2019-08-12 RX ORDER — IOPAMIDOL 510 MG/ML
INJECTION, SOLUTION INTRAVASCULAR PRN
Status: DISCONTINUED | OUTPATIENT
Start: 2019-08-12 | End: 2019-08-12 | Stop reason: HOSPADM

## 2019-08-12 RX ORDER — PROPOFOL 10 MG/ML
INJECTION, EMULSION INTRAVENOUS PRN
Status: DISCONTINUED | OUTPATIENT
Start: 2019-08-12 | End: 2019-08-12

## 2019-08-12 RX ORDER — DEXAMETHASONE SODIUM PHOSPHATE 4 MG/ML
INJECTION, SOLUTION INTRA-ARTICULAR; INTRALESIONAL; INTRAMUSCULAR; INTRAVENOUS; SOFT TISSUE PRN
Status: DISCONTINUED | OUTPATIENT
Start: 2019-08-12 | End: 2019-08-12

## 2019-08-12 RX ORDER — FENTANYL CITRATE 50 UG/ML
INJECTION, SOLUTION INTRAMUSCULAR; INTRAVENOUS PRN
Status: DISCONTINUED | OUTPATIENT
Start: 2019-08-12 | End: 2019-08-12

## 2019-08-12 RX ORDER — LIDOCAINE 40 MG/G
CREAM TOPICAL
Status: CANCELLED | OUTPATIENT
Start: 2019-08-12

## 2019-08-12 RX ORDER — ONDANSETRON 2 MG/ML
4 INJECTION INTRAMUSCULAR; INTRAVENOUS
Status: CANCELLED | OUTPATIENT
Start: 2019-08-12

## 2019-08-12 RX ORDER — DEXTROSE MONOHYDRATE, SODIUM CHLORIDE, AND POTASSIUM CHLORIDE 50; 1.49; 4.5 G/1000ML; G/1000ML; G/1000ML
INJECTION, SOLUTION INTRAVENOUS CONTINUOUS
Status: DISCONTINUED | OUTPATIENT
Start: 2019-08-12 | End: 2019-08-13 | Stop reason: HOSPADM

## 2019-08-12 RX ADMIN — PANTOPRAZOLE SODIUM 40 MG: 40 TABLET, DELAYED RELEASE ORAL at 10:20

## 2019-08-12 RX ADMIN — DEXAMETHASONE SODIUM PHOSPHATE 8 MG: 4 INJECTION, SOLUTION INTRA-ARTICULAR; INTRALESIONAL; INTRAMUSCULAR; INTRAVENOUS; SOFT TISSUE at 15:45

## 2019-08-12 RX ADMIN — PROPOFOL 100 MG: 10 INJECTION, EMULSION INTRAVENOUS at 15:45

## 2019-08-12 RX ADMIN — POTASSIUM CHLORIDE, DEXTROSE MONOHYDRATE AND SODIUM CHLORIDE: 150; 5; 450 INJECTION, SOLUTION INTRAVENOUS at 12:35

## 2019-08-12 RX ADMIN — LIDOCAINE HYDROCHLORIDE 100 MG: 20 INJECTION, SOLUTION INFILTRATION; PERINEURAL at 15:45

## 2019-08-12 RX ADMIN — FENTANYL CITRATE 100 MCG: 50 INJECTION, SOLUTION INTRAMUSCULAR; INTRAVENOUS at 15:45

## 2019-08-12 RX ADMIN — SUGAMMADEX 200 MG: 100 INJECTION, SOLUTION INTRAVENOUS at 16:39

## 2019-08-12 RX ADMIN — METOPROLOL TARTRATE 25 MG: 25 TABLET ORAL at 19:56

## 2019-08-12 RX ADMIN — POTASSIUM CHLORIDE 20 MEQ: 400 INJECTION, SOLUTION INTRAVENOUS at 10:20

## 2019-08-12 RX ADMIN — SODIUM CHLORIDE, POTASSIUM CHLORIDE, SODIUM LACTATE AND CALCIUM CHLORIDE: 600; 310; 30; 20 INJECTION, SOLUTION INTRAVENOUS at 15:32

## 2019-08-12 RX ADMIN — ONDANSETRON 4 MG: 2 INJECTION INTRAMUSCULAR; INTRAVENOUS at 15:45

## 2019-08-12 RX ADMIN — ROCURONIUM BROMIDE 50 MG: 10 INJECTION INTRAVENOUS at 15:45

## 2019-08-12 RX ADMIN — SODIUM CHLORIDE, POTASSIUM CHLORIDE, SODIUM LACTATE AND CALCIUM CHLORIDE 1000 ML: 600; 310; 30; 20 INJECTION, SOLUTION INTRAVENOUS at 10:18

## 2019-08-12 RX ADMIN — FENTANYL CITRATE 50 MCG: 50 INJECTION, SOLUTION INTRAMUSCULAR; INTRAVENOUS at 16:18

## 2019-08-12 RX ADMIN — SODIUM CHLORIDE, POTASSIUM CHLORIDE, SODIUM LACTATE AND CALCIUM CHLORIDE: 600; 310; 30; 20 INJECTION, SOLUTION INTRAVENOUS at 01:53

## 2019-08-12 ASSESSMENT — ACTIVITIES OF DAILY LIVING (ADL)
ADLS_ACUITY_SCORE: 13

## 2019-08-12 ASSESSMENT — MIFFLIN-ST. JEOR: SCORE: 1284.24

## 2019-08-12 NOTE — PLAN OF CARE
This morning pt voiced frustrations about the prolonged hospital stay and not knowing if she will be able to eat again. Morning lab draw BG, 77. Recheck at noon was 73. Provider notified because patient is NPO and feedings have been stopped since Friday and blood sugar has been trending down. Provider ordered MIV fluids of D5 1/2 NS 20 KCl. BG recheck after Dextrose fluid given was 90. Output from g-tube is thick, creamy, and green/yellow. Total output this shift was 275 ml. G-tube site is open to air with barrier paste to reduce irritation. OR checklist completed and pt transported down to OR at 15:00 for ERCP/EGD. Spouse and family member at bedside.

## 2019-08-12 NOTE — ANESTHESIA CARE TRANSFER NOTE
Patient: Marilia Bean    Procedure(s):  ESOPHAGOGASTRODUODENOSCOPY (EGD) with GJ exchange, duodenum stent removal.    Diagnosis: duodenal-jeujonostomy anastomosis  Diagnosis Additional Information: No value filed.    Anesthesia Type:   General     Note:  Airway :Nasal Cannula  Patient transferred to:PACU  Comments: Pt extubated in the OR without incident or complications. Pt VSS upon arrival to the PACU. Pt has no c/o pain/N/V. Pt care report given to receiving RN> Handoff Report: Identifed the Patient, Identified the Reponsible Provider, Reviewed the pertinent medical history, Discussed the surgical course, Reviewed Intra-OP anesthesia mangement and issues during anesthesia, Set expectations for post-procedure period and Allowed opportunity for questions and acknowledgement of understanding      Vitals: (Last set prior to Anesthesia Care Transfer)    CRNA VITALS  8/12/2019 1616 - 8/12/2019 1651      8/12/2019             Pulse:  77    SpO2:  92 %                Electronically Signed By: CECILIA Donovan CRNA  August 12, 2019  4:51 PM

## 2019-08-12 NOTE — PROGRESS NOTES
Care Coordinator - Discharge Planning    Admission Date/Time:  7/15/2019  Attending MD:  Joaquin Brito,*     Data  Date of initial CC assessment:  8/8/19  Chart reviewed, discussed with interdisciplinary team.   Patient was admitted for:   1. Abdominal pain with vomiting    2. Duodenal adenocarcinoma (H)         Assessment   Full assessment completed in previous note    Coordination of Care and Referrals: Provided patient/family with options for Home Infusion.    Per team, patient is not stable to discharge today as her J tube is coiled and patient will be going down for GJ tube repositioning procedure today. Writer was asked to reschedule patient's ATC appointment that was scheduled for 8/13.     Writer sent a request via in-basket and rescheduled patient's appointment to Wednesday, August 14th at 2pm. Team and AVS updated.       Plan  Anticipated Discharge Date:  8/13/19  Anticipated Discharge Plan:  Home with home infusion services and clinic follow-up as recommended.      CTS Handoff completed:  NO (will send when patient discharges)     Joanna Munoz, RN, BSN, PHN  Care Coordinator

## 2019-08-12 NOTE — PROGRESS NOTES
Surgery Progress Note  08/12/2019       Subjective:  BRIANNE overnight. No nausea or abdominal pain.     Objective:  Temp:  [96.3  F (35.7  C)-98.4  F (36.9  C)] 96.3  F (35.7  C)  Pulse:  [92] 92  Heart Rate:  [] 92  Resp:  [18-20] 18  BP: ()/(54-68) 131/64  SpO2:  [93 %-99 %] 93 %    I/O last 3 completed shifts:  In: 4342.5 [P.O.:110; I.V.:4232.5]  Out: 2025 [Urine:800; Emesis/NG output:1225]      Gen: Awake, alert, NAD  Resp: NLB on RA  Abd: soft, non-distended, non-tender  Ext: WWP, no edema     Labs:  Recent Labs   Lab 08/12/19  0545 08/07/19  0945   WBC 9.3 11.3*   HGB 9.8* 9.3*    413       Recent Labs   Lab 08/12/19  0545 08/11/19  0545 08/10/19  0630 08/09/19  0452 08/08/19  0544    137 135 135 135   POTASSIUM 3.8 4.1 4.7 4.3 4.4   CHLORIDE 100 102 101 103 104   CO2 25 26 26 25 25   BUN 25 26 32* 24 16   CR 0.78 0.88 0.82 0.75 0.69   GLC 77 90 107* 117* 124*   TARIQ 9.2 9.2 9.6 9.1 8.7   MAG 2.1  --   --  2.5* 2.4*   PHOS 3.8  --   --  3.7 3.7       Imaging:  No new imaging.     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 Course c/b perianastomitic hematoma and stricture. Now s/p GJ tube placement by GI 8/6. Overall doing well, however still with high G-tube output.     -Plan for GI to reposition GJ tube today.  -Will follow up GI recommendations regarding starting tube feeds.  -1L LR bolus replacement fluid today and 125 ml/hr LR maintenance  -When discharged, will follow-up in ATC following day  -Will follow-up with Dr. Brito in clinic one week post discharge    Seen, examined, and discussed with chief resident, who will discuss with staff.    Karin Rico MD  PGY-1 General Surgery  [id number]

## 2019-08-12 NOTE — PLAN OF CARE
A/Ox4; VSS. BPs improved & denies lightheadedness or dizziness upon standing. Denies pain and nausea. LR continues at 125 mL/hr. G tube draining to gravity; J tube remains clamped. Mild skin breakdown around GJ tube site, as well as small amount of drainage; dressing changed frequently and barrier cream applied. OR add-on today likely for ERCP/EGD. Continue with POC.

## 2019-08-12 NOTE — PLAN OF CARE
[id number]: Mildly tachycardic, other VSS on RA. Denies pain or nausea. PICC patent, LR infusing at 125 ml/hr. GJ tube site with minimal thick, yellow drainage and erythematous; cleaned and barrier applied. G tube to gravity, 500 ml output over 8 hours. J tube clamped. Voiding adequately. Up independently. Pt is on add-on OR schedule for tomorrow for tube replacement. Surgery scrub completed in case of need. Continue with POC.

## 2019-08-12 NOTE — OP NOTE
Upper GI Endoscopy 08/12/2019  3:53 PM [facility], [address] [place] [address] [phone]     Endoscopy Department   _______________________________________________________________________________   Patient Name: Marilia Bean            Procedure Date: 8/12/2019 3:53 PM   MRN: [id number]                       Account Number: [id number]   YOB: 1945              Admit Type: Inpatient   Age: 74                                Gender: Female   Note Status: Finalized                Attending MD: Ford Mann MD   Total Sedation Time:                     _______________________________________________________________________________       Procedure:           Upper GI endoscopy   Indications:         Replace PEG tube, Walled off collection post endoscopic                        transluminal drainage, Stent removal, Anastomotic                        stenosis   Providers:           Ford Mann MD, Conner Fountain MD   Patient Profile:     Ms Bean is a 73yo woman with Gardiner status post surgical                        duodenal resection with primary duodenojejunostomy                        created for which we created a cystoduodenostomy (from                        the bulb) to access and ultimately drain a                        perianastomotic hematoma and placed a gastrojejunostomy                        tube with gastropexy for failure to thrive. Her course                        has stalled and she has not been able to tolerate tube                        feeds over the past four days. We now proceds to upper                        endoscopy for removal of collection contents, stent                        removal and replacement of the gastrojejunostomy tube.   Referring MD:        Joaquin Brito MD   Medicines:           General Anesthesia   Complications:       No immediate complications.    _______________________________________________________________________________   Procedure:           Pre-Anesthesia Assessment:                        - Prior to the procedure, a History and Physical was                        performed, and patient medications and allergies were                        reviewed. The patient is competent. The risks and                        benefits of the procedure and the sedation options and                        risks were discussed with the patient. All questions                        were answered and informed consent was obtained. Patient                        identification and proposed procedure were verified by                        the nurse in the pre-procedure area. Mental Status                        Examination: alert and oriented. Airway Examination:                        normal oropharyngeal airway and neck mobility.                        Respiratory Examination: clear to auscultation. CV                        Examination: normal. ASA Grade Assessment: II - A                        patient with mild systemic disease. After reviewing the                        risks and benefits, the patient was deemed in                        satisfactory condition to undergo the procedure. The                        anesthesia plan was to use general anesthesia.                        Immediately prior to administration of medications, the                        patient was re-assessed for adequacy to receive                        sedatives. The heart rate, respiratory rate, oxygen                        saturations, blood pressure, adequacy of pulmonary                        ventilation, and response to care were monitored                        throughout the procedure. The physical status of the                        patient was re-assessed after the procedure. After                        obtaining informed consent, the endoscope was passed                        " under direct vision. Throughout the procedure, the                        patient's blood pressure, pulse, and oxygen saturations                        were monitored continuously. The gastroscope was                        introduced through the mouth, and advanced to the second                        part of duodenum. The pediatric gastroscope was                        introduced through the gastrostomy, and advanced to the                        fourth part of duodenum. The upper GI endoscopy was                        accomplished without difficulty. The patient tolerated                        the procedure well.                                                                                     Findings:        The abdomen was inspected and the gastrostomy site was without        infection. The T tag buttons were removed.  films demonstrated the        GJ partially coiled in the stomach with tip likely across the pylorus as        well as the Axios and stent in stent Zimmon. The gastroscope was passed        per os demonstrating an unremarkable esophagus with the squmocolumnar        line found without significant irregularity at the gastroesophageal        junction. The stomach was notable for the gastrostomy balloon inflated        and slight deeply seated on the anterior antral wall with the tube        coiled in the stomach though the tip well across the pylorus. The        balloon was deflated and the GJ tube pulled back to allow deeper        passage. In the duodenal bulb the 10mm Axios was found well seated        across the duodenal wall with stent in stent Zimmon. Both stents were        removed in serial fashion using rat toothed forceps. This allowed access        to a small cavity across a mature tract. The small cavity contained a        small amount of pus looking liquid and old blood. An 0.025\" Visiglide        wire was then curled in the cavity and the cavity was swept with a 12mm       " " occlusion balloon removing all contents. The cavity was then irrigated        with 20cc of saline. Next we removed the GJ and passed a pediatric        gastroscope per ostomy across the pylorus as well as a tortuous stenosis        likely representing the duodenojejunostomy to the jejunum. Of note the        overlying mucosa appeared healthy. We then exchanged an 0.035\" Glidewire        culred in the deep proximal jejunum. Over this wire a fresh 18F 45cm one        piece GJ tube was positioned with tip in the proximal jejunum and no        gastric curl. The balloon was inflated and the external bumper place        aroud 4cm.                                                                                     Impression:          - Uncomplicated removal of external T tag buttons                        - Uncomplicated removal of malpositioned                        gastrojejunostomy tube and subsequent replacement of                        well postioned 18F 45cm gastrojejunostomy tube                        - Mature gastrostomy tract                        - Well postioned Axios with stent in stent Zimmon                        removed allowing access to mature cystoduodenostomy                        tract and mostly collapsed cavity containing a small                        amount of what appeared to be pus and old blood, all of                        which was removed and cavity irrigated                        - Ongoing tortuous stenosis involving an otherwise                        healthy appearing surgical duodenojejunostomy   Recommendation:      - General anesthesia recovery with return to the floor                        when appropriate                        - From my perspected oral intake should be driven by                        patient desire and symptoms, popsicles should be                        reasonable as a starting point (requested by patient)                        - Reinitiate tube feeds at " a slow rate and ramp towards                        goal as tolerated per nutrition recommendations                        - No planned acute endoscopic procedure at this                        juncture; GJ may be removed with not needed for over a                        week given improved oral intake- The findings and                        recommendations were discussed with the patient and                        their family.                                                                                       electronically signed by CHUY Mann

## 2019-08-13 ENCOUNTER — HOME INFUSION (PRE-WILLOW HOME INFUSION) (OUTPATIENT)
Dept: PHARMACY | Facility: CLINIC | Age: 74
End: 2019-08-13

## 2019-08-13 VITALS
SYSTOLIC BLOOD PRESSURE: 98 MMHG | WEIGHT: 165.7 LBS | RESPIRATION RATE: 18 BRPM | HEART RATE: 80 BPM | OXYGEN SATURATION: 95 % | TEMPERATURE: 95.8 F | BODY MASS INDEX: 26.01 KG/M2 | DIASTOLIC BLOOD PRESSURE: 56 MMHG | HEIGHT: 67 IN

## 2019-08-13 LAB
ANION GAP SERPL CALCULATED.3IONS-SCNC: 8 MMOL/L (ref 3–14)
BUN SERPL-MCNC: 14 MG/DL (ref 7–30)
CALCIUM SERPL-MCNC: 8.5 MG/DL (ref 8.5–10.1)
CHLORIDE SERPL-SCNC: 102 MMOL/L (ref 94–109)
CO2 SERPL-SCNC: 26 MMOL/L (ref 20–32)
CREAT SERPL-MCNC: 0.73 MG/DL (ref 0.52–1.04)
GFR SERPL CREATININE-BSD FRML MDRD: 81 ML/MIN/{1.73_M2}
GLUCOSE SERPL-MCNC: 197 MG/DL (ref 70–99)
POTASSIUM SERPL-SCNC: 4.2 MMOL/L (ref 3.4–5.3)
SODIUM SERPL-SCNC: 136 MMOL/L (ref 133–144)
UPPER GI ENDOSCOPY: NORMAL

## 2019-08-13 PROCEDURE — 40000558 ZZH STATISTIC CVC DRESSING CHANGE

## 2019-08-13 PROCEDURE — 36592 COLLECT BLOOD FROM PICC: CPT | Performed by: INTERNAL MEDICINE

## 2019-08-13 PROCEDURE — 40000802 ZZH SITE CHECK

## 2019-08-13 PROCEDURE — 25800030 ZZH RX IP 258 OP 636: Performed by: INTERNAL MEDICINE

## 2019-08-13 PROCEDURE — 25000132 ZZH RX MED GY IP 250 OP 250 PS 637: Performed by: INTERNAL MEDICINE

## 2019-08-13 PROCEDURE — 80048 BASIC METABOLIC PNL TOTAL CA: CPT | Performed by: INTERNAL MEDICINE

## 2019-08-13 RX ORDER — POLYETHYLENE GLYCOL 3350 17 G/17G
17 POWDER, FOR SOLUTION ORAL DAILY PRN
Qty: 30 PACKET | Refills: 0 | Status: SHIPPED | OUTPATIENT
Start: 2019-08-13 | End: 2020-04-07

## 2019-08-13 RX ORDER — SIMETHICONE 80 MG
80 TABLET,CHEWABLE ORAL EVERY 6 HOURS PRN
Qty: 30 TABLET | Refills: 0 | Status: SHIPPED | OUTPATIENT
Start: 2019-08-13 | End: 2020-04-07

## 2019-08-13 RX ORDER — ONDANSETRON 4 MG/1
4 TABLET, ORALLY DISINTEGRATING ORAL EVERY 6 HOURS PRN
Qty: 20 TABLET | Refills: 0 | Status: SHIPPED | OUTPATIENT
Start: 2019-08-13 | End: 2020-04-07

## 2019-08-13 RX ORDER — PANTOPRAZOLE SODIUM 40 MG/1
40 TABLET, DELAYED RELEASE ORAL
Qty: 30 TABLET | Refills: 0 | Status: SHIPPED | OUTPATIENT
Start: 2019-08-14 | End: 2020-04-07

## 2019-08-13 RX ADMIN — SODIUM CHLORIDE, POTASSIUM CHLORIDE, SODIUM LACTATE AND CALCIUM CHLORIDE 1000 ML: 600; 310; 30; 20 INJECTION, SOLUTION INTRAVENOUS at 09:07

## 2019-08-13 RX ADMIN — POTASSIUM CHLORIDE, DEXTROSE MONOHYDRATE AND SODIUM CHLORIDE: 150; 5; 450 INJECTION, SOLUTION INTRAVENOUS at 09:07

## 2019-08-13 RX ADMIN — PANTOPRAZOLE SODIUM 40 MG: 40 TABLET, DELAYED RELEASE ORAL at 09:08

## 2019-08-13 RX ADMIN — POTASSIUM CHLORIDE, DEXTROSE MONOHYDRATE AND SODIUM CHLORIDE: 150; 5; 450 INJECTION, SOLUTION INTRAVENOUS at 00:09

## 2019-08-13 ASSESSMENT — ACTIVITIES OF DAILY LIVING (ADL)
ADLS_ACUITY_SCORE: 13

## 2019-08-13 NOTE — PLAN OF CARE
Physical Therapy Discharge Summary    Reason for therapy discharge:    Discharged to home with home therapy.    Progress towards therapy goal(s). See goals on Care Plan in [facility] electronic health record for goal details.  Goals not met.  Barriers to achieving goals:   discharge from facility.    Therapy recommendation(s):    Continued therapy is recommended.  Rationale/Recommendations:  for safe progression of indep functional mobility.

## 2019-08-13 NOTE — PROGRESS NOTES
"  Care Coordinator - Discharge Planning    Admission Date/Time:  7/15/2019  Attending MD:  Joaquin Brito,*     Data  Date of initial CC assessment:  8/8/19  Chart reviewed, discussed with interdisciplinary team.   Patient was admitted for:   1. Abdominal pain with vomiting    2. Duodenal adenocarcinoma (H)    3. Partial intestinal obstruction, unspecified cause (H)         Assessment   Full assessment completed in previous note    Coordination of Care and Referrals: Provided patient/family with options for Home Infusion.    Per team, patient is stable to discharge home today.     [facility] liaison notified.     Writer presented patient with the \"Important Message from Medicare\" document, obtained patient's signature and placed a copy in patient's chart. Patient had no questions or concerns at this time. RNCC will follow as needed.       Plan  Anticipated Discharge Date:  8/13/19  Anticipated Discharge Plan:  Home with home infusion services and clinic follow-up as recommended.    CTS Handoff completed: YES    Joanna Munoz, RN, BSN, PHN  Care Coordinator                 "

## 2019-08-13 NOTE — PROGRESS NOTES
8/13/2019 Gastroenterology Brief Note    Patient seen at 1000. Reports that she is doing well post procedure - discussed results. She is excited to go home, planning for discharge this afternoon. Informed her that procedure for tomorrow will be cancelled since it was completed yesterday. No specific GI follow up is indicated. GJ tube management and diet per surgery team.    The inpatient advanced endoscopy/pancreaticobiliary service will sign off at this time. Please call or repost with questions or if status changes. Thank you for allowing us to participate in the care of this patient.    Above in collaboration with Dr. Johnathan Ponce PA-C  Advanced Endoscopy/Pancreaticobiliary Service  Pager *[phone]

## 2019-08-13 NOTE — PLAN OF CARE
Shift:   VS: Temp: 98  F (36.7  C) Temp src: Oral BP: 121/55 Pulse: 80 Heart Rate: 77 Resp: 16 SpO2: 93 % O2 Device: None (Room air) Oxygen Delivery: 1 LPM  Pain: Denies pain/nausea  Neuro: A&Ox4, calls appropriately  Respiratory: RA. Denies SOB. Pt refused CAPNO.   GI/Diet/Appetite: NPO. TF infusing via J-tube at 10cc/hr. Tolerating well. Please advance as per order. G-tube to gravity with medium output.  :  Adequate UO  LDA's: PICC with MIVF at 125cc/hr  Skin: PEG site dressing changed.   Activity: Up with SBA  Tests/Procedures:   Pertinent Labs/Lab Collection:      Plan: Continue with cares and update MD with any changes.

## 2019-08-13 NOTE — PROGRESS NOTES
Focus: Discharge  D: Pt admitted for small bowel obstructions and yesterday pt received G-J tube exchange in OR yesterday. G-tube is hook up to gravity bag drainage. G-tube draining green color output. Dayshift g-tube output is 1125ml. Pt receiving daily  NS 1L bolus/day.  Pt getting continuous Nutren 1.5 enteral tube feed at goal rate 50ml/hr. She is tolerating her tube feeding fine. No c/o of nausea or abd pain. Pt report having a small BM yesterday. She is passing gas this am.   I: Reviewed discharge instructions with pt regarding her medications, follow up appointment in clinic and  Home infusion service will be following her cares at home regarding continuous tube feed, daily boluses and lab draw and PICC line. Review s/s of infection with pt and pt was instructed to notify provider.   A: Pt tolerating tube feeding fine. G-tube site draining well with large output. Pt stated understanding of discharge instructions and her questions & concern have been answered  P: Discharge to home

## 2019-08-13 NOTE — PLAN OF CARE
Pt afebrile, VSS. A&Ox4. Denied pain. No nausea. Tube feed infusing in J-tube increased to 50 mL/hr, per MD order. Pt tolerated well overnight, goal rate 50 mL/hr met. IV fluids infusing at 125 mL/hr. G-tube set to gravity, output 125 mL. Pt voiding spontaneously. No BM but passing gas. Possible discharge to home today. Continue plan of care.

## 2019-08-14 ENCOUNTER — HOME INFUSION (PRE-WILLOW HOME INFUSION) (OUTPATIENT)
Dept: PHARMACY | Facility: CLINIC | Age: 74
End: 2019-08-14

## 2019-08-14 ENCOUNTER — INFUSION THERAPY VISIT (OUTPATIENT)
Dept: INFUSION THERAPY | Facility: CLINIC | Age: 74
End: 2019-08-14
Attending: INTERNAL MEDICINE
Payer: MEDICARE

## 2019-08-14 ENCOUNTER — TELEPHONE (OUTPATIENT)
Dept: INTERNAL MEDICINE | Facility: CLINIC | Age: 74
End: 2019-08-14

## 2019-08-14 VITALS
OXYGEN SATURATION: 97 % | SYSTOLIC BLOOD PRESSURE: 123 MMHG | TEMPERATURE: 97.8 F | DIASTOLIC BLOOD PRESSURE: 72 MMHG | RESPIRATION RATE: 18 BRPM | HEART RATE: 87 BPM

## 2019-08-14 DIAGNOSIS — K56.600 PARTIAL INTESTINAL OBSTRUCTION, UNSPECIFIED CAUSE (H): Primary | ICD-10-CM

## 2019-08-14 LAB
ANION GAP SERPL CALCULATED.3IONS-SCNC: 8 MMOL/L (ref 3–14)
BUN SERPL-MCNC: 24 MG/DL (ref 7–30)
CALCIUM SERPL-MCNC: 9.1 MG/DL (ref 8.5–10.1)
CHLORIDE SERPL-SCNC: 106 MMOL/L (ref 94–109)
CO2 SERPL-SCNC: 23 MMOL/L (ref 20–32)
CREAT SERPL-MCNC: 0.92 MG/DL (ref 0.52–1.04)
GFR SERPL CREATININE-BSD FRML MDRD: 61 ML/MIN/{1.73_M2}
GLUCOSE SERPL-MCNC: 114 MG/DL (ref 70–99)
POTASSIUM SERPL-SCNC: 4.1 MMOL/L (ref 3.4–5.3)
SODIUM SERPL-SCNC: 136 MMOL/L (ref 133–144)

## 2019-08-14 PROCEDURE — 96361 HYDRATE IV INFUSION ADD-ON: CPT

## 2019-08-14 PROCEDURE — 96360 HYDRATION IV INFUSION INIT: CPT

## 2019-08-14 PROCEDURE — 25800030 ZZH RX IP 258 OP 636: Mod: ZF | Performed by: STUDENT IN AN ORGANIZED HEALTH CARE EDUCATION/TRAINING PROGRAM

## 2019-08-14 PROCEDURE — 80048 BASIC METABOLIC PNL TOTAL CA: CPT | Performed by: STUDENT IN AN ORGANIZED HEALTH CARE EDUCATION/TRAINING PROGRAM

## 2019-08-14 RX ADMIN — SODIUM CHLORIDE, POTASSIUM CHLORIDE, SODIUM LACTATE AND CALCIUM CHLORIDE 1000 ML: 600; 310; 30; 20 INJECTION, SOLUTION INTRAVENOUS at 14:15

## 2019-08-14 RX ADMIN — SODIUM CHLORIDE, POTASSIUM CHLORIDE, SODIUM LACTATE AND CALCIUM CHLORIDE 1000 ML: 600; 310; 30; 20 INJECTION, SOLUTION INTRAVENOUS at 15:49

## 2019-08-14 NOTE — PROGRESS NOTES
Nursing Note  Marilia Bean presents [facility] for:   Chief Complaint   Patient presents with     Infusion     LR fluids     During today's [facility] appointment, orders from Dr. Hill were completed.  Frequency: once    Progress note:  Patient identification verified by name and date of birth.  Assessment completed.  Vitals recorded in Doc Flowsheets.  Patient was provided with education regarding infusion and possible side effects.  Patient verbalized understanding.     present during visit today: Not Applicable.    Treatment Conditions: non-applicable.    Premedications: were not ordered.    Drug Waste Record: No    Infusion length and rate:  Infusion was started at 166mls/hr- MD was contacted who stated patient could have fluids over an hour if she tolerated it. And additional liter of LR was ordered due to output,    Labs: were drawn per orders.     Vascular access: picc.    Post Infusion Assessment:  Patient tolerated infusion without incident.     Discharge Plan:   Follow up plan of care with: ongoing infusions at [facility].  Discharge instructions were reviewed with patient.  Patient/representative verbalized understanding of discharge instructions and all questions answered.  Patient discharged from [facility] in stable condition.    Samara Grimm RN    Administrations This Visit     lactated ringers BOLUS 1,000 mL     Admin Date  08/14/2019 Action  New Bag Dose  1000 mL Rate  166.7 mL/hr Route  Intravenous Administered By  Samara Grimm RN           Admin Date  08/14/2019 Action  New Bag Dose  1000 mL Rate  999 mL/hr Route  Intravenous Administered By  Samara Grimm RN                /72   Pulse 87   Temp 97.8  F (36.6  C) (Oral)   Resp 18   SpO2 97%

## 2019-08-14 NOTE — TELEPHONE ENCOUNTER
Patient currently being seen at [facility] with plans to follow up there for current condition.

## 2019-08-14 NOTE — PROGRESS NOTES
This is a recent snapshot of the patient's [facility] medical record.  For current drug dose and complete information and questions, call [phone] or In Basket pool, fv home infusion ([id number])  CSN Number:  [id number]

## 2019-08-14 NOTE — TELEPHONE ENCOUNTER
Patient called to schedule an appointment for a hospital follow-up or appeared on a report showing that they were recently discharged from the hospital.    Patient was admitted to [facility]  Discharged date: 08/13/19  Reason for hospital admission:  Poss Bowel Obstruction Post Procedure, Bowel Obstruction (H), Abdominal Pain With Vomiting, Nausea & Vomiting  Does patient have future appointment scheduled with provider? No  Date of future appointment:        This information will be used to help the care team plan for the patients upcoming visit.  The triage RN may determine that a follow up call is necessary and reach out to the patient via the phone number listed in the chart.     Please route this message on routine priority to the Triage RN pool.

## 2019-08-15 ENCOUNTER — HOME INFUSION (PRE-WILLOW HOME INFUSION) (OUTPATIENT)
Dept: PHARMACY | Facility: CLINIC | Age: 74
End: 2019-08-15

## 2019-08-15 NOTE — PROGRESS NOTES
This is a recent snapshot of the patient's [facility] medical record.  For current drug dose and complete information and questions, call [phone] or In Basket pool, fv home infusion ([id number])  CSN Number:  [id number]

## 2019-08-16 ENCOUNTER — HOME INFUSION (PRE-WILLOW HOME INFUSION) (OUTPATIENT)
Dept: PHARMACY | Facility: CLINIC | Age: 74
End: 2019-08-16

## 2019-08-16 ENCOUNTER — TELEPHONE (OUTPATIENT)
Dept: TRANSPLANT | Facility: CLINIC | Age: 74
End: 2019-08-16

## 2019-08-16 LAB
ANION GAP SERPL CALCULATED.3IONS-SCNC: 9 MMOL/L (ref 3–14)
BUN SERPL-MCNC: 27 MG/DL (ref 7–30)
CALCIUM SERPL-MCNC: 9.2 MG/DL (ref 8.5–10.1)
CHLORIDE SERPL-SCNC: 108 MMOL/L (ref 94–109)
CO2 SERPL-SCNC: 22 MMOL/L (ref 20–32)
CREAT SERPL-MCNC: 0.74 MG/DL (ref 0.52–1.04)
GFR SERPL CREATININE-BSD FRML MDRD: 80 ML/MIN/{1.73_M2}
GLUCOSE SERPL-MCNC: 98 MG/DL (ref 70–99)
POTASSIUM SERPL-SCNC: 4 MMOL/L (ref 3.4–5.3)
SODIUM SERPL-SCNC: 139 MMOL/L (ref 133–144)

## 2019-08-16 PROCEDURE — 80048 BASIC METABOLIC PNL TOTAL CA: CPT | Performed by: INTERNAL MEDICINE

## 2019-08-16 NOTE — PROGRESS NOTES
This is a recent snapshot of the patient's [facility] medical record.  For current drug dose and complete information and questions, call [phone] or In Basket pool, fv home infusion ([id number])  CSN Number:  [id number]

## 2019-08-16 NOTE — TELEPHONE ENCOUNTER
Call from Marilia at 8am. One of the ports on her PICC line is blocked. Her IV fluids took 7 hours to infuse yesterday. She accidently left her G tube clamped for 7 hours and when she opened it got about 1700 mls back She was feeling distended. This is not an accurate measurement , estimated from markings on the drainage bag.  See Epic message to patient for instructions and plan for today.

## 2019-08-17 ENCOUNTER — DOCUMENTATION ONLY (OUTPATIENT)
Dept: CARE COORDINATION | Facility: CLINIC | Age: 74
End: 2019-08-17

## 2019-08-17 ENCOUNTER — HOME INFUSION (PRE-WILLOW HOME INFUSION) (OUTPATIENT)
Dept: PHARMACY | Facility: CLINIC | Age: 74
End: 2019-08-17

## 2019-08-17 ENCOUNTER — TELEPHONE (OUTPATIENT)
Dept: SURGERY | Facility: CLINIC | Age: 74
End: 2019-08-17

## 2019-08-17 NOTE — TELEPHONE ENCOUNTER
Called by patient and daughter in law regarding clogged J tube. G tube is draining well but J tube is clogged. The patient's daughter in law is an ER nurse and has already tried flushing with warm water, coke, and clot buster however this has not succeeded. The GJ was originally placed on 8/5/19. The patient will need endoscopic or wire guidance to assess the J tube. Discussed with GI fellow who will reach out to patient regarding plan for possible replacement or rewiring of GJ tube.     Discussed with Dr. Nanette Romero MD  PGY-2 General Surgery

## 2019-08-17 NOTE — PROGRESS NOTES
Dear  XXXX (Referring and Attending)  S: We are notifying you that Marilia Bean; MRN [id number]  Has declined home care assessment.      A: Writer called and spoke with Marilia  who reported patient is doing well, eating and drinking without issues, ambulating safely, able to verbalize understanding and manage medications.  Respectfully declines Home Care Assessment.  She has decided to stay with [facility] at this time.     R: Please follow up with patient as appropriate and if the patient's needs change, please submit a new home care referral order through [facility].  Thank you for the referral  Sincerely [facility]  Lucila Rosa  [phone]

## 2019-08-18 ENCOUNTER — TELEPHONE (OUTPATIENT)
Dept: GASTROENTEROLOGY | Facility: CLINIC | Age: 74
End: 2019-08-18

## 2019-08-18 NOTE — TELEPHONE ENCOUNTER
Discussed with patient about clogged GJ-tube. They have tried clog buster, warm water, coca cola without success. She has been staying hydrated as she gets IV fluids at home. Takes her medications through her mouth. Due to weekend schedule, she did not want to come to the ED without knowing that this tube would be replaced. Since she felt comfortable staying at home (since she has fluids and meds), she elected to do that. I will follow up with her in the morning to see if the tube has opened up, she may need this to be replaced. I asked her to try the warm water and clog buster again today. Her daughter in law is an ED nurse who is with her taking care of her, and will take to ED with any concerning symptoms. She feels well otherwise, other than feeling hungry.    Ran WALSH MD  GI Fellow

## 2019-08-19 ENCOUNTER — HOSPITAL ENCOUNTER (OUTPATIENT)
Facility: CLINIC | Age: 74
Discharge: HOME | End: 2019-08-19
Attending: INTERNAL MEDICINE | Admitting: INTERNAL MEDICINE
Payer: MEDICARE

## 2019-08-19 ENCOUNTER — HOME INFUSION (PRE-WILLOW HOME INFUSION) (OUTPATIENT)
Dept: PHARMACY | Facility: CLINIC | Age: 74
End: 2019-08-19

## 2019-08-19 ENCOUNTER — ANESTHESIA (OUTPATIENT)
Dept: SURGERY | Facility: CLINIC | Age: 74
End: 2019-08-19
Payer: MEDICARE

## 2019-08-19 ENCOUNTER — APPOINTMENT (OUTPATIENT)
Dept: GENERAL RADIOLOGY | Facility: CLINIC | Age: 74
End: 2019-08-19
Attending: INTERNAL MEDICINE
Payer: MEDICARE

## 2019-08-19 ENCOUNTER — ANESTHESIA EVENT (OUTPATIENT)
Dept: SURGERY | Facility: CLINIC | Age: 74
End: 2019-08-19
Payer: MEDICARE

## 2019-08-19 ENCOUNTER — TELEPHONE (OUTPATIENT)
Dept: GASTROENTEROLOGY | Facility: CLINIC | Age: 74
End: 2019-08-19

## 2019-08-19 ENCOUNTER — TELEPHONE (OUTPATIENT)
Dept: TRANSPLANT | Facility: CLINIC | Age: 74
End: 2019-08-19

## 2019-08-19 VITALS
RESPIRATION RATE: 16 BRPM | WEIGHT: 164.02 LBS | DIASTOLIC BLOOD PRESSURE: 89 MMHG | SYSTOLIC BLOOD PRESSURE: 136 MMHG | BODY MASS INDEX: 25.74 KG/M2 | TEMPERATURE: 98 F | HEART RATE: 83 BPM | OXYGEN SATURATION: 99 % | HEIGHT: 67 IN

## 2019-08-19 LAB
GLUCOSE BLDC GLUCOMTR-MCNC: 69 MG/DL (ref 70–99)
GLUCOSE BLDC GLUCOMTR-MCNC: 79 MG/DL (ref 70–99)
UPPER GI ENDOSCOPY: NORMAL

## 2019-08-19 PROCEDURE — 36000053 ZZH SURGERY LEVEL 2 EA 15 ADDTL MIN - UMMC: Performed by: INTERNAL MEDICINE

## 2019-08-19 PROCEDURE — 25800030 ZZH RX IP 258 OP 636: Performed by: NURSE ANESTHETIST, CERTIFIED REGISTERED

## 2019-08-19 PROCEDURE — 40000277 XR SURGERY CARM FLUORO LESS THAN 5 MIN W STILLS: Mod: TC

## 2019-08-19 PROCEDURE — 25000128 H RX IP 250 OP 636: Performed by: INTERNAL MEDICINE

## 2019-08-19 PROCEDURE — 37000009 ZZH ANESTHESIA TECHNICAL FEE, EACH ADDTL 15 MIN: Performed by: INTERNAL MEDICINE

## 2019-08-19 PROCEDURE — 27210794 ZZH OR GENERAL SUPPLY STERILE: Performed by: INTERNAL MEDICINE

## 2019-08-19 PROCEDURE — 25000128 H RX IP 250 OP 636: Performed by: NURSE ANESTHETIST, CERTIFIED REGISTERED

## 2019-08-19 PROCEDURE — 36000055 ZZH SURGERY LEVEL 2 W FLUORO 1ST 30 MIN - UMMC: Performed by: INTERNAL MEDICINE

## 2019-08-19 PROCEDURE — 71000027 ZZH RECOVERY PHASE 2 EACH 15 MINS: Performed by: INTERNAL MEDICINE

## 2019-08-19 PROCEDURE — 25000125 ZZHC RX 250: Performed by: INTERNAL MEDICINE

## 2019-08-19 PROCEDURE — C1769 GUIDE WIRE: HCPCS | Performed by: INTERNAL MEDICINE

## 2019-08-19 PROCEDURE — 40000170 ZZH STATISTIC PRE-PROCEDURE ASSESSMENT II: Performed by: INTERNAL MEDICINE

## 2019-08-19 PROCEDURE — 37000008 ZZH ANESTHESIA TECHNICAL FEE, 1ST 30 MIN: Performed by: INTERNAL MEDICINE

## 2019-08-19 PROCEDURE — 82962 GLUCOSE BLOOD TEST: CPT | Mod: 91

## 2019-08-19 RX ORDER — DIMENHYDRINATE 50 MG/ML
25 INJECTION, SOLUTION INTRAMUSCULAR; INTRAVENOUS
Status: DISCONTINUED | OUTPATIENT
Start: 2019-08-19 | End: 2019-08-19 | Stop reason: HOSPADM

## 2019-08-19 RX ORDER — FLUMAZENIL 0.1 MG/ML
0.2 INJECTION, SOLUTION INTRAVENOUS
Status: CANCELLED | OUTPATIENT
Start: 2019-08-19 | End: 2019-08-20

## 2019-08-19 RX ORDER — ONDANSETRON 4 MG/1
4 TABLET, ORALLY DISINTEGRATING ORAL EVERY 30 MIN PRN
Status: DISCONTINUED | OUTPATIENT
Start: 2019-08-19 | End: 2019-08-19 | Stop reason: HOSPADM

## 2019-08-19 RX ORDER — IOPAMIDOL 755 MG/ML
INJECTION, SOLUTION INTRAVASCULAR PRN
Status: DISCONTINUED | OUTPATIENT
Start: 2019-08-19 | End: 2019-08-19 | Stop reason: HOSPADM

## 2019-08-19 RX ORDER — FENTANYL CITRATE 50 UG/ML
25-50 INJECTION, SOLUTION INTRAMUSCULAR; INTRAVENOUS
Status: DISCONTINUED | OUTPATIENT
Start: 2019-08-19 | End: 2019-08-19 | Stop reason: HOSPADM

## 2019-08-19 RX ORDER — ONDANSETRON 2 MG/ML
4 INJECTION INTRAMUSCULAR; INTRAVENOUS EVERY 30 MIN PRN
Status: DISCONTINUED | OUTPATIENT
Start: 2019-08-19 | End: 2019-08-19 | Stop reason: HOSPADM

## 2019-08-19 RX ORDER — ONDANSETRON 4 MG/1
4 TABLET, ORALLY DISINTEGRATING ORAL EVERY 6 HOURS PRN
Status: CANCELLED | OUTPATIENT
Start: 2019-08-19

## 2019-08-19 RX ORDER — ONDANSETRON 2 MG/ML
4 INJECTION INTRAMUSCULAR; INTRAVENOUS EVERY 6 HOURS PRN
Status: CANCELLED | OUTPATIENT
Start: 2019-08-19

## 2019-08-19 RX ORDER — NALOXONE HYDROCHLORIDE 0.4 MG/ML
.1-.4 INJECTION, SOLUTION INTRAMUSCULAR; INTRAVENOUS; SUBCUTANEOUS
Status: CANCELLED | OUTPATIENT
Start: 2019-08-19 | End: 2019-08-20

## 2019-08-19 RX ORDER — SODIUM CHLORIDE, SODIUM LACTATE, POTASSIUM CHLORIDE, CALCIUM CHLORIDE 600; 310; 30; 20 MG/100ML; MG/100ML; MG/100ML; MG/100ML
INJECTION, SOLUTION INTRAVENOUS CONTINUOUS PRN
Status: DISCONTINUED | OUTPATIENT
Start: 2019-08-19 | End: 2019-08-19

## 2019-08-19 RX ORDER — LIDOCAINE 40 MG/G
CREAM TOPICAL
Status: DISCONTINUED | OUTPATIENT
Start: 2019-08-19 | End: 2019-08-19 | Stop reason: HOSPADM

## 2019-08-19 RX ORDER — SODIUM CHLORIDE, SODIUM LACTATE, POTASSIUM CHLORIDE, CALCIUM CHLORIDE 600; 310; 30; 20 MG/100ML; MG/100ML; MG/100ML; MG/100ML
INJECTION, SOLUTION INTRAVENOUS CONTINUOUS
Status: DISCONTINUED | OUTPATIENT
Start: 2019-08-19 | End: 2019-08-19 | Stop reason: HOSPADM

## 2019-08-19 RX ORDER — NALOXONE HYDROCHLORIDE 0.4 MG/ML
.1-.4 INJECTION, SOLUTION INTRAMUSCULAR; INTRAVENOUS; SUBCUTANEOUS
Status: DISCONTINUED | OUTPATIENT
Start: 2019-08-19 | End: 2019-08-19 | Stop reason: HOSPADM

## 2019-08-19 RX ORDER — LABETALOL 20 MG/4 ML (5 MG/ML) INTRAVENOUS SYRINGE
10
Status: DISCONTINUED | OUTPATIENT
Start: 2019-08-19 | End: 2019-08-19 | Stop reason: HOSPADM

## 2019-08-19 RX ORDER — PROPOFOL 10 MG/ML
INJECTION, EMULSION INTRAVENOUS CONTINUOUS PRN
Status: DISCONTINUED | OUTPATIENT
Start: 2019-08-19 | End: 2019-08-19

## 2019-08-19 RX ORDER — HYDROMORPHONE HYDROCHLORIDE 1 MG/ML
.3-.5 INJECTION, SOLUTION INTRAMUSCULAR; INTRAVENOUS; SUBCUTANEOUS EVERY 5 MIN PRN
Status: DISCONTINUED | OUTPATIENT
Start: 2019-08-19 | End: 2019-08-19 | Stop reason: HOSPADM

## 2019-08-19 RX ADMIN — SODIUM CHLORIDE, POTASSIUM CHLORIDE, SODIUM LACTATE AND CALCIUM CHLORIDE: 600; 310; 30; 20 INJECTION, SOLUTION INTRAVENOUS at 11:27

## 2019-08-19 RX ADMIN — PROPOFOL 100 MCG/KG/MIN: 10 INJECTION, EMULSION INTRAVENOUS at 11:31

## 2019-08-19 ASSESSMENT — MIFFLIN-ST. JEOR: SCORE: 1276.63

## 2019-08-19 NOTE — ANESTHESIA PREPROCEDURE EVALUATION
Anesthesia Pre-Procedure Evaluation  [facility] - Ms Bean is a 73yo woman with Gardiner status post surgical                        duodenal resection with primary duodenojejunostomy                        created for which we created a cystoduodenostomy (from                        the bulb) to access and ultimately drain a                        perianastomotic hematoma and placed a gastrojejunostomy                        tube with gastropexy for failure to thrive.  Needs replacement.   Patient: Marilia Bean   MRN:     [id number] Gender:   female   Age:    74 year old :      1945        Preoperative Diagnosis: Tube Clogged   Procedure(s):  REPLACEMENT, GASTROJEJUNOSTOMY TUBE     Past Medical History:   Diagnosis Date     Aortic aneurysm (H) 2007    see  [place] report -follow yearly, treat high BP is occurs Problem list name updated by automated process. Provider to review     Aortic aneurysm of unspecified site without mention of rupture 2007    4.4 cm ascending aorta noted in      Asymptomatic postmenopausal status (age-related) (natural)     on HRT  Prempro -weaning off      CAD (coronary artery disease)     LAD     Family history of Gardiner syndrome      Hyperlipidemia LDL goal <100 10/31/2010     Hypertension goal BP (blood pressure) < 140/90 2016     Leiomyoma of uterus, unspecified     Uterine fibroid     Lump or mass in breast 2004    rt. nodule - bx neg     Personal history of colonic polyps      Postmenopausal atrophic vaginitis 2006     Pulmonary nodule     incidental noted in  during [place]     Pure hypercholesterolemia     mild, diet - low cholesterol, low fat.10/06 start Lovastatin      Past Surgical History:   Procedure Laterality Date     BYPASS GRAFT ARTERY CORONARY N/A 2017    Procedure: BYPASS GRAFT ARTERY CORONARY;;  Surgeon: Akshat Soto MD;  Location: [facility] OR     C DEXA INTERPRETATION, AXIAL  01    wnl. 2005 wnl but lower     COLONOSCOPY   05/07/07    Repeat in 1 year for surveillance     COLONOSCOPY  12/10/08    repeat 1 year  -see 1/2009 letter     COLONOSCOPY  03/31/10     COLONOSCOPY  10/31/2011    Procedure:COMBINED COLONOSCOPY, SINGLE BIOPSY/POLYPECTOMY BY BIOPSY; colonoscopy with polypectomy by biopsy; Surgeon:JESSICA GARCIA; Location:[facility]     COLONOSCOPY  12/6/2013    Procedure: COMBINED COLONOSCOPY, SINGLE BIOPSY/POLYPECTOMY BY BIOPSY;  Colonoscopy, Polypectomies;  Surgeon: Herbert Salinas MD;  Location: [facility]     COLONOSCOPY N/A 12/8/2015    Procedure: COLONOSCOPY;  Surgeon: Jared Carbajal MD;  Location: [facility]     COLONOSCOPY N/A 4/26/2017    Procedure: COMBINED COLONOSCOPY, SINGLE OR MULTIPLE BIOPSY/POLYPECTOMY BY BIOPSY;  Colonoscopy with polypectomies with forceps and snare;  Surgeon: Herbert Salinas MD;  Location:  GI     ENDOSCOPIC INSERTION TUBE JEJUNOSTOMY N/A 8/5/2019    Procedure: Gastrojejunostomy tube placement with gastropexy;  Surgeon: Ford Mann MD;  Location: [facility] OR     ESOPHAGOSCOPY, GASTROSCOPY, DUODENOSCOPY (EGD), COMBINED N/A 12/8/2015    Procedure: COMBINED ESOPHAGOSCOPY, GASTROSCOPY, DUODENOSCOPY (EGD), BIOPSY SINGLE OR MULTIPLE;  Surgeon: Jared Carbajal MD;  Location:  GI     ESOPHAGOSCOPY, GASTROSCOPY, DUODENOSCOPY (EGD), COMBINED N/A 7/31/2019    Procedure: Endoscopic ultrasound with cystoduodenostomy, stent placement x2, stent dilation, and nasojejunal tube placement;  Surgeon: Ford Mann MD;  Location: [facility]     ESOPHAGOSCOPY, GASTROSCOPY, DUODENOSCOPY (EGD), COMBINED N/A 8/5/2019    Procedure: ESOPHAGOGASTRODUODENOSCOPY (EGD);  Surgeon: Ford Mann MD;  Location: [facility] OR     ESOPHAGOSCOPY, GASTROSCOPY, DUODENOSCOPY (EGD), COMBINED N/A 8/12/2019    Procedure: ESOPHAGOGASTRODUODENOSCOPY (EGD) with GJ exchange, duodenum stent removal.;  Surgeon: Ford Mann MD;  Location: [facility] OR     HC BIOPSY BREAST, PERC NEEDLE CORE, WITH IMAGING  8/17/2004    Right      HC COLONOSCOPY THRU STOMA W BIOPSY/CAUTERY TUMOR/POLYP/LESION  1999,2002 2004 polyp - hyperplastic - repeat 5 years ?     HC COLONOSCOPY W/WO BRUSH/WASH  12/12/2005    Polypectomy.  Diverticulosis-minimal. Bx adenomatous and mucosal polyps - repeat 1 year     HC ECP WITH CATARACT SURGERY Bilateral 2015, 2016     HC LAPAROSCOPY, SURGICAL; APPENDECTOMY  10/31/2004     HC LAPAROSCOPY, SURGICAL; CHOLECYSTECTOMY  2000    Cholecystectomy, Laparoscopic     HC REVISE MEDIAN N/CARPAL TUNNEL SURG  10/01/10    left     [facility] UGI ENDOSCOPY, SIMPLE EXAM  03/31/10     HYSTERECTOMY, ELVIN  12/14/09    TAHBSO, MMK     REPAIR ANEURYSM ASCENDING AORTA N/A 6/5/2017    Procedure: REPAIR ANEURYSM ASCENDING AORTA;  Median Sternotomy, Ascending Aortic Aneurysm Repair, Coronary Artery Bypass Graft x1 on pump oxygenator;  Surgeon: Akshat Soto MD;  Location: [facility] OR     RESECTION DUODENAL N/A 7/3/2019    Procedure: Resection of Distal Duodenal and proximal Jejunum;  Surgeon: Joaquin Brito MD;  Location: [facility] OR               JZG FV AN PHYSICAL EXAM    LABS:  CBC:   Lab Results   Component Value Date    WBC 9.3 08/12/2019    WBC 11.3 (H) 08/07/2019    HGB 9.8 (L) 08/12/2019    HGB 9.3 (L) 08/07/2019    HCT 32.8 (L) 08/12/2019    HCT 30.4 (L) 08/07/2019     08/12/2019     08/07/2019     BMP:   Lab Results   Component Value Date     08/16/2019     08/14/2019    POTASSIUM 4.0 08/16/2019    POTASSIUM 4.1 08/14/2019    CHLORIDE 108 08/16/2019    CHLORIDE 106 08/14/2019    CO2 22 08/16/2019    CO2 23 08/14/2019    BUN 27 08/16/2019    BUN 24 08/14/2019    CR 0.74 08/16/2019    CR 0.92 08/14/2019    GLC 98 08/16/2019     (H) 08/14/2019     COAGS:   Lab Results   Component Value Date    PTT 33 06/12/2017    INR 1.12 08/12/2019    FIBR 269 06/06/2017     POC:   Lab Results   Component Value Date    BGM 97 08/12/2019     OTHER:   Lab Results   Component Value Date    PH 7.32 (L) 06/06/2017    LACT  "1.1 08/11/2019    A1C 5.8 06/07/2017    TARIQ 9.2 08/16/2019    PHOS 3.8 08/12/2019    MAG 2.1 08/12/2019    ALBUMIN 2.8 (L) 08/12/2019    PROTTOTAL 7.3 08/12/2019    ALT 98 (H) 08/12/2019    AST 58 (H) 08/12/2019    ALKPHOS 240 (H) 08/12/2019    BILITOTAL 0.8 08/12/2019    LIPASE 411 (H) 07/14/2019    AMYLASE 30 07/06/2019    TSH 4.97 (H) 06/13/2017        Preop Vitals    BP Readings from Last 3 Encounters:   08/14/19 123/72   08/13/19 98/56   07/14/19 162/89    Pulse Readings from Last 3 Encounters:   08/14/19 87   08/12/19 80   07/14/19 83      Resp Readings from Last 3 Encounters:   08/14/19 18   08/13/19 18   07/14/19 18    SpO2 Readings from Last 3 Encounters:   08/14/19 97%   08/13/19 95%   07/14/19 98%      Temp Readings from Last 1 Encounters:   08/14/19 36.6  C (97.8  F) (Oral)    Ht Readings from Last 1 Encounters:   07/15/19 1.702 m (5' 7\")      Wt Readings from Last 1 Encounters:   08/12/19 75.2 kg (165 lb 11.2 oz)    Estimated body mass index is 25.95 kg/m  as calculated from the following:    Height as of 7/15/19: 1.702 m (5' 7\").    Weight as of 8/12/19: 75.2 kg (165 lb 11.2 oz).     LDA:  PICC Double Lumen 07/18/19 Right Basilic (Active)   Number of days: 32       Gastrostomy/Enterostomy Gastrojejunostomy RUQ 1 18 fr Replacement of GJ Tube (Active)   Number of days: 7        Assessment:   ASA SCORE: 3    H&P: History and physical reviewed and following examination; no interval change.         Plan:   Anes. Type:  MAC   Pre-Medication: None   Induction:  IV (Standard)   Airway: ETT; Oral   Access/Monitoring: PIV   Maintenance: Balanced     Postop Plan:   Postop Pain: Opioids  Postop Sedation/Airway: Not planned     PONV Management:   Adult Risk Factors: Female, Postop Opioids   Prevention: Ondansetron, Dexamethasone     CONSENT: Direct conversation   Plan and risks discussed with: Patient; Spouse   Blood Products: Consent Deferred (Minimal Blood Loss)       Comments for Plan/Consent:  History and " physical assessed; Patient examined.  I have reviewed and agree with this pre-op assessment and anesthetic plan.  Patient and family also agree.   Risks and alternatives presented and discussed.   AQA.  -rcp                   Bernard Meléndez MD

## 2019-08-19 NOTE — OR NURSING
Advised Dr. Middleton, RICARDO pt glucose on admission is 69mgs, order for pt to drink fruit juice container

## 2019-08-19 NOTE — ANESTHESIA POSTPROCEDURE EVALUATION
Anesthesia POST Procedure Evaluation    Patient: Marilia Bean   MRN:     [id number] Gender:   female   Age:    74 year old :      1945        Preoperative Diagnosis: Tube Clogged   Procedure(s):  REPLACEMENT, GASTROJEJUNOSTOMY TUBE   Postop Comments: No value filed.       Anesthesia Type:  Not documented  MAC    Reportable Event: NO     PAIN: Uncomplicated   Sign Out status: Comfortable, Well controlled pain     PONV: No PONV   Sign Out status:  No Nausea or Vomiting     Neuro/Psych: Uneventful perioperative course   Sign Out Status: Preoperative baseline; Age appropriate mentation     Airway/Resp.: Uneventful perioperative course   Sign Out Status: Non labored breathing, age appropriate RR; Resp. Status within EXPECTED Parameters     CV: Uneventful perioperative course   Sign Out status: Appropriate BP and perfusion indices; Appropriate HR/Rhythm     Disposition:   Sign Out in:  Phase II  Disposition:  Home  Recovery Course: Uneventful  Follow-Up: Not required     Comments/Narrative:  Patient awake to voice, clear a/w.  Stable VS.  No new or current issues evident at this time.  OK out per PACU protocol.  --rcp           Last Anesthesia Record Vitals:  CRNA VITALS  2019 1135 - 2019 1222      2019             NIBP:  126/66    Ht Rate:  85    SpO2:  98 %          Last PACU Vitals:  Vitals Value Taken Time   /69 2019 12:01 PM   Temp     Pulse 69 2019 12:01 PM   Resp     SpO2 97 % 2019 12:05 PM   Temp src     NIBP 116/71 2019 12:00 PM   Pulse 86 2019 12:02 PM   SpO2 100 % 2019 12:02 PM   Resp     Temp     Ht Rate 85 2019 11:59 AM   Temp 2 37.2  C (99  F) 2019 12:00 PM   Vitals shown include unvalidated device data.      Electronically Signed By: Bernard Meléndez MD, 2019, 12:22 PM

## 2019-08-19 NOTE — DISCHARGE INSTRUCTIONS
General acute hospital  Same-Day Surgery   Adult Discharge Orders & Instructions     For 24 hours after surgery    1. Get plenty of rest.  A responsible adult must stay with you for at least 24 hours after you leave the hospital.   2. Do not drive or use heavy equipment.  If you have weakness or tingling, don't drive or use heavy equipment until this feeling goes away.  3. Do not drink alcohol.  4. Avoid strenuous or risky activities.  Ask for help when climbing stairs.   5. You may feel lightheaded.  IF so, sit for a few minutes before standing.  Have someone help you get up.   6. If you have nausea (feel sick to your stomach): Drink only clear liquids such as apple juice, ginger ale, broth or 7-Up.  Rest may also help.  Be sure to drink enough fluids.  Move to a regular diet as you feel able.  7. You may have a slight fever. Call the doctor if your fever is over 100 F (37.7 C) (taken under the tongue) or lasts longer than 24 hours.  8. You may have a dry mouth, a sore throat, muscle aches or trouble sleeping.  These should go away after 24 hours.  9. Do not make important or legal decisions.   Call your doctor for any of the followin.  Signs of infection (fever, growing tenderness at the surgery site, a large amount of drainage or bleeding, severe pain, foul-smelling drainage, redness, swelling).    2. It has been over 8 to 10 hours since surgery and you are still not able to urinate (pass water).    3.  Headache for over 24 hours.    4.  Numbness, tingling or weakness the day after surgery (if you had spinal anesthesia).  To contact a doctor, call _Dr Tafoya's office at [phone]     or:        [phone] and ask for the resident on call for   Gastroenterology (answered 24 hours a day)      Emergency Department:    [facility]: [phone]       (TTY for hearing impaired: [phone])    [facility]: [phone]       (TTY for hearing impaired:  [phone])

## 2019-08-19 NOTE — PROGRESS NOTES
This is a recent snapshot of the patient's [facility] medical record.  For current drug dose and complete information and questions, call [phone] or In Basket pool, fv home infusion ([id number])  CSN Number:  [id number]

## 2019-08-19 NOTE — PROGRESS NOTES
This is a recent snapshot of the patient's [facility] medical record.  For current drug dose and complete information and questions, call [phone] or In Basket pool, fv home infusion ([id number])  CSN Number:  [id number]

## 2019-08-19 NOTE — TELEPHONE ENCOUNTER
Called the patient at 7 AM and spoke with her.    Has clogged GJ tube.  I have added her to our OR schedule for outpatient procedure to replace her GJ tube under MAC. Patient is NPO.   Discussed with Dr Tafoya.    Gave patient instruction to come to 3C in the main hospital by 9 AM.    Ran WALSH MD  GI Fellow

## 2019-08-19 NOTE — BRIEF OP NOTE
[facility], [place]    Brief Operative Note    Pre-operative diagnosis: Tube Clogged  Post-operative diagnosis Same, GJ tube exchange  Procedure: Procedure(s):  REPLACEMENT, GASTROJEJUNOSTOMY TUBE  Surgeon: Surgeon(s) and Role:     * Robert Tafoya MD - Primary  Anesthesia: Monitor Anesthesia Care   Estimated blood loss: None  Drains: None  Specimens: * No specimens in log *  Findings:   GJ tube would not allow glide indicating total blockage. Tube removed, pediatric endoscope advanced through G tract to jejunum, wire guided 18F 45cm CHERISE tube placed.  Complications: None.  Implants:  18F 45cm GJ tube  REC: instruct patient on flushing tube between each use

## 2019-08-20 ENCOUNTER — HOME INFUSION (PRE-WILLOW HOME INFUSION) (OUTPATIENT)
Dept: PHARMACY | Facility: CLINIC | Age: 74
End: 2019-08-20

## 2019-08-20 NOTE — TELEPHONE ENCOUNTER
Loki Torres    I just drained 450 cc's .  I have to mention that in surgery today they had me drink 8 oz  of juice because my sugar was so low. Boy that sure tasted good and I am sure some went thru to my intestine because it was red juice and the output was just pretty yellow.  Melissa could you please email all my instructions going forward because I talked to you from the car.  Could you please  tell me what Dr Brito wants me to do regarding oral intake and how many bags of IVs a day I should be having ttomorrow and going forward  I think I will need to call [facility] tomorrow as my pick line has not been changed for 6 days    Thank you so much for all you do    Marilia.

## 2019-08-20 NOTE — PROGRESS NOTES
This is a recent snapshot of the patient's [facility] medical record.  For current drug dose and complete information and questions, call [phone] or In Basket pool, fv home infusion ([id number])  CSN Number:  [id number]

## 2019-08-21 ENCOUNTER — CARE COORDINATION (OUTPATIENT)
Dept: GASTROENTEROLOGY | Facility: CLINIC | Age: 74
End: 2019-08-21

## 2019-08-21 ENCOUNTER — TELEPHONE (OUTPATIENT)
Dept: TRANSPLANT | Facility: CLINIC | Age: 74
End: 2019-08-21

## 2019-08-21 NOTE — PROGRESS NOTES
This is a recent snapshot of the patient's [facility] medical record.  For current drug dose and complete information and questions, call [phone] or In Basket pool, fv home infusion ([id number])  CSN Number:  [id number]

## 2019-08-21 NOTE — PROGRESS NOTES
Post EGD (8/19/19) with Dr. Tafoya: Follow-up     Post procedure recommendations: - Resume previous tube feeding.    - FLUSH between each feeding   - Discuss diet advancement with Dr Brito's team.     Orders placed: None at this time.      Patient states she is having a lot of output-  1550 ml since last night from the gastric bag (about 8pm last night until around 6 am this morning). She has talked to nurse Melissa of Dr. Brito's team and states hat she will be calling her back this afternoon.    Clinic contact and scheduling numbers verified for future questions/concerns.      LORETTA Barrois Dr., Dr. Mann, & Dr. Alston  [facility]  [phone]

## 2019-08-23 ENCOUNTER — HOME INFUSION (PRE-WILLOW HOME INFUSION) (OUTPATIENT)
Dept: PHARMACY | Facility: CLINIC | Age: 74
End: 2019-08-23

## 2019-08-25 ENCOUNTER — TELEPHONE (OUTPATIENT)
Dept: TRANSPLANT | Facility: CLINIC | Age: 74
End: 2019-08-25

## 2019-08-25 NOTE — TELEPHONE ENCOUNTER
Several calls /communications with Marilia this weekend.Her G tube outlet  decreased considerably <500 a day. Tolerated clear liquids yesterday and advanced to full liquids today.  Plan: decrease IV fluids to one litre a day starting tomorrow.  If she tolerates full liquids tomorrow may advance diet.  She will keep her G tune clamped at all time unless she is symptomatic ( bloating.pain , belly swelling )  She is aware my co worker  Alyse will call her tomorrow.

## 2019-08-26 ENCOUNTER — MEDICAL CORRESPONDENCE (OUTPATIENT)
Dept: HEALTH INFORMATION MANAGEMENT | Facility: CLINIC | Age: 74
End: 2019-08-26

## 2019-08-26 ENCOUNTER — TELEPHONE (OUTPATIENT)
Dept: TRANSPLANT | Facility: CLINIC | Age: 74
End: 2019-08-26

## 2019-08-26 LAB
ANION GAP SERPL CALCULATED.3IONS-SCNC: 7 MMOL/L (ref 3–14)
BASOPHILS # BLD AUTO: 0 10E9/L (ref 0–0.2)
BASOPHILS NFR BLD AUTO: 0.4 %
BUN SERPL-MCNC: 10 MG/DL (ref 7–30)
CALCIUM SERPL-MCNC: 8.7 MG/DL (ref 8.5–10.1)
CHLORIDE SERPL-SCNC: 104 MMOL/L (ref 94–109)
CO2 SERPL-SCNC: 28 MMOL/L (ref 20–32)
CREAT SERPL-MCNC: 0.7 MG/DL (ref 0.52–1.04)
DIFFERENTIAL METHOD BLD: ABNORMAL
EOSINOPHIL # BLD AUTO: 0.1 10E9/L (ref 0–0.7)
EOSINOPHIL NFR BLD AUTO: 2.4 %
ERYTHROCYTE [DISTWIDTH] IN BLOOD BY AUTOMATED COUNT: 14.2 % (ref 10–15)
GFR SERPL CREATININE-BSD FRML MDRD: 85 ML/MIN/{1.73_M2}
GLUCOSE SERPL-MCNC: 91 MG/DL (ref 70–99)
HCT VFR BLD AUTO: 31.9 % (ref 35–47)
HGB BLD-MCNC: 10.1 G/DL (ref 11.7–15.7)
IMM GRANULOCYTES # BLD: 0 10E9/L (ref 0–0.4)
IMM GRANULOCYTES NFR BLD: 0.2 %
LYMPHOCYTES # BLD AUTO: 1.4 10E9/L (ref 0.8–5.3)
LYMPHOCYTES NFR BLD AUTO: 29.5 %
MCH RBC QN AUTO: 26.6 PG (ref 26.5–33)
MCHC RBC AUTO-ENTMCNC: 31.7 G/DL (ref 31.5–36.5)
MCV RBC AUTO: 84 FL (ref 78–100)
MONOCYTES # BLD AUTO: 0.5 10E9/L (ref 0–1.3)
MONOCYTES NFR BLD AUTO: 10.3 %
NEUTROPHILS # BLD AUTO: 2.7 10E9/L (ref 1.6–8.3)
NEUTROPHILS NFR BLD AUTO: 57.2 %
NRBC # BLD AUTO: 0 10*3/UL
NRBC BLD AUTO-RTO: 0 /100
PLATELET # BLD AUTO: 309 10E9/L (ref 150–450)
POTASSIUM SERPL-SCNC: 3.7 MMOL/L (ref 3.4–5.3)
RBC # BLD AUTO: 3.79 10E12/L (ref 3.8–5.2)
SODIUM SERPL-SCNC: 139 MMOL/L (ref 133–144)
WBC # BLD AUTO: 4.7 10E9/L (ref 4–11)

## 2019-08-26 PROCEDURE — 80048 BASIC METABOLIC PNL TOTAL CA: CPT | Performed by: INTERNAL MEDICINE

## 2019-08-26 PROCEDURE — 85025 COMPLETE CBC W/AUTO DIFF WBC: CPT | Performed by: INTERNAL MEDICINE

## 2019-08-26 NOTE — TELEPHONE ENCOUNTER
Called and spoke with Marilia for overall update. G tube has been clamped since AM of 8/25 and she's tolerating full liquids well. Home care was out today for dressing change and supply delivery.     Coordinator to call patient tomorrow and if she continues to be able to keep G tube clamped continuously and tolerate oral liquids, will progress to soft, bland diet and decrease daily IV hydration to 1 L.     Alyse Toro RN Transplant Coordinator on August 26, 2019 4:39 PM

## 2019-08-26 NOTE — PROGRESS NOTES
This is a recent snapshot of the patient's [facility] medical record.  For current drug dose and complete information and questions, call [phone] or In Basket pool, fv home infusion ([id number])  CSN Number:  [id number]

## 2019-08-27 ENCOUNTER — TELEPHONE (OUTPATIENT)
Dept: SURGERY | Facility: CLINIC | Age: 74
End: 2019-08-27

## 2019-08-27 NOTE — TELEPHONE ENCOUNTER
Established Patient Telephone Reminder Call    Date of call:  08/27/19  Phone numbers:  Home number on file [phone] (home) or Cell number on file:    Telephone Information:   Mobile [phone]       Reached patient/confirmed appointment:  Yes  Appointment with:   Dr. Cory Brito  Reason for visit:  Post op

## 2019-08-28 ENCOUNTER — TELEPHONE (OUTPATIENT)
Dept: TRANSPLANT | Facility: CLINIC | Age: 74
End: 2019-08-28

## 2019-08-28 NOTE — TELEPHONE ENCOUNTER
Spoke with Marilia today for status check. G tube remains clamped without complication and she is tolerating SOFT solid diet; reviewed diet guidelines for soft solids/transition foods again. Marilia feels that she is taking in more fluids and food but feels better with the 1 L lactated ringers daily; will continue with this and discuss with Dr. Brito in clinic tomorrow.     Patient reports that she has not had a bowel movement for 5+ days. Took a dose of Miralax both yesterday morning and again this morning. Recommended that she take another dose this evening and tomorrow morning. Will make changes to bowel regimen if no results by tomorrow evening. Reviewed s/sx of worsening bowel issues. Denies pain or cramping but reports she is starting to feel bloated.     Verified she has coordinator contact information if she has questions/concerns.     Alyse Toro RN Transplant Coordinator on August 28, 2019 5:13 PM

## 2019-08-29 ENCOUNTER — OFFICE VISIT (OUTPATIENT)
Dept: SURGERY | Facility: CLINIC | Age: 74
End: 2019-08-29
Payer: COMMERCIAL

## 2019-08-29 ENCOUNTER — TELEPHONE (OUTPATIENT)
Dept: SURGERY | Facility: CLINIC | Age: 74
End: 2019-08-29

## 2019-08-29 VITALS
BODY MASS INDEX: 26.43 KG/M2 | SYSTOLIC BLOOD PRESSURE: 144 MMHG | DIASTOLIC BLOOD PRESSURE: 78 MMHG | HEIGHT: 67 IN | HEART RATE: 74 BPM | WEIGHT: 168.4 LBS | OXYGEN SATURATION: 97 %

## 2019-08-29 DIAGNOSIS — E46 PROTEIN-CALORIE MALNUTRITION, UNSPECIFIED SEVERITY (H): Primary | ICD-10-CM

## 2019-08-29 DIAGNOSIS — E86.0 DEHYDRATION: ICD-10-CM

## 2019-08-29 ASSESSMENT — MIFFLIN-ST. JEOR: SCORE: 1296.49

## 2019-08-29 ASSESSMENT — PAIN SCALES - GENERAL: PAINLEVEL: NO PAIN (0)

## 2019-08-29 NOTE — TELEPHONE ENCOUNTER
This writer called the patient to let her know her appointment times changed from 6:30 pm to 6:00 pmrespectively. Patient confirmed this would work for her.

## 2019-08-29 NOTE — NURSING NOTE
"Chief Complaint   Patient presents with     Surgical Followup     post op recheck, DOS 7/3       Vitals:    08/29/19 1724   BP: (!) 144/78   BP Location: Left arm   Patient Position: Chair   Cuff Size: Adult Regular   Pulse: 74   SpO2: 97%   Weight: 76.4 kg (168 lb 6.4 oz)   Height: 1.702 m (5' 7\")       Body mass index is 26.38 kg/m .    Edvin Pantoja, EMT    "

## 2019-08-29 NOTE — LETTER
8/29/2019       RE: Marilia Bena  [address]  [facility] [address]     Dear Colleague,    Thank you for referring your patient, Marilia Bean, to the [facility] at [facility]. Please see a copy of my visit note below.    See dictated note: [id number]  Visit time 25 min, 15 min counselling  GB      Again, thank you for allowing me to participate in the care of your patient.      Sincerely,    Joaquin Brito MD

## 2019-08-29 NOTE — LETTER
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  Her postoperative course was complicated by edema at the duodenum requiring placement of a GJ tube and a hematoma in the subhepatic space requiring a percutaneous or transjejunal drainage and pigtail catheter.  She has been doing well over the last week or so and has started eating small amounts of regular food.  Her G-tube has been clamped.  She denies nausea, vomiting, diarrhea or constipation.  She is flushing the jejunal port with 120 mL 4 times a day.  She is also receiving 1 liter a day of IV fluid through her PICC line for dehydration.      PHYSICAL EXAMINATION:   VITAL SIGNS:  Stable.  She is afebrile.   HEENT:  Unremarkable.   NECK:  Supple.   CHEST:  Clear.   CARDIOVASCULAR:  Regular rate and rhythm.   ABDOMEN:  The G-tube site is clean and dry.  Her incision is likewise clean and dry without abdominal tenderness.   EXTREMITIES:  Without cyanosis, clubbing or edema.      ASSESSMENT:   1.  Anastomotic edema with resultant upper GI obstruction.  This appears to be resolving clinically.  She is tolerating the moderate amounts of liquids.  I asked her to start a soft diet with 6 small meals per day and spent some time counseling her and her family about what a small meal is and what kind of foods to avoid (bread and starchy foods in particular).  She feels better after IV fluids, despite the fact that she is noting multiple times having to get up out of bed at night to urinate.  We will continue her IV fluids for now for dehydration secondary to the above.  Her PICC line site is clean and dry.   2.  Follow up in 2 weeks.  If she is doing well at that point, I will pull her GJ tube.      Joaquin Brito MD

## 2019-08-30 ENCOUNTER — HOME INFUSION (PRE-WILLOW HOME INFUSION) (OUTPATIENT)
Dept: PHARMACY | Facility: CLINIC | Age: 74
End: 2019-08-30

## 2019-08-30 NOTE — PROGRESS NOTES
Service Date: 2019      HISTORY OF PRESENT ILLNESS:  Ms. 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  Her postoperative course was complicated by edema at the duodenum requiring placement of a GJ tube and a hematoma in the subhepatic space requiring a percutaneous or transjejunal drainage and pigtail catheter.  She has been doing well over the last week or so and has started eating small amounts of regular food.  Her G-tube has been clamped.  She denies nausea, vomiting, diarrhea or constipation.  She is flushing the jejunal port with 120 mL 4 times a day.  She is also receiving 1 liter a day of IV fluid through her PICC line for dehydration.      PHYSICAL EXAMINATION:   VITAL SIGNS:  Stable.  She is afebrile.   HEENT:  Unremarkable.   NECK:  Supple.   CHEST:  Clear.   CARDIOVASCULAR:  Regular rate and rhythm.   ABDOMEN:  The G-tube site is clean and dry.  Her incision is likewise clean and dry without abdominal tenderness.   EXTREMITIES:  Without cyanosis, clubbing or edema.      ASSESSMENT:   1.  Anastomotic edema with resultant upper GI obstruction.  This appears to be resolving clinically.  She is tolerating the moderate amounts of liquids.  I asked her to start a soft diet with 6 small meals per day and spent some time counseling her and her family about what a small meal is and what kind of foods to avoid (bread and starchy foods in particular).  She feels better after IV fluids, despite the fact that she is noting multiple times having to get up out of bed at night to urinate.  We will continue her IV fluids for now for dehydration secondary to the above.  Her PICC line site is clean and dry.   2.  Follow up in 2 weeks.  If she is doing well at that point, I will pull her GJ tube.      MD KRISHNA Villanueva MD             D: 2019   T: 2019   MT: EVIN      Name:     MARGIE PATINO   MRN:      [id number]        Account:      [id number]   :       1945           Service Date: 08/29/2019      Document: [id number]

## 2019-09-03 NOTE — PROGRESS NOTES
This is a recent snapshot of the patient's [facility] medical record.  For current drug dose and complete information and questions, call [phone] or In Basket pool, fv home infusion ([id number])  CSN Number:  [id number]

## 2019-09-04 ENCOUNTER — MEDICAL CORRESPONDENCE (OUTPATIENT)
Dept: HEALTH INFORMATION MANAGEMENT | Facility: CLINIC | Age: 74
End: 2019-09-04

## 2019-09-04 LAB
ANION GAP SERPL CALCULATED.3IONS-SCNC: 8 MMOL/L (ref 3–14)
BASOPHILS # BLD AUTO: 0 10E9/L (ref 0–0.2)
BASOPHILS NFR BLD AUTO: 0.5 %
BUN SERPL-MCNC: 14 MG/DL (ref 7–30)
CALCIUM SERPL-MCNC: 8.4 MG/DL (ref 8.5–10.1)
CHLORIDE SERPL-SCNC: 106 MMOL/L (ref 94–109)
CO2 SERPL-SCNC: 27 MMOL/L (ref 20–32)
CREAT SERPL-MCNC: 0.76 MG/DL (ref 0.52–1.04)
DIFFERENTIAL METHOD BLD: ABNORMAL
EOSINOPHIL # BLD AUTO: 0.2 10E9/L (ref 0–0.7)
EOSINOPHIL NFR BLD AUTO: 3.7 %
ERYTHROCYTE [DISTWIDTH] IN BLOOD BY AUTOMATED COUNT: 14.5 % (ref 10–15)
GFR SERPL CREATININE-BSD FRML MDRD: 77 ML/MIN/{1.73_M2}
GLUCOSE SERPL-MCNC: 94 MG/DL (ref 70–99)
HCT VFR BLD AUTO: 31.1 % (ref 35–47)
HGB BLD-MCNC: 9.5 G/DL (ref 11.7–15.7)
IMM GRANULOCYTES # BLD: 0 10E9/L (ref 0–0.4)
IMM GRANULOCYTES NFR BLD: 0.2 %
LYMPHOCYTES # BLD AUTO: 1.3 10E9/L (ref 0.8–5.3)
LYMPHOCYTES NFR BLD AUTO: 31.5 %
MCH RBC QN AUTO: 26 PG (ref 26.5–33)
MCHC RBC AUTO-ENTMCNC: 30.5 G/DL (ref 31.5–36.5)
MCV RBC AUTO: 85 FL (ref 78–100)
MONOCYTES # BLD AUTO: 0.3 10E9/L (ref 0–1.3)
MONOCYTES NFR BLD AUTO: 8.4 %
NEUTROPHILS # BLD AUTO: 2.2 10E9/L (ref 1.6–8.3)
NEUTROPHILS NFR BLD AUTO: 55.7 %
NRBC # BLD AUTO: 0 10*3/UL
NRBC BLD AUTO-RTO: 0 /100
PLATELET # BLD AUTO: 324 10E9/L (ref 150–450)
POTASSIUM SERPL-SCNC: 3.4 MMOL/L (ref 3.4–5.3)
PROT SERPL-MCNC: 6.4 G/DL (ref 6.8–8.8)
RBC # BLD AUTO: 3.66 10E12/L (ref 3.8–5.2)
SODIUM SERPL-SCNC: 141 MMOL/L (ref 133–144)
WBC # BLD AUTO: 4 10E9/L (ref 4–11)

## 2019-09-04 PROCEDURE — 80048 BASIC METABOLIC PNL TOTAL CA: CPT | Performed by: SURGERY

## 2019-09-04 PROCEDURE — 84155 ASSAY OF PROTEIN SERUM: CPT | Performed by: SURGERY

## 2019-09-04 PROCEDURE — 85025 COMPLETE CBC W/AUTO DIFF WBC: CPT | Performed by: SURGERY

## 2019-09-06 ENCOUNTER — HOME INFUSION (PRE-WILLOW HOME INFUSION) (OUTPATIENT)
Dept: PHARMACY | Facility: CLINIC | Age: 74
End: 2019-09-06

## 2019-09-07 ENCOUNTER — HOME INFUSION (PRE-WILLOW HOME INFUSION) (OUTPATIENT)
Dept: PHARMACY | Facility: CLINIC | Age: 74
End: 2019-09-07

## 2019-09-09 NOTE — PROGRESS NOTES
This is a recent snapshot of the patient's [facility] medical record.  For current drug dose and complete information and questions, call [phone] or In Basket pool, fv home infusion ([id number])  CSN Number:  [id number]

## 2019-09-09 NOTE — PROGRESS NOTES
This is a recent snapshot of the patient's [facility] medical record.  For current drug dose and complete information and questions, call [phone] or In Basket pool, fv home infusion ([id number])  CSN Number:  [id number]

## 2019-09-10 ENCOUNTER — HOME INFUSION (PRE-WILLOW HOME INFUSION) (OUTPATIENT)
Dept: PHARMACY | Facility: CLINIC | Age: 74
End: 2019-09-10

## 2019-09-10 ENCOUNTER — TELEPHONE (OUTPATIENT)
Dept: TRANSPLANT | Facility: CLINIC | Age: 74
End: 2019-09-10

## 2019-09-10 NOTE — TELEPHONE ENCOUNTER
Called Marilia yesterday. She has no issues , PICC line has been removed. She is looking forward to having her G/J removed this week advised not to eat or drink for 2 hours prior to clinic visit.

## 2019-09-12 ENCOUNTER — HOME INFUSION (PRE-WILLOW HOME INFUSION) (OUTPATIENT)
Dept: PHARMACY | Facility: CLINIC | Age: 74
End: 2019-09-12

## 2019-09-12 ENCOUNTER — OFFICE VISIT (OUTPATIENT)
Dept: SURGERY | Facility: CLINIC | Age: 74
End: 2019-09-12
Payer: COMMERCIAL

## 2019-09-12 VITALS
HEART RATE: 66 BPM | WEIGHT: 166.5 LBS | BODY MASS INDEX: 26.13 KG/M2 | DIASTOLIC BLOOD PRESSURE: 86 MMHG | OXYGEN SATURATION: 98 % | HEIGHT: 67 IN | SYSTOLIC BLOOD PRESSURE: 136 MMHG

## 2019-09-12 DIAGNOSIS — Z98.890 POSTOPERATIVE STATE: ICD-10-CM

## 2019-09-12 DIAGNOSIS — C17.0 DUODENAL ADENOCARCINOMA (H): ICD-10-CM

## 2019-09-12 DIAGNOSIS — E46 PROTEIN-CALORIE MALNUTRITION, UNSPECIFIED SEVERITY (H): Primary | ICD-10-CM

## 2019-09-12 ASSESSMENT — MIFFLIN-ST. JEOR: SCORE: 1287.87

## 2019-09-12 ASSESSMENT — PAIN SCALES - GENERAL: PAINLEVEL: NO PAIN (0)

## 2019-09-12 NOTE — PROGRESS NOTES
Service Date: 2019      Samuel Nelson MD   [facility]    [address] [place] [id number]      RE: Marilia Bean    MRN: [id number]   : 1945      Dear Dr. Nelson:       I had the pleasure of seeing your patient Ms. Marilia Bean in Surgery Clinic today.  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  She underwent a resection of her duodenum on  of this year.  She has been doing well over the last 3 months and is here today for gastrostomy tube removal.  She has been essentially asymptomatic.  She has drained nothing from her G tube.  She denies fevers, chills or sweats.  Her appetite is good.      PHYSICAL EXAMINATION  The wound is well healed.  There is no incisional hernia noted.  The G tube was removed without difficulty.      ASSESSMENT/PLAN:  Status post duodenal resection with malnutrition and gastric emptying delay.  This all appears to be resolved.  I asked her to come back and see you in 2-3 months for followup.  I am happy to see her anytime I can be of assistance.      Sincerely yours,      Joaquin Wayne MD       ADDENDUM:  She was kind enough to bring me about a dozen tomatoes from her garden.         JOAQUIN WAYNE MD             D: 2019   T: 2019   MT: [email or web address]      Name:     MARILIA BEAN   MRN:      [id number]        Account:      [id number]   :      1945           Service Date: 2019      Document: [id number]

## 2019-09-12 NOTE — LETTER
2019       RE: Marilia Bean  [address]  [facility] [address]     Dear Colleague,    Thank you for referring your patient, Marilia Bean, to the [facility] at [facility]. Please see a copy of my visit note below.    Service Date: 2019      Samuel Nelson MD   [facility]    [address] [place] [id number]      RE: Marilia Bean    MRN: [id number]   : 1945      Dear Dr. Nelson:       I had the pleasure of seeing your patient Ms. Marilia Bean in Surgery Clinic today.  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  She underwent a resection of her duodenum on  of this year.  She has been doing well over the last 3 months and is here today for gastrostomy tube removal.  She has been essentially asymptomatic.  She has drained nothing from her G tube.  She denies fevers, chills or sweats.  Her appetite is good.      PHYSICAL EXAMINATION  The wound is well healed.  There is no incisional hernia noted.  The G tube was removed without difficulty.      ASSESSMENT/PLAN:  Status post duodenal resection with malnutrition and gastric emptying delay.  This all appears to be resolved.  I asked her to come back and see you in 2-3 months for followup.  I am happy to see her anytime I can be of assistance.      Sincerely yours,      Joaquin Wayne MD       ADDENDUM:  She was kind enough to bring me about a dozen tomatoes from her garden.         JOAQUIN WAYNE MD             D: 2019   T: 2019   MT: [email or web address]      Name:     MARILIA BEAN   MRN:      [id number]        Account:      [id number]   :      1945           Service Date: 2019      Document: [id number]

## 2019-09-12 NOTE — NURSING NOTE
"Chief Complaint   Patient presents with     RECHECK     pt here for GJ tube check and possible removal       Vitals:    09/12/19 1409   BP: 136/86   BP Location: Left arm   Patient Position: Chair   Cuff Size: Adult Regular   Pulse: 66   SpO2: 98%   Weight: 75.5 kg (166 lb 8 oz)   Height: 1.702 m (5' 7\")       Body mass index is 26.08 kg/m .    Edvin Pantoja, EMT    "

## 2019-09-17 NOTE — PROGRESS NOTES
This is a recent snapshot of the patient's [facility] medical record.  For current drug dose and complete information and questions, call [phone] or In [facility] pool, fv home infusion ([id number])  CSN Number:  [id number]

## 2019-09-24 NOTE — PROGRESS NOTES
This is a recent snapshot of the patient's [facility] medical record.  For current drug dose and complete information and questions, call [phone] or In Basket pool, fv home infusion ([id number])  CSN Number:  [id number]

## 2019-09-27 ENCOUNTER — HEALTH MAINTENANCE LETTER (OUTPATIENT)
Age: 74
End: 2019-09-27

## 2019-10-01 DIAGNOSIS — Z95.1 S/P CABG (CORONARY ARTERY BYPASS GRAFT): ICD-10-CM

## 2019-10-01 DIAGNOSIS — Z86.79 S/P AORTIC ANEURYSM REPAIR: ICD-10-CM

## 2019-10-01 DIAGNOSIS — I10 HYPERTENSION GOAL BP (BLOOD PRESSURE) < 140/90: ICD-10-CM

## 2019-10-01 DIAGNOSIS — E78.5 HYPERLIPIDEMIA LDL GOAL <100: ICD-10-CM

## 2019-10-01 DIAGNOSIS — I25.10 ASCVD (ARTERIOSCLEROTIC CARDIOVASCULAR DISEASE): ICD-10-CM

## 2019-10-01 DIAGNOSIS — Z98.890 S/P AORTIC ANEURYSM REPAIR: ICD-10-CM

## 2019-10-02 RX ORDER — METOPROLOL TARTRATE 25 MG/1
TABLET, FILM COATED ORAL
Qty: 180 TABLET | Refills: 3 | Status: SHIPPED | OUTPATIENT
Start: 2019-10-02 | End: 2020-11-18

## 2019-10-02 RX ORDER — LOSARTAN POTASSIUM 25 MG/1
25 TABLET ORAL EVERY MORNING
Qty: 90 TABLET | Refills: 1 | Status: SHIPPED | OUTPATIENT
Start: 2019-10-02 | End: 2020-04-07

## 2019-10-02 RX ORDER — ATORVASTATIN CALCIUM 20 MG/1
TABLET, FILM COATED ORAL
Qty: 90 TABLET | Refills: 3 | Status: SHIPPED | OUTPATIENT
Start: 2019-10-02 | End: 2020-11-18

## 2019-10-02 NOTE — TELEPHONE ENCOUNTER
"Metoprolol  Last Written Prescription Date:  9/19/18  Last Fill Quantity: 180,  # refills: 3   Last office visit: 9/19/2018 with prescribing provider:     Future Office Visit:  NONE    COZAAR  Last Written Prescription Date:  9/19/19  Last Fill Quantity: 90,  # refills: 3   Last office visit: 9/19/2018 with prescribing provider:     Future Office Visit:  NONE    Lipitor  Last Written Prescription Date:  9/19/18  Last Fill Quantity: 90,  # refills: 3   Last office visit: 9/19/2018 with prescribing provider:     Future Office Visit: NONE    Requested Prescriptions   Pending Prescriptions Disp Refills     metoprolol tartrate (LOPRESSOR) 25 MG tablet [Pharmacy Med Name: METOPROLOL TARTRATE 25MG TABS] 180 tablet 3     Sig: TAKE ONE TABLET BY MOUTH TWICE A DAY       Beta-Blockers Protocol Failed - 10/1/2019  8:18 PM        Failed - Recent (12 mo) or future (30 days) visit within the authorizing provider's specialty     Patient has had an office visit with the authorizing provider or a provider within the authorizing providers department within the previous 12 mos or has a future within next 30 days. See \"Patient Info\" tab in inbasket, or \"Choose Columns\" in Meds & Orders section of the refill encounter.        Passed - Blood pressure under 140/90 in past 12 months     BP Readings from Last 3 Encounters:   09/12/19 136/86   08/29/19 (!) 144/78   08/19/19 136/89           Passed - Patient is age 6 or older        Passed - Medication is active on med list        losartan (COZAAR) 25 MG tablet [Pharmacy Med Name: LOSARTAN POTASSIUM 25MG TABS] 90 tablet 3     Sig: TAKE ONE TABLET BY MOUTH EVERY DAY       Angiotensin-II Receptors Failed - 10/1/2019  8:18 PM        Failed - Recent (12 mo) or future (30 days) visit within the authorizing provider's specialty     Patient has had an office visit with the authorizing provider or a provider within the authorizing providers department within the previous 12 mos or has a future within " "next 30 days. See \"Patient Info\" tab in inbasket, or \"Choose Columns\" in Meds & Orders section of the refill encounter.          Passed - Last blood pressure under 140/90 in past 12 months     BP Readings from Last 3 Encounters:   09/12/19 136/86   08/29/19 (!) 144/78   08/19/19 136/89           Passed - Medication is active on med list        Passed - Patient is age 18 or older        Passed - No active pregnancy on record        Passed - Normal serum creatinine on file in past 12 months     Recent Labs   Lab Test 09/04/19  1050  06/20/19  1252   CR 0.76   < >  --    CREAT  --   --  0.8    < > = values in this interval not displayed.           Passed - Normal serum potassium on file in past 12 months     Recent Labs   Lab Test 09/04/19  1050   POTASSIUM 3.4            Passed - No positive pregnancy test in past 12 months        atorvastatin (LIPITOR) 20 MG tablet [Pharmacy Med Name: ATORVASTATIN CALCIUM 20MG TABS] 90 tablet 3     Sig: TAKE ONE TABLET BY MOUTH EVERY MORNING       Statins Protocol Failed - 10/1/2019  8:18 PM        Failed - Recent (12 mo) or future (30 days) visit within the authorizing provider's specialty     Patient has had an office visit with the authorizing provider or a provider within the authorizing providers department within the previous 12 mos or has a future within next 30 days. See \"Patient Info\" tab in inbasket, or \"Choose Columns\" in Meds & Orders section of the refill encounter.              Passed - LDL on file in past 12 months     Recent Labs   Lab Test 10/17/18  0956   LDL 82           Passed - No abnormal creatine kinase in past 12 months     No lab results found.         Passed - Medication is active on med list        Passed - Patient is age 18 or older        Passed - No active pregnancy on record        Passed - No positive pregnancy test in past 12 months        Routing refill request to provider for review/approval because:  Patient needs to be seen because it has been " more than 1 year since last office visit.  LACEY Cota

## 2019-11-11 ENCOUNTER — IMMUNIZATION (OUTPATIENT)
Dept: FAMILY MEDICINE | Facility: CLINIC | Age: 74
End: 2019-11-11
Payer: COMMERCIAL

## 2019-11-11 PROCEDURE — G0008 ADMIN INFLUENZA VIRUS VAC: HCPCS

## 2019-11-11 PROCEDURE — 90662 IIV NO PRSV INCREASED AG IM: CPT

## 2020-03-15 ENCOUNTER — HEALTH MAINTENANCE LETTER (OUTPATIENT)
Age: 75
End: 2020-03-15

## 2020-04-06 DIAGNOSIS — I10 HYPERTENSION GOAL BP (BLOOD PRESSURE) < 140/90: ICD-10-CM

## 2020-04-06 DIAGNOSIS — I25.10 ASCVD (ARTERIOSCLEROTIC CARDIOVASCULAR DISEASE): ICD-10-CM

## 2020-04-07 ENCOUNTER — VIRTUAL VISIT (OUTPATIENT)
Dept: INTERNAL MEDICINE | Facility: CLINIC | Age: 75
End: 2020-04-07
Payer: COMMERCIAL

## 2020-04-07 DIAGNOSIS — C17.0 DUODENAL ADENOCARCINOMA (H): ICD-10-CM

## 2020-04-07 DIAGNOSIS — I10 HYPERTENSION GOAL BP (BLOOD PRESSURE) < 140/90: ICD-10-CM

## 2020-04-07 DIAGNOSIS — D64.9 ANEMIA, UNSPECIFIED TYPE: ICD-10-CM

## 2020-04-07 DIAGNOSIS — I25.10 ASCVD (ARTERIOSCLEROTIC CARDIOVASCULAR DISEASE): Primary | ICD-10-CM

## 2020-04-07 PROCEDURE — 99214 OFFICE O/P EST MOD 30 MIN: CPT | Mod: 95 | Performed by: INTERNAL MEDICINE

## 2020-04-07 RX ORDER — LOSARTAN POTASSIUM 25 MG/1
TABLET ORAL
Qty: 90 TABLET | Refills: 1 | Status: SHIPPED | OUTPATIENT
Start: 2020-04-07 | End: 2020-11-18

## 2020-04-07 NOTE — TELEPHONE ENCOUNTER
Losartan refill  Last Written Prescription Date:  10/2/19  Last Fill Quantity: 90,   # refills: 1  Last Office Visit:   Future Office visit:       Routing refill request to provider for review/approval because:  Due for office visit

## 2020-04-07 NOTE — PROGRESS NOTES
"Subjective     Marilia Bean is a 75 year old female who is being evaluated via a billable telephone visit.      The patient has been notified of following:     \"This telephone visit will be conducted via a call between you and your physician/provider. We have found that certain health care needs can be provided without the need for a physical exam.  This service lets us provide the care you need with a short phone conversation.  If a prescription is necessary we can send it directly to your pharmacy.  If lab work is needed we can place an order for that and you can then stop by our lab to have the test done at a later time.    Telephone visits are billed at different rates depending on your insurance coverage. During this emergency period, for some insurers they may be billed the same as an in-person visit.  Please reach out to your insurance provider with any questions.    If during the course of the call the physician/provider feels a telephone visit is not appropriate, you will not be charged for this service.\"    Patient has given verbal consent for Telephone visit?  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 Then saw Dr Brito and had more of the duodenum removed as well.  Then connection with stomach issue, had lots of feeding tubes and issues.    She got better over labor day weekend.  Had follow up with Dr Brito and g tube removed on Sept 12.      She has gained weight back, she is feeling good.      She is still taking atorvastatin, metoprolol, and losartan.     ALLERGIES  Contrast dye and Morphine      Patient Active Problem List   Diagnosis     Asymptomatic postmenopausal status     Postmenopausal atrophic vaginitis     Aortic aneurysm (H)     HYPERLIPIDEMIA LDL GOAL <100     ACP (advance care planning)     Hypertension goal BP (blood pressure) < 140/90     Status " post coronary angiogram     S/P thoracic aortic aneurysm repair     Duodenal adenocarcinoma (H)     Abdominal pain with vomiting     Bowel obstruction (H)     Past Surgical History:   Procedure Laterality Date     BYPASS GRAFT ARTERY CORONARY N/A 6/5/2017    Procedure: BYPASS GRAFT ARTERY CORONARY;;  Surgeon: Akshat Soto MD;  [facility]     C DEXA INTERPRETATION, AXIAL  12/21/01    wnl. 7/2005 wnl but lower     COLONOSCOPY  05/07/07    Repeat in 1 year for surveillance     COLONOSCOPY  12/10/08    repeat 1 year  -see 1/2009 letter     COLONOSCOPY  03/31/10     COLONOSCOPY  10/31/2011    Procedure:COMBINED COLONOSCOPY, SINGLE BIOPSY/POLYPECTOMY BY BIOPSY; colonoscopy with polypectomy by biopsy; Surgeon:JESSICA GARCIA; [facility]     COLONOSCOPY  12/6/2013    Procedure: COMBINED COLONOSCOPY, SINGLE BIOPSY/POLYPECTOMY BY BIOPSY;  Colonoscopy, Polypectomies;  Surgeon: Herbert Salinas MD;  [facility]     COLONOSCOPY N/A 12/8/2015    Procedure: COLONOSCOPY;  Surgeon: Jared Carbajal MD;  [facility]     COLONOSCOPY N/A 4/26/2017    Procedure: COMBINED COLONOSCOPY, SINGLE OR MULTIPLE BIOPSY/POLYPECTOMY BY BIOPSY;  Colonoscopy with polypectomies with forceps and snare;  Surgeon: Herbert Salinas MD;  [facility]     ENDOSCOPIC INSERTION TUBE JEJUNOSTOMY N/A 8/5/2019    Procedure: Gastrojejunostomy tube placement with gastropexy;  Surgeon: Ford Mann MD;  [facility]     ESOPHAGOSCOPY, GASTROSCOPY, DUODENOSCOPY (EGD), COMBINED N/A 12/8/2015    Procedure: COMBINED ESOPHAGOSCOPY, GASTROSCOPY, DUODENOSCOPY (EGD), BIOPSY SINGLE OR MULTIPLE;  Surgeon: Jared Carbajal MD;  Location:  GI     ESOPHAGOSCOPY, GASTROSCOPY, DUODENOSCOPY (EGD), COMBINED N/A 7/31/2019    Procedure: Endoscopic ultrasound with cystoduodenostomy, stent placement x2, stent dilation, and nasojejunal tube placement;  Surgeon: Ford Mann MD;  [facility]     ESOPHAGOSCOPY,  GASTROSCOPY, DUODENOSCOPY (EGD), COMBINED N/A 8/5/2019    Procedure: ESOPHAGOGASTRODUODENOSCOPY (EGD);  Surgeon: Ford Mann MD;  [facility]     ESOPHAGOSCOPY, GASTROSCOPY, DUODENOSCOPY (EGD), COMBINED N/A 8/12/2019    Procedure: ESOPHAGOGASTRODUODENOSCOPY (EGD) with GJ exchange, duodenum stent removal.;  Surgeon: Ford Mann MD;  [facility]     HC BIOPSY BREAST, PERC NEEDLE CORE, WITH IMAGING  8/17/2004    Right     HC COLONOSCOPY THRU STOMA W BIOPSY/CAUTERY TUMOR/POLYP/LESION  1999,2002 2004 polyp - hyperplastic - repeat 5 years ?     HC COLONOSCOPY W/WO BRUSH/WASH  12/12/2005    Polypectomy.  Diverticulosis-minimal. Bx adenomatous and mucosal polyps - repeat 1 year     HC ECP WITH CATARACT SURGERY Bilateral 2015, 2016     HC LAPAROSCOPY, SURGICAL; APPENDECTOMY  10/31/2004     HC LAPAROSCOPY, SURGICAL; CHOLECYSTECTOMY  2000    Cholecystectomy, Laparoscopic     HC REVISE MEDIAN N/CARPAL TUNNEL SURG  10/01/10    left     HC UGI ENDOSCOPY, SIMPLE EXAM  03/31/10     HYSTERECTOMY, ELVIN  12/14/09    TAHBSO, MMK     REPAIR ANEURYSM ASCENDING AORTA N/A 6/5/2017    Procedure: REPAIR ANEURYSM ASCENDING AORTA;  Median Sternotomy, Ascending Aortic Aneurysm Repair, Coronary Artery Bypass Graft x1 on pump oxygenator;  Surgeon: Akshat Soto MD;  [facility]     REPLACE GASTROJEJUNOSTOMY TUBE, PERCUTANEOUS N/A 8/19/2019    Procedure: REPLACEMENT, GASTROJEJUNOSTOMY TUBE;  Surgeon: Robert Tafoya MD;  [facility]     RESECTION DUODENAL N/A 7/3/2019    Procedure: Resection of Distal Duodenal and proximal Jejunum;  Surgeon: Joaquin Brito MD;  [facility]       Social History     Tobacco Use     Smoking status: Never Smoker     Smokeless tobacco: Never Used   Substance Use Topics     Alcohol use: Yes     Comment: rarely     Family History   Problem Relation Age of Onset     Cancer Mother         Colon Cancer     Heart Disease Mother         MI      Osteoporosis Mother      Cancer Father         Colon Cancer     Heart Disease Father         Heart Disease/ Heart attacks     Diabetes Father         Adult Onset     Cancer Sister         Colon Cancer     Heart Disease Brother         Heart attacks at age 45     Breast Cancer Sister      Cancer Paternal Grandmother         Unknown type     Heart Disease Maternal Grandfather         MI     Diabetes Sister         Adult Onset     Diabetes Sister         Adult Onset     Diabetes Brother         Adult Onset     Heart Disease Brother         heart attack age 64     Cancer - colorectal Son         age 36, Gardiner syndrome         Current Outpatient Medications   Medication Sig Dispense Refill     aspirin 81 MG EC tablet Take 81 mg by mouth every morning  90 tablet 3     atorvastatin (LIPITOR) 20 MG tablet TAKE ONE TABLET BY MOUTH EVERY MORNING 90 tablet 3     losartan (COZAAR) 25 MG tablet TAKE ONE TABLET BY MOUTH EVERY MORNING 90 tablet 1     metoprolol tartrate (LOPRESSOR) 25 MG tablet TAKE ONE TABLET BY MOUTH TWICE A  tablet 3       Reviewed and updated as needed this visit by Provider         Review of Systems   ROS COMP: Constitutional, HEENT, cardiovascular, pulmonary, gi and gu systems are negative, except as otherwise noted.       Objective   Reported vitals:  There were no vitals taken for this visit.   healthy, alert and no distress  Psych: Alert and oriented times 3; coherent speech, normal   rate and volume, able to articulate logical thoughts, able   to abstract reason, no tangential thoughts, no hallucinations   or delusions  Her affect is good     Diagnostic Test Results:  Labs reviewed in Epic        Assessment/Plan:  1. ASCVD (arteriosclerotic cardiovascular disease)  Patient with heart disease she has had bypass surgery in the past we will continue her atorvastatin, losartan, metoprolol.  Refills were done to get her in until October.  - **CBC with platelets FUTURE anytime; Future  - **Comprehensive  metabolic panel FUTURE anytime; Future    2. Hypertension goal BP (blood pressure) < 140/90  She will check her blood pressure and let us know if this is running high with her known heart disease.  - **Comprehensive metabolic panel FUTURE anytime; Future    3. Duodenal adenocarcinoma (H)  Duodenal adenocarcinoma she had her surgery and that has bring last year and summer complications with that through the summer.  She can follow-up with Dr. Collier later this year.    4. Anemia, unspecified type  Anemia after surgery her hemoglobin in September was 9 she is not feeling anemic denies chest pain lightheadedness or feeling pale.  We will check a hemoglobin when she can get into the lab and do this.  - **CBC with platelets FUTURE anytime; Future    Patient also has some skin lesions on her face could be rosacea could be precancerous like actinic keratoses or even a squamous cell carcinoma she called dermatology but they are not seen patient at this time.  I offered to see her in clinic but she will wait for now.  Could consider some creams for possible rosacea if she reviews that and thinks that is what it is she will let me know.    Follow-up in 2 to 3 months for labs and clinic visit.  To do a Medicare wellness exam.    No follow-ups on file.      Phone call duration:  13 minutes    Herbert Epstein MD

## 2020-07-22 NOTE — ANESTHESIA CARE TRANSFER NOTE
Patient: Marilia Bean    Procedure(s):  REPLACEMENT, GASTROJEJUNOSTOMY TUBE    Diagnosis: Tube Clogged  Diagnosis Additional Information: No value filed.    Anesthesia Type:   MAC     Note:  Airway :Room Air  Patient transferred to:Phase II  Comments: To P2, VSS, pt awake and alert, exchanging well, report to RN, care accepted.Handoff Report: Identifed the Patient, Identified the Reponsible Provider, Reviewed the pertinent medical history, Discussed the surgical course, Reviewed Intra-OP anesthesia mangement and issues during anesthesia, Set expectations for post-procedure period and Allowed opportunity for questions and acknowledgement of understanding      Vitals: (Last set prior to Anesthesia Care Transfer)    CRNA VITALS  8/19/2019 1135 - 8/19/2019 1213      8/19/2019             Resp Rate (observed):  1  (Abnormal)                 Electronically Signed By: CECILIA Hoskins CRNA  August 19, 2019  12:13 PM   2

## 2020-08-11 ENCOUNTER — VIRTUAL VISIT (OUTPATIENT)
Dept: DERMATOLOGY | Facility: CLINIC | Age: 75
End: 2020-08-11
Payer: COMMERCIAL

## 2020-08-11 ENCOUNTER — TELEPHONE (OUTPATIENT)
Dept: DERMATOLOGY | Facility: CLINIC | Age: 75
End: 2020-08-11

## 2020-08-11 DIAGNOSIS — Z15.09 LYNCH SYNDROME: ICD-10-CM

## 2020-08-11 DIAGNOSIS — L57.0 AK (ACTINIC KERATOSIS): Primary | ICD-10-CM

## 2020-08-11 PROCEDURE — 99202 OFFICE O/P NEW SF 15 MIN: CPT | Mod: TEL | Performed by: DERMATOLOGY

## 2020-08-11 NOTE — TELEPHONE ENCOUNTER
Patient called to schedule in clinic follow up in clinic with Dr. Cheng on 10/27/20 at 1:15pm.    Yasmin Grant LPN

## 2020-08-11 NOTE — LETTER
8/11/2020         RE: Marilia Bean  [address]  [place] [address]        Dear Colleague,    Thank you for referring your patient, Marilia Bean, to the [facility]. Please see a copy of my visit note below.    [facility] D[facility] Record:  Store and Forward and Telephone [phone]      Dermatology Problem List:  1. 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  2. AKs on the nose: plan to treat in person    Encounter Date: Aug 11, 2020    CC:   Chief Complaint   Patient presents with     Derm Problem       History of Present Illness:  Marilia Bean is a 75 year old female who presents for spot of concern on the nose.    Marilia has never seen a dermatologist herself. She notes that she has had some pinkness on her nose over the last year. Her children and siblings pointed this out to her. Her siblings have had skin cancer, so they were concerned. Marilia notes rough texture to her nose with palpation. She denies any pain or bleeding.    Marilia notes that her son has gardiner syndrome. He had genetic testing in the past, but she is unsure which gene he had. 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 She was never tested herself.    Marilia has no personal history of skin cancer.    ROS: Patient is generally feeling well today.    Physical Examination:  General: Well-appearing female, appropriately-developed individual.  Skin: Focused examination including face was performed.   -Pink macules scattered on the nose with irregular borders.    Labs:  None    Past Medical History:   Patient Active Problem List   Diagnosis     Asymptomatic postmenopausal status     Postmenopausal atrophic vaginitis     Aortic aneurysm (H)     HYPERLIPIDEMIA LDL GOAL <100     ACP (advance care planning)     Hypertension goal BP (blood pressure) < 140/90     Status post coronary  angiogram     S/P thoracic aortic aneurysm repair     Duodenal adenocarcinoma (H)     Abdominal pain with vomiting     Bowel obstruction (H)     Past Medical History:   Diagnosis Date     Aortic aneurysm (H) 7/1/2007    see 7/07 [place] report -follow yearly, treat high BP is occurs Problem list name updated by automated process. Provider to review     Aortic aneurysm of unspecified site without mention of rupture 7/2007    4.4 cm ascending aorta noted in 2007     Asymptomatic postmenopausal status (age-related) (natural)     on HRT  Prempro -weaning off 5/'2004     CAD (coronary artery disease)     LAD     Family history of Gardiner syndrome      Hyperlipidemia LDL goal <100 10/31/2010     Hypertension goal BP (blood pressure) < 140/90 2/9/2016     Leiomyoma of uterus, unspecified     Uterine fibroid     Lump or mass in breast 7/2004    rt. nodule - bx neg     Personal history of colonic polyps      Postmenopausal atrophic vaginitis 8/20/2006     Pulmonary nodule     incidental noted in 2007 during [place]     Pure hypercholesterolemia     mild, diet - low cholesterol, low fat.10/06 start Lovastatin     Past Surgical History:   Procedure Laterality Date     BYPASS GRAFT ARTERY CORONARY N/A 6/5/2017    Procedure: BYPASS GRAFT ARTERY CORONARY;;  Surgeon: Akshat Soto MD;  [facility]     C DEXA INTERPRETATION, AXIAL  12/21/01    wnl. 7/2005 wnl but lower     COLONOSCOPY  05/07/07    Repeat in 1 year for surveillance     COLONOSCOPY  12/10/08    repeat 1 year  -see 1/2009 letter     COLONOSCOPY  03/31/10     COLONOSCOPY  10/31/2011    Procedure:COMBINED COLONOSCOPY, SINGLE BIOPSY/POLYPECTOMY BY BIOPSY; colonoscopy with polypectomy by biopsy; Surgeon:JESSICA GARCIA; [facility]     COLONOSCOPY  12/6/2013    Procedure: COMBINED COLONOSCOPY, SINGLE BIOPSY/POLYPECTOMY BY BIOPSY;  Colonoscopy, Polypectomies;  Surgeon: Herbert Salinas MD;  [facility]     COLONOSCOPY N/A 12/8/2015    Procedure: COLONOSCOPY;   Surgeon: Jared Carbajal MD;  [facility]     COLONOSCOPY N/A 4/26/2017    Procedure: COMBINED COLONOSCOPY, SINGLE OR MULTIPLE BIOPSY/POLYPECTOMY BY BIOPSY;  Colonoscopy with polypectomies with forceps and snare;  Surgeon: Herbert Salinas MD;  Location:  GI     ENDOSCOPIC INSERTION TUBE JEJUNOSTOMY N/A 8/5/2019    Procedure: Gastrojejunostomy tube placement with gastropexy;  Surgeon: Ford Mann MD;  [facility]     ESOPHAGOSCOPY, GASTROSCOPY, DUODENOSCOPY (EGD), COMBINED N/A 12/8/2015    Procedure: COMBINED ESOPHAGOSCOPY, GASTROSCOPY, DUODENOSCOPY (EGD), BIOPSY SINGLE OR MULTIPLE;  Surgeon: Jared Carbajal MD;  Location:  GI     ESOPHAGOSCOPY, GASTROSCOPY, DUODENOSCOPY (EGD), COMBINED N/A 7/31/2019    Procedure: Endoscopic ultrasound with cystoduodenostomy, stent placement x2, stent dilation, and nasojejunal tube placement;  Surgeon: Ford Mann MD;  [facility]     ESOPHAGOSCOPY, GASTROSCOPY, DUODENOSCOPY (EGD), COMBINED N/A 8/5/2019    Procedure: ESOPHAGOGASTRODUODENOSCOPY (EGD);  Surgeon: Ford Mann MD;  [facility]     ESOPHAGOSCOPY, GASTROSCOPY, DUODENOSCOPY (EGD), COMBINED N/A 8/12/2019    Procedure: ESOPHAGOGASTRODUODENOSCOPY (EGD) with GJ exchange, duodenum stent removal.;  Surgeon: Ford Mann MD;  [facility]      BIOPSY BREAST, PERC NEEDLE CORE, WITH IMAGING  8/17/2004    Right     HC COLONOSCOPY THRU STOMA W BIOPSY/CAUTERY TUMOR/POLYP/LESION  1999,2002 2004 polyp - hyperplastic - repeat 5 years ?     HC COLONOSCOPY W/WO BRUSH/WASH  12/12/2005    Polypectomy.  Diverticulosis-minimal. Bx adenomatous and mucosal polyps - repeat 1 year     HC ECP WITH CATARACT SURGERY Bilateral 2015, 2016     HC LAPAROSCOPY, SURGICAL; APPENDECTOMY  10/31/2004     HC LAPAROSCOPY, SURGICAL; CHOLECYSTECTOMY  2000    Cholecystectomy, Laparoscopic     HC REVISE MEDIAN N/CARPAL TUNNEL SURG  10/01/10    left     HC UGI ENDOSCOPY, SIMPLE  EXAM  03/31/10     HYSTERECTOMY, ELVIN  12/14/09    TAHBSO, MMK     REPAIR ANEURYSM ASCENDING AORTA N/A 6/5/2017    Procedure: REPAIR ANEURYSM ASCENDING AORTA;  Median Sternotomy, Ascending Aortic Aneurysm Repair, Coronary Artery Bypass Graft x1 on pump oxygenator;  Surgeon: Akshat Soto MD;  [facility]     REPLACE GASTROJEJUNOSTOMY TUBE, PERCUTANEOUS N/A 8/19/2019    Procedure: REPLACEMENT, GASTROJEJUNOSTOMY TUBE;  Surgeon: Robert Tafoya MD;  [facility]     RESECTION DUODENAL N/A 7/3/2019    Procedure: Resection of Distal Duodenal and proximal Jejunum;  Surgeon: Joaquin Brito MD;  [facility]       Social History:  Patient reports that she has never smoked. She has never used smokeless tobacco. She reports current alcohol use. She reports that she does not use drugs. [place]    Family History:  Brothers and mom have had skin cancers.  Son has kline syndrome.    Medications:  Current Outpatient Medications   Medication     aspirin 81 MG EC tablet     atorvastatin (LIPITOR) 20 MG tablet     losartan (COZAAR) 25 MG tablet     metoprolol tartrate (LOPRESSOR) 25 MG tablet     No current facility-administered medications for this visit.           Allergies   Allergen Reactions     Contrast Dye Itching and Other (See Comments)     Tingling in mouth     Morphine Nausea     Reports that she did not vomit.            Impression and Recommendations (Patient Counseled on the Following):  1. Actinic keratoses, nose: Morphology most consistent with AKs, but cannot fully rule out NMSC. No pain or bleeding to make me suspect SCC/BCC. Will plan to recheck in person at time of full skin check as below. Instructed patient to call for a visit should any spots become tender or bleed before her scheduled visit.     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 She does not know which gene her son  "has. Gardiner syndrome patients do have increased risk of sebaceous carcinoma and keratoacanthomas, so I recommended full skin check to Marilia. This is not urgent and can be delayed by 2 months. She is comfortable with this.       Follow-up:   2 months for skin check     Staff only    Cassy Wilson MD    Department of Dermatology  [facility]: Phone: [phone], Fax:[phone]  [facility]: Phone: [phone], Fax: [phone]        _____________________________________________________________________________    Teledermatology information:  - Location of patient: [place]  - Patient presented as: self referral  - Location of teledermatologist:  ([facility] )  - Reason teledermatology is appropriate:  of National Emergency Regarding Coronavirus disease (COVID 19) Outbreak  - Image quality and interpretability: acceptable  - Physician has received verbal consent for a Video/Photos Visit from the patient? Yes  - In-person dermatology visit recommendation: no  - Date of images: 8/5/20  - Service start time: 2:51  - Service end time: 2:57  - Date of report: 8/11/2020     Teledermatology Nurse Call for NEW Patients (not seen in last 3 years):     The patient was contacted by phone and we reviewed they have a visit in teledermatology upcoming with an MD or PASTEPHANIE;  Importantly, \"a teledermatology visit may not be as thorough as an in-person visit, and the quality of the photograph and/or video sent may not be of the same quality as that taken by the dermatology clinic.\"     This video visit will be conducted via a call between you and your physician/provider via [employer]. We have found that certain health care needs can be provided without the need for an in-person physical exam.  This service lets us provide the care you need with a video conversation.  If a prescription is " necessary we can send it directly to your pharmacy.  If lab work is needed we can place an order for that and you can then stop by our lab to have the test done at a later time.If during the course of the call the physician/provider feels a video visit is not appropriate, you will not be charged for this service.Visits are billed at different rates depending on your insurance coverage. Please reach out to your insurance provider with any questions.    The patient will also send photographs via [employer] for review. Instructions for photography are/were sent to the patient via [employer] messaging.       The patient verified the location of the photo/video visit to be [place].(The physician must be notified by nurse if the patient is not in the state of [place] during the encounter)    The patient denied skin pain, fever, mucosal symptoms(lesions, blisters, sores in the mouth, nose, eyes, or genitals) no IF PATIENT ENDORSES ANY OF THESE STOP AND PAGE ON CALL ATTENDING    Additional notes:  Patient summary of issue:red spot on nose noticed by her children and advised to be checked by a dermatologist   Location of problem on body:nose  How long has area/symptoms been present:since about March or April  What makes it better?:nothing  What makes it worse?:Heat, noticed it increase in redness while in [place]  Other symptoms include the following:does not itch or irritate her   Which medications have been tried, for how long, and did they make it better or worse (ex. Triamcinolone, used twice daily for 2 weeks, not improved):no  The patient has not seen a dermatologist.   The patient's mom, brothers and sister had skin cancer  ROS: The patient is generally feeling well.                     Again, thank you for allowing me to participate in the care of your patient.        Sincerely,        Cassy Wilson MD

## 2020-08-11 NOTE — PATIENT INSTRUCTIONS
[facility] Dermatology Visit    Thank you for allowing us to participate in your care. Your findings, instructions and follow-up plan are as follows:    Sandpaper like roughness on the nose: I suspect this is precancerous change. Nothing that we need to do urgently for it. I will recheck when I see you in person. If anything on the nose becomes tender or bleeds before your visit, please call and let us know so we can move your visit up.    Gardiner syndrome: Certain genes that cause gardiner syndrome are associated with higher risk of developing skin cancer. Because of this, I would like to see you for a full skin check. I will have one of my nursing staff call to set you up with this in about 2 months.    When should I call my doctor?    If you are worsening or not improving, please, contact us or seek urgent care as noted below.     Who should I call with questions (adults)?    [facility] (adult and pediatric): [phone]     [facility] (adult): [phone]    For urgent needs outside of business hours call the [facility] at [phone] and ask for the dermatology resident on call    If this is a medical emergency and you are unable to reach an ER, Call [phone]      Who should I call with questions (pediatric)?  [facility]- Pediatric Dermatology  Dr. Christin Tavares, Dr. Pushpa Ralph, Dr. Jenn Gomez, Patience Moran, INA Solorio, Dr. Johana Bai & Dr. Kavin Lozano  Non Urgent  Nurse Triage Line; [phone]- Yodit and Tali RAHMAN Care Coordinators   Le[place] (/Complex ) [phone]    If you need a prescription refill, please contact your pharmacy. Refills are approved or denied by our Physicians during normal business hours, Monday through Fridays  Per office policy, refills will not be granted if you have not been seen within the past year (or sooner  depending on your child's condition)    Scheduling Information:  Pediatric Appointment Scheduling and Call Center [phone]  Radiology Scheduling- [phone]  Sedation Unit Scheduling- [phone]  [place] Scheduling- General [phone]; Pediatric Dermatology [phone]  Main  Services: [phone]  Andorran: [phone]  English: [phone]  Hmong/Bangladeshi/Luis: [phone]  Preadmission Nursing Department Fax Number: [phone] (Fax all pre-operative paperwork to this number)    For urgent matters arising during evenings, weekends, or holidays that cannot wait for normal business hours please call [phone] and ask for the Dermatology Resident On-Call to be paged.

## 2020-08-11 NOTE — Clinical Note
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    Cassy Wilson MD    Department of Dermatology  [facility]: Phone: [phone], Fax:[phone]  [facility]: Phone: [phone], Fax: [phone]

## 2020-08-11 NOTE — PROGRESS NOTES
[facility] Dermatology Record:  Store and Forward and Telephone [phone]      Dermatology Problem List:  1. 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  2. AKs on the nose: plan to treat in person    Encounter Date: Aug 11, 2020    CC:   Chief Complaint   Patient presents with     Derm Problem       History of Present Illness:  Marilia Bean is a 75 year old female who presents for spot of concern on the nose.    Marilia has never seen a dermatologist herself. She notes that she has had some pinkness on her nose over the last year. Her children and siblings pointed this out to her. Her siblings have had skin cancer, so they were concerned. Marliia notes rough texture to her nose with palpation. She denies any pain or bleeding.    Marilia notes that her son has gardiner syndrome. He had genetic testing in the past, but she is unsure which gene he had. 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 She was never tested herself.    Marilia has no personal history of skin cancer.    ROS: Patient is generally feeling well today.    Physical Examination:  General: Well-appearing female, appropriately-developed individual.  Skin: Focused examination including face was performed.   -Pink macules scattered on the nose with irregular borders.    Labs:  None    Past Medical History:   Patient Active Problem List   Diagnosis     Asymptomatic postmenopausal status     Postmenopausal atrophic vaginitis     Aortic aneurysm (H)     HYPERLIPIDEMIA LDL GOAL <100     ACP (advance care planning)     Hypertension goal BP (blood pressure) < 140/90     Status post coronary angiogram     S/P thoracic aortic aneurysm repair     Duodenal adenocarcinoma (H)     Abdominal pain with vomiting     Bowel obstruction (H)     Past Medical History:   Diagnosis Date     Aortic aneurysm (H) 7/1/2007    see 7/07 Ba report  -follow yearly, treat high BP is occurs Problem list name updated by automated process. Provider to review     Aortic aneurysm of unspecified site without mention of rupture 7/2007    4.4 cm ascending aorta noted in 2007     Asymptomatic postmenopausal status (age-related) (natural)     on HRT  Prempro -weaning off 5/'2004     CAD (coronary artery disease)     LAD     Family history of Gardiner syndrome      Hyperlipidemia LDL goal <100 10/31/2010     Hypertension goal BP (blood pressure) < 140/90 2/9/2016     Leiomyoma of uterus, unspecified     Uterine fibroid     Lump or mass in breast 7/2004    rt. nodule - bx neg     Personal history of colonic polyps      Postmenopausal atrophic vaginitis 8/20/2006     Pulmonary nodule     incidental noted in 2007 during garcia     Pure hypercholesterolemia     mild, diet - low cholesterol, low fat.10/06 start Lovastatin     Past Surgical History:   Procedure Laterality Date     BYPASS GRAFT ARTERY CORONARY N/A 6/5/2017    Procedure: BYPASS GRAFT ARTERY CORONARY;;  Surgeon: Akshat Soto MD;  Location: [facility]     C DEXA INTERPRETATION, AXIAL  12/21/01    wnl. 7/2005 wnl but lower     COLONOSCOPY  05/07/07    Repeat in 1 year for surveillance     COLONOSCOPY  12/10/08    repeat 1 year  -see 1/2009 letter     COLONOSCOPY  03/31/10     COLONOSCOPY  10/31/2011    Procedure:COMBINED COLONOSCOPY, SINGLE BIOPSY/POLYPECTOMY BY BIOPSY; colonoscopy with polypectomy by biopsy; Surgeon:JESSICA GARCIA; Location:[facility]     COLONOSCOPY  12/6/2013    Procedure: COMBINED COLONOSCOPY, SINGLE BIOPSY/POLYPECTOMY BY BIOPSY;  Colonoscopy, Polypectomies;  Surgeon: Herbert Salinas MD;  Location: [facility]     COLONOSCOPY N/A 12/8/2015    Procedure: COLONOSCOPY;  Surgeon: Jared Carbajal MD;  Location: [facility]     COLONOSCOPY N/A 4/26/2017    Procedure: COMBINED COLONOSCOPY, SINGLE OR MULTIPLE BIOPSY/POLYPECTOMY BY BIOPSY;  Colonoscopy with polypectomies with forceps and snare;  Surgeon:  Herbert Salinas MD;  Location: [facility]     ENDOSCOPIC INSERTION TUBE JEJUNOSTOMY N/A 8/5/2019    Procedure: Gastrojejunostomy tube placement with gastropexy;  Surgeon: Ford Mann MD;  Location: [facility]     ESOPHAGOSCOPY, GASTROSCOPY, DUODENOSCOPY (EGD), COMBINED N/A 12/8/2015    Procedure: COMBINED ESOPHAGOSCOPY, GASTROSCOPY, DUODENOSCOPY (EGD), BIOPSY SINGLE OR MULTIPLE;  Surgeon: Jared Carbajal MD;  [facility]     ESOPHAGOSCOPY, GASTROSCOPY, DUODENOSCOPY (EGD), COMBINED N/A 7/31/2019    Procedure: Endoscopic ultrasound with cystoduodenostomy, stent placement x2, stent dilation, and nasojejunal tube placement;  Surgeon: Ford Mann MD;  Location: [facility]     ESOPHAGOSCOPY, GASTROSCOPY, DUODENOSCOPY (EGD), COMBINED N/A 8/5/2019    Procedure: ESOPHAGOGASTRODUODENOSCOPY (EGD);  Surgeon: Ford Mann MD;  [facility]: [facility]     ESOPHAGOSCOPY, GASTROSCOPY, DUODENOSCOPY (EGD), COMBINED N/A 8/12/2019    Procedure: ESOPHAGOGASTRODUODENOSCOPY (EGD) with GJ exchange, duodenum stent removal.;  Surgeon: Ford Mann MD;  Location: [facility]     HC BIOPSY BREAST, PERC NEEDLE CORE, WITH IMAGING  8/17/2004    Right     HC COLONOSCOPY THRU STOMA W BIOPSY/CAUTERY TUMOR/POLYP/LESION  1999,2002 2004 polyp - hyperplastic - repeat 5 years ?     HC COLONOSCOPY W/WO BRUSH/WASH  12/12/2005    Polypectomy.  Diverticulosis-minimal. Bx adenomatous and mucosal polyps - repeat 1 year     HC ECP WITH CATARACT SURGERY Bilateral 2015, 2016     HC LAPAROSCOPY, SURGICAL; APPENDECTOMY  10/31/2004     HC LAPAROSCOPY, SURGICAL; CHOLECYSTECTOMY  2000    Cholecystectomy, Laparoscopic     HC REVISE MEDIAN N/CARPAL TUNNEL SURG  10/01/10    left     [facility] ENDOSCOPY, SIMPLE EXAM  03/31/10     HYSTERECTOMY, ELVIN  12/14/09    TAHBSO, MMK     REPAIR ANEURYSM ASCENDING AORTA N/A 6/5/2017    Procedure: REPAIR ANEURYSM ASCENDING AORTA;  Median Sternotomy, Ascending Aortic Aneurysm Repair, Coronary Artery Bypass  Graft x1 on pump oxygenator;  Surgeon: Akshat Soto MD;  [facility]     REPLACE GASTROJEJUNOSTOMY TUBE, PERCUTANEOUS N/A 8/19/2019    Procedure: REPLACEMENT, GASTROJEJUNOSTOMY TUBE;  Surgeon: Robert Tafoya MD;  [facility]: [facility]     RESECTION DUODENAL N/A 7/3/2019    Procedure: Resection of Distal Duodenal and proximal Jejunum;  Surgeon: Joaquin Brito MD;  Location: [facility]       Social History:  Patient reports that she has never smoked. She has never used smokeless tobacco. She reports current alcohol use. She reports that she does not use drugs. [place]    Family History:  Brothers and mom have had skin cancers.  Son has gardiner syndrome.    Medications:  Current Outpatient Medications   Medication     aspirin 81 MG EC tablet     atorvastatin (LIPITOR) 20 MG tablet     losartan (COZAAR) 25 MG tablet     metoprolol tartrate (LOPRESSOR) 25 MG tablet     No current facility-administered medications for this visit.           Allergies   Allergen Reactions     Contrast Dye Itching and Other (See Comments)     Tingling in mouth     Morphine Nausea     Reports that she did not vomit.            Impression and Recommendations (Patient Counseled on the Following):  1. Actinic keratoses, nose: Morphology most consistent with AKs, but cannot fully rule out NMSC. No pain or bleeding to make me suspect SCC/BCC. Will plan to recheck in person at time of full skin check as below. Instructed patient to call for a visit should any spots become tender or bleed before her scheduled visit.     2. Suspected gardiner syndrome: Son diagnosed with genetic testing, Marilia is the presumed carrier as she has had an intestinal cancer. She does not know which gene her son has. Gardiner syndrome patients do have increased risk of sebaceous carcinoma and keratoacanthomas, so I recommended full skin check to Marilia. This is not urgent and can be delayed by 2 months. She is comfortable with this.  "      Follow-up:   2 months for skin check     Staff only    Cassy Wilson MD    Department of Dermatology  [facility]: Phone: [phone], Fax:[phone]  [facility]: Phone: [phone], Fax: [phone]        _____________________________________________________________________________    Teledermatology information:  - Location of patient: [place]  - Patient presented as: self referral  - Location of teledermatologist:  ([facility] )  - Reason teledermatology is appropriate:  of National Emergency Regarding Coronavirus disease (COVID 19) Outbreak  - Image quality and interpretability: acceptable  - Physician has received verbal consent for a Video/Photos Visit from the patient? Yes  - In-person dermatology visit recommendation: no  - Date of images: 8/5/20  - Service start time: 2:51  - Service end time: 2:57  - Date of report: 8/11/2020     Teledermatology Nurse Call for NEW Patients (not seen in last 3 years):     The patient was contacted by phone and we reviewed they have a visit in teledermatology upcoming with an MD or KATHE;  Importantly, \"a teledermatology visit may not be as thorough as an in-person visit, and the quality of the photograph and/or video sent may not be of the same quality as that taken by the dermatology clinic.\"     This video visit will be conducted via a call between you and your physician/provider via [employer]. We have found that certain health care needs can be provided without the need for an in-person physical exam.  This service lets us provide the care you need with a video conversation.  If a prescription is necessary we can send it directly to your pharmacy.  If lab work is needed we can place an order for that and you can then stop by our lab to have the test done at a later time.If during the course of the call the " physician/provider feels a video visit is not appropriate, you will not be charged for this service.Visits are billed at different rates depending on your insurance coverage. Please reach out to your insurance provider with any questions.    The patient will also send photographs via [email or web address] for review. Instructions for photography are/were sent to the patient via [email or web address] messaging.       The patient verified the location of the photo/video visit to be [place].(The physician must be notified by nurse if the patient is not in the state of [place] during the encounter)    The patient denied skin pain, fever, mucosal symptoms(lesions, blisters, sores in the mouth, nose, eyes, or genitals) no IF PATIENT ENDORSES ANY OF THESE STOP AND PAGE ON CALL ATTENDING    Additional notes:  Patient summary of issue:red spot on nose noticed by her children and advised to be checked by a dermatologist   Location of problem on body:nose  How long has area/symptoms been present:since about March or April  What makes it better?:nothing  What makes it worse?:Heat, noticed it increase in redness while in [place]  Other symptoms include the following:does not itch or irritate her   Which medications have been tried, for how long, and did they make it better or worse (ex. Triamcinolone, used twice daily for 2 weeks, not improved):no  The patient has not seen a dermatologist.   The patient's mom, brothers and sister had skin cancer  ROS: The patient is generally feeling well.

## 2020-10-27 ENCOUNTER — OFFICE VISIT (OUTPATIENT)
Dept: DERMATOLOGY | Facility: CLINIC | Age: 75
End: 2020-10-27
Payer: COMMERCIAL

## 2020-10-27 DIAGNOSIS — D18.01 CHERRY ANGIOMA: ICD-10-CM

## 2020-10-27 DIAGNOSIS — Z15.09 LYNCH SYNDROME: Primary | ICD-10-CM

## 2020-10-27 DIAGNOSIS — L57.8 SUN-DAMAGED SKIN: ICD-10-CM

## 2020-10-27 DIAGNOSIS — L57.0 ACTINIC KERATOSIS: ICD-10-CM

## 2020-10-27 DIAGNOSIS — D22.9 MULTIPLE BENIGN NEVI: ICD-10-CM

## 2020-10-27 DIAGNOSIS — L82.1 SEBORRHEIC KERATOSIS: ICD-10-CM

## 2020-10-27 DIAGNOSIS — L82.0 INFLAMED SEBORRHEIC KERATOSIS: ICD-10-CM

## 2020-10-27 PROCEDURE — 17110 DESTRUCTION B9 LES UP TO 14: CPT | Performed by: DERMATOLOGY

## 2020-10-27 PROCEDURE — 17003 DESTRUCT PREMALG LES 2-14: CPT | Mod: 59 | Performed by: DERMATOLOGY

## 2020-10-27 PROCEDURE — 99214 OFFICE O/P EST MOD 30 MIN: CPT | Mod: 25 | Performed by: DERMATOLOGY

## 2020-10-27 PROCEDURE — 17000 DESTRUCT PREMALG LESION: CPT | Mod: 59 | Performed by: DERMATOLOGY

## 2020-10-27 ASSESSMENT — PAIN SCALES - GENERAL: PAINLEVEL: NO PAIN (0)

## 2020-10-27 NOTE — PROGRESS NOTES
The following medication was given:     MEDICATION:  Lidocaine with epinephrine 1% [id number]  ROUTE: SQ  SITE: see procedure note  DOSE: 0.5cc  LOT #: -[id number]  : Liudmila  EXPIRATION DATE:   NDC#: 9619-7065-51   Was there drug waste? yes  Multi-dose vial: Yes    Rachelle Prasad LPN  October 27, 2020

## 2020-10-27 NOTE — LETTER
10/27/2020         RE: Marilia Bean  [address]  [facility] [address]        Dear Colleague,    Thank you for referring your patient, Marilia Bean, to the [facility]. Please see a copy of my visit note below.    [facility] Dermatology Note    Dermatology Problem List:  1. 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 AKs: Cryo    Encounter Date: Oct 27, 2020    CC:  Chief Complaint   Patient presents with     Skin Check     No personal hx SC, daughter with hx unknown type of SC, siblings with hx unknown SC. Not immunosuppressed. Areas of concern: none       History of Present Illness:  Ms. Marilia Bean is a 75 year old female with no history of skin cancer but presumed gardiner syndrome who presents for skin check.    The patient was last seen on 8/11/20 for virtual visit. At that time, a suspected AK was diagnosed on the nose. Plan was to have patient come in person for skin check and treatment of AK on the nose.    Today, the patient reports no new concerns. Spot on the nose remains. Denies any tender, nonhealing, or bleeding skin lesions.    No other concerns addressed today.    Past Medical History:   Patient Active Problem List   Diagnosis     Asymptomatic postmenopausal status     Postmenopausal atrophic vaginitis     Aortic aneurysm (H)     HYPERLIPIDEMIA LDL GOAL <100     ACP (advance care planning)     Hypertension goal BP (blood pressure) < 140/90     Status post coronary angiogram     S/P thoracic aortic aneurysm repair     Duodenal adenocarcinoma (H)     Abdominal pain with vomiting     Bowel obstruction (H)     Past Medical History:   Diagnosis Date     Aortic aneurysm (H) 7/1/2007    see 7/07 Ba report -follow yearly, treat high BP is occurs Problem list name updated by automated process. Provider to review     Aortic aneurysm of unspecified site without mention of rupture 7/2007    4.4 cm ascending aorta noted in  2007     Asymptomatic postmenopausal status (age-related) (natural)     on HRT  Prempro -weaning off 5/'2004     CAD (coronary artery disease)     LAD     Family history of Gardiner syndrome      Hyperlipidemia LDL goal <100 10/31/2010     Hypertension goal BP (blood pressure) < 140/90 2/9/2016     Leiomyoma of uterus, unspecified     Uterine fibroid     Lump or mass in breast 7/2004    rt. nodule - bx neg     Personal history of colonic polyps      Postmenopausal atrophic vaginitis 8/20/2006     Pulmonary nodule     incidental noted in 2007 during garcia     Pure hypercholesterolemia     mild, diet - low cholesterol, low fat.10/06 start Lovastatin     Past Surgical History:   Procedure Laterality Date     BYPASS GRAFT ARTERY CORONARY N/A 6/5/2017    Procedure: BYPASS GRAFT ARTERY CORONARY;;  Surgeon: Akshat Soto MD;  Location:  OR      DEXA INTERPRETATION, AXIAL  12/21/01    wnl. 7/2005 wnl but lower     COLONOSCOPY  05/07/07    Repeat in 1 year for surveillance     COLONOSCOPY  12/10/08    repeat 1 year  -see 1/2009 letter     COLONOSCOPY  03/31/10     COLONOSCOPY  10/31/2011    Procedure:COMBINED COLONOSCOPY, SINGLE BIOPSY/POLYPECTOMY BY BIOPSY; colonoscopy with polypectomy by biopsy; Surgeon:JESSICA GARCIA; Location: GI     COLONOSCOPY  12/6/2013    Procedure: COMBINED COLONOSCOPY, SINGLE BIOPSY/POLYPECTOMY BY BIOPSY;  Colonoscopy, Polypectomies;  Surgeon: Herbert Salinas MD;  Location:  GI     COLONOSCOPY N/A 12/8/2015    Procedure: COLONOSCOPY;  Surgeon: Jared Carbajal MD;  Location:  GI     COLONOSCOPY N/A 4/26/2017    Procedure: COMBINED COLONOSCOPY, SINGLE OR MULTIPLE BIOPSY/POLYPECTOMY BY BIOPSY;  Colonoscopy with polypectomies with forceps and snare;  Surgeon: Herbert Salinas MD;  Location:  GI     ENDOSCOPIC INSERTION TUBE JEJUNOSTOMY N/A 8/5/2019    Procedure: Gastrojejunostomy tube placement with gastropexy;  Surgeon: Ford Mann MD;  Location:  OR      ESOPHAGOSCOPY, GASTROSCOPY, DUODENOSCOPY (EGD), COMBINED N/A 12/8/2015    Procedure: COMBINED ESOPHAGOSCOPY, GASTROSCOPY, DUODENOSCOPY (EGD), BIOPSY SINGLE OR MULTIPLE;  Surgeon: Jared Carbajal MD;  [facility]     ESOPHAGOSCOPY, GASTROSCOPY, DUODENOSCOPY (EGD), COMBINED N/A 7/31/2019    Procedure: Endoscopic ultrasound with cystoduodenostomy, stent placement x2, stent dilation, and nasojejunal tube placement;  Surgeon: Ford Mann MD;  [facility]     ESOPHAGOSCOPY, GASTROSCOPY, DUODENOSCOPY (EGD), COMBINED N/A 8/5/2019    Procedure: ESOPHAGOGASTRODUODENOSCOPY (EGD);  Surgeon: Ford Mann MD;  [facility]     ESOPHAGOSCOPY, GASTROSCOPY, DUODENOSCOPY (EGD), COMBINED N/A 8/12/2019    Procedure: ESOPHAGOGASTRODUODENOSCOPY (EGD) with GJ exchange, duodenum stent removal.;  Surgeon: Ford Mann MD;  [facility]     HC BIOPSY BREAST, PERC NEEDLE CORE, WITH IMAGING  8/17/2004    Right     HC COLONOSCOPY THRU STOMA W BIOPSY/CAUTERY TUMOR/POLYP/LESION  1999,2002 2004 polyp - hyperplastic - repeat 5 years ?     HC COLONOSCOPY W/WO BRUSH/WASH  12/12/2005    Polypectomy.  Diverticulosis-minimal. Bx adenomatous and mucosal polyps - repeat 1 year     HC ECP WITH CATARACT SURGERY Bilateral 2015, 2016     HC LAPAROSCOPY, SURGICAL; APPENDECTOMY  10/31/2004     HC LAPAROSCOPY, SURGICAL; CHOLECYSTECTOMY  2000    Cholecystectomy, Laparoscopic     HC REVISE MEDIAN N/CARPAL TUNNEL SURG  10/01/10    left     HC UGI ENDOSCOPY, SIMPLE EXAM  03/31/10     HYSTERECTOMY, ELVIN  12/14/09    TAHBSO, MMK     REPAIR ANEURYSM ASCENDING AORTA N/A 6/5/2017    Procedure: REPAIR ANEURYSM ASCENDING AORTA;  Median Sternotomy, Ascending Aortic Aneurysm Repair, Coronary Artery Bypass Graft x1 on pump oxygenator;  Surgeon: Akshat Soto MD;  [facility]     REPLACE GASTROJEJUNOSTOMY TUBE, PERCUTANEOUS N/A 8/19/2019    Procedure: REPLACEMENT, GASTROJEJUNOSTOMY TUBE;  Surgeon: Michelet  Robert Rose MD;  [facility]     RESECTION DUODENAL N/A 7/3/2019    Procedure: Resection of Distal Duodenal and proximal Jejunum;  Surgeon: Joaquin Birto MD;  [facility]       Social History:  Patient reports that she has never smoked. She has never used smokeless tobacco. She reports current alcohol use. She reports that she does not use drugs. [place]    Family History:  Sister, brothers, daughter, and mom have had skin cancers.  Son has kline syndrome.    Medications:  Current Outpatient Medications   Medication Sig Dispense Refill     aspirin 81 MG EC tablet Take 81 mg by mouth every morning  90 tablet 3     atorvastatin (LIPITOR) 20 MG tablet TAKE ONE TABLET BY MOUTH EVERY MORNING 90 tablet 3     losartan (COZAAR) 25 MG tablet TAKE ONE TABLET BY MOUTH EVERY MORNING 90 tablet 1     metoprolol tartrate (LOPRESSOR) 25 MG tablet TAKE ONE TABLET BY MOUTH TWICE A  tablet 3       Allergies   Allergen Reactions     Contrast Dye Itching and Other (See Comments)     Tingling in mouth     Morphine Nausea     Reports that she did not vomit.        Review of Systems:  -Constitutional: Patient is otherwise feeling well, in usual state of health.   -Skin: As above in HPI. No additional skin concerns.    Physical exam:  Vitals: There were no vitals taken for this visit.  GEN: This is a well developed, well-nourished female in no acute distress, in a pleasant mood.    SKIN: Total skin excluding the undergarment areas was performed. The exam included the head/face, neck, both arms, chest, back, abdomen, both legs, digits and/or nails.   - Klein Type II  - Few scattered brown macules on sun exposed areas. Few for age.  - There are five erythematous macules with overyling adherent scale on the nasal tip.   - There are dome shaped bright red papules on the trunk.   - Multiple regular brown pigmented macules and papules are identified on the body. About 30 nevi in all. None are  atypical.  - There are waxy stuck on tan to brown papules on the trunk, face, extremities.  - There is a tan to brown waxy stuck on papule with surrounding erythema on the right forearm.   - No other lesions of concern on areas examined.     Impression/Plan:    1. Gardiner syndrome: Unknown gene (presumed carrier as patient has had colon cancer and son has been tested and showed mutation). Discussed increased risk of skin cancers, including Shyam and sebaceous carcinomas. Recommended yearly skin checks due to this.    2. Sun damaged skin with solar lentigines  - Recommend sunscreens SPF #30 or greater, protective clothing and avoidance of tanning beds.    3. Benign findings including: seborrheic keratoses, cherry angioma  - No further intervention required. Patient to report changes.   - Patient reassured of the benign nature of these lesions.    4. Multiple clinically benign nevi  - No further intervention required. Patient to report changes.   - Patient reassured of the benign nature of these lesions.    5. Actinic keratosis, nasal tip x5: Discussed precancerous nature.  - Cryotherapy procedure note: After verbal consent and discussion of risks and benefits including but no limited to dyspigmentation/scar, blister, and pain, 5 was(were) treated with 1-2mm freeze border for 2 cycles with liquid nitrogen. Post cryotherapy instructions were provided.    6. Seborrheic keratosis, symptomatic. Right forearm  - Cryotherapy procedure note: After verbal consent and discussion of risks and benefits including but no limited to dyspigmentation/scar, blister, and pain, 1 was(were) treated with 1-2mm freeze border for 2 cycles with liquid nitrogen. Post cryotherapy instructions were provided.    Follow-up in 1 year for skin check, earlier for new or changing lesions.     Staff Involved:  Staff only    Cassy Wilson MD    Department of Dermatology  [facility]  Clinics: Phone: [phone], Fax:[phone]  [facility]: Phone: [phone], Fax: [phone]            Marilia Bean's goals for this visit include:   Chief Complaint   Patient presents with     Skin Check     No personal hx SC, daughter with hx unknown type of SC, siblings with hx unknown SC. Not immunosuppressed. Areas of concern: none       She requests these members of her care team be copied on today's visit information: no    PCP: Herbert Epstein    Referring Provider:  No referring provider defined for this encounter.    There were no vitals taken for this visit.    Do you need any medication refills at today's visit? No  Rachelle Prasad LPN          Again, thank you for allowing me to participate in the care of your patient.        Sincerely,        Cassy Wilson MD

## 2020-10-27 NOTE — PROGRESS NOTES
Marilia Bean's goals for this visit include:   Chief Complaint   Patient presents with     Skin Check     No personal hx SC, daughter with hx unknown type of SC, siblings with hx unknown SC. Not immunosuppressed. Areas of concern: none       She requests these members of her care team be copied on today's visit information: no    PCP: Herbert Epstein    Referring Provider:  No referring provider defined for this encounter.    There were no vitals taken for this visit.    Do you need any medication refills at today's visit? No  Rachelle Prasad LPN

## 2020-10-27 NOTE — PATIENT INSTRUCTIONS
Cryotherapy    What is it?    Use of a very cold liquid, such as liquid nitrogen, to freeze and destroy abnormal skin cells that need to be removed    What should I expect?    Tenderness and redness    A small blister that might grow and fill with dark purple blood. There may be crusting.    More than one treatment may be needed if the lesions do not go away.    How do I care for the treated area?    Gently wash the area with your hands when bathing.    Use a thin layer of Vaseline to help with healing. You may use a Band-Aid.     The area should heal within 7-10 days and may leave behind a pink or lighter color.     Do not use an antibiotic or Neosporin ointment.     You may take acetaminophen (Tylenol) for pain.     Call your Doctor if you have:    Severe pain    Signs of infection (warmth, redness, cloudy yellow drainage, and or a bad smell)    Questions or concerns    Who should I call with questions?       [facility]: [phone]       [facility]: [phone]       For urgent needs outside of business hours call the [facility] at [phone]        and ask for the dermatology resident on call

## 2020-10-27 NOTE — PROGRESS NOTES
[facility] Dermatology Note    Dermatology Problem List:  1. 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 AKs: Cryo    Encounter Date: Oct 27, 2020    CC:  Chief Complaint   Patient presents with     Skin Check     No personal hx SC, daughter with hx unknown type of SC, siblings with hx unknown SC. Not immunosuppressed. Areas of concern: none       History of Present Illness:  Ms. Marilia Bean is a 75 year old female with no history of skin cancer but presumed gardiner syndrome who presents for skin check.    The patient was last seen on 8/11/20 for virtual visit. At that time, a suspected AK was diagnosed on the nose. Plan was to have patient come in person for skin check and treatment of AK on the nose.    Today, the patient reports no new concerns. Spot on the nose remains. Denies any tender, nonhealing, or bleeding skin lesions.    No other concerns addressed today.    Past Medical History:   Patient Active Problem List   Diagnosis     Asymptomatic postmenopausal status     Postmenopausal atrophic vaginitis     Aortic aneurysm (H)     HYPERLIPIDEMIA LDL GOAL <100     ACP (advance care planning)     Hypertension goal BP (blood pressure) < 140/90     Status post coronary angiogram     S/P thoracic aortic aneurysm repair     Duodenal adenocarcinoma (H)     Abdominal pain with vomiting     Bowel obstruction (H)     Past Medical History:   Diagnosis Date     Aortic aneurysm (H) 7/1/2007    see 7/07 Ba report -follow yearly, treat high BP is occurs Problem list name updated by automated process. Provider to review     Aortic aneurysm of unspecified site without mention of rupture 7/2007    4.4 cm ascending aorta noted in 2007     Asymptomatic postmenopausal status (age-related) (natural)     on HRT  Prempro -weaning off 5/'2004     CAD (coronary artery disease)     LAD     Family history of Gardiner syndrome      Hyperlipidemia LDL goal <100 10/31/2010     Hypertension  goal BP (blood pressure) < 140/90 2/9/2016     Leiomyoma of uterus, unspecified     Uterine fibroid     Lump or mass in breast 7/2004    rt. nodule - bx neg     Personal history of colonic polyps      Postmenopausal atrophic vaginitis 8/20/2006     Pulmonary nodule     incidental noted in 2007 during garcia     Pure hypercholesterolemia     mild, diet - low cholesterol, low fat.10/06 start Lovastatin     Past Surgical History:   Procedure Laterality Date     BYPASS GRAFT ARTERY CORONARY N/A 6/5/2017    Procedure: BYPASS GRAFT ARTERY CORONARY;;  Surgeon: Akshat Soto MD;  [facility]     C DEXA INTERPRETATION, AXIAL  12/21/01    wnl. 7/2005 wnl but lower     COLONOSCOPY  05/07/07    Repeat in 1 year for surveillance     COLONOSCOPY  12/10/08    repeat 1 year  -see 1/2009 letter     COLONOSCOPY  03/31/10     COLONOSCOPY  10/31/2011    Procedure:COMBINED COLONOSCOPY, SINGLE BIOPSY/POLYPECTOMY BY BIOPSY; colonoscopy with polypectomy by biopsy; Surgeon:JESSICA GARCIA; [facility]     COLONOSCOPY  12/6/2013    Procedure: COMBINED COLONOSCOPY, SINGLE BIOPSY/POLYPECTOMY BY BIOPSY;  Colonoscopy, Polypectomies;  Surgeon: Herbert Salinas MD;  [facility]     COLONOSCOPY N/A 12/8/2015    Procedure: COLONOSCOPY;  Surgeon: Jared Carbajal MD;  [facility]     COLONOSCOPY N/A 4/26/2017    Procedure: COMBINED COLONOSCOPY, SINGLE OR MULTIPLE BIOPSY/POLYPECTOMY BY BIOPSY;  Colonoscopy with polypectomies with forceps and snare;  Surgeon: Herbert Salinas MD;  Location:  GI     ENDOSCOPIC INSERTION TUBE JEJUNOSTOMY N/A 8/5/2019    Procedure: Gastrojejunostomy tube placement with gastropexy;  Surgeon: Ford Mann MD;  [facility]     ESOPHAGOSCOPY, GASTROSCOPY, DUODENOSCOPY (EGD), COMBINED N/A 12/8/2015    Procedure: COMBINED ESOPHAGOSCOPY, GASTROSCOPY, DUODENOSCOPY (EGD), BIOPSY SINGLE OR MULTIPLE;  Surgeon: Jared Carbajal MD;  Location:  GI     ESOPHAGOSCOPY, GASTROSCOPY,  DUODENOSCOPY (EGD), COMBINED N/A 7/31/2019    Procedure: Endoscopic ultrasound with cystoduodenostomy, stent placement x2, stent dilation, and nasojejunal tube placement;  Surgeon: Ford Mann MD;  [facility]     ESOPHAGOSCOPY, GASTROSCOPY, DUODENOSCOPY (EGD), COMBINED N/A 8/5/2019    Procedure: ESOPHAGOGASTRODUODENOSCOPY (EGD);  Surgeon: Ford Mann MD;  [facility]     ESOPHAGOSCOPY, GASTROSCOPY, DUODENOSCOPY (EGD), COMBINED N/A 8/12/2019    Procedure: ESOPHAGOGASTRODUODENOSCOPY (EGD) with GJ exchange, duodenum stent removal.;  Surgeon: Ford Mann MD;  [facility]     HC BIOPSY BREAST, PERC NEEDLE CORE, WITH IMAGING  8/17/2004    Right     HC COLONOSCOPY THRU STOMA W BIOPSY/CAUTERY TUMOR/POLYP/LESION  1999,2002 2004 polyp - hyperplastic - repeat 5 years ?     HC COLONOSCOPY W/WO BRUSH/WASH  12/12/2005    Polypectomy.  Diverticulosis-minimal. Bx adenomatous and mucosal polyps - repeat 1 year     HC ECP WITH CATARACT SURGERY Bilateral 2015, 2016     HC LAPAROSCOPY, SURGICAL; APPENDECTOMY  10/31/2004     HC LAPAROSCOPY, SURGICAL; CHOLECYSTECTOMY  2000    Cholecystectomy, Laparoscopic     HC REVISE MEDIAN N/CARPAL TUNNEL SURG  10/01/10    left     HC UGI ENDOSCOPY, SIMPLE EXAM  03/31/10     HYSTERECTOMY, ELVIN  12/14/09    TAHBSO, MMK     REPAIR ANEURYSM ASCENDING AORTA N/A 6/5/2017    Procedure: REPAIR ANEURYSM ASCENDING AORTA;  Median Sternotomy, Ascending Aortic Aneurysm Repair, Coronary Artery Bypass Graft x1 on pump oxygenator;  Surgeon: Akshat Soto MD;  [facility]     REPLACE GASTROJEJUNOSTOMY TUBE, PERCUTANEOUS N/A 8/19/2019    Procedure: REPLACEMENT, GASTROJEJUNOSTOMY TUBE;  Surgeon: Robert Tafoya MD;  [facility]     RESECTION DUODENAL N/A 7/3/2019    Procedure: Resection of Distal Duodenal and proximal Jejunum;  Surgeon: Joaquin Brito MD;  [facility]       Social History:  Patient reports that she has never smoked.  She has never used smokeless tobacco. She reports current alcohol use. She reports that she does not use drugs. [place] farm.    Family History:  Sister, brothers, daughter, and mom have had skin cancers.  Son has kline syndrome.    Medications:  Current Outpatient Medications   Medication Sig Dispense Refill     aspirin 81 MG EC tablet Take 81 mg by mouth every morning  90 tablet 3     atorvastatin (LIPITOR) 20 MG tablet TAKE ONE TABLET BY MOUTH EVERY MORNING 90 tablet 3     losartan (COZAAR) 25 MG tablet TAKE ONE TABLET BY MOUTH EVERY MORNING 90 tablet 1     metoprolol tartrate (LOPRESSOR) 25 MG tablet TAKE ONE TABLET BY MOUTH TWICE A  tablet 3       Allergies   Allergen Reactions     Contrast Dye Itching and Other (See Comments)     Tingling in mouth     Morphine Nausea     Reports that she did not vomit.        Review of Systems:  -Constitutional: Patient is otherwise feeling well, in usual state of health.   -Skin: As above in HPI. No additional skin concerns.    Physical exam:  Vitals: There were no vitals taken for this visit.  GEN: This is a well developed, well-nourished female in no acute distress, in a pleasant mood.    SKIN: Total skin excluding the undergarment areas was performed. The exam included the head/face, neck, both arms, chest, back, abdomen, both legs, digits and/or nails.   - Klein Type II  - Few scattered brown macules on sun exposed areas. Few for age.  - There are five erythematous macules with overyling adherent scale on the nasal tip.   - There are dome shaped bright red papules on the trunk.   - Multiple regular brown pigmented macules and papules are identified on the body. About 30 nevi in all. None are atypical.  - There are waxy stuck on tan to brown papules on the trunk, face, extremities.  - There is a tan to brown waxy stuck on papule with surrounding erythema on the right forearm.   - No other lesions of concern on areas examined.      Impression/Plan:    1. Gardiner syndrome: Unknown gene (presumed carrier as patient has had colon cancer and son has been tested and showed mutation). Discussed increased risk of skin cancers, including Shyam and sebaceous carcinomas. Recommended yearly skin checks due to this.    2. Sun damaged skin with solar lentigines  - Recommend sunscreens SPF #30 or greater, protective clothing and avoidance of tanning beds.    3. Benign findings including: seborrheic keratoses, cherry angioma  - No further intervention required. Patient to report changes.   - Patient reassured of the benign nature of these lesions.    4. Multiple clinically benign nevi  - No further intervention required. Patient to report changes.   - Patient reassured of the benign nature of these lesions.    5. Actinic keratosis, nasal tip x5: Discussed precancerous nature.  - Cryotherapy procedure note: After verbal consent and discussion of risks and benefits including but no limited to dyspigmentation/scar, blister, and pain, 5 was(were) treated with 1-2mm freeze border for 2 cycles with liquid nitrogen. Post cryotherapy instructions were provided.    6. Seborrheic keratosis, symptomatic. Right forearm  - Cryotherapy procedure note: After verbal consent and discussion of risks and benefits including but no limited to dyspigmentation/scar, blister, and pain, 1 was(were) treated with 1-2mm freeze border for 2 cycles with liquid nitrogen. Post cryotherapy instructions were provided.    Follow-up in 1 year for skin check, earlier for new or changing lesions.     Staff Involved:  Staff only    Cassy Wilson MD    Department of Dermatology  [facility]: Phone: [phone], Fax:[phone]  [facility]: Phone: [phone], Fax: [phone]

## 2020-11-16 DIAGNOSIS — Z95.1 S/P CABG (CORONARY ARTERY BYPASS GRAFT): ICD-10-CM

## 2020-11-16 DIAGNOSIS — E78.5 HYPERLIPIDEMIA LDL GOAL <100: ICD-10-CM

## 2020-11-16 DIAGNOSIS — I25.10 ASCVD (ARTERIOSCLEROTIC CARDIOVASCULAR DISEASE): ICD-10-CM

## 2020-11-16 DIAGNOSIS — Z98.890 S/P AORTIC ANEURYSM REPAIR: ICD-10-CM

## 2020-11-16 DIAGNOSIS — Z86.79 S/P AORTIC ANEURYSM REPAIR: ICD-10-CM

## 2020-11-16 DIAGNOSIS — I10 HYPERTENSION GOAL BP (BLOOD PRESSURE) < 140/90: ICD-10-CM

## 2020-11-18 RX ORDER — LOSARTAN POTASSIUM 50 MG/1
TABLET ORAL
Qty: 45 TABLET | Refills: 0 | Status: SHIPPED | OUTPATIENT
Start: 2020-11-18 | End: 2021-03-16

## 2020-11-18 RX ORDER — ATORVASTATIN CALCIUM 20 MG/1
TABLET, FILM COATED ORAL
Qty: 90 TABLET | Refills: 0 | Status: SHIPPED | OUTPATIENT
Start: 2020-11-18 | End: 2021-03-16

## 2020-11-18 RX ORDER — METOPROLOL TARTRATE 25 MG/1
TABLET, FILM COATED ORAL
Qty: 180 TABLET | Refills: 0 | Status: SHIPPED | OUTPATIENT
Start: 2020-11-18 | End: 2020-12-16

## 2020-11-18 NOTE — TELEPHONE ENCOUNTER
Routing refill request to provider for review/approval because:  Labs not current:  BP, Creatinine, Potassium, LDL    EYAL UrbinaN, RN  [facility]

## 2020-12-15 ENCOUNTER — NURSE TRIAGE (OUTPATIENT)
Dept: NURSING | Facility: CLINIC | Age: 75
End: 2020-12-15

## 2020-12-15 NOTE — TELEPHONE ENCOUNTER
"Pt reports \"elevated BP readings\" over the past two weeks.    Sample readings over the past two weeks:  172/103.  168/98  156/82  Last evening -> 148/87    Had \"one dizzy spell when BP was 172/103.\"  \"This is what prompted me to start checking my BP readings.\"  NO dizziness now.  NO hypertensive symptoms at this time.    SECOND ISSUE:  \"Heart rhythm has also felt like skipping beats.\"  NOT occurring now.  Pt states \"Feeling well right now.\"    Pt agrees to in-person clinic eval per triage disposition.  No suspicion of covid symptoms per screening.  Transferred to a  for an appointment now.    Pt declines appt today, stating \"I feel fine; why do I need an appt today?\"  Discussed best to determine a plan of action with a provider when symptoms are not severe.  Pt now agrees to plan.  Appt scheduled within 24 hours per best triage timeframe combined with pt's preference.    [facility] Nurse Advisor     Additional Information    Negative: Passed out (i.e., fainted, collapsed and was not responding)    Negative: Shock suspected (e.g., cold/pale/clammy skin, too weak to stand, low BP, rapid pulse)    Negative: Difficult to awaken or acting confused (e.g., disoriented, slurred speech)    Negative: Visible sweat on face or sweat dripping down face    Negative: Unable to walk, or can only walk with assistance (e.g., requires support)    Negative: Received SHOCK from implantable cardiac defibrillator and has persisting symptoms (i.e., palpitations, lightheadedness)    Negative: Sounds like a life-threatening emergency to the triager    Negative: Chest pain    Negative: Difficulty breathing    Negative: Dizziness, lightheadedness, or weakness    Negative: Heart beating very rapidly (e.g., > 140 / minute) and present now (EXCEPTION: during exercise)    Negative: Heart beating very slowly (e.g., < 50 / minute) (EXCEPTION: athlete)    Negative: New or worsened shortness of breath with activity (dyspnea on " "exertion)    Negative: Patient sounds very sick or weak to the triager    Negative: Wearing a \"holter monitor\" or \"cardiac event monitor\"    Negative: Received SHOCK from implantable cardiac defibrillator (and now feels well)    Negative: Heart beating very rapidly (e.g., > 140 / minute) and not present now (EXCEPTION: during exercise)    Negative: Skipped or extra beat(s) and increases with exercise or exertion    Negative: Skipped or extra beat(s) and occurs 4 or more times per minute    History of heart disease (i.e., heart attack, bypass surgery, angina, angioplasty)    Age > 60 years    Protocols used: HEART RATE AND HEARTBEAT [id number]    ________________________    COVID 19 Nurse Triage Plan/Patient Instructions    Please be aware that novel coronavirus (COVID-19) may be circulating in the community. If you develop symptoms such as fever, cough, or SOB or if you have concerns about the presence of another infection including coronavirus (COVID-19), please contact your health care provider or visit [email or web address].     Disposition/Instructions    Additional COVID19 information to add for patients.   How can I protect others?  If you have symptoms (fever, cough, body aches or trouble breathing): Stay home and away from others (self-isolate) until:    At least 10 days have passed since your symptoms started, And     You ve had no fever--and no medicine that reduces fever--for 1 full day (24 hours), And      Your other symptoms have resolved (gotten better).     If you don t have symptoms, but a test showed that you have COVID-19 (you tested positive):    Stay home and away from others (self-isolate). Follow the tips under \"How do I self-isolate?\" below for 10 days (20 days if you have a weak immune system).    You don't need to be retested for COVID-19 before going back to school or work. As long as you're fever-free and feeling better, you can go back to school, work and other activities after waiting the " 10 or 20 days.     How do I self-isolate?    Stay in your own room, even for meals. Use your own bathroom if you can.     Stay away from others in your home. No hugging, kissing or shaking hands. No visitors.    Don t go to work, school or anywhere else.     Clean  high touch  surfaces often (doorknobs, counters, handles, etc.). Use a household cleaning spray or wipes. You ll find a full list on the EPA website:  www.epa.gov/pesticide-registration/list-n-disinfectants-use-against-sars-cov-2.    Cover your mouth and nose with a mask, tissue or washcloth to avoid spreading germs.    Wash your hands and face often. Use soap and water.    Caregivers in these groups are at risk for severe illness due to COVID-19:  o People 65 years and older  o People who live in a nursing home or long-term care facility  o People with chronic disease (lung, heart, cancer, diabetes, kidney, liver, immunologic)  o People who have a weakened immune system, including those who:  - Are in cancer treatment  - Take medicine that weakens the immune system, such as corticosteroids  - Had a bone marrow or organ transplant  - Have an immune deficiency  - Have poorly controlled HIV or AIDS  - Are obese (body mass index of 40 or higher)  - Smoke regularly    Caregivers should wear gloves while washing dishes, handling laundry and cleaning bedrooms and bathrooms.    Use caution when washing and drying laundry: Don t shake dirty laundry, and use the warmest water setting that you can.    For more tips, go to www.cdc.gov/coronavirus/2019-ncov/downloads/10Things.pdf.    How can I take care of myself?  1. Get lots of rest. Drink extra fluids (unless a doctor has told you not to).     2. Take Tylenol (acetaminophen) for fever or pain. If you have liver or kidney problems, ask your family doctor if it s okay to take Tylenol.     Adults can take either:     650 mg (two 325 mg pills) every 4 to 6 hours, or     1,000 mg (two 500 mg pills) every 8 hours as  needed.     Note: Don t take more than 3,000 mg in one day.   Acetaminophen is found in many medicines (both prescribed and over-the-counter medicines). Read all labels to be sure you don t take too much.     For children, check the Tylenol bottle for the right dose. The dose is based on the child s age or weight.    3. If you have other health problems (like cancer, heart failure, an organ transplant or severe kidney disease): Call your specialty clinic if you don t feel better in the next 2 days.    4. Know when to call 911: Emergency warning signs include:    Trouble breathing or shortness of breath    Pain or pressure in the chest that doesn t go away    Feeling confused like you haven t felt before, or not being able to wake up    Bluish-colored lips or face    What are the symptoms of COVID-19?     The most common symptoms are cough, fever and trouble breathing.     Less common symptoms include body aches, chills, diarrhea (loose, watery poops), fatigue (feeling very tired), headache, runny nose, sore throat and loss of smell.    COVID-19 can cause severe coughing (bronchitis) and lung infection (pneumonia).    How does it spread?     The virus may spread when a person coughs or sneezes into the air. The virus can travel about 6 feet this way, and it can live on surfaces.      Common  (household disinfectants) will kill the virus.    Who is at risk?  Anyone can catch COVID-19 if they re around someone who has the virus.    How can others protect themselves?     Stay away from people who have COVID-19 (or symptoms of COVID-19).    Wash hands often with soap and water. Or, use hand  with at least 60% alcohol.    Avoid touching the eyes, nose or mouth.     Wear a face mask when you go out in public, when sick or when caring for a sick person.    Where can I get more information?     [facility]: About COVID-19: [email or web address]    CDC: What to Do If You re Sick:  www.cdc.gov/coronavirus/2019-ncov/about/steps-when-sick.html    CDC: Ending Home Isolation: www.cdc.gov/coronavirus/2019-ncov/hcp/disposition-in-home-patients.html     CDC: Caring for Someone: www.cdc.gov/coronavirus/2019-ncov/if-you-are-sick/care-for-someone.html     [facility]: Interim Guidance for Hospital Discharge to Home: www.[facility]/diseases/coronavirus/hcp/hospdischarge.pdf    [facility] clinical trials (COVID-19 research studies): clinicalaffairs.[facility]-clinical-trials     Below are the COVID-19 hotlines at the [place] [facility] ([facility]). Interpreters are available.   o For health questions: Call 701-245-8470 or 1-257.253.3400 (7 a.m. to 7 p.m.)  o For questions about schools and childcare: Call 736-041-2591 or 1-967.154.4248 (7 a.m. to 7 p.m.)          Thank you for taking steps to prevent the spread of this virus.  o Limit your contact with others.  o Wear a simple mask to cover your cough.  o Wash your hands well and often.    [facility]: About COVID-19: [email or web address]    CDC: What to Do If You're Sick: www.cdc.gov/coronavirus/2019-ncov/about/steps-when-sick.html    CDC: Ending Home Isolation: www.cdc.gov/coronavirus/2019-ncov/hcp/disposition-in-home-patients.html     CDC: Caring for Someone: www.cdc.gov/coronavirus/2019-ncov/if-you-are-sick/care-for-someone.html     [facility]: Interim Guidance for Hospital Discharge to Home: www.[facility]/diseases/coronavirus/hcp/hospdischarge.pdf    [facility] clinical trials (COVID-19 research studies): clinicalaffairs.[facility]-clinical-trials     Below are the COVID-19 hotlines at the [place] [facility] ([facility]). Interpreters are available.   o For health questions: Call 063-484-6072 or 1-673.899.7157 (7 a.m. to 7 p.m.)  o For questions about schools and childcare: Call 695-970-7206 or 1-953.537.6868 (7 a.m. to 7 p.m.)

## 2020-12-16 ENCOUNTER — OFFICE VISIT (OUTPATIENT)
Dept: INTERNAL MEDICINE | Facility: CLINIC | Age: 75
End: 2020-12-16
Payer: COMMERCIAL

## 2020-12-16 VITALS
OXYGEN SATURATION: 95 % | RESPIRATION RATE: 16 BRPM | HEIGHT: 68 IN | WEIGHT: 192.3 LBS | TEMPERATURE: 97.5 F | BODY MASS INDEX: 29.15 KG/M2 | HEART RATE: 82 BPM | DIASTOLIC BLOOD PRESSURE: 78 MMHG | SYSTOLIC BLOOD PRESSURE: 162 MMHG

## 2020-12-16 DIAGNOSIS — I49.8 OTHER CARDIAC ARRHYTHMIA: Primary | ICD-10-CM

## 2020-12-16 DIAGNOSIS — I10 HYPERTENSION GOAL BP (BLOOD PRESSURE) < 140/90: ICD-10-CM

## 2020-12-16 DIAGNOSIS — Z98.890 S/P AORTIC ANEURYSM REPAIR: ICD-10-CM

## 2020-12-16 DIAGNOSIS — Z95.1 S/P CABG (CORONARY ARTERY BYPASS GRAFT): ICD-10-CM

## 2020-12-16 DIAGNOSIS — I48.91 ATRIAL FIBRILLATION WITH RVR (H): ICD-10-CM

## 2020-12-16 DIAGNOSIS — Z86.79 S/P AORTIC ANEURYSM REPAIR: ICD-10-CM

## 2020-12-16 DIAGNOSIS — D64.9 ANEMIA, UNSPECIFIED TYPE: ICD-10-CM

## 2020-12-16 LAB
ALBUMIN SERPL-MCNC: 3.5 G/DL (ref 3.4–5)
ALP SERPL-CCNC: 106 U/L (ref 40–150)
ALT SERPL W P-5'-P-CCNC: 22 U/L (ref 0–50)
ANION GAP SERPL CALCULATED.3IONS-SCNC: 2 MMOL/L (ref 3–14)
AST SERPL W P-5'-P-CCNC: 19 U/L (ref 0–45)
BILIRUB SERPL-MCNC: 0.3 MG/DL (ref 0.2–1.3)
BUN SERPL-MCNC: 21 MG/DL (ref 7–30)
CALCIUM SERPL-MCNC: 9.2 MG/DL (ref 8.5–10.1)
CHLORIDE SERPL-SCNC: 108 MMOL/L (ref 94–109)
CO2 SERPL-SCNC: 31 MMOL/L (ref 20–32)
CREAT SERPL-MCNC: 0.99 MG/DL (ref 0.52–1.04)
ERYTHROCYTE [DISTWIDTH] IN BLOOD BY AUTOMATED COUNT: 12.8 % (ref 10–15)
GFR SERPL CREATININE-BSD FRML MDRD: 55 ML/MIN/{1.73_M2}
GLUCOSE SERPL-MCNC: 95 MG/DL (ref 70–99)
HCT VFR BLD AUTO: 36.4 % (ref 35–47)
HGB BLD-MCNC: 11.9 G/DL (ref 11.7–15.7)
MCH RBC QN AUTO: 29.4 PG (ref 26.5–33)
MCHC RBC AUTO-ENTMCNC: 32.7 G/DL (ref 31.5–36.5)
MCV RBC AUTO: 90 FL (ref 78–100)
PLATELET # BLD AUTO: 300 10E9/L (ref 150–450)
POTASSIUM SERPL-SCNC: 3.9 MMOL/L (ref 3.4–5.3)
PROT SERPL-MCNC: 7.4 G/DL (ref 6.8–8.8)
RBC # BLD AUTO: 4.05 10E12/L (ref 3.8–5.2)
SODIUM SERPL-SCNC: 141 MMOL/L (ref 133–144)
WBC # BLD AUTO: 6.8 10E9/L (ref 4–11)

## 2020-12-16 PROCEDURE — 85027 COMPLETE CBC AUTOMATED: CPT | Performed by: INTERNAL MEDICINE

## 2020-12-16 PROCEDURE — 99214 OFFICE O/P EST MOD 30 MIN: CPT | Performed by: INTERNAL MEDICINE

## 2020-12-16 PROCEDURE — 93005 ELECTROCARDIOGRAM TRACING: CPT | Performed by: INTERNAL MEDICINE

## 2020-12-16 PROCEDURE — 80053 COMPREHEN METABOLIC PANEL: CPT | Performed by: INTERNAL MEDICINE

## 2020-12-16 PROCEDURE — 36415 COLL VENOUS BLD VENIPUNCTURE: CPT | Performed by: INTERNAL MEDICINE

## 2020-12-16 RX ORDER — METOPROLOL TARTRATE 25 MG/1
25 TABLET, FILM COATED ORAL 3 TIMES DAILY
Qty: 180 TABLET | Refills: 0 | COMMUNITY
Start: 2020-12-16 | End: 2021-03-16

## 2020-12-16 ASSESSMENT — MIFFLIN-ST. JEOR: SCORE: 1407.83

## 2020-12-16 ASSESSMENT — PAIN SCALES - GENERAL: PAINLEVEL: NO PAIN (0)

## 2020-12-16 NOTE — PROGRESS NOTES
Subjective     Marilia Bean is a 75 year old female who presents to clinic today for the following health issues:    HPI         Chief Complaint   Patient presents with     Hypertension     has hade issues with episodes of high blood pressure, heart palpations and dizziness. Has had 2 recently 1 week apart. Last one was December 1st.      Palpitations        EMR reviewed including: History of chief complaint, pertinent distant history, medications, concurrent diagnoses.             Chief Complaint:  #1   Recent increase in heart palpitations and occasional lightheadedness.  No syncope or near syncope.  Last occurred 2 weeks ago.  History of previous coronary artery bypass grafting.  History of previous atrial fibrillation.                         PAST, FAMILY,SOCIAL HISTORY:     Medical  History:   has a past medical history of Aortic aneurysm (H) (7/1/2007), Aortic aneurysm of unspecified site without mention of rupture (7/2007), Asymptomatic postmenopausal status (age-related) (natural), CAD (coronary artery disease), Family history of Gardiner syndrome, Hyperlipidemia LDL goal <100 (10/31/2010), Hypertension goal BP (blood pressure) < 140/90 (2/9/2016), Leiomyoma of uterus, unspecified, Lump or mass in breast (7/2004), Personal history of colonic polyps, Postmenopausal atrophic vaginitis (8/20/2006), Pulmonary nodule, and Pure hypercholesterolemia.     Surgical History:   has a past surgical history that includes LAPAROSCOPY, SURGICAL; CHOLECYSTECTOMY (2000); DEXA INTERPRETATION, AXIAL (12/21/01); COLONOSCOPY THRU STOMA W BIOPSY/CAUTERY TUMOR/POLYP/LESION (1999,2002); BIOPSY BREAST, PERC NEEDLE CORE, WITH IMAGING (8/17/2004); LAPAROSCOPY, SURGICAL; APPENDECTOMY (10/31/2004); Colonoscopy w/wo Brush **Performed** (12/12/2005); colonoscopy (05/07/07); colonoscopy (12/10/08); hysterectomy, hernan (12/14/09); colonoscopy (03/31/10); UPPER GI ENDOSCOPY,EXAM (03/31/10); REVISE MEDIAN N/CARPAL TUNNEL SURG (10/01/10);  Colonoscopy (10/31/2011); Colonoscopy (12/6/2013); Colonoscopy (N/A, 12/8/2015); Esophagoscopy, gastroscopy, duodenoscopy (EGD), combined (N/A, 12/8/2015); Colonoscopy (N/A, 4/26/2017); [facility] ecp with cataract surgery (Bilateral, 2015, 2016); Repair aneurysm ascending aorta (N/A, 6/5/2017); Bypass graft artery coronary (N/A, 6/5/2017); Resection Duodenal (N/A, 7/3/2019); Esophagoscopy, gastroscopy, duodenoscopy (EGD), combined (N/A, 7/31/2019); Esophagoscopy, gastroscopy, duodenoscopy (EGD), combined (N/A, 8/5/2019); Endoscopic insertion tube jejunostomy (N/A, 8/5/2019); Esophagoscopy, gastroscopy, duodenoscopy (EGD), combined (N/A, 8/12/2019); and Replace Gastrojejunostomy Tube, Percutaneous (N/A, 8/19/2019).     Social History:   reports that she has never smoked. She has never used smokeless tobacco. She reports current alcohol use. She reports that she does not use drugs.     Family History:  family history includes Breast Cancer in her sister; Cancer in her father, mother, paternal grandmother, and sister; Cancer - colorectal in her son; Diabetes in her brother, father, sister, and sister; Heart Disease in her brother, brother, father, maternal grandfather, and mother; Osteoporosis in her mother.            MEDICATIONS  Current Outpatient Medications   Medication Sig Dispense Refill     aspirin 81 MG EC tablet Take 81 mg by mouth every morning  90 tablet 3     atorvastatin (LIPITOR) 20 MG tablet TAKE ONE TABLET BY MOUTH EVERY MORNING 90 tablet 0     KRILL OIL PO        losartan (COZAAR) 50 MG tablet TAKE ONE-HALF TABLET(25MG) BY MOUTH EVERY MORNING 45 tablet 0     metoprolol tartrate (LOPRESSOR) 25 MG tablet Take 1 tablet (25 mg) by mouth 3 times daily 180 tablet 0         --------------------------------------------------------------------------------------------------------------------                              Review of Systems       LUNGS: Pt denies: cough, excess sputum, hemoptysis, or shortness of  "breath.   HEART: Pt denies: chest pain,  syncope, tachy or bradyarrhythmia.  Has had increased arrhythmia recently.  Notes irregular heartbeats but no tachycardia.   GI: Pt denies: nausea, vomiting, diarrhea, constipation, melena, or hematochezia.   NEURO: Pt denies: seizures, strokes, diplopia, weakness, paraesthesias, or paralysis.   SKIN: Pt denies: itching, rashes, discoloration, or specific lesions of concern. Denies recent hair loss.   PSYCH: The patient denies significant depression, anxiety, mood imbalance. Specifically denies any suicidal ideation.          Examination    BP (!) 162/78 (BP Location: Right arm, Patient Position: Sitting, Cuff Size: Adult Large)   Pulse 82   Temp 97.5  F (36.4  C) (Temporal)   Resp 16   Ht 1.715 m (5' 7.5\")   Wt 87.2 kg (192 lb 4.8 oz)   SpO2 95%   BMI 29.67 kg/m      Constitutional: The patient appears to be in no acute distress. The patient appears to be adequately hydrated. No acute respiratory or hemodynamic distress is noted at this time.   LUNGS: clear bilaterally, airflow is brisk, no intercostal retraction or stridor is noted. No coughing is noted during visit.   HEART:  regular without rubs, clicks, gallops, or murmurs. PMI is nondisplaced. Upstrokes are brisk. S1,S2 are heard.   GI: Abdomen is soft, without rebound, guarding or tenderness. Bowel sounds are appropriate. No renal bruits are heard.   NEURO: Pt is alert and appropriate. No neurologic lateralization is noted. Cranial nerves 2-12 are intact. Peripheral sensory and motor function are grossly normal.    SKIN:  warm and dry. No erythema, or rashes are noted. No specific lesions of concern are noted.    PSYCH: The patient appears grossly appropriate. Maintains good eye contact, does not have any jittery or atypical motion. Displays appropriate affect.         Decision Making       1. S/P CABG (coronary artery bypass graft)  Continue beta-blocker and blood pressure control  - KRILL OIL PO  - metoprolol " tartrate (LOPRESSOR) 25 MG tablet; Take 1 tablet (25 mg) by mouth 3 times daily  Dispense: 180 tablet; Refill: 0    2. S/P aortic aneurysm repair  As above.  - metoprolol tartrate (LOPRESSOR) 25 MG tablet; Take 1 tablet (25 mg) by mouth 3 times daily  Dispense: 180 tablet; Refill: 0  Last CT was in July.    3. Other cardiac arrhythmia  No arrhythmia noted at this time.  Discussed increased beta-blocker dosage for suppression of arrhythmia.  Increase metoprolol to 25 mg 3 times daily.  - EKG 12-lead complete w/read - Clinics  - Holter Monitor 48 hour Adult Pediatric; Future    4. Hypertension goal BP (blood pressure) < 140/90  As above.  Blood pressure is somewhat controlled.  Will check renal function  - **Comprehensive metabolic panel FUTURE anytime    5. Anemia, unspecified type  Rule out anemia  - **CBC with platelets FUTURE anytime    6. Atrial fibrillation with RVR (H)  Currently not in atrial fibrillation and no longer on anticoagulation                                 FOLLOW UP   I have asked the patient to make an appointment for followup with me or her primary care provider in the next 2 weeks.            I have carefully explained the diagnosis and treatment options to the patient.  The patient has displayed an understanding of the above, and all subsequent questions were answered.      DO JANN Forbes    Portions of this note were produced using [employer]  Although every attempt at real-time proof reading has been made, occasional grammar/syntax errors may have been missed.

## 2020-12-17 NOTE — RESULT ENCOUNTER NOTE
Dear Marilia, your recent test results are attached.    The chemistry panel shows a slightly decreased but stable kidney function.  Liver tests are normal.  Blood cell count is normal with resolution of the previous anemia.    You will be contacted with any outstanding results when they are available.  Feel free to contact me via the office or My Chart if you have any questions regarding the above.  Sincerely,  Fabien Garces,  FACOI

## 2020-12-21 ENCOUNTER — HOSPITAL ENCOUNTER (OUTPATIENT)
Dept: CARDIOLOGY | Facility: CLINIC | Age: 75
Discharge: HOME | End: 2020-12-21
Attending: INTERNAL MEDICINE | Admitting: INTERNAL MEDICINE
Payer: MEDICARE

## 2020-12-21 DIAGNOSIS — I49.8 OTHER CARDIAC ARRHYTHMIA: ICD-10-CM

## 2020-12-21 PROCEDURE — 93227 XTRNL ECG REC<48 HR R&I: CPT | Performed by: INTERNAL MEDICINE

## 2020-12-21 PROCEDURE — 93225 XTRNL ECG REC<48 HRS REC: CPT

## 2020-12-29 NOTE — RESULT ENCOUNTER NOTE
Dear Marilia, your recent test results are attached.    Your Holter monitor demonstrated a couple short runs of supraventricular tachycardia.  This is a harmless episode of fast heart rate.  This would most likely explain your symptoms.  I would recommend continue metoprolol 3 times daily as current.  Let us recheck your blood pressure and symptoms in the upcoming month.    You will be contacted with any outstanding results when they are available.  Feel free to contact me via the office or My Chart if you have any questions regarding the above.  Sincerely,  DO JAMIE ForbesOI

## 2021-02-16 ENCOUNTER — MYC MEDICAL ADVICE (OUTPATIENT)
Dept: INTERNAL MEDICINE | Facility: CLINIC | Age: 76
End: 2021-02-16

## 2021-02-16 DIAGNOSIS — Z86.0100 HISTORY OF COLONIC POLYPS: Primary | ICD-10-CM

## 2021-02-17 NOTE — TELEPHONE ENCOUNTER
Appointment request form completed and faxed with recent clinic notes to [facility] , they will contact patient to set up appointment. Sandra,

## 2021-03-12 DIAGNOSIS — I25.10 ASCVD (ARTERIOSCLEROTIC CARDIOVASCULAR DISEASE): ICD-10-CM

## 2021-03-12 DIAGNOSIS — I10 HYPERTENSION GOAL BP (BLOOD PRESSURE) < 140/90: ICD-10-CM

## 2021-03-12 DIAGNOSIS — Z95.1 S/P CABG (CORONARY ARTERY BYPASS GRAFT): ICD-10-CM

## 2021-03-12 DIAGNOSIS — Z86.79 S/P AORTIC ANEURYSM REPAIR: ICD-10-CM

## 2021-03-12 DIAGNOSIS — E78.5 HYPERLIPIDEMIA LDL GOAL <100: ICD-10-CM

## 2021-03-12 DIAGNOSIS — Z98.890 S/P AORTIC ANEURYSM REPAIR: ICD-10-CM

## 2021-03-16 RX ORDER — ATORVASTATIN CALCIUM 20 MG/1
TABLET, FILM COATED ORAL
Qty: 90 TABLET | Refills: 0 | Status: SHIPPED | OUTPATIENT
Start: 2021-03-16 | End: 2021-03-26

## 2021-03-16 RX ORDER — LOSARTAN POTASSIUM 50 MG/1
TABLET ORAL
Qty: 45 TABLET | Refills: 0 | Status: SHIPPED | OUTPATIENT
Start: 2021-03-16 | End: 2021-03-26

## 2021-03-16 RX ORDER — METOPROLOL TARTRATE 25 MG/1
TABLET, FILM COATED ORAL
Qty: 180 TABLET | Refills: 0 | Status: SHIPPED | OUTPATIENT
Start: 2021-03-16 | End: 2021-03-26

## 2021-03-16 NOTE — TELEPHONE ENCOUNTER
metoprolol tartrate (LOPRESSOR) 25 MG tablet [Pharmacy Med Name: METOPROLOL TARTRATE 25MG TABS] 180 tablet 0    Sig: TAKE ONE TABLET BY MOUTH TWICE A DAY   Routing refill request to provider for review/approval because:  Patient needs to be seen because it has been more than 1 year since last office visit.  BP failed protocol  T'dup for 3 months per provider request for review      atorvastatin (LIPITOR) 20 MG tablet [Pharmacy Med Name: ATORVASTATIN CALCIUM 20MG TABS] 90 tablet 0    Sig: TAKE ONE TABLET BY MOUTH EVERY MORNING   Routing refill request to provider for review/approval because:  Labs not current:  LDL last taken 0/17/2018  T'dup for 3 months per provider request for review        losartan (COZAAR) 50 MG tablet [Pharmacy Med Name: LOSARTAN POTASSIUM 50MG TABS] 45 tablet 0    Sig: TAKE ONE-HALF TABLET(25MG) BY MOUTH EVERY MORNING   Routing refill request to provider for review/approval because:  BP failed protocol  T'dup for 3 months per provider request for review    Sending to scheduling for yearly office visit due    Kim Devine RN

## 2021-03-16 NOTE — TELEPHONE ENCOUNTER
"Requested Prescriptions   Pending Prescriptions Disp Refills     metoprolol tartrate (LOPRESSOR) 25 MG tablet [Pharmacy Med Name: METOPROLOL TARTRATE 25MG TABS] 180 tablet 0     Sig: TAKE ONE TABLET BY MOUTH TWICE A DAY   Last Written Prescription Date:  12/16/2020  Last Fill Quantity: 180,  # refills: 0   Last office visit: 12/16/2020 with prescribing provider:     Future Office Visit:        Beta-Blockers Protocol Failed - 3/12/2021  5:23 PM        Failed - Blood pressure under 140/90 in past 12 months     BP Readings from Last 3 Encounters:   12/16/20 (!) 162/78   09/12/19 136/86   08/29/19 (!) 144/78           Passed - Patient is age 6 or older        Passed - Recent (12 mo) or future (30 days) visit within the authorizing provider's specialty     Patient has had an office visit with the authorizing provider or a provider within the authorizing providers department within the previous 12 mos or has a future within next 30 days. See \"Patient Info\" tab in inbasket, or \"Choose Columns\" in Meds & Orders section of the refill encounter.            Passed - Medication is active on med list           atorvastatin (LIPITOR) 20 MG tablet [Pharmacy Med Name: ATORVASTATIN CALCIUM 20MG TABS] 90 tablet 0     Sig: TAKE ONE TABLET BY MOUTH EVERY MORNING       Statins Protocol Failed - 3/12/2021  5:23 PM        Failed - LDL on file in past 12 months     Recent Labs   Lab Test 10/17/18  0956   LDL 82           Passed - No abnormal creatine kinase in past 12 months     No lab results found.           Passed - Recent (12 mo) or future (30 days) visit within the authorizing provider's specialty     Patient has had an office visit with the authorizing provider or a provider within the authorizing providers department within the previous 12 mos or has a future within next 30 days. See \"Patient Info\" tab in inRiskalyzeet, or \"Choose Columns\" in Meds & Orders section of the refill encounter.            Passed - Medication is active on med " "list        Passed - Patient is age 18 or older        Passed - No active pregnancy on record        Passed - No positive pregnancy test in past 12 months           losartan (COZAAR) 50 MG tablet [Pharmacy Med Name: LOSARTAN POTASSIUM 50MG TABS] 45 tablet 0     Sig: TAKE ONE-HALF TABLET(25MG) BY MOUTH EVERY MORNING       Angiotensin-II Receptors Failed - 3/12/2021  5:23 PM        Failed - Last blood pressure under 140/90 in past 12 months     BP Readings from Last 3 Encounters:   12/16/20 (!) 162/78   09/12/19 136/86   08/29/19 (!) 144/78           Passed - Recent (12 mo) or future (30 days) visit within the authorizing provider's specialty     Patient has had an office visit with the authorizing provider or a provider within the authorizing providers department within the previous 12 mos or has a future within next 30 days. See \"Patient Info\" tab in inbasket, or \"Choose Columns\" in Meds & Orders section of the refill encounter.          Passed - Medication is active on med list        Passed - Patient is age 18 or older        Passed - No active pregnancy on record        Passed - Normal serum creatinine on file in past 12 months     Recent Labs   Lab Test 12/16/20  1713 06/20/19  1252 06/20/19  1252   CR 0.99   < >  --    CREAT  --   --  0.8    < > = values in this interval not displayed.       Ok to refill medication if creatinine is low          Passed - Normal serum potassium on file in past 12 months     Recent Labs   Lab Test 12/16/20  1713   POTASSIUM 3.9            Passed - No positive pregnancy test in past 12 months         Routing refill requests to provider for review/approval   Kim Devine RN        "

## 2021-03-25 ENCOUNTER — OFFICE VISIT (OUTPATIENT)
Dept: FAMILY MEDICINE | Facility: CLINIC | Age: 76
End: 2021-03-25
Payer: COMMERCIAL

## 2021-03-25 ENCOUNTER — MYC MEDICAL ADVICE (OUTPATIENT)
Dept: INTERNAL MEDICINE | Facility: CLINIC | Age: 76
End: 2021-03-25

## 2021-03-25 VITALS
HEART RATE: 59 BPM | RESPIRATION RATE: 14 BRPM | OXYGEN SATURATION: 96 % | DIASTOLIC BLOOD PRESSURE: 71 MMHG | SYSTOLIC BLOOD PRESSURE: 131 MMHG | TEMPERATURE: 98 F | BODY MASS INDEX: 30.09 KG/M2 | WEIGHT: 195 LBS

## 2021-03-25 DIAGNOSIS — B37.31 YEAST INFECTION OF THE VAGINA: ICD-10-CM

## 2021-03-25 DIAGNOSIS — R35.0 URINARY FREQUENCY: ICD-10-CM

## 2021-03-25 DIAGNOSIS — N30.00 ACUTE CYSTITIS WITHOUT HEMATURIA: Primary | ICD-10-CM

## 2021-03-25 DIAGNOSIS — N89.8 VAGINAL ITCHING: ICD-10-CM

## 2021-03-25 LAB
ALBUMIN UR-MCNC: NEGATIVE MG/DL
APPEARANCE UR: ABNORMAL
BACTERIA #/AREA URNS HPF: ABNORMAL /HPF
BILIRUB UR QL STRIP: NEGATIVE
COLOR UR AUTO: YELLOW
GLUCOSE UR STRIP-MCNC: NEGATIVE MG/DL
HGB UR QL STRIP: NEGATIVE
KETONES UR STRIP-MCNC: NEGATIVE MG/DL
LEUKOCYTE ESTERASE UR QL STRIP: ABNORMAL
MUCOUS THREADS #/AREA URNS LPF: PRESENT /LPF
NITRATE UR QL: NEGATIVE
PH UR STRIP: 5 PH (ref 5–7)
RBC #/AREA URNS AUTO: 0 /HPF (ref 0–2)
SOURCE: ABNORMAL
SP GR UR STRIP: 1.02 (ref 1–1.03)
SQUAMOUS #/AREA URNS AUTO: <1 /HPF (ref 0–1)
TRANS CELLS #/AREA URNS HPF: 1 /HPF
UROBILINOGEN UR STRIP-MCNC: 0 MG/DL (ref 0–2)
WBC #/AREA URNS AUTO: 136 /HPF (ref 0–5)
WBC CLUMPS #/AREA URNS HPF: PRESENT /HPF

## 2021-03-25 PROCEDURE — 81001 URINALYSIS AUTO W/SCOPE: CPT | Performed by: PHYSICIAN ASSISTANT

## 2021-03-25 PROCEDURE — 99213 OFFICE O/P EST LOW 20 MIN: CPT | Performed by: PHYSICIAN ASSISTANT

## 2021-03-25 PROCEDURE — 87086 URINE CULTURE/COLONY COUNT: CPT | Performed by: PHYSICIAN ASSISTANT

## 2021-03-25 RX ORDER — BISACODYL 5 MG/1
TABLET, DELAYED RELEASE ORAL
COMMUNITY
Start: 2021-02-25 | End: 2021-05-25

## 2021-03-25 RX ORDER — CEPHALEXIN 500 MG/1
500 CAPSULE ORAL 2 TIMES DAILY
Qty: 14 CAPSULE | Refills: 0 | Status: SHIPPED | OUTPATIENT
Start: 2021-03-25 | End: 2021-04-01

## 2021-03-25 RX ORDER — FLUCONAZOLE 150 MG/1
150 TABLET ORAL ONCE
Qty: 1 TABLET | Refills: 0 | Status: SHIPPED | OUTPATIENT
Start: 2021-03-25 | End: 2021-03-25

## 2021-03-25 ASSESSMENT — ENCOUNTER SYMPTOMS
MYALGIAS: 1
BREAST MASS: 0
CHILLS: 0
PARESTHESIAS: 0
ABDOMINAL PAIN: 0
DYSURIA: 1
NERVOUS/ANXIOUS: 0
COUGH: 0
FEVER: 0
NAUSEA: 0
SHORTNESS OF BREATH: 0
HEADACHES: 0
HEMATOCHEZIA: 0
FREQUENCY: 1
HEMATURIA: 0
PALPITATIONS: 0
HEARTBURN: 1
CONSTIPATION: 1
ARTHRALGIAS: 1
WEAKNESS: 0
SORE THROAT: 0
DIZZINESS: 0
JOINT SWELLING: 0
EYE PAIN: 0
DIARRHEA: 0

## 2021-03-25 ASSESSMENT — ACTIVITIES OF DAILY LIVING (ADL): CURRENT_FUNCTION: NO ASSISTANCE NEEDED

## 2021-03-25 NOTE — PROGRESS NOTES
Assessment & Plan     Acute cystitis without hematuria  - cephALEXin (KEFLEX) 500 MG capsule; Take 1 capsule (500 mg) by mouth 2 times daily for 7 days    Vaginal itching    Yeast infection of the vagina  - fluconazole (DIFLUCAN) 150 MG tablet; Take 1 tablet (150 mg) by mouth once for 1 dose    Urinary frequency  - UA reflex to Microscopic ([facility]); Future  - Urine Culture Aerobic Bacterial; Future  - Urine Culture Aerobic Bacterial  - UA reflex to Microscopic ([facility])    UA with moderate leuks. Start Keflex. Will treat for yeast given her significant vaginal itching.    20 minutes spent on the date of the encounter doing chart review, patient visit and documentation     No follow-ups on file.    Magdalene Burkett PA-C  [facility]    Christa Capellan is a 75 year old who presents for the following health issues     HPI     Genitourinary - Female  Onset/Duration: 5 days, off and on  Description:   Painful urination (Dysuria): no           Frequency: YES  Blood in urine (Hematuria): no  Delay in urine (Hesitency): YES, feels like she is unable to completely empty her bladder  Intensity: mild, moderate  Progression of Symptoms:  intermittent  Accompanying Signs & Symptoms:  Fever/chills: no  Flank pain: no  Nausea and vomiting: no  Vaginal symptoms: itching  Abdominal/Pelvic Pain: YES- tightness  History:   History of frequent UTI s: no        History of kidney stones: no  Sexually Active: no  Precipitating or alleviating factors: None  Therapies tried and outcome: Increase fluid intake - has helped     Marilia presents the clinic today for evaluation of a presumed urinary tract infection.  She states that in the last 5 days or so she has had urinary frequency and after using the bathroom feels like she is unable to completely empty her bladder as she still has the urge to go even while sitting on the toilet.  She has had associated abdominal  tightness but not significant pain.  She has not had any vaginal discharge but has noted significant vaginal itching starting last night.  Her last UTI was about 3 years ago and last yeast infection was in her 20s. She has not had dysuria, fever, chills, flank pain, nausea, vomiting.    Review of Systems   ROS negative except as stated above.      Objective    /71 (BP Location: Right arm)   Pulse 59   Temp 98  F (36.7  C) (Temporal)   Resp 14   Wt 88.5 kg (195 lb)   SpO2 96%   BMI 30.09 kg/m    Body mass index is 30.09 kg/m .  Physical Exam   GENERAL: healthy, alert and no distress  RESP: lungs clear to auscultation - no rales, rhonchi or wheezes  CV: regular rate and rhythm, normal S1 S2, no S3 or S4, no murmur, click or rub  ABDOMEN: soft, nontender, no hepatosplenomegaly, no masses and bowel sounds normal  BACK: no CVA tenderness, no paralumbar tenderness    Results for orders placed or performed in visit on 03/25/21   UA reflex to Microscopic (Destiney Lorenzo; [facility])     Status: Abnormal   Result Value Ref Range    Color Urine Yellow     Appearance Urine Slightly Cloudy     Glucose Urine Negative NEG^Negative mg/dL    Bilirubin Urine Negative NEG^Negative    Ketones Urine Negative NEG^Negative mg/dL    Specific Gravity Urine 1.019 1.003 - 1.035    Blood Urine Negative NEG^Negative    pH Urine 5.0 5.0 - 7.0 pH    Protein Albumin Urine Negative NEG^Negative mg/dL    Urobilinogen mg/dL 0.0 0.0 - 2.0 mg/dL    Nitrite Urine Negative NEG^Negative    Leukocyte Esterase Urine Moderate (A) NEG^Negative    Source Midstream Urine     RBC Urine 0 0 - 2 /HPF    WBC Urine 136 (H) 0 - 5 /HPF    WBC Clumps Present (A) NEG^Negative /HPF    Bacteria Urine Few (A) NEG^Negative /HPF    Squamous Epithelial /HPF Urine <1 0 - 1 /HPF    Transitional Epi 1 (A) FEW^Few /HPF    Mucous Urine Present (A) NEG^Negative /LPF

## 2021-03-25 NOTE — PATIENT INSTRUCTIONS
Take full course of antibiotics- Keflex twice daily for 7 days  Diflucan once today.  Urine culture pending, we will call you only if culture shows resistance and change of antibiotic is required or if no growth to stop antibiotics and to follow-up with your primary care provider.  Please follow up with primary care provider in 1 week for a repeat urine test as there was blood in your urine today.  May use over the counter AZO pain relief or Uristat (phenazopyridine) for urinary burning but do not use for 24 hours before future urine tests.  Drink plenty of fluids. Limit caffeine and alcohol as these are bladder irritants.  May take tylenol or ibuprofen as needed for discomfort.   If you develop any vomiting, high fevers or lower back pain, these can be signs of a kidney infection and you should be seen in urgent care or in the ER.  Prevention of future infections by drinking cranberry juice, urination after intercourse and wiping from front to back after using the toilet.  Please follow up with primary care provider if symptoms return, if you're not improving, worsening or new symptoms or for any adverse reactions to medications.

## 2021-03-25 NOTE — TELEPHONE ENCOUNTER
Message sent to patient.     Domenica Garvey, EYALN, RN  [facility]

## 2021-03-26 ENCOUNTER — OFFICE VISIT (OUTPATIENT)
Dept: INTERNAL MEDICINE | Facility: CLINIC | Age: 76
End: 2021-03-26
Payer: COMMERCIAL

## 2021-03-26 VITALS
SYSTOLIC BLOOD PRESSURE: 130 MMHG | OXYGEN SATURATION: 100 % | DIASTOLIC BLOOD PRESSURE: 82 MMHG | WEIGHT: 190.19 LBS | RESPIRATION RATE: 18 BRPM | HEART RATE: 68 BPM | TEMPERATURE: 97.5 F | BODY MASS INDEX: 29.35 KG/M2

## 2021-03-26 DIAGNOSIS — Z11.59 ENCOUNTER FOR HEPATITIS C SCREENING TEST FOR LOW RISK PATIENT: ICD-10-CM

## 2021-03-26 DIAGNOSIS — Z12.31 ENCOUNTER FOR SCREENING MAMMOGRAM FOR BREAST CANCER: ICD-10-CM

## 2021-03-26 DIAGNOSIS — I25.10 ASCVD (ARTERIOSCLEROTIC CARDIOVASCULAR DISEASE): ICD-10-CM

## 2021-03-26 DIAGNOSIS — Z95.1 S/P CABG (CORONARY ARTERY BYPASS GRAFT): ICD-10-CM

## 2021-03-26 DIAGNOSIS — Z98.890 S/P AORTIC ANEURYSM REPAIR: ICD-10-CM

## 2021-03-26 DIAGNOSIS — N18.31 STAGE 3A CHRONIC KIDNEY DISEASE (H): ICD-10-CM

## 2021-03-26 DIAGNOSIS — Z00.00 ENCOUNTER FOR MEDICARE ANNUAL WELLNESS EXAM: Primary | ICD-10-CM

## 2021-03-26 DIAGNOSIS — I10 HYPERTENSION GOAL BP (BLOOD PRESSURE) < 140/90: ICD-10-CM

## 2021-03-26 DIAGNOSIS — E78.5 HYPERLIPIDEMIA LDL GOAL <100: ICD-10-CM

## 2021-03-26 DIAGNOSIS — C17.0 DUODENAL ADENOCARCINOMA (H): ICD-10-CM

## 2021-03-26 DIAGNOSIS — Z86.0100 HISTORY OF COLONIC POLYPS: ICD-10-CM

## 2021-03-26 DIAGNOSIS — Z86.79 S/P AORTIC ANEURYSM REPAIR: ICD-10-CM

## 2021-03-26 PROBLEM — N18.30 CHRONIC KIDNEY DISEASE, STAGE 3 (H): Status: ACTIVE | Noted: 2021-03-26

## 2021-03-26 LAB
ALBUMIN SERPL-MCNC: 3.6 G/DL (ref 3.4–5)
ALP SERPL-CCNC: 110 U/L (ref 40–150)
ALT SERPL W P-5'-P-CCNC: 20 U/L (ref 0–50)
ANION GAP SERPL CALCULATED.3IONS-SCNC: 5 MMOL/L (ref 3–14)
AST SERPL W P-5'-P-CCNC: 20 U/L (ref 0–45)
BACTERIA SPEC CULT: NO GROWTH
BILIRUB SERPL-MCNC: 0.4 MG/DL (ref 0.2–1.3)
BUN SERPL-MCNC: 20 MG/DL (ref 7–30)
CALCIUM SERPL-MCNC: 9.3 MG/DL (ref 8.5–10.1)
CHLORIDE SERPL-SCNC: 109 MMOL/L (ref 94–109)
CHOLEST SERPL-MCNC: 163 MG/DL
CO2 SERPL-SCNC: 27 MMOL/L (ref 20–32)
CREAT SERPL-MCNC: 0.89 MG/DL (ref 0.52–1.04)
CREAT UR-MCNC: 43 MG/DL
GFR SERPL CREATININE-BSD FRML MDRD: 63 ML/MIN/{1.73_M2}
GLUCOSE SERPL-MCNC: 94 MG/DL (ref 70–99)
HCV AB SERPL QL IA: NONREACTIVE
HDLC SERPL-MCNC: 57 MG/DL
LDLC SERPL CALC-MCNC: 86 MG/DL
Lab: NORMAL
MICROALBUMIN UR-MCNC: 6 MG/L
MICROALBUMIN/CREAT UR: 14.51 MG/G CR (ref 0–25)
NONHDLC SERPL-MCNC: 106 MG/DL
POTASSIUM SERPL-SCNC: 4 MMOL/L (ref 3.4–5.3)
PROT SERPL-MCNC: 7.7 G/DL (ref 6.8–8.8)
SODIUM SERPL-SCNC: 141 MMOL/L (ref 133–144)
SPECIMEN SOURCE: NORMAL
TRIGL SERPL-MCNC: 99 MG/DL

## 2021-03-26 PROCEDURE — 82043 UR ALBUMIN QUANTITATIVE: CPT | Performed by: INTERNAL MEDICINE

## 2021-03-26 PROCEDURE — 86803 HEPATITIS C AB TEST: CPT | Performed by: INTERNAL MEDICINE

## 2021-03-26 PROCEDURE — 36415 COLL VENOUS BLD VENIPUNCTURE: CPT | Performed by: INTERNAL MEDICINE

## 2021-03-26 PROCEDURE — 80061 LIPID PANEL: CPT | Performed by: INTERNAL MEDICINE

## 2021-03-26 PROCEDURE — 80053 COMPREHEN METABOLIC PANEL: CPT | Performed by: INTERNAL MEDICINE

## 2021-03-26 PROCEDURE — 99397 PER PM REEVAL EST PAT 65+ YR: CPT | Performed by: INTERNAL MEDICINE

## 2021-03-26 RX ORDER — LOSARTAN POTASSIUM 50 MG/1
TABLET ORAL
Qty: 45 TABLET | Refills: 3 | Status: SHIPPED | OUTPATIENT
Start: 2021-03-26 | End: 2022-06-08

## 2021-03-26 RX ORDER — METOPROLOL TARTRATE 25 MG/1
25 TABLET, FILM COATED ORAL 2 TIMES DAILY
Qty: 180 TABLET | Refills: 3 | Status: SHIPPED | OUTPATIENT
Start: 2021-03-26 | End: 2022-05-05

## 2021-03-26 RX ORDER — CLOSTRIDIUM TETANI TOXOID ANTIGEN (FORMALDEHYDE INACTIVATED), CORYNEBACTERIUM DIPHTHERIAE TOXOID ANTIGEN (FORMALDEHYDE INACTIVATED), BORDETELLA PERTUSSIS TOXOID ANTIGEN (GLUTARALDEHYDE INACTIVATED), BORDETELLA PERTUSSIS FILAMENTOUS HEMAGGLUTININ ANTIGEN (FORMALDEHYDE INACTIVATED), BORDETELLA PERTUSSIS PERTACTIN ANTIGEN, AND BORDETELLA PERTUSSIS FIMBRIAE 2/3 ANTIGEN 5; 2; 2.5; 5; 3; 5 [LF]/.5ML; [LF]/.5ML; UG/.5ML; UG/.5ML; UG/.5ML; UG/.5ML
INJECTION, SUSPENSION INTRAMUSCULAR
Status: ON HOLD | COMMUNITY
Start: 2020-11-17 | End: 2021-07-02

## 2021-03-26 RX ORDER — INFLUENZA A VIRUS A/MICHIGAN/45/2015 X-275 (H1N1) ANTIGEN (FORMALDEHYDE INACTIVATED), INFLUENZA A VIRUS A/SINGAPORE/INFIMH-16-0019/2016 IVR-186 (H3N2) ANTIGEN (FORMALDEHYDE INACTIVATED), INFLUENZA B VIRUS B/PHUKET/3073/2013 ANTIGEN (FORMALDEHYDE INACTIVATED), AND INFLUENZA B VIRUS B/MARYLAND/15/2016 BX-69A ANTIGEN (FORMALDEHYDE INACTIVATED) 60; 60; 60; 60 UG/.7ML; UG/.7ML; UG/.7ML; UG/.7ML
INJECTION, SUSPENSION INTRAMUSCULAR
Status: ON HOLD | COMMUNITY
Start: 2020-10-30 | End: 2021-07-02

## 2021-03-26 RX ORDER — ATORVASTATIN CALCIUM 20 MG/1
20 TABLET, FILM COATED ORAL EVERY MORNING
Qty: 90 TABLET | Refills: 3 | Status: SHIPPED | OUTPATIENT
Start: 2021-03-26 | End: 2021-11-29

## 2021-03-26 ASSESSMENT — ENCOUNTER SYMPTOMS
EYE PAIN: 0
NAUSEA: 0
BREAST MASS: 0
PARESTHESIAS: 0
SHORTNESS OF BREATH: 0
SORE THROAT: 0
MYALGIAS: 1
FREQUENCY: 1
DIZZINESS: 0
FEVER: 0
CONSTIPATION: 1
HEADACHES: 0
HEMATURIA: 0
PALPITATIONS: 0
NERVOUS/ANXIOUS: 0
HEARTBURN: 1
JOINT SWELLING: 0
CHILLS: 0
HEMATOCHEZIA: 0
WEAKNESS: 0
COUGH: 0
ARTHRALGIAS: 1
DIARRHEA: 0
DYSURIA: 1
ABDOMINAL PAIN: 0

## 2021-03-26 ASSESSMENT — ACTIVITIES OF DAILY LIVING (ADL): CURRENT_FUNCTION: NO ASSISTANCE NEEDED

## 2021-03-26 ASSESSMENT — PAIN SCALES - GENERAL: PAINLEVEL: NO PAIN (0)

## 2021-03-26 NOTE — PROGRESS NOTES
"SUBJECTIVE:   Marilia Bean is a 75 year old female who presents for Preventive Visit.    Patient has been advised of split billing requirements and indicates understanding: Yes   Are you in the first 12 months of your Medicare coverage?  No    Healthy Habits:     In general, how would you rate your overall health?  Good    Duration of exercise:  Less than 15 minutes    Do you usually eat at least 4 servings of fruit and vegetables a day, include whole grains    & fiber and avoid regularly eating high fat or \"junk\" foods?  No    Taking medications regularly:  Yes    Medication side effects:  Muscle aches    Ability to successfully perform activities of daily living:  No assistance needed    Home Safety:  Lack of grab bars in the bathroom    Hearing Impairment:  No hearing concerns    In the past 6 months, have you been bothered by leaking of urine? Yes    In general, how would you rate your overall mental or emotional health?  Good      PHQ-2 Total Score: 0    Additional concerns today:  Yes  had a UTI yesterday and was seen. Treated with keflex and diflucan, feeling better.     Had covid injection, did fine.      Taking her medications.  Palpitations in the fall when not taking metoprolol.     Heart is doing ok, not exercising.     Do you feel safe in your environment? Yes    Have you ever done Advance Care Planning? (For example, a Health Directive, POLST, or a discussion with a medical provider or your loved ones about your wishes): No, advance care planning information given to patient to review.  Patient declined advance care planning discussion at this time.    Fall risk  Fallen 2 or more times in the past year?: No  Any fall with injury in the past year?: No    Cognitive Screening   1) Repeat 3 items (Leader, Season, Table)    2) Clock draw: NORMAL  3) 3 item recall: Recalls 3 objects  Results: 3 items recalled: COGNITIVE IMPAIRMENT LESS LIKELY    Mini-CogTM Copyright S Mustapha. Licensed by the author for use " in [facility]; reprinted with permission (soob@[facility]). All rights reserved.      Do you have sleep apnea, excessive snoring or daytime drowsiness?: no    Reviewed and updated as needed this visit by clinical staff  Tobacco  Allergies               Reviewed and updated as needed this visit by Provider                Social History     Tobacco Use     Smoking status: Never Smoker     Smokeless tobacco: Never Used   Substance Use Topics     Alcohol use: Yes     Comment: rarely     Alcohol Use 3/25/2021   Prescreen: >3 drinks/day or >7 drinks/week? No   Prescreen: >3 drinks/day or >7 drinks/week? -   Current providers sharing in care for this patient include:   Patient Care Team:  Herbert Epstein MD as PCP - General (Family Practice)  Herbert Epstein MD as Assigned PCP  Michelle Vale RN as Registered Nurse (Transplant)  Care, [facility] ([facility] ([facility] ([place]  Cassy Wilson MD as Assigned Surgical Provider    The following health maintenance items are reviewed in Epic and correct as of today:  Health Maintenance Due   Topic Date Due     HEPATITIS C SCREENING  Never done     ZOSTER IMMUNIZATION (1 of 2) Never done     MAMMO SCREENING  03/14/2019     MEDICARE ANNUAL WELLNESS VISIT  09/19/2019     FALL RISK ASSESSMENT  09/19/2019     COLORECTAL CANCER SCREENING  04/30/2020     DEXA  07/29/2020     Lab work is in process  Mammogram Screening: Mammogram Screening - Patient over age 75, has elected to continue with screening.    Pertinent mammograms are reviewed under the imaging tab.    Review of Systems   Constitutional: Negative for chills and fever.   HENT: Negative for congestion, ear pain, hearing loss and sore throat.    Eyes: Negative for pain and visual disturbance.   Respiratory: Negative for cough and shortness of breath.    Cardiovascular: Negative for chest pain, palpitations and peripheral edema.   Gastrointestinal: Positive for constipation and heartburn. Negative  "for abdominal pain, diarrhea, hematochezia and nausea.   Breasts:  Negative for tenderness, breast mass and discharge.   Genitourinary: Positive for dysuria, frequency and urgency. Negative for genital sores, hematuria, pelvic pain, vaginal bleeding and vaginal discharge.   Musculoskeletal: Positive for arthralgias and myalgias. Negative for joint swelling.   Skin: Negative for rash.   Neurological: Negative for dizziness, weakness, headaches and paresthesias.   Psychiatric/Behavioral: Negative for mood changes. The patient is not nervous/anxious.      OBJECTIVE:   /82   Pulse 68   Temp 97.5  F (36.4  C) (Temporal)   Resp 18   Wt 86.3 kg (190 lb 3 oz)   SpO2 100%   Breastfeeding No   BMI 29.35 kg/m   Estimated body mass index is 29.35 kg/m  as calculated from the following:    Height as of 12/16/20: 1.715 m (5' 7.5\").    Weight as of this encounter: 86.3 kg (190 lb 3 oz).  Physical Exam  GENERAL: healthy, alert and no distress  EYES: Eyes grossly normal to inspection, PERRL and conjunctivae and sclerae normal  HENT: ear canals and TM's normal, nose and mouth without ulcers or lesions  NECK: no adenopathy, no asymmetry, masses, or scars and thyroid normal to palpation  RESP: lungs clear to auscultation - no rales, rhonchi or wheezes  CV: regular rate and rhythm, normal S1 S2, no S3 or S4, no murmur, click or rub, no peripheral edema and peripheral pulses strong  ABDOMEN: soft, nontender, no hepatosplenomegaly, no masses and bowel sounds normal  MS: no gross musculoskeletal defects noted, no edema  SKIN: no suspicious lesions or rashes  NEURO: Normal strength and tone, mentation intact and speech normal  PSYCH: mentation appears normal, affect normal/bright    Diagnostic Test Results:  Labs reviewed in Epic    ASSESSMENT / PLAN:       ICD-10-CM    1. Encounter for Medicare annual wellness exam  Z00.00 Lipid Profile   2. History of colonic polyps  Z86.010 GASTROENTEROLOGY ADULT REF PROCEDURE ONLY   3. " "Encounter for hepatitis C screening test for low risk patient  Z11.59 Hepatitis C antibody   4. Hyperlipidemia LDL goal <100  E78.5    5. Hypertension goal BP (blood pressure) < 140/90  I10 Albumin Random Urine Quantitative with Creat Ratio     Comprehensive metabolic panel   6. ASCVD (arteriosclerotic cardiovascular disease)  I25.10    7. Encounter for screening mammogram for breast cancer  Z12.31    8. 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  Her family has it she has not been tested.  She has a history of coronary disease but been stable.  She has hypertension hyperlipidemia which are treated we will check her labs.  Chronic kidney disease we will check her creatinine today.  We will order her a mammogram.    I have asked her to get a colonoscopy and she is due she may also need an EGD with her history of adenocarcinoma of her duodenum.  I sent a message in GI to see if they would do both procedures.    Patient has been advised of split billing requirements and indicates understanding: Yes  COUNSELING:  Reviewed preventive health counseling, as reflected in patient instructions       Regular exercise       Healthy diet/nutrition    Estimated body mass index is 29.35 kg/m  as calculated from the following:    Height as of 12/16/20: 1.715 m (5' 7.5\").    Weight as of this encounter: 86.3 kg (190 lb 3 oz).        She reports that she has never smoked. She has never used smokeless tobacco.      Appropriate preventive services were discussed with this patient, including applicable screening as appropriate for cardiovascular disease, diabetes, osteopenia/osteoporosis, and glaucoma.  As appropriate for age/gender, discussed screening for colorectal cancer, prostate cancer, breast cancer, and cervical cancer. Checklist reviewing preventive services available has been given to the patient.    Reviewed patients plan " of care and provided an AVS. The Basic Care Plan (routine screening as documented in Health Maintenance) for Marilia meets the Care Plan requirement. This Care Plan has been established and reviewed with the Patient.    Counseling Resources:  ATP IV Guidelines  Pooled Cohorts Equation Calculator  Breast Cancer Risk Calculator  Breast Cancer: Medication to Reduce Risk  FRAX Risk Assessment  ICSI Preventive Guidelines  Dietary Guidelines for Americans, 2010  [employer] MyPlate  ASA Prophylaxis  Lung CA Screening    Herbert Epstein MD  [facility]    Identified Health Risks:

## 2021-03-26 NOTE — PATIENT INSTRUCTIONS
Patient Education   Personalized Prevention Plan  You are due for the preventive services outlined below.  Your care team is available to assist you in scheduling these services.  If you have already completed any of these items, please share that information with your care team to update in your medical record.  Health Maintenance Due   Topic Date Due     Hepatitis C Screening  Never done     Zoster (Shingles) Vaccine (1 of 2) Never done     Mammogram  03/14/2019     Annual Wellness Visit  09/19/2019     FALL RISK ASSESSMENT  09/19/2019     Colorectal Cancer Screening  04/30/2020     Osteoporosis Screening  07/29/2020        
Admission

## 2021-04-20 ENCOUNTER — HOSPITAL ENCOUNTER (OUTPATIENT)
Dept: MAMMOGRAPHY | Facility: CLINIC | Age: 76
Discharge: HOME | End: 2021-04-20
Attending: INTERNAL MEDICINE | Admitting: INTERNAL MEDICINE
Payer: MEDICARE

## 2021-04-20 DIAGNOSIS — Z12.31 ENCOUNTER FOR SCREENING MAMMOGRAM FOR BREAST CANCER: ICD-10-CM

## 2021-04-20 PROCEDURE — 77063 BREAST TOMOSYNTHESIS BI: CPT

## 2021-04-21 ENCOUNTER — TELEPHONE (OUTPATIENT)
Dept: INTERNAL MEDICINE | Facility: CLINIC | Age: 76
End: 2021-04-21

## 2021-04-21 DIAGNOSIS — Z11.59 ENCOUNTER FOR SCREENING FOR OTHER VIRAL DISEASES: ICD-10-CM

## 2021-04-21 NOTE — TELEPHONE ENCOUNTER
----- Message from Herbert Epstein MD sent at 4/21/2021 12:17 PM CDT -----  Please make sure she has additional views set up.

## 2021-05-03 RX ORDER — SODIUM, POTASSIUM,MAG SULFATES 17.5-3.13G
2 SOLUTION, RECONSTITUTED, ORAL ORAL SEE ADMIN INSTRUCTIONS
Qty: 354 ML | Refills: 0 | Status: SHIPPED | OUTPATIENT
Start: 2021-05-03 | End: 2021-05-25

## 2021-05-04 ASSESSMENT — MIFFLIN-ST. JEOR: SCORE: 1392.39

## 2021-05-05 ENCOUNTER — HOSPITAL ENCOUNTER (OUTPATIENT)
Dept: MAMMOGRAPHY | Facility: CLINIC | Age: 76
Discharge: HOME | End: 2021-05-05
Attending: INTERNAL MEDICINE | Admitting: INTERNAL MEDICINE
Payer: MEDICARE

## 2021-05-05 DIAGNOSIS — Z11.59 ENCOUNTER FOR SCREENING FOR OTHER VIRAL DISEASES: ICD-10-CM

## 2021-05-05 DIAGNOSIS — R92.8 ABNORMAL MAMMOGRAM: ICD-10-CM

## 2021-05-05 PROCEDURE — 77065 DX MAMMO INCL CAD UNI: CPT | Mod: LT

## 2021-05-06 DIAGNOSIS — Z11.59 ENCOUNTER FOR SCREENING FOR OTHER VIRAL DISEASES: ICD-10-CM

## 2021-05-06 LAB
SARS-COV-2 RNA RESP QL NAA+PROBE: NORMAL
SPECIMEN SOURCE: NORMAL

## 2021-05-06 PROCEDURE — U0005 INFEC AGEN DETEC AMPLI PROBE: HCPCS | Performed by: INTERNAL MEDICINE

## 2021-05-06 PROCEDURE — U0003 INFECTIOUS AGENT DETECTION BY NUCLEIC ACID (DNA OR RNA); SEVERE ACUTE RESPIRATORY SYNDROME CORONAVIRUS 2 (SARS-COV-2) (CORONAVIRUS DISEASE [COVID-19]), AMPLIFIED PROBE TECHNIQUE, MAKING USE OF HIGH THROUGHPUT TECHNOLOGIES AS DESCRIBED BY CMS-2020-01-R: HCPCS | Performed by: INTERNAL MEDICINE

## 2021-05-07 LAB
LABORATORY COMMENT REPORT: NORMAL
SARS-COV-2 RNA RESP QL NAA+PROBE: NEGATIVE
SPECIMEN SOURCE: NORMAL

## 2021-05-10 ENCOUNTER — HOSPITAL ENCOUNTER (OUTPATIENT)
Facility: AMBULATORY SURGERY CENTER | Age: 76
Discharge: HOME | End: 2021-05-10
Attending: INTERNAL MEDICINE | Admitting: INTERNAL MEDICINE
Payer: COMMERCIAL

## 2021-05-10 VITALS
HEART RATE: 58 BPM | DIASTOLIC BLOOD PRESSURE: 68 MMHG | BODY MASS INDEX: 28.79 KG/M2 | SYSTOLIC BLOOD PRESSURE: 156 MMHG | WEIGHT: 190 LBS | OXYGEN SATURATION: 99 % | HEIGHT: 68 IN | TEMPERATURE: 97.7 F | RESPIRATION RATE: 16 BRPM

## 2021-05-10 DIAGNOSIS — Z12.11 SCREENING FOR COLON CANCER: Primary | ICD-10-CM

## 2021-05-10 LAB
COLONOSCOPY: NORMAL
UPPER GI ENDOSCOPY: NORMAL

## 2021-05-10 PROCEDURE — 43239 EGD BIOPSY SINGLE/MULTIPLE: CPT

## 2021-05-10 PROCEDURE — 45381 COLONOSCOPY SUBMUCOUS NJX: CPT | Mod: PT

## 2021-05-10 PROCEDURE — 88341 IMHCHEM/IMCYTCHM EA ADD ANTB: CPT | Performed by: PATHOLOGY

## 2021-05-10 PROCEDURE — G8907 PT DOC NO EVENTS ON DISCHARG: HCPCS

## 2021-05-10 PROCEDURE — 45385 COLONOSCOPY W/LESION REMOVAL: CPT

## 2021-05-10 PROCEDURE — 88305 TISSUE EXAM BY PATHOLOGIST: CPT | Performed by: PATHOLOGY

## 2021-05-10 PROCEDURE — G8918 PT W/O PREOP ORDER IV AB PRO: HCPCS

## 2021-05-10 PROCEDURE — 88342 IMHCHEM/IMCYTCHM 1ST ANTB: CPT | Performed by: PATHOLOGY

## 2021-05-10 RX ORDER — FLUMAZENIL 0.1 MG/ML
0.2 INJECTION, SOLUTION INTRAVENOUS
Status: DISCONTINUED | OUTPATIENT
Start: 2021-05-10 | End: 2021-05-11 | Stop reason: HOSPADM

## 2021-05-10 RX ORDER — LIDOCAINE 40 MG/G
CREAM TOPICAL
Status: DISCONTINUED | OUTPATIENT
Start: 2021-05-10 | End: 2021-05-12 | Stop reason: HOSPADM

## 2021-05-10 RX ORDER — NALOXONE HYDROCHLORIDE 0.4 MG/ML
0.2 INJECTION, SOLUTION INTRAMUSCULAR; INTRAVENOUS; SUBCUTANEOUS
Status: DISCONTINUED | OUTPATIENT
Start: 2021-05-10 | End: 2021-05-12 | Stop reason: HOSPADM

## 2021-05-10 RX ORDER — NALOXONE HYDROCHLORIDE 0.4 MG/ML
0.4 INJECTION, SOLUTION INTRAMUSCULAR; INTRAVENOUS; SUBCUTANEOUS
Status: DISCONTINUED | OUTPATIENT
Start: 2021-05-10 | End: 2021-05-12 | Stop reason: HOSPADM

## 2021-05-10 RX ORDER — ONDANSETRON 4 MG/1
4 TABLET, ORALLY DISINTEGRATING ORAL EVERY 6 HOURS PRN
Status: DISCONTINUED | OUTPATIENT
Start: 2021-05-10 | End: 2021-05-12 | Stop reason: HOSPADM

## 2021-05-10 RX ORDER — PROCHLORPERAZINE MALEATE 5 MG
5 TABLET ORAL EVERY 6 HOURS PRN
Status: DISCONTINUED | OUTPATIENT
Start: 2021-05-10 | End: 2021-05-12 | Stop reason: HOSPADM

## 2021-05-10 RX ORDER — ONDANSETRON 2 MG/ML
4 INJECTION INTRAMUSCULAR; INTRAVENOUS
Status: DISCONTINUED | OUTPATIENT
Start: 2021-05-10 | End: 2021-05-12 | Stop reason: HOSPADM

## 2021-05-10 RX ORDER — ONDANSETRON 2 MG/ML
4 INJECTION INTRAMUSCULAR; INTRAVENOUS EVERY 6 HOURS PRN
Status: DISCONTINUED | OUTPATIENT
Start: 2021-05-10 | End: 2021-05-12 | Stop reason: HOSPADM

## 2021-05-10 RX ORDER — FENTANYL CITRATE 50 UG/ML
INJECTION, SOLUTION INTRAMUSCULAR; INTRAVENOUS PRN
Status: DISCONTINUED | OUTPATIENT
Start: 2021-05-10 | End: 2021-05-10 | Stop reason: HOSPADM

## 2021-05-11 ENCOUNTER — NURSE TRIAGE (OUTPATIENT)
Dept: NURSING | Facility: CLINIC | Age: 76
End: 2021-05-11

## 2021-05-11 ENCOUNTER — TELEPHONE (OUTPATIENT)
Dept: GASTROENTEROLOGY | Facility: CLINIC | Age: 76
End: 2021-05-11

## 2021-05-11 DIAGNOSIS — Z91.041 HISTORY OF ALLERGY TO RADIOGRAPHIC CONTRAST MEDIA: Primary | ICD-10-CM

## 2021-05-11 DIAGNOSIS — K63.89 MASS OF COLON: Primary | ICD-10-CM

## 2021-05-11 DIAGNOSIS — D37.4 NEOPLASM OF UNCERTAIN BEHAVIOR OF COLON: ICD-10-CM

## 2021-05-11 RX ORDER — METHYLPREDNISOLONE 16 MG/1
TABLET ORAL
Qty: 4 TABLET | Refills: 0 | Status: SHIPPED | OUTPATIENT
Start: 2021-05-11 | End: 2021-05-25

## 2021-05-11 NOTE — TELEPHONE ENCOUNTER
On 5/11/2021 Pt had a colonoscopy with Dr. Franklyn Hernandez.     Pt is reporting, having left abdominal pain after the procedure.  It feels like she has been running and gotten a left side ache from running.  But has not been running.      Pt wanting to know if this is normal.  Pt would also like to know when she can get scheduled for her surgery.   Pt would also like to know which Surgeons she can have the surgery with at [facility] or [facility].      Pt requested specifically to talk to Dr. Franklyn Lambert herself.     Please call Marilia marcos @ [phone] for further assistance.       Thank you     Louise Chaudhary RN  Central Triage Red Flags/Med Refills          Reason for Disposition    [1] Caller has NON-URGENT question AND [2] triager unable to answer question    Additional Information    Negative: Shock suspected (e.g., cold/pale/clammy skin, too weak to stand, low BP, rapid pulse)    Negative: Difficult to awaken or acting confused (e.g., disoriented, slurred speech)    Negative: Passed out (i.e., lost consciousness, collapsed and was not responding)    Negative: Sounds like a life-threatening emergency to the triager    Negative: Breathing difficulty    Negative: Chest pain    Negative: [1] Abdomen pain is main concern AND [2] started > 3 days (72 hours) after colonoscopy AND [3] Female    Negative: [1] Abdomen pain is main concern AND [2] started > 3 days (72 hours) after colonoscopy AND [3] male    Negative: [1] Vomiting is main concern AND [2] started > 3 days after colonoscopy    Negative: SEVERE abdomen pain (e.g., excruciating)    Negative: SEVERE rectal bleeding (large blood clots; on and off, or constant bleeding)    Negative: SEVERE dizziness (e.g., unable to stand, requires support to walk, feels like passing out now)    Negative: SEVERE vomiting (e.g., 6 or more times/day)    Negative: [1] MODERATE rectal bleeding (small blood clots, passing blood without stool, or toilet water turns red)  AND [2] more than once    Negative: [1] MILD-MODERATE abdomen pain AND [2] constant AND [3] present > 2 hours    Negative: [1] Drinking very little AND [2] dehydration suspected (e.g., no urine > 12 hours, very dry mouth, very lightheaded)    Negative: Patient sounds very sick or weak to the triager    Negative: Fever > 100.4 F (38.0 C)    Negative: [1] Abdominal bloating, cramping, nausea, or vomiting while drinking bowel prep AND [2] CANNOT finish bowel prep AND [3] has tried recommended Care Advice    Negative: [1] Caller has URGENT question or concern AND [2] triager unable to answer question    Negative: [1] MILD-MODERATE abdomen pain (e.g., does not interfere with normal activities) AND [2] pain comes and goes (cramps) [3] lasting > 48 hours    Negative: [1] MILD to MODERATE vomiting (e.g., 1 to 5 times a day) AND [2] lasting > 24 hours    Negative: Any rectal bleeding occurring > 24 hours after colonoscopy    Protocols used: COLONOSCOPY SYMPTOMS AND [id number]

## 2021-05-11 NOTE — TELEPHONE ENCOUNTER
Franklyn Hernandez MD  You 20 minutes ago (12:11 PM)     I talked with her.  She has a likely diagnosis of colon cancer, biopsy pending.  Pain is only with walking, otherwise feels fine.   She will monitor for improvement at this time.     She needs labs and CT scan.  She can get these done in [place].  Can you help her to schedule these?     I'll also put in a referral to colorectal surgery at the [facility].      Patient contacted, offered to schedule or transfer to scheduling for CT and labs.  Patient states she will schedule, numbers for [facility] and lab provided to patient.      Meche Wolf RN

## 2021-05-11 NOTE — TELEPHONE ENCOUNTER
Franklyn Hernandez MD  You 2 hours ago (1:42 PM)     Pre-meds below were prescribed.     Medrol:  Take 32 mg by mouth 12 hours before the procedure and repeat 32 mg by mouth 2 hours before the procedure to prevent contrast reaction.   Benadryl 50mg:  Take 1 tab by mouth 1 hour prior to CT scan.     Please let me know if anything else is needed.        Patient contacted, informed that provider wishes to proceed with contrast for the CT and premedications were sent to her pharmacy.      Meche Wolf RN

## 2021-05-11 NOTE — TELEPHONE ENCOUNTER
Patient contacted, reports that many years ago when she had a CT done she experienced tingling in her mouth and they advised that she is allergic to the contrast.  She has since had CT's performed without contrast, but has had CT with contrast with a premed, as well.  If contrast is recommended and patient requires premedication, patient uses the [facility] in [place].     Meche Wolf RN

## 2021-05-11 NOTE — TELEPHONE ENCOUNTER
[facility] Call Center    Phone Message    May a detailed message be left on voicemail: yes     Reason for Call: Pt is scheduled for CT that Dr. Hernandez ordered.  She is scheduled on 5/18 at the [facility].  Pt said that she is allergic to the contrast that is used.  She wants to know if Dr. Hernandez would like to order a CT with out contrast or if he can prescribe a medication so she can have the contrast.  Please call Pt back to discuss. Thanks.    Action Taken: Message routed to:  Adult Clinics: Gastroenterology (GI) p [id number]    Travel Screening: Not Applicable

## 2021-05-13 ENCOUNTER — TELEPHONE (OUTPATIENT)
Dept: GASTROENTEROLOGY | Facility: CLINIC | Age: 76
End: 2021-05-13

## 2021-05-13 NOTE — TELEPHONE ENCOUNTER
[facility] Call Center    Phone Message    May a detailed message be left on voicemail: yes     Reason for Call: Other: pt calling and wondering how ergent her consult is for surgery, please advise with her     Action Taken: Message routed to:  Adult Clinics: Gastroenterology (GI) p [id number]    Travel Screening: Not Applicable

## 2021-05-13 NOTE — TELEPHONE ENCOUNTER
Patient scheduled with colon & rectal surgery on 5/25/2021.  Forwarding to provider to advise on appropriateness of timing for appointment.      Meche Wolf RN

## 2021-05-14 LAB — COPATH REPORT: NORMAL

## 2021-05-14 NOTE — TELEPHONE ENCOUNTER
RECORDS RECEIVED FROM:    DATE RECEIVED:    NOTES STATUS DETAILS   OFFICE NOTE from referring provider  Internal Franklyn Hernandez MD   OFFICE NOTE from other specialist   N/A    DISCHARGE SUMMARY from hospital  Internal    DISCHARGE REPORT from the ER N/A    OPERATIVE REPORT  Internal See Procedure Notes Below   MEDICATION LIST Internal    LABS     PFC TESTING N/A    ANAL PAP N/A    BIOPSIES/PATHOLOGY RELATED TO DIAGNOSIS Internal 5/10/2021   A. STOMACH, ANTRUM AND BODY, BIOPSY:   - Gastric body and antral type mucosa with chronic inactive gastritis   - Negative for intestinal metaplasia and dysplasia     B. ASCENDING COLON, POLYPS, POLYPECTOMY X2:   - Tubular adenoma(s) in multiple fragments; negative for high grade   dysplasia   - Strips of unremarkable colonic mucosa     C. ASCENDING COLON, MASS, BIOPSY:   - Adenocarcinoma, moderately differentiated    DIAGNOSTIC PROCEDURES     COLONOSCOPY Internal 5/10/2021 Colonoscopy    UPPER ENDOSCOPY (EGD) Internal 5/10/2021, 8/19/2019, 7/15/2019 more in EPIC   FLEX SIGMOIDOSCOPY  N/A    ERCP N/A    IMAGING (DISC & REPORT)      CT  Internal CT Chest Abdomen Pelvis 5/18/2021   MRI N/A    XRAY N/A    ULTRASOUND (ENDOANAL/ENDORECTAL) N/A

## 2021-05-14 NOTE — TELEPHONE ENCOUNTER
C/o L arm pain x2 weeks. No other complaints.    Franklyn Hernandez MD  You 19 hours ago (4:36 PM)     5/25 seems reasonable for this issue.        Patient contacted, informed that provider advised that the timing of the appointment is fine.  Patient states relief, as she could have had a sooner appointment but had a particular provider in mind that she was hoping to schedule with and the first available was on 5/25/2021.      Meche Wolf RN

## 2021-05-15 NOTE — PROGRESS NOTES
CLINICAL NUTRITION SERVICES -  BRIEF NOTE     Nutrition Prescription    RECOMMENDATIONS FOR MDs/PROVIDERS TO ORDER:  Recommend to continue giving pt MIVF's with tapering of PN with transition to TF since H20 flushes are only for tube patency during adv to goal rate.    Recommendations already ordered by Registered Dietitian (RD):  - Continue TF orders to adv TF rate by 10 ml every 8 hrs to goal of 50 ml/hr pending pt's tolerance.  - Lipids discontinued this am and remaining PN formula to run out at 8 pm tonight and then discontinue.          F/U with pt regarding tolerance of initiation and adv of TF towards goal and tapering of PN. Per chart review, TF initiated via NJT yesterday at 1315 @ 10 ml/hr with adv to 20 ml/hr at 0130 this am. However, TF rate decreased to 10 ml/hr at 0500 d/t nausea. Per visit with pt this noon, pt reports tolerating TF at current rate without further complaints of nausea.   Intervention:  Reviewed with pt, plan to wean off PN this evening pending ongoing tolerance of TF adv to goal rate of 50 ml/hr. Pt receptive to plan.   Gay Poole RD,LD  Unit pager [phone]     · Chronic hyponatremia Sodium is 130   · Trend BMP  ·  Encourage oral intake

## 2021-05-17 ENCOUNTER — ANCILLARY PROCEDURE (OUTPATIENT)
Dept: MAMMOGRAPHY | Facility: CLINIC | Age: 76
End: 2021-05-17
Attending: INTERNAL MEDICINE
Payer: COMMERCIAL

## 2021-05-17 DIAGNOSIS — R92.8 ABNORMAL MAMMOGRAM: ICD-10-CM

## 2021-05-17 PROCEDURE — 19081 BX BREAST 1ST LESION STRTCTC: CPT | Mod: LT | Performed by: RADIOLOGY

## 2021-05-17 PROCEDURE — 88305 TISSUE EXAM BY PATHOLOGIST: CPT | Performed by: RADIOLOGY

## 2021-05-17 PROCEDURE — 88360 TUMOR IMMUNOHISTOCHEM/MANUAL: CPT | Performed by: RADIOLOGY

## 2021-05-17 RX ORDER — LIDOCAINE HYDROCHLORIDE 10 MG/ML
9 INJECTION, SOLUTION EPIDURAL; INFILTRATION; INTRACAUDAL; PERINEURAL ONCE
Status: COMPLETED | OUTPATIENT
Start: 2021-05-17 | End: 2021-05-17

## 2021-05-17 RX ORDER — LIDOCAINE HYDROCHLORIDE AND EPINEPHRINE 10; 10 MG/ML; UG/ML
16 INJECTION, SOLUTION INFILTRATION; PERINEURAL ONCE
Status: COMPLETED | OUTPATIENT
Start: 2021-05-17 | End: 2021-05-17

## 2021-05-17 RX ADMIN — LIDOCAINE HYDROCHLORIDE 9 ML: 10 INJECTION, SOLUTION EPIDURAL; INFILTRATION; INTRACAUDAL; PERINEURAL at 11:25

## 2021-05-17 RX ADMIN — LIDOCAINE HYDROCHLORIDE AND EPINEPHRINE 16 ML: 10; 10 INJECTION, SOLUTION INFILTRATION; PERINEURAL at 11:25

## 2021-05-18 ENCOUNTER — HOSPITAL ENCOUNTER (OUTPATIENT)
Dept: CT IMAGING | Facility: CLINIC | Age: 76
Discharge: HOME | End: 2021-05-18
Attending: INTERNAL MEDICINE | Admitting: INTERNAL MEDICINE
Payer: MEDICARE

## 2021-05-18 DIAGNOSIS — K63.89 MASS OF COLON: ICD-10-CM

## 2021-05-18 DIAGNOSIS — D37.4 NEOPLASM OF UNCERTAIN BEHAVIOR OF COLON: ICD-10-CM

## 2021-05-18 LAB
ALBUMIN SERPL-MCNC: 3.7 G/DL (ref 3.4–5)
ALP SERPL-CCNC: 103 U/L (ref 40–150)
ALT SERPL W P-5'-P-CCNC: 21 U/L (ref 0–50)
ANION GAP SERPL CALCULATED.3IONS-SCNC: 4 MMOL/L (ref 3–14)
AST SERPL W P-5'-P-CCNC: 16 U/L (ref 0–45)
BASOPHILS # BLD AUTO: 0 10E9/L (ref 0–0.2)
BASOPHILS NFR BLD AUTO: 0.2 %
BILIRUB SERPL-MCNC: 0.4 MG/DL (ref 0.2–1.3)
BUN SERPL-MCNC: 19 MG/DL (ref 7–30)
CALCIUM SERPL-MCNC: 9.3 MG/DL (ref 8.5–10.1)
CEA SERPL-MCNC: 4.6 UG/L (ref 0–2.5)
CHLORIDE SERPL-SCNC: 107 MMOL/L (ref 94–109)
CO2 SERPL-SCNC: 28 MMOL/L (ref 20–32)
CREAT BLD-MCNC: 0.9 MG/DL (ref 0.52–1.04)
CREAT SERPL-MCNC: 0.89 MG/DL (ref 0.52–1.04)
DIFFERENTIAL METHOD BLD: NORMAL
EOSINOPHIL # BLD AUTO: 0 10E9/L (ref 0–0.7)
EOSINOPHIL NFR BLD AUTO: 0 %
ERYTHROCYTE [DISTWIDTH] IN BLOOD BY AUTOMATED COUNT: 14 % (ref 10–15)
GFR SERPL CREATININE-BSD FRML MDRD: 61 ML/MIN/{1.73_M2}
GFR SERPL CREATININE-BSD FRML MDRD: 63 ML/MIN/{1.73_M2}
GLUCOSE SERPL-MCNC: 195 MG/DL (ref 70–99)
HCT VFR BLD AUTO: 42.2 % (ref 35–47)
HGB BLD-MCNC: 13.7 G/DL (ref 11.7–15.7)
IMM GRANULOCYTES # BLD: 0 10E9/L (ref 0–0.4)
IMM GRANULOCYTES NFR BLD: 0.3 %
LYMPHOCYTES # BLD AUTO: 0.8 10E9/L (ref 0.8–5.3)
LYMPHOCYTES NFR BLD AUTO: 12.7 %
MCH RBC QN AUTO: 28.1 PG (ref 26.5–33)
MCHC RBC AUTO-ENTMCNC: 32.5 G/DL (ref 31.5–36.5)
MCV RBC AUTO: 87 FL (ref 78–100)
MONOCYTES # BLD AUTO: 0 10E9/L (ref 0–1.3)
MONOCYTES NFR BLD AUTO: 0.5 %
NEUTROPHILS # BLD AUTO: 5.1 10E9/L (ref 1.6–8.3)
NEUTROPHILS NFR BLD AUTO: 86.3 %
NRBC # BLD AUTO: 0 10*3/UL
NRBC BLD AUTO-RTO: 0 /100
PLATELET # BLD AUTO: 308 10E9/L (ref 150–450)
POTASSIUM SERPL-SCNC: 4.1 MMOL/L (ref 3.4–5.3)
PROT SERPL-MCNC: 7.9 G/DL (ref 6.8–8.8)
RBC # BLD AUTO: 4.87 10E12/L (ref 3.8–5.2)
SODIUM SERPL-SCNC: 139 MMOL/L (ref 133–144)
WBC # BLD AUTO: 5.9 10E9/L (ref 4–11)

## 2021-05-18 PROCEDURE — 71260 CT THORAX DX C+: CPT

## 2021-05-18 PROCEDURE — 250N000009 HC RX 250: Performed by: INTERNAL MEDICINE

## 2021-05-18 PROCEDURE — 82378 CARCINOEMBRYONIC ANTIGEN: CPT | Performed by: INTERNAL MEDICINE

## 2021-05-18 PROCEDURE — 36415 COLL VENOUS BLD VENIPUNCTURE: CPT | Performed by: INTERNAL MEDICINE

## 2021-05-18 PROCEDURE — 250N000011 HC RX IP 250 OP 636: Performed by: INTERNAL MEDICINE

## 2021-05-18 PROCEDURE — 85025 COMPLETE CBC W/AUTO DIFF WBC: CPT | Performed by: INTERNAL MEDICINE

## 2021-05-18 PROCEDURE — 82565 ASSAY OF CREATININE: CPT | Performed by: FAMILY MEDICINE

## 2021-05-18 PROCEDURE — 80053 COMPREHEN METABOLIC PANEL: CPT | Performed by: INTERNAL MEDICINE

## 2021-05-18 RX ORDER — IOPAMIDOL 755 MG/ML
100 INJECTION, SOLUTION INTRAVASCULAR ONCE
Status: COMPLETED | OUTPATIENT
Start: 2021-05-18 | End: 2021-05-18

## 2021-05-18 RX ADMIN — SODIUM CHLORIDE 60 ML: 9 INJECTION, SOLUTION INTRAVENOUS at 08:36

## 2021-05-18 RX ADMIN — IOPAMIDOL 93 ML: 755 INJECTION, SOLUTION INTRAVENOUS at 08:36

## 2021-05-20 ENCOUNTER — TELEPHONE (OUTPATIENT)
Dept: GENERAL RADIOLOGY | Facility: CLINIC | Age: 76
End: 2021-05-20

## 2021-05-20 ENCOUNTER — TELEPHONE (OUTPATIENT)
Dept: GASTROENTEROLOGY | Facility: CLINIC | Age: 76
End: 2021-05-20

## 2021-05-20 DIAGNOSIS — D05.12 DUCTAL CARCINOMA IN SITU (DCIS) OF LEFT BREAST: Primary | ICD-10-CM

## 2021-05-20 LAB — COPATH REPORT: NORMAL

## 2021-05-20 NOTE — TELEPHONE ENCOUNTER
Spoke to patient regarding Left breast biopsy done on 5/17/21 with finding of Ductal carcinoma in situ (DCIS) . Notified patient that the Radiologist recommendation is Surgical consultation. Surgical referral placed and  will call patient to schedule. Patient verbalized understanding and all questions and concerns answered to patients satisfaction.

## 2021-05-20 NOTE — TELEPHONE ENCOUNTER
Writer called patient to follow up on results sent to patient by Dr Hernandez.   Patient admitted to sitting down during  writers call to read the MyChart message sent by Dr. Hernandez.   Writer read result notes to patient to confirm patient had recieved the message.  Patient will consult with the colorectal surgeon about polyp that was left by Dr Hernandez to be removed by colorectal surgeon.  Patient seemed happy and energetic and had no further questions.  Daniel Staton, CMA

## 2021-05-24 NOTE — PROGRESS NOTES
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 She presents with her , Elroy, daughter in law Gay who works in PACU at [employer], and her daughter Rowan was on video.    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 Campo.     Colonoscopy 5/10/21 with Dr. Hernandez:  Findings:        The perianal and digital rectal examinations were normal.        The terminal ileum appeared normal.        Two sessile polyps were found in the ascending colon. The polyps were 3        to 6 mm in size. These polyps were removed with a cold snare. Resection        and retrieval were complete. Verification of patient identification for        the specimen was done. Estimated blood loss was minimal.        A sessile non-obstructing medium-sized mass was found in the ascending        colon. The mass was non-circumferential. The mass measured two cm in        length. In addition, its diameter measured twenty mm. No bleeding was        present. This was biopsied with a cold forceps for histology.        Verification of patient identification for the specimen was done. Area        distal to the mass was tattooed with an injection of 5 mL of Spot        (carbon black) on two separate walls.        A 20 mm polyp was found in the transverse colon. The polyp was        pedunculated. Polypectomy was not attempted due to previously identified        malignancy. Area distal to the polyp was tattooed with an injection of 5        mL of Spot (carbon black) on two separate walls.        Many small-mouthed diverticula were found in the sigmoid colon.        The retroflexed view of the distal rectum and anal verge was normal and        showed no anal or  rectal abnormalities.       EGD 5/10/21:  Impression:               - Normal exam of the esophagus.                             - Post-surgical deformity in the gastric body                             consistent with prior PEG site.                             - Widely patent duodenoenterostomy, characterized                             by healthy appearing mucosa was found.                             - Suture material noted in the region of the                             anastomosis.     Pathology:  - DNA mismatch repair proteins (MMR) IHC panel was performed on block C1   with appropriate controls.     RESULT:  ABSENCE OF MSH2 (with secondary loss of MSH6)     MLH1       MSH2     MSH6      PMS2   Present    Absent    Absent     Present     FINAL DIAGNOSIS:   A. STOMACH, ANTRUM AND BODY, BIOPSY:   - Gastric body and antral type mucosa with chronic inactive gastritis   - Negative for intestinal metaplasia and dysplasia     B. ASCENDING COLON, POLYPS, POLYPECTOMY X2:   - Tubular adenoma(s) in multiple fragments; negative for high grade   dysplasia   - Strips of unremarkable colonic mucosa     C. ASCENDING COLON, MASS, BIOPSY:   - Adenocarcinoma, moderately differentiated     CT CAP 5/18/21:  IMPRESSION:  1.  The reported colon mass is not well visualized by CT.  2.  No CT evidence of metastatic disease in the chest, abdomen or  pelvis.  3.  Multiple small pulmonary nodules are not significantly changed  since 7/2/2019.  4.  Additional incidental findings, as detailed in the body of the  Report.    CEA 4.6    Albumin 3.7     She denies any change in bowel habits. She has constipation and diarrhea but this has not changed. Her appetite is good. No weight loss.     She was just diagnosed with breast cancer this Thursday and is meeting with Dr. Toro next week.    Family History: Sister, two sons, daughter, and grandson all with Gardiner syndrome. Mother with colon cancer but she reports that Gardiner was traced to her  "father's side of the family. Sister with breast cancer in her 60s. No family history of ovarian cancer.    Surgical History: ELVIN with BSO for fibroids, appy , cholecystectomy, ascending aneurysm repair and CABG with Dr. Soto in 2017    OB History: vaginal deliveries X4 of \"big babies\" without tearing or episiotomies. No fecal incontinence.     Physical examination:  Examination was chaperoned by Danae Mao NP.     Vitals: /80 (BP Location: Left arm, Patient Position: Sitting, Cuff Size: Adult Regular)   Pulse 55   Temp 97.9  F (36.6  C) (Oral)   Ht 5' 7.5\"   Wt 189 lb 3.2 oz   SpO2 97%   BMI 29.20 kg/m    BMI= Body mass index is 29.2 kg/m .    Alert, oriented, in no acute distress. Abdomen with multiple well healed scars present.     Laboratory data:    Recent Labs   Lab Test 05/18/21  0802 08/12/19  0545 08/12/19  0545   WBC 5.9   < > 9.3   HGB 13.7   < > 9.8*      < > 374   CR 0.89   < > 0.78   ALBUMIN 3.7   < > 2.8*   BILITOTAL 0.4   < > 0.8   ALKPHOS 103   < > 240*   ALT 21   < > 98*   AST 16   < > 58*   INR  --   --  1.12    < > = values in this interval not displayed.       Assessment/plan:  I had a long discussion with Marilia and her family regarding treatment options. We discussed risk of developing other colon cancers with Gardiner syndrome. Because of this, discussed that we typically recommend a total abdominal colectomy with ileorectal anastomosis. However, given her age and risk of altered continence, we discussed doing a subtotal colectomy with ileosigmoid anastomosis to preserve some function but discussed that she would continue to need yearly endoscopic surveillance. She is in agreement with this plan. We can attempt this laparoscopically but given her past abdominal surgical history, would likely need to be open. Will have her first meet with Dr. Toro to determine plan for her newly diagnosed breast cancer and we can see if these surgeries could be coordinated. Would " like her to meet with her cardiologist or we can get her set up with cardiology here. Will need to meet with our perioperative assessment center prior to surgery also.  I recommended she meet with a genetic counselor and she will also need continued EGD surveillance. Discussed genetic testing for family members who have not yet been tested also. Patient's and family's questions were answered to their stated satisfaction and they are in agreement with this plan.      For details of past medical history, surgical history, family history, medications, allergies, and review of systems, please see details below.    Medical history:  Past Medical History:   Diagnosis Date     Aortic aneurysm (H) 07/01/2007    see 7/07 [place] report -follow yearly, treat high BP is occurs Problem list name updated by automated process. Provider to review     Aortic aneurysm of unspecified site without mention of rupture 07/2007    4.4 cm ascending aorta noted in 2007     Asymptomatic postmenopausal status (age-related) (natural)     on HRT  Prempro -weaning off 5/'2004     CAD (coronary artery disease)     LAD     Family history of Gardiner syndrome      Hyperlipidemia LDL goal <100 10/31/2010     Hypertension goal BP (blood pressure) < 140/90 02/09/2016     Leiomyoma of uterus, unspecified     Uterine fibroid     Lump or mass in breast 07/2004    rt. nodule - bx neg     Gardiner syndrome      Personal history of colonic polyps      Postmenopausal atrophic vaginitis 08/20/2006     Pulmonary nodule     incidental noted in 2007 during [place]     Pure hypercholesterolemia     mild, diet - low cholesterol, low fat.10/06 start Lovastatin       Surgical history:  Past Surgical History:   Procedure Laterality Date     BYPASS GRAFT ARTERY CORONARY N/A 6/5/2017    Procedure: BYPASS GRAFT ARTERY CORONARY;;  Surgeon: Akshat Soto MD;  [facility]: [facility]     C DEXA INTERPRETATION, AXIAL  12/21/01    wnl. 7/2005 wnl but lower     COLONOSCOPY  05/07/07     Repeat in 1 year for surveillance     COLONOSCOPY  12/10/08    repeat 1 year  -see 1/2009 letter     COLONOSCOPY  03/31/10     COLONOSCOPY  10/31/2011    Procedure:COMBINED COLONOSCOPY, SINGLE BIOPSY/POLYPECTOMY BY BIOPSY; colonoscopy with polypectomy by biopsy; Surgeon:JESSICA GARCIA; Location:[facility]     COLONOSCOPY  12/6/2013    Procedure: COMBINED COLONOSCOPY, SINGLE BIOPSY/POLYPECTOMY BY BIOPSY;  Colonoscopy, Polypectomies;  Surgeon: Herbert Salinas MD;  Location: [facility]     COLONOSCOPY N/A 12/8/2015    Procedure: COLONOSCOPY;  Surgeon: Jared Carbajal MD;  Location: [facility]     COLONOSCOPY N/A 4/26/2017    Procedure: COMBINED COLONOSCOPY, SINGLE OR MULTIPLE BIOPSY/POLYPECTOMY BY BIOPSY;  Colonoscopy with polypectomies with forceps and snare;  Surgeon: Herbert Salinas MD;  [facility]: [facility]     COLONOSCOPY N/A 5/10/2021    Procedure: Colonoscopy, With Polypectomy And Biopsy;  Surgeon: Franklyn Hernandez MD;  Location: [facility]     COLONOSCOPY WITH CO2 INSUFFLATION N/A 5/10/2021    Procedure: COLONOSCOPY, WITH CO2 INSUFFLATION;  Surgeon: Franklyn Hernandez MD;  [facility]: [facility]     COMBINED ESOPHAGOSCOPY, GASTROSCOPY, DUODENOSCOPY (EGD) WITH CO2 INSUFFLATION N/A 5/10/2021    Procedure: ESOPHAGOGASTRODUODENOSCOPY, WITH CO2 INSUFFLATION;  Surgeon: Franklyn Hernandez MD;  [facility]: [facility]     ENDOSCOPIC INSERTION TUBE JEJUNOSTOMY N/A 8/5/2019    Procedure: Gastrojejunostomy tube placement with gastropexy;  Surgeon: Ford Mann MD;  [facility]     ESOPHAGOSCOPY, GASTROSCOPY, DUODENOSCOPY (EGD), COMBINED N/A 12/8/2015    Procedure: COMBINED ESOPHAGOSCOPY, GASTROSCOPY, DUODENOSCOPY (EGD), BIOPSY SINGLE OR MULTIPLE;  Surgeon: Jared Carbajal MD;  Location:  GI     ESOPHAGOSCOPY, GASTROSCOPY, DUODENOSCOPY (EGD), COMBINED N/A 7/31/2019    Procedure: Endoscopic ultrasound with cystoduodenostomy, stent placement x2, stent dilation, and nasojejunal tube placement;   Surgeon: Ford Mann MD;  [facility]     ESOPHAGOSCOPY, GASTROSCOPY, DUODENOSCOPY (EGD), COMBINED N/A 8/5/2019    Procedure: ESOPHAGOGASTRODUODENOSCOPY (EGD);  Surgeon: Ford Mann MD;  [facility]     ESOPHAGOSCOPY, GASTROSCOPY, DUODENOSCOPY (EGD), COMBINED N/A 8/12/2019    Procedure: ESOPHAGOGASTRODUODENOSCOPY (EGD) with GJ exchange, duodenum stent removal.;  Surgeon: Ford Mann MD;  [facility]     ESOPHAGOSCOPY, GASTROSCOPY, DUODENOSCOPY (EGD), COMBINED N/A 5/10/2021    Procedure: Esophagogastroduodenoscopy, With Biopsy;  Surgeon: Franklyn Hernandez MD;  [facility]     HC COLONOSCOPY THRU STOMA W BIOPSY/CAUTERY TUMOR/POLYP/LESION  1999,2002 2004 polyp - hyperplastic - repeat 5 years ?     HC COLONOSCOPY W/WO BRUSH/WASH  12/12/2005    Polypectomy.  Diverticulosis-minimal. Bx adenomatous and mucosal polyps - repeat 1 year     HC ECP WITH CATARACT SURGERY Bilateral 2015, 2016     HC LAPAROSCOPY, SURGICAL; APPENDECTOMY  10/31/2004     HC LAPAROSCOPY, SURGICAL; CHOLECYSTECTOMY  2000    Cholecystectomy, Laparoscopic     HC REVISE MEDIAN N/CARPAL TUNNEL SURG  10/01/10    left     HYSTERECTOMY, ELVIN  12/14/09    TAHBSO, [facility]     REPAIR ANEURYSM ASCENDING AORTA N/A 6/5/2017    Procedure: REPAIR ANEURYSM ASCENDING AORTA;  Median Sternotomy, Ascending Aortic Aneurysm Repair, Coronary Artery Bypass Graft x1 on pump oxygenator;  Surgeon: Akshat Soto MD;  [facility]     REPLACE GASTROJEJUNOSTOMY TUBE, PERCUTANEOUS N/A 8/19/2019    Procedure: REPLACEMENT, GASTROJEJUNOSTOMY TUBE;  Surgeon: Robert Tafoya MD;  [facility]     RESECTION DUODENAL N/A 7/3/2019    Procedure: Resection of Distal Duodenal and proximal Jejunum;  Surgeon: Joaquin Brito MD;  [facility]     ZZHC BIOPSY BREAST, PERC NEEDLE CORE, WITH IMAGING  8/17/2004    Right     [facility] ENDOSCOPY, SIMPLE EXAM  03/31/10       Family history:  Family History  "  Problem Relation Age of Onset     Cancer Mother         Colon Cancer     Heart Disease Mother         MI     Osteoporosis Mother      Cancer Father         Colon Cancer     Heart Disease Father         Heart Disease/ Heart attacks     Diabetes Father         Adult Onset     Cancer Sister         Colon Cancer     Heart Disease Brother         Heart attacks at age 45     Breast Cancer Sister      Cancer Paternal Grandmother         Unknown type     Heart Disease Maternal Grandfather         MI     Diabetes Sister         Adult Onset     Diabetes Sister         Adult Onset     Diabetes Brother         Adult Onset     Heart Disease Brother         heart attack age 64     Cancer - colorectal Son         age 36, Gardiner syndrome       Medications:  Current Outpatient Medications   Medication Sig Dispense Refill     ADACEL 5-2-15.5 LF-MCG/0.5 injection        aspirin 81 MG EC tablet Take 81 mg by mouth every morning  90 tablet 3     atorvastatin (LIPITOR) 20 MG tablet Take 1 tablet (20 mg) by mouth every morning 90 tablet 3     FLUZONE HIGH-DOSE QUADRIVALENT 0.7 ML KOBE injection        losartan (COZAAR) 50 MG tablet TAKE ONE-HALF TABLET(25MG) BY MOUTH EVERY MORNING 45 tablet 3     magnesium citrate solution Take 296 mLs by mouth once for 1 dose Start at 8 pm night before surgery, unless otherwise stated in \"Getting Ready for Surgery\" instruction 296 mL 0     metoprolol tartrate (LOPRESSOR) 25 MG tablet Take 1 tablet (25 mg) by mouth 2 times daily 180 tablet 3     metroNIDAZOLE (FLAGYL) 500 MG tablet Take 1 tablet (500 mg) by mouth every 6 hours At 8:00 am, 2:00 pm, 8:00 pm the day prior to your surgery with neomycin and zofran. 3 tablet 0     neomycin (MYCIFRADIN) 500 MG tablet Take 2 tablets (1,000 mg) by mouth every 6 hours At 8:00 am, 2:00 pm, 8:00 pm the day prior to your surgery with flagyl and zofran. 6 tablet 0     ondansetron (ZOFRAN) 4 MG tablet Take 1 tablet (4 mg) by mouth every 6 hours At 8:00 am, 2:00 pm, " "8:00 pm the day prior to your surgery with neomycin and flagyl. 3 tablet 0     polyethylene glycol (MIRALAX) 17 g packet Take 238 g by mouth See Admin Instructions Starting at 4 pm night prior to surgery. Refer to \"Getting Ready for Surgery\" instructions. 14 packet 0       Allergies:  The patientis allergic to contrast dye and morphine.    Social history:  Social History     Tobacco Use     Smoking status: Never Smoker     Smokeless tobacco: Never Used   Substance Use Topics     Alcohol use: Yes     Comment: rarely     Marital status: .    Review of Systems:  Nursing Notes:   Janeen Rebollar CMA  5/25/2021 10:07 AM  Signed  Chief Complaint   Patient presents with     New Patient     New colon cancer       Vitals:    05/25/21 0859   BP: 128/80   BP Location: Left arm   Patient Position: Sitting   Cuff Size: Adult Regular   Pulse: 55   Temp: 97.9  F (36.6  C)   TempSrc: Oral   SpO2: 97%   Weight: 189 lb 3.2 oz   Height: 5' 7.5\"       Body mass index is 29.2 kg/m .         Janeen Fields CMA         45 minutes spent on the date of the encounter doing chart review, history and exam, documentation, review of imaging, and further activities as noted above.     Akil Lay MD   Professor and Chief  Division of Colon and Rectal Surgery  [facility]      Referring Provider:  Franklyn Hernandez MD  [facility]  [address] [place] [address]     Primary Care Provider:  Herbert Epstein    This note was created using speech recognition software and may contain unintended word substitutions.  "

## 2021-05-25 ENCOUNTER — OFFICE VISIT (OUTPATIENT)
Dept: SURGERY | Facility: CLINIC | Age: 76
End: 2021-05-25
Payer: COMMERCIAL

## 2021-05-25 ENCOUNTER — PRE VISIT (OUTPATIENT)
Dept: SURGERY | Facility: CLINIC | Age: 76
End: 2021-05-25

## 2021-05-25 VITALS
HEART RATE: 55 BPM | TEMPERATURE: 97.9 F | OXYGEN SATURATION: 97 % | SYSTOLIC BLOOD PRESSURE: 128 MMHG | HEIGHT: 68 IN | DIASTOLIC BLOOD PRESSURE: 80 MMHG | WEIGHT: 189.2 LBS | BODY MASS INDEX: 28.67 KG/M2

## 2021-05-25 DIAGNOSIS — C18.9 COLON CANCER (H): Primary | ICD-10-CM

## 2021-05-25 DIAGNOSIS — C18.2 CANCER OF ASCENDING COLON (H): Primary | ICD-10-CM

## 2021-05-25 DIAGNOSIS — K63.89 MASS OF COLON: ICD-10-CM

## 2021-05-25 PROCEDURE — 99204 OFFICE O/P NEW MOD 45 MIN: CPT | Performed by: COLON & RECTAL SURGERY

## 2021-05-25 RX ORDER — METRONIDAZOLE 500 MG/1
500 TABLET ORAL EVERY 6 HOURS
Qty: 3 TABLET | Refills: 0 | Status: ON HOLD | OUTPATIENT
Start: 2021-05-25 | End: 2021-06-30

## 2021-05-25 RX ORDER — POLYETHYLENE GLYCOL 3350 17 G/17G
238 POWDER, FOR SOLUTION ORAL SEE ADMIN INSTRUCTIONS
Qty: 14 PACKET | Refills: 0 | Status: ON HOLD | OUTPATIENT
Start: 2021-05-25 | End: 2021-07-02

## 2021-05-25 RX ORDER — ONDANSETRON 4 MG/1
4 TABLET, FILM COATED ORAL EVERY 6 HOURS
Qty: 3 TABLET | Refills: 0 | Status: ON HOLD | OUTPATIENT
Start: 2021-05-25 | End: 2021-07-02

## 2021-05-25 RX ORDER — NEOMYCIN SULFATE 500 MG/1
1000 TABLET ORAL EVERY 6 HOURS
Qty: 6 TABLET | Refills: 0 | Status: ON HOLD | OUTPATIENT
Start: 2021-05-25 | End: 2021-06-30

## 2021-05-25 ASSESSMENT — PAIN SCALES - GENERAL: PAINLEVEL: NO PAIN (0)

## 2021-05-25 ASSESSMENT — MIFFLIN-ST. JEOR: SCORE: 1388.77

## 2021-05-25 NOTE — PATIENT INSTRUCTIONS
Follow up:    1. New cancer study     2. Cinthia will call you to schedule your surgery. If you do not hear from her within three business days please contact her.     3. Appointments you will need:   -COVID test  -blood work   -pre op physical   -cardiology clearance   -prehab with Dr. Bennett     4. You will need to do a full bowel prep with antibiotics the day before surgery     Please call with any questions or concerns regarding your clinic visit today.    It is a pleasure being involved in your health care.    Contacts post-consultation depending on your need:    Radiology Appointments [phone]    Schedule Clinic Appointments [phone] # 1   M-F 7:30 - 5 pm    [facility] [phone]    Clinic Fax Number [phone]    Surgery Scheduling [phone]    My Chart is available 24 hours a day and is a secure way to access your records and communicate with your care team.  I strongly recommend signing up if you haven't already done so, if you are comfortable with computers.  If you would like to inquire about this or are having problems with My Chart access, you may call [phone] or go online at [email or web address].  Please allow at least 24 hours for a response and extra time on weekends and Holidays.

## 2021-05-25 NOTE — LETTER
2021       RE: Marilia Bean  [address]  [facility] [address]     Dear Colleague,    Thank you for referring your patient, Marilia Bean, to the [facility] COLON AND RECTAL SURGERY CLINIC [place] at [facility]. Please see a copy of my visit note below.    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 She presents with her , Elroy, daughter in law Gay who works in PACU at [employer], and her daughter Rowan was on video.    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 Campo.     Colonoscopy 5/10/21 with Dr. Hernandez:  Findings:        The perianal and digital rectal examinations were normal.        The terminal ileum appeared normal.        Two sessile polyps were found in the ascending colon. The polyps were 3        to 6 mm in size. These polyps were removed with a cold snare. Resection        and retrieval were complete. Verification of patient identification for        the specimen was done. Estimated blood loss was minimal.        A sessile non-obstructing medium-sized mass was found in the ascending        colon. The mass was non-circumferential. The mass measured two cm in        length. In addition, its diameter measured twenty mm. No bleeding was        present. This was biopsied with a cold forceps for histology.        Verification of patient identification for the specimen was done. Area        distal to the mass was tattooed with an injection of 5 mL of Spot        (carbon black) on two separate walls.        A 20 mm polyp was found in the transverse colon. The polyp was        pedunculated. Polypectomy was not attempted due to previously  identified        malignancy. Area distal to the polyp was tattooed with an injection of 5        mL of Spot (carbon black) on two separate walls.        Many small-mouthed diverticula were found in the sigmoid colon.        The retroflexed view of the distal rectum and anal verge was normal and        showed no anal or rectal abnormalities.       EGD 5/10/21:  Impression:               - Normal exam of the esophagus.                             - Post-surgical deformity in the gastric body                             consistent with prior PEG site.                             - Widely patent duodenoenterostomy, characterized                             by healthy appearing mucosa was found.                             - Suture material noted in the region of the                             anastomosis.     Pathology:  - DNA mismatch repair proteins (MMR) IHC panel was performed on block C1   with appropriate controls.     RESULT:  ABSENCE OF MSH2 (with secondary loss of MSH6)     MLH1       MSH2     MSH6      PMS2   Present    Absent    Absent     Present     FINAL DIAGNOSIS:   A. STOMACH, ANTRUM AND BODY, BIOPSY:   - Gastric body and antral type mucosa with chronic inactive gastritis   - Negative for intestinal metaplasia and dysplasia     B. ASCENDING COLON, POLYPS, POLYPECTOMY X2:   - Tubular adenoma(s) in multiple fragments; negative for high grade   dysplasia   - Strips of unremarkable colonic mucosa     C. ASCENDING COLON, MASS, BIOPSY:   - Adenocarcinoma, moderately differentiated     CT CAP 5/18/21:  IMPRESSION:  1.  The reported colon mass is not well visualized by CT.  2.  No CT evidence of metastatic disease in the chest, abdomen or  pelvis.  3.  Multiple small pulmonary nodules are not significantly changed  since 7/2/2019.  4.  Additional incidental findings, as detailed in the body of the  Report.    CEA 4.6    Albumin 3.7     She denies any change in bowel habits. She has constipation and diarrhea  "but this has not changed. Her appetite is good. No weight loss.     She was just diagnosed with breast cancer this Thursday and is meeting with Dr. Toro next week.    Family History: Sister, two sons, daughter, and grandson all with Gardiner syndrome. Mother with colon cancer but she reports that Gardiner was traced to her father's side of the family. Sister with breast cancer in her 60s. No family history of ovarian cancer.    Surgical History: ELVIN with BSO for fibroids, appy , cholecystectomy, ascending aneurysm repair and CABG with Dr. Soto in 2017    OB History: vaginal deliveries X4 of \"big babies\" without tearing or episiotomies. No fecal incontinence.     Physical examination:  Examination was chaperoned by Danae Mao NP.     Vitals: /80 (BP Location: Left arm, Patient Position: Sitting, Cuff Size: Adult Regular)   Pulse 55   Temp 97.9  F (36.6  C) (Oral)   Ht 5' 7.5\"   Wt 189 lb 3.2 oz   SpO2 97%   BMI 29.20 kg/m    BMI= Body mass index is 29.2 kg/m .    Alert, oriented, in no acute distress. Abdomen with multiple well healed scars present.     Laboratory data:    Recent Labs   Lab Test 05/18/21  0802 08/12/19  0545 08/12/19  0545   WBC 5.9   < > 9.3   HGB 13.7   < > 9.8*      < > 374   CR 0.89   < > 0.78   ALBUMIN 3.7   < > 2.8*   BILITOTAL 0.4   < > 0.8   ALKPHOS 103   < > 240*   ALT 21   < > 98*   AST 16   < > 58*   INR  --   --  1.12    < > = values in this interval not displayed.       Assessment/plan:  I had a long discussion with Marilia and her family regarding treatment options. We discussed risk of developing other colon cancers with Gardiner syndrome. Because of this, discussed that we typically recommend a total abdominal colectomy with ileorectal anastomosis. However, given her age and risk of altered continence, we discussed doing a subtotal colectomy with ileosigmoid anastomosis to preserve some function but discussed that she would continue to need yearly endoscopic " surveillance. She is in agreement with this plan. We can attempt this laparoscopically but given her past abdominal surgical history, would likely need to be open. Will have her first meet with Dr. Toro to determine plan for her newly diagnosed breast cancer and we can see if these surgeries could be coordinated. Would like her to meet with her cardiologist or we can get her set up with cardiology here. Will need to meet with our perioperative assessment center prior to surgery also.  I recommended she meet with a genetic counselor and she will also need continued EGD surveillance. Discussed genetic testing for family members who have not yet been tested also. Patient's and family's questions were answered to their stated satisfaction and they are in agreement with this plan.      For details of past medical history, surgical history, family history, medications, allergies, and review of systems, please see details below.    Medical history:  Past Medical History:   Diagnosis Date     Aortic aneurysm (H) 07/01/2007    see 7/07 [facility] report -follow yearly, treat high BP is occurs Problem list name updated by automated process. Provider to review     Aortic aneurysm of unspecified site without mention of rupture 07/2007    4.4 cm ascending aorta noted in 2007     Asymptomatic postmenopausal status (age-related) (natural)     on HRT  Prempro -weaning off 5/'2004     CAD (coronary artery disease)     LAD     Family history of Gardiner syndrome      Hyperlipidemia LDL goal <100 10/31/2010     Hypertension goal BP (blood pressure) < 140/90 02/09/2016     Leiomyoma of uterus, unspecified     Uterine fibroid     Lump or mass in breast 07/2004    rt. nodule - bx neg     Gardiner syndrome      Personal history of colonic polyps      Postmenopausal atrophic vaginitis 08/20/2006     Pulmonary nodule     incidental noted in 2007 during [place]     Pure hypercholesterolemia     mild, diet - low cholesterol, low fat.10/06 start Lovastatin        Surgical history:  Past Surgical History:   Procedure Laterality Date     BYPASS GRAFT ARTERY CORONARY N/A 6/5/2017    Procedure: BYPASS GRAFT ARTERY CORONARY;;  Surgeon: Akshat Soto MD;  [facility]     C DEXA INTERPRETATION, AXIAL  12/21/01    wnl. 7/2005 wnl but lower     COLONOSCOPY  05/07/07    Repeat in 1 year for surveillance     COLONOSCOPY  12/10/08    repeat 1 year  -see 1/2009 letter     COLONOSCOPY  03/31/10     COLONOSCOPY  10/31/2011    Procedure:COMBINED COLONOSCOPY, SINGLE BIOPSY/POLYPECTOMY BY BIOPSY; colonoscopy with polypectomy by biopsy; Surgeon:JESSICA GARCIA; Location:[facility]     COLONOSCOPY  12/6/2013    Procedure: COMBINED COLONOSCOPY, SINGLE BIOPSY/POLYPECTOMY BY BIOPSY;  Colonoscopy, Polypectomies;  Surgeon: Herbert Salinas MD;  [facility]: [facility]     COLONOSCOPY N/A 12/8/2015    Procedure: COLONOSCOPY;  Surgeon: Jared Carbajal MD;  [facility]: [facility]     COLONOSCOPY N/A 4/26/2017    Procedure: COMBINED COLONOSCOPY, SINGLE OR MULTIPLE BIOPSY/POLYPECTOMY BY BIOPSY;  Colonoscopy with polypectomies with forceps and snare;  Surgeon: Herbert Salinas MD;  [facility]     COLONOSCOPY N/A 5/10/2021    Procedure: Colonoscopy, With Polypectomy And Biopsy;  Surgeon: Franlkyn Hernandez MD;  Location: [facility]     COLONOSCOPY WITH CO2 INSUFFLATION N/A 5/10/2021    Procedure: COLONOSCOPY, WITH CO2 INSUFFLATION;  Surgeon: Franklyn Hernandez MD;  [facility]: [facility]     COMBINED ESOPHAGOSCOPY, GASTROSCOPY, DUODENOSCOPY (EGD) WITH CO2 INSUFFLATION N/A 5/10/2021    Procedure: ESOPHAGOGASTRODUODENOSCOPY, WITH CO2 INSUFFLATION;  Surgeon: Franklyn Hernandez MD;  [facility]: [facility]     ENDOSCOPIC INSERTION TUBE JEJUNOSTOMY N/A 8/5/2019    Procedure: Gastrojejunostomy tube placement with gastropexy;  Surgeon: Ford Mann MD;  [facility]     ESOPHAGOSCOPY, GASTROSCOPY, DUODENOSCOPY (EGD), COMBINED N/A 12/8/2015    Procedure: COMBINED ESOPHAGOSCOPY,  GASTROSCOPY, DUODENOSCOPY (EGD), BIOPSY SINGLE OR MULTIPLE;  Surgeon: Jared Carbajal MD;  [facility]     ESOPHAGOSCOPY, GASTROSCOPY, DUODENOSCOPY (EGD), COMBINED N/A 7/31/2019    Procedure: Endoscopic ultrasound with cystoduodenostomy, stent placement x2, stent dilation, and nasojejunal tube placement;  Surgeon: Ford Mann MD;  [facility]     ESOPHAGOSCOPY, GASTROSCOPY, DUODENOSCOPY (EGD), COMBINED N/A 8/5/2019    Procedure: ESOPHAGOGASTRODUODENOSCOPY (EGD);  Surgeon: Ford Mann MD;  [facility]     ESOPHAGOSCOPY, GASTROSCOPY, DUODENOSCOPY (EGD), COMBINED N/A 8/12/2019    Procedure: ESOPHAGOGASTRODUODENOSCOPY (EGD) with GJ exchange, duodenum stent removal.;  Surgeon: Ford Mann MD;  [facility]     ESOPHAGOSCOPY, GASTROSCOPY, DUODENOSCOPY (EGD), COMBINED N/A 5/10/2021    Procedure: Esophagogastroduodenoscopy, With Biopsy;  Surgeon: Franklyn Hernandez MD;  [facility]     HC COLONOSCOPY THRU STOMA W BIOPSY/CAUTERY TUMOR/POLYP/LESION  1999,2002 2004 polyp - hyperplastic - repeat 5 years ?     HC COLONOSCOPY W/WO BRUSH/WASH  12/12/2005    Polypectomy.  Diverticulosis-minimal. Bx adenomatous and mucosal polyps - repeat 1 year     HC ECP WITH CATARACT SURGERY Bilateral 2015, 2016     HC LAPAROSCOPY, SURGICAL; APPENDECTOMY  10/31/2004     HC LAPAROSCOPY, SURGICAL; CHOLECYSTECTOMY  2000    Cholecystectomy, Laparoscopic     HC REVISE MEDIAN N/CARPAL TUNNEL SURG  10/01/10    left     HYSTERECTOMY, ELVIN  12/14/09    TAHBSO, MMK     REPAIR ANEURYSM ASCENDING AORTA N/A 6/5/2017    Procedure: REPAIR ANEURYSM ASCENDING AORTA;  Median Sternotomy, Ascending Aortic Aneurysm Repair, Coronary Artery Bypass Graft x1 on pump oxygenator;  Surgeon: Akshat Soto MD;  [facility]     REPLACE GASTROJEJUNOSTOMY TUBE, PERCUTANEOUS N/A 8/19/2019    Procedure: REPLACEMENT, GASTROJEJUNOSTOMY TUBE;  Surgeon: Robert Tafoya MD;  [facility]      "RESECTION DUODENAL N/A 7/3/2019    Procedure: Resection of Distal Duodenal and proximal Jejunum;  Surgeon: Joaquin Brito MD;  [facility]     [facility] BIOPSY BREAST, PERC NEEDLE CORE, WITH IMAGING  8/17/2004    Right     [facility] ENDOSCOPY, SIMPLE EXAM  03/31/10       Family history:  Family History   Problem Relation Age of Onset     Cancer Mother         Colon Cancer     Heart Disease Mother         MI     Osteoporosis Mother      Cancer Father         Colon Cancer     Heart Disease Father         Heart Disease/ Heart attacks     Diabetes Father         Adult Onset     Cancer Sister         Colon Cancer     Heart Disease Brother         Heart attacks at age 45     Breast Cancer Sister      Cancer Paternal Grandmother         Unknown type     Heart Disease Maternal Grandfather         MI     Diabetes Sister         Adult Onset     Diabetes Sister         Adult Onset     Diabetes Brother         Adult Onset     Heart Disease Brother         heart attack age 64     Cancer - colorectal Son         age 36, Gardiner syndrome       Medications:  Current Outpatient Medications   Medication Sig Dispense Refill     ADACEL 5-2-15.5 LF-MCG/0.5 injection        aspirin 81 MG EC tablet Take 81 mg by mouth every morning  90 tablet 3     atorvastatin (LIPITOR) 20 MG tablet Take 1 tablet (20 mg) by mouth every morning 90 tablet 3     FLUZONE HIGH-DOSE QUADRIVALENT 0.7 ML KOBE injection        losartan (COZAAR) 50 MG tablet TAKE ONE-HALF TABLET(25MG) BY MOUTH EVERY MORNING 45 tablet 3     magnesium citrate solution Take 296 mLs by mouth once for 1 dose Start at 8 pm night before surgery, unless otherwise stated in \"Getting Ready for Surgery\" instruction 296 mL 0     metoprolol tartrate (LOPRESSOR) 25 MG tablet Take 1 tablet (25 mg) by mouth 2 times daily 180 tablet 3     metroNIDAZOLE (FLAGYL) 500 MG tablet Take 1 tablet (500 mg) by mouth every 6 hours At 8:00 am, 2:00 pm, 8:00 pm the day prior to your surgery with neomycin " "and zofran. 3 tablet 0     neomycin (MYCIFRADIN) 500 MG tablet Take 2 tablets (1,000 mg) by mouth every 6 hours At 8:00 am, 2:00 pm, 8:00 pm the day prior to your surgery with flagyl and zofran. 6 tablet 0     ondansetron (ZOFRAN) 4 MG tablet Take 1 tablet (4 mg) by mouth every 6 hours At 8:00 am, 2:00 pm, 8:00 pm the day prior to your surgery with neomycin and flagyl. 3 tablet 0     polyethylene glycol (MIRALAX) 17 g packet Take 238 g by mouth See Admin Instructions Starting at 4 pm night prior to surgery. Refer to \"Getting Ready for Surgery\" instructions. 14 packet 0       Allergies:  The patientis allergic to contrast dye and morphine.    Social history:  Social History     Tobacco Use     Smoking status: Never Smoker     Smokeless tobacco: Never Used   Substance Use Topics     Alcohol use: Yes     Comment: rarely     Marital status: .    Review of Systems:  Nursing Notes:   Janeen Rebollar CMA  5/25/2021 10:07 AM  Signed  Chief Complaint   Patient presents with     New Patient     New colon cancer       Vitals:    05/25/21 0859   BP: 128/80   BP Location: Left arm   Patient Position: Sitting   Cuff Size: Adult Regular   Pulse: 55   Temp: 97.9  F (36.6  C)   TempSrc: Oral   SpO2: 97%   Weight: 189 lb 3.2 oz   Height: 5' 7.5\"       Body mass index is 29.2 kg/m .         Janeen Fields CMA         45 minutes spent on the date of the encounter doing chart review, history and exam, documentation, review of imaging, and further activities as noted above.     Akil Lay MD   Professor and Chief  Division of Colon and Rectal Surgery  [facility]    Referring Provider:  Franklyn Hernandez MD  [facility]  [address] [address] [place] [address]     Primary Care Provider:  Herbert Epstein    This note was created using speech recognition software and may contain unintended word substitutions.    "

## 2021-05-25 NOTE — NURSING NOTE
"Chief Complaint   Patient presents with     New Patient     New colon cancer       Vitals:    05/25/21 0859   BP: 128/80   BP Location: Left arm   Patient Position: Sitting   Cuff Size: Adult Regular   Pulse: 55   Temp: 97.9  F (36.6  C)   TempSrc: Oral   SpO2: 97%   Weight: 189 lb 3.2 oz   Height: 5' 7.5\"       Body mass index is 29.2 kg/m .         Janeen Fields CMA    "

## 2021-05-26 ENCOUNTER — TELEPHONE (OUTPATIENT)
Dept: DERMATOLOGY | Facility: CLINIC | Age: 76
End: 2021-05-26

## 2021-05-26 ENCOUNTER — TELEPHONE (OUTPATIENT)
Dept: SURGERY | Facility: CLINIC | Age: 76
End: 2021-05-26

## 2021-05-26 NOTE — TELEPHONE ENCOUNTER
Case Request received to schedule a Tier 2 Laparoscopic, possible open subtotal colectomy, ileosigmoid anastomosis 240 min surgery admit case with Dr. Lay.    Additional Instructions for the Case  Multi-surgeon case:  no  Time in minutes:  240  Anesthesia: general  Prep: full prep with antibiotics   Pre-Op: PAC  Labs: yes if out of 30 days   WOC: no  Special equipment: no  Special Instructions: needs cards clearance and prehab with Dr. Bennett     Schedule with Danae Mao NP 2-3 weeks after surgery.  Schedule with Surgeon 3-4 weeks after Danae Mao NP    On 5/26:   msg left for patient to call back.    On 6/7:  Spoke with patient, completed the following scheduling via phone, then sent Surgery Packet via [employer] and Mail:     Required: Yes, you will need a  18 years or older to drive you home from your procedure as well as stay with you for 24 hours after your procedure, if you are discharged the same day as your procedure.    Surgery: Laparoscopic, possible open subtotal colectomy, ileosigmoid anastomosis    Date: June 30, 2021  Surgeon(s): Dr. Akil Lay    Time: You will receive a call 2-3 days prior to your surgery with your finalized surgery and arrival time.     Location:     [facility]      [facility]      [address] [place], [place] [address] Floor(3C)      [place] [address]      [phone]      [email or web address]       Upcoming Appointments:   To ensure you are fully prepared for your surgery, please make sure the following items have been completed PRIOR to your surgery date:    Pre-habilitation appointment:  Video appointment with Dr. Alicia Bennett:  6/9/2021 at 8:00 AM                                                                      VIDEO VISIT    Pre-operative Cardiac Clearance appointment:  - Clinic appointment with Cardiology - please call [phone] to schedule this appointment ahead of  "surgery    Pre-operative History & Physical appointment:    Clinic appointment with Pre-operative Assessment Center (PAC): 6/14/2021 at 8:30 AM                                                 VIDEO VISIT    Pre-operative COVID-19 Test:   Pre-procedure asymptomatic COVID PCR   6/28/2021 at 8:30 AM                                                 [facility]        [address] [place] [id number]     Pre-operative Lab appointment:   Lab draw appointment:    6/28/2021 at 8:45 AM                                                 [facility]        [address] [place] [id number]     Post operative appointment:  Clinic appointment with Danae Ba NP: 7/23/2021 at 10:00 AM                                                                      [facility] [place] [address](4K)                                                                      [address] [place], [place] [address]    Post operative appointment:  Clinic appointment with Dr. Akil Lay: 8/24/2021 at 3:00 PM                                                                      [address](4K)                                                                      [address] [place] [address]    Pre-surgical Preparation:      MiraLAX/Gatorade, Magnesium Citrate, Antibiotics, Zofran  - see \"Day Before Surgery\"  - obtain medications and ingredients in advance  - if you have diabetes or blood sugar concerns, please use Gatorade ZERO or Low Sugar, as per recommendation by your physician    "

## 2021-05-26 NOTE — TELEPHONE ENCOUNTER
RN reached out to pt to notify that per RN's supervisor and  pt is okay to have two visitors come with to her consult appt on 6/1 with  and that the  staff will be updated of this per Supervisor. No answer, VM id's pt. Left detailed message with reason for all and to call the [facility] clinic back at [phone] with any additional questions...Terese Hester RN

## 2021-05-26 NOTE — TELEPHONE ENCOUNTER
Pt called, no answer. VM Id's pt. Left detailed message with reason for call and  for pt to call the [facility] clinic back at [phone].    Writer left message confirming that patient is allowed to have 2 guests to accompany her to her appointment with Dr. Toor on 6/1/21. Message left to advise the screeners that it was ok'd by clinic staff. Patient advised to call with any questions or concerns.    Yasmin Grant LPN    ----- Message -----  From: Cinthia Garcia RN  Sent: 5/24/2021  10:14 AM CDT  To: Terese Hester RN, Bertha Toro MD    Good morning!I received a call from Marilia asking if she is able to have two people with her at her surgical consult? I believe her  wants to come with her and her daughter, who is in healthcare in some capacity would like to attend as well. I told her I would ask and let her know. :)Thanks!Cinthia

## 2021-05-27 ENCOUNTER — TELEPHONE (OUTPATIENT)
Dept: SURGERY | Facility: CLINIC | Age: 76
End: 2021-05-27

## 2021-05-27 NOTE — TELEPHONE ENCOUNTER
"PREVISIT INFORMATION                                                    Marilia Bean scheduled for future visit at [facility] specialty clinics.    Patient is scheduled to see Dr. Toro on 6/1/21  Reason for visit: DCIS  Referring provider Radiology referral  Has patient seen previous specialist? No  Medical Records:  Available in chart.  Patient was previously seen at a [facility] or [facility] facility.    REVIEW                                                      New patient packet mailed to patient: No  Medication reconciliation complete: No      Current Outpatient Medications   Medication Sig Dispense Refill     ADACEL 5-2-15.5 LF-MCG/0.5 injection        aspirin 81 MG EC tablet Take 81 mg by mouth every morning  90 tablet 3     atorvastatin (LIPITOR) 20 MG tablet Take 1 tablet (20 mg) by mouth every morning 90 tablet 3     FLUZONE HIGH-DOSE QUADRIVALENT 0.7 ML KOBE injection        losartan (COZAAR) 50 MG tablet TAKE ONE-HALF TABLET(25MG) BY MOUTH EVERY MORNING 45 tablet 3     metoprolol tartrate (LOPRESSOR) 25 MG tablet Take 1 tablet (25 mg) by mouth 2 times daily 180 tablet 3     metroNIDAZOLE (FLAGYL) 500 MG tablet Take 1 tablet (500 mg) by mouth every 6 hours At 8:00 am, 2:00 pm, 8:00 pm the day prior to your surgery with neomycin and zofran. 3 tablet 0     neomycin (MYCIFRADIN) 500 MG tablet Take 2 tablets (1,000 mg) by mouth every 6 hours At 8:00 am, 2:00 pm, 8:00 pm the day prior to your surgery with flagyl and zofran. 6 tablet 0     ondansetron (ZOFRAN) 4 MG tablet Take 1 tablet (4 mg) by mouth every 6 hours At 8:00 am, 2:00 pm, 8:00 pm the day prior to your surgery with neomycin and flagyl. 3 tablet 0     polyethylene glycol (MIRALAX) 17 g packet Take 238 g by mouth See Admin Instructions Starting at 4 pm night prior to surgery. Refer to \"Getting Ready for Surgery\" instructions. 14 packet 0       Allergies: Contrast dye and Morphine        PLAN/FOLLOW-UP NEEDED               "                                        Previsit review complete.  Patient will see provider at future scheduled appointment.     Patient called with appointment reminder. Date, time and location given.    Patient Reminders Given:  Please, make sure you bring an updated list of your medications.   If you are having a procedure, please, present 15 minutes early.  If you need to cancel or reschedule,please call [phone].    Yasmin Grant LPN

## 2021-06-01 ENCOUNTER — OFFICE VISIT (OUTPATIENT)
Dept: SURGERY | Facility: CLINIC | Age: 76
End: 2021-06-01
Attending: INTERNAL MEDICINE
Payer: COMMERCIAL

## 2021-06-01 VITALS — BODY MASS INDEX: 28.67 KG/M2 | WEIGHT: 189.2 LBS | HEIGHT: 68 IN

## 2021-06-01 DIAGNOSIS — D05.12 DUCTAL CARCINOMA IN SITU (DCIS) OF LEFT BREAST: ICD-10-CM

## 2021-06-01 PROCEDURE — 99215 OFFICE O/P EST HI 40 MIN: CPT | Performed by: SURGERY

## 2021-06-01 ASSESSMENT — MIFFLIN-ST. JEOR: SCORE: 1388.77

## 2021-06-01 NOTE — NURSING NOTE
"Marilia Bean's goals for this visit include:   Chief Complaint   Patient presents with     Consult     DCIS       She requests these members of her care team be copied on today's visit information: no    PCP: Herbert Epstein    Referring Provider:  Herbert Epstein MD  [address] [place] [id number]    Ht 1.715 m (5' 7.5\")   Wt 85.8 kg (189 lb 3.2 oz)   BMI 29.20 kg/m      Do you need any medication refills at today's visit? No    Yasmin Grant LPN      "

## 2021-06-01 NOTE — PATIENT INSTRUCTIONS
Surgery Instructions    Always follow your surgeon s instructions. If you don t, your surgery could be cancelled. Please use the following checklist.  Your surgery is on: The surgery scheduler will contact you within 1 week of your consult with the surgeon. If you do not hear from them, please call the clinic or RN Care Coordinator for your provider.    Time: Prearrival times can vary depending on location/type of surgery.  [place] - 2 hour pre-arrival  [facility] - 2 hour pre-arrival  [place] - 1 hour pre-arrival    Note:  These times may change. A nurse will call you before surgery to confirm. If you have not received a call or if you have more questions, please call us on the working day before your surgery:  ? [place]: [phone] or [phone] (9am to 5:30pm)  ? [facility]: [phone] (8am to 6pm)  ? [place]: [phone] (9am to 5pm)  ? [facility] [phone] (7am to 4pm)  Prior to surgery  ? Have a pre-op physical exam with your Primary Doctor within 30 days of surgery  - Ask your doctor to send all of your results to the surgery center/hospital before surgery. Your doctor also may ask you to bring the results with you on the day of surgery.  - Tell your doctor if:  - You are allergic to latex or rubber (latex and rubber gloves are often used in medical care).  - You are taking any medicines (including aspirin), vitamins, or herbal products. You may need to stop taking some medicines before surgery.  - You have any medical problems (allergies, diabetes, or heart disease, for example).  - You have a pacemaker or an AICD (automatic implanted cardiac defibrillator). If you do, please bring the ID card with you on the day of surgery.  - People who smoke have a higher risk of infection after surgery. Ask your doctor how you can quit smoking.  - If you Primary Doctor is not within the [facility] system, you will need to have your pre-op physical faxed to us to be scanned into your  chart.  - [place]: [phone] or [phone]  - [facility] ([place]): [phone]  - [facility] ([facility]): [phone]  ? Call your insurance company. Ask if you need pre-approval for your surgery. If you do not have insurance, please let us know. If you wish to speak to the , please alert the clinic staff so this can be arranged.  ? Arrange for someone to drive you home after surgery.  will need to be a responsible adult (18 years or older) that will provide transportation to and from surgery and stay in the waiting room during your surgery. You may not drive yourself or take public transportation to and from surgery.  ? Arrange for someone to stay with you for 24 hours after you go home. This person must be a responsible adult (18 years or older).  ? Call your surgeon or their nurse if there is any change in your health (cold, flu, infections, hospitalizations).  ? Do not smoke, drink alcohol, or take over-the-counter medicine for 24 hours before and after surgery.  ? If you take prescribed drugs, you may need to stop them until after the surgery.  Discuss what medications to take or not take prior to surgery with your Primary Doctor at your pre-op physical. Avoid over-the-counter blood-thinning medications such as Aspirin, Ibuprofen, vitamin E, or fish oil 7 days prior to surgery (unless otherwise directed by your Primary Doctor). Tylenol is a good alternative for mild pain relief prior to surgery.  ? Eating and drinking guidelines prior to surgery:  - Stop all solid food consumption 8 hours prior to surgery  - You may drink clear liquids up to 2 hours prior to surgery (water, fruit juices without pulp, jello, tea/coffee without creamer, sports drinks, clear-fat free broth (bouillon or consomme), popsicles (without milk, bits of fruit, or seeds/nuts)  ? Follow instructions given for showering or bathing before surgery.    - Use 8 ounces of antiseptic surgical soap,  like:  - Hibiclens, Scrub Care, or Exidine  - You can find it at your local pharmacy, clinic, or retail store. If you have trouble, ask your pharmacist to help you find the right substitute.  - Please wash with one of the above soaps twice before coming to the hospital for your surgery. This will decrease bacteria (germs) on your skin. It will also help reduce your chance of infection after surgery.  - Items you will need for showering:  - 4 newly washed washcloths  - 2 newly washed towels  - 8 ounces of one of the above soaps  - Following these instructions:  - The evening before surgery: Shower or bathe as you normally would, using your regular soap and a clean washcloth. Give special attention to places where your incision (surgical cut) or catheters will be. This includes your groin area. Rinse well. You may wash your hair with your regular shampoo. Next, wash your body with 4 ounces of the antiseptic soap. Use a clean, damp washcloth and gently clean your body (from the chin down). If your surgery involves your head, use the special soap on your head and scalp. Rinse well and dry off using a newly washed towel.  - The morning of surgery: Repeat the same process as the evening shower.  - Other suggestions:    Do not shave within 12 inches of your incision (surgical cut) area for at least 3 days before surgery. Shaving can make small cuts in the skin. This puts you at higher risk of infection.    Wear freshly washed pajamas or clothing after your evening shower.    Wear freshly washed clothes the day of surgery.    Wash and change your bed sheets the day before surgery to have clean bed sheets after your shower and when you get home from surgery.    If you have trouble washing all areas, make sure someone helps you.    Don't use any deodorant, lotion or powder after your shower.    Women who are menstruating should wear a fresh sanitary pad to the hospital.  ? Do not wear or add deodorant, cologne, lotion,  makeup, nail polish or jewelry to surgery. If you wear fake nails, please remove at least one nail before coming to surgery (an oxygen monitor needs to be placed on your finger during surgery).  ? Bring these items to the surgery center/hospital:  - Insurance card  - Money for parking and co-pays, if needed  - A list of all the medicines you take. Include vitamins, minerals, herbs, and over-the-counter drugs.  Note any drug allergies.  - A copy of your advance health care directive, if you have one. This tells us what treatment you would want--and who would make health care decisions--if you could no longer speak for yourself. You may request this form in advance or download it from [email or web address].  - A case for glasses, contact lenses, hearing aids, or dentures.  - Your inhaler or CPAP machine, if you use these at home.  ? Leave extra cash, jewelry, and other valuables at home.  When you arrive  When you get to the surgery center/hospital, you will:  ? Check in. If you are under age 18, you must be with a parent or legal guardian.  ? Sign consent forms, if you haven t already. These forms state that you know the risks and benefits of surgery. When you sign the forms, you give us permission to do the surgery. Do not sign them unless you understand what will happen during and after your surgery. If you have any questions about your surgery, ask to speak with your doctor before you sign the forms. If you don t understand the answers, ask again.  ? Receive a copy of the Patient s Bill of Rights. If you do not receive a copy, please ask for one.  ? Change into hospital clothes. Your belongings will be placed in a bag. We will return them to you after surgery.  ? Meet with the anesthesia provider. He or she will tell you what kind of anesthesia (medicine) will be used to keep you comfortable during surgery.  Remember: it s okay to remind doctors and nurses to wash their hands before touching you.  In most  cases, your surgeon will use a marker to write his or her initials on the surgery site. This ensures that the exact site is operated on.  For safety reasons, we will ask you the same questions many times. For example, we may ask your name and birth date over and over again.  Friends and family can stay with you until it s time for surgery. While you re in surgery, they will be in the waiting area. Please note that cell phones are not allowed in some patient care areas.  If you have questions about what will happen in the operating room, talk to your care team.  After surgery  We will move you to a recovery room, where we will watch you closely. If you have any pain or discomfort, tell your nurse. He or she will try to make you comfortable.  If you are staying overnight, we will move you to your hospital room after you are awake.  If you are going home, we will move you to another room. Friends and family may be able to join you. The length of time you spend in recovery depend on the type of medicine you received, your medical condition, the type of surgery you had, or your response to the anesthesia given during your procedure.  When you are discharged from the recovery room, the nurses will review instructions with you and your caregiver.  ? Please wash your hands every time you touch the wound or change bandages or dressings.  ? Do not submerge the wound in water.  You may not use a bathtub or hot tub until the wound is closed. The wait time frame is generally 2-3 weeks, but any open area can be a source of incoming bacteria, so it is better to be on the safe side and avoid water submersion until your wound is fully healed.  ? You may take a shower 24 hours after surgery. Double check with your surgeon if it is OK for water to run over the wound, whether it has been sutured, stapled, glued, or is open. You may gently wash the wound using the antiseptic soap provided for your pre-surgery showering (do not use a  washcloth). Any mild soap will work as well.  ? Many surgical wounds will have small white strips of tape on them called steri-strips.  Do not remove these. The edges will curl and fall off within 7-10 days with normal showering.  ? If you are going home with sutures (stitches) or staples, you must return to the clinic to have them taken out, usually within 1-2 weeks. Some stitches are dissolvable and do not require removal. Make sure to clarify with your surgeon or surgery nurse reviewing discharge paperwork what kind of sutures you have.  ? Signs and symptoms of infection include:  - Fever, temperature over 101.5   F  - Redness  - Swelling  - Increased pain  - Green or yellow drainage which may or may not have a foul odor  Dealing with pain  A nurse will check your comfort level often during your stay. He or she will work with you to manage your pain.  Remember:  ? All pain is real. There are many ways to control pain. We can help you decide what works best for you.  ? Ask for pain medicine when you need it. Don t try to  tough it out --this can make you feel worse. Always take your medicine as ordered.  ? Medicine doesn t work the same for everyone. If your medicine isn t working, tell your nurse. There may be other medicines or treatments we can try.  Going home  We will let you know when you re ready to leave the surgery center or hospital. Before you leave, we will tell you how to care for yourself at home and prevent infections. If you do not understand something, please say so. We will answer any questions you have. We will then help you get ready to leave.  Remember, you must have a responsible adult (18 years or older) to stay with you 24 hours after you leave the hospital.  Take it easy when you get home. You will need some time to recover--you may be more tired than you realize at first. Rest and relax for at least the first 24 hours at home. You ll feel better and heal faster if you take good care of  yourself.  Follow the discharge instructions that are given to you when you leave the surgery center or hospital  Please call the clinic if you experience any problems during regular clinic hours (Monday-Friday 8:00am-5:00pm).  If you experience problems during non-clinic hours, please call the [facility] on-call line at [phone] and ask the  to page the on-call Provider for your specialty. The on-call Provider will call you back and can triage your symptoms and further advise. If you are having an emergency, always call 911 or seek immediate evaluation at the Emergency Room.  Locations  [facility]  Same-day surgery center - 2nd floor, check-in #5  [address] [place] [address]  [phone]  www.[email or web address] ([facility])  [address] [place], [place] [address] [phone]   [email or web address]    [address] [address] [phone] (patient registration)  [phone] (main line)  [email or web address]  [facility]  [address] [place] [address]  [facility], 3rd floor for check-in  [phone] (patient registration)  [phone] (main line)  [email or web address]  [facility]  [address] [address]  [phone]  www.[facility]  [address] [facility] [address] [place] [address] [address]  [phone]  www.[email or web address].[email or web address].[facility]  [address] [place] [address]  [phone]  www.[email or web address]  [address]  [place] [address]  [phone]  www.[facility].[email or web address].[facility]  [address]  [place] [address]  [phone]  [email or web address]  [address] [address]  [phone]  [email or web address] [email or web address]

## 2021-06-01 NOTE — LETTER
6/1/2021         RE: Marilia Bean  [address]  [facility] [address]        Dear Colleague,    Thank you for referring your patient, Marilia Bean, to the [facility]. Please see a copy of my visit note below.    NEW SURGICAL CONSULTATION  Jun 1, 2021    Marilia Bean is a 76 year old woman who presents with a left  breast complaint.  She was referred by Herbert Epstein MD.    HPI:    She noted no masses in either breast, axilla, or neck. She denies any nipple discharge or nipple inversion.     Imaging showed a group of calcifications in left medial breast    A biopsy was performed and a clip was placed.  It showed ductal carcinoma in situ, grade 2, ER+ AK+.    BREAST-SPECIFIC HISTORY:  Prior breast biopsies: Yes - cyst aspiration on the RIGHT  Prior breast surgeries: Yes - right lumpectomy (benign per patient, around 18 years ago)  Prior radiation history: No  Hormone replacement therapy: Yes - Prempro (1990-95)  Menopausal: 1990  Bra size: 36 B  Dominant hand: Right    FAMILY HISTORY:  Breast ca: Yes - sister (dx 60)  Ovarian ca: No  Pancreatic ca: No  Melanoma: No  Gastric ca: No  Colon ca: Yes personal history as well; son (dx 36, has Gardiner syndrome), siblings have Gardiner syndrome  Other cancer: Yes - brother w/ lymphoma, brother w/ non melanoma skin cancer, sister w/ glioblastoma    Genetic counseling appt coming up July 5, 2021    Past Medical History:   Diagnosis Date     Aortic aneurysm (H) 07/01/2007    see 7/07 Ba report -follow yearly, treat high BP is occurs Problem list name updated by automated process. Provider to review     Aortic aneurysm of unspecified site without mention of rupture 07/2007    4.4 cm ascending aorta noted in 2007     Asymptomatic postmenopausal status (age-related) (natural)     on HRT  Prempro -weaning off 5/'2004     CAD (coronary artery disease)     LAD     Family history of Gardiner syndrome      Hyperlipidemia LDL goal <100 10/31/2010      Hypertension goal BP (blood pressure) < 140/90 02/09/2016     Leiomyoma of uterus, unspecified     Uterine fibroid     Lump or mass in breast 07/2004    rt. nodule - bx neg     Gardiner syndrome      Personal history of colonic polyps      Postmenopausal atrophic vaginitis 08/20/2006     Pulmonary nodule     incidental noted in 2007 during [place]     Pure hypercholesterolemia     mild, diet - low cholesterol, low fat.10/06 start Lovastatin   Able to climb a flight of stairs.  Able to walk a mile flat.    Concurrently diagnosed with colon cancer on screening colonoscopy - she has met with Dr Lay, who in light of likely Gardiner Syndrome, has recommended a subtotal colectomy with ileosigmoid anastomosis and subsequent yearly endoscopic surveillance.    Past Surgical History:   Procedure Laterality Date     BYPASS GRAFT ARTERY CORONARY N/A 6/5/2017    Procedure: BYPASS GRAFT ARTERY CORONARY;;  Surgeon: Akshat Soto MD;  Location: [facility]     C DEXA INTERPRETATION, AXIAL  12/21/01    wnl. 7/2005 wnl but lower     COLONOSCOPY  05/07/07    Repeat in 1 year for surveillance     COLONOSCOPY  12/10/08    repeat 1 year  -see 1/2009 letter     COLONOSCOPY  03/31/10     COLONOSCOPY  10/31/2011    Procedure:COMBINED COLONOSCOPY, SINGLE BIOPSY/POLYPECTOMY BY BIOPSY; colonoscopy with polypectomy by biopsy; Surgeon:JESSICA GARCIA; Location: [facility]     COLONOSCOPY  12/6/2013    Procedure: COMBINED COLONOSCOPY, SINGLE BIOPSY/POLYPECTOMY BY BIOPSY;  Colonoscopy, Polypectomies;  Surgeon: Herbert Salinas MD;  Location:  GI     COLONOSCOPY N/A 12/8/2015    Procedure: COLONOSCOPY;  Surgeon: Jared Carbajal MD;  Location:  GI     COLONOSCOPY N/A 4/26/2017    Procedure: COMBINED COLONOSCOPY, SINGLE OR MULTIPLE BIOPSY/POLYPECTOMY BY BIOPSY;  Colonoscopy with polypectomies with forceps and snare;  Surgeon: Herbert Sailnas MD;  Location:  GI     COLONOSCOPY N/A 5/10/2021    Procedure: Colonoscopy, With Polypectomy And  Biopsy;  Surgeon: Franklyn Hernandez MD;  Location: [facility]     COLONOSCOPY WITH CO2 INSUFFLATION N/A 5/10/2021    Procedure: COLONOSCOPY, WITH CO2 INSUFFLATION;  Surgeon: Franklyn Hernandez MD;  Location: [facility]     COMBINED ESOPHAGOSCOPY, GASTROSCOPY, DUODENOSCOPY (EGD) WITH CO2 INSUFFLATION N/A 5/10/2021    Procedure: ESOPHAGOGASTRODUODENOSCOPY, WITH CO2 INSUFFLATION;  Surgeon: Fraknlyn Hernandez MD;  Location: [facility]     ENDOSCOPIC INSERTION TUBE JEJUNOSTOMY N/A 8/5/2019    Procedure: Gastrojejunostomy tube placement with gastropexy;  Surgeon: Ford Mann MD;  [facility]: [facility]     ESOPHAGOSCOPY, GASTROSCOPY, DUODENOSCOPY (EGD), COMBINED N/A 12/8/2015    Procedure: COMBINED ESOPHAGOSCOPY, GASTROSCOPY, DUODENOSCOPY (EGD), BIOPSY SINGLE OR MULTIPLE;  Surgeon: Jared Carbajal MD;  Location: [facility]     ESOPHAGOSCOPY, GASTROSCOPY, DUODENOSCOPY (EGD), COMBINED N/A 7/31/2019    Procedure: Endoscopic ultrasound with cystoduodenostomy, stent placement x2, stent dilation, and nasojejunal tube placement;  Surgeon: Ford Mann MD;  [facility]     ESOPHAGOSCOPY, GASTROSCOPY, DUODENOSCOPY (EGD), COMBINED N/A 8/5/2019    Procedure: ESOPHAGOGASTRODUODENOSCOPY (EGD);  Surgeon: Ford Mann MD;  [facility]     ESOPHAGOSCOPY, GASTROSCOPY, DUODENOSCOPY (EGD), COMBINED N/A 8/12/2019    Procedure: ESOPHAGOGASTRODUODENOSCOPY (EGD) with GJ exchange, duodenum stent removal.;  Surgeon: Ford Mann MD;  [facility]     ESOPHAGOSCOPY, GASTROSCOPY, DUODENOSCOPY (EGD), COMBINED N/A 5/10/2021    Procedure: Esophagogastroduodenoscopy, With Biopsy;  Surgeon: Franklyn Hernandez MD;  Location: [facility]     HC COLONOSCOPY THRU STOMA W BIOPSY/CAUTERY TUMOR/POLYP/LESION  1999,2002 2004 polyp - hyperplastic - repeat 5 years ?     HC COLONOSCOPY W/WO BRUSH/WASH  12/12/2005    Polypectomy.  Diverticulosis-minimal. Bx adenomatous and mucosal polyps - repeat 1  year     HC ECP WITH CATARACT SURGERY Bilateral 2015, 2016     HC LAPAROSCOPY, SURGICAL; APPENDECTOMY  10/31/2004     HC LAPAROSCOPY, SURGICAL; CHOLECYSTECTOMY  2000    Cholecystectomy, Laparoscopic     HC REVISE MEDIAN N/CARPAL TUNNEL SURG  10/01/10    left     HYSTERECTOMY, ELVIN  12/14/09    TAHBSO, MMK     REPAIR ANEURYSM ASCENDING AORTA N/A 6/5/2017    Procedure: REPAIR ANEURYSM ASCENDING AORTA;  Median Sternotomy, Ascending Aortic Aneurysm Repair, Coronary Artery Bypass Graft x1 on pump oxygenator;  Surgeon: Akshat Soto MD;  [facility]     REPLACE GASTROJEJUNOSTOMY TUBE, PERCUTANEOUS N/A 8/19/2019    Procedure: REPLACEMENT, GASTROJEJUNOSTOMY TUBE;  Surgeon: Robert Tafoya MD;  [facility]: [facility]     RESECTION DUODENAL N/A 7/3/2019    Procedure: Resection of Distal Duodenal and proximal Jejunum;  Surgeon: Joaquin Brito MD;  Location: [facility]     ZZHC BIOPSY BREAST, PERC NEEDLE CORE, WITH IMAGING  8/17/2004    Right     ZZHC [facility] ENDOSCOPY, SIMPLE EXAM  03/31/10   No GA issues    Current Outpatient Medications   Medication Sig Dispense Refill     ADACEL 5-2-15.5 LF-MCG/0.5 injection        aspirin 81 MG EC tablet Take 81 mg by mouth every morning  90 tablet 3     atorvastatin (LIPITOR) 20 MG tablet Take 1 tablet (20 mg) by mouth every morning 90 tablet 3     FLUZONE HIGH-DOSE QUADRIVALENT 0.7 ML KOBE injection        losartan (COZAAR) 50 MG tablet TAKE ONE-HALF TABLET(25MG) BY MOUTH EVERY MORNING 45 tablet 3     metoprolol tartrate (LOPRESSOR) 25 MG tablet Take 1 tablet (25 mg) by mouth 2 times daily 180 tablet 3     metroNIDAZOLE (FLAGYL) 500 MG tablet Take 1 tablet (500 mg) by mouth every 6 hours At 8:00 am, 2:00 pm, 8:00 pm the day prior to your surgery with neomycin and zofran. 3 tablet 0     neomycin (MYCIFRADIN) 500 MG tablet Take 2 tablets (1,000 mg) by mouth every 6 hours At 8:00 am, 2:00 pm, 8:00 pm the day prior to your surgery with flagyl and zofran. 6 tablet 0      "ondansetron (ZOFRAN) 4 MG tablet Take 1 tablet (4 mg) by mouth every 6 hours At 8:00 am, 2:00 pm, 8:00 pm the day prior to your surgery with neomycin and flagyl. 3 tablet 0     polyethylene glycol (MIRALAX) 17 g packet Take 238 g by mouth See Admin Instructions Starting at 4 pm night prior to surgery. Refer to \"Getting Ready for Surgery\" instructions. 14 packet 0           Allergies   Allergen Reactions     Contrast Dye Itching and Other (See Comments)     Tingling in mouth     Morphine Nausea     Reports that she did not vomit.         SOCIAL HISTORY:  Smokes: No    She is retired.    ROS:  Back pain: No  Headache: No  Abdominal pain: No  Unexpected weight loss: No  Easy bruising/bleeding: No     Fully vaccinated for COVID-19 - in March 2021 (left arm)    Ht 1.715 m (5' 7.5\")   Wt 85.8 kg (189 lb 3.2 oz)   BMI 29.20 kg/m     Physical Exam  Constitutional:       Appearance: She is well-developed.   Chest:      Breasts: Breasts are symmetrical.         Right: No inverted nipple, mass, nipple discharge, skin change or tenderness.         Left: No inverted nipple, mass, nipple discharge, skin change or tenderness.      Comments: Patient was examined in both supine and upright positions.  Median sternotomy scar unremarkable.  Lymphadenopathy:      Cervical: No cervical adenopathy.      Right cervical: No superficial, deep or posterior cervical adenopathy.     Left cervical: No superficial, deep or posterior cervical adenopathy.      Upper Body:      Right upper body: No supraclavicular, axillary or pectoral adenopathy.      Left upper body: No supraclavicular, axillary or pectoral adenopathy.      Comments: No lymphedema in bilateral upper extremities.   Skin:     General: Skin is warm and dry.          INVESTIGATIONS:    Diagnostic Mammogram (5/5/2021) showed:  BREAST DENSITY: Scattered fibroglandular densities.  FINDINGS:  The group of microcalcifications in the inner posterior left breast demonstrates " "calcifications of varying sizes and varying shapes. Some of these are rounded but others are more linear. These are indeterminate, and further evaluation with biopsy is recommended.  IMPRESSION: BI-RADS CATEGORY: 4 - Suspicious. Indeterminate microcalcifications inner posterior left breast. These should be amenable to stereotactic biopsy.    Screening Mammogram (4/20/2021) showed:  BREAST DENSITY: Scattered fibroglandular densities.  COMMENTS: There are calcifications in the left breast, medial, posterior depth. Diagnostic mammogram recommended for further evaluation.  The right breast is unremarkable.  IMPRESSION: BI-RAD 0. Incomplete: Needs additional imaging evaluation.    Biopsy (5/17/2021) showed:  FINAL DIAGNOSIS:   LEFT breast, calcifications, stereotactic core biopsy:   -Ductal carcinoma in situ (DCIS), nuclear 2, cribriform and solid type(s),  with comedonecrosis   -DCIS is estrogen receptor positive and progesterone receptor positive by immunohistochemistry (see details below)   -Calcifications associated with DCIS (predominantly) and benign acini     ASSESSMENT:  Marilia Bean is a 76 year old woman with left breast ductal carcinoma in situ.    Her stage is 0 ([id number]).    I personally reviewed the imaging above.    The imaging, diagnosis and treatment of ductal carcinoma in situ (DCIS) was discussed with Marilia Bean, her  and her daughter.  Her daughter Rowan joined us by phone.  The mainstay of treatment for DCIS is surgical resection, in the form of either breast conservation (segmental mastectomy plus radiation) or mastectomy.  We reviewed that the two strategies are equivalent in terms of overall survival.  The advantages and disadvantages of each were discussed.   Marilia Bean IS a candidate for breast conservation therapy.  We discussed that this involves two necessary components: the lumpectomy (or \"segmental mastectomy\"), and 6 weeks of whole breast radiation therapy.  We discussed " that the overall survival after breast conservation therapy is identical to mastectomy and that local recurrence (either DCIS or invasive cancer) rates are significantly higher if segmental mastectomy was performed without subsequent radiation.  We also discussed the significance of clear margins and that a subsequent procedure may be necessary to achieve this.  Finally, we reviewed that a small subset of patients who undergo surgery for DCIS will be found to have invasive cancer in the final surgical pathology.  Should this be the case, further surgery +/- adjuvant therapy would be recommended.      Because the lesion is not palpable, a wire-localized approach will be taken.  She would present on the day of surgery for an image-guided wire placement, followed by a surgical excision in the operating room.  The risks of a wire-localized segmental mastectomy were discussed with the patient and family, including the risks of bleeding, wound infection, wound dehiscence, and post-operative contour change to the breast.      We briefly discussed mastectomy as an alternative to breast conservation.  We discussed that a sentinel lymph node biopsy (with risks of lymphedema of 5-10%) would be performed at the index procedure if she elected for a mastectomy.    Finally, I agreed with the recommendation for genetic counseling given the genetic testing guidelines by the [employer] from February 2019.  The natural history of BRCA mutations and breast cancer were discussed with the patient. Should a deleterious mutation be identified, at age 76, a mastectomy may not necessarily be indicated.  However, genetic testing would provide additional information for her family.    I also reviewed that I will discuss with Dr Alireza conley: coordinating the breast and colorectal surgeries. My recommendation is to prioritize the colorectal surgery.  We discussed that I would recommend a medical oncology referral for  neoadjuvant endocrine therapy if proceeding with colorectal surgery first.    All of the above was discussed with Marilia YINKA Vikas and all questions were answered.  She elected to proceed with LEFT wire-localized segmental mastectomy.    Total time spent with the patient was 60 minutes, of which 75% was counseling.     PLAN:  1. LEFT wire-localized segmental mastectomy  2. Genetic counseling referral as scheduled  3. Patient to report to her PCP for preoperative H&P and testing.    Bertha Toro MD MSc [facility]    Division of Surgical Oncology  [facility]     ADDENDUM (6/1/2021):  I was able to personally speak with Dr Lay after the clinic visit, and we will plan to stage the two procedures, colorectal surgery first plus medical oncology referral for endocrine therapy, followed by LEFT wire-localized segmental mastectomy after she has recovered from her colorectal surgery.    Bertha Toro MD MSc [facility] FACS    Division of Surgical Oncology  [facility]     93 minutes spent on the date of the encounter doing chart review, review of test results, interpretation of tests, patient visit, documentation, discussion with other provider(s) and discussion with family.          Again, thank you for allowing me to participate in the care of your patient.        Sincerely,        Bertha Toro MD

## 2021-06-01 NOTE — PROGRESS NOTES
NEW SURGICAL CONSULTATION  Jun 1, 2021    Marilia Bean is a 76 year old woman who presents with a left  breast complaint.  She was referred by Herbert Epstein MD.    HPI:    She noted no masses in either breast, axilla, or neck. She denies any nipple discharge or nipple inversion.     Imaging showed a group of calcifications in left medial breast    A biopsy was performed and a clip was placed.  It showed ductal carcinoma in situ, grade 2, ER+ MT+.    BREAST-SPECIFIC HISTORY:  Prior breast biopsies: Yes - cyst aspiration on the RIGHT  Prior breast surgeries: Yes - right lumpectomy (benign per patient, around 18 years ago)  Prior radiation history: No  Hormone replacement therapy: Yes - Prempro (1990-95)  Menopausal: 1990  Bra size: 36 B  Dominant hand: Right    FAMILY HISTORY:  Breast ca: Yes - sister (dx 60)  Ovarian ca: No  Pancreatic ca: No  Melanoma: No  Gastric ca: No  Colon ca: Yes personal history as well; son (dx 36, has Gardiner syndrome), siblings have Gardiner syndrome  Other cancer: Yes - brother w/ lymphoma, brother w/ non melanoma skin cancer, sister w/ glioblastoma    Genetic counseling appt coming up July 5, 2021    Past Medical History:   Diagnosis Date     Aortic aneurysm (H) 07/01/2007    see 7/07 Ba report -follow yearly, treat high BP is occurs Problem list name updated by automated process. Provider to review     Aortic aneurysm of unspecified site without mention of rupture 07/2007    4.4 cm ascending aorta noted in 2007     Asymptomatic postmenopausal status (age-related) (natural)     on HRT  Prempro -weaning off 5/'2004     CAD (coronary artery disease)     LAD     Family history of Gardiner syndrome      Hyperlipidemia LDL goal <100 10/31/2010     Hypertension goal BP (blood pressure) < 140/90 02/09/2016     Leiomyoma of uterus, unspecified     Uterine fibroid     Lump or mass in breast 07/2004    rt. nodule - bx neg     Gardiner syndrome      Personal history of colonic polyps      Postmenopausal  atrophic vaginitis 08/20/2006     Pulmonary nodule     incidental noted in 2007 during garcia     Pure hypercholesterolemia     mild, diet - low cholesterol, low fat.10/06 start Lovastatin   Able to climb a flight of stairs.  Able to walk a mile flat.    Concurrently diagnosed with colon cancer on screening colonoscopy - she has met with Dr Lay, who in light of likely Gardiner Syndrome, has recommended a subtotal colectomy with ileosigmoid anastomosis and subsequent yearly endoscopic surveillance.    Past Surgical History:   Procedure Laterality Date     BYPASS GRAFT ARTERY CORONARY N/A 6/5/2017    Procedure: BYPASS GRAFT ARTERY CORONARY;;  Surgeon: Akshat Soto MD;  Location: [facility]     C DEXA INTERPRETATION, AXIAL  12/21/01    wnl. 7/2005 wnl but lower     COLONOSCOPY  05/07/07    Repeat in 1 year for surveillance     COLONOSCOPY  12/10/08    repeat 1 year  -see 1/2009 letter     COLONOSCOPY  03/31/10     COLONOSCOPY  10/31/2011    Procedure:COMBINED COLONOSCOPY, SINGLE BIOPSY/POLYPECTOMY BY BIOPSY; colonoscopy with polypectomy by biopsy; Surgeon:JESSICA GARCIA; Location:[facility]     COLONOSCOPY  12/6/2013    Procedure: COMBINED COLONOSCOPY, SINGLE BIOPSY/POLYPECTOMY BY BIOPSY;  Colonoscopy, Polypectomies;  Surgeon: Herbert Salinas MD;  Location: [facility]     COLONOSCOPY N/A 12/8/2015    Procedure: COLONOSCOPY;  Surgeon: Jared Carbajal MD;  Location: [facility]     COLONOSCOPY N/A 4/26/2017    Procedure: COMBINED COLONOSCOPY, SINGLE OR MULTIPLE BIOPSY/POLYPECTOMY BY BIOPSY;  Colonoscopy with polypectomies with forceps and snare;  Surgeon: Herbert Salinas MD;  Location: [facility]     COLONOSCOPY N/A 5/10/2021    Procedure: Colonoscopy, With Polypectomy And Biopsy;  Surgeon: Franklyn Hernandez MD;  Location: [facility]     COLONOSCOPY WITH CO2 INSUFFLATION N/A 5/10/2021    Procedure: COLONOSCOPY, WITH CO2 INSUFFLATION;  Surgeon: Franklyn Hernandez MD;  [facility]: [facility]     COMBINED  ESOPHAGOSCOPY, GASTROSCOPY, DUODENOSCOPY (EGD) WITH CO2 INSUFFLATION N/A 5/10/2021    Procedure: ESOPHAGOGASTRODUODENOSCOPY, WITH CO2 INSUFFLATION;  Surgeon: Franklyn Hernandez MD;  Location: [facility]     ENDOSCOPIC INSERTION TUBE JEJUNOSTOMY N/A 8/5/2019    Procedure: Gastrojejunostomy tube placement with gastropexy;  Surgeon: Ford Mann MD;  [facility]: [facility]     ESOPHAGOSCOPY, GASTROSCOPY, DUODENOSCOPY (EGD), COMBINED N/A 12/8/2015    Procedure: COMBINED ESOPHAGOSCOPY, GASTROSCOPY, DUODENOSCOPY (EGD), BIOPSY SINGLE OR MULTIPLE;  Surgeon: Jared Carbajal MD;  Location:  GI     ESOPHAGOSCOPY, GASTROSCOPY, DUODENOSCOPY (EGD), COMBINED N/A 7/31/2019    Procedure: Endoscopic ultrasound with cystoduodenostomy, stent placement x2, stent dilation, and nasojejunal tube placement;  Surgeon: Ford Mann MD;  [facility]     ESOPHAGOSCOPY, GASTROSCOPY, DUODENOSCOPY (EGD), COMBINED N/A 8/5/2019    Procedure: ESOPHAGOGASTRODUODENOSCOPY (EGD);  Surgeon: Ford Mann MD;  [facility]     ESOPHAGOSCOPY, GASTROSCOPY, DUODENOSCOPY (EGD), COMBINED N/A 8/12/2019    Procedure: ESOPHAGOGASTRODUODENOSCOPY (EGD) with GJ exchange, duodenum stent removal.;  Surgeon: Ford Mann MD;  [facility]     ESOPHAGOSCOPY, GASTROSCOPY, DUODENOSCOPY (EGD), COMBINED N/A 5/10/2021    Procedure: Esophagogastroduodenoscopy, With Biopsy;  Surgeon: Franklyn Hernandez MD;  [facility]     HC COLONOSCOPY THRU STOMA W BIOPSY/CAUTERY TUMOR/POLYP/LESION  1999,2002 2004 polyp - hyperplastic - repeat 5 years ?     HC COLONOSCOPY W/WO BRUSH/WASH  12/12/2005    Polypectomy.  Diverticulosis-minimal. Bx adenomatous and mucosal polyps - repeat 1 year     HC ECP WITH CATARACT SURGERY Bilateral 2015, 2016     HC LAPAROSCOPY, SURGICAL; APPENDECTOMY  10/31/2004     HC LAPAROSCOPY, SURGICAL; CHOLECYSTECTOMY  2000    Cholecystectomy, Laparoscopic     HC REVISE MEDIAN N/CARPAL TUNNEL SURG   10/01/10    left     HYSTERECTOMY, ELVIN  12/14/09    TAHBSO, [facility]     REPAIR ANEURYSM ASCENDING AORTA N/A 6/5/2017    Procedure: REPAIR ANEURYSM ASCENDING AORTA;  Median Sternotomy, Ascending Aortic Aneurysm Repair, Coronary Artery Bypass Graft x1 on pump oxygenator;  Surgeon: Akshat Soto MD;  [facility]     REPLACE GASTROJEJUNOSTOMY TUBE, PERCUTANEOUS N/A 8/19/2019    Procedure: REPLACEMENT, GASTROJEJUNOSTOMY TUBE;  Surgeon: Robert Tafoya MD;  [facility]     RESECTION DUODENAL N/A 7/3/2019    Procedure: Resection of Distal Duodenal and proximal Jejunum;  Surgeon: Joaquin Brito MD;  [facility]     ZZHC BIOPSY BREAST, PERC NEEDLE CORE, WITH IMAGING  8/17/2004    Right     ZZHC [facility] ENDOSCOPY, SIMPLE EXAM  03/31/10   No GA issues    Current Outpatient Medications   Medication Sig Dispense Refill     ADACEL 5-2-15.5 LF-MCG/0.5 injection        aspirin 81 MG EC tablet Take 81 mg by mouth every morning  90 tablet 3     atorvastatin (LIPITOR) 20 MG tablet Take 1 tablet (20 mg) by mouth every morning 90 tablet 3     FLUZONE HIGH-DOSE QUADRIVALENT 0.7 ML KOBE injection        losartan (COZAAR) 50 MG tablet TAKE ONE-HALF TABLET(25MG) BY MOUTH EVERY MORNING 45 tablet 3     metoprolol tartrate (LOPRESSOR) 25 MG tablet Take 1 tablet (25 mg) by mouth 2 times daily 180 tablet 3     metroNIDAZOLE (FLAGYL) 500 MG tablet Take 1 tablet (500 mg) by mouth every 6 hours At 8:00 am, 2:00 pm, 8:00 pm the day prior to your surgery with neomycin and zofran. 3 tablet 0     neomycin (MYCIFRADIN) 500 MG tablet Take 2 tablets (1,000 mg) by mouth every 6 hours At 8:00 am, 2:00 pm, 8:00 pm the day prior to your surgery with flagyl and zofran. 6 tablet 0     ondansetron (ZOFRAN) 4 MG tablet Take 1 tablet (4 mg) by mouth every 6 hours At 8:00 am, 2:00 pm, 8:00 pm the day prior to your surgery with neomycin and flagyl. 3 tablet 0     polyethylene glycol (MIRALAX) 17 g packet Take 238 g by mouth See Admin  "Instructions Starting at 4 pm night prior to surgery. Refer to \"Getting Ready for Surgery\" instructions. 14 packet 0           Allergies   Allergen Reactions     Contrast Dye Itching and Other (See Comments)     Tingling in mouth     Morphine Nausea     Reports that she did not vomit.         SOCIAL HISTORY:  Smokes: No    She is retired.    ROS:  Back pain: No  Headache: No  Abdominal pain: No  Unexpected weight loss: No  Easy bruising/bleeding: No     Fully vaccinated for COVID-19 - in March 2021 (left arm)    Ht 1.715 m (5' 7.5\")   Wt 85.8 kg (189 lb 3.2 oz)   BMI 29.20 kg/m     Physical Exam  Constitutional:       Appearance: She is well-developed.   Chest:      Breasts: Breasts are symmetrical.         Right: No inverted nipple, mass, nipple discharge, skin change or tenderness.         Left: No inverted nipple, mass, nipple discharge, skin change or tenderness.      Comments: Patient was examined in both supine and upright positions.  Median sternotomy scar unremarkable.  Lymphadenopathy:      Cervical: No cervical adenopathy.      Right cervical: No superficial, deep or posterior cervical adenopathy.     Left cervical: No superficial, deep or posterior cervical adenopathy.      Upper Body:      Right upper body: No supraclavicular, axillary or pectoral adenopathy.      Left upper body: No supraclavicular, axillary or pectoral adenopathy.      Comments: No lymphedema in bilateral upper extremities.   Skin:     General: Skin is warm and dry.          INVESTIGATIONS:    Diagnostic Mammogram (5/5/2021) showed:  BREAST DENSITY: Scattered fibroglandular densities.  FINDINGS:  The group of microcalcifications in the inner posterior left breast demonstrates calcifications of varying sizes and varying shapes. Some of these are rounded but others are more linear. These are indeterminate, and further evaluation with biopsy is recommended.  IMPRESSION: BI-RADS CATEGORY: 4 - Suspicious. Indeterminate microcalcifications " "inner posterior left breast. These should be amenable to stereotactic biopsy.    Screening Mammogram (4/20/2021) showed:  BREAST DENSITY: Scattered fibroglandular densities.  COMMENTS: There are calcifications in the left breast, medial, posterior depth. Diagnostic mammogram recommended for further evaluation.  The right breast is unremarkable.  IMPRESSION: BI-RAD 0. Incomplete: Needs additional imaging evaluation.    Biopsy (5/17/2021) showed:  FINAL DIAGNOSIS:   LEFT breast, calcifications, stereotactic core biopsy:   -Ductal carcinoma in situ (DCIS), nuclear 2, cribriform and solid type(s),  with comedonecrosis   -DCIS is estrogen receptor positive and progesterone receptor positive by immunohistochemistry (see details below)   -Calcifications associated with DCIS (predominantly) and benign acini     ASSESSMENT:  Marilia Bean is a 76 year old woman with left breast ductal carcinoma in situ.    Her stage is 0 ([id number]).    I personally reviewed the imaging above.    The imaging, diagnosis and treatment of ductal carcinoma in situ (DCIS) was discussed with Marilia Bean, her  and her daughter.  Her daughter Rowan joined us by phone.  The mainstay of treatment for DCIS is surgical resection, in the form of either breast conservation (segmental mastectomy plus radiation) or mastectomy.  We reviewed that the two strategies are equivalent in terms of overall survival.  The advantages and disadvantages of each were discussed.   Marilia Bean IS a candidate for breast conservation therapy.  We discussed that this involves two necessary components: the lumpectomy (or \"segmental mastectomy\"), and 6 weeks of whole breast radiation therapy.  We discussed that the overall survival after breast conservation therapy is identical to mastectomy and that local recurrence (either DCIS or invasive cancer) rates are significantly higher if segmental mastectomy was performed without subsequent radiation.  We also discussed " the significance of clear margins and that a subsequent procedure may be necessary to achieve this.  Finally, we reviewed that a small subset of patients who undergo surgery for DCIS will be found to have invasive cancer in the final surgical pathology.  Should this be the case, further surgery +/- adjuvant therapy would be recommended.      Because the lesion is not palpable, a wire-localized approach will be taken.  She would present on the day of surgery for an image-guided wire placement, followed by a surgical excision in the operating room.  The risks of a wire-localized segmental mastectomy were discussed with the patient and family, including the risks of bleeding, wound infection, wound dehiscence, and post-operative contour change to the breast.      We briefly discussed mastectomy as an alternative to breast conservation.  We discussed that a sentinel lymph node biopsy (with risks of lymphedema of 5-10%) would be performed at the index procedure if she elected for a mastectomy.    Finally, I agreed with the recommendation for genetic counseling given the genetic testing guidelines by the [employer] Society of Breast [employer] from February 2019.  The natural history of BRCA mutations and breast cancer were discussed with the patient. Should a deleterious mutation be identified, at age 76, a mastectomy may not necessarily be indicated.  However, genetic testing would provide additional information for her family.    I also reviewed that I will discuss with Dr Alireza conley: coordinating the breast and colorectal surgeries. My recommendation is to prioritize the colorectal surgery.  We discussed that I would recommend a medical oncology referral for neoadjuvant endocrine therapy if proceeding with colorectal surgery first.    All of the above was discussed with Marilia Bean and all questions were answered.  She elected to proceed with LEFT wire-localized segmental mastectomy.    Total time spent with the patient  was 60 minutes, of which 75% was counseling.     PLAN:  1. LEFT wire-localized segmental mastectomy  2. Genetic counseling referral as scheduled  3. Patient to report to her PCP for preoperative H&P and testing.    Bertha Toro MD MSc [facility] FACS    Division of Surgical Oncology  [facility]     ADDENDUM (6/1/2021):  I was able to personally speak with Dr Lay after the clinic visit, and we will plan to stage the two procedures, colorectal surgery first plus medical oncology referral for endocrine therapy, followed by LEFT wire-localized segmental mastectomy after she has recovered from her colorectal surgery.    Bertha Toro MD MSc [facility] FACS    Division of Surgical Oncology  [facility]     93 minutes spent on the date of the encounter doing chart review, review of test results, interpretation of tests, patient visit, documentation, discussion with other provider(s) and discussion with family.

## 2021-06-02 ENCOUNTER — TELEPHONE (OUTPATIENT)
Dept: SURGERY | Facility: CLINIC | Age: 76
End: 2021-06-02

## 2021-06-02 NOTE — TELEPHONE ENCOUNTER
Referral from Dr. Toro to schedule with Medical Oncology. Please assist patient with scheduling.    Yasmin Grant LPN

## 2021-06-02 NOTE — TELEPHONE ENCOUNTER
Upon review of chart, patient has been scheduled with Dr Bennett at the [facility].      Terese Blunt  Surgical Specialties Procedure   [facility]  6/2/2021 9:27 AM

## 2021-06-03 ENCOUNTER — TELEPHONE (OUTPATIENT)
Dept: CARDIOLOGY | Facility: CLINIC | Age: 76
End: 2021-06-03

## 2021-06-03 NOTE — TELEPHONE ENCOUNTER
"Pt needs to schedule an appointment with a general cardiologist per a referral.    CARDIOLOGY EVAL ADULT REFERRAL HAS BEEN CANCELLED; can be found in the \"finalized requests\" tab of the pt's appointment desk. PLEASE REINSTATE (right click on request and select \"reinstate\") AND LINK TO APPOINTMENT WHEN SCHEDULING.     "

## 2021-06-07 ENCOUNTER — TUMOR CONFERENCE (OUTPATIENT)
Dept: ONCOLOGY | Facility: CLINIC | Age: 76
End: 2021-06-07
Payer: COMMERCIAL

## 2021-06-07 DIAGNOSIS — Z11.59 ENCOUNTER FOR SCREENING FOR OTHER VIRAL DISEASES: ICD-10-CM

## 2021-06-07 PROBLEM — C18.9 COLON CANCER (H): Status: ACTIVE | Noted: 2021-06-07

## 2021-06-07 NOTE — TELEPHONE ENCOUNTER
FUTURE VISIT INFORMATION      SURGERY INFORMATION:    Date: 21    Location: [facility]    Surgeon:  Akil Lay MD    Anesthesia Type:  General    Procedure: Laparoscopic subtotal colectomy, ileosigmoid anastomosis    Consult: ov     RECORDS REQUESTED FROM:       Primary Care Provider: Herbert Epstein MD- [facility]    Pertinent Medical History:  Aortic aneurysm, hypertension    Most recent EKG+ Tracin20    Most recent ECHO: 2007- [facility]

## 2021-06-07 NOTE — PROGRESS NOTES
Tumor Conference Information  Tumor Conference: Colorectal  Specialties Present: Medical oncology, Radiation oncology, Radiology, Surgery  Patient Status: A new patient  Pathology: Not Discussed  Treatment to Date: None  Clinical Trial Eligibility: Not discussed  Recommended Plan: Surgery (Comment: ileosigmoid anastomosis)  Did the review exceed 30 minutes?: did not           Documentation / Disclaimer Cancer Tumor Board Note  Cancer tumor board recommendations do not override what is determined to be reasonable care and treatment, which is dependent on the circumstances of a patient's case; the patient's medical, social, and personal concerns; and the clinical judgment of the oncologist [physician].

## 2021-06-08 ENCOUNTER — TELEPHONE (OUTPATIENT)
Dept: SURGERY | Facility: CLINIC | Age: 76
End: 2021-06-08

## 2021-06-08 NOTE — TELEPHONE ENCOUNTER
Spoke with patient, she confirmed she has an appt with Cardiology for surgery clearance. I answered some general questions.

## 2021-06-09 ENCOUNTER — TELEPHONE (OUTPATIENT)
Dept: CARDIOLOGY | Facility: CLINIC | Age: 76
End: 2021-06-09

## 2021-06-09 ENCOUNTER — VIRTUAL VISIT (OUTPATIENT)
Dept: ONCOLOGY | Facility: CLINIC | Age: 76
End: 2021-06-09
Attending: COLON & RECTAL SURGERY
Payer: COMMERCIAL

## 2021-06-09 DIAGNOSIS — C18.2 MALIGNANT NEOPLASM OF ASCENDING COLON (H): Primary | ICD-10-CM

## 2021-06-09 DIAGNOSIS — D05.12 DUCTAL CARCINOMA IN SITU (DCIS) OF LEFT BREAST: ICD-10-CM

## 2021-06-09 PROCEDURE — 99204 OFFICE O/P NEW MOD 45 MIN: CPT | Mod: GT | Performed by: STUDENT IN AN ORGANIZED HEALTH CARE EDUCATION/TRAINING PROGRAM

## 2021-06-09 PROCEDURE — 999N001193 HC VIDEO/TELEPHONE VISIT; NO CHARGE

## 2021-06-09 NOTE — PATIENT INSTRUCTIONS
1. Referral to Cancer Rehab PT placed to help you with strengthening, transfers and endurance in order to optimize your function prior to your planned surgery.  2. As discussed, Nutrition services are available for recommendations on diet and supplementation that may be needed. We can readdress this at our next visit.   3. Try to implement a structured home exercise regimen as we discussed with both strengthening and aerobic components as tolerated.  4. Return to clinic with Dr. Bennett in 6-8 weeks.

## 2021-06-09 NOTE — TELEPHONE ENCOUNTER
[facility] Call Center    Phone Message    May a detailed message be left on voicemail: yes     Reason for Call: Other: Pt calling because she reports she received a call from the clinic earlier this morning that she was unable to answer since she was in a video visit. No notes in chart, please give pt another call back to discuss.     Action Taken: Message routed to:  [facility] ([facility] cardio    Travel Screening: Not Applicable

## 2021-06-09 NOTE — PROGRESS NOTES
Marilia is a 76 year old who is being evaluated via a billable video visit.      How would you like to obtain your AVS? MyChart  If the video visit is dropped, the invitation should be resent by: Send to e-mail at: [email or web address]  Will anyone else be joining your video visit? No      Video Start Time: 8:02 AM  Video-Visit Details    Type of service:  Video Visit    Video End Time:8:55 AM    Originating Location (pt. Location): Home    Distant Location (provider location):  [facility]     Platform used for Video Visit: [employer]

## 2021-06-09 NOTE — PROGRESS NOTES
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 Lay), oncology management with endocrine therapy followed by a left wire-localized segmental mastectomy (Dr. Toro) after recovery from colorectal surgery.      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 Campo)  - 5/10/21- Screening Colonscopy with Dr. Patel. Findings included 2 sessile polyps in the ascending colon (3-6 mm) Sessile non-obstructing medium sized mass found in ascending colon (2 cm in length, diameter 20 mm). 20 mm polyp in transverse colon, pedunculated.  - 5/10/21- Pathology- ascending colon mass biopsy- adenocarcinoma, moderately differentiated.  - 21- CT C/A/P- Colon mass not well visualized by CT, no evidence of metastatic disease in chest, abdomen or pelvis. Multiple small pulmonary nodules are not significantly changed since 19.  -21- Visit with Dr. Lay. Given age and risk of altered continence, plan for subtotal colectomy with ileosigmoid anastomosis, yearly endoscopic surveillance. Surgery planned for 21.      Breast-  21- Screening mammogram  demonstrated calcifications in left breast, medial, posterior depth.  5/5/2021- Diagnostic Mammogram with suspicious, indeterminate microcalcifications in inner posterior left breast.  5/17/21- MA Stereotactic Biopsy L Breast calcifications- Pathology revealed ductal carcinoma in situ (DCIS), ER +ve, SC +ve  6/1/21- Visit with Dr. Toro. Plan for left wire-localized segmental mastectomy. There is a plan to stage her 2 procedures (colon and breast). Plan for colorectal surgery first plus medical oncology referral for endocrine therapy, followed by left wire-localized segmental mastectomy after patient has recovered from colorectal surgery.      Symptoms,  Patient was seen this morning for a virtual visit. Her daughter is on the phone for the visit. Patient states that overall she is feeling ok. In terms of her function and mobility, she states that she has generally been active. 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  She does not exercise regularly in a structured way. However, she states that she continues to walk when able, and can walk up to 3/4- 1 mile at times.   She does not use an assistive device to ambulate, and denies any falls or near falls at home.  She, however, does endorse some difficulty at times with sit to stand transfers. She gives the example of sitting on the floor, and needing to hold onto something so she can stabilize herself to stand.   She generally feels stable on her feet when standing and walking.     She denies any ADLs that may be difficult for her at home, including showering, fixing a meal and self grooming tasks. She has noticed mild general deconditioning as she has gotten older, but does not notice any overt weakness in her upper or lower extremities.    In terms of her diet, she states that she currently has good oral intake, including good protein intake daily.     She denies any specific aches and pains  besides her knees occasionally pain, especially with sitting to standing at times as a result of OA. She also does endorse previously having some occasional shoulder pain related to OA, however states that she has not had these symptoms for a while now. She previously had LE sciatic symptoms and low back pain about 2 years ago, and had outpatient PT for this. Her back/lower extremity symptoms resolved.    Patient denies any difficulties with sleep, states that she sleeps well overnight and has generally not had an issue with this.         Therapies/HEP,  Patient has not had outpatient PT for several years. She had PT previously with low back/sciatica and after her open heart surgery in 2017.      Functionally,   Patient is independent for mobility, ADLs and IADLs.             Past Medical and Surgical History:     Past Medical History:   Diagnosis Date     Aortic aneurysm (H) 07/01/2007    see 7/07 [facility] report -follow yearly, treat high BP is occurs Problem list name updated by automated process. Provider to review     Aortic aneurysm of unspecified site without mention of rupture 07/2007    4.4 cm ascending aorta noted in 2007     Asymptomatic postmenopausal status (age-related) (natural)     on HRT  Prempro -weaning off 5/'2004     CAD (coronary artery disease)     LAD     Family history of Gardiner syndrome      Hyperlipidemia LDL goal <100 10/31/2010     Hypertension goal BP (blood pressure) < 140/90 02/09/2016     Leiomyoma of uterus, unspecified     Uterine fibroid     Lump or mass in breast 07/2004    rt. nodule - bx neg     Gardiner syndrome      Personal history of colonic polyps      Postmenopausal atrophic vaginitis 08/20/2006     Pulmonary nodule     incidental noted in 2007 during [place]     Pure hypercholesterolemia     mild, diet - low cholesterol, low fat.10/06 start Lovastatin     Past Surgical History:   Procedure Laterality Date     BYPASS GRAFT ARTERY CORONARY N/A 6/5/2017    Procedure: BYPASS GRAFT  ARTERY CORONARY;;  Surgeon: Akshat Soto MD;  Location: [facility]     C DEXA INTERPRETATION, AXIAL  12/21/01    wnl. 7/2005 wnl but lower     COLONOSCOPY  05/07/07    Repeat in 1 year for surveillance     COLONOSCOPY  12/10/08    repeat 1 year  -see 1/2009 letter     COLONOSCOPY  03/31/10     COLONOSCOPY  10/31/2011    Procedure:COMBINED COLONOSCOPY, SINGLE BIOPSY/POLYPECTOMY BY BIOPSY; colonoscopy with polypectomy by biopsy; Surgeon:JESSICA GARCIA; Location:[facility]     COLONOSCOPY  12/6/2013    Procedure: COMBINED COLONOSCOPY, SINGLE BIOPSY/POLYPECTOMY BY BIOPSY;  Colonoscopy, Polypectomies;  Surgeon: Herbert Salinas MD;  Location: [facility]     COLONOSCOPY N/A 12/8/2015    Procedure: COLONOSCOPY;  Surgeon: Jared Carbajal MD;  Location: [facility]     COLONOSCOPY N/A 4/26/2017    Procedure: COMBINED COLONOSCOPY, SINGLE OR MULTIPLE BIOPSY/POLYPECTOMY BY BIOPSY;  Colonoscopy with polypectomies with forceps and snare;  Surgeon: Herbert Salinas MD;  Location: [facility]     COLONOSCOPY N/A 5/10/2021    Procedure: Colonoscopy, With Polypectomy And Biopsy;  Surgeon: Franklyn Hernandez MD;  Location: [facility]     COLONOSCOPY WITH CO2 INSUFFLATION N/A 5/10/2021    Procedure: COLONOSCOPY, WITH CO2 INSUFFLATION;  Surgeon: Franklyn Hernandez MD;  Location: [facility]     COMBINED ESOPHAGOSCOPY, GASTROSCOPY, DUODENOSCOPY (EGD) WITH CO2 INSUFFLATION N/A 5/10/2021    Procedure: ESOPHAGOGASTRODUODENOSCOPY, WITH CO2 INSUFFLATION;  Surgeon: Franklyn Hernandez MD;  Location: [facility]     ENDOSCOPIC INSERTION TUBE JEJUNOSTOMY N/A 8/5/2019    Procedure: Gastrojejunostomy tube placement with gastropexy;  Surgeon: Ford Mann MD;  Location: [facility]     ESOPHAGOSCOPY, GASTROSCOPY, DUODENOSCOPY (EGD), COMBINED N/A 12/8/2015    Procedure: COMBINED ESOPHAGOSCOPY, GASTROSCOPY, DUODENOSCOPY (EGD), BIOPSY SINGLE OR MULTIPLE;  Surgeon: Jared Carbajal MD;  [facility]     ESOPHAGOSCOPY, GASTROSCOPY,  DUODENOSCOPY (EGD), COMBINED N/A 7/31/2019    Procedure: Endoscopic ultrasound with cystoduodenostomy, stent placement x2, stent dilation, and nasojejunal tube placement;  Surgeon: Ford Mann MD;  [facility]: [facility]     ESOPHAGOSCOPY, GASTROSCOPY, DUODENOSCOPY (EGD), COMBINED N/A 8/5/2019    Procedure: ESOPHAGOGASTRODUODENOSCOPY (EGD);  Surgeon: Ford Mann MD;  [facility]: [facility]     ESOPHAGOSCOPY, GASTROSCOPY, DUODENOSCOPY (EGD), COMBINED N/A 8/12/2019    Procedure: ESOPHAGOGASTRODUODENOSCOPY (EGD) with GJ exchange, duodenum stent removal.;  Surgeon: Ford Mann MD;  Location: [facility]     ESOPHAGOSCOPY, GASTROSCOPY, DUODENOSCOPY (EGD), COMBINED N/A 5/10/2021    Procedure: Esophagogastroduodenoscopy, With Biopsy;  Surgeon: Franklyn Hernandez MD;  Location: [facility]     HC COLONOSCOPY THRU STOMA W BIOPSY/CAUTERY TUMOR/POLYP/LESION  1999,2002 2004 polyp - hyperplastic - repeat 5 years ?     HC COLONOSCOPY W/WO BRUSH/WASH  12/12/2005    Polypectomy.  Diverticulosis-minimal. Bx adenomatous and mucosal polyps - repeat 1 year     HC ECP WITH CATARACT SURGERY Bilateral 2015, 2016     HC LAPAROSCOPY, SURGICAL; APPENDECTOMY  10/31/2004     HC LAPAROSCOPY, SURGICAL; CHOLECYSTECTOMY  2000    Cholecystectomy, Laparoscopic     HC REVISE MEDIAN N/CARPAL TUNNEL SURG  10/01/10    left     HYSTERECTOMY, ELVIN  12/14/09    TAHBSO, [facility]     REPAIR ANEURYSM ASCENDING AORTA N/A 6/5/2017    Procedure: REPAIR ANEURYSM ASCENDING AORTA;  Median Sternotomy, Ascending Aortic Aneurysm Repair, Coronary Artery Bypass Graft x1 on pump oxygenator;  Surgeon: Akshat Soto MD;  Location: [facility]     REPLACE GASTROJEJUNOSTOMY TUBE, PERCUTANEOUS N/A 8/19/2019    Procedure: REPLACEMENT, GASTROJEJUNOSTOMY TUBE;  Surgeon: Robert Tafoya MD;  Location: [facility]     RESECTION DUODENAL N/A 7/3/2019    Procedure: Resection of Distal Duodenal and proximal Jejunum;  Surgeon: Joaquin Brito MD;  Location:  [facility]     ZZHC BIOPSY BREAST, PERC NEEDLE CORE, WITH IMAGING  8/17/2004    Right     [facility] ENDOSCOPY, SIMPLE EXAM  03/31/10            Social History:     Social History     Tobacco Use     Smoking status: Never Smoker     Smokeless tobacco: Never Used   Substance Use Topics     Alcohol use: Yes     Comment: rarely       Marital Status: , Cain  Living situation: Lives in [place], [place] in a single family home  Family support: Other than her , patient has 2 daughters in the area and a daughter in law/sister in law in the healthcare field.   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    Hand dominance: right  Driving: yes            Family History:     Family History   Problem Relation Age of Onset     Cancer Mother         Colon Cancer     Heart Disease Mother         MI     Osteoporosis Mother      Cancer Father         Colon Cancer     Heart Disease Father         Heart Disease/ Heart attacks     Diabetes Father         Adult Onset     Cancer Sister         Colon Cancer     Heart Disease Brother         Heart attacks at age 45     Breast Cancer Sister      Cancer Paternal Grandmother         Unknown type     Heart Disease Maternal Grandfather         MI     Diabetes Sister         Adult Onset     Diabetes Sister         Adult Onset     Diabetes Brother         Adult Onset     Heart Disease Brother         heart attack age 64     Cancer - colorectal Son         age 36, Gardiner syndrome            Medications:     Current Outpatient Medications   Medication Sig Dispense Refill     ADACEL 5-2-15.5 LF-MCG/0.5 injection        aspirin 81 MG EC tablet Take 81 mg by mouth every morning  90 tablet 3     atorvastatin (LIPITOR) 20 MG tablet Take 1 tablet (20 mg) by mouth every morning 90 tablet 3     FLUZONE HIGH-DOSE QUADRIVALENT 0.7  "ML KOBE injection        losartan (COZAAR) 50 MG tablet TAKE ONE-HALF TABLET(25MG) BY MOUTH EVERY MORNING 45 tablet 3     metoprolol tartrate (LOPRESSOR) 25 MG tablet Take 1 tablet (25 mg) by mouth 2 times daily 180 tablet 3     neomycin (MYCIFRADIN) 500 MG tablet Take 2 tablets (1,000 mg) by mouth every 6 hours At 8:00 am, 2:00 pm, 8:00 pm the day prior to your surgery with flagyl and zofran. 6 tablet 0     ondansetron (ZOFRAN) 4 MG tablet Take 1 tablet (4 mg) by mouth every 6 hours At 8:00 am, 2:00 pm, 8:00 pm the day prior to your surgery with neomycin and flagyl. 3 tablet 0     metroNIDAZOLE (FLAGYL) 500 MG tablet Take 1 tablet (500 mg) by mouth every 6 hours At 8:00 am, 2:00 pm, 8:00 pm the day prior to your surgery with neomycin and zofran. (Patient not taking: Reported on 6/9/2021) 3 tablet 0     polyethylene glycol (MIRALAX) 17 g packet Take 238 g by mouth See Admin Instructions Starting at 4 pm night prior to surgery. Refer to \"Getting Ready for Surgery\" instructions. 14 packet 0            Allergies:     Allergies   Allergen Reactions     Contrast Dye Itching and Other (See Comments)     Tingling in mouth     Morphine Nausea     Reports that she did not vomit.               ROS:     A focused ROS is negative other than the symptoms noted above in the HPI.           Physical Examiniation:     VITAL SIGNS: There were no vitals taken for this visit.  BMI: Estimated body mass index is 29.2 kg/m  as calculated from the following:    Height as of 6/1/21: 1.715 m (5' 7.5\").    Weight as of 6/1/21: 85.8 kg (189 lb 3.2 oz).    Gen: NAD, pleasant and cooperative   HEENT: Atraumatic, normocephalic, extraocular movements appear intact.   Pulm: non-labored breathing in room air  Neuro/MSK:   Orientation: Oriented to person, time, place and situation. Exhibits good insight into her conditions and planned management. Speech is clear and articulate.   Motor: Patient observed moving her upper extremities actively during " the visit.            Laboratory/Imaging:     CT Chest/Abdomen/Pelvis (5/18/21):  IMPRESSION:  1.  The reported colon mass is not well visualized by CT.  2.  No CT evidence of metastatic disease in the chest, abdomen or  pelvis.  3.  Multiple small pulmonary nodules are not significantly changed  since 7/2/2019.  4.  Additional incidental findings, as detailed in the body of the  report.           Assessment/Plan:   Marilia Bean is a 76 year old female with history of newly diagnosed left breast ductal carcinoma in situ (ER+ve, MT+ve), h/o duodenal adenocarcinoma (s/p surgical resection with duodeno-jejunostomy with Dr. 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 Lay), oncology management with endocrine therapy followed by a left wire-localized segmental mastectomy (Dr. Toro) after recovery from colorectal surgery. Prehabilitation concepts and recommendations were discussed at length with Marilia. Recommendation is that she start outpatient Cancer Rehab PT as soon as possible for prehabilitation services to help with transfers, strengthening and endurance to optimize patient prior to her planned upcoming major surgical interventions.       1. Patient education: In depth discussion and education was provided about the assessment and implications of each of the below recommendations for management. Patient indicated readiness to learn, all questions were answered and understanding of material presented was confirmed.  2. Therapy/equipment/braces:  1. Outpatient PT referral placed for Prehabilitation/optimization prior to planned surgical interventions.  2. Continue home exercise regimen as tolerated, and as instructed by therapists after  evaluation.   3. Referral / follow up with other providers:  1. We discussed Nutrition services. Patient currently has good nutritional intake and has no concerns, so we will readdress this at her next visit.   4. Follow up: 6 weeks    Alicia Bennett MD  Physical Medicine & Rehabilitation    I appreciate the opportunity to participate in the care of your patient.

## 2021-06-09 NOTE — LETTER
2021         RE: Marilia Bean  [address]  [place] [address]        Dear Colleague,    Thank you for referring your patient, Marilia Bean, to the [facility]. Please see a copy of my visit note below.    Marilia is a 76 year old who is being evaluated via a billable video visit.      How would you like to obtain your AVS? MyChart  If the video visit is dropped, the invitation should be resent by: Send to e-mail at: [email or web address]  Will anyone else be joining your video visit? No      Video Start Time: 8:02 AM  Video-Visit Details    Type of service:  Video Visit    Video End Time:8:55 AM    Originating Location (pt. 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 Lay), oncology management with endocrine therapy followed by a left wire-localized segmental mastectomy (Dr. Toro) after recovery from colorectal surgery.      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 Personal history of duodenal adenocarcinoma (s/p resection with duodeno-jejunostomy with  Dr. Campo)  - 5/10/21- Screening Colonscopy with Dr. Patel. Findings included 2 sessile polyps in the ascending colon (3-6 mm) Sessile non-obstructing medium sized mass found in ascending colon (2 cm in length, diameter 20 mm). 20 mm polyp in transverse colon, pedunculated.  - 5/10/21- Pathology- ascending colon mass biopsy- adenocarcinoma, moderately differentiated.  - 5/18/21- CT C/A/P- Colon mass not well visualized by CT, no evidence of metastatic disease in chest, abdomen or pelvis. Multiple small pulmonary nodules are not significantly changed since 7/2/19.  -5/25/21- Visit with Dr. Lay. Given age and risk of altered continence, plan for subtotal colectomy with ileosigmoid anastomosis, yearly endoscopic surveillance. Surgery planned for 6/30/21.      Breast-  4/20/21- Screening mammogram demonstrated calcifications in left breast, medial, posterior depth.  5/5/2021- Diagnostic Mammogram with suspicious, indeterminate microcalcifications in inner posterior left breast.  5/17/21- MA Stereotactic Biopsy L Breast calcifications- Pathology revealed ductal carcinoma in situ (DCIS), ER +ve, CA +ve  6/1/21- Visit with Dr. Toro. Plan for left wire-localized segmental mastectomy. There is a plan to stage her 2 procedures (colon and breast). Plan for colorectal surgery first plus medical oncology referral for endocrine therapy, followed by left wire-localized segmental mastectomy after patient has recovered from colorectal surgery.      Symptoms,  Patient was seen this morning for a virtual visit. Her daughter is on the phone for the visit. Patient states that overall she is feeling ok. In terms of her function and mobility, she states that she has generally been active. 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  She does not exercise regularly in a structured way. However, she states that she continues to walk when able, and can walk up to  3/4- 1 mile at times.   She does not use an assistive device to ambulate, and denies any falls or near falls at home.  She, however, does endorse some difficulty at times with sit to stand transfers. She gives the example of sitting on the floor, and needing to hold onto something so she can stabilize herself to stand.   She generally feels stable on her feet when standing and walking.     She denies any ADLs that may be difficult for her at home, including showering, fixing a meal and self grooming tasks. She has noticed mild general deconditioning as she has gotten older, but does not notice any overt weakness in her upper or lower extremities.    In terms of her diet, she states that she currently has good oral intake, including good protein intake daily.     She denies any specific aches and pains besides her knees occasionally pain, especially with sitting to standing at times as a result of OA. She also does endorse previously having some occasional shoulder pain related to OA, however states that she has not had these symptoms for a while now. She previously had LE sciatic symptoms and low back pain about 2 years ago, and had outpatient PT for this. Her back/lower extremity symptoms resolved.    Patient denies any difficulties with sleep, states that she sleeps well overnight and has generally not had an issue with this.         Therapies/HEP,  Patient has not had outpatient PT for several years. She had PT previously with low back/sciatica and after her open heart surgery in 2017.      Functionally,   Patient is independent for mobility, ADLs and IADLs.             Past Medical and Surgical History:     Past Medical History:   Diagnosis Date     Aortic aneurysm (H) 07/01/2007    see 7/07 Ba report -follow yearly, treat high BP is occurs Problem list name updated by automated process. Provider to review     Aortic aneurysm of unspecified site without mention of rupture 07/2007    4.4 cm ascending aorta  noted in 2007     Asymptomatic postmenopausal status (age-related) (natural)     on HRT  Prempro -weaning off 5/'2004     CAD (coronary artery disease)     LAD     Family history of Gardiner syndrome      Hyperlipidemia LDL goal <100 10/31/2010     Hypertension goal BP (blood pressure) < 140/90 02/09/2016     Leiomyoma of uterus, unspecified     Uterine fibroid     Lump or mass in breast 07/2004    rt. nodule - bx neg     Gardiner syndrome      Personal history of colonic polyps      Postmenopausal atrophic vaginitis 08/20/2006     Pulmonary nodule     incidental noted in 2007 during [place]     Pure hypercholesterolemia     mild, diet - low cholesterol, low fat.10/06 start Lovastatin     Past Surgical History:   Procedure Laterality Date     BYPASS GRAFT ARTERY CORONARY N/A 6/5/2017    Procedure: BYPASS GRAFT ARTERY CORONARY;;  Surgeon: Akshat Soto MD;  Location: [facility]     C DEXA INTERPRETATION, AXIAL  12/21/01    wnl. 7/2005 wnl but lower     COLONOSCOPY  05/07/07    Repeat in 1 year for surveillance     COLONOSCOPY  12/10/08    repeat 1 year  -see 1/2009 letter     COLONOSCOPY  03/31/10     COLONOSCOPY  10/31/2011    Procedure:COMBINED COLONOSCOPY, SINGLE BIOPSY/POLYPECTOMY BY BIOPSY; colonoscopy with polypectomy by biopsy; Surgeon:JESSICA GARCIA; Location: GI     COLONOSCOPY  12/6/2013    Procedure: COMBINED COLONOSCOPY, SINGLE BIOPSY/POLYPECTOMY BY BIOPSY;  Colonoscopy, Polypectomies;  Surgeon: Herbert Salinas MD;  Location:  GI     COLONOSCOPY N/A 12/8/2015    Procedure: COLONOSCOPY;  Surgeon: Jared Carbajal MD;  Location:  GI     COLONOSCOPY N/A 4/26/2017    Procedure: COMBINED COLONOSCOPY, SINGLE OR MULTIPLE BIOPSY/POLYPECTOMY BY BIOPSY;  Colonoscopy with polypectomies with forceps and snare;  Surgeon: Herbert Salinas MD;  Location:  GI     COLONOSCOPY N/A 5/10/2021    Procedure: Colonoscopy, With Polypectomy And Biopsy;  Surgeon: Franklyn Hernandez MD;  Location:  OR      COLONOSCOPY WITH CO2 INSUFFLATION N/A 5/10/2021    Procedure: COLONOSCOPY, WITH CO2 INSUFFLATION;  Surgeon: Franklyn Hernandez MD;  Location: [facility]     COMBINED ESOPHAGOSCOPY, GASTROSCOPY, DUODENOSCOPY (EGD) WITH CO2 INSUFFLATION N/A 5/10/2021    Procedure: ESOPHAGOGASTRODUODENOSCOPY, WITH CO2 INSUFFLATION;  Surgeon: Franklyn Hernandez MD;  Location: [facility] OR     ENDOSCOPIC INSERTION TUBE JEJUNOSTOMY N/A 8/5/2019    Procedure: Gastrojejunostomy tube placement with gastropexy;  Surgeon: Ford Mann MD;  Location: [facility]     ESOPHAGOSCOPY, GASTROSCOPY, DUODENOSCOPY (EGD), COMBINED N/A 12/8/2015    Procedure: COMBINED ESOPHAGOSCOPY, GASTROSCOPY, DUODENOSCOPY (EGD), BIOPSY SINGLE OR MULTIPLE;  Surgeon: Jared Carbajal MD;  Location:  GI     ESOPHAGOSCOPY, GASTROSCOPY, DUODENOSCOPY (EGD), COMBINED N/A 7/31/2019    Procedure: Endoscopic ultrasound with cystoduodenostomy, stent placement x2, stent dilation, and nasojejunal tube placement;  Surgeon: Ford Mann MD;  Location: [facility]     ESOPHAGOSCOPY, GASTROSCOPY, DUODENOSCOPY (EGD), COMBINED N/A 8/5/2019    Procedure: ESOPHAGOGASTRODUODENOSCOPY (EGD);  Surgeon: Ford Mann MD;  [facility]: [facility]     ESOPHAGOSCOPY, GASTROSCOPY, DUODENOSCOPY (EGD), COMBINED N/A 8/12/2019    Procedure: ESOPHAGOGASTRODUODENOSCOPY (EGD) with GJ exchange, duodenum stent removal.;  Surgeon: Ford Mann MD;  Location: [facility]     ESOPHAGOSCOPY, GASTROSCOPY, DUODENOSCOPY (EGD), COMBINED N/A 5/10/2021    Procedure: Esophagogastroduodenoscopy, With Biopsy;  Surgeon: Franklyn Hernandez MD;  Location: [facility]     HC COLONOSCOPY THRU STOMA W BIOPSY/CAUTERY TUMOR/POLYP/LESION  1999,2002 2004 polyp - hyperplastic - repeat 5 years ?     HC COLONOSCOPY W/WO BRUSH/WASH  12/12/2005    Polypectomy.  Diverticulosis-minimal. Bx adenomatous and mucosal polyps - repeat 1 year     HC ECP WITH CATARACT SURGERY Bilateral 2015, 2016     HC  LAPAROSCOPY, SURGICAL; APPENDECTOMY  10/31/2004     HC LAPAROSCOPY, SURGICAL; CHOLECYSTECTOMY  2000    Cholecystectomy, Laparoscopic     HC REVISE MEDIAN N/CARPAL TUNNEL SURG  10/01/10    left     HYSTERECTOMY, ELVIN  12/14/09    TAHBSO, MMK     REPAIR ANEURYSM ASCENDING AORTA N/A 6/5/2017    Procedure: REPAIR ANEURYSM ASCENDING AORTA;  Median Sternotomy, Ascending Aortic Aneurysm Repair, Coronary Artery Bypass Graft x1 on pump oxygenator;  Surgeon: Akshat Soto MD;  [facility]: [facility]     REPLACE GASTROJEJUNOSTOMY TUBE, PERCUTANEOUS N/A 8/19/2019    Procedure: REPLACEMENT, GASTROJEJUNOSTOMY TUBE;  Surgeon: Robert Tafoya MD;  Location: [facility]     RESECTION DUODENAL N/A 7/3/2019    Procedure: Resection of Distal Duodenal and proximal Jejunum;  Surgeon: Joaquin Brito MD;  Location: [facility]     ZZHC BIOPSY BREAST, PERC NEEDLE CORE, WITH IMAGING  8/17/2004    Right     ZZHC [facility] ENDOSCOPY, SIMPLE EXAM  03/31/10            Social History:     Social History     Tobacco Use     Smoking status: Never Smoker     Smokeless tobacco: Never Used   Substance Use Topics     Alcohol use: Yes     Comment: rarely       Marital Status: , Cain  Living situation: Lives in [place], [place] in a single family home  Family support: Other than her , patient has 2 daughters in the area and a daughter in law/sister in law in the healthcare field.   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    Hand dominance: right  Driving: yes            Family History:     Family History   Problem Relation Age of Onset     Cancer Mother         Colon Cancer     Heart Disease Mother         MI     Osteoporosis Mother      Cancer Father         Colon Cancer     Heart Disease Father         Heart Disease/ Heart attacks     Diabetes Father          "Adult Onset     Cancer Sister         Colon Cancer     Heart Disease Brother         Heart attacks at age 45     Breast Cancer Sister      Cancer Paternal Grandmother         Unknown type     Heart Disease Maternal Grandfather         MI     Diabetes Sister         Adult Onset     Diabetes Sister         Adult Onset     Diabetes Brother         Adult Onset     Heart Disease Brother         heart attack age 64     Cancer - colorectal Son         age 36, Gradiner syndrome            Medications:     Current Outpatient Medications   Medication Sig Dispense Refill     ADACEL 5-2-15.5 LF-MCG/0.5 injection        aspirin 81 MG EC tablet Take 81 mg by mouth every morning  90 tablet 3     atorvastatin (LIPITOR) 20 MG tablet Take 1 tablet (20 mg) by mouth every morning 90 tablet 3     FLUZONE HIGH-DOSE QUADRIVALENT 0.7 ML KOBE injection        losartan (COZAAR) 50 MG tablet TAKE ONE-HALF TABLET(25MG) BY MOUTH EVERY MORNING 45 tablet 3     metoprolol tartrate (LOPRESSOR) 25 MG tablet Take 1 tablet (25 mg) by mouth 2 times daily 180 tablet 3     neomycin (MYCIFRADIN) 500 MG tablet Take 2 tablets (1,000 mg) by mouth every 6 hours At 8:00 am, 2:00 pm, 8:00 pm the day prior to your surgery with flagyl and zofran. 6 tablet 0     ondansetron (ZOFRAN) 4 MG tablet Take 1 tablet (4 mg) by mouth every 6 hours At 8:00 am, 2:00 pm, 8:00 pm the day prior to your surgery with neomycin and flagyl. 3 tablet 0     metroNIDAZOLE (FLAGYL) 500 MG tablet Take 1 tablet (500 mg) by mouth every 6 hours At 8:00 am, 2:00 pm, 8:00 pm the day prior to your surgery with neomycin and zofran. (Patient not taking: Reported on 6/9/2021) 3 tablet 0     polyethylene glycol (MIRALAX) 17 g packet Take 238 g by mouth See Admin Instructions Starting at 4 pm night prior to surgery. Refer to \"Getting Ready for Surgery\" instructions. 14 packet 0            Allergies:     Allergies   Allergen Reactions     Contrast Dye Itching and Other (See Comments)     Tingling in " "mouth     Morphine Nausea     Reports that she did not vomit.               ROS:     A focused ROS is negative other than the symptoms noted above in the HPI.           Physical Examiniation:     VITAL SIGNS: There were no vitals taken for this visit.  BMI: Estimated body mass index is 29.2 kg/m  as calculated from the following:    Height as of 6/1/21: 1.715 m (5' 7.5\").    Weight as of 6/1/21: 85.8 kg (189 lb 3.2 oz).    Gen: NAD, pleasant and cooperative   HEENT: Atraumatic, normocephalic, extraocular movements appear intact.   Pulm: non-labored breathing in room air  Neuro/MSK:   Orientation: Oriented to person, time, place and situation. Exhibits good insight into her conditions and planned management. Speech is clear and articulate.   Motor: Patient observed moving her upper extremities actively during the visit.            Laboratory/Imaging:     CT Chest/Abdomen/Pelvis (5/18/21):  IMPRESSION:  1.  The reported colon mass is not well visualized by CT.  2.  No CT evidence of metastatic disease in the chest, abdomen or  pelvis.  3.  Multiple small pulmonary nodules are not significantly changed  since 7/2/2019.  4.  Additional incidental findings, as detailed in the body of the  report.           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 Lay), oncology management with endocrine therapy followed by a left wire-localized segmental mastectomy (Dr. Toro) after recovery from colorectal surgery. Prehabilitation " concepts and recommendations were discussed at length with Marilia. Recommendation is that she start outpatient Cancer Rehab PT as soon as possible for prehabilitation services to help with transfers, strengthening and endurance to optimize patient prior to her planned upcoming major surgical interventions.       1. Patient education: In depth discussion and education was provided about the assessment and implications of each of the below recommendations for management. Patient indicated readiness to learn, all questions were answered and understanding of material presented was confirmed.  2. Therapy/equipment/braces:  1. Outpatient PT referral placed for Prehabilitation/optimization prior to planned surgical interventions.  2. Continue home exercise regimen as tolerated, and as instructed by therapists after evaluation.   3. Referral / follow up with other providers:  1. We discussed Nutrition services. Patient currently has good nutritional intake and has no concerns, so we will readdress this at her next visit.   4. Follow up: 6 weeks    Alicia Bennett MD  Physical Medicine & Rehabilitation    I appreciate the opportunity to participate in the care of your patient.                     Again, thank you for allowing me to participate in the care of your patient.        Sincerely,        Alicia Bennett MD

## 2021-06-11 ENCOUNTER — TELEPHONE (OUTPATIENT)
Dept: DERMATOLOGY | Facility: CLINIC | Age: 76
End: 2021-06-11

## 2021-06-11 DIAGNOSIS — D05.12 DUCTAL CARCINOMA IN SITU (DCIS) OF LEFT BREAST: Primary | ICD-10-CM

## 2021-06-11 DIAGNOSIS — D05.10 DCIS (DUCTAL CARCINOMA IN SITU): Primary | ICD-10-CM

## 2021-06-11 RX ORDER — ACETAMINOPHEN 325 MG/1
975 TABLET ORAL ONCE
Status: CANCELLED | OUTPATIENT
Start: 2021-06-11 | End: 2021-06-11

## 2021-06-11 RX ORDER — CEFAZOLIN SODIUM 2 G/50ML
2 SOLUTION INTRAVENOUS
Status: CANCELLED | OUTPATIENT
Start: 2021-06-11

## 2021-06-11 RX ORDER — CEFAZOLIN SODIUM 2 G/50ML
2 SOLUTION INTRAVENOUS SEE ADMIN INSTRUCTIONS
Status: CANCELLED | OUTPATIENT
Start: 2021-06-11

## 2021-06-11 NOTE — TELEPHONE ENCOUNTER
Appears to be completed in notes by surgery ctr. Coordinator  .Al Guerrier L.P.N.,Krista perales. Dept.

## 2021-06-11 NOTE — TELEPHONE ENCOUNTER
Pt called and states that she is confused because she thought  was with Medical Oncology but pt states that Donald is not she is with rehab. Pt states that she has not been contacted by medical Oncology. RN notified pt that RN would route a message to our Procedure schedulers to assist with scheduling an appointment with medical oncology and also to the [facility] surgery pool to follow up to ensure pt is scheduled. RN will also update  as well and update pt with any further information. Pt states that she feels better and has a better understanding of things. Pt given RN's direct contact information. Staff message routed to the Procedure scheduling pool high priority to assist with scheduling a medical oncology appointment.Medical Oncology referral placed ..Michelle Menjivar RN, RN Geistfeld, Elizabeth J; Bertha Toro MD; Terese Hester RN   Cc: Yasmin Grant LPN; Danika Yan RN   Caller: Unspecified (2 days ago,  6:08 PM)   Phone Number: [phone]             [facility]ly [facility] team - this patient just called and left me a message and she VERY VERY confused on the plan. The patient is still waiting to discuss with someone the plan for endocrine therapy in the mean time of waiting for her breast surgery. She continues to be contacted by scheduling to arrange visits but still not clear on all that is being set up etc.     Can someone from the care team that is assisting with these appointments please call this patient ASAP and answer her questions.     If not please let me know and I will have to try and figure out what is going on and call her this after noon as I am in clinic with providers this morning.     Please let me know ASAP if someone can call pt to discuss.     Thanks!  BREANNA

## 2021-06-14 ENCOUNTER — ANESTHESIA EVENT (OUTPATIENT)
Dept: SURGERY | Facility: CLINIC | Age: 76
End: 2021-06-14

## 2021-06-14 ENCOUNTER — PRE VISIT (OUTPATIENT)
Dept: SURGERY | Facility: CLINIC | Age: 76
End: 2021-06-14

## 2021-06-14 ENCOUNTER — TELEPHONE (OUTPATIENT)
Dept: SURGERY | Facility: CLINIC | Age: 76
End: 2021-06-14

## 2021-06-14 DIAGNOSIS — Z11.59 ENCOUNTER FOR SCREENING FOR OTHER VIRAL DISEASES: ICD-10-CM

## 2021-06-14 ASSESSMENT — LIFESTYLE VARIABLES: TOBACCO_USE: 0

## 2021-06-14 NOTE — ANESTHESIA PREPROCEDURE EVALUATION
Anesthesia Pre-Procedure Evaluation    Patient: Marilia Bean   MRN: [id number] : 1945        Preoperative Diagnosis: * No surgery found *   Procedure :      Past Medical History:   Diagnosis Date     Aortic aneurysm (H) 2007    see  [place] report -follow yearly, treat high BP is occurs Problem list name updated by automated process. Provider to review     Aortic aneurysm of unspecified site without mention of rupture 2007    4.4 cm ascending aorta noted in      Asymptomatic postmenopausal status (age-related) (natural)     on HRT  Prempro -weaning off      CAD (coronary artery disease)     LAD     Family history of Gardiner syndrome      Hyperlipidemia LDL goal <100 10/31/2010     Hypertension goal BP (blood pressure) < 140/90 2016     Leiomyoma of uterus, unspecified     Uterine fibroid     Lump or mass in breast 2004    rt. nodule - bx neg     Gardiner syndrome      Personal history of colonic polyps      Postmenopausal atrophic vaginitis 2006     Pulmonary nodule     incidental noted in  during [place]     Pure hypercholesterolemia     mild, diet - low cholesterol, low fat.10/06 start Lovastatin      Past Surgical History:   Procedure Laterality Date     BYPASS GRAFT ARTERY CORONARY N/A 2017    Procedure: BYPASS GRAFT ARTERY CORONARY;;  Surgeon: Akshat Soto MD;  Location: [facility]     C DEXA INTERPRETATION, AXIAL  01    wnl. 2005 wnl but lower     COLONOSCOPY  07    Repeat in 1 year for surveillance     COLONOSCOPY  12/10/08    repeat 1 year  -see 2009 letter     COLONOSCOPY  03/31/10     COLONOSCOPY  10/31/2011    Procedure:COMBINED COLONOSCOPY, SINGLE BIOPSY/POLYPECTOMY BY BIOPSY; colonoscopy with polypectomy by biopsy; Surgeon:JESSICA GARCIA; Location: [facility]     COLONOSCOPY  2013    Procedure: COMBINED COLONOSCOPY, SINGLE BIOPSY/POLYPECTOMY BY BIOPSY;  Colonoscopy, Polypectomies;  Surgeon: Herbert Salinas MD;  Location:  GI      COLONOSCOPY N/A 12/8/2015    Procedure: COLONOSCOPY;  Surgeon: Jared Carbajal MD;  Location: [facility]     COLONOSCOPY N/A 4/26/2017    Procedure: COMBINED COLONOSCOPY, SINGLE OR MULTIPLE BIOPSY/POLYPECTOMY BY BIOPSY;  Colonoscopy with polypectomies with forceps and snare;  Surgeon: Herbert Salinas MD;  Location: [facility]     COLONOSCOPY N/A 5/10/2021    Procedure: Colonoscopy, With Polypectomy And Biopsy;  Surgeon: Franklyn Hernandez MD;  Location: [facility]     COLONOSCOPY WITH CO2 INSUFFLATION N/A 5/10/2021    Procedure: COLONOSCOPY, WITH CO2 INSUFFLATION;  Surgeon: Franklyn Hernandez MD;  [facility]     COMBINED ESOPHAGOSCOPY, GASTROSCOPY, DUODENOSCOPY (EGD) WITH CO2 INSUFFLATION N/A 5/10/2021    Procedure: ESOPHAGOGASTRODUODENOSCOPY, WITH CO2 INSUFFLATION;  Surgeon: Franklyn Hernandez MD;  Location: [facility]     ENDOSCOPIC INSERTION TUBE JEJUNOSTOMY N/A 8/5/2019    Procedure: Gastrojejunostomy tube placement with gastropexy;  Surgeon: Ford Mann MD;  [facility]     ESOPHAGOSCOPY, GASTROSCOPY, DUODENOSCOPY (EGD), COMBINED N/A 12/8/2015    Procedure: COMBINED ESOPHAGOSCOPY, GASTROSCOPY, DUODENOSCOPY (EGD), BIOPSY SINGLE OR MULTIPLE;  Surgeon: Jared Carbajal MD;  [facility]     ESOPHAGOSCOPY, GASTROSCOPY, DUODENOSCOPY (EGD), COMBINED N/A 7/31/2019    Procedure: Endoscopic ultrasound with cystoduodenostomy, stent placement x2, stent dilation, and nasojejunal tube placement;  Surgeon: Ford Mann MD;  [facility]     ESOPHAGOSCOPY, GASTROSCOPY, DUODENOSCOPY (EGD), COMBINED N/A 8/5/2019    Procedure: ESOPHAGOGASTRODUODENOSCOPY (EGD);  Surgeon: Ford Mann MD;  [facility]     ESOPHAGOSCOPY, GASTROSCOPY, DUODENOSCOPY (EGD), COMBINED N/A 8/12/2019    Procedure: ESOPHAGOGASTRODUODENOSCOPY (EGD) with GJ exchange, duodenum stent removal.;  Surgeon: Ford Mann MD;  [facility]     ESOPHAGOSCOPY, GASTROSCOPY,  DUODENOSCOPY (EGD), COMBINED N/A 5/10/2021    Procedure: Esophagogastroduodenoscopy, With Biopsy;  Surgeon: Franklyn Hernandez MD;  Location: [facility]     HC COLONOSCOPY THRU STOMA W BIOPSY/CAUTERY TUMOR/POLYP/LESION  1999,2002 2004 polyp - hyperplastic - repeat 5 years ?     HC COLONOSCOPY W/WO BRUSH/WASH  12/12/2005    Polypectomy.  Diverticulosis-minimal. Bx adenomatous and mucosal polyps - repeat 1 year     HC ECP WITH CATARACT SURGERY Bilateral 2015, 2016     HC LAPAROSCOPY, SURGICAL; APPENDECTOMY  10/31/2004     HC LAPAROSCOPY, SURGICAL; CHOLECYSTECTOMY  2000    Cholecystectomy, Laparoscopic     HC REVISE MEDIAN N/CARPAL TUNNEL SURG  10/01/10    left     HYSTERECTOMY, ELVIN  12/14/09    TAHBSO, MMK     REPAIR ANEURYSM ASCENDING AORTA N/A 6/5/2017    Procedure: REPAIR ANEURYSM ASCENDING AORTA;  Median Sternotomy, Ascending Aortic Aneurysm Repair, Coronary Artery Bypass Graft x1 on pump oxygenator;  Surgeon: Akshat Soto MD;  [facility]     REPLACE GASTROJEJUNOSTOMY TUBE, PERCUTANEOUS N/A 8/19/2019    Procedure: REPLACEMENT, GASTROJEJUNOSTOMY TUBE;  Surgeon: Robert Tafoya MD;  [facility]     RESECTION DUODENAL N/A 7/3/2019    Procedure: Resection of Distal Duodenal and proximal Jejunum;  Surgeon: Joaquin Brito MD;  [facility]     ZZ BIOPSY BREAST, PERC NEEDLE CORE, WITH IMAGING  8/17/2004    Right     ZZHC [facility] ENDOSCOPY, SIMPLE EXAM  03/31/10      Allergies   Allergen Reactions     Contrast Dye Itching and Other (See Comments)     Tingling in mouth     Morphine Nausea     Reports that she did not vomit.       Social History     Tobacco Use     Smoking status: Never Smoker     Smokeless tobacco: Never Used   Substance Use Topics     Alcohol use: Yes     Comment: rarely      Wt Readings from Last 1 Encounters:   06/01/21 85.8 kg (189 lb 3.2 oz)        Anesthesia Evaluation   Pt has had prior anesthetic. Type: General and MAC.    No history of anesthetic  complications       ROS/MED HX  ENT/Pulmonary:     (+) JARED risk factors, hypertension,  (-) tobacco use   Neurologic:  - neg neurologic ROS     Cardiovascular: Comment:   S/p ascending aortic aneurysm repair with CABG by Dr. Soto 2017. CABG with LIMA to LAD done at the same time. Post-op atrial fibrillation treated with amiodarone and warfarin that eventually resolved without recurrence.     Denies cardiac symptoms including chest pain, SOB, palpitations, syncope, JAY, orthopnea, or PND.          (+) hypertension--CAD -CABG--Taking blood thinners Previous cardiac testing     METS/Exercise Tolerance: >4 METS Comment: Reports she could easily walk a mile.  Does not do any regular exercise but stays busy with house keeping and everyday activities.    Hematologic:     (+) history of blood transfusion (2017), no previous transfusion reaction, Known PRBC Anitbodies:No     Musculoskeletal:  - neg musculoskeletal ROS     GI/Hepatic:  - neg GI/hepatic ROS   (+) appendicitis (s/p appendectomy), cholecystitis/cholelithiasis (s/p cholecystectomy),     Renal/Genitourinary:  - neg Renal ROS     Endo:  - neg endo ROS     Psychiatric/Substance Use:  - neg psychiatric ROS     Infectious Disease: Comment: Completed COVID vaccine in March 2021. - neg infectious disease ROS     Malignancy:   (+) Malignancy, History of Breast and Other.Breast CA Active status post.  Other CA Colon cancer Active status post.    Other: Comment: S/P ELVIN, BSO and MMK . 12/2009              OUTSIDE LABS:  CBC:   Lab Results   Component Value Date    WBC 5.9 05/18/2021    WBC 6.8 12/16/2020    HGB 13.7 05/18/2021    HGB 11.9 12/16/2020    HCT 42.2 05/18/2021    HCT 36.4 12/16/2020     05/18/2021     12/16/2020     BMP:   Lab Results   Component Value Date     05/18/2021     03/26/2021    POTASSIUM 4.1 05/18/2021    POTASSIUM 4.0 03/26/2021    CHLORIDE 107 05/18/2021    CHLORIDE 109 03/26/2021    CO2 28 05/18/2021    CO2 27 03/26/2021     BUN 19 2021    BUN 20 2021    CR 0.89 2021    CR 0.89 2021     (H) 2021    GLC 94 2021     COAGS:   Lab Results   Component Value Date    PTT 33 2017    INR 1.12 2019    FIBR 269 2017     POC:   Lab Results   Component Value Date    BGM 79 2019     HEPATIC:   Lab Results   Component Value Date    ALBUMIN 3.7 2021    PROTTOTAL 7.9 2021    ALT 21 2021    AST 16 2021    ALKPHOS 103 2021    BILITOTAL 0.4 2021     OTHER:   Lab Results   Component Value Date    PH 7.32 (L) 2017    LACT 1.1 2019    A1C 5.8 2017    TARIQ 9.3 2021    PHOS 3.8 2019    MAG 2.1 2019    LIPASE 411 (H) 2019    AMYLASE 30 2019    TSH 4.97 (H) 2017             PAC Discussion and Assessment    ASA Classification: 3  Case is suitable for: [place]  Anesthetic techniques and relevant risks discussed: GA                  PAC Resident/NP Anesthesia Assessment: Type of service:  Video Visit    Patient verbally consented to video service today: YES    Two identifiers used:  yes (name and )    Video Start Time: 8:37  Video End Time (time video stopped): 8:57    Originating Location (pt. Location): Home    Distant Location (provider location):  home    Mode of Communication:  Video Conference via [employer]    Please note, because this was a virtual visit, a full physical could not be completed.  On the DOS, the OOD of anesthesia will complete the appropriate components of the physical exam.  --    Marilia Bean is a 76-year-old female schedule for Laparoscopic subtotal colectomy, ileosigmoid anastomosis with Akil Lay MD on 2021 at [facility] under general anesthesia with ORAS protocol.       Ms. Bean was seen in colon and rectal consultation on 21 with Dr. 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 Campo in 2019.  Ms. Bean had a colonoscopy on 5/10/21 that showed an ascending colon mass with the biopsy being positive for adenocarcinoma, moderately differentiated. She was referred to Dr Lay.  He counseled her on her diagnosis and treatment options.  Ms. Bean presents to the PAC today virtually and would like to proceed with above surgical intervention.      Additional notable PMH includes CAD and aortic aneurysm s/p aortic aneurysm repair with vascular graft and single vessel coronary artery bypass by Dr. Soto (6/5/17), HTN, HLD, h/o CKD, Gardiner syndrome and recent dx of DCIS of left breast.     PROCEDURES   ECG 12/16/2020   SB 59 bpm  Possible left atrial enlargement  Non-specific T-wave abnormality.     Holter monitor 12/2020 (unable to find official report but recorded per DO JANN Forbes phone note)     \"Couple short runs of supraventricular tachycardia.  Instructed to continue metoprolol.           CTA chest dated 6/10/2017 10:28 AM      CLINICAL INFORMATION: routine imaging after repair of ascending aortic     aneurysm                                                                     Impression:     1. New postoperative changes of ascending aortic aneurysm repair with     vascular graft as well as coronary artery bypass with postsurgical     inflammation and hematoma in the anterior mediastinum. The treated     ascending aorta now measures 3.5 x 3.4 cm without anastomotic leak.     Measurements as detailed above.     2. New left greater than right small pleural effusions with subjacent     compressive atelectasis.     3. Subcentimeter pulmonary nodules measuring up to 9 mm, grossly     unchanged from 1/30/2015.      I have personally reviewed the examination and initial interpretation     and I agree with the findings.      ROSS MCCLELLAN       Coronary angiogram 5/22/2017     Catheterization " diagnosis:      1-vessel coronary artery disease (LAD), without left main lesion.         Echocardiogram 3/16/17 this was done prior to aortic aneurysm repair     Interpretation Summary      The aortic Sinus(es) of Valsalva are mildly dilated at at 4.1 cm and the     ascending aorta is Moderately dilated at 5.0 cm (The ULNL = 3.7 cm). Compared     to the serial echos dated 3- and 1-8-2013, the ascending aorta has     progressively dilated from 4.2 cm => 4.6 cm => currently 5.0 cm. Close     continued and regular monitoring of this progressive ascending aortic     dilation/aneurysm is indicated.     The left ventricle is normal in size with borderline to mild concentric left     ventricular hypertrophy.     The visual ejection fraction is estimated at 65-70% with Grade I or early     diastolic dysfunction.     There is trace mitral regurgitation.     There is trace aortic regurgitation.     Right ventricle systolic pressure estimate is noted below and normal.         Carotid US, bilateral 4/14/17                                                                    Impression:     1. Right side:         Degree of stenosis: Normal     2. Left side:          Degree of stenosis: Normal       She has the following specific operative considerations:   - JARED # of risks 2/8 = low risk  - VTE risk:  3%  - Risk of PONV score = 2.  If > 2, anti-emetic intervention recommended.      #  Cardiology   - CAD and ascending aortic aneurysm s/p ascending aortic aneurysm repair with CABG (MURPHY to LAD) with Dr. Soto (2017).  Denies cardiac symptoms. METS:  >4. Will be seeing cardiology tomorrow for cardiac risk assessment. RCRI : Coronary Artery Disease (MI, positive stress test, angina, Qs on EKG) and intra-peritoneal surgery.  6.6 % risk of major adverse cardiac event. Hold ASA for 7 days prior to surgery.   - HTN take beta blocker DOS.  Hold losartan DOS.   - HLD, take statin as prescribed DOS.     #  Pulmonary   - no smoking  hx  - Denies pulmonary symptoms  - No inhalers     #  Hematology   -  H/o blood transfusion without reaction      # Renal   - h/o CKD, stage III.  BMP ordered by Dr. Lay prior to surgery.       # Colon and rectal  - H/O duodenal adenocarcinoma s/p surgical resection with resulting duodeno-jejunostomy with Dr. Campo in 2019.  On colonoscopy 5/2021, ascending colon mass with the biopsy being positive for adenocarcinoma, moderately differentiated. Above procedure planned with ORAS protocol.      # GYN  - Newly dx DCIS of Left breast.  Following with Dr. Toro.  Will pursue treatment once recovered from above surgery.     #  ID  - COVID-19 testing per surgeon's office    #   Anesthesia considerations   -  Refer to PAC assessment in anesthesia records      Arrival time, NPO, shower and medication instructions provided by nursing staff today.    Patient is an acceptable candidate for the proposed procedure.  No further diagnostic evaluation is needed.     **For further details of assessment, testing, and physical exam please see H and P completed on same date.      Reviewed and Signed by PAC Mid-Level Provider/Resident  Mid-Level Provider/Resident: Rosanna BROOKS CNP  Date: 6/14/2021                                 Rosanna Reyes, CECILIA CNP

## 2021-06-14 NOTE — TELEPHONE ENCOUNTER
6/14 Called and left voicemail. Provided phone number [phone] to schedule referral for medical oncology.     Justa larsen Procedure   Orthopedics, Podiatry, Sports Medicine, ENT/Eye Specialties  [facility] [place]   [phone]      ----- Message from Terese Hester RN sent at 6/11/2021  5:02 PM CDT -----  Referral was placed for medical oncology today per . Can someone please assist with scheduling an appointment? Pt asked not to be called Monday morning to schedule as she has another appt     Thanks so much    Terese

## 2021-06-15 ENCOUNTER — HOSPITAL ENCOUNTER (OUTPATIENT)
Dept: PHYSICAL THERAPY | Facility: CLINIC | Age: 76
Setting detail: THERAPIES SERIES
End: 2021-06-15
Attending: STUDENT IN AN ORGANIZED HEALTH CARE EDUCATION/TRAINING PROGRAM
Payer: MEDICARE

## 2021-06-15 ENCOUNTER — VIRTUAL VISIT (OUTPATIENT)
Dept: CARDIOLOGY | Facility: CLINIC | Age: 76
End: 2021-06-15
Attending: INTERNAL MEDICINE
Payer: COMMERCIAL

## 2021-06-15 DIAGNOSIS — Z95.1 STATUS POST AORTO-CORONARY ARTERY BYPASS GRAFT: ICD-10-CM

## 2021-06-15 DIAGNOSIS — C18.8 OVERLAPPING MALIGNANT NEOPLASM OF COLON (H): ICD-10-CM

## 2021-06-15 DIAGNOSIS — Z98.890 S/P THORACIC AORTIC ANEURYSM REPAIR: Primary | ICD-10-CM

## 2021-06-15 DIAGNOSIS — C18.2 MALIGNANT NEOPLASM OF ASCENDING COLON (H): ICD-10-CM

## 2021-06-15 DIAGNOSIS — Z86.79 S/P THORACIC AORTIC ANEURYSM REPAIR: Primary | ICD-10-CM

## 2021-06-15 DIAGNOSIS — D05.12 DUCTAL CARCINOMA IN SITU (DCIS) OF LEFT BREAST: ICD-10-CM

## 2021-06-15 PROCEDURE — 97112 NEUROMUSCULAR REEDUCATION: CPT | Mod: GP | Performed by: PHYSICAL THERAPIST

## 2021-06-15 PROCEDURE — 99204 OFFICE O/P NEW MOD 45 MIN: CPT | Mod: 95 | Performed by: INTERNAL MEDICINE

## 2021-06-15 PROCEDURE — 97162 PT EVAL MOD COMPLEX 30 MIN: CPT | Mod: GP | Performed by: PHYSICAL THERAPIST

## 2021-06-15 ASSESSMENT — 6 MINUTE WALK TEST (6MWT)
TOTAL DISTANCE WALKED (METERS): 454.46
TOTAL DISTANCE WALKED (FT): 1491

## 2021-06-15 NOTE — PROGRESS NOTES
06/15/21 1405   Quick Adds   Quick Adds Certification  (Medicare and [employer])   Type of Visit Initial OP PT Evaluation   General Information   Start of Care Date 06/15/21   Referring Physician Alicia Bennett MD   Orders Evaluate and Treat as Indicated   Order Date 06/09/21   Medical Diagnosis Malignant neoplasm of ascending colon (H) C18.2  - Primary  Ductal carcinoma in situ (DCIS) of left breast D05.12    Onset of illness/injury or Date of Surgery 05/25/21   Precautions/Limitations no known precautions/limitations   Surgical/Medical history reviewed Yes   Pertinent history of current problem (include personal factors and/or comorbidities that impact the POC) Pt reports that She feels good! She underwent a regular colonoscopy 5/10/21 and they found a medium sized mass in colon. At her regular mammogram they found a new small lump in her breast.  77 y/o with history of gardiner syndrome, newly diagnosed left breast ductal carcinoma, new ascending colon cancer scheduled for surgery 6/30 and a lump in breast to be removed after that (planned 8/16).  She reports her body is feeling pretty darn good over all.  She denies pain.  Dr Bennett wants her to be strong for surgery.  Pt reports that she is not sure why she is at therapy.  She notes that she can get out of a chair with no problem, but prefers to have something to hold on to when getting up from floor.  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  denies issues with ADLS or getting out of bed. No problem on stairs basement is 15 stairs. PMHX is significant for duodenal adenocarcinoma s/p surgical resection with resulting duodeno-jejunostomy with Dr. Campo in 2019,  CAD and aortic aneurysm s/p aortic aneurysm repair with vascular graft and single vessel coronary artery bypass by Dr. Soto (6/5/17), HTN, HLD, h/o CKD, Gardiner syndrome and recent dx of DCIS of left breast.    Prior level of function comment Indep   Diagnostic Tests   (See EMR)    Current Community Support Family/friend caregiver   Patient role/Employment history Retired   Living environment House/[facility]   Home/Community Accessibility Comments no concerns   Patient/Family Goals Statement To get through her up coming surgeries   Fall Risk Screen   Fall screen completed by PT   Have you fallen 2 or more times in the past year? No   Have you fallen and had an injury in the past year? No   Is patient a fall risk? No   Abuse Screen (yes response referral indicated)   Feels Unsafe at Home or Work/School no   Feels Threatened by Someone no   Does Anyone Try to Keep You From Having Contact with Others or Doing Things Outside Your Home? no   Physical Signs of Abuse Present no   System Outcome Measures   Outcome Measures Cancer Rehab   FACIT Fatigue Subscale (score out of 52). The higher the score, the better the QOL. 50   Six Minute Walk (meters). An increase of 70 or more meters indicates statistically significant change. 454.46  (1491' Average for age group is 1332' or 406m.)   Pain   Patient currently in pain No   Cognitive Status Examination   Orientation orientation to person, place and time   Level of Consciousness alert   Follows Commands and Answers Questions 100% of the time   Personal Safety and Judgment intact   Memory intact   Observation   Observation NAD, pleasant   Integumentary   Integumentary No deficits were identified   Posture   Posture Normal   Range of Motion (ROM)   ROM Comment WNL Grossly   Strength   Manual Muscle Testing Quick Adds No deficits were identified   Strength Comments 5xSTS 9 sec,  17 reps in 30 sec   Bed Mobility   Bed Mobility Comments indep   Transfer Skills   Transfer Comments indep   Gait   Gait Comments WNL   Gait Special Tests   Gait Special Tests SIX MINUTE WALK TEST   Gait Special Tests Six Minute Walk Test   Feet 1491 Feet   Comments well with in age related norms, able to walk and talk with a friend during this time   Gait Special Tests Other    Comments Gait speed =1.262m/s while walking and talking   Balance   Balance Comments Able to walk and visit with friend with no LOB and no path deviation   Balance Special Tests   Balance Special Tests Modified CTSIB Conditions;Single leg stance right;Single leg stance left   Balance Special Tests Single Leg Stance Right,   Right, seconds 19 Seconds   Balance Special Tests Single Leg Stance Left   Left, seconds 22 Seconds   Balance Special Tests Modified CTSIB Conditions   Condition 1, seconds 30 Seconds   Condition 2, seconds 30 Seconds   Condition 4, seconds 30 Seconds   Condition 5, seconds 14 Seconds   Sensory Examination   Sensory Perception Comments intact to 4.31 monofilament testing (B) LE's and arms.    Coordination   Coordination no deficits were identified   Muscle Tone   Muscle Tone no deficits were identified   Planned Therapy Interventions   Planned Therapy Interventions balance training;neuromuscular re-education;strengthening   Clinical Impression   Criteria for Skilled Therapeutic Interventions Met yes, treatment indicated   PT Diagnosis mild decrease in balance, planned Cancer surgeries.    Influenced by the following impairments mild impairment in balance, Possible sequelae of cancer surgeries   Functional limitations due to impairments currently pt is functioning well.   Clinical Presentation Evolving/Changing   Clinical Presentation Rationale Pt will have 2 surgeries in the next 2 months   Clinical Decision Making (Complexity) Moderate complexity   Therapy Frequency other (see comments)   Predicted Duration of Therapy Intervention (days/wks) Evaluation and 1 treatment completed today. Pt will call after surgery if function delcines   Risk & Benefits of therapy have been explained Yes   Patient, Family & other staff in agreement with plan of care Yes   Clinical Impression Comments PPW mild decrease in balance, planned Cancer surgeries.  Pt was instructed in balance HEP to  improve her balance.  "She is not currently at increased risk for falls but was not able to complete 30\" EC on compliant surface or SLS x 30\"  on the first try.  (she could on subsequent trials)  Pt demos no further need for PT intervention at this time.  However after her surgeries she may benefit from cancer rehab to help her return to this baseline of activity. Pt is instructed to call for an appointment if she is not returning to her PLOF after surgery.  We will keep this chart open until the end of August.     Education Assessment   Preferred Learning Style Listening   Barriers to Learning No barriers   GOALS   PT Eval Goals 1   Goal 1   Goal Identifier Education   Goal Description Pt to verbalize understanding of PT role and cancer rehab. Pt will also verbalize and demo understaning of HEP for balance training.    Target Date 06/15/21   Date Met 06/15/21   Total Evaluation Time   PT Eval, Moderate Complexity Minutes (67904) 40   Therapy Certification   Certification date from 06/15/21   Certification date to 09/13/21   Medical Diagnosis Malignant neoplasm of ascending colon (H) C18.2  - Primary  Ductal carcinoma in situ (DCIS) of left breast D05.12        Addendum: Pt did not return after 1st visit. This will serve as discharge note.   "

## 2021-06-15 NOTE — PATIENT INSTRUCTIONS
Complete the echocardiogram (ultrasound of heart) and Lexiscan at [facility] location of your choice.  As soon as results are compiled and reviewed, you will be notified.    Lexiscan (nuclear stress test)     Why do I need this test?  Your doctor has ordered a nuclear stress test to check how well blood is flowing through your heart. You will either exercise or take a medicine that mimics exercise; we will watch your heart. Please allow 2 to 4 hours for this test.      What happens during this test?      1. We will place an IV (small needle) in the vein of your arm or hand.  2. We will inject a liquid through the IV. The liquid contains radioactivity. This helps your heart show up better on the pictures we will take.  3. About 30 to 60 minutes later, you will lie down on a table. A special camera will rotate around your chest taking pictures of your heart. This lasts less than 30 minutes.  4. To prepare for the stress test, we will check your blood pressure. We will also attach small pads to your chest. The pads are hooked to an EKG (electrocardiogram) machine. The machine shows how your heart is working during the test.  5. You will begin the stress test.    If you can exercise, you will walk on a treadmill for 5 to 15 minutes. You will start at a slow speed. We will slowly increase the speed and level of incline.    If you cannot exercise, we will give you medicine to mimic the effects of exercise. The medicine goes through the IV slowly.  6. During the stress test, we will again inject liquid through your IV. (See Step 2.)  7. After the stress test, you will wait 30 to 60 minutes. We will then take more pictures of your heart.    Preparation :    This test can take up to 3 hours.    NO FOOD 3 hours prior, Water is ok and encouraged  NO alcohol, smoking, caffeine, or decaf products 12 hours prior    TAKE ALL medications as prescribed, hold the following medications if they pertain to you:  If you take  Aggrenox or dipyridamole (Persantine, Permole), stop taking it 48 hours before your test.  If you take Viagra, Cialis or Levitra, stop taking it 48 hours before your test.  If you take theophylline or aminophylline, stop taking it 12 hours before your test.  Do not take nitrates on the day of your test. Do not wear a Nitro-Patch.

## 2021-06-15 NOTE — LETTER
"6/15/2021      RE: Marilia Bean  [address]  [facility] [address]       Dear Colleague,    Thank you for the opportunity to participate in the care of your patient, Marilia Bean, at the [facility] [place] at [facility]. Please see a copy of my visit note below.    Marilia is a 76 year old who is being evaluated via a billable video visit.      How would you like to obtain your AVS? MyChart  If the video visit is dropped, the invitation should be resent by: Send to e-mail at: [email or web address]  Will anyone else be joining your video visit? No    Vitals - Patient Reported  Weight (Patient Reported): 86.2 kg (190 lb)  Height (Patient Reported): 170.2 cm (5' 7\")  BMI (Based on Pt Reported Ht/Wt): 29.76  Pain Score: No Pain (0)  Pain Loc: Chest      Subjective   Marilia is a 76 year old who presents for preop evaluation.  Planning for laparoscopic colectomy with ileosigmoid anastomosis for colon cancer on 6/30/2021.    HPI   Patient's cardiac history is significant for ascending aortic aneurysm repair and single-vessel CABG involving LIMA to LAD on 6/5/2017.  Coronary angiogram at that time showed no significant flow-limiting lesion in other coronary arteries.  Since then patient is active and asymptomatic.  Had no cardiac complaints today.  As per patient at her son's place [place] go [place] the lake there are 30 steps.  She goes up and down the steps without difficulty.  She runs a food shelf alone.  Her cardiac risk factors are hypertension and hyperlipidemia.  Non-smoker.  No diabetes.    Review of Systems   Constitutional, HEENT, cardiovascular, pulmonary, gi and gu systems are negative, except as otherwise noted.      Objective    Vitals - Patient Reported  Weight (Patient Reported): 86.2 kg (190 lb)  Height (Patient Reported): 170.2 cm (5' 7\")  BMI (Based on Pt Reported Ht/Wt): 29.76  Pain Score: No Pain (0)  Pain Loc: Chest        Physical Exam "   GENERAL: Healthy, alert and no distress  EYES: Eyes grossly normal to inspection.  No discharge or erythema, or obvious scleral/conjunctival abnormalities.  RESP: No audible wheeze, cough, or visible cyanosis.  No visible retractions or increased work of breathing.    SKIN: Visible skin clear. No significant rash, abnormal pigmentation or lesions.  NEURO: Cranial nerves grossly intact.  Mentation and speech appropriate for age.  PSYCH: Mentation appears normal, affect normal/bright, judgement and insight intact, normal speech and appearance well-groomed.      ASSESSMENT/PLAN:  Patient here for preop evaluation.  Planning for colon surgery.  Currently patient is very active and has no cardiac symptoms.  Her prior cardiac history is significant for ascending aortic aneurysm repair and single-vessel CABG involving LIMA to LAD.  Angiogram at that time showed D1 disease which was not bypassed due to small vessel size.  Since then patient remain active and asymptomatic.  Her cardiac risk factors are hypertension and hyperlipidemia.  No diabetes.  She is a non-smoker.  Patient had no significant follow-up after the surgery in 2017.  We will plan for an echocardiogram and a Lexiscan.  If these are normal patient will be able to proceed with the planned surgery.  Her beta-blocker has to be continued during the perioperative..    Reviewed EKG.  Normal sinus rhythm.  Sinus bradycardia otherwise unremarkable.  Echocardiogram and transesophageal echocardiogram from 2017 reviewed normal biventricular function.  No regional wall motion abnormalities.  No significant valvular abnormalities.  Aortic size was 5 cm.  Coronary angiogram reviewed critical mid LAD disease otherwise no flow-limiting lesions.  Video-Visit Details    Type of service:  Video Visit  Video visit start time: 7:51 AM  Video End Time:7:59 AM    Originating Location (pt. Location): Home    Distant Location (provider location):  [facility]  [place]     Platform used for Video Visit: LorWell  Total visit duration 45 minutes.  This includes video interview, chart review, review of Care Everywhere though no information available, review of EKG echocardiogram and coronary angiogram as well as documentation.    Patient's cell phone was not working today.  She requested to contact her on the land phone number.  She wanted the test to be done at [place] at [facility].      Please do not hesitate to contact me if you have any questions/concerns.     Sincerely,     MISSY Osorio MD

## 2021-06-15 NOTE — PROGRESS NOTES
"Marilia is a 76 year old who is being evaluated via a billable video visit.      How would you like to obtain your AVS? MyChart  If the video visit is dropped, the invitation should be resent by: Send to e-mail at: [email or web address]  Will anyone else be joining your video visit? No    Vitals - Patient Reported  Weight (Patient Reported): 86.2 kg (190 lb)  Height (Patient Reported): 170.2 cm (5' 7\")  BMI (Based on Pt Reported Ht/Wt): 29.76  Pain Score: No Pain (0)  Pain Loc: Chest      Subjective   Marilia is a 76 year old who presents for preop evaluation.  Planning for laparoscopic colectomy with ileosigmoid anastomosis for colon cancer on 6/30/2021.    HPI   Patient's cardiac history is significant for ascending aortic aneurysm repair and single-vessel CABG involving LIMA to LAD on 6/5/2017.  Coronary angiogram at that time showed no significant flow-limiting lesion in other coronary arteries.  Since then patient is active and asymptomatic.  Had no cardiac complaints today.  As per patient at her son's place to go to the lake there are 30 steps.  She goes up and down the steps without difficulty.  She runs a food shelf alone.  Her cardiac risk factors are hypertension and hyperlipidemia.  Non-smoker.  No diabetes.    Review of Systems   Constitutional, HEENT, cardiovascular, pulmonary, gi and gu systems are negative, except as otherwise noted.      Objective    Vitals - Patient Reported  Weight (Patient Reported): 86.2 kg (190 lb)  Height (Patient Reported): 170.2 cm (5' 7\")  BMI (Based on Pt Reported Ht/Wt): 29.76  Pain Score: No Pain (0)  Pain Loc: Chest        Physical Exam   GENERAL: Healthy, alert and no distress  EYES: Eyes grossly normal to inspection.  No discharge or erythema, or obvious scleral/conjunctival abnormalities.  RESP: No audible wheeze, cough, or visible cyanosis.  No visible retractions or increased work of breathing.    SKIN: Visible skin clear. No significant rash, abnormal pigmentation or " lesions.  NEURO: Cranial nerves grossly intact.  Mentation and speech appropriate for age.  PSYCH: Mentation appears normal, affect normal/bright, judgement and insight intact, normal speech and appearance well-groomed.      ASSESSMENT/PLAN:  Patient here for preop evaluation.  Planning for colon surgery.  Currently patient is very active and has no cardiac symptoms.  Her prior cardiac history is significant for ascending aortic aneurysm repair and single-vessel CABG involving LIMA to LAD.  Angiogram at that time showed D1 disease which was not bypassed due to small vessel size.  Since then patient remain active and asymptomatic.  Her cardiac risk factors are hypertension and hyperlipidemia.  No diabetes.  She is a non-smoker.  Patient had no significant follow-up after the surgery in 2017.  We will plan for an echocardiogram and a Lexiscan.  If these are normal patient will be able to proceed with the planned surgery.  Her beta-blocker has to be continued during the perioperative..    Reviewed EKG.  Normal sinus rhythm.  Sinus bradycardia otherwise unremarkable.  Echocardiogram and transesophageal echocardiogram from 2017 reviewed normal biventricular function.  No regional wall motion abnormalities.  No significant valvular abnormalities.  Aortic size was 5 cm.  Coronary angiogram reviewed critical mid LAD disease otherwise no flow-limiting lesions.  Video-Visit Details    Type of service:  Video Visit  Video visit start time: 7:51 AM  Video End Time:7:59 AM    Originating Location (pt. Location): Home    Distant Location (provider location):  [facility]     Platform used for Video Visit: Mireya  Total visit duration 45 minutes.  This includes video interview, chart review, review of Care Everywhere though no information available, review of EKG echocardiogram and coronary angiogram as well as documentation.    Patient's cell phone was not working today.  She requested to contact her on  the land phone number.  She wanted the test to be done at [place] at [facility].    Reviewed stress MPI and echo. Both are normal. Patient may proceed with planned surgery.

## 2021-06-15 NOTE — TELEPHONE ENCOUNTER
RECORDS STATUS - BREAST    RECORDS REQUESTED FROM: EPIC   DATE REQUESTED: 6/29/2021   NOTES DETAILS STATUS   OFFICE NOTE from referring provider Complete [facility]   OFFICE NOTE from medical oncologist Complete [facility]    6/9/2021 Virtual Visit- Malignant Neoplasm of ascending colon (H) (Primary Dx) Newly DX Breast Cancer     OFFICE NOTE from surgeon Complete See Breast biopsy in EPIC   OFFICE NOTE from radiation oncologist     DISCHARGE SUMMARY from hospital N/A    DISCHARGE REPORT from the ER     OPERATIVE REPORT Complete See Breast Biopsy in EPIC   MEDICATION LIST Complete [facility]   CLINICAL TRIAL TREATMENTS TO DATE     LABS     PATHOLOGY REPORTS  (Tissue diagnosis, Stage, ER/IL percentage positive and intensity of staining, HER2 IHC, FISH, and all biopsies from breast and any distant metastasis)                 Complete 5/17/2021   LEFT breast, calcifications, stereotactic core biopsy:   -Ductal carcinoma in situ (DCIS), nuclear 2, cribriform and solid type(s),    with comedonecrosis   -DCIS is estrogen receptor positive and progesterone receptor positive by   immunohistochemistry (see details   below)   -Calcifications associated with DCIS (predominantly) and benign acini    GENONOMIC TESTING     TYPE:   (Next Generation Sequencing, including Foundation One testing, and Oncotype score)     IMAGING (NEED IMAGES & REPORT)     CT SCANS     MRI     MAMMO Complete 8/16/2021 MA Breast Specimen Left (Scheduled)     8/16/2021 MA Breast (Scheduled)     MA Stereotactic Breast Biopsy 5/17/2021    MA Diagnostic Digital Left 5/5/2021     MA Screening Bilateral 4/20/2021    ULTRASOUND     PET     BONE SCAN     BRAIN MRI

## 2021-06-17 ENCOUNTER — TEAM CONFERENCE (OUTPATIENT)
Dept: SURGERY | Facility: CLINIC | Age: 76
End: 2021-06-17

## 2021-06-17 NOTE — PROGRESS NOTES
COLON AND RECTAL SURGERY HUDDLE:    Patient was reviewed in preporation for their surgery the following was reviewed and has been completed:    Surgeon: Dr. Akil Lay    Surgery & Date: 6/30/2021 lap subtotal colectomy     Last MD Note: reviewed    Anesthesia Type: General    Other Providers: No    PAC: Yes    WOC: N/A    Labs: Yes    Bowel Prep: Yes MiraLAX / Gatorade , Antibiotic and Magnesium Citrate    Packet: Yes    Imaging: N/A    eval by cards complete and having testing done before surgery   Addendum: cleared by surgery     Post-Op Appointments: Yes    COVID: yes

## 2021-06-22 ENCOUNTER — HOSPITAL ENCOUNTER (OUTPATIENT)
Dept: NUCLEAR MEDICINE | Facility: CLINIC | Age: 76
Setting detail: NUCLEAR MEDICINE
End: 2021-06-22
Attending: INTERNAL MEDICINE
Payer: MEDICARE

## 2021-06-22 ENCOUNTER — HOSPITAL ENCOUNTER (OUTPATIENT)
Dept: CARDIOLOGY | Facility: CLINIC | Age: 76
Discharge: HOME | End: 2021-06-22
Attending: INTERNAL MEDICINE | Admitting: INTERNAL MEDICINE
Payer: MEDICARE

## 2021-06-22 DIAGNOSIS — Z86.79 S/P THORACIC AORTIC ANEURYSM REPAIR: ICD-10-CM

## 2021-06-22 DIAGNOSIS — Z98.890 S/P THORACIC AORTIC ANEURYSM REPAIR: ICD-10-CM

## 2021-06-22 DIAGNOSIS — Z95.1 STATUS POST AORTO-CORONARY ARTERY BYPASS GRAFT: ICD-10-CM

## 2021-06-22 LAB
CV STRESS MAX HR HE: 74
NUC STRESS EJECTION FRACTION: 66 %
RATE PRESSURE PRODUCT: NORMAL
STRESS ECHO BASELINE DIASTOLIC HE: 94
STRESS ECHO BASELINE HR: 53
STRESS ECHO BASELINE SYSTOLIC BP: 180
STRESS ECHO CALCULATED PERCENT HR: 51 %
STRESS ECHO LAST STRESS DIASTOLIC BP: 89
STRESS ECHO LAST STRESS SYSTOLIC BP: 194
STRESS ECHO TARGET HR: 144

## 2021-06-22 PROCEDURE — 93306 TTE W/DOPPLER COMPLETE: CPT

## 2021-06-22 PROCEDURE — 93018 CV STRESS TEST I&R ONLY: CPT | Performed by: INTERNAL MEDICINE

## 2021-06-22 PROCEDURE — 78452 HT MUSCLE IMAGE SPECT MULT: CPT

## 2021-06-22 PROCEDURE — 250N000011 HC RX IP 250 OP 636: Performed by: INTERNAL MEDICINE

## 2021-06-22 PROCEDURE — 93017 CV STRESS TEST TRACING ONLY: CPT

## 2021-06-22 PROCEDURE — 343N000001 HC RX 343: Performed by: INTERNAL MEDICINE

## 2021-06-22 PROCEDURE — 78452 HT MUSCLE IMAGE SPECT MULT: CPT | Mod: 26 | Performed by: INTERNAL MEDICINE

## 2021-06-22 PROCEDURE — 93016 CV STRESS TEST SUPVJ ONLY: CPT | Performed by: INTERNAL MEDICINE

## 2021-06-22 PROCEDURE — A9502 TC99M TETROFOSMIN: HCPCS | Performed by: INTERNAL MEDICINE

## 2021-06-22 PROCEDURE — 93306 TTE W/DOPPLER COMPLETE: CPT | Mod: 26 | Performed by: INTERNAL MEDICINE

## 2021-06-22 RX ORDER — REGADENOSON 0.08 MG/ML
0.4 INJECTION, SOLUTION INTRAVENOUS ONCE
Status: COMPLETED | OUTPATIENT
Start: 2021-06-22 | End: 2021-06-22

## 2021-06-22 RX ADMIN — TETROFOSMIN 30.1 MCI.: 1.38 INJECTION, POWDER, LYOPHILIZED, FOR SOLUTION INTRAVENOUS at 10:12

## 2021-06-22 RX ADMIN — REGADENOSON 0.4 MG: 0.08 INJECTION, SOLUTION INTRAVENOUS at 10:09

## 2021-06-22 RX ADMIN — TETROFOSMIN 10.3 MCI.: 1.38 INJECTION, POWDER, LYOPHILIZED, FOR SOLUTION INTRAVENOUS at 08:25

## 2021-06-23 DIAGNOSIS — I10 HYPERTENSION GOAL BP (BLOOD PRESSURE) < 140/90: ICD-10-CM

## 2021-06-23 DIAGNOSIS — I25.10 ASCVD (ARTERIOSCLEROTIC CARDIOVASCULAR DISEASE): ICD-10-CM

## 2021-06-23 DIAGNOSIS — E78.5 HYPERLIPIDEMIA LDL GOAL <100: ICD-10-CM

## 2021-06-24 PROBLEM — Z15.09 LYNCH SYNDROME: Status: ACTIVE | Noted: 2021-06-24

## 2021-06-25 RX ORDER — LOSARTAN POTASSIUM 50 MG/1
TABLET ORAL
Qty: 45 TABLET | Refills: 0 | OUTPATIENT
Start: 2021-06-25

## 2021-06-25 RX ORDER — ATORVASTATIN CALCIUM 20 MG/1
TABLET, FILM COATED ORAL
Qty: 90 TABLET | Refills: 0 | OUTPATIENT
Start: 2021-06-25

## 2021-06-25 NOTE — TELEPHONE ENCOUNTER
Should have refills on file already.  Sent refills for both Atorvastatin 20 mg and Losartan 50 mg tabs on 3/26/21, #90 with 3 refills (1 year supply).  Requests denied for this reason.    Maia Zavaleta RN  [employer]

## 2021-06-28 ENCOUNTER — ANESTHESIA EVENT (OUTPATIENT)
Dept: SURGERY | Facility: CLINIC | Age: 76
DRG: 331 | End: 2021-06-28
Payer: MEDICARE

## 2021-06-28 ENCOUNTER — APPOINTMENT (OUTPATIENT)
Dept: LAB | Facility: CLINIC | Age: 76
End: 2021-06-28
Payer: COMMERCIAL

## 2021-06-28 DIAGNOSIS — Z11.59 ENCOUNTER FOR SCREENING FOR OTHER VIRAL DISEASES: ICD-10-CM

## 2021-06-28 LAB
ALBUMIN SERPL-MCNC: 3.5 G/DL (ref 3.4–5)
ALP SERPL-CCNC: 92 U/L (ref 40–150)
ALT SERPL W P-5'-P-CCNC: 20 U/L (ref 0–50)
ANION GAP SERPL CALCULATED.3IONS-SCNC: 2 MMOL/L (ref 3–14)
AST SERPL W P-5'-P-CCNC: 21 U/L (ref 0–45)
BASOPHILS # BLD AUTO: 0 10E9/L (ref 0–0.2)
BASOPHILS NFR BLD AUTO: 0.4 %
BILIRUB SERPL-MCNC: 0.4 MG/DL (ref 0.2–1.3)
BUN SERPL-MCNC: 17 MG/DL (ref 7–30)
CALCIUM SERPL-MCNC: 9.2 MG/DL (ref 8.5–10.1)
CHLORIDE SERPL-SCNC: 107 MMOL/L (ref 94–109)
CO2 SERPL-SCNC: 29 MMOL/L (ref 20–32)
CREAT SERPL-MCNC: 0.86 MG/DL (ref 0.52–1.04)
DIFFERENTIAL METHOD BLD: NORMAL
EOSINOPHIL # BLD AUTO: 0.2 10E9/L (ref 0–0.7)
EOSINOPHIL NFR BLD AUTO: 4.4 %
ERYTHROCYTE [DISTWIDTH] IN BLOOD BY AUTOMATED COUNT: 14.5 % (ref 10–15)
GFR SERPL CREATININE-BSD FRML MDRD: 66 ML/MIN/{1.73_M2}
GLUCOSE SERPL-MCNC: 99 MG/DL (ref 70–99)
HCT VFR BLD AUTO: 39.7 % (ref 35–47)
HGB BLD-MCNC: 13.3 G/DL (ref 11.7–15.7)
IMM GRANULOCYTES # BLD: 0 10E9/L (ref 0–0.4)
IMM GRANULOCYTES NFR BLD: 0.4 %
LABORATORY COMMENT REPORT: NORMAL
LYMPHOCYTES # BLD AUTO: 1.7 10E9/L (ref 0.8–5.3)
LYMPHOCYTES NFR BLD AUTO: 32.3 %
MCH RBC QN AUTO: 28.8 PG (ref 26.5–33)
MCHC RBC AUTO-ENTMCNC: 33.5 G/DL (ref 31.5–36.5)
MCV RBC AUTO: 86 FL (ref 78–100)
MONOCYTES # BLD AUTO: 0.5 10E9/L (ref 0–1.3)
MONOCYTES NFR BLD AUTO: 10.1 %
NEUTROPHILS # BLD AUTO: 2.8 10E9/L (ref 1.6–8.3)
NEUTROPHILS NFR BLD AUTO: 52.4 %
NRBC # BLD AUTO: 0 10*3/UL
NRBC BLD AUTO-RTO: 0 /100
PLATELET # BLD AUTO: 233 10E9/L (ref 150–450)
POTASSIUM SERPL-SCNC: 4.2 MMOL/L (ref 3.4–5.3)
PREALB SERPL IA-MCNC: 22 MG/DL (ref 15–45)
PROT SERPL-MCNC: 7.1 G/DL (ref 6.8–8.8)
RBC # BLD AUTO: 4.62 10E12/L (ref 3.8–5.2)
SARS-COV-2 RNA RESP QL NAA+PROBE: NEGATIVE
SARS-COV-2 RNA RESP QL NAA+PROBE: NORMAL
SODIUM SERPL-SCNC: 138 MMOL/L (ref 133–144)
SPECIMEN SOURCE: NORMAL
SPECIMEN SOURCE: NORMAL
WBC # BLD AUTO: 5.3 10E9/L (ref 4–11)

## 2021-06-28 PROCEDURE — 85025 COMPLETE CBC W/AUTO DIFF WBC: CPT | Performed by: COLON & RECTAL SURGERY

## 2021-06-28 PROCEDURE — 84134 ASSAY OF PREALBUMIN: CPT | Performed by: COLON & RECTAL SURGERY

## 2021-06-28 PROCEDURE — U0003 INFECTIOUS AGENT DETECTION BY NUCLEIC ACID (DNA OR RNA); SEVERE ACUTE RESPIRATORY SYNDROME CORONAVIRUS 2 (SARS-COV-2) (CORONAVIRUS DISEASE [COVID-19]), AMPLIFIED PROBE TECHNIQUE, MAKING USE OF HIGH THROUGHPUT TECHNOLOGIES AS DESCRIBED BY CMS-2020-01-R: HCPCS | Performed by: COLON & RECTAL SURGERY

## 2021-06-28 PROCEDURE — 80053 COMPREHEN METABOLIC PANEL: CPT | Performed by: COLON & RECTAL SURGERY

## 2021-06-28 PROCEDURE — 36415 COLL VENOUS BLD VENIPUNCTURE: CPT | Performed by: COLON & RECTAL SURGERY

## 2021-06-28 PROCEDURE — U0005 INFEC AGEN DETEC AMPLI PROBE: HCPCS | Performed by: COLON & RECTAL SURGERY

## 2021-06-28 NOTE — PROGRESS NOTES
"This patient  is being evaluated via a billable video visit.      The patient has been notified of following:     \"This video visit will be conducted via a call between you and your physician/provider. We have found that certain health care needs can be provided without the need for an in-person physical exam.  This service lets us provide the care you need with a video conversation.  If a prescription is necessary we can send it directly to your pharmacy.  If lab work is needed we can place an order for that and you can then stop by our lab to have the test done at a later time.    Video visits are billed at different rates depending on your insurance coverage.  Please reach out to your insurance provider with any questions.    If during the course of the call the physician/provider feels a video visit is not appropriate, you will not be charged for this service.\"    Patient has given verbal consent for Video visit? yes  Video-Visit Details    Type of service:  Video Visit    Video visit duration: 15  min  Originating Location (pt. Location): home    Distant Location (provider location):  [facility]     Platform used for Video Visit: Jason Knight MD      Oncology Consultation:  Date on this visit: 6/29/2021    Marilia Bean  is referred by Dr.Jane Merlyn Toro for an oncology consultation. She requires evaluation for new diagnosis of ER+ DCIS of L breast cancer and colon cancer.    Primary Care Physician: Herbert Vanegas   GI: Dr. Hernandez    History Of Present Illness:  Ms. Bean is a 76 year old female who presents with new diagnosis of L DCIS and colon cancer. She had a screening mammogram on 4/20/2021 which showed calcifications in medial left breast, posterior depth. R breast was unremarkable. L diagnostic mammogram on 5/5/2021 showed a group of calcs in the inner posterior left breast. Stereotactic guided L breast biopsy on 5/17/2021 showed grade 2 " DCIS, cribriform and solid type, with comedonecrosis. DCIS was ER positive (100%) and TN positive (98%) by IHC. CT of the chest, abdomen and pelvis on 5/18/2021 showed no e/o metastatic disease in the chest, abdomen or pelvis. There were multiple small pulmonary nodules that have not changed since July 2019.   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 Upper EGD was negative. Screening colonoscopy on 5/10/2021 showed likely malignant tumor in the ascending colon. Also, there was one 20 mm polyp in the transverse colon.  Additionally, there were Two 3 to 6 mm polyps in the ascending colon, resected and retrieved.   Pathology showed moderately differentiated adenocarcinoma of the ascending colon mass. There was a tubular adenomas from ascending colon polypectomy with no dysplasia. There was loss of mismatch repair proteins MSH2 and MSH6, consistent   with germline mutation of MSH2. With loss of MSH2 there is also loss of MSH6 which is not due to a mutation in the MSH6 gene.  The ascending colon mass on Bx was adenocarcinoma, moderately differentiated.   CEA on 5/18/2021 was elevated at 4.6. 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 Campo)  She is scheduled to undergo laporoscopic subtotal colectomy on 6/30. Also, scheduled for wire localized L lumpectomy on 8/16.  Patient's cardiac history is significant for ascending aortic aneurysm repair and single-vessel CABG involving LIMA to LAD on 6/5/2017.  S/p CABG and Asc Ao repair   Coronary angiogram at that time showed no significant flow-limiting lesion in other coronary arteries. Echocardiogram on 6/22/2021 showed left ventricular systolic function at 60-65%.Lexiscan stress test on  June 22, 2021 showed:   The nuclear stress test is abnormal.     There is nontransmural infarction in the anterior and anterolateral segment(s) of the left ventricle. (There is breast tissue attenuation noted affecting the specificity of these findings.)     Left ventricular function is normal.     The left ventricular ejection fraction at stress is 66%.     There is no prior study for comparison.     Nuclear Study Quality: The sensitivity of this test is reduced due to poor image quality.  Consider future nuclear stress testing on cardiac specific ultrafast camera to avoid breast attenuation artifact.  She is followed by  from cardiology.  She is s/p HENRY BSO 2009  Bone mineral density on DEXA scan in 08/2005 was normal.  She is here to discuss treatment recommendations.  She was recommended to start on adjuvant systemic endocrine therapy while she is waiting to proceed with left lumpectomy for her newly diagnosed DCIS.  In addition, a complete 12 point  review of systems is negative.    Past Medical/Surgical History:  Past Medical History:   Diagnosis Date     Aortic aneurysm (H) 07/01/2007    see 7/07 [place] report -follow yearly, treat high BP is occurs Problem list name updated by automated process. Provider to review     Aortic aneurysm of unspecified site without mention of rupture 07/2007    4.4 cm ascending aorta noted in 2007     Asymptomatic postmenopausal status (age-related) (natural)     on HRT  Prempro -weaning off 5/'2004     CAD (coronary artery disease)     LAD     Family history of Gardiner syndrome      Hyperlipidemia LDL goal <100 10/31/2010     Hypertension goal BP (blood pressure) < 140/90 02/09/2016     Leiomyoma of uterus, unspecified     Uterine fibroid     Lump or mass in breast 07/2004    rt. nodule - bx neg     Gardiner syndrome      Personal history of colonic polyps      Postmenopausal atrophic vaginitis 08/20/2006     Pulmonary nodule     incidental noted in 2007 during [place]      Pure hypercholesterolemia     mild, diet - low cholesterol, low fat.10/06 start Lovastatin     Past Surgical History:   Procedure Laterality Date     BYPASS GRAFT ARTERY CORONARY N/A 6/5/2017    Procedure: BYPASS GRAFT ARTERY CORONARY;;  Surgeon: Akshat Soto MD;  [facility]: [facility]     C DEXA INTERPRETATION, AXIAL  12/21/01    wnl. 7/2005 wnl but lower     COLONOSCOPY  05/07/07    Repeat in 1 year for surveillance     COLONOSCOPY  12/10/08    repeat 1 year  -see 1/2009 letter     COLONOSCOPY  03/31/10     COLONOSCOPY  10/31/2011    Procedure:COMBINED COLONOSCOPY, SINGLE BIOPSY/POLYPECTOMY BY BIOPSY; colonoscopy with polypectomy by biopsy; Surgeon:JESSICA GARCIA; Location:[facility]     COLONOSCOPY  12/6/2013    Procedure: COMBINED COLONOSCOPY, SINGLE BIOPSY/POLYPECTOMY BY BIOPSY;  Colonoscopy, Polypectomies;  Surgeon: Herbert Salinas MD;  [facility]: [facility]     COLONOSCOPY N/A 12/8/2015    Procedure: COLONOSCOPY;  Surgeon: Jared Carbajal MD;  [facility]: [facility]     COLONOSCOPY N/A 4/26/2017    Procedure: COMBINED COLONOSCOPY, SINGLE OR MULTIPLE BIOPSY/POLYPECTOMY BY BIOPSY;  Colonoscopy with polypectomies with forceps and snare;  Surgeon: Herbert Salinas MD;  [facility]: [facility]     COLONOSCOPY N/A 5/10/2021    Procedure: Colonoscopy, With Polypectomy And Biopsy;  Surgeon: Franklyn Hernandez MD;  Location: [facility]     COLONOSCOPY WITH CO2 INSUFFLATION N/A 5/10/2021    Procedure: COLONOSCOPY, WITH CO2 INSUFFLATION;  Surgeon: Franklyn Hernandez MD;  Location: [facility]     COMBINED ESOPHAGOSCOPY, GASTROSCOPY, DUODENOSCOPY (EGD) WITH CO2 INSUFFLATION N/A 5/10/2021    Procedure: ESOPHAGOGASTRODUODENOSCOPY, WITH CO2 INSUFFLATION;  Surgeon: Franklyn Hernandez MD;  [facility]: [facility]     ENDOSCOPIC INSERTION TUBE JEJUNOSTOMY N/A 8/5/2019    Procedure: Gastrojejunostomy tube placement with gastropexy;  Surgeon: Ford Mann MD;  [facility]     ESOPHAGOSCOPY, GASTROSCOPY,  DUODENOSCOPY (EGD), COMBINED N/A 12/8/2015    Procedure: COMBINED ESOPHAGOSCOPY, GASTROSCOPY, DUODENOSCOPY (EGD), BIOPSY SINGLE OR MULTIPLE;  Surgeon: Jared Carbajal MD;  [facility]     ESOPHAGOSCOPY, GASTROSCOPY, DUODENOSCOPY (EGD), COMBINED N/A 7/31/2019    Procedure: Endoscopic ultrasound with cystoduodenostomy, stent placement x2, stent dilation, and nasojejunal tube placement;  Surgeon: Ford Mann MD;  [facility]     ESOPHAGOSCOPY, GASTROSCOPY, DUODENOSCOPY (EGD), COMBINED N/A 8/5/2019    Procedure: ESOPHAGOGASTRODUODENOSCOPY (EGD);  Surgeon: Ford Mann MD;  [facility]     ESOPHAGOSCOPY, GASTROSCOPY, DUODENOSCOPY (EGD), COMBINED N/A 8/12/2019    Procedure: ESOPHAGOGASTRODUODENOSCOPY (EGD) with GJ exchange, duodenum stent removal.;  Surgeon: Ford Mann MD;  [facility]     ESOPHAGOSCOPY, GASTROSCOPY, DUODENOSCOPY (EGD), COMBINED N/A 5/10/2021    Procedure: Esophagogastroduodenoscopy, With Biopsy;  Surgeon: Franklyn Hernandez MD;  [facility]     HC COLONOSCOPY THRU STOMA W BIOPSY/CAUTERY TUMOR/POLYP/LESION  1999,2002 2004 polyp - hyperplastic - repeat 5 years ?     HC COLONOSCOPY W/WO BRUSH/WASH  12/12/2005    Polypectomy.  Diverticulosis-minimal. Bx adenomatous and mucosal polyps - repeat 1 year     HC ECP WITH CATARACT SURGERY Bilateral 2015, 2016     HC LAPAROSCOPY, SURGICAL; APPENDECTOMY  10/31/2004     HC LAPAROSCOPY, SURGICAL; CHOLECYSTECTOMY  2000    Cholecystectomy, Laparoscopic     HC REVISE MEDIAN N/CARPAL TUNNEL SURG  10/01/10    left     HYSTERECTOMY, ELVIN  12/14/09    TAHBSO, MMK     REPAIR ANEURYSM ASCENDING AORTA N/A 6/5/2017    Procedure: REPAIR ANEURYSM ASCENDING AORTA;  Median Sternotomy, Ascending Aortic Aneurysm Repair, Coronary Artery Bypass Graft x1 on pump oxygenator;  Surgeon: Akshat Soto MD;  [facility]     REPLACE GASTROJEJUNOSTOMY TUBE, PERCUTANEOUS N/A 8/19/2019    Procedure: REPLACEMENT,  GASTROJEJUNOSTOMY TUBE;  Surgeon: Robert Tafoya MD;  [facility]     RESECTION DUODENAL N/A 7/3/2019    Procedure: Resection of Distal Duodenal and proximal Jejunum;  Surgeon: Joaquin Brito MD;  [facility]     [facility] WITH [facility]AGING  8/17/2004    Right     [facility] ENDOSCOPY, SIMPLE EXAM  03/31/10       Allergies:  Allergies as of 06/29/2021 - Reviewed 06/22/2021   Allergen Reaction Noted     Contrast dye Itching and Other (See Comments) 01/15/2013     Morphine Nausea 06/24/2019     Current Medications:  Current Outpatient Medications   Medication Sig Dispense Refill     ADACEL 5-2-15.5 LF-MCG/0.5 injection        aspirin 81 MG EC tablet Take 81 mg by mouth every morning  90 tablet 3     atorvastatin (LIPITOR) 20 MG tablet Take 1 tablet (20 mg) by mouth every morning 90 tablet 3     diphenhydrAMINE (BENADRYL) 12.5 MG/5ML solution Take by mouth every evening       FLUZONE HIGH-DOSE QUADRIVALENT 0.7 ML KOBE injection        losartan (COZAAR) 50 MG tablet TAKE ONE-HALF TABLET(25MG) BY MOUTH EVERY MORNING 45 tablet 3     metoprolol tartrate (LOPRESSOR) 25 MG tablet Take 1 tablet (25 mg) by mouth 2 times daily 180 tablet 3     metroNIDAZOLE (FLAGYL) 500 MG tablet Take 1 tablet (500 mg) by mouth every 6 hours At 8:00 am, 2:00 pm, 8:00 pm the day prior to your surgery with neomycin and zofran. (Patient not taking: Reported on 6/9/2021) 3 tablet 0     neomycin (MYCIFRADIN) 500 MG tablet Take 2 tablets (1,000 mg) by mouth every 6 hours At 8:00 am, 2:00 pm, 8:00 pm the day prior to your surgery with flagyl and zofran. (Patient not taking: Reported on 6/15/2021) 6 tablet 0     ondansetron (ZOFRAN) 4 MG tablet Take 1 tablet (4 mg) by mouth every 6 hours At 8:00 am, 2:00 pm, 8:00 pm the day prior to your surgery with neomycin and flagyl. (Patient not taking: Reported on 6/15/2021) 3 tablet 0     polyethylene glycol (MIRALAX) 17 g packet Take 238 g by mouth See Admin  "Instructions Starting at 4 pm night prior to surgery. Refer to \"Getting Ready for Surgery\" instructions. (Patient not taking: Reported on 6/15/2021) 14 packet 0      Family History:  Family History   Problem Relation Age of Onset     Cancer Mother         Colon Cancer     Heart Disease Mother         MI     Osteoporosis Mother      Cancer Father         Colon Cancer     Heart Disease Father         Heart Disease/ Heart attacks     Diabetes Father         Adult Onset     Cancer Sister         Colon Cancer     Heart Disease Brother         Heart attacks at age 45     Breast Cancer Sister      Cancer Paternal Grandmother         Unknown type     Heart Disease Maternal Grandfather         MI     Diabetes Sister         Adult Onset     Diabetes Sister         Adult Onset     Diabetes Brother         Adult Onset     Heart Disease Brother         heart attack age 64     Cancer - colorectal Son         age 36, Gadriner syndrome     Social History:  Social History     Socioeconomic History     Marital status:      Spouse name: Cain     Number of children: 4     Years of education: 12     Highest education level: Not on file   Occupational History     Occupation: [employer]     Employer: [employer]     Comment: [employer]   Tobacco Use     Smoking status: Never Smoker     Smokeless tobacco: Never Used   Substance and Sexual Activity     Alcohol use: Yes     Comment: rarely     Drug use: No     Sexual activity: Yes     Partners: Male     Comment: Post menopausal   Social History Narrative    Lives with spouse. No domestic violence issues.     Physical Exam:  Ht 1.715 m (5' 7.5\")   Wt 85.7 kg (189 lb)   BMI 29.16 kg/m      Constitutional: alert and in no distress  Eyes: No redness or discharge  Respiratory: No cough or labored breathing.  Musculoskeletal: Full range of motion in extremities.  Skin: no visible skin lesions or discoloration  Neurological: No tremors and denies headache.  Psychiatric: " Mentation appears normal and affect is normal as well.  Alert and oriented x3.  The rest the comprehensive physical examination is deferred due to public health emergency video visit restrictions.    Laboratory/Imaging Studies  Labs reviewed.  CBC with differential counts normal on May 2021 CEA is elevated at 4.6.  LFTs are normal.  Creatinine is normal.  Component      Latest Ref Rng & Units 5/18/2021   WBC      4.0 - 11.0 10e9/L 5.9   RBC Count      3.8 - 5.2 10e12/L 4.87   Hemoglobin      11.7 - 15.7 g/dL 13.7   Hematocrit      35.0 - 47.0 % 42.2   MCV      78 - 100 fl 87   MCH      26.5 - 33.0 pg 28.1   MCHC      31.5 - 36.5 g/dL 32.5   RDW      10.0 - 15.0 % 14.0   Platelet Count      150 - 450 10e9/L 308   Diff Method       Automated Method   % Neutrophils      % 86.3   % Lymphocytes      % 12.7   % Monocytes      % 0.5   % Eosinophils      % 0.0   % Basophils      % 0.2   % Immature Granulocytes      % 0.3   Nucleated RBCs      0 /100 0   Absolute Neutrophil      1.6 - 8.3 10e9/L 5.1   Absolute Lymphocytes      0.8 - 5.3 10e9/L 0.8   Absolute Monocytes      0.0 - 1.3 10e9/L 0.0   Absolute Eosinophils      0.0 - 0.7 10e9/L 0.0   Absolute Basophils      0.0 - 0.2 10e9/L 0.0   Abs Immature Granulocytes      0 - 0.4 10e9/L 0.0   Absolute Nucleated RBC       0.0   Sodium      133 - 144 mmol/L 139   Potassium      3.4 - 5.3 mmol/L 4.1   Chloride      94 - 109 mmol/L 107   Carbon Dioxide      20 - 32 mmol/L 28   Anion Gap      3 - 14 mmol/L 4   Glucose      70 - 99 mg/dL 195 (H)   Urea Nitrogen      7 - 30 mg/dL 19   Creatinine      0.52 - 1.04 mg/dL 0.89   GFR Estimate      >60 mL/min/1.73:m2 63   GFR Estimate If Black      >60 mL/min/1.73:m2 73   Calcium      8.5 - 10.1 mg/dL 9.3   Bilirubin Total      0.2 - 1.3 mg/dL 0.4   Albumin      3.4 - 5.0 g/dL 3.7   Protein Total      6.8 - 8.8 g/dL 7.9   Alkaline Phosphatase      40 - 150 U/L 103   ALT      0 - 50 U/L 21   AST      0 - 45 U/L 16   CEA elevated at 4.6 as  above.  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    1.  New diagnosis of moderately differentiated adenocarcinoma of the ascending colon, with mildly elevated preop CEA of 4.6 and no evidence of distant metastatic disease by CT imaging -she is scheduled to undergo laparoscopic subtotal colectomy on June 30 by Dr. Lay.  We will follow up on pathology from the surgery.    2.  Left DCIS-plan for her is to undergo left wire localized lumpectomy on August 16.  While she is awaiting surgery, we will start her on anastrozole 1 mg p.o. daily- start on 6/29/2021.    The rationale behind using Anastrozole was discussed with the patient in detail. We also discussed the major side effects of Anastrozole including, but not limited to more immediate side effects of vasomotor symptoms, mood disturbance, fatigue and weakness, headache, joint pain, nausea/vomiting, cough, sore throat, and vaginal infection/discharge as well as a small risk of long-term side effects such as DVT/PE, cardiovascular disease, high cholesterol level, high blood pressure, cataracts, and potential loss of bone mineral density and need to obtain DEXA scan. The patient was instructed to begin Calcium plus Vitamin D supplementation. The patient will be emailed information about Anastrozole, agrees to proceed with treatment, and denies any further questions at this time.  She will proceed with baseline DEXA scan in the near future, prior to our return visit.  We will plan to see her back at the end of August, following her left-sided lumpectomy.    3.  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  She is scheduled to see genetic counseling on  July 5.   Individuals with Gardiner syndrome are also at increased risk of cancer of the ovary, stomach, small bowel, pancreatobiliary system, genitourinary system (renal pelvis, ureter, bladder), brain (glioma), as well as various skin pathologies (sebaceous neoplasms, keratoacanthomas). We will refer her to establish care with dermatology in the future.  Cancers of the lower uterine segment and the adrenal gland (adrenocortical carcinomas) appear to be rare manifestations of Gardiner syndrome  Several other cancers including laryngeal cancer, hematologic malignancies, and sarcomas have been reported in individuals with Gardiner syndrome, but it is unclear if the incidence of these cancers is increased in individuals with Gardiner syndrome above the general population.  Some studies have reported an increased risk of breast cancer but other studies have not detected an increased risk of breast cancer with Gardiner syndrome.    4.  Bone health-obtain DEXA scan prior to our return visit.  Start calcium and vitamin D supplementation.    5.  History of coronary artery disease, history of ascending aortic aneurysm-status post repair with CABG in 2017.  Echocardiogram on 6/22/201 showed left ventricular systolic function at 60-65%.  Follow-up with     6.  CKD-creatinine stable.  It was my pleasure to meet Marilia.  At the end of our visit patient verbalized understanding and concurred with the plan.    90 minutes spent on the date of the encounter doing chart review, review of test results, interpretation of tests, patient visit and documentation.      Addendum: Pathology from subtotal laparoscopic colectomy from June 30, 2021 showed: 3 tubular adenomas 1 of which contained a focus of high-grade dysplasia (distally located 1.9 cm pedunculated polyp).  2.3 cm invasive moderately differentiated (grade 2) adenocarcinoma, invading submucosa.  28 benign lymph nodes negative for metastatic carcinoma.  Lymphovascular invasion  negative.  AJCC eighth edition pathologic stage stage I (pT1pN0).  Per NCCN guidelines, f/up of stage I colon cancer includes f/up colonoscopy at the latest in one year from previous.  Marilia will need that in the context of possible Gardiner Syndrome as well, ? frequency    Addendum: Pathology 8/16/2021:  LEFT BREAST, WIRE LOCALIZED SEGMENTAL MASTECTOMY:  - DUCTAL CARCINOMA IN-SITU (DCIS), intermediate nuclear grade (grade 2), cribriform and solid type with focal central necrosis.  - DCIS is 1.5 cm in greatest dimension.  - No evidence of invasive malignancy identified.  - Margins are negative; closest margins to DCIS are medial and anterior-inferior corner at 1 mm, respectively.  - Radial scar, 3 mm in linear extent.  - Fibrocystic changes including microcysts and apocrine metaplasia.  - Calcifications associated with DCIS, benign ducts and acini.  - Biopsy site changes.  - AJCC 8th edition pathologic stage: pTis NX.  - See synoptic report.     B. LEFT BREAST, NEW SUPERIOR MARGIN, EXCISION:  - Benign breast tissue with small focus of atypical lobular hyperplasia (1 mm).  - Negative for malignancy.    She was seen by Dr. Toro post-op on 8/30. Per her note, she is continuing on Anastrazole.  She is seeing Dr. Lomax from radiation oncology in [place] on 9/16.   She was seen by  and declined genetic testing for Gardiner Syndrome on 7/26.    We'll follow-up with her after completion of radiation therapy.

## 2021-06-29 ENCOUNTER — PRE VISIT (OUTPATIENT)
Dept: ONCOLOGY | Facility: CLINIC | Age: 76
End: 2021-06-29

## 2021-06-29 ENCOUNTER — VIRTUAL VISIT (OUTPATIENT)
Dept: ONCOLOGY | Facility: CLINIC | Age: 76
End: 2021-06-29
Attending: SURGERY
Payer: COMMERCIAL

## 2021-06-29 VITALS — WEIGHT: 189 LBS | HEIGHT: 68 IN | BODY MASS INDEX: 28.64 KG/M2

## 2021-06-29 DIAGNOSIS — E28.39 ESTROGEN DEFICIENCY: Primary | ICD-10-CM

## 2021-06-29 DIAGNOSIS — D05.12 BREAST NEOPLASM, TIS (DCIS), LEFT: ICD-10-CM

## 2021-06-29 DIAGNOSIS — C18.9 COLON ADENOCARCINOMA (H): ICD-10-CM

## 2021-06-29 PROCEDURE — 99205 OFFICE O/P NEW HI 60 MIN: CPT | Mod: 95 | Performed by: INTERNAL MEDICINE

## 2021-06-29 PROCEDURE — 99417 PROLNG OP E/M EACH 15 MIN: CPT | Performed by: INTERNAL MEDICINE

## 2021-06-29 RX ORDER — ANASTROZOLE 1 MG/1
1 TABLET ORAL DAILY
Qty: 90 TABLET | Refills: 3 | Status: SHIPPED | OUTPATIENT
Start: 2021-06-29 | End: 2021-09-07

## 2021-06-29 ASSESSMENT — PAIN SCALES - GENERAL: PAINLEVEL: NO PAIN (0)

## 2021-06-29 ASSESSMENT — MIFFLIN-ST. JEOR: SCORE: 1387.86

## 2021-06-29 ASSESSMENT — LIFESTYLE VARIABLES: TOBACCO_USE: 0

## 2021-06-29 NOTE — LETTER
"    6/29/2021         RE: Marilia Bean  [address]  [place] [address]        Dear Colleague,    Thank you for referring your patient, Marilia Bean, to the [facility]. Please see a copy of my visit note below.    This patient  is being evaluated via a billable video visit.      The patient has been notified of following:     \"This video visit will be conducted via a call between you and your physician/provider. We have found that certain health care needs can be provided without the need for an in-person physical exam.  This service lets us provide the care you need with a video conversation.  If a prescription is necessary we can send it directly to your pharmacy.  If lab work is needed we can place an order for that and you can then stop by our lab to have the test done at a later time.    Video visits are billed at different rates depending on your insurance coverage.  Please reach out to your insurance provider with any questions.    If during the course of the call the physician/provider feels a video visit is not appropriate, you will not be charged for this service.\"    Patient has given verbal consent for Video visit? yes  Video-Visit Details    Type of service:  Video Visit    Video visit duration: 15  min  Originating Location (pt. Location): home    Distant Location (provider location):  [facility]     Platform used for Video Visit: Jason Knight MD      Oncology Consultation:  Date on this visit: 6/29/2021    Marilia Bean  is referred by Dr.Jane Merlyn Toro for an oncology consultation. She requires evaluation for new diagnosis of ER+ DCIS of L breast cancer and colon cancer.    Primary Care Physician: Herbert Vanegas   GI: Dr. Hernandez    History Of Present Illness:  Ms. Bean is a 76 year old female who presents with new diagnosis of L DCIS and colon cancer. She had a screening mammogram on " 4/20/2021 which showed calcifications in medial left breast, posterior depth. R breast was unremarkable. L diagnostic mammogram on 5/5/2021 showed a group of calcs in the inner posterior left breast. Stereotactic guided L breast biopsy on 5/17/2021 showed grade 2 DCIS, cribriform and solid type, with comedonecrosis. DCIS was ER positive (100%) and DE positive (98%) by IHC. CT of the chest, abdomen and pelvis on 5/18/2021 showed no e/o metastatic disease in the chest, abdomen or pelvis. There were multiple small pulmonary nodules that have not changed since July 2019.   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 Upper EGD was negative. Screening colonoscopy on 5/10/2021 showed likely malignant tumor in the ascending colon. Also, there was one 20 mm polyp in the transverse colon.  Additionally, there were Two 3 to 6 mm polyps in the ascending colon, resected and retrieved.   Pathology showed moderately differentiated adenocarcinoma of the ascending colon mass. There was a tubular adenomas from ascending colon polypectomy with no dysplasia. There was loss of mismatch repair proteins MSH2 and MSH6, consistent   with germline mutation of MSH2. With loss of MSH2 there is also loss of MSH6 which is not due to a mutation in the MSH6 gene.  The ascending colon mass on Bx was adenocarcinoma, moderately differentiated.   CEA on 5/18/2021 was elevated at 4.6. 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 Campo)  She is scheduled to undergo laporoscopic subtotal colectomy on 6/30. Also, scheduled for wire localized L lumpectomy on 8/16.  Patient's cardiac history is significant for ascending aortic aneurysm repair and single-vessel CABG  involving MURPHY to LAD on 6/5/2017.  S/p CABG and Asc Ao repair   Coronary angiogram at that time showed no significant flow-limiting lesion in other coronary arteries. Echocardiogram on 6/22/2021 showed left ventricular systolic function at 60-65%.Lexiscan stress test on June 22, 2021 showed:   The nuclear stress test is abnormal.     There is nontransmural infarction in the anterior and anterolateral segment(s) of the left ventricle. (There is breast tissue attenuation noted affecting the specificity of these findings.)     Left ventricular function is normal.     The left ventricular ejection fraction at stress is 66%.     There is no prior study for comparison.     Nuclear Study Quality: The sensitivity of this test is reduced due to poor image quality.  Consider future nuclear stress testing on cardiac specific ultrafast camera to avoid breast attenuation artifact.  She is followed by  from cardiology.  She is s/p HENRY BSO 2009  Bone mineral density on DEXA scan in 08/2005 was normal.  She is here to discuss treatment recommendations.  She was recommended to start on adjuvant systemic endocrine therapy while she is waiting to proceed with left lumpectomy for her newly diagnosed DCIS.  In addition, a complete 12 point  review of systems is negative.    Past Medical/Surgical History:  Past Medical History:   Diagnosis Date     Aortic aneurysm (H) 07/01/2007    see 7/07 Ba report -follow yearly, treat high BP is occurs Problem list name updated by automated process. Provider to review     Aortic aneurysm of unspecified site without mention of rupture 07/2007    4.4 cm ascending aorta noted in 2007     Asymptomatic postmenopausal status (age-related) (natural)     on HRT  Prempro -weaning off 5/'2004     CAD (coronary artery disease)     LAD     Family history of Gardiner syndrome      Hyperlipidemia LDL goal <100 10/31/2010     Hypertension goal BP (blood pressure) < 140/90 02/09/2016     Leiomyoma of  uterus, unspecified     Uterine fibroid     Lump or mass in breast 07/2004    rt. nodule - bx neg     Gardiner syndrome      Personal history of colonic polyps      Postmenopausal atrophic vaginitis 08/20/2006     Pulmonary nodule     incidental noted in 2007 during [place]     Pure hypercholesterolemia     mild, diet - low cholesterol, low fat.10/06 start Lovastatin     Past Surgical History:   Procedure Laterality Date     BYPASS GRAFT ARTERY CORONARY N/A 6/5/2017    Procedure: BYPASS GRAFT ARTERY CORONARY;;  Surgeon: Akshat Soto MD;  Location: [facility]     C DEXA INTERPRETATION, AXIAL  12/21/01    wnl. 7/2005 wnl but lower     COLONOSCOPY  05/07/07    Repeat in 1 year for surveillance     COLONOSCOPY  12/10/08    repeat 1 year  -see 1/2009 letter     COLONOSCOPY  03/31/10     COLONOSCOPY  10/31/2011    Procedure:COMBINED COLONOSCOPY, SINGLE BIOPSY/POLYPECTOMY BY BIOPSY; colonoscopy with polypectomy by biopsy; Surgeon:JESSICA GARCIA; Location:[facility]     COLONOSCOPY  12/6/2013    Procedure: COMBINED COLONOSCOPY, SINGLE BIOPSY/POLYPECTOMY BY BIOPSY;  Colonoscopy, Polypectomies;  Surgeon: Herbert Salinas MD;  Location: [facility]     COLONOSCOPY N/A 12/8/2015    Procedure: COLONOSCOPY;  Surgeon: Jared Carbajal MD;  Location: [facility]     COLONOSCOPY N/A 4/26/2017    Procedure: COMBINED COLONOSCOPY, SINGLE OR MULTIPLE BIOPSY/POLYPECTOMY BY BIOPSY;  Colonoscopy with polypectomies with forceps and snare;  Surgeon: Herbert Salinas MD;  Location: [facility]     COLONOSCOPY N/A 5/10/2021    Procedure: Colonoscopy, With Polypectomy And Biopsy;  Surgeon: Franklyn Hernandez MD;  Location: [facility]     COLONOSCOPY WITH CO2 INSUFFLATION N/A 5/10/2021    Procedure: COLONOSCOPY, WITH CO2 INSUFFLATION;  Surgeon: Franklyn Hernandez MD;  Location: [facility]     COMBINED ESOPHAGOSCOPY, GASTROSCOPY, DUODENOSCOPY (EGD) WITH CO2 INSUFFLATION N/A 5/10/2021    Procedure: ESOPHAGOGASTRODUODENOSCOPY, WITH CO2 INSUFFLATION;   Surgeon: Franklyn Hernandez MD;  Location: [facility]     ENDOSCOPIC INSERTION TUBE JEJUNOSTOMY N/A 8/5/2019    Procedure: Gastrojejunostomy tube placement with gastropexy;  Surgeon: Ford Mann MD;  [facility]: [facility]     ESOPHAGOSCOPY, GASTROSCOPY, DUODENOSCOPY (EGD), COMBINED N/A 12/8/2015    Procedure: COMBINED ESOPHAGOSCOPY, GASTROSCOPY, DUODENOSCOPY (EGD), BIOPSY SINGLE OR MULTIPLE;  Surgeon: Jared Carbajal MD;  [facility]     ESOPHAGOSCOPY, GASTROSCOPY, DUODENOSCOPY (EGD), COMBINED N/A 7/31/2019    Procedure: Endoscopic ultrasound with cystoduodenostomy, stent placement x2, stent dilation, and nasojejunal tube placement;  Surgeon: Ford Mann MD;  [facility]     ESOPHAGOSCOPY, GASTROSCOPY, DUODENOSCOPY (EGD), COMBINED N/A 8/5/2019    Procedure: ESOPHAGOGASTRODUODENOSCOPY (EGD);  Surgeon: Ford Mann MD;  [facility]     ESOPHAGOSCOPY, GASTROSCOPY, DUODENOSCOPY (EGD), COMBINED N/A 8/12/2019    Procedure: ESOPHAGOGASTRODUODENOSCOPY (EGD) with GJ exchange, duodenum stent removal.;  Surgeon: Ford Mann MD;  [facility]     ESOPHAGOSCOPY, GASTROSCOPY, DUODENOSCOPY (EGD), COMBINED N/A 5/10/2021    Procedure: Esophagogastroduodenoscopy, With Biopsy;  Surgeon: Franklyn Hernandez MD;  Location: [facility]     HC COLONOSCOPY THRU STOMA W BIOPSY/CAUTERY TUMOR/POLYP/LESION  1999,2002 2004 polyp - hyperplastic - repeat 5 years ?     HC COLONOSCOPY W/WO BRUSH/WASH  12/12/2005    Polypectomy.  Diverticulosis-minimal. Bx adenomatous and mucosal polyps - repeat 1 year     HC ECP WITH CATARACT SURGERY Bilateral 2015, 2016     HC LAPAROSCOPY, SURGICAL; APPENDECTOMY  10/31/2004     HC LAPAROSCOPY, SURGICAL; CHOLECYSTECTOMY  2000    Cholecystectomy, Laparoscopic     HC REVISE MEDIAN N/CARPAL TUNNEL SURG  10/01/10    left     HYSTERECTOMY, ELVIN  12/14/09    TAHBSO, MMK     REPAIR ANEURYSM ASCENDING AORTA N/A 6/5/2017    Procedure: REPAIR ANEURYSM  ASCENDING AORTA;  Median Sternotomy, Ascending Aortic Aneurysm Repair, Coronary Artery Bypass Graft x1 on pump oxygenator;  Surgeon: Akshat Soto MD;  [facility]     REPLACE GASTROJEJUNOSTOMY TUBE, PERCUTANEOUS N/A 8/19/2019    Procedure: REPLACEMENT, GASTROJEJUNOSTOMY TUBE;  Surgeon: Robert Tafoya MD;  [facility]     RESECTION DUODENAL N/A 7/3/2019    Procedure: Resection of Distal Duodenal and proximal Jejunum;  Surgeon: Joaquin Brito MD;  Location: [facility]     ZZHC BIOPSY BREAST, PERC NEEDLE CORE, WITH IMAGING  8/17/2004    Right     ZZHC [facility] ENDOSCOPY, SIMPLE EXAM  03/31/10       Allergies:  Allergies as of 06/29/2021 - Reviewed 06/22/2021   Allergen Reaction Noted     Contrast dye Itching and Other (See Comments) 01/15/2013     Morphine Nausea 06/24/2019     Current Medications:  Current Outpatient Medications   Medication Sig Dispense Refill     ADACEL 5-2-15.5 LF-MCG/0.5 injection        aspirin 81 MG EC tablet Take 81 mg by mouth every morning  90 tablet 3     atorvastatin (LIPITOR) 20 MG tablet Take 1 tablet (20 mg) by mouth every morning 90 tablet 3     diphenhydrAMINE (BENADRYL) 12.5 MG/5ML solution Take by mouth every evening       FLUZONE HIGH-DOSE QUADRIVALENT 0.7 ML KOBE injection        losartan (COZAAR) 50 MG tablet TAKE ONE-HALF TABLET(25MG) BY MOUTH EVERY MORNING 45 tablet 3     metoprolol tartrate (LOPRESSOR) 25 MG tablet Take 1 tablet (25 mg) by mouth 2 times daily 180 tablet 3     metroNIDAZOLE (FLAGYL) 500 MG tablet Take 1 tablet (500 mg) by mouth every 6 hours At 8:00 am, 2:00 pm, 8:00 pm the day prior to your surgery with neomycin and zofran. (Patient not taking: Reported on 6/9/2021) 3 tablet 0     neomycin (MYCIFRADIN) 500 MG tablet Take 2 tablets (1,000 mg) by mouth every 6 hours At 8:00 am, 2:00 pm, 8:00 pm the day prior to your surgery with flagyl and zofran. (Patient not taking: Reported on 6/15/2021) 6 tablet 0     ondansetron (ZOFRAN) 4 MG  "tablet Take 1 tablet (4 mg) by mouth every 6 hours At 8:00 am, 2:00 pm, 8:00 pm the day prior to your surgery with neomycin and flagyl. (Patient not taking: Reported on 6/15/2021) 3 tablet 0     polyethylene glycol (MIRALAX) 17 g packet Take 238 g by mouth See Admin Instructions Starting at 4 pm night prior to surgery. Refer to \"Getting Ready for Surgery\" instructions. (Patient not taking: Reported on 6/15/2021) 14 packet 0      Family History:  Family History   Problem Relation Age of Onset     Cancer Mother         Colon Cancer     Heart Disease Mother         MI     Osteoporosis Mother      Cancer Father         Colon Cancer     Heart Disease Father         Heart Disease/ Heart attacks     Diabetes Father         Adult Onset     Cancer Sister         Colon Cancer     Heart Disease Brother         Heart attacks at age 45     Breast Cancer Sister      Cancer Paternal Grandmother         Unknown type     Heart Disease Maternal Grandfather         MI     Diabetes Sister         Adult Onset     Diabetes Sister         Adult Onset     Diabetes Brother         Adult Onset     Heart Disease Brother         heart attack age 64     Cancer - colorectal Son         age 36, Gardiner syndrome     Social History:  Social History     Socioeconomic History     Marital status:      Spouse name: Cain     Number of children: 4     Years of education: 12     Highest education level: Not on file   Occupational History     Occupation: [employer]     Employer: [employer]     Comment: [employer]   Tobacco Use     Smoking status: Never Smoker     Smokeless tobacco: Never Used   Substance and Sexual Activity     Alcohol use: Yes     Comment: rarely     Drug use: No     Sexual activity: Yes     Partners: Male     Comment: Post menopausal   Social History Narrative    Lives with spouse. No domestic violence issues.     Physical Exam:  Ht 1.715 m (5' 7.5\")   Wt 85.7 kg (189 lb)   BMI 29.16 kg/m      Constitutional: alert " and in no distress  Eyes: No redness or discharge  Respiratory: No cough or labored breathing.  Musculoskeletal: Full range of motion in extremities.  Skin: no visible skin lesions or discoloration  Neurological: No tremors and denies headache.  Psychiatric: Mentation appears normal and affect is normal as well.  Alert and oriented x3.  The rest the comprehensive physical examination is deferred due to public health emergency video visit restrictions.    Laboratory/Imaging Studies  Labs reviewed.  CBC with differential counts normal on May 2021 CEA is elevated at 4.6.  LFTs are normal.  Creatinine is normal.  Component      Latest Ref Rng & Units 5/18/2021   WBC      4.0 - 11.0 10e9/L 5.9   RBC Count      3.8 - 5.2 10e12/L 4.87   Hemoglobin      11.7 - 15.7 g/dL 13.7   Hematocrit      35.0 - 47.0 % 42.2   MCV      78 - 100 fl 87   MCH      26.5 - 33.0 pg 28.1   MCHC      31.5 - 36.5 g/dL 32.5   RDW      10.0 - 15.0 % 14.0   Platelet Count      150 - 450 10e9/L 308   Diff Method       Automated Method   % Neutrophils      % 86.3   % Lymphocytes      % 12.7   % Monocytes      % 0.5   % Eosinophils      % 0.0   % Basophils      % 0.2   % Immature Granulocytes      % 0.3   Nucleated RBCs      0 /100 0   Absolute Neutrophil      1.6 - 8.3 10e9/L 5.1   Absolute Lymphocytes      0.8 - 5.3 10e9/L 0.8   Absolute Monocytes      0.0 - 1.3 10e9/L 0.0   Absolute Eosinophils      0.0 - 0.7 10e9/L 0.0   Absolute Basophils      0.0 - 0.2 10e9/L 0.0   Abs Immature Granulocytes      0 - 0.4 10e9/L 0.0   Absolute Nucleated RBC       0.0   Sodium      133 - 144 mmol/L 139   Potassium      3.4 - 5.3 mmol/L 4.1   Chloride      94 - 109 mmol/L 107   Carbon Dioxide      20 - 32 mmol/L 28   Anion Gap      3 - 14 mmol/L 4   Glucose      70 - 99 mg/dL 195 (H)   Urea Nitrogen      7 - 30 mg/dL 19   Creatinine      0.52 - 1.04 mg/dL 0.89   GFR Estimate      >60 mL/min/1.73:m2 63   GFR Estimate If Black      >60 mL/min/1.73:m2 73   Calcium       8.5 - 10.1 mg/dL 9.3   Bilirubin Total      0.2 - 1.3 mg/dL 0.4   Albumin      3.4 - 5.0 g/dL 3.7   Protein Total      6.8 - 8.8 g/dL 7.9   Alkaline Phosphatase      40 - 150 U/L 103   ALT      0 - 50 U/L 21   AST      0 - 45 U/L 16   CEA elevated at 4.6 as above.  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    1.  New diagnosis of moderately differentiated adenocarcinoma of the ascending colon, with mildly elevated preop CEA of 4.6 and no evidence of distant metastatic disease by CT imaging -she is scheduled to undergo laparoscopic subtotal colectomy on June 30 by Dr. Lay.  We will follow up on pathology from the surgery.    2.  Left DCIS-plan for her is to undergo left wire localized lumpectomy on August 16.  While she is awaiting surgery, we will start her on anastrozole 1 mg p.o. daily.  She will start about a week postoperatively.  Prescription was placed.  The rationale behind using Anastrozole was discussed with the patient in detail. We also discussed the major side effects of Anastrozole including, but not limited to more immediate side effects of vasomotor symptoms, mood disturbance, fatigue and weakness, headache, joint pain, nausea/vomiting, cough, sore throat, and vaginal infection/discharge as well as a small risk of long-term side effects such as DVT/PE, cardiovascular disease, high cholesterol level, high blood pressure, cataracts, and potential loss of bone mineral density and need to obtain DEXA scan. The patient was instructed to begin Calcium plus Vitamin D supplementation. The patient will be emailed information about Anastrozole, agrees to proceed with treatment, and denies any further questions at this time.  She will  proceed with baseline DEXA scan in the near future, prior to our return visit.  We will plan to see her back at the end of August, following her left-sided lumpectomy.    3.  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  She is scheduled to see genetic counseling on July 5.   Individuals with Gardiner syndrome are also at increased risk of cancer of the ovary, stomach, small bowel, pancreatobiliary system, genitourinary system (renal pelvis, ureter, bladder), brain (glioma), as well as various skin pathologies (sebaceous neoplasms, keratoacanthomas). We will refer her to establish care with dermatology in the future.  Cancers of the lower uterine segment and the adrenal gland (adrenocortical carcinomas) appear to be rare manifestations of Gardiner syndrome  Several other cancers including laryngeal cancer, hematologic malignancies, and sarcomas have been reported in individuals with Gardiner syndrome, but it is unclear if the incidence of these cancers is increased in individuals with Gardiner syndrome above the general population.  Some studies have reported an increased risk of breast cancer but other studies have not detected an increased risk of breast cancer with Gardiner syndrome.    4.  Bone health-obtain DEXA scan prior to our return visit.  Start calcium and vitamin D supplementation.    5.  History of coronary artery disease, history of ascending aortic aneurysm-status post repair with CABG in 2017.  Echocardiogram on 6/22/201 showed left ventricular systolic function at 60-65%.  Follow-up with     6.  CKD-creatinine stable.  It was my pleasure to meet Marilia.  At the end of our visit patient verbalized understanding and concurred with the plan.    90 minutes spent on the date of the encounter doing chart review, review of test results, interpretation of tests, patient visit and documentation.      Addendum: Pathology from subtotal laparoscopic colectomy from June  30, 2021 showed: 3 tubular adenomas 1 of which contained a focus of high-grade dysplasia (distally located 1.9 cm pedunculated polyp).  2.3 cm invasive moderately differentiated (grade 2) adenocarcinoma, invading submucosa.  28 benign lymph nodes negative for metastatic carcinoma.  Lymphovascular invasion negative.  AJCC eighth edition pathologic stage stage I (pT1pN0).          Again, thank you for allowing me to participate in the care of your patient.        Sincerely,        Ashley Knight MD, MD

## 2021-06-29 NOTE — ANESTHESIA PREPROCEDURE EVALUATION
Anesthesia Pre-Procedure Evaluation    Patient: Marilia Bean   MRN: [id number] : 1945        Preoperative Diagnosis: Colon cancer (H) [C18.9]   Procedure : Procedure(s):  Laparoscopic subtotal colectomy, ileosigmoid anastomosis  possible open           Past Medical History:   Diagnosis Date     Aortic aneurysm (H) 2007    see  [facility] report -follow yearly, treat high BP is occurs Problem list name updated by automated process. Provider to review     Aortic aneurysm of unspecified site without mention of rupture 2007    4.4 cm ascending aorta noted in      Asymptomatic postmenopausal status (age-related) (natural)     on HRT  Prempro -weaning off      CAD (coronary artery disease)     LAD     Family history of Gardiner syndrome      Hyperlipidemia LDL goal <100 10/31/2010     Hypertension goal BP (blood pressure) < 140/90 2016     Leiomyoma of uterus, unspecified     Uterine fibroid     Lump or mass in breast 2004    rt. nodule - bx neg     Gardiner syndrome      Personal history of colonic polyps      Postmenopausal atrophic vaginitis 2006     Pulmonary nodule     incidental noted in  during [place]     Pure hypercholesterolemia     mild, diet - low cholesterol, low fat.10/06 start Lovastatin      Past Surgical History:   Procedure Laterality Date     BYPASS GRAFT ARTERY CORONARY N/A 2017    Procedure: BYPASS GRAFT ARTERY CORONARY;;  Surgeon: Akshat Soto MD;  Location: [facility]     C DEXA INTERPRETATION, AXIAL  01    wnl. 2005 wnl but lower     COLONOSCOPY  07    Repeat in 1 year for surveillance     COLONOSCOPY  12/10/08    repeat 1 year  -see 2009 letter     COLONOSCOPY  03/31/10     COLONOSCOPY  10/31/2011    Procedure:COMBINED COLONOSCOPY, SINGLE BIOPSY/POLYPECTOMY BY BIOPSY; colonoscopy with polypectomy by biopsy; Surgeon:JESSICA GARCIA; Location: [facility]     COLONOSCOPY  2013    Procedure: COMBINED COLONOSCOPY, SINGLE BIOPSY/POLYPECTOMY BY  BIOPSY;  Colonoscopy, Polypectomies;  Surgeon: Herbert Salinas MD;  Location: [facility]     COLONOSCOPY N/A 12/8/2015    Procedure: COLONOSCOPY;  Surgeon: Jared Carbajal MD;  Location: [facility]     COLONOSCOPY N/A 4/26/2017    Procedure: COMBINED COLONOSCOPY, SINGLE OR MULTIPLE BIOPSY/POLYPECTOMY BY BIOPSY;  Colonoscopy with polypectomies with forceps and snare;  Surgeon: Herbert Salinas MD;  Location: [facility]     COLONOSCOPY N/A 5/10/2021    Procedure: Colonoscopy, With Polypectomy And Biopsy;  Surgeon: Franklyn Hernandez MD;  Location: [facility]     COLONOSCOPY WITH CO2 INSUFFLATION N/A 5/10/2021    Procedure: COLONOSCOPY, WITH CO2 INSUFFLATION;  Surgeon: Franklyn Hernandez MD;  [facility]     COMBINED ESOPHAGOSCOPY, GASTROSCOPY, DUODENOSCOPY (EGD) WITH CO2 INSUFFLATION N/A 5/10/2021    Procedure: ESOPHAGOGASTRODUODENOSCOPY, WITH CO2 INSUFFLATION;  Surgeon: Franklyn Hernandez MD;  Location: [facility]     ENDOSCOPIC INSERTION TUBE JEJUNOSTOMY N/A 8/5/2019    Procedure: Gastrojejunostomy tube placement with gastropexy;  Surgeon: Ford Mann MD;  [facility]     ESOPHAGOSCOPY, GASTROSCOPY, DUODENOSCOPY (EGD), COMBINED N/A 12/8/2015    Procedure: COMBINED ESOPHAGOSCOPY, GASTROSCOPY, DUODENOSCOPY (EGD), BIOPSY SINGLE OR MULTIPLE;  Surgeon: Jared Carbajal MD;  Location:  GI     ESOPHAGOSCOPY, GASTROSCOPY, DUODENOSCOPY (EGD), COMBINED N/A 7/31/2019    Procedure: Endoscopic ultrasound with cystoduodenostomy, stent placement x2, stent dilation, and nasojejunal tube placement;  Surgeon: Ford Mann MD;  [facility]     ESOPHAGOSCOPY, GASTROSCOPY, DUODENOSCOPY (EGD), COMBINED N/A 8/5/2019    Procedure: ESOPHAGOGASTRODUODENOSCOPY (EGD);  Surgeon: Ford Mann MD;  [facility]     ESOPHAGOSCOPY, GASTROSCOPY, DUODENOSCOPY (EGD), COMBINED N/A 8/12/2019    Procedure: ESOPHAGOGASTRODUODENOSCOPY (EGD) with GJ exchange, duodenum stent removal.;   Surgeon: Fodr Mann MD;  [facility]     ESOPHAGOSCOPY, GASTROSCOPY, DUODENOSCOPY (EGD), COMBINED N/A 5/10/2021    Procedure: Esophagogastroduodenoscopy, With Biopsy;  Surgeon: Franklyn Hernandez MD;  Location[facility]     HC COLONOSCOPY THRU STOMA W BIOPSY/CAUTERY TUMOR/POLYP/LESION  1999,2002 2004 polyp - hyperplastic - repeat 5 years ?     HC COLONOSCOPY W/WO BRUSH/WASH  12/12/2005    Polypectomy.  Diverticulosis-minimal. Bx adenomatous and mucosal polyps - repeat 1 year     HC ECP WITH CATARACT SURGERY Bilateral 2015, 2016     HC LAPAROSCOPY, SURGICAL; APPENDECTOMY  10/31/2004     HC LAPAROSCOPY, SURGICAL; CHOLECYSTECTOMY  2000    Cholecystectomy, Laparoscopic     HC REVISE MEDIAN N/CARPAL TUNNEL SURG  10/01/10    left     HYSTERECTOMY, ELVIN  12/14/09    TAHBSO, MMK     REPAIR ANEURYSM ASCENDING AORTA N/A 6/5/2017    Procedure: REPAIR ANEURYSM ASCENDING AORTA;  Median Sternotomy, Ascending Aortic Aneurysm Repair, Coronary Artery Bypass Graft x1 on pump oxygenator;  Surgeon: Akshat Soto MD;  [facility]     REPLACE GASTROJEJUNOSTOMY TUBE, PERCUTANEOUS N/A 8/19/2019    Procedure: REPLACEMENT, GASTROJEJUNOSTOMY TUBE;  Surgeon: Robert Tafoya MD;  [facility]     RESECTION DUODENAL N/A 7/3/2019    Procedure: Resection of Distal Duodenal and proximal Jejunum;  Surgeon: Joaquin Brito MD;  Location[facility]     ZZ BIOPSY BREAST, PERC NEEDLE CORE, WITH IMAGING  8/17/2004    Right     ZZHC [facility] ENDOSCOPY, SIMPLE EXAM  03/31/10      Allergies   Allergen Reactions     Contrast Dye Itching and Other (See Comments)     Tingling in mouth     Morphine Nausea     Reports that she did not vomit.       Social History     Tobacco Use     Smoking status: Never Smoker     Smokeless tobacco: Never Used   Substance Use Topics     Alcohol use: Yes     Comment: rarely      Wt Readings from Last 1 Encounters:   06/29/21 85.7 kg (189 lb)        Anesthesia Evaluation   Pt has  had prior anesthetic. Type: General and MAC.    No history of anesthetic complications       ROS/MED HX  ENT/Pulmonary:     (+) JARED risk factors, hypertension,  (-) tobacco use   Neurologic:  - neg neurologic ROS     Cardiovascular: Comment:   S/p ascending aortic aneurysm repair with CABG by Dr. Soto 2017. CABG with LIMA to LAD done at the same time. Post-op atrial fibrillation treated with amiodarone and warfarin that eventually resolved without recurrence.     Denies cardiac symptoms including chest pain, SOB, palpitations, syncope, JAY, orthopnea, or PND.          (+) hypertension--CAD -CABG--Taking blood thinners Previous cardiac testing   Echo: Date: Results:    Stress Test: Date: 6/16/2021 Results:  EF 60-65%.  Grade I diastolic dysfunction.  There is nontransmural infarction in the anterior and anterolateral segment(s) of the left ventricle.  ECG Reviewed: Date: 12/16/2020 Results:  Sinus bradycardia. Nonspecific T-wave abnormalities.  Cath: Date: Results:      METS/Exercise Tolerance: >4 METS Comment: Reports she could easily walk a mile.  Does not do any regular exercise but stays busy with house keeping and everyday activities.    Hematologic:     (+) history of blood transfusion (2017), no previous transfusion reaction, Known PRBC Anitbodies:No     Musculoskeletal:  - neg musculoskeletal ROS     GI/Hepatic:  - neg GI/hepatic ROS   (+) appendicitis (s/p appendectomy), cholecystitis/cholelithiasis (s/p cholecystectomy),     Renal/Genitourinary:  - neg Renal ROS     Endo:  - neg endo ROS     Psychiatric/Substance Use:  - neg psychiatric ROS     Infectious Disease: Comment: Completed COVID vaccine in March 2021. - neg infectious disease ROS     Malignancy:   (+) Malignancy, History of Breast and Other.Breast CA Active status post.  Other CA Colon cancer Active status post.    Other: Comment: S/P ELVIN, BSO and MMK . 12/2009           Physical Exam    Airway        Mallampati: II   TM distance: > 3 FB   Neck  ROM: full   Mouth opening: > 3 cm    Respiratory Devices and Support         Dental       (+) upper dentures and lower dentures      Cardiovascular          Rhythm and rate: regular and normal     Pulmonary           breath sounds clear to auscultation           OUTSIDE LABS:  CBC:   Lab Results   Component Value Date    WBC 5.3 06/28/2021    WBC 5.9 05/18/2021    HGB 13.3 06/28/2021    HGB 13.7 05/18/2021    HCT 39.7 06/28/2021    HCT 42.2 05/18/2021     06/28/2021     05/18/2021     BMP:   Lab Results   Component Value Date     06/28/2021     05/18/2021    POTASSIUM 4.2 06/28/2021    POTASSIUM 4.1 05/18/2021    CHLORIDE 107 06/28/2021    CHLORIDE 107 05/18/2021    CO2 29 06/28/2021    CO2 28 05/18/2021    BUN 17 06/28/2021    BUN 19 05/18/2021    CR 0.86 06/28/2021    CR 0.89 05/18/2021    GLC 99 06/28/2021     (H) 05/18/2021     COAGS:   Lab Results   Component Value Date    PTT 33 06/12/2017    INR 1.12 08/12/2019    FIBR 269 06/06/2017     POC:   Lab Results   Component Value Date    BGM 79 08/19/2019     HEPATIC:   Lab Results   Component Value Date    ALBUMIN 3.5 06/28/2021    PROTTOTAL 7.1 06/28/2021    ALT 20 06/28/2021    AST 21 06/28/2021    ALKPHOS 92 06/28/2021    BILITOTAL 0.4 06/28/2021     OTHER:   Lab Results   Component Value Date    PH 7.32 (L) 06/06/2017    LACT 1.1 08/11/2019    A1C 5.8 06/07/2017    TARIQ 9.2 06/28/2021    PHOS 3.8 08/12/2019    MAG 2.1 08/12/2019    LIPASE 411 (H) 07/14/2019    AMYLASE 30 07/06/2019    TSH 4.97 (H) 06/13/2017       Anesthesia Plan    ASA Status:  3      Anesthesia Type: General.     - Airway: ETT   Induction: Intravenous, Propofol.   Maintenance: Inhalation.   Techniques and Equipment:     - Lines/Monitors: 2nd IV     Consents    Anesthesia Plan(s) and associated risks, benefits, and realistic alternatives discussed. Questions answered and patient/representative(s) expressed understanding.     - Discussed with:  Patient      -  "Extended Intubation/Ventilatory Support Discussed: No.      - Patient is DNR/DNI Status: No    Use of blood products discussed: Yes.     - Discussed with: Patient.     - Consented: consented to blood products            Reason for refusal: other.     Postoperative Care       PONV prophylaxis: Ondansetron (or other 5HT-3), Dexamethasone or Solumedrol     Comments:    Marilia is a 76-year-old female undergoing laparoscopic subtotal colectomy with ileosigmoid anastomosis with Dr. Lay. 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    Seen by cardiology 6/15/2021:  \"Patient's cardiac history is significant for ascending aortic aneurysm repair and single-vessel CABG involving LIMA to LAD on 2017.  Coronary angiogram at that time showed no significant flow-limiting lesion in other coronary arteries.  Since then patient is active and asymptomatic.  Had no cardiac complaints today. As per patient at her son's [place].  She goes up and down the steps without difficulty.  She runs a food shelf alone. Her cardiac risk factors are hypertension and hyperlipidemia.  Non-smoker.  No diabetes.\"            PAC Discussion and Assessment    ASA Classification: 3  Case is suitable for: [place]  Anesthetic techniques and relevant risks discussed: GA                  PAC Resident/NP Anesthesia Assessment: Type of service:  Video Visit    Patient verbally consented to video service today: YES    Two identifiers used:  yes (name and )    Video Start Time: 8:37  Video End Time (time video stopped): 8:57    Originating Location (pt. Location): Home    Distant Location (provider location):  home    Mode of Communication:  Video Conference via [employer]    Please note, because this was a virtual " "visit, a full physical could not be completed.  On the DOS, the OOD of anesthesia will complete the appropriate components of the physical exam.  --    Marilia Bean is a 76-year-old female schedule for Laparoscopic subtotal colectomy, ileosigmoid anastomosis with Akil Lay MD on 6/30/2021 at [facility] under general anesthesia with ORAS protocol.       Ms. Bean was seen in colon and rectal consultation on 5/25/21 with Dr. 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 She has a history of duodenal adenocarcinoma s/p surgical resection with resulting duodeno-jejunostomy with Dr. Campo in 2019.  Ms. Bean had a colonoscopy on 5/10/21 that showed an ascending colon mass with the biopsy being positive for adenocarcinoma, moderately differentiated. She was referred to Dr Lay.  He counseled her on her diagnosis and treatment options.  Ms. Bean presents to the PAC today virtually and would like to proceed with above surgical intervention.      Additional notable PMH includes CAD and aortic aneurysm s/p aortic aneurysm repair with vascular graft and single vessel coronary artery bypass by Dr. Soto (6/5/17), HTN, HLD, h/o CKD, Gardiner syndrome and recent dx of DCIS of left breast.     PROCEDURES   ECG 12/16/2020   SB 59 bpm  Possible left atrial enlargement  Non-specific T-wave abnormality.     Holter monitor 12/2020 (unable to find official report but recorded per DO JANN Forbes phone note)     \"Couple short runs of supraventricular tachycardia.  Instructed to continue metoprolol.           CTA chest dated 6/10/2017 10:28 AM      CLINICAL INFORMATION: routine imaging after repair of ascending aortic     aneurysm                                                                     Impression:     1. New postoperative changes of ascending aortic aneurysm repair with     vascular graft as well as coronary artery bypass with postsurgical     inflammation and " hematoma in the anterior mediastinum. The treated     ascending aorta now measures 3.5 x 3.4 cm without anastomotic leak.     Measurements as detailed above.     2. New left greater than right small pleural effusions with subjacent     compressive atelectasis.     3. Subcentimeter pulmonary nodules measuring up to 9 mm, grossly     unchanged from 1/30/2015.      I have personally reviewed the examination and initial interpretation     and I agree with the findings.      ROSS MCCLELLAN       Coronary angiogram 5/22/2017     Catheterization diagnosis:      1-vessel coronary artery disease (LAD), without left main lesion.         Echocardiogram 3/16/17 this was done prior to aortic aneurysm repair     Interpretation Summary      The aortic Sinus(es) of Valsalva are mildly dilated at at 4.1 cm and the     ascending aorta is Moderately dilated at 5.0 cm (The ULNL = 3.7 cm). Compared     to the serial echos dated 3- and 1-8-2013, the ascending aorta has     progressively dilated from 4.2 cm => 4.6 cm => currently 5.0 cm. Close     continued and regular monitoring of this progressive ascending aortic     dilation/aneurysm is indicated.     The left ventricle is normal in size with borderline to mild concentric left     ventricular hypertrophy.     The visual ejection fraction is estimated at 65-70% with Grade I or early     diastolic dysfunction.     There is trace mitral regurgitation.     There is trace aortic regurgitation.     Right ventricle systolic pressure estimate is noted below and normal.         Carotid US, bilateral 4/14/17                                                                    Impression:     1. Right side:         Degree of stenosis: Normal     2. Left side:          Degree of stenosis: Normal       She has the following specific operative considerations:   - JARED # of risks 2/8 = low risk  - VTE risk:  3%  - Risk of PONV score = 2.  If > 2, anti-emetic intervention recommended.      #   Cardiology   - CAD and ascending aortic aneurysm s/p ascending aortic aneurysm repair with CABG (MURPHY to LAD) with Dr. Soto (2017).  Denies cardiac symptoms. METS:  >4. Will be seeing cardiology tomorrow for cardiac risk assessment. RCRI : Coronary Artery Disease (MI, positive stress test, angina, Qs on EKG) and intra-peritoneal surgery.  6.6 % risk of major adverse cardiac event. Hold ASA for 7 days prior to surgery.   - HTN take beta blocker DOS.  Hold losartan DOS.   - HLD, take statin as prescribed DOS.     #  Pulmonary   - no smoking hx  - Denies pulmonary symptoms  - No inhalers     #  Hematology   -  H/o blood transfusion without reaction      # Renal   - h/o CKD, stage III.  BMP ordered by Dr. Lay prior to surgery.       # Colon and rectal  - H/O duodenal adenocarcinoma s/p surgical resection with resulting duodeno-jejunostomy with Dr. Campo in 2019.  On colonoscopy 5/2021, ascending colon mass with the biopsy being positive for adenocarcinoma, moderately differentiated. Above procedure planned with ORAS protocol.      # GYN  - Newly dx DCIS of Left breast.  Following with Dr. Toro.  Will pursue treatment once recovered from above surgery.     #  ID  - COVID-19 testing per surgeon's office    #   Anesthesia considerations   -  Refer to PAC assessment in anesthesia records      Arrival time, NPO, shower and medication instructions provided by nursing staff today.    Patient is an acceptable candidate for the proposed procedure.  No further diagnostic evaluation is needed.     **For further details of assessment, testing, and physical exam please see H and P completed on same date.      Reviewed and Signed by PAC Mid-Level Provider/Resident  Mid-Level Provider/Resident: Rosanna BROOKS CNP  Date: 6/14/2021                                 Chan Rubin MD

## 2021-06-29 NOTE — NURSING NOTE
Marilia is a 76 year old who is being evaluated via a billable video visit.      How would you like to obtain your AVS? MyChart  If the video visit is dropped, the invitation should be resent by: Text to cell phone: [phone]  Will anyone else be joining your video visit? No      Video-Visit Details    Type of service:  Video Visit      Originating Location (pt. Location): Home    Distant Location (provider location):  [facility]     Platform used for Video Visit: Mireya     Concerns:  Patient is having Colon Cancer surgery tomorrow and wondering if she was supposed to take her cholesterol and BP medications today?    Mulu Sanders CMA

## 2021-06-30 ENCOUNTER — ANESTHESIA (OUTPATIENT)
Dept: SURGERY | Facility: CLINIC | Age: 76
DRG: 331 | End: 2021-06-30
Payer: MEDICARE

## 2021-06-30 ENCOUNTER — HOSPITAL ENCOUNTER (INPATIENT)
Facility: CLINIC | Age: 76
LOS: 3 days | Discharge: HOME | DRG: 331 | End: 2021-07-03
Attending: COLON & RECTAL SURGERY | Admitting: COLON & RECTAL SURGERY
Payer: MEDICARE

## 2021-06-30 ENCOUNTER — ANCILLARY PROCEDURE (OUTPATIENT)
Dept: ULTRASOUND IMAGING | Facility: CLINIC | Age: 76
End: 2021-06-30
Attending: STUDENT IN AN ORGANIZED HEALTH CARE EDUCATION/TRAINING PROGRAM
Payer: COMMERCIAL

## 2021-06-30 DIAGNOSIS — C18.9 COLON CANCER (H): ICD-10-CM

## 2021-06-30 DIAGNOSIS — Z90.49 STATUS POST COLECTOMY: Primary | ICD-10-CM

## 2021-06-30 LAB
ABO + RH BLD: NORMAL
ABO + RH BLD: NORMAL
BLD GP AB SCN SERPL QL: NORMAL
BLOOD BANK CMNT PATIENT-IMP: NORMAL
GLUCOSE BLDC GLUCOMTR-MCNC: 113 MG/DL (ref 70–99)
SPECIMEN EXP DATE BLD: NORMAL

## 2021-06-30 PROCEDURE — 120N000002 HC R&B MED SURG/OB UMMC

## 2021-06-30 PROCEDURE — 86901 BLOOD TYPING SEROLOGIC RH(D): CPT | Performed by: NURSE PRACTITIONER

## 2021-06-30 PROCEDURE — 36415 COLL VENOUS BLD VENIPUNCTURE: CPT | Performed by: NURSE PRACTITIONER

## 2021-06-30 PROCEDURE — 88309 TISSUE EXAM BY PATHOLOGIST: CPT | Mod: TC | Performed by: COLON & RECTAL SURGERY

## 2021-06-30 PROCEDURE — 250N000011 HC RX IP 250 OP 636: Performed by: STUDENT IN AN ORGANIZED HEALTH CARE EDUCATION/TRAINING PROGRAM

## 2021-06-30 PROCEDURE — 360N000077 HC SURGERY LEVEL 4, PER MIN: Performed by: COLON & RECTAL SURGERY

## 2021-06-30 PROCEDURE — 88309 TISSUE EXAM BY PATHOLOGIST: CPT | Mod: 26 | Performed by: PATHOLOGY

## 2021-06-30 PROCEDURE — 999N001017 HC STATISTIC GLUCOSE BY METER IP

## 2021-06-30 PROCEDURE — 250N000011 HC RX IP 250 OP 636: Performed by: COLON & RECTAL SURGERY

## 2021-06-30 PROCEDURE — 710N000009 HC RECOVERY PHASE 1, LEVEL 1, PER MIN: Performed by: COLON & RECTAL SURGERY

## 2021-06-30 PROCEDURE — C9290 INJ, BUPIVACAINE LIPOSOME: HCPCS | Performed by: ANESTHESIOLOGY

## 2021-06-30 PROCEDURE — 86900 BLOOD TYPING SEROLOGIC ABO: CPT | Performed by: NURSE PRACTITIONER

## 2021-06-30 PROCEDURE — 272N000002 HC OR SUPPLY OTHER OPNP: Performed by: COLON & RECTAL SURGERY

## 2021-06-30 PROCEDURE — 250N000011 HC RX IP 250 OP 636: Performed by: ANESTHESIOLOGY

## 2021-06-30 PROCEDURE — 999N000141 HC STATISTIC PRE-PROCEDURE NURSING ASSESSMENT: Performed by: COLON & RECTAL SURGERY

## 2021-06-30 PROCEDURE — 250N000011 HC RX IP 250 OP 636: Performed by: NURSE ANESTHETIST, CERTIFIED REGISTERED

## 2021-06-30 PROCEDURE — 86850 RBC ANTIBODY SCREEN: CPT | Performed by: NURSE PRACTITIONER

## 2021-06-30 PROCEDURE — 0DNW4ZZ RELEASE PERITONEUM, PERCUTANEOUS ENDOSCOPIC APPROACH: ICD-10-PCS | Performed by: COLON & RECTAL SURGERY

## 2021-06-30 PROCEDURE — 250N000013 HC RX MED GY IP 250 OP 250 PS 637: Performed by: COLON & RECTAL SURGERY

## 2021-06-30 PROCEDURE — 258N000003 HC RX IP 258 OP 636: Performed by: STUDENT IN AN ORGANIZED HEALTH CARE EDUCATION/TRAINING PROGRAM

## 2021-06-30 PROCEDURE — 0DTE4ZZ RESECTION OF LARGE INTESTINE, PERCUTANEOUS ENDOSCOPIC APPROACH: ICD-10-PCS | Performed by: COLON & RECTAL SURGERY

## 2021-06-30 PROCEDURE — 370N000017 HC ANESTHESIA TECHNICAL FEE, PER MIN: Performed by: COLON & RECTAL SURGERY

## 2021-06-30 PROCEDURE — 93010 ELECTROCARDIOGRAM REPORT: CPT | Mod: 59 | Performed by: INTERNAL MEDICINE

## 2021-06-30 PROCEDURE — 250N000009 HC RX 250: Performed by: STUDENT IN AN ORGANIZED HEALTH CARE EDUCATION/TRAINING PROGRAM

## 2021-06-30 PROCEDURE — 272N000001 HC OR GENERAL SUPPLY STERILE: Performed by: COLON & RECTAL SURGERY

## 2021-06-30 PROCEDURE — 250N000025 HC SEVOFLURANE, PER MIN: Performed by: COLON & RECTAL SURGERY

## 2021-06-30 PROCEDURE — 250N000013 HC RX MED GY IP 250 OP 250 PS 637: Performed by: STUDENT IN AN ORGANIZED HEALTH CARE EDUCATION/TRAINING PROGRAM

## 2021-06-30 RX ORDER — LIDOCAINE HYDROCHLORIDE 20 MG/ML
INJECTION, SOLUTION INFILTRATION; PERINEURAL PRN
Status: DISCONTINUED | OUTPATIENT
Start: 2021-06-30 | End: 2021-06-30

## 2021-06-30 RX ORDER — FENTANYL CITRATE 50 UG/ML
INJECTION, SOLUTION INTRAMUSCULAR; INTRAVENOUS PRN
Status: DISCONTINUED | OUTPATIENT
Start: 2021-06-30 | End: 2021-06-30

## 2021-06-30 RX ORDER — PROPOFOL 10 MG/ML
INJECTION, EMULSION INTRAVENOUS PRN
Status: DISCONTINUED | OUTPATIENT
Start: 2021-06-30 | End: 2021-06-30

## 2021-06-30 RX ORDER — HYDROMORPHONE HCL IN WATER/PF 6 MG/30 ML
0.2 PATIENT CONTROLLED ANALGESIA SYRINGE INTRAVENOUS
Status: DISCONTINUED | OUTPATIENT
Start: 2021-06-30 | End: 2021-07-03 | Stop reason: HOSPADM

## 2021-06-30 RX ORDER — CYCLOBENZAPRINE HCL 10 MG
10 TABLET ORAL 3 TIMES DAILY
Status: DISCONTINUED | OUTPATIENT
Start: 2021-06-30 | End: 2021-07-03 | Stop reason: HOSPADM

## 2021-06-30 RX ORDER — SODIUM CHLORIDE, SODIUM LACTATE, POTASSIUM CHLORIDE, CALCIUM CHLORIDE 600; 310; 30; 20 MG/100ML; MG/100ML; MG/100ML; MG/100ML
INJECTION, SOLUTION INTRAVENOUS CONTINUOUS
Status: DISCONTINUED | OUTPATIENT
Start: 2021-06-30 | End: 2021-07-02

## 2021-06-30 RX ORDER — ONDANSETRON 2 MG/ML
4 INJECTION INTRAMUSCULAR; INTRAVENOUS EVERY 30 MIN PRN
Status: DISCONTINUED | OUTPATIENT
Start: 2021-06-30 | End: 2021-06-30 | Stop reason: HOSPADM

## 2021-06-30 RX ORDER — FENTANYL CITRATE 50 UG/ML
25-50 INJECTION, SOLUTION INTRAMUSCULAR; INTRAVENOUS
Status: DISCONTINUED | OUTPATIENT
Start: 2021-06-30 | End: 2021-06-30 | Stop reason: HOSPADM

## 2021-06-30 RX ORDER — OXYCODONE HYDROCHLORIDE 5 MG/1
5 TABLET ORAL EVERY 4 HOURS PRN
Status: DISCONTINUED | OUTPATIENT
Start: 2021-06-30 | End: 2021-07-03 | Stop reason: HOSPADM

## 2021-06-30 RX ORDER — ONDANSETRON 4 MG/1
4 TABLET, ORALLY DISINTEGRATING ORAL EVERY 6 HOURS PRN
Status: DISCONTINUED | OUTPATIENT
Start: 2021-06-30 | End: 2021-07-03 | Stop reason: HOSPADM

## 2021-06-30 RX ORDER — NALOXONE HYDROCHLORIDE 0.4 MG/ML
0.4 INJECTION, SOLUTION INTRAMUSCULAR; INTRAVENOUS; SUBCUTANEOUS
Status: DISCONTINUED | OUTPATIENT
Start: 2021-06-30 | End: 2021-06-30 | Stop reason: HOSPADM

## 2021-06-30 RX ORDER — NALOXONE HYDROCHLORIDE 0.4 MG/ML
0.2 INJECTION, SOLUTION INTRAMUSCULAR; INTRAVENOUS; SUBCUTANEOUS
Status: DISCONTINUED | OUTPATIENT
Start: 2021-06-30 | End: 2021-07-03 | Stop reason: HOSPADM

## 2021-06-30 RX ORDER — FLUMAZENIL 0.1 MG/ML
0.2 INJECTION, SOLUTION INTRAVENOUS
Status: DISCONTINUED | OUTPATIENT
Start: 2021-06-30 | End: 2021-06-30 | Stop reason: HOSPADM

## 2021-06-30 RX ORDER — ONDANSETRON 2 MG/ML
4 INJECTION INTRAMUSCULAR; INTRAVENOUS EVERY 6 HOURS PRN
Status: DISCONTINUED | OUTPATIENT
Start: 2021-06-30 | End: 2021-07-03 | Stop reason: HOSPADM

## 2021-06-30 RX ORDER — KETAMINE HYDROCHLORIDE 10 MG/ML
INJECTION INTRAMUSCULAR; INTRAVENOUS PRN
Status: DISCONTINUED | OUTPATIENT
Start: 2021-06-30 | End: 2021-06-30

## 2021-06-30 RX ORDER — SODIUM CHLORIDE, SODIUM LACTATE, POTASSIUM CHLORIDE, CALCIUM CHLORIDE 600; 310; 30; 20 MG/100ML; MG/100ML; MG/100ML; MG/100ML
INJECTION, SOLUTION INTRAVENOUS CONTINUOUS
Status: DISCONTINUED | OUTPATIENT
Start: 2021-06-30 | End: 2021-06-30 | Stop reason: HOSPADM

## 2021-06-30 RX ORDER — NALOXONE HYDROCHLORIDE 0.4 MG/ML
0.4 INJECTION, SOLUTION INTRAMUSCULAR; INTRAVENOUS; SUBCUTANEOUS
Status: DISCONTINUED | OUTPATIENT
Start: 2021-06-30 | End: 2021-07-03 | Stop reason: HOSPADM

## 2021-06-30 RX ORDER — NALOXONE HYDROCHLORIDE 0.4 MG/ML
0.2 INJECTION, SOLUTION INTRAMUSCULAR; INTRAVENOUS; SUBCUTANEOUS
Status: DISCONTINUED | OUTPATIENT
Start: 2021-06-30 | End: 2021-06-30 | Stop reason: HOSPADM

## 2021-06-30 RX ORDER — ONDANSETRON 4 MG/1
4 TABLET, ORALLY DISINTEGRATING ORAL EVERY 30 MIN PRN
Status: DISCONTINUED | OUTPATIENT
Start: 2021-06-30 | End: 2021-06-30 | Stop reason: HOSPADM

## 2021-06-30 RX ORDER — BUPIVACAINE HYDROCHLORIDE 2.5 MG/ML
INJECTION, SOLUTION EPIDURAL; INFILTRATION; INTRACAUDAL
Status: COMPLETED | OUTPATIENT
Start: 2021-06-30 | End: 2021-06-30

## 2021-06-30 RX ORDER — HYDROMORPHONE HCL IN WATER/PF 6 MG/30 ML
0.4 PATIENT CONTROLLED ANALGESIA SYRINGE INTRAVENOUS
Status: DISCONTINUED | OUTPATIENT
Start: 2021-06-30 | End: 2021-07-03 | Stop reason: HOSPADM

## 2021-06-30 RX ORDER — ONDANSETRON 2 MG/ML
4 INJECTION INTRAMUSCULAR; INTRAVENOUS ONCE
Status: DISCONTINUED | OUTPATIENT
Start: 2021-06-30 | End: 2021-06-30 | Stop reason: HOSPADM

## 2021-06-30 RX ORDER — OXYCODONE HYDROCHLORIDE 10 MG/1
10 TABLET ORAL EVERY 4 HOURS PRN
Status: DISCONTINUED | OUTPATIENT
Start: 2021-06-30 | End: 2021-07-02

## 2021-06-30 RX ORDER — CEFAZOLIN SODIUM 2 G/100ML
2 INJECTION, SOLUTION INTRAVENOUS SEE ADMIN INSTRUCTIONS
Status: DISCONTINUED | OUTPATIENT
Start: 2021-06-30 | End: 2021-06-30 | Stop reason: HOSPADM

## 2021-06-30 RX ORDER — LABETALOL HYDROCHLORIDE 5 MG/ML
10 INJECTION, SOLUTION INTRAVENOUS
Status: DISCONTINUED | OUTPATIENT
Start: 2021-06-30 | End: 2021-06-30 | Stop reason: HOSPADM

## 2021-06-30 RX ORDER — DEXAMETHASONE SODIUM PHOSPHATE 10 MG/ML
INJECTION, SOLUTION INTRAMUSCULAR; INTRAVENOUS PRN
Status: DISCONTINUED | OUTPATIENT
Start: 2021-06-30 | End: 2021-06-30

## 2021-06-30 RX ORDER — LIDOCAINE 40 MG/G
CREAM TOPICAL
Status: DISCONTINUED | OUTPATIENT
Start: 2021-06-30 | End: 2021-07-03 | Stop reason: HOSPADM

## 2021-06-30 RX ORDER — HYDROMORPHONE HYDROCHLORIDE 1 MG/ML
.3-.5 INJECTION, SOLUTION INTRAMUSCULAR; INTRAVENOUS; SUBCUTANEOUS EVERY 5 MIN PRN
Status: DISCONTINUED | OUTPATIENT
Start: 2021-06-30 | End: 2021-06-30 | Stop reason: HOSPADM

## 2021-06-30 RX ORDER — LIDOCAINE 40 MG/G
CREAM TOPICAL
Status: DISCONTINUED | OUTPATIENT
Start: 2021-06-30 | End: 2021-06-30 | Stop reason: HOSPADM

## 2021-06-30 RX ORDER — ONDANSETRON 2 MG/ML
INJECTION INTRAMUSCULAR; INTRAVENOUS PRN
Status: DISCONTINUED | OUTPATIENT
Start: 2021-06-30 | End: 2021-06-30

## 2021-06-30 RX ORDER — ATORVASTATIN CALCIUM 20 MG/1
20 TABLET, FILM COATED ORAL EVERY MORNING
Status: DISCONTINUED | OUTPATIENT
Start: 2021-07-01 | End: 2021-07-03 | Stop reason: HOSPADM

## 2021-06-30 RX ORDER — EPHEDRINE SULFATE 50 MG/ML
INJECTION, SOLUTION INTRAMUSCULAR; INTRAVENOUS; SUBCUTANEOUS PRN
Status: DISCONTINUED | OUTPATIENT
Start: 2021-06-30 | End: 2021-06-30

## 2021-06-30 RX ORDER — PHENYLEPHRINE HCL IN 0.9% NACL 50MG/250ML
.1-6 PLASTIC BAG, INJECTION (ML) INTRAVENOUS CONTINUOUS
Status: DISCONTINUED | OUTPATIENT
Start: 2021-06-30 | End: 2021-06-30

## 2021-06-30 RX ORDER — SODIUM CHLORIDE, SODIUM LACTATE, POTASSIUM CHLORIDE, CALCIUM CHLORIDE 600; 310; 30; 20 MG/100ML; MG/100ML; MG/100ML; MG/100ML
INJECTION, SOLUTION INTRAVENOUS CONTINUOUS PRN
Status: DISCONTINUED | OUTPATIENT
Start: 2021-06-30 | End: 2021-06-30

## 2021-06-30 RX ORDER — CEFAZOLIN SODIUM 2 G/100ML
2 INJECTION, SOLUTION INTRAVENOUS
Status: COMPLETED | OUTPATIENT
Start: 2021-06-30 | End: 2021-06-30

## 2021-06-30 RX ORDER — ACETAMINOPHEN 325 MG/1
975 TABLET ORAL ONCE
Status: COMPLETED | OUTPATIENT
Start: 2021-06-30 | End: 2021-06-30

## 2021-06-30 RX ADMIN — Medication 10 MG: at 08:19

## 2021-06-30 RX ADMIN — ROCURONIUM BROMIDE 20 MG: 10 INJECTION INTRAVENOUS at 12:34

## 2021-06-30 RX ADMIN — ROCURONIUM BROMIDE 20 MG: 10 INJECTION INTRAVENOUS at 11:17

## 2021-06-30 RX ADMIN — FENTANYL CITRATE 25 MCG: 50 INJECTION, SOLUTION INTRAMUSCULAR; INTRAVENOUS at 06:47

## 2021-06-30 RX ADMIN — CYCLOBENZAPRINE 10 MG: 10 TABLET, FILM COATED ORAL at 19:24

## 2021-06-30 RX ADMIN — ENOXAPARIN SODIUM 40 MG: 40 INJECTION, SOLUTION INTRAVENOUS; SUBCUTANEOUS at 06:42

## 2021-06-30 RX ADMIN — Medication 5 MG: at 14:11

## 2021-06-30 RX ADMIN — SODIUM CHLORIDE, POTASSIUM CHLORIDE, SODIUM LACTATE AND CALCIUM CHLORIDE: 600; 310; 30; 20 INJECTION, SOLUTION INTRAVENOUS at 16:19

## 2021-06-30 RX ADMIN — ONDANSETRON 8 MG: 2 INJECTION INTRAMUSCULAR; INTRAVENOUS at 08:35

## 2021-06-30 RX ADMIN — LIDOCAINE HYDROCHLORIDE 60 MG: 20 INJECTION, SOLUTION INFILTRATION; PERINEURAL at 07:58

## 2021-06-30 RX ADMIN — DEXAMETHASONE SODIUM PHOSPHATE 8 MG: 10 INJECTION, SOLUTION INTRAMUSCULAR; INTRAVENOUS at 08:35

## 2021-06-30 RX ADMIN — PROPOFOL 100 MG: 10 INJECTION, EMULSION INTRAVENOUS at 07:58

## 2021-06-30 RX ADMIN — Medication 2 G: at 12:54

## 2021-06-30 RX ADMIN — ROCURONIUM BROMIDE 20 MG: 10 INJECTION INTRAVENOUS at 08:41

## 2021-06-30 RX ADMIN — PROPOFOL 20 MG: 10 INJECTION, EMULSION INTRAVENOUS at 08:00

## 2021-06-30 RX ADMIN — ROCURONIUM BROMIDE 10 MG: 10 INJECTION INTRAVENOUS at 14:28

## 2021-06-30 RX ADMIN — FENTANYL CITRATE 50 MCG: 50 INJECTION, SOLUTION INTRAMUSCULAR; INTRAVENOUS at 14:58

## 2021-06-30 RX ADMIN — FENTANYL CITRATE 50 MCG: 50 INJECTION, SOLUTION INTRAMUSCULAR; INTRAVENOUS at 08:41

## 2021-06-30 RX ADMIN — BUPIVACAINE 20 ML: 13.3 INJECTION, SUSPENSION, LIPOSOMAL INFILTRATION at 07:00

## 2021-06-30 RX ADMIN — Medication 10 MG: at 13:52

## 2021-06-30 RX ADMIN — ROCURONIUM BROMIDE 50 MG: 10 INJECTION INTRAVENOUS at 07:59

## 2021-06-30 RX ADMIN — FENTANYL CITRATE 50 MCG: 50 INJECTION, SOLUTION INTRAMUSCULAR; INTRAVENOUS at 10:23

## 2021-06-30 RX ADMIN — Medication 5 MG: at 11:08

## 2021-06-30 RX ADMIN — ACETAMINOPHEN 975 MG: 325 TABLET, FILM COATED ORAL at 06:33

## 2021-06-30 RX ADMIN — BUPIVACAINE HYDROCHLORIDE 20 ML: 2.5 INJECTION, SOLUTION EPIDURAL; INFILTRATION; INTRACAUDAL; PERINEURAL at 07:00

## 2021-06-30 RX ADMIN — Medication 10 MG: at 12:51

## 2021-06-30 RX ADMIN — Medication 10 MG: at 09:53

## 2021-06-30 RX ADMIN — ONDANSETRON 4 MG: 2 INJECTION INTRAMUSCULAR; INTRAVENOUS at 21:54

## 2021-06-30 RX ADMIN — Medication 5 MG: at 08:34

## 2021-06-30 RX ADMIN — SUGAMMADEX 200 MG: 100 INJECTION, SOLUTION INTRAVENOUS at 14:50

## 2021-06-30 RX ADMIN — FENTANYL CITRATE 50 MCG: 50 INJECTION, SOLUTION INTRAMUSCULAR; INTRAVENOUS at 13:20

## 2021-06-30 RX ADMIN — METRONIDAZOLE 500 MG: 500 INJECTION, SOLUTION INTRAVENOUS at 08:08

## 2021-06-30 RX ADMIN — Medication 5 MG: at 13:29

## 2021-06-30 RX ADMIN — Medication 2 G: at 08:08

## 2021-06-30 RX ADMIN — SODIUM CHLORIDE, POTASSIUM CHLORIDE, SODIUM LACTATE AND CALCIUM CHLORIDE: 600; 310; 30; 20 INJECTION, SOLUTION INTRAVENOUS at 08:00

## 2021-06-30 RX ADMIN — Medication 10 MG: at 11:17

## 2021-06-30 RX ADMIN — HYDROMORPHONE HYDROCHLORIDE 0.5 MG: 1 INJECTION, SOLUTION INTRAMUSCULAR; INTRAVENOUS; SUBCUTANEOUS at 15:02

## 2021-06-30 RX ADMIN — SODIUM CHLORIDE, POTASSIUM CHLORIDE, SODIUM LACTATE AND CALCIUM CHLORIDE: 600; 310; 30; 20 INJECTION, SOLUTION INTRAVENOUS at 07:46

## 2021-06-30 RX ADMIN — FENTANYL CITRATE 25 MCG: 50 INJECTION, SOLUTION INTRAMUSCULAR; INTRAVENOUS at 15:34

## 2021-06-30 RX ADMIN — ROCURONIUM BROMIDE 10 MG: 10 INJECTION INTRAVENOUS at 10:03

## 2021-06-30 RX ADMIN — FENTANYL CITRATE 25 MCG: 50 INJECTION, SOLUTION INTRAMUSCULAR; INTRAVENOUS at 15:38

## 2021-06-30 ASSESSMENT — ACTIVITIES OF DAILY LIVING (ADL): ADLS_ACUITY_SCORE: 13

## 2021-06-30 ASSESSMENT — MIFFLIN-ST. JEOR: SCORE: 1378.63

## 2021-06-30 NOTE — PROGRESS NOTES
"Post Op Check    06/30/2021    Marilia Bean is a 76 year old female with h/o colon cancer now POD#0 s/p subtotal colectomy with ileosigmoid anastomosis.    Pt reports she is doing well. Pain adequately controlled. Tolerating clears. Denies SOB, chest pain, or dizziness. Feels like she has to pass gas, none yet. No BM. Voiding appropriately via kaur.    /73 (BP Location: Left arm)   Pulse 65   Temp 96.3  F (35.7  C) (Temporal)   Resp 13   Ht 1.702 m (5' 7\")   Wt 85.6 kg (188 lb 11.4 oz)   SpO2 96%   BMI 29.56 kg/m      Gen: A&O x3, NAD, resting comfortably in bed  Chest: breathing non-labored, on minimal supplemental O2  Abdomen: soft, appropriately-tender, non-distended  Incision: clean, dry, intact  Extremities: warm and well perfused, no peripheral edema    A/P: No acute post-op issues. Continue plan of care per primary team. Please call with any questions.    Efrain Bailey MD  Surgery resident  [phone]    "

## 2021-06-30 NOTE — BRIEF OP NOTE
[facility]    Brief Operative Note    Pre-operative diagnosis: Colon cancer (H) [C18.9]  Post-operative diagnosis Ascending colon cancer; Gardiner syndrome    Procedure: Procedure(s):  Laparoscopic subtotal colectomy, ileosigmoid anastomosis, lysis of adhesions for 150 mins, splenic flexure mobilization  Surgeon: Surgeon(s) and Role:     * Akil Lay MD - Primary  Anesthesia: Combined General with Block   Estimated blood loss: Less than 50 ml  Drains: None  Specimens:   ID Type Source Tests Collected by Time Destination   A : Subtotal colectomy Tissue Colon SURGICAL PATHOLOGY EXAM Akil Lay MD 6/30/2021  2:04 PM      Findings:   Palpable small tumor in ascending colon. Ascending and transverse tattoos.  Complications: None.  Implants: * No implants in log *      Suhail Graham MD  Colorectal Fellow  6/30/2021

## 2021-06-30 NOTE — ANESTHESIA POSTPROCEDURE EVALUATION
Patient: Marilia Bean    Procedure(s):  Laparoscopic subtotal colectomy, ileosigmoid anastomosis, lysis of adhesions for 150 mins, splenic flexure mobilization    Diagnosis:Colon cancer (H) [C18.9]  Diagnosis Additional Information: No value filed.    Anesthesia Type:  General    Note:  Disposition: Admission   Postop Pain Control: Uneventful            Sign Out: Well controlled pain   PONV: No   Neuro/Psych: Uneventful            Sign Out: Acceptable/Baseline neuro status   Airway/Respiratory: Uneventful            Sign Out: Acceptable/Baseline resp. status   CV/Hemodynamics: Uneventful            Sign Out: Acceptable CV status; No obvious hypovolemia; No obvious fluid overload   Other NRE: NONE   DID A NON-ROUTINE EVENT OCCUR? No           Last vitals:  Vitals:    06/30/21 0649 06/30/21 0653 06/30/21 1550   BP: (!) 146/85 127/78 124/76   Pulse: 63 62 66   Resp: 15 15 20   Temp:   35.4  C (95.7  F)   SpO2: 93%         Last vitals prior to Anesthesia Care Transfer:  CRNA VITALS  6/30/2021 1513 - 6/30/2021 1602      6/30/2021             Resp Rate (observed):  12          Electronically Signed By: Laurent Hernandez MD  June 30, 2021  4:02 PM

## 2021-06-30 NOTE — ANESTHESIA CARE TRANSFER NOTE
Patient: Marilia Bean    Procedure(s):  Laparoscopic subtotal colectomy, ileosigmoid anastomosis, lysis of adhesions for 150 mins, splenic flexure mobilization    Diagnosis: Colon cancer (H) [C18.9]  Diagnosis Additional Information: No value filed.    Anesthesia Type:   General     Note:    Oropharynx: oropharynx clear of all foreign objects  Level of Consciousness: awake  Oxygen Supplementation: face mask  Level of Supplemental Oxygen (L/min / FiO2): 10  Independent Airway: airway patency satisfactory and stable  Dentition: dentition unchanged  Vital Signs Stable: post-procedure vital signs reviewed and stable  Report to RN Given: handoff report given  Patient transferred to: PACU    Handoff Report: Identifed the Patient, Identified the Reponsible Provider, Reviewed the pertinent medical history, Discussed the surgical course, Reviewed Intra-OP anesthesia mangement and issues during anesthesia, Set expectations for post-procedure period and Allowed opportunity for questions and acknowledgement of understanding      Vitals: (Last set prior to Anesthesia Care Transfer)  CRNA VITALS  6/30/2021 1513 - 6/30/2021 1548      6/30/2021             Resp Rate (observed):  12        Electronically Signed By: CECILIA Flor CRNA  June 30, 2021  3:48 PM

## 2021-06-30 NOTE — ANESTHESIA PROCEDURE NOTES
TAP Procedure Note  Pre-Procedure   Staff -        Anesthesiologist:  Po Harden MD       Resident/Fellow: Jess Flores MD       Performed By: anesthesiologist and with residents       Procedure performed by resident/fellow/CRNA in presence of a teaching physician.         Location: pre-op       Procedure Start/Stop Times: 6/30/2021 6:45 AM and 6/30/2021 6:58 AM       Pre-Anesthestic Checklist: patient identified, IV checked, site marked, risks and benefits discussed, informed consent, monitors and equipment checked, pre-op evaluation, at physician/surgeon's request and post-op pain management  Timeout:       Correct Patient: Yes        Correct Procedure: Yes        Correct Site: Yes        Correct Position: Yes        Correct Laterality: Yes        Site Marked: Yes  Procedure Documentation  Procedure: TAP       Diagnosis: POST OPERATIVE PAIN       Laterality: bilateral       Patient Position: supine       Patient Prep/Sterile Barriers: sterile gloves, mask       Skin prep: Chloraprep       Needle Type: short bevel       Needle Gauge: 21.        Needle Length (millimeters): 110        Ultrasound guided       1. Ultrasound was used to identify targeted nerve, plexus, vascular marker, or fascial plane and place a needle adjacent to it in real-time.       2. Ultrasound was used to visualize the spread of anesthetic in close proximity to the above referenced structure.       3. A permanent image is entered into the patient's record.    Assessment/Narrative         The placement was negative for: blood aspirated, painful injection and site bleeding       Paresthesias: No.     Bolus given via needle..        Secured via.        Insertion/Infusion Method: Single Shot       Complications: none       Injection made incrementally with aspirations every 5 mL.    Medication(s) Administered   Bupivacaine 0.25% PF (Infiltration), 20 mL  Bupivacaine liposome (Exparel) 1.3% LA inj susp  (Infiltration), 20 mL  Medication Administration Time: 6/30/2021 7:00 AM

## 2021-06-30 NOTE — OP NOTE
Procedure Date: 06/30/2021    PREOPERATIVE DIAGNOSES:     1.  Presumptive Gardiner syndrome.  2.  History of duodenal cancer.  3.  Carcinoma of the ascending colon.  4.  Transverse colon polyp.  5.  History of coronary artery disease.  6.  History of ascending aortic aneurysm.  7.  Status post ascending aortic aneurysm repair with CABG in 2017.  8.  History of chronic kidney disease stage III.  9.  DCIS of the left breast.    POSTOPERATIVE DIAGNOSES:     1.  Presumptive Gardiner syndrome.  2.  History of duodenal cancer.  3.  Carcinoma of the ascending colon.  4.  Transverse colon polyp.  5.  History of coronary artery disease.  6.  History of ascending aortic aneurysm.  7.  Status post ascending aortic aneurysm repair with CABG in 2017.  8.  History of chronic kidney disease stage III.  9.  DCIS of the left breast.    PROCEDURE PERFORMED:  Laparoscopic subtotal abdominal colectomy with ileosigmoid anastomosis; extensive lysis of adhesions (2.5 hours); splenic flexure mobilization.    HISTORY:  Marilia Bean is a 76-year-old woman with a strongly positive family history of colon cancer.  Several of her family members are known to have Gardiner syndrome.  In 2019, the patient developed a duodenal carcinoma that was resected by Dr. Cory Brito.  She underwent screening colonoscopy by Dr. Josh Hernandez and was found to have ascending colon cancer as well as a transverse colon polyp that was not resected.  Both of these lesions were removed.  Pathology of the ascending colon mass demonstrated moderately-differentiated adenocarcinoma.  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  We discussed surgical resection.  Based on her Gardiner syndrome, I advised an extended resection but, given her age, I advised against a formal total abdominal colectomy with ileorectal anastomosis due to concerns about bowel function.  We agreed that a subtotal colectomy with an ileosigmoid resection was the  most appropriate.  I discussed the risks and alternatives to surgery in detail. I planned a laparoscopic approach to start, but felt that there was a high likelihood of having to convert to open given the previous duodenal resection.  I discussed the risks and alternatives of surgery in detail with the patient.  I answered all of her questions to her stated satisfaction.  She expressed understanding and provided written informed consent.    SURGEON:  Akil Lay MD    ASSISTANT:  Suhail Graham MD     DESCRIPTION OF PROCEDURE:  After induction of adequate endotracheal anesthesia, the patient was placed in the modified lithotomy position using Yellofin stirrups and pneumatic compression stockings.  The abdomen was prepped and draped in a sterile fashion.  A timeout was performed.  We entered the abdomen using Sha technique with a 12 mm balloon port above the umbilicus.  Over the course of the case, we eventually placed 5 additional 5 mm ports in the left and right upper and lower quadrants and in the suprapubic position.  Laparoscopic exploration of the abdomen demonstrated 2 obvious tattoos, 1 in the proximal to mid ascending colon and a second in the distal transverse colon.  There was no evidence of intraperitoneal malignancy.  There were extensive adhesions to the abdominal wall that were carefully lysed.  We identified the ileocolic pedicle and just superior to this through a mesenteric window, we identified the duodenojejunal anastomosis.  We lysed additional adhesions in this area to dissect this away from the ascending colon mesentery.  With the anastomosis safely mobilized, we skeletonized and then divided the ileocolic pedicle with triple application of the LigaSure.  This was hemostatic.  We identified several Fanny ink tattooed nodes running along the ileocolic vessels and these were taken with the specimen.  We performed a medial to lateral dissection and then took down the lateral attachments of the  ascending colon and the hepatic flexure.      We next turned our attention to the left side of the abdomen, where there were additional numerous adhesions surrounding the splenic flexure.  These were again taken down.  We mobilized the sigmoid and descending colon from their lateral attachments and fully mobilized the splenic flexure.  We entered the lesser sac and proceeded medially to divide the gastrocolic omentum.  We completed our dissection to meet up with our prior right-sided dissection.  As we further mobilized the hepatic flexure, we lysed additional adhesions to fully free the duodenojejunal anastomosis, which looked entirely healthy and which we preserved intact.      With the major oncologic pedicles divided and the difficulty of exposure in the upper abdomen with the prior duodenal surgery, we next created a short midline incision around the umbilicus and placed an Scot retractor.  The colon was exteriorized and the remaining mesentery to the descending/sigmoid junction was taken down using multiple applications of the LigaSure.  We selected a point for transection here and in the terminal ileum.  The bowel walls were cleared and the mesentery taken down with the LigaSure.  A functional end-to-end anastomosis was performed with a longitudinal antimesenteric firing of a blue load DOMENICA 80 stapler.  Active bleeders on the staple line and at the mesenteric corners were oversewn with 3-0 Vicryl.  The apex of the anastomosis was reinforced with 3-0 Vicryl.  The anastomosis was returned to the abdomen.  We changed gloves.  We reestablished our pneumoperitoneum and inspected the anastomosis in situ.  This was a tension free and well vascularized.  We checked to be entirely certain that the mesentery was straight and there was no twisting of the bowel.  The abdomen was irrigated with water and hemostasis was intact.      We released our pneumoperitoneum and closed the midline incision with running 0 PDS  suture.  Subcutaneous tissues were irrigated.  The skin was closed with 4-0 Monocryl and Dermabond.    ESTIMATED BLOOD LOSS:  30 mL     The patient tolerated the procedure well without evident complications.  Sponge, needle and instrument counts were reported as correct at the conclusion of the case x 2.    Akil Lay MD    Addendum:  This case warrants a -22 modifier due to its complexity and technical difficulty based upon the previous duodenal resection that needed meticulous dissection to preserve as well as extensive adhesions requiring a prolonged operating time to complete laparoscopically.    D: 2021   T: 2021   MT: MEETAMT    Name:     MARGIE PATINO  MRN:      [id number]        Account:        [id number]   :      1945           Procedure Date: 2021     Document: [id number]    cc:  MD Joaquin Pratt MD Christopher D. Steevens, MD ADAM SANG, MD Jane Y. Hui, MD Milena Elimelakh, MD

## 2021-07-01 ENCOUNTER — APPOINTMENT (OUTPATIENT)
Dept: OCCUPATIONAL THERAPY | Facility: CLINIC | Age: 76
DRG: 331 | End: 2021-07-01
Attending: COLON & RECTAL SURGERY
Payer: MEDICARE

## 2021-07-01 LAB
ANION GAP SERPL CALCULATED.3IONS-SCNC: 6 MMOL/L (ref 3–14)
BUN SERPL-MCNC: 16 MG/DL (ref 7–30)
CALCIUM SERPL-MCNC: 8.6 MG/DL (ref 8.5–10.1)
CHLORIDE SERPL-SCNC: 108 MMOL/L (ref 94–109)
CO2 SERPL-SCNC: 26 MMOL/L (ref 20–32)
CREAT SERPL-MCNC: 0.88 MG/DL (ref 0.52–1.04)
ERYTHROCYTE [DISTWIDTH] IN BLOOD BY AUTOMATED COUNT: 14.6 % (ref 10–15)
GFR SERPL CREATININE-BSD FRML MDRD: 64 ML/MIN/{1.73_M2}
GLUCOSE SERPL-MCNC: 103 MG/DL (ref 70–99)
HCT VFR BLD AUTO: 38.3 % (ref 35–47)
HGB BLD-MCNC: 12.4 G/DL (ref 11.7–15.7)
INTERPRETATION ECG - MUSE: NORMAL
MCH RBC QN AUTO: 28.1 PG (ref 26.5–33)
MCHC RBC AUTO-ENTMCNC: 32.4 G/DL (ref 31.5–36.5)
MCV RBC AUTO: 87 FL (ref 78–100)
PLATELET # BLD AUTO: 249 10E9/L (ref 150–450)
POTASSIUM SERPL-SCNC: 3.8 MMOL/L (ref 3.4–5.3)
RBC # BLD AUTO: 4.41 10E12/L (ref 3.8–5.2)
SODIUM SERPL-SCNC: 139 MMOL/L (ref 133–144)
WBC # BLD AUTO: 12.5 10E9/L (ref 4–11)

## 2021-07-01 PROCEDURE — 250N000011 HC RX IP 250 OP 636: Performed by: STUDENT IN AN ORGANIZED HEALTH CARE EDUCATION/TRAINING PROGRAM

## 2021-07-01 PROCEDURE — 97165 OT EVAL LOW COMPLEX 30 MIN: CPT | Mod: GO | Performed by: OCCUPATIONAL THERAPIST

## 2021-07-01 PROCEDURE — 97530 THERAPEUTIC ACTIVITIES: CPT | Mod: GO | Performed by: OCCUPATIONAL THERAPIST

## 2021-07-01 PROCEDURE — 258N000003 HC RX IP 258 OP 636: Performed by: STUDENT IN AN ORGANIZED HEALTH CARE EDUCATION/TRAINING PROGRAM

## 2021-07-01 PROCEDURE — 258N000003 HC RX IP 258 OP 636

## 2021-07-01 PROCEDURE — 999N000147 HC STATISTIC PT IP EVAL DEFER: Performed by: REHABILITATION PRACTITIONER

## 2021-07-01 PROCEDURE — 97535 SELF CARE MNGMENT TRAINING: CPT | Mod: GO | Performed by: OCCUPATIONAL THERAPIST

## 2021-07-01 PROCEDURE — 80048 BASIC METABOLIC PNL TOTAL CA: CPT | Performed by: STUDENT IN AN ORGANIZED HEALTH CARE EDUCATION/TRAINING PROGRAM

## 2021-07-01 PROCEDURE — 85027 COMPLETE CBC AUTOMATED: CPT | Performed by: STUDENT IN AN ORGANIZED HEALTH CARE EDUCATION/TRAINING PROGRAM

## 2021-07-01 PROCEDURE — 250N000013 HC RX MED GY IP 250 OP 250 PS 637: Performed by: STUDENT IN AN ORGANIZED HEALTH CARE EDUCATION/TRAINING PROGRAM

## 2021-07-01 PROCEDURE — 250N000013 HC RX MED GY IP 250 OP 250 PS 637: Performed by: PHYSICIAN ASSISTANT

## 2021-07-01 PROCEDURE — 36415 COLL VENOUS BLD VENIPUNCTURE: CPT | Performed by: STUDENT IN AN ORGANIZED HEALTH CARE EDUCATION/TRAINING PROGRAM

## 2021-07-01 PROCEDURE — 120N000002 HC R&B MED SURG/OB UMMC

## 2021-07-01 RX ORDER — METOPROLOL TARTRATE 25 MG/1
25 TABLET, FILM COATED ORAL 2 TIMES DAILY
Status: DISCONTINUED | OUTPATIENT
Start: 2021-07-01 | End: 2021-07-03 | Stop reason: HOSPADM

## 2021-07-01 RX ADMIN — HYDROMORPHONE HYDROCHLORIDE 0.2 MG: 0.2 INJECTION, SOLUTION INTRAMUSCULAR; INTRAVENOUS; SUBCUTANEOUS at 02:18

## 2021-07-01 RX ADMIN — CYCLOBENZAPRINE 10 MG: 10 TABLET, FILM COATED ORAL at 13:40

## 2021-07-01 RX ADMIN — ATORVASTATIN CALCIUM 20 MG: 20 TABLET, FILM COATED ORAL at 08:28

## 2021-07-01 RX ADMIN — ENOXAPARIN SODIUM 40 MG: 40 INJECTION, SOLUTION INTRAVENOUS; SUBCUTANEOUS at 11:40

## 2021-07-01 RX ADMIN — SODIUM CHLORIDE, POTASSIUM CHLORIDE, SODIUM LACTATE AND CALCIUM CHLORIDE: 600; 310; 30; 20 INJECTION, SOLUTION INTRAVENOUS at 18:07

## 2021-07-01 RX ADMIN — CYCLOBENZAPRINE 10 MG: 10 TABLET, FILM COATED ORAL at 08:28

## 2021-07-01 RX ADMIN — CYCLOBENZAPRINE 10 MG: 10 TABLET, FILM COATED ORAL at 20:00

## 2021-07-01 RX ADMIN — METOPROLOL TARTRATE 25 MG: 25 TABLET, FILM COATED ORAL at 20:00

## 2021-07-01 RX ADMIN — SODIUM CHLORIDE, POTASSIUM CHLORIDE, SODIUM LACTATE AND CALCIUM CHLORIDE: 600; 310; 30; 20 INJECTION, SOLUTION INTRAVENOUS at 03:07

## 2021-07-01 RX ADMIN — METOPROLOL TARTRATE 25 MG: 25 TABLET, FILM COATED ORAL at 08:28

## 2021-07-01 ASSESSMENT — ACTIVITIES OF DAILY LIVING (ADL)
ADLS_ACUITY_SCORE: 15
ADLS_ACUITY_SCORE: 13
IADL_COMMENTS: SO CAN ASSIST AS NEEDED.
PREVIOUS_RESPONSIBILITIES: MEAL PREP;HOUSEKEEPING;LAUNDRY;SHOPPING;YARDWORK;MEDICATION MANAGEMENT;FINANCES;DRIVING;WORK
ADLS_ACUITY_SCORE: 15
ADLS_ACUITY_SCORE: 13
ADLS_ACUITY_SCORE: 15
ADLS_ACUITY_SCORE: 13

## 2021-07-01 NOTE — PLAN OF CARE
PT-7C- PT Consult Order received, per chart review and communication with interdisciplinary care team, pt is mobilizing well in OT Evaluation, no skilled need for PT at this time. PT will defer.

## 2021-07-01 NOTE — PROGRESS NOTES
SURGERY PROGRESS NOTE    PATIENT INFORMATION  Name: Marilia Bean  MRN: [id number]  Encounter Date: July 1, 2021  Case Description/Date: Laparoscopic subtotal colectomy with iliosigmoid anastamosis, extensive DESIREE    One Liner: 76 year old females s/p laparoscopic subtotal colectomy with iliosigmoid anastamosis, extensive DESIREE and Splenic flexure mobilization on 6/30 with Dr. Lay.    SUBJECTIVE  Overall doing well today  Having pain that is more than prior surgeries but tolerable  Some nausea over night  No vomiting but had some gagging over night  Not passing gas yet and feeling bloated; feels like it would come out if she walks    OBJECTIVE  Vitals:  Temp:  [95.7  F (35.4  C)-98.3  F (36.8  C)] 98.3  F (36.8  C)  Pulse:  [62-84] 76  Resp:  [12-20] 17  BP: (119-162)/(66-85) 133/66  SpO2:  [93 %-98 %] 95 %    INPUT/OUTPUT:  Date 07/01/21 0700 - 07/02/21 0659   Shift 9434-4953 2734-3907 3667-3817 24 Hour Total   INTAKE   Shift Total(mL/kg)       OUTPUT   Urine 300   300   Shift Total(mL/kg) 300(3.5)   300(3.5)   Weight (kg) 85.6 85.6 85.6 85.6       PHYSICAL EXAM:  General: Well appearing, relaxed, not in distress, oriented X3  HEENT: Atraumatic  Cardiovascular: Acyanotic, non diaphoretic  Respiratory: No respiratory distress, no labored breathing, no use of accessory muscles. Sating well on NC  Abdominal exam: Soft, appropriately tender, mildly distended, no guarding  Extremities: warm and well perfused  Incisions: Non-erythematous, healing well      BMP  Recent Labs   Lab 06/28/21  0854      POTASSIUM 4.2   CHLORIDE 107   CO2 29   BUN 17   CR 0.86   GLC 99       CBC  Recent Labs   Lab 06/28/21  0854   WBC 5.3   HGB 13.3   HCT 39.7          ASSESSMENT: 76 year old females s/p laparoscopic subtotal colectomy with iliosigmoid anastamosis, extensive DESIREE and Splenic flexure mobilization on 6/30 with Dr. Lay.    PMH significant for ascending colon cancer 2/2 Gardiner-syndrome with prior duodenal  adenocarcinoma, in addition to CKD, HTN, and HLD.     PLAN:  - Snell out  - Continue CLD  - Encourage OOB/ambulation  - Lovenox / Compression device  - Consults: PT/OT  - PTA meds: Atorvastatin, Metoprolol (held Anastrozole [will start outpatient] / Losartan / Aspirin)    Patient was seen and discussed with fellow, Dr. Graham, who will discuss with staff.     Migue Rodriguez, Ph.D., MS4  [employer]    RESIDENT ATTESTATION:   I saw and examined the patient with the medical student and agree with the assessment and plan as documented above. Edits have been made as needed.     Kimmy Torres MD  General Surgery, PGY3  [phone]    Attestation:  This patient has been seen and evaluated by me.  Discussed with the house staff team or resident(s) and agree with the findings and plan in this note. Overall pain has improved since am rounds.  He has had a BM and looks generally well.  Abd soft. Try clears to start.  Akil Lay MD  Professor of Surgery  Chief, Division of Colon and Rectal Surgery  [phone]

## 2021-07-01 NOTE — PROGRESS NOTES
"   07/01/21 1500   Quick Adds   Type of Visit Initial Occupational Therapy Evaluation   Living Environment   People in home spouse   Current Living Arrangements house   Home Accessibility stairs to enter home;stairs within home   Number of Stairs, Main Entrance 3   Stair Railings, Main Entrance none   Number of Stairs, Within Home, Primary 10   Stair Railings, Within Home, Primary railings on both sides of stairs   Transportation Anticipated car, drives self;family or friend will provide   Self-Care   Usual Activity Tolerance good   Current Activity Tolerance moderate   Regular Exercise No   Equipment Currently Used at Home none   Activity/Exercise/Self-Care Comment 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   Disability/Function   Hearing Difficulty or Deaf no   Wear Glasses or Blind yes   Vision Management glasses   Concentrating, Remembering or Making Decisions Difficulty no   Difficulty Communicating no   Difficulty Eating/Swallowing no   Walking or Climbing Stairs Difficulty no   Dressing/Bathing Difficulty no   Toileting issues no   Doing Errands Independently Difficulty (such as shopping) no   Fall history within last six months no   Change in Functional Status Since Onset of Current Illness/Injury yes   General Information   Referring Physician Rashi Leon   Patient/Family Therapy Goal Statement (OT) return to PLOF   Additional Occupational Profile Info/Pertinent History of Current Problem 76 year old females s/p laparoscopic subtotal colectomy with iliosigmoid anastamosis, extensive DESIREE and Splenic flexure mobilization on 6/30 with Dr. Lay   Existing Precautions/Restrictions abdominal   General Observations and Info pt motivated in therapy   Cognitive Status Examination   Orientation Status orientation to person, place and time   Cognitive Status Comments no concerns.    Visual Perception   Visual Impairment/Limitations WFL   Sensory   Sensory Quick Adds No deficits were identified   Range of " Motion Comprehensive   General Range of Motion no range of motion deficits identified   Strength Comprehensive (MMT)   General Manual Muscle Testing (MMT) Assessment other (see comments)   Comment, General Manual Muscle Testing (MMT) Assessment overall deconditioning.    Coordination   Upper Extremity Coordination No deficits were identified   Bed Mobility   Bed Mobility supine-sit;sit-supine   Supine-Sit [place] (Bed Mobility) minimum assist (75% patient effort)   Comment (Bed Mobility) education required.    Transfers   Transfers bed-chair transfer;sit-stand transfer   Transfer Skill: Bed to Chair/Chair to Bed   Bed-Chair [place] (Transfers) contact guard   Sit-Stand Transfer   Sit-Stand [place] (Transfers) contact guard   Balance   Balance Assessment standing balance: dynamic   Standing Balance: Dynamic good balance   Activities of Daily Living   BADL Assessment/Intervention lower body dressing   Lower Body Dressing Assessment/Training   [place] Level (Lower Body Dressing)   (education required. )   Instrumental Activities of Daily Living (IADL)   Previous Responsibilities meal prep;housekeeping;laundry;shopping;yardwork;medication management;finances;driving;work   IADL Comments SO can assist as needed.    Clinical Impression   Criteria for Skilled Therapeutic Interventions Met (OT) yes   OT Diagnosis decreased ADL I   Assessment of Occupational Performance 5 or more Performance Deficits   Identified Performance Deficits dressing, bathing, toileting, G/H, home making, bed mobility.    Planned Therapy Interventions (OT) ADL retraining;IADL retraining;bed mobility training;strengthening;transfer training;home program guidelines;progressive activity/exercise;risk factor education   Clinical Decision Making Complexity (OT) low complexity   Therapy Frequency (OT) 5x/week   Predicted Duration of Therapy 1 week   Risk & Benefits of therapy have been explained evaluation/treatment results  reviewed;care plan/treatment goals reviewed;risks/benefits reviewed;current/potential barriers reviewed;participants voiced agreement with care plan;participants included;patient   Comment-Clinical Impression Pt presents to OT with post surgical precautions and general deconditoining leading to decreased ADL I.    OT Discharge Planning    OT Discharge Recommendation (DC Rec) Home with assist   OT Rationale for DC Rec pt expected to progress well.    OT Brief overview of current status  Pt mod I  supine to EOB after education, pt mod I LE dressing after education, pt ambulating in hallway with no LOB despite balance challenges.    Total Evaluation Time (Minutes)   Total Evaluation Time (Minutes) 3

## 2021-07-01 NOTE — PROGRESS NOTES
Admitted/transferred from: PACU  2 RN full skin assessment completed by Judi Cedillo, LACEY and Nimisha Saleem RN.  Skin assessment finding: skin intact, no problems   Interventions/actions: NA    Will continue to monitor.

## 2021-07-01 NOTE — PLAN OF CARE
VSS overnight, requiring more O2 support while sleeping due to mouth breathing. Remains on capnography. Gas pain acknowledged by patient, IV diluadid given and warm pack applied to chest and abdomen. T pad ordered. Not passing gas. No BM this shift. Adequate UOP from kaur this shift. Will continue plan of care.

## 2021-07-01 NOTE — PLAN OF CARE
Arrived from PACU around 1700. SBPs elevated in the 150s-160s. Other VSS on 1 LPM oxygen. Pain controlled with Flexeril. Midline incision and lap sites BIANCA with liquid bandage. Tolerating sips of clears. Urine output adequate via kuar. Not passing gas or stool. Walked from cart to bed upon arrival to unit. Gets nauseous with movement, Zofran given. Up with assist x1 and gait belt.

## 2021-07-01 NOTE — PLAN OF CARE
"BP (!) 146/74 (BP Location: Left arm)   Pulse 72   Temp 98.2  F (36.8  C) (Temporal)   Resp 17   Ht 1.702 m (5' 7\")   Wt 85.6 kg (188 lb 11.4 oz)   SpO2 96%   BMI 29.56 kg/m       A&Ox4, BP elevated OVSS on O2 NC. Capno on. Commode at bedside, transfers via Ax1. +BM this afternoon. No PRN pain medications administered, pt continued to deny pain. Snell removed, tolerated well. Voiding spontaneously. . Continue POC.  "

## 2021-07-02 ENCOUNTER — APPOINTMENT (OUTPATIENT)
Dept: OCCUPATIONAL THERAPY | Facility: CLINIC | Age: 76
DRG: 331 | End: 2021-07-02
Attending: COLON & RECTAL SURGERY
Payer: MEDICARE

## 2021-07-02 LAB
ERYTHROCYTE [DISTWIDTH] IN BLOOD BY AUTOMATED COUNT: 14.6 % (ref 10–15)
HCT VFR BLD AUTO: 36.9 % (ref 35–47)
HGB BLD-MCNC: 11.8 G/DL (ref 11.7–15.7)
MCH RBC QN AUTO: 28.2 PG (ref 26.5–33)
MCHC RBC AUTO-ENTMCNC: 32 G/DL (ref 31.5–36.5)
MCV RBC AUTO: 88 FL (ref 78–100)
PLATELET # BLD AUTO: 210 10E9/L (ref 150–450)
RBC # BLD AUTO: 4.18 10E12/L (ref 3.8–5.2)
WBC # BLD AUTO: 9.4 10E9/L (ref 4–11)

## 2021-07-02 PROCEDURE — 250N000013 HC RX MED GY IP 250 OP 250 PS 637

## 2021-07-02 PROCEDURE — 85027 COMPLETE CBC AUTOMATED: CPT

## 2021-07-02 PROCEDURE — 97535 SELF CARE MNGMENT TRAINING: CPT | Mod: GO | Performed by: OCCUPATIONAL THERAPIST

## 2021-07-02 PROCEDURE — 97530 THERAPEUTIC ACTIVITIES: CPT | Mod: GO | Performed by: OCCUPATIONAL THERAPIST

## 2021-07-02 PROCEDURE — 36415 COLL VENOUS BLD VENIPUNCTURE: CPT

## 2021-07-02 PROCEDURE — 120N000002 HC R&B MED SURG/OB UMMC

## 2021-07-02 PROCEDURE — 250N000013 HC RX MED GY IP 250 OP 250 PS 637: Performed by: STUDENT IN AN ORGANIZED HEALTH CARE EDUCATION/TRAINING PROGRAM

## 2021-07-02 PROCEDURE — 250N000011 HC RX IP 250 OP 636: Performed by: STUDENT IN AN ORGANIZED HEALTH CARE EDUCATION/TRAINING PROGRAM

## 2021-07-02 PROCEDURE — 250N000013 HC RX MED GY IP 250 OP 250 PS 637: Performed by: PHYSICIAN ASSISTANT

## 2021-07-02 RX ORDER — ACETAMINOPHEN 500 MG
1000 TABLET ORAL EVERY 8 HOURS
COMMUNITY
Start: 2021-07-02 | End: 2021-08-11

## 2021-07-02 RX ORDER — OXYCODONE HYDROCHLORIDE 5 MG/1
2.5-5 TABLET ORAL EVERY 8 HOURS PRN
Qty: 5 TABLET | Refills: 0 | Status: SHIPPED | OUTPATIENT
Start: 2021-07-02 | End: 2021-08-11

## 2021-07-02 RX ORDER — OXYCODONE HYDROCHLORIDE 5 MG/1
2.5-5 TABLET ORAL EVERY 8 HOURS PRN
Qty: 5 TABLET | Refills: 0 | Status: SHIPPED | OUTPATIENT
Start: 2021-07-02 | End: 2021-07-02

## 2021-07-02 RX ORDER — LOSARTAN POTASSIUM 25 MG/1
25 TABLET ORAL DAILY
Status: DISCONTINUED | OUTPATIENT
Start: 2021-07-02 | End: 2021-07-03 | Stop reason: HOSPADM

## 2021-07-02 RX ORDER — CYCLOBENZAPRINE HCL 10 MG
10 TABLET ORAL 3 TIMES DAILY PRN
Qty: 30 TABLET | Refills: 0 | Status: SHIPPED | OUTPATIENT
Start: 2021-07-02 | End: 2021-08-11

## 2021-07-02 RX ORDER — ACETAMINOPHEN 500 MG
1000 TABLET ORAL EVERY 8 HOURS
Status: DISCONTINUED | OUTPATIENT
Start: 2021-07-02 | End: 2021-07-03 | Stop reason: HOSPADM

## 2021-07-02 RX ADMIN — METOPROLOL TARTRATE 25 MG: 25 TABLET, FILM COATED ORAL at 08:11

## 2021-07-02 RX ADMIN — CYCLOBENZAPRINE 10 MG: 10 TABLET, FILM COATED ORAL at 20:12

## 2021-07-02 RX ADMIN — ATORVASTATIN CALCIUM 20 MG: 20 TABLET, FILM COATED ORAL at 08:11

## 2021-07-02 RX ADMIN — ENOXAPARIN SODIUM 40 MG: 40 INJECTION, SOLUTION INTRAVENOUS; SUBCUTANEOUS at 10:12

## 2021-07-02 RX ADMIN — LOSARTAN POTASSIUM 25 MG: 25 TABLET, FILM COATED ORAL at 10:12

## 2021-07-02 RX ADMIN — CYCLOBENZAPRINE 10 MG: 10 TABLET, FILM COATED ORAL at 08:11

## 2021-07-02 RX ADMIN — METOPROLOL TARTRATE 25 MG: 25 TABLET, FILM COATED ORAL at 20:12

## 2021-07-02 RX ADMIN — CYCLOBENZAPRINE 10 MG: 10 TABLET, FILM COATED ORAL at 13:50

## 2021-07-02 RX ADMIN — ACETAMINOPHEN 1000 MG: 500 TABLET, FILM COATED ORAL at 20:12

## 2021-07-02 RX ADMIN — ACETAMINOPHEN 1000 MG: 500 TABLET, FILM COATED ORAL at 02:39

## 2021-07-02 RX ADMIN — ACETAMINOPHEN 1000 MG: 500 TABLET, FILM COATED ORAL at 11:43

## 2021-07-02 ASSESSMENT — ACTIVITIES OF DAILY LIVING (ADL)
ADLS_ACUITY_SCORE: 15

## 2021-07-02 NOTE — PLAN OF CARE
"  Problem: Bowel Motility Impaired (Surgery Nonspecified)  Goal: Effective Bowel Elimination  Outcome: Improving     Problem: Pain (Surgery Nonspecified)  Goal: Acceptable Pain Control  Outcome: Improving  Intervention: Prevent or Manage Pain  Recent Flowsheet Documentation  Taken 7/2/2021 0818 by Ritu Kelley RN  Pain Management Interventions:    relaxation techniques promoted    repositioned    rest    VSS. Neuros intact.  HR regular, hypertension improved with meds.  LS clear-IS encouraged.  Pt tolerating LFD.   UAL in room, SBA in hallways.  +BS, passing flatus and loose maroon-yellow stools, voiding spontaneously.  Pain \"only when I move\", sched tylenol and flexeril given.  Lap sites with intact derma bond. BIANCA, abd binder in place.  No PIV.  Continue to monitor gen status and continue with POC.          "

## 2021-07-02 NOTE — PROGRESS NOTES
CLINICAL NUTRITION SERVICES  Reason for Assessment:  Nutrition education regarding low fiber/low residue diet education  Diet History:  Patient reports she is familiar with what a low fiber diet is.  Nutrition Diagnosis:  Food- and nutrition-related knowledge deficit r/t no previous knowledge of low fiber diet as evidenced by patient report  Interventions:  Offered verbal instruction on low fiber diet, however pt politely declined saying she has a good understanding of it would just like some handouts.  Let pt know of RD availability if questions arise, and that duration of diet per her providers' discretion.  Provided handouts: Fiber Nutrition Therapy and Low Fiber Diet Hospital Menu  Goals:   Patient will verbalize at least 5 low fiber foods acceptable on diet and 5 high fiber foods to avoid.  Follow-up:    Patient to ask any further nutrition-related questions before discharge.  In addition, pt may request outpatient RD appointment.      Crystal Fairbanks RD, LD  Pager: [phone]  Units covered: 7C (all beds) and 5A (beds 5201 through 5211-2)

## 2021-07-02 NOTE — PROGRESS NOTES
Hypertensive this shift, OVSS. Pain managed w tylenol. Tolerating full liquid diet. Abdominal incision and lap sites C/D/I, BIANCA. Void spont. Pt thought she was passing gas but had small BM. Up w SBA. SCDs on in bed Continue w POC

## 2021-07-02 NOTE — PLAN OF CARE
Occupational Therapy Discharge Summary    Reason for therapy discharge:    All goals and outcomes met, no further needs identified.    Progress towards therapy goal(s). See goals on Care Plan in [facility] electronic health record for goal details.  Goals met    Therapy recommendation(s):    No further therapy is recommended.

## 2021-07-02 NOTE — PROGRESS NOTES
SURGERY PROGRESS NOTE    PATIENT INFORMATION  Name: Marilia Bean  MRN: [id number]  Encounter Date: July 1, 2021  Case Description/Date: Laparoscopic subtotal colectomy with iliosigmoid anastamosis, extensive DESIREE    One Liner: 76 year old females s/p laparoscopic subtotal colectomy with iliosigmoid anastamosis, extensive DESIREE and Splenic flexure mobilization on 6/30 with Dr. Lay.    SUBJECTIVE  Overall doing well today  Pain is much better today  Feels like she is ready to go home  No nausea  No vomiting  No CP or SOB    OBJECTIVE  Vitals:  Temp:  [98  F (36.7  C)-99  F (37.2  C)] 98  F (36.7  C)  Pulse:  [59-74] 74  Resp:  [18-20] 20  BP: (142-164)/(69-80) 153/73  SpO2:  [92 %-97 %] 92 %    INPUT/OUTPUT:  Date 07/01/21 0700 - 07/02/21 0659   Shift 6530-1054 8439-7244 6524-6750 24 Hour Total   INTAKE   Shift Total(mL/kg)       OUTPUT   Urine 300   300   Shift Total(mL/kg) 300(3.5)   300(3.5)   Weight (kg) 85.6 85.6 85.6 85.6       PHYSICAL EXAM:  General: Well appearing, relaxed, not in distress, oriented X3  HEENT: Atraumatic  Cardiovascular: Acyanotic, non diaphoretic  Respiratory: No respiratory distress, no labored breathing, no use of accessory muscles. Sating well on NC  Abdominal exam: Soft, appropriately tender, no guarding  Extremities: warm and well perfused  Incisions: Non-erythematous, healing well      BMP  Recent Labs   Lab 07/01/21  0801 06/28/21  0854    138   POTASSIUM 3.8 4.2   CHLORIDE 108 107   CO2 26 29   BUN 16 17   CR 0.88 0.86   * 99       CBC  Recent Labs   Lab 07/01/21  0801 06/28/21  0854   WBC 12.5* 5.3   HGB 12.4 13.3   HCT 38.3 39.7    233       ASSESSMENT: 76 year old females s/p laparoscopic subtotal colectomy with iliosigmoid anastamosis, extensive DESIREE and Splenic flexure mobilization on 6/30 with Dr. Lay.    PMH significant for ascending colon cancer 2/2 Gardiner-syndrome with prior duodenal adenocarcinoma, in addition to CKD, HTN, and HLD.  Patient is  recovering well, bowel and bladder function have returned, and she is tolerating full liquid diet.  Her pain is well controlled.       PLAN:  - Possible LFD  - Possible discharge 7/3  - Encourage OOB/ambulation  - Lovenox / Compression device  - Discontinue fluids  - Advance to low fiber diet  - Restart PTA Losartan 25mg PO Daily  - Consults: PT/OT  - PTA meds: Atorvastatin, Metoprolol (held Anastrozole [will start outpatient] / Losartan / Aspirin)    Patient was seen and discussed with fellow, Dr. Graham, who will discuss with staff.     Migue Rodriguez, Ph.D., MS4  [employer]    I saw this patient with the above medical student, and agree with his assessment and plan.    Rashi Leon MD, PGY1  Colorectal Service    Attestation:  This patient has been seen and evaluated by me.  Discussed with the house staff team or resident(s) and agree with the findings and plan in this note.  She is feeling well and having BM's.  She describes passing old blood and VS are stable.  Will recheck a Hb today.  She is eating a low res diet and plans to go home tomorrow.  All questions answered to her stated satisfaction and she expressed her agreement with this plan.  Akil Lay MD  Professor of Surgery  Chief, Division of Colon and Rectal Surgery  [phone]

## 2021-07-02 NOTE — PLAN OF CARE
SBPs elevated in the 140s-160s. Other VSS. Intermittently requiring oxygen. Pain controlled with Flexeril. Midline incision and lap sites BIANCA with liquid bandage. Tolerating full liquid diet. Voiding with adequate urine output. Reports she is not passing much gas but did have a loose stool. Up with SBA.

## 2021-07-03 VITALS
BODY MASS INDEX: 29.62 KG/M2 | WEIGHT: 188.71 LBS | HEART RATE: 77 BPM | RESPIRATION RATE: 16 BRPM | HEIGHT: 67 IN | OXYGEN SATURATION: 93 % | SYSTOLIC BLOOD PRESSURE: 141 MMHG | TEMPERATURE: 97.5 F | DIASTOLIC BLOOD PRESSURE: 74 MMHG

## 2021-07-03 PROCEDURE — 250N000011 HC RX IP 250 OP 636: Performed by: STUDENT IN AN ORGANIZED HEALTH CARE EDUCATION/TRAINING PROGRAM

## 2021-07-03 PROCEDURE — 250N000013 HC RX MED GY IP 250 OP 250 PS 637: Performed by: PHYSICIAN ASSISTANT

## 2021-07-03 PROCEDURE — 250N000013 HC RX MED GY IP 250 OP 250 PS 637: Performed by: STUDENT IN AN ORGANIZED HEALTH CARE EDUCATION/TRAINING PROGRAM

## 2021-07-03 PROCEDURE — 250N000013 HC RX MED GY IP 250 OP 250 PS 637

## 2021-07-03 RX ADMIN — METOPROLOL TARTRATE 25 MG: 25 TABLET, FILM COATED ORAL at 07:52

## 2021-07-03 RX ADMIN — LOSARTAN POTASSIUM 25 MG: 25 TABLET, FILM COATED ORAL at 07:52

## 2021-07-03 RX ADMIN — ACETAMINOPHEN 1000 MG: 500 TABLET, FILM COATED ORAL at 06:01

## 2021-07-03 RX ADMIN — ATORVASTATIN CALCIUM 20 MG: 20 TABLET, FILM COATED ORAL at 07:52

## 2021-07-03 RX ADMIN — CYCLOBENZAPRINE 10 MG: 10 TABLET, FILM COATED ORAL at 07:52

## 2021-07-03 RX ADMIN — ENOXAPARIN SODIUM 40 MG: 40 INJECTION, SOLUTION INTRAVENOUS; SUBCUTANEOUS at 10:04

## 2021-07-03 ASSESSMENT — ACTIVITIES OF DAILY LIVING (ADL)
ADLS_ACUITY_SCORE: 15

## 2021-07-03 NOTE — PLAN OF CARE
Vitals stable. Low fiber diet  Slept soundly throughout the night.  Pain managed w/ scheduled  tylenol. Tolerating full liquid diet. Abdominal incision and lap sites C/D/I, BIANCA. Void spont. No IV access. Patient hoping to discharge to home tomorrow

## 2021-07-03 NOTE — DISCHARGE SUMMARY
General Surgery Discharge Summary    Marilia Bean MRN# [id number]   YOB: 1945 Age: 76 year old     Date of Admission:  6/30/2021  Date of Discharge::  7/3/2021  Admitting Physician:  Akil Lay MD  Discharge Physician:  Dr. Cagle  Primary Care Physician:        Herbret Epstein          Admission Diagnoses:   Colon cancer (H) [C18.9]          Discharge Diagnosis:   Colon Cancer         Procedures:   Laparoscopic subtotal abdominal colectomy with ileosigmoid anastomosis; extensive lysis of adhesions (2.5 hours); splenic flexure mobilization by Dr. Lay          Consultations:   PHYSICAL THERAPY ADULT IP CONSULT  OCCUPATIONAL THERAPY ADULT IP CONSULT           Medications Prior to Admission:     No medications prior to admission.            Discharge Medications:      Marilia Bean   Home Medication Instructions NEELIMA:[id number]    Printed on:07/03/21 5617   Medication Information                      acetaminophen (TYLENOL) 500 MG tablet  Take 2 tablets (1,000 mg) by mouth every 8 hours             anastrozole (ARIMIDEX) 1 MG tablet  Take 1 tablet (1 mg) by mouth daily             aspirin 81 MG EC tablet  Take 81 mg by mouth every morning              atorvastatin (LIPITOR) 20 MG tablet  Take 1 tablet (20 mg) by mouth every morning             cyclobenzaprine (FLEXERIL) 10 MG tablet  Take 1 tablet (10 mg) by mouth 3 times daily as needed for muscle spasms             diphenhydrAMINE (BENADRYL) 12.5 MG/5ML solution  Take by mouth every evening             enoxaparin ANTICOAGULANT (LOVENOX) 40 MG/0.4ML syringe  Inject 0.4 mLs (40 mg) Subcutaneous every 24 hours for 28 days             losartan (COZAAR) 50 MG tablet  TAKE ONE-HALF TABLET(25MG) BY MOUTH EVERY MORNING             metoprolol tartrate (LOPRESSOR) 25 MG tablet  Take 1 tablet (25 mg) by mouth 2 times daily             oxyCODONE (ROXICODONE) 5 MG tablet  Take 0.5-1 tablets (2.5-5 mg) by mouth every 8 hours as needed for moderate to  "severe pain                       Day of Discharge Exam   BP (!) 141/74 (BP Location: Right arm)   Pulse 77   Temp 97.5  F (36.4  C) (Temporal)   Resp 16   Ht 1.702 m (5' 7\")   Wt 85.6 kg (188 lb 11.4 oz)   SpO2 93%   BMI 29.56 kg/m      General:   A&Ox3, NAD  Cardio:   Non cyanotic   Chest:   Non labored breathing on RA  Abd:   Soft, non-distended, appropriately tender to palpation, incisions c/d/i  Ext:   WWP          Brief History of Illness:   Marilia Bean is a 76-year-old woman with a strongly positive family history of colon cancer.  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  She underwent screening colonoscopy by Dr. Josh Hernandez and was found to have ascending colon cancer as well as a transverse colon polyp that was not resected.  Both of these lesions were removed.  Pathology of the ascending colon mass demonstrated moderately-differentiated adenocarcinoma.  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  She was evaluated in the colorectal surgery clinic by Dr. Lay. She wished to proceed with surgical resection.           Hospital Course:   The patient was admitted and underwent the above procedure, which she tolerated the procedure well. There were no complications. Postoperatively shet was transferred to the general floor for further care. Diet was slowly advanced as bowel function returned. Pain was controlled with oral pain medication and she was able to ambulate and void without difficulty. The patient received appropriate education post operatively. On POD 3  the patient was discharged to home with appropriate instructions and follow up. The patient acknowledged understanding and were in agreement with the plan.         Imaging Studies:     Results for orders placed or performed during the hospital encounter of 06/30/21   POC US Guidance Needle Placement    Impression    Bilateral TAP block             Final " Pathology Result:   Final path pending at discharge         Discharge Instructions:      ACTIVITY   -No lifting, pushing, pulling greater than 10 lbs and no strenuous exercise for 6 weeks   -Do not insert anything into your anus or rectum for 6 weeks   -No driving while on narcotic analgesics (i.e. Percocet, oxycodone, Vicodin)   -No driving until you are able to fully twist to both sides or slam on brakes quickly and without any pain   -We encourage walking at least 4-5 times per day      DIET   -Low Residue Diet for at least 4-6 weeks unless cleared by Colorectal surgery.  No raw vegetables, fruit skins, fibrous foods that require a lot of chewing, nuts, seeds, corn, popcorn.   -We recommend eating slowly, chewing thoroughly, eating small frequent meals throughout the day   -Stay well hydrated.    WOUND CARE   -Inspect your wounds daily for signs of infection (increased redness, drainage, pain)   -Keep your wound clean and dry   -You may shower, but do not soak in tub or pool     NOTIFY   Please contact Hamida Monroy RN or Janeen BURGOS at [phone] for problems after discharge such as:   -Temperature > 101F, chills, rigors, dizziness   -Redness around or purulent drainage from wound   -Inability to tolerate diet, nausea or vomiting   -You stop passing gas, develop significant bloating, abdominal pain   -Have blood in stools/vomit   -Have severe diarrhea/constipation   -Any other questions or concerns.   - At nights (after 4:30pm), on weekends, or if urgent, call [phone] and ask the  to speak with the on-call Colorectal Surgery resident or fellow          Follow-Up:     FOLLOW-UP   1.  You will need to follow-up with Danae Mao NP in the Colon and Rectal Surgery clinic in 2-3 week(s) and then with [facility]S Staff: Dr. Lay in 6-8 weeks after.  Please contact our clinic scheduler (phone # [phone]) if you have not heard from our clinic in 3 business days afer discharge to schedule a  follow-up appointment.     2.  Follow up with your primary care provider in 1-2 weeks after discharge from the hospital to review this hospitalization.         Home Health Care:   None            Discharge Disposition:   Discharged to home       Condition at discharge: Stable        Kimmy Torres MD  General Surgery, PGY3  [phone]

## 2021-07-03 NOTE — PLAN OF CARE
"VSS. Neuros intact.  HR regular, hypertension improved with meds.  LS clear-IS encouraged.  Pt tolerating LFD.   UAL in room, SBA in hallways.  +BS, passing flatus and loose maroon-yellow stools, voiding spontaneously.  Pain \"only when I move\", sched tylenol and flexeril given.  Lap sites with intact derma bond. BIANCA, abd binder in place.  Pt dc'd to home. Discharge instructions and follow up appt reviewed with pt and spouse using teach back.  No c/o's at time to discharge.  "

## 2021-07-05 ENCOUNTER — PATIENT OUTREACH (OUTPATIENT)
Dept: SURGERY | Facility: CLINIC | Age: 76
End: 2021-07-05

## 2021-07-05 ENCOUNTER — VIRTUAL VISIT (OUTPATIENT)
Dept: ONCOLOGY | Facility: CLINIC | Age: 76
End: 2021-07-05
Attending: COLON & RECTAL SURGERY
Payer: MEDICARE

## 2021-07-05 DIAGNOSIS — Z80.0 FAMILY HISTORY OF LYNCH SYNDROME: ICD-10-CM

## 2021-07-05 DIAGNOSIS — Z83.719 FAMILY HISTORY OF COLONIC POLYPS: ICD-10-CM

## 2021-07-05 DIAGNOSIS — C18.9 COLON ADENOCARCINOMA (H): Primary | ICD-10-CM

## 2021-07-05 DIAGNOSIS — Z80.8 FAMILY HISTORY OF GLIOBLASTOMA: ICD-10-CM

## 2021-07-05 DIAGNOSIS — C17.0 DUODENAL ADENOCARCINOMA (H): ICD-10-CM

## 2021-07-05 DIAGNOSIS — Z80.3 FAMILY HISTORY OF MALIGNANT NEOPLASM OF BREAST: ICD-10-CM

## 2021-07-05 DIAGNOSIS — Z80.0 FAMILY HISTORY OF COLON CANCER: ICD-10-CM

## 2021-07-05 DIAGNOSIS — D05.12 DUCTAL CARCINOMA IN SITU (DCIS) OF LEFT BREAST: ICD-10-CM

## 2021-07-05 DIAGNOSIS — Z84.81 FAMILY HISTORY OF GENE MUTATION: ICD-10-CM

## 2021-07-05 LAB — COPATH REPORT: NORMAL

## 2021-07-05 PROCEDURE — 96040 HC GENETIC COUNSELING, EACH 30 MINUTES: CPT | Performed by: GENETIC COUNSELOR, MS

## 2021-07-05 NOTE — PROGRESS NOTES
Post Op Note     Called to check on patient postoperatively after hospital discharge.     Patient is s/p Subtotal colectomy, ileosigmoid anastamosis with Dr. Akil Lay for colon cancer.   Admitted 6/30/2021 and discharged on 7/3/2021.      No pain     Patient is eating and drinking normally. Patient is on a low fiber diet.  Encouraged patient to drink 8-10 glasses of water a day.     Patient is passing flats, is having soft bowel movements.    Patient is voiding normally and urine is light in color.    Patient is not set up with home care.    I did not review pathology with patient.     Patient Denies nausea and vomiting.    Patient Denies any fevers or chills.    Patient's incision is blistering due to allergy to adhesive glue. She spoke with one of the residents who said that she could pull off some of the glue. I told her she could start oral benadryl for itching.     Patient is on a activity restriction. Lifting 10 pounds for 6 weeks.     Patient Denies needing any forms completed.     Follow up is set up with Danae Ba NP on 7/23 and with Dr. Akil Lay on 8/24.   Encouraged the patient to contact the clinic in the meantime with any fevers, chills, nausea, vomiting, increased colostomy output, no colostomy output, dizziness, lightheadedness, uncontrolled pain, changes to the incisions, or with any questions or concerns.    Patient's questions were answered to their stated satisfaction and they are in agreement with this plan.    LACEY Mei [phone]  Colon & Rectal Surgery Clinic  [facility]

## 2021-07-05 NOTE — PROGRESS NOTES
7/5/2021    Marilia is a 76 year old who is being evaluated via a billable video visit.      Video-Visit Details    Type of service: Video Visit    Video Start Time: 02:02 pm  Video End Time: 02:39 pm     *An additional 34 minutes of the visit was completed by phone due to connection issues.     Originating Location (pt. Location): Home    Distant Location (provider location): [facility]    Platform used for Video Visit: [facility]    Referring Provider: Akil Lay MD    Presenting Information:   I spoke with Marilia Bean over video today for genetic counseling to discuss her personal and family history of cancer. With her permission, this appointment was conducted virtually due to COVID-19 precautions. We talked today to review this history, cancer screening recommendations, and available genetic testing options.    Personal History:  Marilia is a 76 year old female. She was recently diagnosed with colon cancer (ascending colon adenocarcinoma) found on colonoscopy on 5/10/21. This colonoscopy also detected two tubular adenomas in the ascending colon, and a 20mm polyp in the transverse colon (not removed due to identified mass). Mismatch repair testing by immunohistochemistry showed absent MSH2 and MSH6. She underwent laparoscopic subtotal abdominal colectomy with ileosigmoid anastomosis on 6/30/21.     She has a history of duodenal adenocarcinoma diagnosed in 2019 at age 74. She underwent duodenal and proximal jejunal resection with duodenal jejunostomy on 7/3/19. Mismatch repair testing of this cancer by immunohistochemistry also showed absent MSH2 and MSH6.    She was also recently diagnosed with a left breast cancer (DCIS, ER positive, MS positive) in May 2021 at age 76. She is planning for surgery after she has recovered from her colon cancer surgery. She has a history of a right breast lumpectomy on 8/17/04, which was benign.    Colonoscopy history:    4/30/19: ascending colon: tubular  adenoma, hepatic flexure: tubular adenoma    4/26/17: Two 2 mm polyps in the descending colon, One 4 mm polyp in the descending colon: tubular adenoma     12/8/15: Small transverse colon polyp: tubular adenoma    12/6/13: One 3 mm polyp in the ascending colon: tubular adenoma. Two 2 to 3 mm polyps in the sigmoid colon: hyperplastic polyp    10/31/2011: Colonic mucosa with no diagnostic alterations    3/31/2010: tubular adenoma    12/10/2008: hyperplastic polyp    5/7/2007: no polyps    12/12/2005: Three fragments of tubular adenoma and a portion of normal colonic mucosa    6/28/2004: Hyperplastic polyp, two fragments.    She had her first menstrual period at age 13 or 14, her first child at age 20, and is postmenopausal. Marilia had a total abdominal hysterectomy, bilateral salpingo-oophorectomy, and bladder suspension on 12/14/2009 due to pelvic pain, uterine fibroids, urinary incontinence. She reports that she used hormone replacement therapy for approximately 5 years in the past.     Family History: (Please see scanned pedigree for detailed family history information)  Children:    Her son, Shorty, is 51 years old and was diagnosed with colon cancer at age 36. Treatment included surgery, radiation, chemotherapy. He reportedly has a diagnosis of Gardiner syndrome. A letter from Shorty's genetic counselor from 2007 was available in Marilia's chart for review. This letter notes that mismatch repair testing by immunohistochemistry of his colon tumor showed absent MSH2 and MSH6 expression. He then underwent genetic testing through [facility] on the MSH2 and MSH6 genes. The results of this testing were negative (normal). However, the letter goes on to recommend that Shorty and his relatives follow the screening guidelines as if he had confirmed Gardiner syndrome, as was the recommended guideline at that time. Marilia is not sure whether he has ever undergone any updated genetic testing, but will see if she can find  out more information.    Her daughter is 53 years old with no known history of cancer. She has had colon polyps. She has reportedly tested positive for Gardiner syndrome.     Another son is 48 years old with no known history of cancer or colon polyps. He has also reportedly tested positive for Gardiner syndrome.     Her other daughter is 55 years old with no known history of cancer or colon polyps. She has not had genetic testing.     Marilia has two grandchildren who have tested positive for Gardiner syndrome so far.  Siblings:    Marilia is one of 11 siblings.     Her sister, Jovan, is 81 years old and has a history of multiple colon cancers (ages of diagnoses unknown to Marilia). She has a colostomy. She has reportedly tested positive for Gardiner syndrome.      Her daughter (Marilia's niece) has no known history of cancer, but has also tested positive for Gardiner syndrome. She has a daughter who is in her 20s and was recently diagnosed with colon cancer at age 26.     Her brother, Stephanie, is 80 years old and has a history of colon polyps and skin cancer (face). She thinks that he has also tested positive for Gardiner syndrome.    Her sister, Laly, was diagnosed in January 2021 with a glioblastoma at age 78 and passed away at age 78. She also had a history of kidney disease and had previously had a kidney transplant. She additionally had a history of breast cancer diagnosed at age 60 prior to her transplant. No known genetic testing.    Her daughter was diagnosed with a leiomyosarcoma on her leg in her mid 30s that was removed.     Her brother, Faraz, is 75 years old and has a history of skin cancer. No known colon polyps.    His son (Marilia's nephew) has a history of Hodgkin's lymphoma.    Her sister, Cary, is 73 years old with no known history of cancer. She has reportedly tested negative for Gardiner syndrome.    Her sister, Winsome, is 72 years old with no known history of cancer.     Her sister, Lelia, is 69 years old with no known  history of cancer. She has possibly tested positive for Gardiner syndrome.    Her sister, Ileana, is 67 years old with no known history of cancer. She has also possibly tested positive for Gardiner syndrome.    Her brother, Christiano, is 65 years old and was diagnosed with lymphoma recently at age 65. Treatment included radiation.    Her brother, Augustine, is 62 years old with no known history of cancer.  Maternal:    Her mother was diagnosed with colon cancer at an unknown age (older age) and passed away at age 95.    Her aunt was diagnosed with colon cancer and passed away in her late 80s.    She is not aware of any other cancers in her maternal relatives.  Paternal:    Her father was diagnosed with colon cancer in his 50s. He passed away at age 66. She reports that one of her siblings told her that it has been determined that the Gardiner syndrome mutation in her family was inherited from her father's side of the family. No known genetic testing for her father.    Her aunt was diagnosed with colon cancer and passed away in her 40s.    Her daughter (Marilia's cousin) also has a history of colon cancer. This cousin has a child who has also had colon cancer.     Two of her sons (Marilia's cousins) have a history of colon polyps and she believes they also have Gardiner syndrome.    Her grandfather passed away at age 42 due to colon cancer.    Her maternal ethnicity is [place]. Her paternal ethnicity is Filipino and [place]. There is no known Ashkenazi Taoism ancestry on either side of her family.     Discussion:    Marilia's personal and family history of cancer is suggestive of a hereditary cancer syndrome.    We reviewed the features of sporadic, familial, and hereditary cancers. Marilia's personal and family history of cancers are strongly suggestive of Gardiner syndrome, and based on the information she has about her relatives, it sounds as though a gene mutation has been identified in her family. As reviewed above, based on the information  "available in her son's genetic counseling note from 2007, it is unclear whether he is still being screened \"as if he has Gardiner syndrome\" per the original recommendations, or if he has had updated testing that identified a mutation. She will try to find out more information about this. If a mutation has been identified in her son and if this same mutation has been confirmed in her siblings or other relatives, then that would make Marilia an obligate carrier of this mutation.     We reviewed that testing for Marilia would give us confirmation of this. This would be particularly important if she is not able to find out more information from her family members about their testing. Genetic testing for Marilia would also give us the chance to test multiple different genes in Marilia (such as those related to colon, breast, and other cancers) to ensure that there are not any other genes (in addition to a presumed Gardiner syndrome mutation) that are contributing to her personal or family history of cancer.     We discussed the natural history and genetics of hereditary cancer. We reviewed that Gardiner syndrome can be caused by a mutation in one of five genes: MLH1, MSH2, MSH6, PMS2, and EPCAM. The highest cancer risks associated with Gardiner syndrome include colon cancer, endometrial/uterine cancer, gastric cancer, and ovarian cancer. Other cancers have also been reported with Gardiner syndrome, such as urinary tract, pancreas, bile duct, and other cancers. A detailed handout regarding Gardiner syndrome and other genes in which mutations are associated with an increased risk for colon cancer will be provided to Marilia via [employer] and can be found in the after visit summary. Topics included: inheritance pattern, cancer risks, cancer screening recommendations, and also risks, benefits and limitations of testing.    Based on her personal and family history, Marilia meets current National Comprehensive Cancer Network (NCCN) criteria for genetic " testing of the Gardiner syndrome genes.      We discussed that there are additional genes that could cause increased risk for colon, duodenal, breast, and other cancers. As many of these genes present with overlapping features in a family and accurate cancer risk cannot always be established based upon the pedigree analysis alone, it would be reasonable for Marilia to consider panel genetic testing to analyze multiple genes at once.    Marilia would like to take some more time to think about the implications of genetic testing and the utility of proceeding with testing for herself, given that she is already being followed as if she has Gardiner syndrome. We reviewed that the main purpose for testing would be to confirm this diagnosis, and ensure that there are no other mutations that could be contributing to her personal or family history of cancer. She will think about this and let me know what she decides.     Plan:  1) No genetic testing was ordered today. As described above, Marilia would like to think more about this and will let me know if she decides she would like to proceed with testing.   2) She will also speak with her family members and see if she can find out any additional information about the genetic testing in the family. She will send this information to me when available.   3) She was encouraged to contact me with any additional questions or concerns, or if she decides that she would like to proceed with testing.     Time spent over video and phone: 71 minutes    Sandra Jones MS, [facility]  Licensed, Certified Genetic Counselor  Office: [phone]  Email: [email or web address]    Addendum 7/5/21:    Marilia contacted me after our appointment today and let me know that she was able to find some additional paperwork about her son Shorty's genetic testing. She sent this information to me for review. This genetic counseling letter was from 2011 and outlines his updated genetic testing results that were performed at  that time. 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 This confirmed his diagnosis of Gardiner syndrome. The letter notes that this mutation was previously identified in his maternal aunt, meaning that it is highly likely his mother (Marilia) also has this mutation and a diagnosis of Gardiner syndrome. This would fit with Marilia's personal history of duodenal and colon cancer, with both tumors having absent MSH2 and MSH6 on immunohistochemistry testing. Marilia will think more about all of this and let me know if she decides that she would like to have genetic testing.

## 2021-07-05 NOTE — LETTER
Cancer Risk Management  Program Locations    [facility]  [facility]  [facility]  Mailing Address  Cancer Risk Management Program  [address]  [place] [address]    New patient appointments  [phone]  July 26, 2021    Marilia Bean  [address]  [facility] [id number]      Dear Marliia,    It was a pleasure speaking with you over video and phone for genetic counseling on 7/5/21. Here is a copy of the progress note from our discussion. If you have any additional questions, please feel free to call.    Referring Provider: Akil Lay MD    Presenting Information:   I spoke with Marilia Bean over video today for genetic counseling to discuss her personal and family history of cancer. With her permission, this appointment was conducted virtually due to COVID-19 precautions. We talked today to review this history, cancer screening recommendations, and available genetic testing options.    Personal History:  Marilia is a 76 year old female. She was recently diagnosed with colon cancer (ascending colon adenocarcinoma) found on colonoscopy on 5/10/21. This colonoscopy also detected two tubular adenomas in the ascending colon, and a 20mm polyp in the transverse colon (not removed due to identified mass). Mismatch repair testing by immunohistochemistry showed absent MSH2 and MSH6. She underwent laparoscopic subtotal abdominal colectomy with ileosigmoid anastomosis on 6/30/21.     She has a history of duodenal adenocarcinoma diagnosed in 2019 at age 74. She underwent duodenal and proximal jejunal resection with duodenal jejunostomy on 7/3/19. Mismatch repair testing of this cancer by immunohistochemistry also showed absent MSH2 and MSH6.    She was also recently diagnosed with a left breast cancer (DCIS, ER positive, ND  positive) in May 2021 at age 76. She is planning for surgery after she has recovered from her colon cancer surgery. She has a history of a right breast lumpectomy on 8/17/04, which was benign.    Colonoscopy history:    4/30/19: ascending colon: tubular adenoma, hepatic flexure: tubular adenoma    4/26/17: Two 2 mm polyps in the descending colon, One 4 mm polyp in the descending colon: tubular adenoma     12/8/15: Small transverse colon polyp: tubular adenoma    12/6/13: One 3 mm polyp in the ascending colon: tubular adenoma. Two 2 to 3 mm polyps in the sigmoid colon: hyperplastic polyp    10/31/2011: Colonic mucosa with no diagnostic alterations    3/31/2010: tubular adenoma    12/10/2008: hyperplastic polyp    5/7/2007: no polyps    12/12/2005: Three fragments of tubular adenoma and a portion of normal colonic mucosa    6/28/2004: Hyperplastic polyp, two fragments.    She had her first menstrual period at age 13 or 14, her first child at age 20, and is postmenopausal. Marilia had a total abdominal hysterectomy, bilateral salpingo-oophorectomy, and bladder suspension on 12/14/2009 due to pelvic pain, uterine fibroids, urinary incontinence. She reports that she used hormone replacement therapy for approximately 5 years in the past.     Family History: (Please see scanned pedigree for detailed family history information)  Children:    Her son, Shorty, is 51 years old and was diagnosed with colon cancer at age 36. Treatment included surgery, radiation, chemotherapy. He reportedly has a diagnosis of Gardiner syndrome. A letter from Shorty's genetic counselor from 2007 was available in Marilia's chart for review. This letter notes that mismatch repair testing by immunohistochemistry of his colon tumor showed absent MSH2 and MSH6 expression. He then underwent genetic testing through [employer] on the MSH2 and MSH6 genes. The results of this testing were negative (normal). However, the letter goes on to recommend  that Shorty and his relatives follow the screening guidelines as if he had confirmed Gardiner syndrome, as was the recommended guideline at that time. Marilia is not sure whether he has ever undergone any updated genetic testing, but will see if she can find out more information.    Her daughter is 53 years old with no known history of cancer. She has had colon polyps. She has reportedly tested positive for Gardiner syndrome.     Another son is 48 years old with no known history of cancer or colon polyps. He has also reportedly tested positive for Gardiner syndrome.     Her other daughter is 55 years old with no known history of cancer or colon polyps. She has not had genetic testing.     Marilia has two grandchildren who have tested positive for Gardiner syndrome so far.  Siblings:    Marilia is one of 11 siblings.     Her sister, Jovan, is 81 years old and has a history of multiple colon cancers (ages of diagnoses unknown to Marilia). She has a colostomy. She has reportedly tested positive for Gardiner syndrome.      Her daughter (Marilia's niece) has no known history of cancer, but has also tested positive for Gardiner syndrome. She has a daughter who is in her 20s and was recently diagnosed with colon cancer at age 26.     Her brother, Stephanie, is 80 years old and has a history of colon polyps and skin cancer (face). She thinks that he has also tested positive for Gardiner syndrome.    Her sister, Laly, was diagnosed in January 2021 with a glioblastoma at age 78 and passed away at age 78. She also had a history of kidney disease and had previously had a kidney transplant. She additionally had a history of breast cancer diagnosed at age 60 prior to her transplant. No known genetic testing.    Her daughter was diagnosed with a leiomyosarcoma on her leg in her mid 30s that was removed.     Her brother, Faraz, is 75 years old and has a history of skin cancer. No known colon polyps.    His son (Marilia's nephew) has a history of Hodgkin's  lymphoma.    Her sister, Cary, is 73 years old with no known history of cancer. She has reportedly tested negative for Gardiner syndrome.    Her sister, Winsome, is 72 years old with no known history of cancer.     Her sister, Lelia, is 69 years old with no known history of cancer. She has possibly tested positive for Gardiner syndrome.    Her sister, Ileana, is 67 years old with no known history of cancer. She has also possibly tested positive for Gardiner syndrome.    Her brother, Christiano, is 65 years old and was diagnosed with lymphoma recently at age 65. Treatment included radiation.    Her brother, Augustine, is 62 years old with no known history of cancer.  Maternal:    Her mother was diagnosed with colon cancer at an unknown age (older age) and passed away at age 95.    Her aunt was diagnosed with colon cancer and passed away in her late 80s.    She is not aware of any other cancers in her maternal relatives.  Paternal:    Her father was diagnosed with colon cancer in his 50s. He passed away at age 66. She reports that one of her siblings told her that it has been determined that the Gardiner syndrome mutation in her family was inherited from her father's side of the family. No known genetic testing for her father.    Her aunt was diagnosed with colon cancer and passed away in her 40s.    Her daughter (Marilia's cousin) also has a history of colon cancer. This cousin has a child who has also had colon cancer.     Two of her sons (Marilia's cousins) have a history of colon polyps and she believes they also have Gardiner syndrome.    Her grandfather passed away at age 42 due to colon cancer.    Her maternal ethnicity is Mexican. Her paternal ethnicity is [place] and [place]. There is no known Ashkenazi Nondenominational ancestry on either side of her family.     Discussion:    Marilia's personal and family history of cancer is suggestive of a hereditary cancer syndrome.    We reviewed the features of sporadic, familial, and hereditary cancers.  "Marilia's personal and family history of cancers are strongly suggestive of Gardiner syndrome, and based on the information she has about her relatives, it sounds as though a gene mutation has been identified in her family. As reviewed above, based on the information available in her son's genetic counseling note from 2007, it is unclear whether he is still being screened \"as if he has Gardiner syndrome\" per the original recommendations, or if he has had updated testing that identified a mutation. She will try to find out more information about this. If a mutation has been identified in her son and if this same mutation has been confirmed in her siblings or other relatives, then that would make Marilia an obligate carrier of this mutation.     We reviewed that testing for Marilia would give us confirmation of this. This would be particularly important if she is not able to find out more information from her family members about their testing. Genetic testing for Marilia would also give us the chance to test multiple different genes in Marilia (such as those related to colon, breast, and other cancers) to ensure that there are not any other genes (in addition to a presumed Gardiner syndrome mutation) that are contributing to her personal or family history of cancer.     We discussed the natural history and genetics of hereditary cancer. We reviewed that Gardiner syndrome can be caused by a mutation in one of five genes: MLH1, MSH2, MSH6, PMS2, and EPCAM. The highest cancer risks associated with Gardiner syndrome include colon cancer, endometrial/uterine cancer, gastric cancer, and ovarian cancer. Other cancers have also been reported with Gardiner syndrome, such as urinary tract, pancreas, bile duct, and other cancers. A detailed handout regarding Gardiner syndrome and other genes in which mutations are associated with an increased risk for colon cancer will be provided to Marilia via [employer] and can be found in the after visit summary. Topics " included: inheritance pattern, cancer risks, cancer screening recommendations, and also risks, benefits and limitations of testing.    Based on her personal and family history, Marilia meets current National Comprehensive Cancer Network (NCCN) criteria for genetic testing of the Gardiner syndrome genes.      We discussed that there are additional genes that could cause increased risk for colon, duodenal, breast, and other cancers. As many of these genes present with overlapping features in a family and accurate cancer risk cannot always be established based upon the pedigree analysis alone, it would be reasonable for Marilia to consider panel genetic testing to analyze multiple genes at once.    Marilia would like to take some more time to think about the implications of genetic testing and the utility of proceeding with testing for herself, given that she is already being followed as if she has Gardiner syndrome. We reviewed that the main purpose for testing would be to confirm this diagnosis, and ensure that there are no other mutations that could be contributing to her personal or family history of cancer. She will think about this and let me know what she decides.     Plan:  1) No genetic testing was ordered today. As described above, Marilia would like to think more about this and will let me know if she decides she would like to proceed with testing.   2) She will also speak with her family members and see if she can find out any additional information about the genetic testing in the family. She will send this information to me when available.   3) She was encouraged to contact me with any additional questions or concerns, or if she decides that she would like to proceed with testing.     Sandra Jones MS, [facility]  Licensed, Certified Genetic Counselor  Office: [phone]  Email: [email or web address]    Addendum 7/5/21:    Marilia contacted me after our appointment today and let me know that she was able to find some  additional paperwork about her son Shorty's genetic testing. She sent this information to me for review. This genetic counseling letter was from 2011 and outlines his updated genetic testing results that were performed at that time. Testing was performed at the [facility] and he tested positive for a deletion in the EPCAM gene. This confirmed his diagnosis of Gardiner syndrome. The letter notes that this mutation was previously identified in his maternal aunt, meaning that it is highly likely his mother (Marilia) also has this mutation and a diagnosis of Gardiner syndrome. This would fit with Marilia's personal history of duodenal and colon cancer, with both tumors having absent MSH2 and MSH6 on immunohistochemistry testing. Marilia will think more about all of this and let me know if she decides that she would like to have genetic testing.

## 2021-07-13 ENCOUNTER — TELEPHONE (OUTPATIENT)
Dept: SURGERY | Facility: CLINIC | Age: 76
End: 2021-07-13

## 2021-07-13 NOTE — TELEPHONE ENCOUNTER
Marilia feels like she is not regular. She goes a couple days without a BM. Her BMs are soft. Her baseline before surgery was not going for 4-5 days and then having diarrhea. I told her she can try a daily fiber supplement and if that does not help regulate her bowels then we can titrate the mediation as needed. Instructed her to NOT add more fibrous foods yet as she is only 2 weeks post op. I told her to avoid items such as seeds, nuts, popcorn, corn beans etc. She read back the plan to me and will call if the daily fiber doesn't help

## 2021-07-13 NOTE — TELEPHONE ENCOUNTER
[facility] Call Center    Phone Message    May a detailed message be left on voicemail: yes     Reason for Call: Other: Pt called in and stated that she is having a difficult time using the bathroom and wants to know her options if any. Please reach out to the pt. Thank you.     Action Taken: Message routed to:  [facility]    Travel Screening: Not Applicable

## 2021-07-23 ENCOUNTER — MYC MEDICAL ADVICE (OUTPATIENT)
Dept: SURGERY | Facility: CLINIC | Age: 76
End: 2021-07-23

## 2021-07-23 ENCOUNTER — OFFICE VISIT (OUTPATIENT)
Dept: SURGERY | Facility: CLINIC | Age: 76
End: 2021-07-23
Payer: COMMERCIAL

## 2021-07-23 VITALS
DIASTOLIC BLOOD PRESSURE: 77 MMHG | HEART RATE: 77 BPM | SYSTOLIC BLOOD PRESSURE: 126 MMHG | OXYGEN SATURATION: 96 % | WEIGHT: 184 LBS | BODY MASS INDEX: 28.88 KG/M2 | HEIGHT: 67 IN

## 2021-07-23 DIAGNOSIS — C18.2 CANCER OF ASCENDING COLON (H): ICD-10-CM

## 2021-07-23 DIAGNOSIS — Z09 FOLLOW-UP EXAMINATION AFTER COLORECTAL SURGERY: Primary | ICD-10-CM

## 2021-07-23 PROCEDURE — 99024 POSTOP FOLLOW-UP VISIT: CPT | Performed by: NURSE PRACTITIONER

## 2021-07-23 ASSESSMENT — PAIN SCALES - GENERAL: PAINLEVEL: NO PAIN (0)

## 2021-07-23 ASSESSMENT — MIFFLIN-ST. JEOR: SCORE: 1357.25

## 2021-07-23 NOTE — PROGRESS NOTES
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 Lay.    FINAL DIAGNOSIS:   COLON, SUBTOTAL COLECTOMY:   - 2.3 cm invasive, moderately differentiated adenocarcinoma   - Tumor invades into the submucosa, all surgical margins are free of   neoplasia   - Three tubular adenomas, largest of which contains a focus of high-grade   dysplasia (distally located 1.9 cm   pedunculated polyp)   - Twenty-eight benign lymph nodes, negative for metastatic carcinoma   (0)     Interval history: Marilia has been doing well. She is not needing anything for pain. She has been eating more fiber in her diet and denies any nausea but states that she does not feel great after eating. No fevers or chills. She is having soft bowel movements. She did meet with a genetic counselor and has decided to forego additional genetic testing at this time.    Physical Examination: Exam was chaperoned by Julia Grant MA   /77 (BP Location: Left arm, Patient Position: Sitting, Cuff Size: Adult Regular)   Pulse 77   Ht 5' 7\"   Wt 184 lb   SpO2 96%   BMI 28.82 kg/m    General: alert, oriented, in no acute distress, sitting comfortably  HEENT: mucous membranes moist  Abdomen: Soft, nondistended, nontender on palpation. Incision sites well approximated without any erythema or drainage. The umbilical site with minimal wound edge separation.    Assessment/Plan:  76 year old female status post laparoscopic subtotal abdominal colectomy with ileosigmoid anastomosis, extensive lysis of adhesions, and " splenic flexure mobilization on 6/30/21 with Dr. Lay. She is recovering well from surgery. Surgical sites are healing well. Recommended backing off to a low residue diet as she does have some discomfort after eating higher fiber foods. No lifting more than 10 pounds for full 6 weeks from surgery. She would like to defer her next clinic visit with Dr. Lay as she feels she is doing well at this time. Will discuss with him when he would like to scope her again and will check if he would like her to meet with medical oncology. Encouraged her to contact the clinic in the meantime with any questions or concerns. Patient's questions were answered to her stated satisfaction and she is in agreement with this plan.      Medical history:  Past Medical History:   Diagnosis Date     Aortic aneurysm (H) 07/01/2007    see 7/07 [place] report -follow yearly, treat high BP is occurs Problem list name updated by automated process. Provider to review     Aortic aneurysm of unspecified site without mention of rupture 07/2007    4.4 cm ascending aorta noted in 2007     Asymptomatic postmenopausal status (age-related) (natural)     on HRT  Prempro -weaning off 5/'2004     CAD (coronary artery disease)     LAD     Family history of Gardiner syndrome      Hyperlipidemia LDL goal <100 10/31/2010     Hypertension goal BP (blood pressure) < 140/90 02/09/2016     Leiomyoma of uterus, unspecified     Uterine fibroid     Lump or mass in breast 07/2004    rt. nodule - bx neg     Gardiner syndrome      Personal history of colonic polyps      Postmenopausal atrophic vaginitis 08/20/2006     Pulmonary nodule     incidental noted in 2007 during [place]     Pure hypercholesterolemia     mild, diet - low cholesterol, low fat.10/06 start Lovastatin       Surgical history:  Past Surgical History:   Procedure Laterality Date     BYPASS GRAFT ARTERY CORONARY N/A 6/5/2017    Procedure: BYPASS GRAFT ARTERY CORONARY;;  Surgeon: Akshat Soto MD;   Location: [facility]     C DEXA INTERPRETATION, AXIAL  12/21/01    wnl. 7/2005 wnl but lower     COLONOSCOPY  05/07/07    Repeat in 1 year for surveillance     COLONOSCOPY  12/10/08    repeat 1 year  -see 1/2009 letter     COLONOSCOPY  03/31/10     COLONOSCOPY  10/31/2011    Procedure:COMBINED COLONOSCOPY, SINGLE BIOPSY/POLYPECTOMY BY BIOPSY; colonoscopy with polypectomy by biopsy; Surgeon:JESSICA GARCIA; Location:[facility]     COLONOSCOPY  12/6/2013    Procedure: COMBINED COLONOSCOPY, SINGLE BIOPSY/POLYPECTOMY BY BIOPSY;  Colonoscopy, Polypectomies;  Surgeon: Herbert Salinas MD;  Location: [facility]     COLONOSCOPY N/A 12/8/2015    Procedure: COLONOSCOPY;  Surgeon: Jared Carbajal MD;  Location: [facility]     COLONOSCOPY N/A 4/26/2017    Procedure: COMBINED COLONOSCOPY, SINGLE OR MULTIPLE BIOPSY/POLYPECTOMY BY BIOPSY;  Colonoscopy with polypectomies with forceps and snare;  Surgeon: Herbert Salinas MD;  Location: [facility]     COLONOSCOPY N/A 5/10/2021    Procedure: Colonoscopy, With Polypectomy And Biopsy;  Surgeon: Franklyn Hernandez MD;  Location: [facility]     COLONOSCOPY WITH CO2 INSUFFLATION N/A 5/10/2021    Procedure: COLONOSCOPY, WITH CO2 INSUFFLATION;  Surgeon: Franklyn Hernandez MD;  Location: [facility]     COMBINED ESOPHAGOSCOPY, GASTROSCOPY, DUODENOSCOPY (EGD) WITH CO2 INSUFFLATION N/A 5/10/2021    Procedure: ESOPHAGOGASTRODUODENOSCOPY, WITH CO2 INSUFFLATION;  Surgeon: Franklyn Hernandez MD;  Location: [facility]     ENDOSCOPIC INSERTION TUBE JEJUNOSTOMY N/A 8/5/2019    Procedure: Gastrojejunostomy tube placement with gastropexy;  Surgeon: Ford Mann MD;  [facility]: [facility]     ESOPHAGOSCOPY, GASTROSCOPY, DUODENOSCOPY (EGD), COMBINED N/A 12/8/2015    Procedure: COMBINED ESOPHAGOSCOPY, GASTROSCOPY, DUODENOSCOPY (EGD), BIOPSY SINGLE OR MULTIPLE;  Surgeon: Jared Carbajal MD;  [facility]     ESOPHAGOSCOPY, GASTROSCOPY, DUODENOSCOPY (EGD), COMBINED N/A 7/31/2019    Procedure:  Endoscopic ultrasound with cystoduodenostomy, stent placement x2, stent dilation, and nasojejunal tube placement;  Surgeon: Ford Mann MD;  [facility]     ESOPHAGOSCOPY, GASTROSCOPY, DUODENOSCOPY (EGD), COMBINED N/A 8/5/2019    Procedure: ESOPHAGOGASTRODUODENOSCOPY (EGD);  Surgeon: Ford Mann MD;  [facility]     ESOPHAGOSCOPY, GASTROSCOPY, DUODENOSCOPY (EGD), COMBINED N/A 8/12/2019    Procedure: ESOPHAGOGASTRODUODENOSCOPY (EGD) with GJ exchange, duodenum stent removal.;  Surgeon: Ford Mann MD;  [facility]     ESOPHAGOSCOPY, GASTROSCOPY, DUODENOSCOPY (EGD), COMBINED N/A 5/10/2021    Procedure: Esophagogastroduodenoscopy, With Biopsy;  Surgeon: Franklyn Hernandez MD;  Location: [facility]     HC ECP WITH CATARACT SURGERY Bilateral 2015, 2016     HC LAPAROSCOPY, SURGICAL; APPENDECTOMY  10/31/2004     HC LAPAROSCOPY, SURGICAL; CHOLECYSTECTOMY  2000    Cholecystectomy, Laparoscopic     HC REVISE MEDIAN N/CARPAL TUNNEL SURG  10/01/10    left     HYSTERECTOMY, ELVIN  12/14/09    TAHBSO, [facility]K     LAPAROSCOPIC ASSISTED COLECTOMY N/A 6/30/2021    Procedure: Laparoscopic subtotal colectomy, ileosigmoid anastomosis, lysis of adhesions for 150 mins, splenic flexure mobilization;  Surgeon: Akil Lay MD;  [facility]: [facility]     REPAIR ANEURYSM ASCENDING AORTA N/A 6/5/2017    Procedure: REPAIR ANEURYSM ASCENDING AORTA;  Median Sternotomy, Ascending Aortic Aneurysm Repair, Coronary Artery Bypass Graft x1 on pump oxygenator;  Surgeon: Akshat Soto MD;  [facility]     REPLACE GASTROJEJUNOSTOMY TUBE, PERCUTANEOUS N/A 8/19/2019    Procedure: REPLACEMENT, GASTROJEJUNOSTOMY TUBE;  Surgeon: Robert Tafoya MD;  Location: [facility]     RESECTION DUODENAL N/A 7/3/2019    Procedure: Resection of Distal Duodenal and proximal Jejunum;  Surgeon: Joaquin Brito MD;  Location: [facility]     ZZHC BIOPSY BREAST, PERC NEEDLE CORE, WITH IMAGING  8/17/2004    Right      [facility] COLONOSCOPY THRU STOMA W BIOPSY/CAUTERY TUMOR/POLYP/LESION  1999,2002 2004 polyp - hyperplastic - repeat 5 years ?     [facility] COLONOSCOPY W/WO BRUSH/WASH  12/12/2005    Polypectomy.  Diverticulosis-minimal. Bx adenomatous and mucosal polyps - repeat 1 year     [facility]  03/31/10       Problem list:  Patient Active Problem List    Diagnosis Date Noted     Gardiner syndrome 06/24/2021     Priority: Medium     Confirmed on pathology specimen 5/10/2021; Loss of DNA Mismatch Repair Gene 2 and DNA Mismatch Repair Gene 6       Status post aorto-coronary artery bypass graft 06/15/2021     Priority: Medium     Overlapping malignant neoplasm of colon (H) 06/15/2021     Priority: Medium     Colon cancer (H) 06/07/2021     Priority: Medium     Added automatically from request for surgery [id number]       Ductal carcinoma in situ (DCIS) of left breast 06/01/2021     Priority: Medium     Chronic kidney disease, stage 3 03/26/2021     Priority: Medium     Bowel obstruction (H) 07/20/2019     Priority: Medium     Abdominal pain with vomiting 07/15/2019     Priority: Medium     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 1/8/2013          Hyperlipidemia with target LDL less than 70 10/31/2010     Priority: Medium     Aortic aneurysm (H) 07/01/2007     Priority: Medium     see 7/07 [facility] report -follow yearly, treat high BP is occurs  Problem list name updated by automated process. Provider to review       Postmenopausal atrophic vaginitis 08/20/2006     Priority: Medium       Medications:  Current Outpatient Medications    Medication Sig Dispense Refill     anastrozole (ARIMIDEX) 1 MG tablet Take 1 tablet (1 mg) by mouth daily 90 tablet 3     aspirin 81 MG EC tablet Take 81 mg by mouth every morning  90 tablet 3     atorvastatin (LIPITOR) 20 MG tablet Take 1 tablet (20 mg) by mouth every morning 90 tablet 3     diphenhydrAMINE (BENADRYL) 12.5 MG/5ML solution Take by mouth every evening       enoxaparin ANTICOAGULANT (LOVENOX) 40 MG/0.4ML syringe Inject 0.4 mLs (40 mg) Subcutaneous every 24 hours for 28 days 11.2 mL 0     losartan (COZAAR) 50 MG tablet TAKE ONE-HALF TABLET(25MG) BY MOUTH EVERY MORNING 45 tablet 3     metoprolol tartrate (LOPRESSOR) 25 MG tablet Take 1 tablet (25 mg) by mouth 2 times daily 180 tablet 3     acetaminophen (TYLENOL) 500 MG tablet Take 2 tablets (1,000 mg) by mouth every 8 hours       cyclobenzaprine (FLEXERIL) 10 MG tablet Take 1 tablet (10 mg) by mouth 3 times daily as needed for muscle spasms 30 tablet 0     oxyCODONE (ROXICODONE) 5 MG tablet Take 0.5-1 tablets (2.5-5 mg) by mouth every 8 hours as needed for moderate to severe pain 5 tablet 0       Allergies:  Allergies   Allergen Reactions     Contrast Dye Itching and Other (See Comments)     Tingling in mouth     Morphine Nausea     Reports that she did not vomit.        Family history:  Family History   Problem Relation Age of Onset     Cancer Mother         Colon Cancer     Heart Disease Mother         MI     Osteoporosis Mother      Cancer Father         Colon Cancer     Heart Disease Father         Heart Disease/ Heart attacks     Diabetes Father         Adult Onset     Cancer Sister         Colon Cancer     Heart Disease Brother         Heart attacks at age 45     Breast Cancer Sister      Cancer Paternal Grandmother         Unknown type     Heart Disease Maternal Grandfather         MI     Diabetes Sister         Adult Onset     Diabetes Sister         Adult Onset     Diabetes Brother         Adult Onset     Heart Disease Brother         heart  "attack age 64     Cancer - colorectal Son         age 36, Gardiner syndrome       Social history:  Social History     Tobacco Use     Smoking status: Never Smoker     Smokeless tobacco: Never Used   Substance Use Topics     Alcohol use: Yes     Comment: rarely     Marital status: .    Nursing Notes:   Julia Grant  7/23/2021  9:46 AM  Signed  Chief Complaint   Patient presents with     Post-op Visit       Vitals:    07/23/21 0942   BP: 126/77   BP Location: Left arm   Patient Position: Sitting   Cuff Size: Adult Regular   Pulse: 77   SpO2: 96%   Weight: 184 lb   Height: 5' 7\"       Body mass index is 28.82 kg/m .    Julia Grant CMA         20 minutes spent on the date of the encounter doing chart review, history and exam, documentation and further activities as noted above.   This is a postop visit.    CECILIA Hirsch, NP-C  Colon and Rectal Surgery  [facility]    This note was created using speech recognition software and may contain unintended word substitutions.    "

## 2021-07-23 NOTE — NURSING NOTE
"Chief Complaint   Patient presents with     Post-op Visit       Vitals:    07/23/21 0942   BP: 126/77   BP Location: Left arm   Patient Position: Sitting   Cuff Size: Adult Regular   Pulse: 77   SpO2: 96%   Weight: 184 lb   Height: 5' 7\"       Body mass index is 28.82 kg/m .    Julia Grant CMA    "

## 2021-07-23 NOTE — LETTER
"2021       RE: Marilia Bean  [address]  [facility] [address]     Dear Colleague,    Thank you for referring your patient, Marilia Bean, to the [facility] COLON AND RECTAL SURGERY CLINIC [place] at [facility]. Please see a copy of my visit note below.    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 Lay.    FINAL DIAGNOSIS:   COLON, SUBTOTAL COLECTOMY:   - 2.3 cm invasive, moderately differentiated adenocarcinoma   - Tumor invades into the submucosa, all surgical margins are free of   neoplasia   - Three tubular adenomas, largest of which contains a focus of high-grade   dysplasia (distally located 1.9 cm   pedunculated polyp)   - Twenty-eight benign lymph nodes, negative for metastatic carcinoma   (0/)     Interval history: Marilia has been doing well. She is not needing anything for pain. She has been eating more fiber in her diet and denies any nausea but states that she does not feel great after eating. No fevers or chills. She is having soft bowel movements. She did meet with a genetic counselor and has decided to forego additional genetic testing at this time.    Physical Examination: Exam was chaperoned by Julia Grant MA   /77 (BP Location: Left arm, Patient Position: Sitting, Cuff Size: Adult Regular)   Pulse 77   Ht 5' 7\"   Wt 184 lb   SpO2 96%   BMI 28.82 kg/m    General: alert, oriented, in no acute distress, sitting comfortably  HEENT: mucous membranes moist  Abdomen: " Soft, nondistended, nontender on palpation. Incision sites well approximated without any erythema or drainage. The umbilical site with minimal wound edge separation.    Assessment/Plan:  76 year old female status post laparoscopic subtotal abdominal colectomy with ileosigmoid anastomosis, extensive lysis of adhesions, and splenic flexure mobilization on 6/30/21 with Dr. Lay. She is recovering well from surgery. Surgical sites are healing well. Recommended backing off to a low residue diet as she does have some discomfort after eating higher fiber foods. No lifting more than 10 pounds for full 6 weeks from surgery. She would like to defer her next clinic visit with Dr. Lay as she feels she is doing well at this time. Will discuss with him when he would like to scope her again and will check if he would like her to meet with medical oncology. Encouraged her to contact the clinic in the meantime with any questions or concerns. Patient's questions were answered to her stated satisfaction and she is in agreement with this plan.      Medical history:  Past Medical History:   Diagnosis Date     Aortic aneurysm (H) 07/01/2007    see 7/07 Ba report -follow yearly, treat high BP is occurs Problem list name updated by automated process. Provider to review     Aortic aneurysm of unspecified site without mention of rupture 07/2007    4.4 cm ascending aorta noted in 2007     Asymptomatic postmenopausal status (age-related) (natural)     on HRT  Prempro -weaning off 5/'2004     CAD (coronary artery disease)     LAD     Family history of Gardiner syndrome      Hyperlipidemia LDL goal <100 10/31/2010     Hypertension goal BP (blood pressure) < 140/90 02/09/2016     Leiomyoma of uterus, unspecified     Uterine fibroid     Lump or mass in breast 07/2004    rt. nodule - bx neg     Gardiner syndrome      Personal history of colonic polyps      Postmenopausal atrophic vaginitis 08/20/2006     Pulmonary nodule     incidental noted in  2007 during [place]     Pure hypercholesterolemia     mild, diet - low cholesterol, low fat.10/06 start Lovastatin       Surgical history:  Past Surgical History:   Procedure Laterality Date     BYPASS GRAFT ARTERY CORONARY N/A 6/5/2017    Procedure: BYPASS GRAFT ARTERY CORONARY;;  Surgeon: Akshat Soto MD;  [facility]: [facility]      DEXA INTERPRETATION, AXIAL  12/21/01    wnl. 7/2005 wnl but lower     COLONOSCOPY  05/07/07    Repeat in 1 year for surveillance     COLONOSCOPY  12/10/08    repeat 1 year  -see 1/2009 letter     COLONOSCOPY  03/31/10     COLONOSCOPY  10/31/2011    Procedure:COMBINED COLONOSCOPY, SINGLE BIOPSY/POLYPECTOMY BY BIOPSY; colonoscopy with polypectomy by biopsy; Surgeon:JESSICA GARCIA; Location:[facility]     COLONOSCOPY  12/6/2013    Procedure: COMBINED COLONOSCOPY, SINGLE BIOPSY/POLYPECTOMY BY BIOPSY;  Colonoscopy, Polypectomies;  Surgeon: Herbert Salinas MD;  Location: [facility]     COLONOSCOPY N/A 12/8/2015    Procedure: COLONOSCOPY;  Surgeon: Jared Carbajal MD;  Location: [facility]     COLONOSCOPY N/A 4/26/2017    Procedure: COMBINED COLONOSCOPY, SINGLE OR MULTIPLE BIOPSY/POLYPECTOMY BY BIOPSY;  Colonoscopy with polypectomies with forceps and snare;  Surgeon: Herbert Salinas MD;  Location: [facility]     COLONOSCOPY N/A 5/10/2021    Procedure: Colonoscopy, With Polypectomy And Biopsy;  Surgeon: Franklyn Hernandez MD;  Location: [facility]     COLONOSCOPY WITH CO2 INSUFFLATION N/A 5/10/2021    Procedure: COLONOSCOPY, WITH CO2 INSUFFLATION;  Surgeon: Franklyn Hernandez MD;  [facility]: [facility]     COMBINED ESOPHAGOSCOPY, GASTROSCOPY, DUODENOSCOPY (EGD) WITH CO2 INSUFFLATION N/A 5/10/2021    Procedure: ESOPHAGOGASTRODUODENOSCOPY, WITH CO2 INSUFFLATION;  Surgeon: Franklyn Hernandez MD;  [facility]: [facility]     ENDOSCOPIC INSERTION TUBE JEJUNOSTOMY N/A 8/5/2019    Procedure: Gastrojejunostomy tube placement with gastropexy;  Surgeon: Ford Mann MD;  [facility]      ESOPHAGOSCOPY, GASTROSCOPY, DUODENOSCOPY (EGD), COMBINED N/A 12/8/2015    Procedure: COMBINED ESOPHAGOSCOPY, GASTROSCOPY, DUODENOSCOPY (EGD), BIOPSY SINGLE OR MULTIPLE;  Surgeon: Jared Carbajal MD;  [facility]     ESOPHAGOSCOPY, GASTROSCOPY, DUODENOSCOPY (EGD), COMBINED N/A 7/31/2019    Procedure: Endoscopic ultrasound with cystoduodenostomy, stent placement x2, stent dilation, and nasojejunal tube placement;  Surgeon: Ford Mann MD;  [facility]     ESOPHAGOSCOPY, GASTROSCOPY, DUODENOSCOPY (EGD), COMBINED N/A 8/5/2019    Procedure: ESOPHAGOGASTRODUODENOSCOPY (EGD);  Surgeon: Ford Mann MD;  [facility]     ESOPHAGOSCOPY, GASTROSCOPY, DUODENOSCOPY (EGD), COMBINED N/A 8/12/2019    Procedure: ESOPHAGOGASTRODUODENOSCOPY (EGD) with GJ exchange, duodenum stent removal.;  Surgeon: Ford Mann MD;  [facility]     ESOPHAGOSCOPY, GASTROSCOPY, DUODENOSCOPY (EGD), COMBINED N/A 5/10/2021    Procedure: Esophagogastroduodenoscopy, With Biopsy;  Surgeon: Franklyn Hernandez MD;  Location: [facility]     HC ECP WITH CATARACT SURGERY Bilateral 2015, 2016     HC LAPAROSCOPY, SURGICAL; APPENDECTOMY  10/31/2004     HC LAPAROSCOPY, SURGICAL; CHOLECYSTECTOMY  2000    Cholecystectomy, Laparoscopic     HC REVISE MEDIAN N/CARPAL TUNNEL SURG  10/01/10    left     HYSTERECTOMY, ELVIN  12/14/09    TAHBSO, MMK     LAPAROSCOPIC ASSISTED COLECTOMY N/A 6/30/2021    Procedure: Laparoscopic subtotal colectomy, ileosigmoid anastomosis, lysis of adhesions for 150 mins, splenic flexure mobilization;  Surgeon: Akil Lay MD;  [facility]     REPAIR ANEURYSM ASCENDING AORTA N/A 6/5/2017    Procedure: REPAIR ANEURYSM ASCENDING AORTA;  Median Sternotomy, Ascending Aortic Aneurysm Repair, Coronary Artery Bypass Graft x1 on pump oxygenator;  Surgeon: Akshta Soto MD;  [facility]     REPLACE GASTROJEJUNOSTOMY TUBE, PERCUTANEOUS N/A 8/19/2019    Procedure: REPLACEMENT,  GASTROJEJUNOSTOMY TUBE;  Surgeon: Rboert Tafoya MD;  [facility]     RESECTION DUODENAL N/A 7/3/2019    Procedure: Resection of Distal Duodenal and proximal Jejunum;  Surgeon: Joaquin Brito MD;  Location: [facility]     [facility] BIOPSY BREAST, PERC NEEDLE CORE, WITH IMAGING  8/17/2004    Right     ZZ COLONOSCOPY THRU STOMA W BIOPSY/CAUTERY TUMOR/POLYP/LESION  1999,2002 2004 polyp - hyperplastic - repeat 5 years ?     ZZ COLONOSCOPY W/WO BRUSH/WASH  12/12/2005    Polypectomy.  Diverticulosis-minimal. Bx adenomatous and mucosal polyps - repeat 1 year     [facility] ENDOSCOPY, SIMPLE EXAM  03/31/10       Problem list:  Patient Active Problem List    Diagnosis Date Noted     Gardiner syndrome 06/24/2021     Priority: Medium     Confirmed on pathology specimen 5/10/2021; Loss of DNA Mismatch Repair Gene 2 and DNA Mismatch Repair Gene 6       Status post aorto-coronary artery bypass graft 06/15/2021     Priority: Medium     Overlapping malignant neoplasm of colon (H) 06/15/2021     Priority: Medium     Colon cancer (H) 06/07/2021     Priority: Medium     Added automatically from request for surgery [id number]       Ductal carcinoma in situ (DCIS) of left breast 06/01/2021     Priority: Medium     Chronic kidney disease, stage 3 03/26/2021     Priority: Medium     Bowel obstruction (H) 07/20/2019     Priority: Medium     Abdominal pain with vomiting 07/15/2019     Priority: Medium     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 1/8/2013          Hyperlipidemia with target LDL less than 70 10/31/2010     Priority: Medium      Aortic aneurysm (H) 07/01/2007     Priority: Medium     see 7/07 Ba report -follow yearly, treat high BP is occurs  Problem list name updated by automated process. Provider to review       Postmenopausal atrophic vaginitis 08/20/2006     Priority: Medium       Medications:  Current Outpatient Medications   Medication Sig Dispense Refill     anastrozole (ARIMIDEX) 1 MG tablet Take 1 tablet (1 mg) by mouth daily 90 tablet 3     aspirin 81 MG EC tablet Take 81 mg by mouth every morning  90 tablet 3     atorvastatin (LIPITOR) 20 MG tablet Take 1 tablet (20 mg) by mouth every morning 90 tablet 3     diphenhydrAMINE (BENADRYL) 12.5 MG/5ML solution Take by mouth every evening       enoxaparin ANTICOAGULANT (LOVENOX) 40 MG/0.4ML syringe Inject 0.4 mLs (40 mg) Subcutaneous every 24 hours for 28 days 11.2 mL 0     losartan (COZAAR) 50 MG tablet TAKE ONE-HALF TABLET(25MG) BY MOUTH EVERY MORNING 45 tablet 3     metoprolol tartrate (LOPRESSOR) 25 MG tablet Take 1 tablet (25 mg) by mouth 2 times daily 180 tablet 3     acetaminophen (TYLENOL) 500 MG tablet Take 2 tablets (1,000 mg) by mouth every 8 hours       cyclobenzaprine (FLEXERIL) 10 MG tablet Take 1 tablet (10 mg) by mouth 3 times daily as needed for muscle spasms 30 tablet 0     oxyCODONE (ROXICODONE) 5 MG tablet Take 0.5-1 tablets (2.5-5 mg) by mouth every 8 hours as needed for moderate to severe pain 5 tablet 0       Allergies:  Allergies   Allergen Reactions     Contrast Dye Itching and Other (See Comments)     Tingling in mouth     Morphine Nausea     Reports that she did not vomit.        Family history:  Family History   Problem Relation Age of Onset     Cancer Mother         Colon Cancer     Heart Disease Mother         MI     Osteoporosis Mother      Cancer Father         Colon Cancer     Heart Disease Father         Heart Disease/ Heart attacks     Diabetes Father         Adult Onset     Cancer Sister         Colon Cancer     Heart Disease Brother          "Heart attacks at age 45     Breast Cancer Sister      Cancer Paternal Grandmother         Unknown type     Heart Disease Maternal Grandfather         MI     Diabetes Sister         Adult Onset     Diabetes Sister         Adult Onset     Diabetes Brother         Adult Onset     Heart Disease Brother         heart attack age 64     Cancer - colorectal Son         age 36, Gardiner syndrome       Social history:  Social History     Tobacco Use     Smoking status: Never Smoker     Smokeless tobacco: Never Used   Substance Use Topics     Alcohol use: Yes     Comment: rarely     Marital status: .    Nursing Notes:   Julia Grant  7/23/2021  9:46 AM  Signed  Chief Complaint   Patient presents with     Post-op Visit       Vitals:    07/23/21 0942   BP: 126/77   BP Location: Left arm   Patient Position: Sitting   Cuff Size: Adult Regular   Pulse: 77   SpO2: 96%   Weight: 184 lb   Height: 5' 7\"       Body mass index is 28.82 kg/m .    Julia Grant CMA         20 minutes spent on the date of the encounter doing chart review, history and exam, documentation and further activities as noted above.   This is a postop visit.    CECILIA Hirsch, NP-C  Colon and Rectal Surgery  [facility]    This note was created using speech recognition software and may contain unintended word substitutions.      Again, thank you for allowing me to participate in the care of your patient.      Sincerely,    CECILIA Hirsch CNP    "

## 2021-07-26 NOTE — PATIENT INSTRUCTIONS
Assessing Cancer Risk  Only about 5-10% of cancers are thought to be due to an inherited cancer susceptibility gene.    These families often have:  ? Several people with the same or related types of cancer  ? Cancers diagnosed at a young age (before age 50)  ? Individuals with more than one primary cancer  ? Multiple generations of the family affected with cancer    Comprehensive Colon Cancer Panel  We each inherit two copies of every gene in our bodies: one from our mother, and one from our father.  Each gene has a specific job to do.  When a gene has a mistake or  mutation  in it, it does not work like it should.      This handout will review common hereditary colon cancer syndromes, and other genes related to an increased risk for colon cancer.  The genes that will be discussed in this handout are: APC, BMPR1A, CDH1, CHEK2, EPCAM, GREM1, MLH1, MSH2, MSH6, MUTYH, PMS2, POLD1, POLE, PTEN, SMAD4, STK11, and TP53.  These genes are clinically actionable, meaning there are published guidelines for cancer screening and management for individuals who are found to carry mutations in these genes. Inheriting a mutation does not mean a person will develop cancer, but it does significantly increase his or her risk above the general population risk.      Familial Adenomatous Polyposis (FAP)  FAP is a hereditary cancer syndrome caused by mutations in the APC gene. The condition is known to cause hundreds to thousands of adenomatous polyps in the colon, creating a  carpet  of polyps. Some individuals have what is called attenuated FAP (AFAP), a milder form of FAP with fewer polyps and typically later onset. Individuals with an APC gene mutation are at an increased risk for colon, thyroid, and duodenal cancers, as well as several other types of cancer1.  Other features of this condition may include: osteomas, dental anomalies, benign skin lesions, CHRPE ( freckle  on the inside of the eye), and desmoid tumors.      Lifetime  Cancer Risks     Cancer Type General Population FAP   Colon  5% near 100%   Thyroid (papillary) 1% 1-12%   Duodenal <1% 5%   Liver  <1% 1-2% before age 5   Pancreas <1% 1%   Stomach <1% 1%       Juvenile Polyposis Syndrome (JPS)  JPS is characterized by hamartomatous polyps, called juvenile polyps, in the gastrointestinal tract.  Juvenile  refers to the type of polyps seen in this hereditary cancer condition, not the age of onset. Currently, mutations in two genes are known to cause JPS: BMPR1A and SMAD4. Of individuals clinically diagnosed with JPS, only 40% have an identifiable mutation in one of these genes, suggesting there are other genes that cause JPS that have not been discovered yet. Individuals with JPS are at an increased risk for colon cancer and stomach cancer 2,3,4. Pancreatic and small bowel cancers have also been reported in JPS, but the actual risks are unknown.         Lifetime Cancer Risks    Cancer Type General Population JPS   Colon 5% 40-50%   Gastric/Duodenal <1% 10-21%     Some individuals with SMAD4 mutations have a condition called JPS/HHT (Juvenile Polyposis/Hereditary Hemorrhagic Telangiectasia) where in addition to JPS, individuals may have nose bleeds and clotting issues.     Hereditary Diffuse Gastric Cancer (HDGC)  Currently, mutations in one gene are known to cause Hereditary Diffuse Gastric Cancer: CDH1.  Individuals with HDGC are at increased risk for diffuse gastric cancer and lobular breast cancer. Of people diagnosed with HDGC, 30-50% have a mutation in the CDH1 gene.  This suggests there are likely mutations in other genes that may cause HDGC that have not been identified yet. Individuals with HDGC may also be at increased risk for colon cancer.      Lifetime Cancer Risks    Cancer Type General Population HDGC   Diffuse Gastric <1% 67-83%   Breast 12% 39-52%     Gardiner syndrome  Mutations in five different genes are known to cause Gardiner syndrome: MLH1, MSH2, MSH6, PMS2, and  EPCAM. Individuals with Gardiner syndrome have an increased risk for colon, uterine, ovarian, small bowel, stomach, urinary tract, and brain cancer, as well as several other types of cancer. The exact lifetime cancer risks are dependent upon the gene in which the mutation was identified.      Lifetime Cancer Risks    Cancer Type General Population Gardiner syndrome   Colon 5% 12-52%   Uterine 2-3% Up to 57%   Stomach <1% Up to 16%   Ovarian 2% Up to 38%   Urinary tract <1% 1-7%   Hepatobiliary tract <1% 1-4%   Small bowel <1% Up to 11%   Brain/CNS <1% Not well established   Pancreas <1% Up to 6%       MUTYH-Associated Polyposis (MAP)  MAP is a hereditary cancer syndrome caused by mutations in the MUTYH gene. Unlike the other hereditary cancer genes discussed in this handout, two mutations in the MUTYH gene cause MAP and increase cancer risk. Those affected with MAP typically have between  adenomatous polyps. This syndrome also increases the risk for colon and duodenal cancer. Current research suggests that other cancers may be associated with MUTYH mutations, as well. The table below includes the risk that someone with two MUTYH gene mutations would develop cancer in their lifetime; of note, there is also an increased colon cancer risk for individuals who carry only one MUTYH gene mutation5,6,7.       Lifetime Cancer Risks    Cancer Type General Population MAP   Colon 5% %   Duodenal  <1% 5%       Cowden syndrome  Cowden syndrome is a hereditary condition that increases the risk for breast, thyroid, endometrial, colon, and kidney cancer.  A single mutation in the PTEN gene causes Cowden syndrome and increases cancer risk.  The table below shows the chance that someone with a PTEN mutation would develop cancer in their lifetime8,9.  Other benign features seen in some individuals with Cowden syndrome include benign skin lesions (facial papules, keratoses, lipomas), learning disabilities, autism, thyroid nodules,  hamartomatous colon polyps, and larger head size.      Lifetime Cancer Risks   Cancer Type General Population Cowden Syndrome   Breast 12% 25-50%*   Thyroid 1% 35%   Renal 1-2% 35%   Endometrial 2-3% 28%   Colon 5% 9%   Melanoma 2-3% >5%     *One recent study found breast cancer risk to be increased to 85%    Peutz-Jeghers syndrome (PJS)  PJS is a hereditary cancer syndrome caused by mutations in the STK11 gene. This condition can be distinguished from other hereditary syndromes by the presence of hamartomatous polyps in the gastrointestinal tract and freckles present in unusual places such as the hands, feet, neck, and lips. Individuals with Peutz-Jeghers syndrome have an increased risk for colon, breast, pancreatic, and other cancers3.  Men are at risk for testicular tumors which can affect hormones in the body. Women are at risk for sex cord tumors of the ovaries and a rare aggressive type of cervical cancer.     Lifetime Cancer Risks    Cancer Type General Population PJS   Breast 12% 45-50%   Colon 5% 39%   Stomach <1% 29%   Pancreas 1.5% 11-36%   Small Intestine <1% 13%     Ovarian  2%  18%   Lung 6-7% 15-17%     Additional Genes Associated with Increased Colon Cancer Risk  CHEK2  CHEK2 is a moderate-risk breast cancer gene.  Women who have a mutation in CHEK2 have around a 2-fold increased risk for breast cancer compared to the general population, and this risk may be higher depending upon family history.12,13,14.  Mutations in CHEK2 have also been shown to increase the risk of a number of other cancers, including colon and prostate, however these cancer risks are currently not well understood.     GREM1  GREM1 is a moderate-risk colon polyposis gene. Duplications of this gene are more commonly found in individuals with Ashkenazi Quaker [id number]. Mutations in GREM1 are associated with colon polyps and therefore an increased risk of colon cancer; however the estimated cancer risk is not well [id number].      POLD1 and POLE  POLD1 and POLE are moderate-risk colon cancer genes. Carriers of a mutation in one of these genes increases the lifetime risk of colorectal enpkkx29,18,19,20. Mutations in these genes may also be associated with increased risk for other cancers including: endometrial cancer, duodenal adenomas and carcinomas, and brain tumors.    TP53  Li Fraumeni syndrome (LFS) is a hereditary cancer predisposition syndrome. LFS is caused by a mutation in the TP53 gene. A single mutation in one of the copies of TP53 increases the risk for multiple cancers. Individuals with LFS are at an increased risk for developing cancer at a young age. The general lifetime risk for development of cancer is 50% by age 30 and 90% by age 60.      Core Cancers: Sarcomas, Breast, Brain, Lung, Leukemias/Lymphomas, Adrenocortical carcinomas  Other Cancers: Gastrointestinal, Thyroid, Skin, Genitourinary    Genetic Testing  Genetic testing involves a simple blood test and will look at the genetic information in genes associated with an increased risk of colon cancer. The tests look for any harmful mutations that are associated with increased cancer risk.  If possible, it is recommended that the person(s) who has had cancer be tested before other family members.  That person will give us the most useful information about whether or not a specific gene mutation is associated with the cancer in the family.     Results  There are three possible results from genetic testing:  ? Positive--a harmful mutation was identified  ? Negative--no mutation was identified  ? Variant of unknown significance--a variation in one of the genes was identified, but it is unclear how this impacts cancer risk in the family  Advantages and Disadvantages  There are advantages and disadvantages to genetic testing of these genes.    Advantages  ? May clarify your cancer risk  ? Can help you make medical decisions  ? May explain the cancers in your family  ? May  give useful information to your family members (if you share your results)    Disadvantages  ? Possible negative emotional impact of learning about inherited cancer risk  ? Uncertainty in interpreting a negative test result in some situations  ? Possible genetic discrimination concerns (see below)    Inheritance   Most mutations in the genes outlined above are inherited in an autosomal dominant pattern.  This means that if a parent has a mutation, each of his or her children will have a 50% chance of inheriting that same mutation.  Therefore, each child--male or female--would have a 50% chance of being at increased risk for developing cancer.                                            Image obtained from [employer], 2013     In the case of MUTYH-Associated Polyposis (MAP) this hereditary cancer syndrome is inherited in an autosomal recessive pattern. This means that each parent of an individual with MAP is a carrier of MAP, meaning that they have only one mutation in MUTYH. They still have one functioning copy of their gene.  Carriers are at a slightly higher risk for colon cancer than the general population. If each parent is a carrier for MAP, they have a 25% of having a child who is affected with MAP, meaning the child inherited both gene mutations - one from each parent.       Image obtained from [employer], 2016    Genetic Information Nondiscrimination Act (NABOR)  NABOR is a federal law that protects individuals from health insurance or employment discrimination based on a genetic test result alone.  Although rare, there are currently no legal protections in terms of life insurance, long term care, or disability insurances.  Visit the National Human Genome Research [place] at Genome.gov/68038958 to learn more.    Reducing Cancer Risk  Each of the genes listed within this handout have nationally recognized cancer screening guidelines that would be recommended for individuals who test  positive.  In addition to increased cancer screening, surgeries may be offered or recommended to reduce cancer risk in certain cases.  Recommendations are based upon an individual s genetic test result as well as their personal and family history of cancer.    Questions to Think About Regarding Genetic Testing  ? What effect will the test result have on me and my relationship with my family members if I have an inherited gene mutation?  If I don t have a gene mutation?  ? Should I share my test results, and how will my family react to this news, which may also affect them?  ? Are my children ready to learn new information that may one day affect their own health?    Resources    PTEN World Recoupworld.[email or web address]   No Stomach for Cancer, Inc. [email or web address]   Stomach Cancer Relief Network scrnet.org   Collaborative Group of the Americas on Inherited Colorectal Cancer (CGA) cgaicc.com   Cancer Care cancercare.[email or web address]   American Cancer Society (ACS) cancer.org   National Cancer [place] (NCI) cancer.org   Gardiner Syndrome International [email or web address]       Please call us if you have any questions or concerns.     Cancer Risk Management Program [phone]-CANCER ([phone])  ? Chan Galicia, MS, [facility] [phone]  ? Ledy Butterfield, MS, [facility]  [phone]  ? Maryann King, MS, [facility]  [phone]  ? Hollie Mcfarland, MS, [facility] [phone]  ? Danika Salinas, MS, [facility] [phone]  ? Sandra Jones, MS, [facility]  [phone]    References    1. Ruth BENJAMIN, Lucrecia J, Sp G, Evin E, Mai J, et al. The Prevalence of thyroid cancer and benign thyroid disease in patients with familial adenomatous polyposis may be higher than previously recognized. Clin Colorectal Cancer. 2012;11:304-308.  2. Celia L, Masoud Smiley A, Nataliia L, Samuel C, Meaghan K, et al. Risk of colorectal cancer in juvenile polyposis. Gut. 2007;56:965-967.  3. Alber FG, Christian MN, Thor CA. Colorectal cancer risk in hamartomatous polyposis syndromes.  World Journal of Gastrointestinal Surgery. 2015;27:25-32  4. Berna KELSEY. Guidance on gastrointestinal surveillance for hereditary non-polyposis colorectal cancer, familial adenomatous polypolis, juvenile polyposis, and Peutz-Jeghers syndrome. Gut. 2002;51:21-27.  5. Ari AK, Homer MALOU, Savannah JG et al. Risk of extracolonic cancers for people with biallelic and monoallelic mutations in MUTYH. Int J of Cancer. 2016;139:6791-5123.  6. Luisa S, Herbert S, Nayla H, Biju K, Cortez M, et al. MUTYH-associated polyposis: 70 of 71 patients with biallelic mutations present with an attenuated or atypical phenotype. Int J of Cancer. 2006;119:807-814.  7. Nigel G, Colin F, Rasheed I, Mao M, Nigel H, et al. MUTYH mutation carriers have increased breast cancer risk. Cancer. 2012;3641-6281.  8. Chun MH, Janeth J, Kenia J, Tanvir LA, Jesus Alberto MS, Eng C. Lifetime cancer risks in individuals with germline PTEN mutations. Clin Cancer Res. 2012;18:400-7.  9. Pilk[place]aki R. Cowden Syndrome: A Critical Review of the Clinical Literature. J Yeny . 2009:18:13-27.  10. National Comprehensive Cancer Network. Clinical practice guidelines in oncology, colorectal cancer screening. Available online (registration required). 2013.  11. National Cancer [place]. SEER Cancer Stat Fact Sheets.  December 2013.  12. CHEK2 Breast Cancer Case-Control Consortium. CHEK2*1100delC and susceptibility to breast cancer: A collaborative analysis involving 10,860 breast cancer cases and 9,065 controls from 10 studies. Am J Hum Yeny, 74 (2004), pp. 5595-4703  13. Mica T, Rachelle S, George K, et al. Spectrum of Mutations in BRCA1, BRCA2, CHEK2, and TP53 in Families at High Risk of Breast Cancer. NILAM. 2006;295(12):5720-7922.  14. Brenden C, Juan D, Shantal A, et al. Risk of breast cancer in women with a CHEK2 mutation with and without a family history of breast cancer. J Clin Oncol. 2011;29:2451-8827.  15. Juan HONEYCUTT, Brittanie E, Sergio J, Migdalia ALVARADO,  Parker CONDE et al. Defining the polyposis/colorectal cancer phenotype associated with the GREM1 duplication: counseling and management guidelines. Yeny .Res. 2016;98:1-5.  16. Juliana BENJAMIN, Aziza S, Milton CORTEZ, Betet Fan, et al. Hereditary mixed polyposis syndrome is caused by a 40kb upstream duplication that leads to increased and ectopic expression of the BMP antagonist GREM1. Nay Yeny. 2015;44:699-703.  17. SANJANA Marie. et al. Germline mutations affecting the proofreading domains of POLE and POLD1 predispose to colorectal adenomas and carcinomas. Nay. Yeny. 45, 136-44 (2013).  18. CHRISTIANO Lama. et al. POLE and POLD1 mutations in 529 yo with familial colorectal cancer and/or polyposis: review of reported cases and recommendations for genetic testing and surveillance. Yeny. Med. (2015). doi:10.1038/gim.2015.75  19. EMIR Rush et al. New insights into POLE and POLD1 germline mutations in familial colorectal cancer and polyposis. Hum. Mol. Yeny. 23, 2372-12 (2014).  20. JOHNSON Henderson. et al. Frequency and phenotypic spectrum of germline mutations in POLE and seven other polymerase genes in 266 patients with colorectal adenomas and carcinomas. Int. J. Cancer 137, 320-31 (2015).

## 2021-07-28 DIAGNOSIS — C18.2 CANCER OF ASCENDING COLON (H): Primary | ICD-10-CM

## 2021-07-30 ENCOUNTER — OFFICE VISIT (OUTPATIENT)
Dept: INTERNAL MEDICINE | Facility: CLINIC | Age: 76
End: 2021-07-30
Payer: COMMERCIAL

## 2021-07-30 VITALS
SYSTOLIC BLOOD PRESSURE: 122 MMHG | TEMPERATURE: 97.9 F | BODY MASS INDEX: 28.82 KG/M2 | WEIGHT: 184 LBS | RESPIRATION RATE: 16 BRPM | HEART RATE: 74 BPM | DIASTOLIC BLOOD PRESSURE: 64 MMHG | OXYGEN SATURATION: 98 %

## 2021-07-30 DIAGNOSIS — I25.10 ASCVD (ARTERIOSCLEROTIC CARDIOVASCULAR DISEASE): ICD-10-CM

## 2021-07-30 DIAGNOSIS — I48.91 ATRIAL FIBRILLATION WITH RVR (H): ICD-10-CM

## 2021-07-30 DIAGNOSIS — I71.9 AORTIC ANEURYSM WITHOUT RUPTURE, UNSPECIFIED PORTION OF AORTA (H): ICD-10-CM

## 2021-07-30 DIAGNOSIS — Z01.818 PREOP GENERAL PHYSICAL EXAM: Primary | ICD-10-CM

## 2021-07-30 DIAGNOSIS — D05.12 DUCTAL CARCINOMA IN SITU (DCIS) OF LEFT BREAST: ICD-10-CM

## 2021-07-30 PROCEDURE — 99214 OFFICE O/P EST MOD 30 MIN: CPT | Performed by: INTERNAL MEDICINE

## 2021-07-30 ASSESSMENT — ENCOUNTER SYMPTOMS
HEARTBURN: 1
FREQUENCY: 0
JOINT SWELLING: 0
MYALGIAS: 0
CHILLS: 0
NERVOUS/ANXIOUS: 0
ABDOMINAL PAIN: 0
DIARRHEA: 0
NAUSEA: 0
DIZZINESS: 0
WEAKNESS: 0
SORE THROAT: 0
PARESTHESIAS: 0
DYSURIA: 0
PALPITATIONS: 0
HEMATOCHEZIA: 0
FEVER: 0
EYE PAIN: 0
CONSTIPATION: 0
HEMATURIA: 0
COUGH: 0
BREAST MASS: 0
ARTHRALGIAS: 1
HEADACHES: 0
SHORTNESS OF BREATH: 0

## 2021-07-30 ASSESSMENT — ACTIVITIES OF DAILY LIVING (ADL): CURRENT_FUNCTION: NO ASSISTANCE NEEDED

## 2021-07-30 ASSESSMENT — PAIN SCALES - GENERAL: PAINLEVEL: NO PAIN (0)

## 2021-07-30 NOTE — PROGRESS NOTES
"[address] [address]  Phone: [phone]  Primary Provider: Herbert Epstein  Pre-op Performing Provider: HERBERT EPSTEIN    PREOPERATIVE EVALUATION:  Today's date: 7/30/2021    Marilia Bean is a 76 year old female who presents for a preoperative evaluation.    Surgical Information:  Surgery/Procedure: Left wire localized segmental mastectomy(\"lumpectomy\")  Surgery Location: [facility]  Surgeon: Dr. Toro  Surgery Date: 8/16/21  Time of Surgery: 9:00  Where patient plans to recover: At home with family  Fax number for surgical facility: Note does not need to be faxed, will be available electronically in Epic.    Type of Anesthesia Anticipated: Combined general with block    Assessment & Plan     The proposed surgical procedure is considered INTERMEDIATE risk.    Problem List Items Addressed This Visit     Aortic aneurysm (H)    Atrial fibrillation with RVR (H)    Ductal carcinoma in situ (DCIS) of left breast    Relevant Orders    CBC with platelets and differential    Basic metabolic panel  (Ca, Cl, CO2, Creat, Gluc, K, Na, BUN)    Asymptomatic COVID-19 Virus (Coronavirus) by PCR      Other Visit Diagnoses     Preop general physical exam    -  Primary    Relevant Orders    CBC with platelets and differential    Basic metabolic panel  (Ca, Cl, CO2, Creat, Gluc, K, Na, BUN)    Asymptomatic COVID-19 Virus (Coronavirus) by PCR    ASCVD (arteriosclerotic cardiovascular disease)            Patient is approved for surgery.  She has ductal carcinoma in situ of the left breast and needs a lumpectomy.  She just underwent a right hemicolectomy for colon cancer.  She tolerated this well.  Her EKG and stress test from June are available.  We will have her take her metoprolol on the day of surgery hold her losartan, aspirin for a week before, and Lovenox on the day of the procedure.  We will have her do labs 1 week prior to the surgery to make sure that her " potassium, hemoglobin and glucose are stable.    She has a history of A. fib but is on anticoagulation and rate controlled she also has a history of aortic aneurysm and coronary disease but these are stable and have been treated in the past.       Risks and Recommendations:  The patient has the following additional risks and recommendations for perioperative complications:   - No identified additional risk factors other than previously addressed    Medication Instructions:   - aspirin: Discontinue aspirin 7-10 days prior to procedure to reduce bleeding risk. It should be resumed postoperatively.    - ACE/ARB: HOLD due to exceptional risk of hypotension during surgery.     RECOMMENDATION:  APPROVAL GIVEN to proceed with proposed procedure, without further diagnostic evaluation.    Review of external notes as documented above                   Subjective     HPI related to upcoming procedure: breast cancer and needing lumpectomy. Also had colon cancer and needed that surgery first.  Had right colectomy.  Doing well.     Preop Questions 7/30/2021   1. Have you ever had a heart attack or stroke? No   2. Have you ever had surgery on your heart or blood vessels, such as a stent placement, a coronary artery bypass, or surgery on an artery in your head, neck, heart, or legs? YES - single bypass and aneurysm repair of aorta.    3. Do you have chest pain with activity? No   4. Do you have a history of  heart failure? No   5. Do you currently have a cold, bronchitis or symptoms of other infection? No   6. Do you have a cough, shortness of breath, or wheezing? No   7. Do you or anyone in your family have previous history of blood clots? No   8. Do you or does anyone in your family have a serious bleeding problem such as prolonged bleeding following surgeries or cuts? No   9. Have you ever had problems with anemia or been told to take iron pills? No   10. Have you had any abnormal blood loss such as black, tarry or bloody  stools, or abnormal vaginal bleeding? No   11. Have you ever had a blood transfusion? No   12. Are you willing to have a blood transfusion if it is medically needed before, during, or after your surgery? Yes   13. Have you or any of your relatives ever had problems with anesthesia? No   14. Do you have sleep apnea, excessive snoring or daytime drowsiness? No   15. Do you have any artifical heart valves or other implanted medical devices like a pacemaker, defibrillator, or continuous glucose monitor? No   16. Do you have artificial joints? No   17. Are you allergic to latex? No       Health Care Directive:  Patient has a Health Care Directive on file      Preoperative Review of :   reviewed - no record of controlled substances prescribed.      Status of Chronic Conditions:  CAD - Patient has a longstanding history of moderate-severe CAD. Patient denies recent chest pain or NTG use, denies exercise induced dyspnea or PND. Last Stress test , EKG .     HYPERLIPIDEMIA - Patient has a long history of significant Hyperlipidemia requiring medication for treatment with recent good control. Patient reports no problems or side effects with the medication.     HYPERTENSION - Patient has longstanding history of HTN , currently denies any symptoms referable to elevated blood pressure. Specifically denies chest pain, palpitations, dyspnea, orthopnea, PND or peripheral edema. Blood pressure readings have been in normal range. Current medication regimen is as listed below. Patient denies any side effects of medication.       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     Priority: Medium     Confirmed on pathology specimen 5/10/2021; Loss of DNA Mismatch Repair Gene 2 and DNA Mismatch Repair Gene 6       Status post aorto-coronary artery bypass graft 06/15/2021     Priority: Medium     Overlapping malignant neoplasm of colon (H) 06/15/2021     Priority: Medium      Colon cancer (H) 06/07/2021     Priority: Medium     Added automatically from request for surgery [id number]       Ductal carcinoma in situ (DCIS) of left breast 06/01/2021     Priority: Medium     Chronic kidney disease, stage 3 03/26/2021     Priority: Medium     Bowel obstruction (H) 07/20/2019     Priority: Medium     Abdominal pain with vomiting 07/15/2019     Priority: Medium     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 1/8/2013          Hyperlipidemia with target LDL less than 70 10/31/2010     Priority: Medium     Aortic aneurysm (H) 07/01/2007     Priority: Medium     see 7/07 Ba report -follow yearly, treat high BP is occurs  Problem list name updated by automated process. Provider to review       Postmenopausal atrophic vaginitis 08/20/2006     Priority: Medium      Past Medical History:   Diagnosis Date     Aortic aneurysm (H) 07/01/2007    see 7/07 Ba report -follow yearly, treat high BP is occurs Problem list name updated by automated process. Provider to review     Aortic aneurysm of unspecified site without mention of rupture 07/2007    4.4 cm ascending aorta noted in 2007     Asymptomatic postmenopausal status (age-related) (natural)     on HRT  Prempro -weaning off 5/'2004     CAD (coronary artery disease)     LAD     Family history of Gardiner syndrome      Hyperlipidemia LDL goal <100 10/31/2010     Hypertension goal BP (blood pressure) < 140/90 02/09/2016     Leiomyoma of uterus, unspecified     Uterine fibroid     Lump or mass in breast 07/2004    rt. nodule - bx neg     Gardiner syndrome      Personal  history of colonic polyps      Postmenopausal atrophic vaginitis 08/20/2006     Pulmonary nodule     incidental noted in 2007 during [place]     Pure hypercholesterolemia     mild, diet - low cholesterol, low fat.10/06 start Lovastatin     Past Surgical History:   Procedure Laterality Date     BYPASS GRAFT ARTERY CORONARY N/A 6/5/2017    Procedure: BYPASS GRAFT ARTERY CORONARY;;  Surgeon: Akshat Soto MD;  Location: [facility]     C DEXA INTERPRETATION, AXIAL  12/21/01    wnl. 7/2005 wnl but lower     COLONOSCOPY  05/07/07    Repeat in 1 year for surveillance     COLONOSCOPY  12/10/08    repeat 1 year  -see 1/2009 letter     COLONOSCOPY  03/31/10     COLONOSCOPY  10/31/2011    Procedure:COMBINED COLONOSCOPY, SINGLE BIOPSY/POLYPECTOMY BY BIOPSY; colonoscopy with polypectomy by biopsy; Surgeon:JESSICA GARCIA; Location:[facility]     COLONOSCOPY  12/6/2013    Procedure: COMBINED COLONOSCOPY, SINGLE BIOPSY/POLYPECTOMY BY BIOPSY;  Colonoscopy, Polypectomies;  Surgeon: Herbert Salinas MD;  Location: [facility]     COLONOSCOPY N/A 12/8/2015    Procedure: COLONOSCOPY;  Surgeon: Jared Carbajal MD;  Location: [facility]     COLONOSCOPY N/A 4/26/2017    Procedure: COMBINED COLONOSCOPY, SINGLE OR MULTIPLE BIOPSY/POLYPECTOMY BY BIOPSY;  Colonoscopy with polypectomies with forceps and snare;  Surgeon: Herbert Salinas MD;  Location: [facility]     COLONOSCOPY N/A 5/10/2021    Procedure: Colonoscopy, With Polypectomy And Biopsy;  Surgeon: Franklyn Hernandez MD;  Location: [facility]     COLONOSCOPY WITH CO2 INSUFFLATION N/A 5/10/2021    Procedure: COLONOSCOPY, WITH CO2 INSUFFLATION;  Surgeon: Franklyn Hernandez MD;  Location: [facility]     COMBINED ESOPHAGOSCOPY, GASTROSCOPY, DUODENOSCOPY (EGD) WITH CO2 INSUFFLATION N/A 5/10/2021    Procedure: ESOPHAGOGASTRODUODENOSCOPY, WITH CO2 INSUFFLATION;  Surgeon: Franklyn Hernandez MD;  Location: [facility]     ENDOSCOPIC INSERTION TUBE JEJUNOSTOMY N/A 8/5/2019    Procedure:  Gastrojejunostomy tube placement with gastropexy;  Surgeon: Ford Mann MD;  Location: [facility]     ESOPHAGOSCOPY, GASTROSCOPY, DUODENOSCOPY (EGD), COMBINED N/A 12/8/2015    Procedure: COMBINED ESOPHAGOSCOPY, GASTROSCOPY, DUODENOSCOPY (EGD), BIOPSY SINGLE OR MULTIPLE;  Surgeon: Jared Carbajal MD;  [facility]     ESOPHAGOSCOPY, GASTROSCOPY, DUODENOSCOPY (EGD), COMBINED N/A 7/31/2019    Procedure: Endoscopic ultrasound with cystoduodenostomy, stent placement x2, stent dilation, and nasojejunal tube placement;  Surgeon: Ford Mann MD;  Location: [facility]     ESOPHAGOSCOPY, GASTROSCOPY, DUODENOSCOPY (EGD), COMBINED N/A 8/5/2019    Procedure: ESOPHAGOGASTRODUODENOSCOPY (EGD);  Surgeon: Ford Mann MD;  [facility]: [facility]     ESOPHAGOSCOPY, GASTROSCOPY, DUODENOSCOPY (EGD), COMBINED N/A 8/12/2019    Procedure: ESOPHAGOGASTRODUODENOSCOPY (EGD) with GJ exchange, duodenum stent removal.;  Surgeon: Ford Mann MD;  Location: [facility]     ESOPHAGOSCOPY, GASTROSCOPY, DUODENOSCOPY (EGD), COMBINED N/A 5/10/2021    Procedure: Esophagogastroduodenoscopy, With Biopsy;  Surgeon: Franklyn Hernandez MD;  Location: [facility]     HC ECP WITH CATARACT SURGERY Bilateral 2015, 2016     [facility] LAPAROSCOPY, SURGICAL; APPENDECTOMY  10/31/2004     [facility] LAPAROSCOPY, SURGICAL; CHOLECYSTECTOMY  2000    Cholecystectomy, Laparoscopic     HC REVISE MEDIAN N/CARPAL TUNNEL SURG  10/01/10    left     HYSTERECTOMY, ELVIN  12/14/09    TAHBSO, MMK     LAPAROSCOPIC ASSISTED COLECTOMY N/A 6/30/2021    Procedure: Laparoscopic subtotal colectomy, ileosigmoid anastomosis, lysis of adhesions for 150 mins, splenic flexure mobilization;  Surgeon: Akil Lay MD;  Location: [facility]     REPAIR ANEURYSM ASCENDING AORTA N/A 6/5/2017    Procedure: REPAIR ANEURYSM ASCENDING AORTA;  Median Sternotomy, Ascending Aortic Aneurysm Repair, Coronary Artery Bypass Graft x1 on pump oxygenator;  Surgeon: Akshat Soto MD;   Location: [facility]     REPLACE GASTROJEJUNOSTOMY TUBE, PERCUTANEOUS N/A 8/19/2019    Procedure: REPLACEMENT, GASTROJEJUNOSTOMY TUBE;  Surgeon: Robert Tafoya MD;  Location: [facility]     RESECTION DUODENAL N/A 7/3/2019    Procedure: Resection of Distal Duodenal and proximal Jejunum;  Surgeon: Joaquin Brito MD;  Location: [facility]     [facility] BIOPSY BREAST, PERC NEEDLE CORE, WITH IMAGING  8/17/2004    Right     [facility] COLONOSCOPY THRU STOMA W BIOPSY/CAUTERY TUMOR/POLYP/LESION  1999,2002 2004 polyp - hyperplastic - repeat 5 years ?     [facility] COLONOSCOPY W/WO BRUSH/WASH  12/12/2005    Polypectomy.  Diverticulosis-minimal. Bx adenomatous and mucosal polyps - repeat 1 year     [facility] ENDOSCOPY, SIMPLE EXAM  03/31/10     Current Outpatient Medications   Medication Sig Dispense Refill     anastrozole (ARIMIDEX) 1 MG tablet Take 1 tablet (1 mg) by mouth daily 90 tablet 3     atorvastatin (LIPITOR) 20 MG tablet Take 1 tablet (20 mg) by mouth every morning 90 tablet 3     diphenhydrAMINE (BENADRYL) 12.5 MG/5ML solution Take by mouth every evening       enoxaparin ANTICOAGULANT (LOVENOX) 40 MG/0.4ML syringe Inject 0.4 mLs (40 mg) Subcutaneous every 24 hours for 28 days 11.2 mL 0     losartan (COZAAR) 50 MG tablet TAKE ONE-HALF TABLET(25MG) BY MOUTH EVERY MORNING 45 tablet 3     metoprolol tartrate (LOPRESSOR) 25 MG tablet Take 1 tablet (25 mg) by mouth 2 times daily 180 tablet 3     acetaminophen (TYLENOL) 500 MG tablet Take 2 tablets (1,000 mg) by mouth every 8 hours       aspirin 81 MG EC tablet Take 81 mg by mouth every morning  (Patient not taking: Reported on 7/30/2021) 90 tablet 3     cyclobenzaprine (FLEXERIL) 10 MG tablet Take 1 tablet (10 mg) by mouth 3 times daily as needed for muscle spasms 30 tablet 0     oxyCODONE (ROXICODONE) 5 MG tablet Take 0.5-1 tablets (2.5-5 mg) by mouth every 8 hours as needed for moderate to severe pain 5 tablet 0       Allergies   Allergen Reactions     Contrast Dye Itching and  Other (See Comments)     Tingling in mouth     Morphine Nausea     Reports that she did not vomit.         Social History     Tobacco Use     Smoking status: Never Smoker     Smokeless tobacco: Never Used   Substance Use Topics     Alcohol use: Yes     Comment: rarely       History   Drug Use No         Objective     /64   Pulse 74   Temp 97.9  F (36.6  C) (Temporal)   Resp 16   Wt 83.5 kg (184 lb)   SpO2 98%   BMI 28.82 kg/m      Physical Exam    GENERAL APPEARANCE: healthy, alert and no distress     HENT: ear canals and TM's normal and nose and mouth without ulcers or lesions     NECK: no adenopathy, no asymmetry, masses, or scars and thyroid normal to palpation     RESP: lungs clear to auscultation - no rales, rhonchi or wheezes     CV: regular rates and rhythm, normal S1 S2, no S3 or S4 and no murmur, click or rub     ABDOMEN: scars are well healed bruising.      MS: extremities normal- no gross deformities noted, no evidence of inflammation in joints, FROM in all extremities.     SKIN: no suspicious lesions or rashes     NEURO: Normal strength and tone, sensory exam grossly normal, mentation intact and speech normal     PSYCH: mentation appears normal. and affect normal/bright     LYMPHATICS: No cervical adenopathy    Recent Labs   Lab Test 07/02/21  1321 07/01/21  0801 06/28/21  0854 08/13/19  0714 08/12/19  0545 08/06/19  0506 08/05/19  0459   HGB 11.8 12.4 13.3   < > 9.8*   < >  --     249 233   < > 374   < >  --    INR  --   --   --   --  1.12  --  1.10   NA  --  139 138  --  135  --  134   POTASSIUM  --  3.8 4.2  --  3.8  --  3.7   CR  --  0.88 0.86  --  0.78  --  0.69    < > = values in this interval not displayed.        Diagnostics:  No labs were ordered during this visit.   No EKG this visit, completed in the last 90 days.  Stress test and ekg in June.       Revised Cardiac Risk Index (RCRI):  The patient has the following serious cardiovascular risks for perioperative  complications:   - No serious cardiac risks = 0 points     RCRI Interpretation: 1 point: Class II (low risk - 0.9% complication rate)           Signed Electronically by: Herbert Epstein MD  Copy of this evaluation report is provided to requesting physician.

## 2021-07-30 NOTE — H&P (VIEW-ONLY)
"[address] [address]  Phone: [phone]  Primary Provider: Herbert Epstein  Pre-op Performing Provider: HERBERT EPSTEIN    PREOPERATIVE EVALUATION:  Today's date: 7/30/2021    Marilia Bean is a 76 year old female who presents for a preoperative evaluation.    Surgical Information:  Surgery/Procedure: Left wire localized segmental mastectomy(\"lumpectomy\")  [place] [facility]  Surgeon: Dr. Toro  Surgery Date: 8/16/21  Time of Surgery: 9:00  Where patient plans to recover: At home with family  Fax number for surgical facility: Note does not need to be faxed, will be available electronically in Epic.    Type of Anesthesia Anticipated: Combined general with block    Assessment & Plan     The proposed surgical procedure is considered INTERMEDIATE risk.    Problem List Items Addressed This Visit     Aortic aneurysm (H)    Atrial fibrillation with RVR (H)    Ductal carcinoma in situ (DCIS) of left breast    Relevant Orders    CBC with platelets and differential    Basic metabolic panel  (Ca, Cl, CO2, Creat, Gluc, K, Na, BUN)    Asymptomatic COVID-19 Virus (Coronavirus) by PCR      Other Visit Diagnoses     Preop general physical exam    -  Primary    Relevant Orders    CBC with platelets and differential    Basic metabolic panel  (Ca, Cl, CO2, Creat, Gluc, K, Na, BUN)    Asymptomatic COVID-19 Virus (Coronavirus) by PCR    ASCVD (arteriosclerotic cardiovascular disease)            Patient is approved for surgery.  She has ductal carcinoma in situ of the left breast and needs a lumpectomy.  She just underwent a right hemicolectomy for colon cancer.  She tolerated this well.  Her EKG and stress test from June are available.  We will have her take her metoprolol on the day of surgery hold her losartan, aspirin for a week before, and Lovenox on the day of the procedure.  We will have her do labs 1 week prior to the surgery to make sure that her " potassium, hemoglobin and glucose are stable.    She has a history of A. fib but is on anticoagulation and rate controlled she also has a history of aortic aneurysm and coronary disease but these are stable and have been treated in the past.       Risks and Recommendations:  The patient has the following additional risks and recommendations for perioperative complications:   - No identified additional risk factors other than previously addressed    Medication Instructions:   - aspirin: Discontinue aspirin 7-10 days prior to procedure to reduce bleeding risk. It should be resumed postoperatively.    - ACE/ARB: HOLD due to exceptional risk of hypotension during surgery.     RECOMMENDATION:  APPROVAL GIVEN to proceed with proposed procedure, without further diagnostic evaluation.    Review of external notes as documented above                   Subjective     HPI related to upcoming procedure: breast cancer and needing lumpectomy. Also had colon cancer and needed that surgery first.  Had right colectomy.  Doing well.     Preop Questions 7/30/2021   1. Have you ever had a heart attack or stroke? No   2. Have you ever had surgery on your heart or blood vessels, such as a stent placement, a coronary artery bypass, or surgery on an artery in your head, neck, heart, or legs? YES - single bypass and aneurysm repair of aorta.    3. Do you have chest pain with activity? No   4. Do you have a history of  heart failure? No   5. Do you currently have a cold, bronchitis or symptoms of other infection? No   6. Do you have a cough, shortness of breath, or wheezing? No   7. Do you or anyone in your family have previous history of blood clots? No   8. Do you or does anyone in your family have a serious bleeding problem such as prolonged bleeding following surgeries or cuts? No   9. Have you ever had problems with anemia or been told to take iron pills? No   10. Have you had any abnormal blood loss such as black, tarry or bloody  stools, or abnormal vaginal bleeding? No   11. Have you ever had a blood transfusion? No   12. Are you willing to have a blood transfusion if it is medically needed before, during, or after your surgery? Yes   13. Have you or any of your relatives ever had problems with anesthesia? No   14. Do you have sleep apnea, excessive snoring or daytime drowsiness? No   15. Do you have any artifical heart valves or other implanted medical devices like a pacemaker, defibrillator, or continuous glucose monitor? No   16. Do you have artificial joints? No   17. Are you allergic to latex? No       Health Care Directive:  Patient has a Health Care Directive on file      Preoperative Review of :   reviewed - no record of controlled substances prescribed.      Status of Chronic Conditions:  CAD - Patient has a longstanding history of moderate-severe CAD. Patient denies recent chest pain or NTG use, denies exercise induced dyspnea or PND. Last Stress test , EKG .     HYPERLIPIDEMIA - Patient has a long history of significant Hyperlipidemia requiring medication for treatment with recent good control. Patient reports no problems or side effects with the medication.     HYPERTENSION - Patient has longstanding history of HTN , currently denies any symptoms referable to elevated blood pressure. Specifically denies chest pain, palpitations, dyspnea, orthopnea, PND or peripheral edema. Blood pressure readings have been in normal range. Current medication regimen is as listed below. Patient denies any side effects of medication.       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     Priority: Medium     Confirmed on pathology specimen 5/10/2021; Loss of DNA Mismatch Repair Gene 2 and DNA Mismatch Repair Gene 6       Status post aorto-coronary artery bypass graft 06/15/2021     Priority: Medium     Overlapping malignant neoplasm of colon (H) 06/15/2021     Priority: Medium      Colon cancer (H) 06/07/2021     Priority: Medium     Added automatically from request for surgery [id number]       Ductal carcinoma in situ (DCIS) of left breast 06/01/2021     Priority: Medium     Chronic kidney disease, stage 3 03/26/2021     Priority: Medium     Bowel obstruction (H) 07/20/2019     Priority: Medium     Abdominal pain with vomiting 07/15/2019     Priority: Medium     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 1/8/2013          Hyperlipidemia with target LDL less than 70 10/31/2010     Priority: Medium     Aortic aneurysm (H) 07/01/2007     Priority: Medium     see 7/07 Ba report -follow yearly, treat high BP is occurs  Problem list name updated by automated process. Provider to review       Postmenopausal atrophic vaginitis 08/20/2006     Priority: Medium      Past Medical History:   Diagnosis Date     Aortic aneurysm (H) 07/01/2007    see 7/07 Ba report -follow yearly, treat high BP is occurs Problem list name updated by automated process. Provider to review     Aortic aneurysm of unspecified site without mention of rupture 07/2007    4.4 cm ascending aorta noted in 2007     Asymptomatic postmenopausal status (age-related) (natural)     on HRT  Prempro -weaning off 5/'2004     CAD (coronary artery disease)     LAD     Family history of Gardiner syndrome      Hyperlipidemia LDL goal <100 10/31/2010     Hypertension goal BP (blood pressure) < 140/90 02/09/2016     Leiomyoma of uterus, unspecified     Uterine fibroid     Lump or mass in breast 07/2004    rt. nodule - bx neg     Gardiner syndrome      Personal  history of colonic polyps      Postmenopausal atrophic vaginitis 08/20/2006     Pulmonary nodule     incidental noted in 2007 during [place]     Pure hypercholesterolemia     mild, diet - low cholesterol, low fat.10/06 start Lovastatin     Past Surgical History:   Procedure Laterality Date     BYPASS GRAFT ARTERY CORONARY N/A 6/5/2017    Procedure: BYPASS GRAFT ARTERY CORONARY;;  Surgeon: Akshat Soto MD;  Location: [facility]     C DEXA INTERPRETATION, AXIAL  12/21/01    wnl. 7/2005 wnl but lower     COLONOSCOPY  05/07/07    Repeat in 1 year for surveillance     COLONOSCOPY  12/10/08    repeat 1 year  -see 1/2009 letter     COLONOSCOPY  03/31/10     COLONOSCOPY  10/31/2011    Procedure:COMBINED COLONOSCOPY, SINGLE BIOPSY/POLYPECTOMY BY BIOPSY; colonoscopy with polypectomy by biopsy; Surgeon:JESSICA GARCIA; Location:[facility]     COLONOSCOPY  12/6/2013    Procedure: COMBINED COLONOSCOPY, SINGLE BIOPSY/POLYPECTOMY BY BIOPSY;  Colonoscopy, Polypectomies;  Surgeon: Herbert Salinas MD;  Location: [facility]     COLONOSCOPY N/A 12/8/2015    Procedure: COLONOSCOPY;  Surgeon: Jared Carbajal MD;  Location: [facility]     COLONOSCOPY N/A 4/26/2017    Procedure: COMBINED COLONOSCOPY, SINGLE OR MULTIPLE BIOPSY/POLYPECTOMY BY BIOPSY;  Colonoscopy with polypectomies with forceps and snare;  Surgeon: Herbert Salinas MD;  Location: [facility]     COLONOSCOPY N/A 5/10/2021    Procedure: Colonoscopy, With Polypectomy And Biopsy;  Surgeon: Franklyn Hernandez MD;  Location: [facility]     COLONOSCOPY WITH CO2 INSUFFLATION N/A 5/10/2021    Procedure: COLONOSCOPY, WITH CO2 INSUFFLATION;  Surgeon: Franklyn Hernandez MD;  Location: [facility]     COMBINED ESOPHAGOSCOPY, GASTROSCOPY, DUODENOSCOPY (EGD) WITH CO2 INSUFFLATION N/A 5/10/2021    Procedure: ESOPHAGOGASTRODUODENOSCOPY, WITH CO2 INSUFFLATION;  Surgeon: Franklyn Hernandez MD;  Location: [facility]     ENDOSCOPIC INSERTION TUBE JEJUNOSTOMY N/A 8/5/2019    Procedure:  Gastrojejunostomy tube placement with gastropexy;  Surgeon: Ford Mann MD;  [facility]     ESOPHAGOSCOPY, GASTROSCOPY, DUODENOSCOPY (EGD), COMBINED N/A 12/8/2015    Procedure: COMBINED ESOPHAGOSCOPY, GASTROSCOPY, DUODENOSCOPY (EGD), BIOPSY SINGLE OR MULTIPLE;  Surgeon: Jared Carbajal MD;  Location: [facility]     ESOPHAGOSCOPY, GASTROSCOPY, DUODENOSCOPY (EGD), COMBINED N/A 7/31/2019    Procedure: Endoscopic ultrasound with cystoduodenostomy, stent placement x2, stent dilation, and nasojejunal tube placement;  Surgeon: Ford Mann MD;  Location[facility]     ESOPHAGOSCOPY, GASTROSCOPY, DUODENOSCOPY (EGD), COMBINED N/A 8/5/2019    Procedure: ESOPHAGOGASTRODUODENOSCOPY (EGD);  Surgeon: Ford Mann MD;  [facility]     ESOPHAGOSCOPY, GASTROSCOPY, DUODENOSCOPY (EGD), COMBINED N/A 8/12/2019    Procedure: ESOPHAGOGASTRODUODENOSCOPY (EGD) with GJ exchange, duodenum stent removal.;  Surgeon: Ford Mann MD;  [facility]     ESOPHAGOSCOPY, GASTROSCOPY, DUODENOSCOPY (EGD), COMBINED N/A 5/10/2021    Procedure: Esophagogastroduodenoscopy, With Biopsy;  Surgeon: Franklyn Hernandez MD;  Location: [facility]     HC ECP WITH CATARACT SURGERY Bilateral 2015, 2016     [facility] LAPAROSCOPY, SURGICAL; APPENDECTOMY  10/31/2004     [facility] LAPAROSCOPY, SURGICAL; CHOLECYSTECTOMY  2000    Cholecystectomy, Laparoscopic     HC REVISE MEDIAN N/CARPAL TUNNEL SURG  10/01/10    left     HYSTERECTOMY, ELVIN  12/14/09    TAHBSO, MMK     LAPAROSCOPIC ASSISTED COLECTOMY N/A 6/30/2021    Procedure: Laparoscopic subtotal colectomy, ileosigmoid anastomosis, lysis of adhesions for 150 mins, splenic flexure mobilization;  Surgeon: Akil Lay MD;  Location: [facility]     REPAIR ANEURYSM ASCENDING AORTA N/A 6/5/2017    Procedure: REPAIR ANEURYSM ASCENDING AORTA;  Median Sternotomy, Ascending Aortic Aneurysm Repair, Coronary Artery Bypass Graft x1 on pump oxygenator;  Surgeon: Akshat Soto MD;   [facility]     REPLACE GASTROJEJUNOSTOMY TUBE, PERCUTANEOUS N/A 8/19/2019    Procedure: REPLACEMENT, GASTROJEJUNOSTOMY TUBE;  Surgeon: Robert Tafoya MD;  Location: [facility]     RESECTION DUODENAL N/A 7/3/2019    Procedure: Resection of Distal Duodenal and proximal Jejunum;  Surgeon: Joaquin Brito MD;  Location: [facility]     [facility] BIOPSY BREAST, PERC NEEDLE CORE, WITH IMAGING  8/17/2004    Right     [facility] COLONOSCOPY THRU STOMA W BIOPSY/CAUTERY TUMOR/POLYP/LESION  1999,2002 2004 polyp - hyperplastic - repeat 5 years ?     [facility] COLONOSCOPY W/WO BRUSH/WASH  12/12/2005    Polypectomy.  Diverticulosis-minimal. Bx adenomatous and mucosal polyps - repeat 1 year     [facility] ENDOSCOPY, SIMPLE EXAM  03/31/10     Current Outpatient Medications   Medication Sig Dispense Refill     anastrozole (ARIMIDEX) 1 MG tablet Take 1 tablet (1 mg) by mouth daily 90 tablet 3     atorvastatin (LIPITOR) 20 MG tablet Take 1 tablet (20 mg) by mouth every morning 90 tablet 3     diphenhydrAMINE (BENADRYL) 12.5 MG/5ML solution Take by mouth every evening       enoxaparin ANTICOAGULANT (LOVENOX) 40 MG/0.4ML syringe Inject 0.4 mLs (40 mg) Subcutaneous every 24 hours for 28 days 11.2 mL 0     losartan (COZAAR) 50 MG tablet TAKE ONE-HALF TABLET(25MG) BY MOUTH EVERY MORNING 45 tablet 3     metoprolol tartrate (LOPRESSOR) 25 MG tablet Take 1 tablet (25 mg) by mouth 2 times daily 180 tablet 3     acetaminophen (TYLENOL) 500 MG tablet Take 2 tablets (1,000 mg) by mouth every 8 hours       aspirin 81 MG EC tablet Take 81 mg by mouth every morning  (Patient not taking: Reported on 7/30/2021) 90 tablet 3     cyclobenzaprine (FLEXERIL) 10 MG tablet Take 1 tablet (10 mg) by mouth 3 times daily as needed for muscle spasms 30 tablet 0     oxyCODONE (ROXICODONE) 5 MG tablet Take 0.5-1 tablets (2.5-5 mg) by mouth every 8 hours as needed for moderate to severe pain 5 tablet 0       Allergies   Allergen Reactions     Contrast Dye Itching and  Other (See Comments)     Tingling in mouth     Morphine Nausea     Reports that she did not vomit.         Social History     Tobacco Use     Smoking status: Never Smoker     Smokeless tobacco: Never Used   Substance Use Topics     Alcohol use: Yes     Comment: rarely       History   Drug Use No         Objective     /64   Pulse 74   Temp 97.9  F (36.6  C) (Temporal)   Resp 16   Wt 83.5 kg (184 lb)   SpO2 98%   BMI 28.82 kg/m      Physical Exam    GENERAL APPEARANCE: healthy, alert and no distress     HENT: ear canals and TM's normal and nose and mouth without ulcers or lesions     NECK: no adenopathy, no asymmetry, masses, or scars and thyroid normal to palpation     RESP: lungs clear to auscultation - no rales, rhonchi or wheezes     CV: regular rates and rhythm, normal S1 S2, no S3 or S4 and no murmur, click or rub     ABDOMEN: scars are well healed bruising.      MS: extremities normal- no gross deformities noted, no evidence of inflammation in joints, FROM in all extremities.     SKIN: no suspicious lesions or rashes     NEURO: Normal strength and tone, sensory exam grossly normal, mentation intact and speech normal     PSYCH: mentation appears normal. and affect normal/bright     LYMPHATICS: No cervical adenopathy    Recent Labs   Lab Test 07/02/21  1321 07/01/21  0801 06/28/21  0854 08/13/19  0714 08/12/19  0545 08/06/19  0506 08/05/19  0459   HGB 11.8 12.4 13.3   < > 9.8*   < >  --     249 233   < > 374   < >  --    INR  --   --   --   --  1.12  --  1.10   NA  --  139 138  --  135  --  134   POTASSIUM  --  3.8 4.2  --  3.8  --  3.7   CR  --  0.88 0.86  --  0.78  --  0.69    < > = values in this interval not displayed.        Diagnostics:  No labs were ordered during this visit.   No EKG this visit, completed in the last 90 days.  Stress test and ekg in June.       Revised Cardiac Risk Index (RCRI):  The patient has the following serious cardiovascular risks for perioperative  complications:   - No serious cardiac risks = 0 points     RCRI Interpretation: 1 point: Class II (low risk - 0.9% complication rate)           Signed Electronically by: Herbert Epstein MD  Copy of this evaluation report is provided to requesting physician.

## 2021-07-30 NOTE — PATIENT INSTRUCTIONS

## 2021-08-11 ENCOUNTER — LAB (OUTPATIENT)
Dept: LAB | Facility: CLINIC | Age: 76
End: 2021-08-11
Payer: COMMERCIAL

## 2021-08-11 DIAGNOSIS — Z01.818 PREOP GENERAL PHYSICAL EXAM: ICD-10-CM

## 2021-08-11 DIAGNOSIS — D05.12 DUCTAL CARCINOMA IN SITU (DCIS) OF LEFT BREAST: ICD-10-CM

## 2021-08-11 LAB
ANION GAP SERPL CALCULATED.3IONS-SCNC: 5 MMOL/L (ref 3–14)
BASOPHILS # BLD AUTO: 0 10E3/UL (ref 0–0.2)
BASOPHILS NFR BLD AUTO: 1 %
BUN SERPL-MCNC: 18 MG/DL (ref 7–30)
CALCIUM SERPL-MCNC: 9.4 MG/DL (ref 8.5–10.1)
CHLORIDE BLD-SCNC: 109 MMOL/L (ref 94–109)
CO2 SERPL-SCNC: 26 MMOL/L (ref 20–32)
CREAT SERPL-MCNC: 1.12 MG/DL (ref 0.52–1.04)
EOSINOPHIL # BLD AUTO: 0.4 10E3/UL (ref 0–0.7)
EOSINOPHIL NFR BLD AUTO: 6 %
ERYTHROCYTE [DISTWIDTH] IN BLOOD BY AUTOMATED COUNT: 13.7 % (ref 10–15)
GFR SERPL CREATININE-BSD FRML MDRD: 48 ML/MIN/1.73M2
GLUCOSE BLD-MCNC: 106 MG/DL (ref 70–99)
HCT VFR BLD AUTO: 38.6 % (ref 35–47)
HGB BLD-MCNC: 12.7 G/DL (ref 11.7–15.7)
IMM GRANULOCYTES # BLD: 0 10E3/UL
IMM GRANULOCYTES NFR BLD: 0 %
LYMPHOCYTES # BLD AUTO: 1.7 10E3/UL (ref 0.8–5.3)
LYMPHOCYTES NFR BLD AUTO: 26 %
MCH RBC QN AUTO: 29 PG (ref 26.5–33)
MCHC RBC AUTO-ENTMCNC: 32.9 G/DL (ref 31.5–36.5)
MCV RBC AUTO: 88 FL (ref 78–100)
MONOCYTES # BLD AUTO: 0.6 10E3/UL (ref 0–1.3)
MONOCYTES NFR BLD AUTO: 9 %
NEUTROPHILS # BLD AUTO: 3.8 10E3/UL (ref 1.6–8.3)
NEUTROPHILS NFR BLD AUTO: 58 %
NRBC # BLD AUTO: 0 10E3/UL
NRBC BLD AUTO-RTO: 0 /100
PLATELET # BLD AUTO: 284 10E3/UL (ref 150–450)
POTASSIUM BLD-SCNC: 4.4 MMOL/L (ref 3.4–5.3)
RBC # BLD AUTO: 4.38 10E6/UL (ref 3.8–5.2)
SODIUM SERPL-SCNC: 140 MMOL/L (ref 133–144)
WBC # BLD AUTO: 6.5 10E3/UL (ref 4–11)

## 2021-08-11 PROCEDURE — 36415 COLL VENOUS BLD VENIPUNCTURE: CPT

## 2021-08-11 PROCEDURE — 85025 COMPLETE CBC W/AUTO DIFF WBC: CPT

## 2021-08-11 PROCEDURE — 80048 BASIC METABOLIC PNL TOTAL CA: CPT

## 2021-08-13 ENCOUNTER — LAB (OUTPATIENT)
Dept: LAB | Facility: CLINIC | Age: 76
End: 2021-08-13
Attending: INTERNAL MEDICINE
Payer: COMMERCIAL

## 2021-08-13 ENCOUNTER — ANESTHESIA EVENT (OUTPATIENT)
Dept: SURGERY | Facility: AMBULATORY SURGERY CENTER | Age: 76
End: 2021-08-13
Payer: COMMERCIAL

## 2021-08-13 DIAGNOSIS — Z11.59 ENCOUNTER FOR SCREENING FOR OTHER VIRAL DISEASES: ICD-10-CM

## 2021-08-13 DIAGNOSIS — D05.12 DUCTAL CARCINOMA IN SITU (DCIS) OF LEFT BREAST: ICD-10-CM

## 2021-08-13 DIAGNOSIS — Z01.818 PREOP GENERAL PHYSICAL EXAM: ICD-10-CM

## 2021-08-13 PROCEDURE — U0003 INFECTIOUS AGENT DETECTION BY NUCLEIC ACID (DNA OR RNA); SEVERE ACUTE RESPIRATORY SYNDROME CORONAVIRUS 2 (SARS-COV-2) (CORONAVIRUS DISEASE [COVID-19]), AMPLIFIED PROBE TECHNIQUE, MAKING USE OF HIGH THROUGHPUT TECHNOLOGIES AS DESCRIBED BY CMS-2020-01-R: HCPCS

## 2021-08-13 PROCEDURE — U0005 INFEC AGEN DETEC AMPLI PROBE: HCPCS

## 2021-08-14 LAB — SARS-COV-2 RNA RESP QL NAA+PROBE: NEGATIVE

## 2021-08-16 ENCOUNTER — ANCILLARY PROCEDURE (OUTPATIENT)
Dept: MAMMOGRAPHY | Facility: CLINIC | Age: 76
End: 2021-08-16
Attending: SURGERY
Payer: COMMERCIAL

## 2021-08-16 ENCOUNTER — ANESTHESIA (OUTPATIENT)
Dept: SURGERY | Facility: AMBULATORY SURGERY CENTER | Age: 76
End: 2021-08-16
Payer: COMMERCIAL

## 2021-08-16 ENCOUNTER — HOSPITAL ENCOUNTER (OUTPATIENT)
Facility: AMBULATORY SURGERY CENTER | Age: 76
End: 2021-08-16
Attending: SURGERY | Admitting: SURGERY
Payer: COMMERCIAL

## 2021-08-16 VITALS
TEMPERATURE: 96.9 F | OXYGEN SATURATION: 97 % | HEART RATE: 43 BPM | DIASTOLIC BLOOD PRESSURE: 72 MMHG | SYSTOLIC BLOOD PRESSURE: 131 MMHG | RESPIRATION RATE: 12 BRPM

## 2021-08-16 DIAGNOSIS — D05.12 DUCTAL CARCINOMA IN SITU (DCIS) OF LEFT BREAST: ICD-10-CM

## 2021-08-16 DIAGNOSIS — D05.12 DUCTAL CARCINOMA IN SITU (DCIS) OF LEFT BREAST: Primary | ICD-10-CM

## 2021-08-16 PROCEDURE — 19301 PARTIAL MASTECTOMY: CPT | Mod: 79 | Performed by: SURGERY

## 2021-08-16 PROCEDURE — 19281 PERQ DEVICE BREAST 1ST IMAG: CPT | Mod: LT | Performed by: RADIOLOGY

## 2021-08-16 PROCEDURE — 76098 X-RAY EXAM SURGICAL SPECIMEN: CPT | Performed by: RADIOLOGY

## 2021-08-16 PROCEDURE — G8916 PT W IV AB GIVEN ON TIME: HCPCS

## 2021-08-16 PROCEDURE — 19301 PARTIAL MASTECTOMY: CPT | Mod: LT

## 2021-08-16 PROCEDURE — G8907 PT DOC NO EVENTS ON DISCHARG: HCPCS

## 2021-08-16 RX ORDER — FENTANYL CITRATE 50 UG/ML
25 INJECTION, SOLUTION INTRAMUSCULAR; INTRAVENOUS EVERY 5 MIN PRN
Status: DISCONTINUED | OUTPATIENT
Start: 2021-08-16 | End: 2021-08-17 | Stop reason: HOSPADM

## 2021-08-16 RX ORDER — ACETAMINOPHEN 325 MG/1
975 TABLET ORAL ONCE
Status: COMPLETED | OUTPATIENT
Start: 2021-08-16 | End: 2021-08-16

## 2021-08-16 RX ORDER — ALBUTEROL SULFATE 0.83 MG/ML
2.5 SOLUTION RESPIRATORY (INHALATION) EVERY 4 HOURS PRN
Status: DISCONTINUED | OUTPATIENT
Start: 2021-08-16 | End: 2021-08-17 | Stop reason: HOSPADM

## 2021-08-16 RX ORDER — HYDRALAZINE HYDROCHLORIDE 20 MG/ML
2.5-5 INJECTION INTRAMUSCULAR; INTRAVENOUS EVERY 10 MIN PRN
Status: DISCONTINUED | OUTPATIENT
Start: 2021-08-16 | End: 2021-08-17 | Stop reason: HOSPADM

## 2021-08-16 RX ORDER — CEFAZOLIN SODIUM 2 G/100ML
2 INJECTION, SOLUTION INTRAVENOUS SEE ADMIN INSTRUCTIONS
Status: DISCONTINUED | OUTPATIENT
Start: 2021-08-16 | End: 2021-08-17 | Stop reason: HOSPADM

## 2021-08-16 RX ORDER — FENTANYL CITRATE 50 UG/ML
25 INJECTION, SOLUTION INTRAMUSCULAR; INTRAVENOUS
Status: DISCONTINUED | OUTPATIENT
Start: 2021-08-16 | End: 2021-08-17 | Stop reason: HOSPADM

## 2021-08-16 RX ORDER — SODIUM CHLORIDE, SODIUM LACTATE, POTASSIUM CHLORIDE, CALCIUM CHLORIDE 600; 310; 30; 20 MG/100ML; MG/100ML; MG/100ML; MG/100ML
INJECTION, SOLUTION INTRAVENOUS CONTINUOUS
Status: DISCONTINUED | OUTPATIENT
Start: 2021-08-16 | End: 2021-08-17 | Stop reason: HOSPADM

## 2021-08-16 RX ORDER — LIDOCAINE 40 MG/G
CREAM TOPICAL
Status: DISCONTINUED | OUTPATIENT
Start: 2021-08-16 | End: 2021-08-17 | Stop reason: HOSPADM

## 2021-08-16 RX ORDER — BUPIVACAINE HYDROCHLORIDE 2.5 MG/ML
INJECTION, SOLUTION INFILTRATION; PERINEURAL PRN
Status: DISCONTINUED | OUTPATIENT
Start: 2021-08-16 | End: 2021-08-16 | Stop reason: HOSPADM

## 2021-08-16 RX ORDER — CEFAZOLIN SODIUM 2 G/100ML
2 INJECTION, SOLUTION INTRAVENOUS
Status: COMPLETED | OUTPATIENT
Start: 2021-08-16 | End: 2021-08-16

## 2021-08-16 RX ORDER — METOPROLOL TARTRATE 1 MG/ML
1-2 INJECTION, SOLUTION INTRAVENOUS EVERY 5 MIN PRN
Status: DISCONTINUED | OUTPATIENT
Start: 2021-08-16 | End: 2021-08-17 | Stop reason: HOSPADM

## 2021-08-16 RX ORDER — ONDANSETRON 4 MG/1
4 TABLET, ORALLY DISINTEGRATING ORAL EVERY 30 MIN PRN
Status: DISCONTINUED | OUTPATIENT
Start: 2021-08-16 | End: 2021-08-17 | Stop reason: HOSPADM

## 2021-08-16 RX ORDER — LIDOCAINE HYDROCHLORIDE 20 MG/ML
INJECTION, SOLUTION INFILTRATION; PERINEURAL PRN
Status: DISCONTINUED | OUTPATIENT
Start: 2021-08-16 | End: 2021-08-16

## 2021-08-16 RX ORDER — OXYCODONE HYDROCHLORIDE 5 MG/1
5 TABLET ORAL EVERY 4 HOURS PRN
Status: DISCONTINUED | OUTPATIENT
Start: 2021-08-16 | End: 2021-08-17 | Stop reason: HOSPADM

## 2021-08-16 RX ORDER — PROPOFOL 10 MG/ML
INJECTION, EMULSION INTRAVENOUS CONTINUOUS PRN
Status: DISCONTINUED | OUTPATIENT
Start: 2021-08-16 | End: 2021-08-16

## 2021-08-16 RX ORDER — MEPERIDINE HYDROCHLORIDE 25 MG/ML
12.5 INJECTION INTRAMUSCULAR; INTRAVENOUS; SUBCUTANEOUS
Status: DISCONTINUED | OUTPATIENT
Start: 2021-08-16 | End: 2021-08-17 | Stop reason: HOSPADM

## 2021-08-16 RX ORDER — PROPOFOL 10 MG/ML
INJECTION, EMULSION INTRAVENOUS PRN
Status: DISCONTINUED | OUTPATIENT
Start: 2021-08-16 | End: 2021-08-16

## 2021-08-16 RX ORDER — ACETAMINOPHEN 325 MG/1
975 TABLET ORAL ONCE
Status: DISCONTINUED | OUTPATIENT
Start: 2021-08-16 | End: 2021-08-16

## 2021-08-16 RX ORDER — ONDANSETRON 2 MG/ML
4 INJECTION INTRAMUSCULAR; INTRAVENOUS EVERY 30 MIN PRN
Status: DISCONTINUED | OUTPATIENT
Start: 2021-08-16 | End: 2021-08-17 | Stop reason: HOSPADM

## 2021-08-16 RX ADMIN — ACETAMINOPHEN 975 MG: 325 TABLET ORAL at 07:21

## 2021-08-16 RX ADMIN — Medication 20 MG: at 09:20

## 2021-08-16 RX ADMIN — PROPOFOL 50 MG: 10 INJECTION, EMULSION INTRAVENOUS at 10:18

## 2021-08-16 RX ADMIN — LIDOCAINE HYDROCHLORIDE 60 MG: 20 INJECTION, SOLUTION INFILTRATION; PERINEURAL at 09:20

## 2021-08-16 RX ADMIN — CEFAZOLIN SODIUM 2 G: 2 INJECTION, SOLUTION INTRAVENOUS at 09:11

## 2021-08-16 RX ADMIN — PROPOFOL 100 MCG/KG/MIN: 10 INJECTION, EMULSION INTRAVENOUS at 09:20

## 2021-08-16 RX ADMIN — PROPOFOL 50 MG: 10 INJECTION, EMULSION INTRAVENOUS at 09:20

## 2021-08-16 RX ADMIN — SODIUM CHLORIDE, SODIUM LACTATE, POTASSIUM CHLORIDE, CALCIUM CHLORIDE: 600; 310; 30; 20 INJECTION, SOLUTION INTRAVENOUS at 08:40

## 2021-08-16 ASSESSMENT — ENCOUNTER SYMPTOMS: DYSRHYTHMIAS: 1

## 2021-08-16 NOTE — ANESTHESIA CARE TRANSFER NOTE
"  Patient: Marilia Bean    Procedure(s):  LEFT Wire-Localized Segmental Mastectomy (=\"Lumpectomy\")    Diagnosis: Ductal carcinoma in situ (DCIS) of left breast [D05.12]  Diagnosis Additional Information: No value filed.    Anesthesia Type:   No value filed.     Note:    Oropharynx: oropharynx clear of all foreign objects  Level of Consciousness: drowsy  Oxygen Supplementation: room air    Independent Airway: airway patency satisfactory and stable  Dentition: dentition unchanged  Vital Signs Stable: post-procedure vital signs reviewed and stable  Report to RN Given: handoff report given  Patient transferred to: PACU  Comments: To PACU for Phase II. Report to RN.  VSS Resp status stable.  Handoff Report: Identifed the Patient, Identified the Reponsible Provider, Reviewed the pertinent medical history, Discussed the surgical course, Reviewed Intra-OP anesthesia mangement and issues during anesthesia, Set expectations for post-procedure period and Allowed opportunity for questions and acknowledgement of understanding      Vitals:  Vitals Value Taken Time   BP     Temp     Pulse     Resp     SpO2         Electronically Signed By: CECILIA Almonte CRNA  August 16, 2021  10:30 AM  "

## 2021-08-16 NOTE — DISCHARGE INSTRUCTIONS
[facility]  Same-Day Surgery   Adult Discharge Orders & Instructions   For 24 hours after surgery  1. Get plenty of rest.  A responsible adult must stay with you for at least 24 hours after you leave the hospital.   2. Do not drive or use heavy equipment.  If you have weakness or tingling, don't drive or use heavy equipment until this feeling goes away.  3. Do not drink alcohol.  4. Avoid strenuous or risky activities.  Ask for help when climbing stairs.   5. You may feel lightheaded.  IF so, sit for a few minutes before standing.  Have someone help you get up.   6. If you have nausea (feel sick to your stomach): Drink only clear liquids such as apple juice, ginger ale, broth or 7-Up.  Rest may also help.  Be sure to drink enough fluids.  Move to a regular diet as you feel able.  7. You may have a slight fever. Call the doctor if your fever is over 100 F (37.7 C) (taken under the tongue) or lasts longer than 24 hours.  8. You may have a dry mouth, a sore throat, muscle aches or trouble sleeping.  These should go away after 24 hours.  9. Do not make important or legal decisions.   Call your doctor for any of the followin.  Signs of infection (fever, growing tenderness at the surgery site, a large amount of drainage or bleeding, severe pain, foul-smelling drainage, redness, swelling).    2. It has been over 8 to 10 hours since surgery and you are still not able to urinate (pass water).    3.  Headache for over 24 hours.    4.  Numbness, tingling or weakness the day after surgery (if you had spinal anesthesia).

## 2021-08-16 NOTE — ANESTHESIA POSTPROCEDURE EVALUATION
"Patient: Marilia Bean    Procedure(s):  LEFT Wire-Localized Segmental Mastectomy (=\"Lumpectomy\")    Diagnosis:Ductal carcinoma in situ (DCIS) of left breast [D05.12]  Diagnosis Additional Information: No value filed.    Anesthesia Type:  No value filed.    Note:  Disposition: Outpatient   Postop Pain Control: Uneventful            Sign Out: Well controlled pain   PONV: No   Neuro/Psych: Uneventful            Sign Out: Acceptable/Baseline neuro status   Airway/Respiratory: Uneventful            Sign Out: Acceptable/Baseline resp. status   CV/Hemodynamics: Uneventful            Sign Out: Acceptable CV status   Other NRE: NONE   DID A NON-ROUTINE EVENT OCCUR? No           Last vitals:  Vitals Value Taken Time   /69 08/16/21 1100   Temp 36.1  C (96.9  F) 08/16/21 1045   Pulse 45 08/16/21 1100   Resp 12 08/16/21 1045   SpO2 98 % 08/16/21 1102   Vitals shown include unvalidated device data.    Electronically Signed By: Moises Pearce MD  August 16, 2021  4:36 PM  "

## 2021-08-16 NOTE — OP NOTE
DATE OF SURGERY: August 16, 2021     SURGEON: Bertha Toro MD  ASSISTANT(S): None    PREOPERATIVE DIAGNOSIS: Ductal carcinoma in situ, left breast 9:00  POSTOPERATIVE DIAGNOSIS: same    PROCEDURE(S): Left wire-localized segmental mastectomy    ANESTHESIA: MAC + local    CLINICAL NOTE:  Marilia Bean is a 76 year old woman with ductal carcinoma in situ of the left breast.  The risks and benefits of wire-localized segmental mastectomy were discussed with the patient and she elected to proceed with informed consent.    OPERATIVE FINDINGS:  1. Specimen radiograph confirmed presence of the wire and biopsy clip within the specimen.  During dissection, I felt the superior margin was a bit close, thus an additional superior margin was removed.    OPERATIVE PROCEDURE:  The patient first presented to the [facility] for the wire-localization by the radiologist.  The wire-localization images were personally reviewed by me prior to the start of the procedure.   The patient was brought to the operating room and placed in the supine position with appropriate padding for all of the pressure points. MAC was provided by the Anesthesia team.   A surgical safety checklist was performed to confirm the patient's identity, the site and laterality of the procedure. Perioperative antibiotics (Ancef) was provided.  VTE prophylaxis was provided with serial compression devices. The left breast was then prepped and draped in the usual sterile fashion using Chloraprep.     A radial incision was made in the 9:00 position of the left breast.  Superficial skin flaps were raised.  The breast tissue denoted by the localizing wire was dissected out, with careful attention paid to resect this area with a grossly normal margin of surrounding breast tissue.  The dissection was carried out down to the pectoralis major muscle, taking its fascia en bloc with the specimen.  The specimen was inked and radiographed.  It was found to contain the biopsy clip  and wire.  The specimen was then sent to pathology.  During the dissection, I felt the superior margin was a bit close. Thus, an additional superior margin was taken, inked, and sent to pathology. The wound was irrigated and hemostasis was achieved.  Hemoclips were placed to rossi the edges of the cavity: medial, inferior, lateral, superior, and posterior (deep).  Deep 3-0 vicryl sutures were placed to reapproximate the breast tissue to avoid a divot. A total of 35 mL of 0.25% marcaine was infiltrated into the surgical site throughout the procedure. The incision was closed in two layers with interrupted 3-0 vicryl and a running subcuticular 4-0 monocryl.  Steristrips and dressings were applied.  The patient tolerated the procedure well. The sponge, needle, instrument counts were correct.  The patient was then awakened and taken to recovery in stable fashion.     I was present and scrubbed for the entire above procedure.    EBL: 2 mL    SPECIMEN(S):  A. Left breast mass  B. Left breast, new superior margin    POSTOPERATIVE PLANS:  Marilia Bean will be discharged home today with wound care instructions and no prescription for analgesics.  She will follow-up with me in 2 weeks.     Bertha Toro MD MSc [facility] FACS    Division of Surgical Oncology  [facility]

## 2021-08-16 NOTE — ANESTHESIA PREPROCEDURE EVALUATION
"Anesthesia Pre-Procedure Evaluation    Patient: Marilia Bean   MRN: [id number] : 1945        Preoperative Diagnosis: Ductal carcinoma in situ (DCIS) of left breast [D05.12]   Procedure : Procedure(s):  LEFT Wire-Localized Segmental Mastectomy (=\"Lumpectomy\")     Past Medical History:   Diagnosis Date     Aortic aneurysm (H) 2007    see  [place] report -follow yearly, treat high BP is occurs Problem list name updated by automated process. Provider to review     Aortic aneurysm of unspecified site without mention of rupture 2007    4.4 cm ascending aorta noted in      Asymptomatic postmenopausal status (age-related) (natural)     on HRT  Prempro -weaning off      CAD (coronary artery disease)     LAD     Family history of Gardiner syndrome      Hyperlipidemia LDL goal <100 10/31/2010     Hypertension goal BP (blood pressure) < 140/90 2016     Leiomyoma of uterus, unspecified     Uterine fibroid     Lump or mass in breast 2004    rt. nodule - bx neg     Gardiner syndrome      Personal history of colonic polyps      Postmenopausal atrophic vaginitis 2006     Pulmonary nodule     incidental noted in  during [place]     Pure hypercholesterolemia     mild, diet - low cholesterol, low fat.10/06 start Lovastatin      Past Surgical History:   Procedure Laterality Date     BYPASS GRAFT ARTERY CORONARY N/A 2017    Procedure: BYPASS GRAFT ARTERY CORONARY;;  Surgeon: Akshat Soto MD;  Location: [facility]     C DEXA INTERPRETATION, AXIAL  01    wnl. 2005 wnl but lower     COLONOSCOPY  07    Repeat in 1 year for surveillance     COLONOSCOPY  12/10/08    repeat 1 year  -see 2009 letter     COLONOSCOPY  03/31/10     COLONOSCOPY  10/31/2011    Procedure:COMBINED COLONOSCOPY, SINGLE BIOPSY/POLYPECTOMY BY BIOPSY; colonoscopy with polypectomy by biopsy; Surgeon:JESSICA GARCIA; Location: [facility]     COLONOSCOPY  2013    Procedure: COMBINED COLONOSCOPY, SINGLE " BIOPSY/POLYPECTOMY BY BIOPSY;  Colonoscopy, Polypectomies;  Surgeon: Herbert Salinas MD;  Location: [facility]     COLONOSCOPY N/A 12/8/2015    Procedure: COLONOSCOPY;  Surgeon: Jared Carbajal MD;  Location[facility]     COLONOSCOPY N/A 4/26/2017    Procedure: COMBINED COLONOSCOPY, SINGLE OR MULTIPLE BIOPSY/POLYPECTOMY BY BIOPSY;  Colonoscopy with polypectomies with forceps and snare;  Surgeon: Herbert Salinas MD;  Location[facility]     COLONOSCOPY N/A 5/10/2021    Procedure: Colonoscopy, With Polypectomy And Biopsy;  Surgeon: Franklyn Hernandez MD;  Location: [facility]     COLONOSCOPY WITH CO2 INSUFFLATION N/A 5/10/2021    Procedure: COLONOSCOPY, WITH CO2 INSUFFLATION;  Surgeon: Franklyn Hernandez MD;  [facility]     COMBINED ESOPHAGOSCOPY, GASTROSCOPY, DUODENOSCOPY (EGD) WITH CO2 INSUFFLATION N/A 5/10/2021    Procedure: ESOPHAGOGASTRODUODENOSCOPY, WITH CO2 INSUFFLATION;  Surgeon: Franklyn Hernandez MD;  Location: [facility]     ENDOSCOPIC INSERTION TUBE JEJUNOSTOMY N/A 8/5/2019    Procedure: Gastrojejunostomy tube placement with gastropexy;  Surgeon: Ford Mann MD;  [facility]     ESOPHAGOSCOPY, GASTROSCOPY, DUODENOSCOPY (EGD), COMBINED N/A 12/8/2015    Procedure: COMBINED ESOPHAGOSCOPY, GASTROSCOPY, DUODENOSCOPY (EGD), BIOPSY SINGLE OR MULTIPLE;  Surgeon: Jared Carbajal MD;  Location:  GI     ESOPHAGOSCOPY, GASTROSCOPY, DUODENOSCOPY (EGD), COMBINED N/A 7/31/2019    Procedure: Endoscopic ultrasound with cystoduodenostomy, stent placement x2, stent dilation, and nasojejunal tube placement;  Surgeon: Ford Mann MD;  [facility]     ESOPHAGOSCOPY, GASTROSCOPY, DUODENOSCOPY (EGD), COMBINED N/A 8/5/2019    Procedure: ESOPHAGOGASTRODUODENOSCOPY (EGD);  Surgeon: Ford Mann MD;  [facility]     ESOPHAGOSCOPY, GASTROSCOPY, DUODENOSCOPY (EGD), COMBINED N/A 8/12/2019    Procedure: ESOPHAGOGASTRODUODENOSCOPY (EGD) with GJ exchange,  duodenum stent removal.;  Surgeon: Ford Mann MD;  [facility]     ESOPHAGOSCOPY, GASTROSCOPY, DUODENOSCOPY (EGD), COMBINED N/A 5/10/2021    Procedure: Esophagogastroduodenoscopy, With Biopsy;  Surgeon: Franklyn Hernandez MD;  [facility]     HC ECP WITH CATARACT SURGERY Bilateral 2015, 2016     HC LAPAROSCOPY, SURGICAL; APPENDECTOMY  10/31/2004     HC LAPAROSCOPY, SURGICAL; CHOLECYSTECTOMY  2000    Cholecystectomy, Laparoscopic     HC REVISE MEDIAN N/CARPAL TUNNEL SURG  10/01/10    left     HYSTERECTOMY, ELVIN  12/14/09    TAHBSO, MMK     LAPAROSCOPIC ASSISTED COLECTOMY N/A 6/30/2021    Procedure: Laparoscopic subtotal colectomy, ileosigmoid anastomosis, lysis of adhesions for 150 mins, splenic flexure mobilization;  Surgeon: Akil Lay MD;  [facility]     REPAIR ANEURYSM ASCENDING AORTA N/A 6/5/2017    Procedure: REPAIR ANEURYSM ASCENDING AORTA;  Median Sternotomy, Ascending Aortic Aneurysm Repair, Coronary Artery Bypass Graft x1 on pump oxygenator;  Surgeon: Akshat Soto MD;  [facility]     REPLACE GASTROJEJUNOSTOMY TUBE, PERCUTANEOUS N/A 8/19/2019    Procedure: REPLACEMENT, GASTROJEJUNOSTOMY TUBE;  Surgeon: Robert Tafoya MD;  [facility]     RESECTION DUODENAL N/A 7/3/2019    Procedure: Resection of Distal Duodenal and proximal Jejunum;  Surgeon: Joaquin Brito MD;  [facility]     [facility] BIOPSY BREAST, PERC NEEDLE CORE, WITH IMAGING  8/17/2004    Right     [facility] COLONOSCOPY THRU STOMA W BIOPSY/CAUTERY TUMOR/POLYP/LESION  1999,2002 2004 polyp - hyperplastic - repeat 5 years ?     [facility] COLONOSCOPY W/WO BRUSH/WASH  12/12/2005    Polypectomy.  Diverticulosis-minimal. Bx adenomatous and mucosal polyps - repeat 1 year     [facility] ENDOSCOPY, SIMPLE EXAM  03/31/10      Allergies   Allergen Reactions     Contrast Dye Itching and Other (See Comments)     Tingling in mouth     Morphine Nausea     Reports that she did not vomit.       Social  History     Tobacco Use     Smoking status: Never Smoker     Smokeless tobacco: Never Used   Substance Use Topics     Alcohol use: Yes     Comment: rarely      Wt Readings from Last 1 Encounters:   07/30/21 83.5 kg (184 lb)        Anesthesia Evaluation            ROS/MED HX  ENT/Pulmonary:  - neg pulmonary ROS     Neurologic:  - neg neurologic ROS     Cardiovascular: Comment: AAA: S/p repair.  stable - neg cardiovascular ROS   (+) Dyslipidemia hypertension--CAD ---dysrhythmias, a-fib,     METS/Exercise Tolerance:     Hematologic:  - neg hematologic  ROS     Musculoskeletal:  - neg musculoskeletal ROS     GI/Hepatic:  - neg GI/hepatic ROS     Renal/Genitourinary:  - neg Renal ROS     Endo:  - neg endo ROS     Psychiatric/Substance Use:  - neg psychiatric ROS     Infectious Disease:  - neg infectious disease ROS     Malignancy:  - neg malignancy ROS (+) Malignancy, History of GI and Breast.Breast CA Active status post.  GI CA  Remission status post Surgery.        Other:  - neg other ROS          Physical Exam    Airway  airway exam normal      Mallampati: II   TM distance: > 3 FB   Neck ROM: full   Mouth opening: > 3 cm    Respiratory Devices and Support         Dental  no notable dental history         Cardiovascular          Rhythm and rate: regular and normal     Pulmonary   pulmonary exam normal        breath sounds clear to auscultation           OUTSIDE LABS:  CBC:   Lab Results   Component Value Date    WBC 6.5 08/11/2021    WBC 9.4 07/02/2021    HGB 12.7 08/11/2021    HGB 11.8 07/02/2021    HCT 38.6 08/11/2021    HCT 36.9 07/02/2021     08/11/2021     07/02/2021     BMP:   Lab Results   Component Value Date     08/11/2021     07/01/2021    POTASSIUM 4.4 08/11/2021    POTASSIUM 3.8 07/01/2021    CHLORIDE 109 08/11/2021    CHLORIDE 108 07/01/2021    CO2 26 08/11/2021    CO2 26 07/01/2021    BUN 18 08/11/2021    BUN 16 07/01/2021    CR 1.12 (H) 08/11/2021    CR 0.88 07/01/2021    GLC  106 (H) 08/11/2021     (H) 07/01/2021     COAGS:   Lab Results   Component Value Date    PTT 33 06/12/2017    INR 1.12 08/12/2019    FIBR 269 06/06/2017     POC:   Lab Results   Component Value Date     (H) 06/30/2021     HEPATIC:   Lab Results   Component Value Date    ALBUMIN 3.5 06/28/2021    PROTTOTAL 7.1 06/28/2021    ALT 20 06/28/2021    AST 21 06/28/2021    ALKPHOS 92 06/28/2021    BILITOTAL 0.4 06/28/2021     OTHER:   Lab Results   Component Value Date    PH 7.32 (L) 06/06/2017    LACT 1.1 08/11/2019    A1C 5.8 06/07/2017    TARIQ 9.4 08/11/2021    PHOS 3.8 08/12/2019    MAG 2.1 08/12/2019    LIPASE 411 (H) 07/14/2019    AMYLASE 30 07/06/2019    TSH 4.97 (H) 06/13/2017       Anesthesia Plan    ASA Status:  3   NPO Status:  NPO Appropriate    Anesthesia Type: MAC.     - Reason for MAC: immobility needed, straight local not clinically adequate   Induction: Intravenous.   Maintenance: TIVA.        Consents    Anesthesia Plan(s) and associated risks, benefits, and realistic alternatives discussed. Questions answered and patient/representative(s) expressed understanding.     - Discussed with:  Patient      - Extended Intubation/Ventilatory Support Discussed: No.      - Patient is DNR/DNI Status: No    Use of blood products discussed: No .     Postoperative Care    Pain management: IV analgesics, Oral pain medications, Multi-modal analgesia.   PONV prophylaxis: Ondansetron (or other 5HT-3), Dexamethasone or Solumedrol, Background Propofol Infusion     Comments:                Moises Pearce MD

## 2021-08-19 NOTE — TELEPHONE ENCOUNTER
Pt called regarding message below to see if pt would be interested in a Mobile jonny  regarding her breast surgery care experience and treatment. Pt states that she is not interested in a mobile jonny as she is not very technologically savvy but she would be open to a one time phone call interview. Email sent to Alfredo Ruelas with this information...Terese Hester RN

## 2021-08-25 ENCOUNTER — TELEPHONE (OUTPATIENT)
Dept: ONCOLOGY | Facility: CLINIC | Age: 76
End: 2021-08-25

## 2021-08-25 NOTE — TELEPHONE ENCOUNTER
I spoke with Marilia about scheduling an appointment with Dr BENJAMIN and her DEXA scan. She said she will do the DEXA with her PCP and she did not want to have any follow up with Dr BENJAMIN at this time. She said she would call if she needed anything.     Gabrielle

## 2021-08-26 LAB
PATH REPORT.COMMENTS IMP SPEC: NORMAL
PATH REPORT.COMMENTS IMP SPEC: NORMAL
PATH REPORT.FINAL DX SPEC: NORMAL
PATH REPORT.GROSS SPEC: NORMAL
PATH REPORT.MICROSCOPIC SPEC OTHER STN: NORMAL
PATH REPORT.RELEVANT HX SPEC: NORMAL
PATHOLOGY SYNOPTIC REPORT: NORMAL
PHOTO IMAGE: NORMAL

## 2021-08-26 PROCEDURE — 88307 TISSUE EXAM BY PATHOLOGIST: CPT | Performed by: PATHOLOGY

## 2021-08-30 ENCOUNTER — OFFICE VISIT (OUTPATIENT)
Dept: SURGERY | Facility: CLINIC | Age: 76
End: 2021-08-30
Payer: COMMERCIAL

## 2021-08-30 DIAGNOSIS — D05.12 DUCTAL CARCINOMA IN SITU (DCIS) OF LEFT BREAST: Primary | ICD-10-CM

## 2021-08-30 PROCEDURE — 99024 POSTOP FOLLOW-UP VISIT: CPT | Performed by: SURGERY

## 2021-08-30 NOTE — PROGRESS NOTES
FOLLOW-UP  Aug 30, 2021    Marilia Bean is a 76 year old female who returns for her 1st post-operative follow-up visit.    Treatment to date:  1. Neoadjuvant letrozole (6/29/2021 to ongoing)  2. Left wire-localized segmental mastectomy (8/16/2021)    HPI:    She underwent a left wire-localized segmental mastectomy on 8/16/2021.  She is currently 2 week(s) post-op.  Final surgical pathology showed 1.5 cm DCIS with uninvolved margins.    Since the procedure, she has been doing well. She denies any redness or swelling, or drainage from the incision, or fever/chills.  She has good range of motion and denies any upper extremity swelling.     Physical Exam  Constitutional:       Appearance: She is well-developed.   Pulmonary:      Effort: No respiratory distress.   Chest:      Comments: Breast incision healing well.  No seroma. No cellulitis or abscess. No breast contour abnormality or nipple-areolar complex distortion.    Skin:     General: Skin is warm and dry.       INVESTIGATIONS:    Surgical Pathology (8/16/2021):  Final Diagnosis   A. LEFT BREAST, WIRE LOCALIZED SEGMENTAL MASTECTOMY:  - DUCTAL CARCINOMA IN-SITU (DCIS), intermediate nuclear grade (grade 2), cribriform and solid type with focal central necrosis.  - DCIS is 1.5 cm in greatest dimension.  - No evidence of invasive malignancy identified.  - Margins are negative; closest margins to DCIS are medial and anterior-inferior corner at 1 mm, respectively.  - Radial scar, 3 mm in linear extent.  - Fibrocystic changes including microcysts and apocrine metaplasia.  - Calcifications associated with DCIS, benign ducts and acini.  - Biopsy site changes.  - AJCC 8th edition pathologic stage: pTis NX.  - See synoptic report.     B. LEFT BREAST, NEW SUPERIOR MARGIN, EXCISION:  - Benign breast tissue with small focus of atypical lobular hyperplasia (1 mm).  - Negative for malignancy.     ASSESSMENT:    Marilia Bean is a 76 year old female with LEFT breast DCIS, s/p  surgery.    She is healing well.  I recommended she start moisturizing and massaging the scar with Vaseline.     We reviewed the pathology today and a copy of the report was provided. No further surgery is indicated.  A referral to Radiation Oncology will be made for adjuvant radiation therapy; she is leaning away from adjuvant radiation therapy but is agreeable to a consultation.   She will follow up with Dr Knight re: endocrine therapy (she is tolerating it fairly well). She will need annual mammography.  I will defer follow up to Dr Knight and see her on an as needed basis.    All of the above was discussed with the patient and all questions were answered.     PLAN:  1. Follow up with Dr Knight  2. Radiation oncology referral - patient prefers [place] location  3. Follow up with me as needed.    Bertha Toro MD MSc [facility] FACS  Assistant Professor of Surgery  Division of Surgical Oncology  [facility]

## 2021-08-30 NOTE — LETTER
8/30/2021         RE: Marilia Bean  [address]  [facility] [address]        Dear Colleague,    Thank you for referring your patient, Marilia Bean, to the [facility]. Please see a copy of my visit note below.    FOLLOW-UP  Aug 30, 2021    Marilia Bean is a 76 year old female who returns for her 1st post-operative follow-up visit.    Treatment to date:  1. Neoadjuvant letrozole (6/29/2021 to ongoing)  2. Left wire-localized segmental mastectomy (8/16/2021)    HPI:    She underwent a left wire-localized segmental mastectomy on 8/16/2021.  She is currently 2 week(s) post-op.  Final surgical pathology showed 1.5 cm DCIS with uninvolved margins.    Since the procedure, she has been doing well. She denies any redness or swelling, or drainage from the incision, or fever/chills.  She has good range of motion and denies any upper extremity swelling.     Physical Exam  Constitutional:       Appearance: She is well-developed.   Pulmonary:      Effort: No respiratory distress.   Chest:      Comments: Breast incision healing well.  No seroma. No cellulitis or abscess. No breast contour abnormality or nipple-areolar complex distortion.    Skin:     General: Skin is warm and dry.       INVESTIGATIONS:    Surgical Pathology (8/16/2021):  Final Diagnosis   A. LEFT BREAST, WIRE LOCALIZED SEGMENTAL MASTECTOMY:  - DUCTAL CARCINOMA IN-SITU (DCIS), intermediate nuclear grade (grade 2), cribriform and solid type with focal central necrosis.  - DCIS is 1.5 cm in greatest dimension.  - No evidence of invasive malignancy identified.  - Margins are negative; closest margins to DCIS are medial and anterior-inferior corner at 1 mm, respectively.  - Radial scar, 3 mm in linear extent.  - Fibrocystic changes including microcysts and apocrine metaplasia.  - Calcifications associated with DCIS, benign ducts and acini.  - Biopsy site changes.  - AJCC 8th edition pathologic stage: pTis NX.  - See synoptic  report.     B. LEFT BREAST, NEW SUPERIOR MARGIN, EXCISION:  - Benign breast tissue with small focus of atypical lobular hyperplasia (1 mm).  - Negative for malignancy.     ASSESSMENT:    Marilia Bean is a 76 year old female with LEFT breast DCIS, s/p surgery.    She is healing well.  I recommended she start moisturizing and massaging the scar with Vaseline.     We reviewed the pathology today and a copy of the report was provided. No further surgery is indicated.  A referral to Radiation Oncology will be made for adjuvant radiation therapy; she is leaning away from adjuvant radiation therapy but is agreeable to a consultation.   She will follow up with Dr Knight re: endocrine therapy (she is tolerating it fairly well). She will need annual mammography.  I will defer follow up to Dr Knight and see her on an as needed basis.    All of the above was discussed with the patient and all questions were answered.     PLAN:  1. Follow up with Dr Knight  2. Radiation oncology referral - patient prefers [place] location  3. Follow up with me as needed.    Bertha Toro MD MSc [facility] FACS  Assistant Professor of Surgery  Division of Surgical Oncology  [facility]       Again, thank you for allowing me to participate in the care of your patient.        Sincerely,        Bertha Toro MD

## 2021-08-30 NOTE — NURSING NOTE
Marilia Bean's goals for this visit include:   Chief Complaint   Patient presents with     Surgical Followup     2 week post of, left breast lumpectomy       She requests these members of her care team be copied on today's visit information: no    PCP: Herbert Epstein    Referring Provider:  No referring provider defined for this encounter.    There were no vitals taken for this visit.    Do you need any medication refills at today's visit? No    Yasmin Grant LPN

## 2021-08-31 ENCOUNTER — TELEPHONE (OUTPATIENT)
Dept: SURGERY | Facility: CLINIC | Age: 76
End: 2021-08-31

## 2021-08-31 NOTE — TELEPHONE ENCOUNTER
Routing to  [facility] Scheduling to assist patient with scheduling a follow up with Dr Knight.    Routing to Rad Onc New Patient scheduling to assist with scheduling consult in [place].    Terese Blunt  Surgical Specialties Procedure   [employer] [place]  8/31/2021 1:26 PM

## 2021-08-31 NOTE — TELEPHONE ENCOUNTER
Dr. Toro would like patient to follow up with Dr. Knight and referral to Radiation Oncology in [place].    Yasmin Grant LPN

## 2021-09-07 RX ORDER — ANASTROZOLE 1 MG/1
1 TABLET ORAL DAILY
Qty: 90 TABLET | Refills: 0 | COMMUNITY
Start: 2021-09-07 | End: 2021-12-21

## 2021-09-08 ENCOUNTER — MYC MEDICAL ADVICE (OUTPATIENT)
Dept: INTERNAL MEDICINE | Facility: CLINIC | Age: 76
End: 2021-09-08

## 2021-09-08 DIAGNOSIS — Z20.822 COVID-19 RULED OUT: Primary | ICD-10-CM

## 2021-09-09 ENCOUNTER — MYC MEDICAL ADVICE (OUTPATIENT)
Dept: INTERNAL MEDICINE | Facility: CLINIC | Age: 76
End: 2021-09-09

## 2021-09-09 NOTE — CONFIDENTIAL NOTE
My Chart message sent to pt to call [facility]: [phone]. They deal with COVID and can help her decide if she should be tested and can give her ideas for testing sites. Dr. Epstein is NOT in clinic today...................LACEY Cota

## 2021-09-18 NOTE — PROGRESS NOTES
Radiation Oncology Consult Note  [facility]        RE: Marilia Bean : 1945   MRN: [id number] ELKE: Sep 20, 2021       OUTPATIENT VISIT NOTE       REASON FOR CONSULTATION: Adjuvant radiotherapy to the LEFT breast for DCIS  CONSULTING PROVIDER: Dr. Bertha Toro (Surgical Oncology), Dr. Knight     DIAGNOSIS: Ductal Carcinoma in situ, Nuclear Grade 2 [ER+(100%)/SD+(98%)]  STAGE: 0, TisNx    HISTORY OF PRESENT ILLNESS: Ms. Bean is a 70 postmenopausal woman with a left sided ductal carcinoma in situ diagnosed on screening mammogram after having palpated a lump.  She is now status post left wire localized mastectomy on 2021 following neoadjuvant letrozole.  She presents today to radiation oncology department for discussion of possible adjuvant radiotherapy options for breast conservation management.    Of note, the patient has a recent diagnosis of Gardiner syndrome and is status post laparoscopic subtotal colectomy with ileosigmoid anastomosis by Dr. Lay in July of this year. Previous diagnosis of duodenal adenocarcinoma.    Diagnostic mammogram on 5/15/21 demonstrated a BIRADS4 abnormality of group of  microcalcifications in the inner posterior left breast. Stereotactic biopsy demonstrated Grade 2 DCIS (ER+/SD+) with comedonecrosis. She underwent left wire localized segmental mastectomy on 2021 with Dr. Bertha Toro of Surgical Oncology.  Intraoperatively, Dr. Toro noted that the superior margin appeared a bit close and so an additional superior margin was taken.  Surgical pathology ([id number]) demonstrated 1.5 cm from the proximal incision, congruent to with cribriform and solid type with focal central necrosis, without evidence of invasive malignancy.  Closest margins were medial and anterior midline.  1 mm.  The additional superior margin demonstrated benign breast tissue with a small focus of atypical lobular hyperplasia but no malignancy.    When Ms. Bean joins us today for consultation with her   to discuss adjuvant radiotherapy as part of her breast conservation management. She recuperated well from surgery and initially had some range of motion issues in the bilateral shoulders, but thinks this is more from arthritic like issues. At the time of our consultation, she is considering against radiation therapy.  She has been initiated on adjuvant anastrozole, and she has not noted any adverse effects at this juncture.    PMH:     Aortic aneurysm    Coronary artery disease    Gardiner syndrome    Hyperlipidemia    Hypertension well-controlled leiomyoma of uterus    Colonic polyps    Atrophic vaginitis    Pulmonary nodule    PSH:    Coronary artery bypass graft    Laparoscopic assisted subtotal colectomy with ileosigmoid anastomosis, lysis of adhesions, splenic flexure mobilization    Hysterectomy with bilateral salpingo-oophorectomy    Bilateral cataract surgery    Laparoscopic appendectomy    Laparoscopic cholecystectomy    MEDICATIONS:    Anastrozole    Aspirin 81    Atorvastatin    Diphenhydramine    Losartan    Metoprolol tartrate      ALLERGY:    Contrast dye    Morphine    FAMILY HISTORY:    Mother with colon cancer    Father with colon cancer    Sister with colon cancer    Son with colon cancer at the age of 36 and diagnosis of Gardiner syndrome    Sister with breast cancer    Paternal grandmother with cancer      SOCIAL HISTORY    Never smoker    Rare alcohol use    Part-time employment at [employer] in [place], [place] with her     They have 4 kids and 3 stepchildren    ECOG PERFORMANCE STATUS: 1    HISTORY OF RADIATION: None  IMPLANTED CARDIAC DEVICE: No  PREGNANCY RISK: No, s/p ELVIN/BSO  HISTORY OF CONNECTIVE TISSUE DISEASE: No    REVIEW OF SYMPTOMS:  A full 10-point review of systems was performed as per nursing note.  Pertinent negatives and positives reviewed.    PHYSICAL EXAMINATION:    Gen: Alert, in NAD  Eyes: EOMI, sclera anicteric  HENT      Head: NC/AT  Pulm: Breathing  comfortably on room air, no audible wheezes or ronchi  CV: Well-perfused, no cyanosis  Abdominal: Soft, nondistended  Skin: Normal color and turgor  Neurologic/MSK: motor grossly intact, normal gait  Psych: Appropriate mood and affect    IMAGIN/5/21 Diagnostic Mammogram                                           IMPRESSION: BI-RADS CATEGORY: 4 - Suspicious. Indeterminate  microcalcifications inner posterior left breast. These should be  amenable to stereotactic biopsy.    PATHOLOGY:  Case: [id number]    A. LEFT BREAST, WIRE LOCALIZED SEGMENTAL MASTECTOMY:  - DUCTAL CARCINOMA IN-SITU (DCIS), intermediate nuclear grade (grade 2), cribriform and solid type with focal central necrosis.  - DCIS is 1.5 cm in greatest dimension.  - No evidence of invasive malignancy identified.  - Margins are negative; closest margins to DCIS are medial and anterior-inferior corner at 1 mm, respectively.  - Radial scar, 3 mm in linear extent.  - Fibrocystic changes including microcysts and apocrine metaplasia.  - Calcifications associated with DCIS, benign ducts and acini.  - Biopsy site changes.  - AJCC 8th edition pathologic stage: pTis NX.  - See synoptic report.     B. LEFT BREAST, NEW SUPERIOR MARGIN, EXCISION:  - Benign breast tissue with small focus of atypical lobular hyperplasia (1 mm).  - Negative for malignancy.    ASSESSMENT: Left sided Intermediate grade DCIS measuring 1.5 cm with close (1 mm) margins    PLAN: We recommend a course of hypofractionated radiotherapy to the left breast to consist of 4005 cGy in 15 fractions to the left breast followed by a 1000 cGy boost to the lumpectomy cavity with the aim of decreasing both locoregional recurrence of DCIS as well as an invasive recurrence. We discussed that her risk of recurrence is approximately 2% per year without adjuvant radiation therapy given her Grade 2 disease and close margin and that this is decreased by 50% or more with the addition of radiation therapy.     I  also discussed employing deep inspiration breath hold given that this is a left sided lesion to mitigate cardiac toxicity.     Ms. Bean inquired about what role Gardiner Syndrome had with regards to DCIS and counseling her female family members about breast cancer. I discussed with Ms. Bean that breast cancer has not been known to be associated with Gardiner Syndrome, and I also described limited retrospective analyses that have assessed whether patients with a diagnosis of Gardiner Syndrome had an increased risk of an invasive breast cancer. The data is mixed with regards to this. She also inquired if DNA mismatch repair genes are assessed in pathology for DCIS or breast cancers; we looked at the gene panel for the OncotypeDx score that is done for invasive cancers, and that DNA MMR genes are not included in this panel. I will inquire if this assay could be performed on her lumpectomy specimen.     We have encouraged Ms. Bean to think about what she would like to do moving forward, and we will plan to have a phone follow up with her in 2 weeks regarding her decision. We have encouraged her to contact us sooner if she has made a decision before this. We discussed in brief what would be involved with a CT simulation, and radiation to proceed approximately a week thereafter.     The patient was seen and discussed with staff, Dr. Lomax. Thank you for allowing us to participate in this patient's care.  Please feel free to call with any questions or concerns.       Sarahi Castellanos MD PGY3  Department of Radiation Oncology  [phone] Clinic  [phone] Pager           I was present with the resident during the visit. I discussed the case with the resident and agree with the note as documented by the resident.    Joey Lomax M.D.  Department of Radiation Oncology  [facility]

## 2021-09-20 ENCOUNTER — OFFICE VISIT (OUTPATIENT)
Dept: RADIATION THERAPY | Facility: OUTPATIENT CENTER | Age: 76
End: 2021-09-20
Payer: COMMERCIAL

## 2021-09-20 VITALS
WEIGHT: 181.6 LBS | HEART RATE: 66 BPM | DIASTOLIC BLOOD PRESSURE: 72 MMHG | BODY MASS INDEX: 28.44 KG/M2 | RESPIRATION RATE: 18 BRPM | OXYGEN SATURATION: 98 % | SYSTOLIC BLOOD PRESSURE: 142 MMHG

## 2021-09-20 DIAGNOSIS — D05.12 DUCTAL CARCINOMA IN SITU (DCIS) OF LEFT BREAST: Primary | ICD-10-CM

## 2021-09-20 ASSESSMENT — PAIN SCALES - GENERAL: PAINLEVEL: NO PAIN (0)

## 2021-09-20 NOTE — NURSING NOTE
"REASON FOR APPOINTMENT   Type of Cancer: L DCIS  Date of Symptom Onset: routine mammo screening    TREATMENT TO-DATE FOR THIS CANCER  Surgery ? Dr. Toro  8/16/21   Chemotherapy ? Dr. Fallon, pt has started anastrazole. Chemo not indicated.   Other Treatments for this Cancer ? Discussion for radiation therapy after lumpectomy    PERSONAL HISTORY OF CANCER   Previous Cancer ? Colon, duodenum - DR. Cowart surgery. Pt with Gardiner Syndrome   Prior Radiation ? no   Prior Chemotherapy ? no   Prior Hormonal Therapy ? no     RECENT IMAGING STUDIES  mammo in [facility]    REFERRALS NEEDED  None at this time    VITALS  BP (!) 142/72   Pulse 66   Resp 18   Wt 82.4 kg (181 lb 9.6 oz)   SpO2 98%   BMI 28.44 kg/m      PACEMAKER/IMPLANTED CARDIAC DEVICE no    PAIN  Denies    PSYCHOSOCIAL  Marital Status:   Patient lives in [place] with spouse , Cain.  Number of children: 4 plus 3 step  Working status: retired. 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  Do you feel safe in your home? Yes    REVIEW OF SYSTEMS  Skin: negative  Eyes: glasses  Ears/Nose/Throat: negative  Respiratory: No shortness of breath, dyspnea on exertion, cough, or hemoptysis  Cardiovascular: negative  Gastrointestinal: digestive changes s/p colorectal surgery  Genitourinary: negative  Musculoskeletal: mild arthritis  Neurologic: negative  Psychiatric: negative  Hematologic/Lymphatic/Immunologic: negative  Endocrine: negative    WOMEN ONLY  Any chance you may be pregnant: No      Radiation Oncology Patient Teaching    Current Concern: \"I am wondering if I really need radiation? \"    Person involved with teaching: Patient and   Patient asked Questions: Yes  Patient was cooperative: Yes  Patient was receptive (willing to accept information given): Yes    Education Assessment  Comprehension ability: Medium  Knowledge level: Medium  Factors affecting teaching: None    Education Materials Given  Radiation Therapy and You  Caring for Your Skin During " Radiation ...    Educational Topics Discussed  Side effects, Wound care, Activity, Nutrition, Adjustment to illness and When to call MD/RN    Response To Teaching  More review necessary    Do you have an advanced directive or living will? yes  Are you DNR/DNI? no

## 2021-09-20 NOTE — LETTER
2021         RE: Marilia Bean  [address]  [facility] [address]        Dear Colleague,    Thank you for referring your patient, Marilia Bean, to the [facility]. Please see a copy of my visit note below.    Radiation Oncology Consult Note  [facility]        RE: Marilia Bean : 1945   MRN: [id number] ELKE: Sep 20, 2021       OUTPATIENT VISIT NOTE       REASON FOR CONSULTATION: Adjuvant radiotherapy to the LEFT breast for DCIS  CONSULTING PROVIDER: Dr. Bertha Toro (Surgical Oncology), Dr. Knight     DIAGNOSIS: Ductal Carcinoma in situ, Nuclear Grade 2 [ER+(100%)/SD+(98%)]  STAGE: 0, TisNx    HISTORY OF PRESENT ILLNESS: Ms. Bean is a 70 postmenopausal woman with a left sided ductal carcinoma in situ diagnosed on screening mammogram after having palpated a lump.  She is now status post left wire localized mastectomy on 2021 following neoadjuvant letrozole.  She presents today to radiation oncology department for discussion of possible adjuvant radiotherapy options for breast conservation management.    Of note, the patient has a recent diagnosis of Gardiner syndrome and is status post laparoscopic subtotal colectomy with ileosigmoid anastomosis by Dr. Lay in July of this year. Previous diagnosis of duodenal adenocarcinoma.    Diagnostic mammogram on 5/15/21 demonstrated a BIRADS4 abnormality of group of  microcalcifications in the inner posterior left breast. Stereotactic biopsy demonstrated Grade 2 DCIS (ER+/SD+) with comedonecrosis. She underwent left wire localized segmental mastectomy on 2021 with Dr. Bertha Toro of Surgical Oncology.  Intraoperatively, Dr. Toro noted that the superior margin appeared a bit close and so an additional superior margin was taken.  Surgical pathology ([id number]) demonstrated 1.5 cm from the proximal incision, congruent to with cribriform and solid type with focal central necrosis, without evidence of invasive malignancy.  Closest  margins were medial and anterior midline.  1 mm.  The additional superior margin demonstrated benign breast tissue with a small focus of atypical lobular hyperplasia but no malignancy.    When Ms. Bean joins us today for consultation with her  to discuss adjuvant radiotherapy as part of her breast conservation management. She recuperated well from surgery and initially had some range of motion issues in the bilateral shoulders, but thinks this is more from arthritic like issues. At the time of our consultation, she is considering against radiation therapy.  She has been initiated on adjuvant anastrozole, and she has not noted any adverse effects at this juncture.    PMH:     Aortic aneurysm    Coronary artery disease    Gardiner syndrome    Hyperlipidemia    Hypertension well-controlled leiomyoma of uterus    Colonic polyps    Atrophic vaginitis    Pulmonary nodule    PSH:    Coronary artery bypass graft    Laparoscopic assisted subtotal colectomy with ileosigmoid anastomosis, lysis of adhesions, splenic flexure mobilization    Hysterectomy with bilateral salpingo-oophorectomy    Bilateral cataract surgery    Laparoscopic appendectomy    Laparoscopic cholecystectomy    MEDICATIONS:    Anastrozole    Aspirin 81    Atorvastatin    Diphenhydramine    Losartan    Metoprolol tartrate      ALLERGY:    Contrast dye    Morphine    FAMILY HISTORY:    Mother with colon cancer    Father with colon cancer    Sister with colon cancer    Son with colon cancer at the age of 36 and diagnosis of Gardiner syndrome    Sister with breast cancer    Paternal grandmother with cancer      SOCIAL HISTORY    Never smoker    Rare alcohol use    Part-time employment at [employer] in [place], [place] with her     They have 4 kids and 3 stepchildren    ECOG PERFORMANCE STATUS: 1    HISTORY OF RADIATION: None  IMPLANTED CARDIAC DEVICE: No  PREGNANCY RISK: No, s/p ELVIN/BSO  HISTORY OF CONNECTIVE TISSUE DISEASE: No    REVIEW OF  SYMPTOMS:  A full 10-point review of systems was performed as per nursing note.  Pertinent negatives and positives reviewed.    PHYSICAL EXAMINATION:    Gen: Alert, in NAD  Eyes: EOMI, sclera anicteric  HENT      Head: NC/AT  Pulm: Breathing comfortably on room air, no audible wheezes or ronchi  CV: Well-perfused, no cyanosis  Abdominal: Soft, nondistended  Skin: Normal color and turgor  Neurologic/MSK: motor grossly intact, normal gait  Psych: Appropriate mood and affect    IMAGIN/5/21 Diagnostic Mammogram                                           IMPRESSION: BI-RADS CATEGORY: 4 - Suspicious. Indeterminate  microcalcifications inner posterior left breast. These should be  amenable to stereotactic biopsy.    PATHOLOGY:  Case: [id number]    A. LEFT BREAST, WIRE LOCALIZED SEGMENTAL MASTECTOMY:  - DUCTAL CARCINOMA IN-SITU (DCIS), intermediate nuclear grade (grade 2), cribriform and solid type with focal central necrosis.  - DCIS is 1.5 cm in greatest dimension.  - No evidence of invasive malignancy identified.  - Margins are negative; closest margins to DCIS are medial and anterior-inferior corner at 1 mm, respectively.  - Radial scar, 3 mm in linear extent.  - Fibrocystic changes including microcysts and apocrine metaplasia.  - Calcifications associated with DCIS, benign ducts and acini.  - Biopsy site changes.  - AJCC 8th edition pathologic stage: pTis NX.  - See synoptic report.     B. LEFT BREAST, NEW SUPERIOR MARGIN, EXCISION:  - Benign breast tissue with small focus of atypical lobular hyperplasia (1 mm).  - Negative for malignancy.    ASSESSMENT: Left sided Intermediate grade DCIS measuring 1.5 cm with close (1 mm) margins    PLAN: We recommend a course of hypofractionated radiotherapy to the left breast to consist of 4005 cGy in 15 fractions to the left breast followed by a 1000 cGy boost to the lumpectomy cavity with the aim of decreasing both locoregional recurrence of DCIS as well as an invasive  recurrence. We discussed that her risk of recurrence is approximately 2% per year without adjuvant radiation therapy given her Grade 2 disease and close margin and that this is decreased by 50% or more with the addition of radiation therapy.     I also discussed employing deep inspiration breath hold given that this is a left sided lesion to mitigate cardiac toxicity.     Ms. Bean inquired about what role Gardiner Syndrome had with regards to DCIS and counseling her female family members about breast cancer. I discussed with Ms. Bean that breast cancer has not been known to be associated with Gardiner Syndrome, and I also described limited retrospective analyses that have assessed whether patients with a diagnosis of Gardiner Syndrome had an increased risk of an invasive breast cancer. The data is mixed with regards to this. She also inquired if DNA mismatch repair genes are assessed in pathology for DCIS or breast cancers; we looked at the gene panel for the OncotypeDx score that is done for invasive cancers, and that DNA MMR genes are not included in this panel. I will inquire if this assay could be performed on her lumpectomy specimen.     We have encouraged Ms. Bean to think about what she would like to do moving forward, and we will plan to have a phone follow up with her in 2 weeks regarding her decision. We have encouraged her to contact us sooner if she has made a decision before this. We discussed in brief what would be involved with a CT simulation, and radiation to proceed approximately a week thereafter.     The patient was seen and discussed with staff, Dr. Lomax. Thank you for allowing us to participate in this patient's care.  Please feel free to call with any questions or concerns.       Sarahi Castellanos MD PGY3  Department of Radiation Oncology  [phone] Clinic  [phone] Pager           I was present with the resident during the visit. I discussed the case with the resident and agree with the note  as documented by the resident.    Joey Lomax M.D.  Department of Radiation Oncology  [facility]

## 2021-09-30 ENCOUNTER — TELEPHONE (OUTPATIENT)
Dept: RADIATION THERAPY | Facility: OUTPATIENT CENTER | Age: 76
End: 2021-09-30

## 2021-09-30 NOTE — TELEPHONE ENCOUNTER
Call received from Marilia. At this time she has decided she will not pursue radiation therapy and cancelled the follow up visit next week.    Dr. Lomax updated and he confirmed that it was an option discussed during patient consult that it would be reasonable to forgo radiation. He is in agreement with patient request to cancel further visits in this clinic.  Pt will continue to follow with referring medical oncology team.

## 2021-10-23 ENCOUNTER — HEALTH MAINTENANCE LETTER (OUTPATIENT)
Age: 76
End: 2021-10-23

## 2021-11-29 ENCOUNTER — OFFICE VISIT (OUTPATIENT)
Dept: INTERNAL MEDICINE | Facility: CLINIC | Age: 76
End: 2021-11-29
Payer: COMMERCIAL

## 2021-11-29 ENCOUNTER — HOSPITAL ENCOUNTER (OUTPATIENT)
Dept: GENERAL RADIOLOGY | Facility: CLINIC | Age: 76
Discharge: HOME | End: 2021-11-29
Attending: INTERNAL MEDICINE | Admitting: INTERNAL MEDICINE
Payer: MEDICARE

## 2021-11-29 VITALS
BODY MASS INDEX: 29.04 KG/M2 | OXYGEN SATURATION: 95 % | WEIGHT: 185.44 LBS | TEMPERATURE: 97.5 F | DIASTOLIC BLOOD PRESSURE: 86 MMHG | RESPIRATION RATE: 20 BRPM | HEART RATE: 85 BPM | SYSTOLIC BLOOD PRESSURE: 132 MMHG

## 2021-11-29 DIAGNOSIS — Z86.79 STATUS POST THORACIC AORTIC ANEURYSM REPAIR: ICD-10-CM

## 2021-11-29 DIAGNOSIS — R05.9 COUGH: ICD-10-CM

## 2021-11-29 DIAGNOSIS — R05.9 COUGH: Primary | ICD-10-CM

## 2021-11-29 DIAGNOSIS — Z98.890 STATUS POST THORACIC AORTIC ANEURYSM REPAIR: ICD-10-CM

## 2021-11-29 DIAGNOSIS — Z15.09 LYNCH SYNDROME: ICD-10-CM

## 2021-11-29 DIAGNOSIS — D05.12 DUCTAL CARCINOMA IN SITU (DCIS) OF LEFT BREAST: ICD-10-CM

## 2021-11-29 DIAGNOSIS — E78.5 HYPERLIPIDEMIA LDL GOAL <100: ICD-10-CM

## 2021-11-29 LAB
ALBUMIN SERPL-MCNC: 3.6 G/DL (ref 3.4–5)
ALP SERPL-CCNC: 90 U/L (ref 40–150)
ALT SERPL W P-5'-P-CCNC: 24 U/L (ref 0–50)
ANION GAP SERPL CALCULATED.3IONS-SCNC: 4 MMOL/L (ref 3–14)
AST SERPL W P-5'-P-CCNC: 20 U/L (ref 0–45)
BILIRUB SERPL-MCNC: 0.8 MG/DL (ref 0.2–1.3)
BUN SERPL-MCNC: 21 MG/DL (ref 7–30)
CALCIUM SERPL-MCNC: 9.1 MG/DL (ref 8.5–10.1)
CHLORIDE BLD-SCNC: 108 MMOL/L (ref 94–109)
CO2 SERPL-SCNC: 28 MMOL/L (ref 20–32)
CREAT SERPL-MCNC: 0.85 MG/DL (ref 0.52–1.04)
GFR SERPL CREATININE-BSD FRML MDRD: 67 ML/MIN/1.73M2
GLUCOSE BLD-MCNC: 104 MG/DL (ref 70–99)
POTASSIUM BLD-SCNC: 4.2 MMOL/L (ref 3.4–5.3)
PROT SERPL-MCNC: 7.3 G/DL (ref 6.8–8.8)
SODIUM SERPL-SCNC: 140 MMOL/L (ref 133–144)

## 2021-11-29 PROCEDURE — 80053 COMPREHEN METABOLIC PANEL: CPT | Performed by: INTERNAL MEDICINE

## 2021-11-29 PROCEDURE — 71046 X-RAY EXAM CHEST 2 VIEWS: CPT

## 2021-11-29 PROCEDURE — 36415 COLL VENOUS BLD VENIPUNCTURE: CPT | Performed by: INTERNAL MEDICINE

## 2021-11-29 PROCEDURE — 99214 OFFICE O/P EST MOD 30 MIN: CPT | Performed by: INTERNAL MEDICINE

## 2021-11-29 RX ORDER — ATORVASTATIN CALCIUM 20 MG/1
20 TABLET, FILM COATED ORAL EVERY OTHER DAY
Qty: 90 TABLET | Refills: 3 | COMMUNITY
Start: 2021-11-29 | End: 2022-05-05

## 2021-11-29 ASSESSMENT — PAIN SCALES - GENERAL: PAINLEVEL: NO PAIN (0)

## 2021-11-29 NOTE — PROGRESS NOTES
"  Assessment & Plan     Cough  Cough for no clear reason she has been in the more urgent care had steroids and a Z-Brad got slightly better but continues to have a productive cough. Worse laying down. This did not appear to be orthopnea or CHF she has had stable weights. No other fluid seen. Could be related to her ARB, losartan. We will also check a chest x-ray today. Could be related to her anastrozole is at least a 10% chance of a cough.  - XR Chest 2 Views; Future    Hyperlipidemia LDL goal <100  Hyperlipidemia patient has cardiac disease with a thoracic aneurysm repair she is having some muscle aches on atorvastatin she will reduce this to every other day but try getting back on it. If that gives her problems she will have to switch over to Crestor.  - atorvastatin (LIPITOR) 20 MG tablet; Take 1 tablet (20 mg) by mouth every other day    Ductal carcinoma in situ (DCIS) of left breast  Ductal carcinoma in situ she did have surgery and this was removed in August. She was recommended to be on anastrozole to lower her risk. She feels this is causing her cough and there is a 10% chance of a cough will refer to oncology to discuss getting off the anastrozole and other plan for evaluation.  - Oncology/Hematology Adult Referral; Future  - Comprehensive metabolic panel (BMP + Alb, Alk Phos, ALT, AST, Total. Bili, TP); Future    Gardiner syndrome  Syndrome is chronic issue with her but makes her high risk for cancer.    Status post thoracic aortic aneurysm repair  Thoracic aortic aneurysm repair we will monitor back on her statin therapy.               BMI:   Estimated body mass index is 29.04 kg/m  as calculated from the following:    Height as of 7/23/21: 1.702 m (5' 7\").    Weight as of this encounter: 84.1 kg (185 lb 7 oz).           No follow-ups on file.    Herbert Epstein MD  [facility]    Christa Capellan is a 76 year old who presents for the following health issues     HPI     Patient " states she stopped taking her atorvastatin medication. Stopped due to muscle cramps.       Also wondering if the Anastrozole is causing on going cough for months.     Gets bouts of coughing, was in [place] in September.  Prednisone and zpak helped her get better but it came back. Coughs in the morning and some when laying down to fall asleep, can't lay down. No sob.  Gets phlegm and rattle in her chest.    Some sinus drainage.  Coughing started this fall.     Knee was bothering her and that cleared up, no known injury. Walking would loosen it up and that is now better.     Past Medical History:   Diagnosis Date     Aortic aneurysm (H) 07/01/2007    see 7/07 [place] report -follow yearly, treat high BP is occurs Problem list name updated by automated process. Provider to review     Aortic aneurysm of unspecified site without mention of rupture 07/2007    4.4 cm ascending aorta noted in 2007     Asymptomatic postmenopausal status (age-related) (natural)     on HRT  Prempro -weaning off 5/'2004     CAD (coronary artery disease)     LAD     Family history of Gardiner syndrome      Hyperlipidemia LDL goal <100 10/31/2010     Hypertension goal BP (blood pressure) < 140/90 02/09/2016     Leiomyoma of uterus, unspecified     Uterine fibroid     Lump or mass in breast 07/2004    rt. nodule - bx neg     Gardiner syndrome      Personal history of colonic polyps      Postmenopausal atrophic vaginitis 08/20/2006     Pulmonary nodule     incidental noted in 2007 during [place]     Pure hypercholesterolemia     mild, diet - low cholesterol, low fat.10/06 start Lovastatin     Current Outpatient Medications   Medication     anastrozole (ARIMIDEX) 1 MG tablet     aspirin 81 MG EC tablet     diphenhydrAMINE (BENADRYL) 12.5 MG/5ML solution     losartan (COZAAR) 50 MG tablet     metoprolol tartrate (LOPRESSOR) 25 MG tablet     atorvastatin (LIPITOR) 20 MG tablet     No current facility-administered medications for this visit.     Social History      Tobacco Use     Smoking status: Never Smoker     Smokeless tobacco: Never Used   Vaping Use     Vaping Use: Never used   Substance Use Topics     Alcohol use: Yes     Comment: rarely     Drug use: No       Review of Systems         Objective    /86   Pulse 85   Temp 97.5  F (36.4  C) (Temporal)   Resp 20   Wt 84.1 kg (185 lb 7 oz)   SpO2 95%   Breastfeeding No   BMI 29.04 kg/m    Body mass index is 29.04 kg/m .  Physical Exam   No acute distress  Heart is regular rate and rhythm without murmurs  Lungs are clear bilaterally throughout  Extremities without edema.

## 2021-12-08 ENCOUNTER — TELEPHONE (OUTPATIENT)
Dept: ONCOLOGY | Facility: CLINIC | Age: 76
End: 2021-12-08
Payer: COMMERCIAL

## 2021-12-08 NOTE — TELEPHONE ENCOUNTER
Spoke with pt to reschedule her oncology appt. She brought up to me that since starting the arimidex she has had a bad cough (productive) while lying down. She let her pcp, Herbert Epstein know this and he told her to stop the Losartan. She has since done this and it hasn't helped. I did talk with Sharlene Danelle, ( I am working with her today), and she looked it up and stated there is a 8-11% chance of this happening. Pt is wondering if she should stop the medication?  Please advise. Thank you!    Ana CONDE [facility]   [facility]  [phone]     none

## 2021-12-16 NOTE — TELEPHONE ENCOUNTER
Call placed to patient to communicate that she should stop taking the Arimidex per Dr. Michaels. If it is the Arimidex, the cough should resolve in 3-5 days. There are other drug alternatives. Reminded patient of video visit with Dr. Michaels on 12/21/21. Patient states she will be going to the [facility] to do the visit.

## 2021-12-21 ENCOUNTER — VIRTUAL VISIT (OUTPATIENT)
Dept: ONCOLOGY | Facility: CLINIC | Age: 76
End: 2021-12-21
Payer: COMMERCIAL

## 2021-12-21 VITALS
OXYGEN SATURATION: 97 % | SYSTOLIC BLOOD PRESSURE: 128 MMHG | WEIGHT: 185.6 LBS | TEMPERATURE: 98 F | HEART RATE: 72 BPM | BODY MASS INDEX: 29.07 KG/M2 | DIASTOLIC BLOOD PRESSURE: 78 MMHG

## 2021-12-21 DIAGNOSIS — C18.8 OVERLAPPING MALIGNANT NEOPLASM OF COLON (H): ICD-10-CM

## 2021-12-21 DIAGNOSIS — D05.12 DUCTAL CARCINOMA IN SITU (DCIS) OF LEFT BREAST: Primary | ICD-10-CM

## 2021-12-21 DIAGNOSIS — Z12.31 ENCOUNTER FOR SCREENING MAMMOGRAM FOR MALIGNANT NEOPLASM OF BREAST: ICD-10-CM

## 2021-12-21 DIAGNOSIS — R05.9 COUGH: ICD-10-CM

## 2021-12-21 PROCEDURE — 99214 OFFICE O/P EST MOD 30 MIN: CPT | Mod: 95 | Performed by: INTERNAL MEDICINE

## 2021-12-21 RX ORDER — LETROZOLE 2.5 MG/1
2.5 TABLET, FILM COATED ORAL DAILY
Qty: 30 TABLET | Refills: 3 | Status: SHIPPED | OUTPATIENT
Start: 2021-12-21 | End: 2022-05-20

## 2021-12-21 ASSESSMENT — PAIN SCALES - GENERAL: PAINLEVEL: NO PAIN (0)

## 2021-12-21 NOTE — LETTER
2021         RE: Marilia Bean  [address]  [facility] [address]        Dear Colleague,    Thank you for referring your patient, Marilia Bean, to the [facility] [place]. Please see a copy of my visit note below.    Health is pretty good.  No major concerns.    Cannot lay on her back.  Accuse pill.  Cough is better.    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  # Declined adjuvant radiation.  # Sep 2021 - Dec 2021 Anastrozole; poorly tolerated 2/2 cough.    Stage 1 (pT1 pN) colorectal cancer, 23 mm, mod differentiated, negative margins, high grade dysplastic tubular adenomas present, 28/28 nodes negative, no LVI, no PNI:  # 2021 Subtotal colectomy.  # Adjuvant therapy not indicated.    Regarding breast cancer, Gabrielle will trial letrozole after a 4-week break off anastrozole.  We are hopeful that her cough will completely galindo off anastrozole; the incidence of cough with anastrozole is somewhere around 10%.  There is a similar incidence with letrozole but it is worthwhile retrialing a hormone blocker given her history of DCIS.  We reviewed that the value of adjuvant hormonal therapy is to prevent future DCIS and invasive breast cancers but will have no impact on overall survival.  Another potential implication to trial hormone blockade is because she declined adjuvant radiotherapy.  If this drug is well, we will have to think longer about her bone health and will likely look at a DEXA scan in the future.  For now we agreed to mammogram in 12 months time in Aug 2022.    Regarding colorectal cancer, Marilia is doing quite well.  There is no role for adjuvant therapy.  She had a number of reassuring pathologic  features.  She remains on aspirin which may have a small chemoprophylactic benefit for colorectal cancer which I would support.  She does need a colonoscopy scheduled in June 2022 which would be her 12-month anniversary since surgery, but I do need to check if this is even necessary given her subtotal colectomy.    Sukhjinder Michaels MD    --------------------  Interval History:  Marilia returns for follow up of breast and colorectal cancer.  All in all, she describes her health is pretty good.  She has no major concerns.  She does note that she developed a cough which she attributes to the anastrozole.  The cough is better since stopping anastrozole.  The coughing was so bad she was having coughing spells often in inopportune places like [employer] and would sometimes get to the point of almost vomiting.  No respiratory symptoms at the present time.  She tested negative for Covid.  No bowel concerns.  Stable appetite and energy.    --------------------    Physical Exam:  VS: /78   Pulse 72   Temp 98  F (36.7  C) (Temporal)   Wt 84.2 kg (185 lb 9.6 oz)   SpO2 97%   BMI 29.07 kg/m    GEN: Well appearing.    Labs / Imaging / Path:  We reviewed labs and reviewed pathology reports.    Video Visit:  Marilia is a 76 year old female who is being evaluated via a billable video visit.  }    Video start time: 8:33 AM  Video end time: 8:57 AM    Provider location: [facility]  Patient location: [facility]    Mode of transmission: [employer] / [employer].        Again, thank you for allowing me to participate in the care of your patient.        Sincerely,        Sukhjinder Michaels MD

## 2021-12-21 NOTE — PATIENT INSTRUCTIONS
1) Colonoscopy June 2022  2) BJT VT August 2022 w/ mammogram prior.  3) Discontinue anastrozole; start letrozole in 4 weeks.    Sukhjinder Michaels MD.    Today:  Discontinue anastrozole. Start letrozole in 4 weeks.    Please follow up with Dr. Michaels in August 2022.  Please schedule mammogram 1-3 days prior prior to follow up appointment.    [facility]: [facility]    Office visit follow up with Dr. Michaels Date/Time: 8/18/22 @8:30[facility]       If you have any questions or concerns please feel free to call.    If you need to reschedule please call:  Clinic or Lab Appointment - [phone]  Infusion - [phone]  Imaging - [phone]    Lissa Joseph [facility]  [phone]

## 2021-12-21 NOTE — PROGRESS NOTES
Health is pretty good.  No major concerns.    Cannot lay on her back.  Accuse pill.  Cough is better.    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  # Declined adjuvant radiation.  # Sep 2021 - Dec 2021 Anastrozole; poorly tolerated 2/2 cough.    Stage 1 (pT1 pN) colorectal cancer, 23 mm, mod differentiated, negative margins, high grade dysplastic tubular adenomas present, 28/28 nodes negative, no LVI, no PNI:  # 2021 Subtotal colectomy.  # Adjuvant therapy not indicated.    Regarding breast cancer, Gabrielle will trial letrozole after a 4-week break off anastrozole.  We are hopeful that her cough will completely galindo off anastrozole; the incidence of cough with anastrozole is somewhere around 10%.  There is a similar incidence with letrozole but it is worthwhile retrialing a hormone blocker given her history of DCIS.  We reviewed that the value of adjuvant hormonal therapy is to prevent future DCIS and invasive breast cancers but will have no impact on overall survival.  Another potential implication to trial hormone blockade is because she declined adjuvant radiotherapy.  If this drug is well, we will have to think longer about her bone health and will likely look at a DEXA scan in the future.  For now we agreed to mammogram in 12 months time in Aug 2022.    Regarding colorectal cancer, Marilia is doing quite well.  There is no role for adjuvant therapy.  She had a number of reassuring pathologic features.  She remains on aspirin which may have a small chemoprophylactic benefit for colorectal cancer which I would support.  She does need a colonoscopy scheduled in 2022 which would be her 12-month anniversary since surgery, but I do need to  check if this is even necessary given her subtotal colectomy.    Sukhjinder Michaels MD    --------------------  Interval History:  Marilia returns for follow up of breast and colorectal cancer.  All in all, she describes her health is pretty good.  She has no major concerns.  She does note that she developed a cough which she attributes to the anastrozole.  The cough is better since stopping anastrozole.  The coughing was so bad she was having coughing spells often in inopportune places like [employer] and would sometimes get to the point of almost vomiting.  No respiratory symptoms at the present time.  She tested negative for Covid.  No bowel concerns.  Stable appetite and energy.    --------------------    Physical Exam:  VS: /78   Pulse 72   Temp 98  F (36.7  C) (Temporal)   Wt 84.2 kg (185 lb 9.6 oz)   SpO2 97%   BMI 29.07 kg/m    GEN: Well appearing.    Labs / Imaging / Path:  We reviewed labs and reviewed pathology reports.    Video Visit:  Marilia is a 76 year old female who is being evaluated via a billable video visit.  }    Video start time: 8:33 AM  Video end time: 8:57 AM    Provider location: [facility]  Patient location: [facility]    Mode of transmission: [email or web address] / [employer].

## 2021-12-21 NOTE — LETTER
2021         RE: Marilia Bean  [address]  [facility] [address]        Dear Colleague,    Thank you for referring your patient, Marilia Bean, to the [facility] [place]. Please see a copy of my visit note below.    Health is pretty good.  No major concerns.    Cannot lay on her back.  Accuse pill.  Cough is better.    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  # Declined adjuvant radiation.  # Sep 2021 - Dec 2021 Anastrozole; poorly tolerated 2/2 cough.    Stage 1 (pT1 pN) colorectal cancer, 23 mm, mod differentiated, negative margins, high grade dysplastic tubular adenomas present, 28/28 nodes negative, no LVI, no PNI:  # 2021 Subtotal colectomy.  # Adjuvant therapy not indicated.    Regarding breast cancer, Gabrielle will trial letrozole after a 4-week break off anastrozole.  We are hopeful that her cough will completely galindo off anastrozole; the incidence of cough with anastrozole is somewhere around 10%.  There is a similar incidence with letrozole but it is worthwhile retrialing a hormone blocker given her history of DCIS.  We reviewed that the value of adjuvant hormonal therapy is to prevent future DCIS and invasive breast cancers but will have no impact on overall survival.  Another potential implication to trial hormone blockade is because she declined adjuvant radiotherapy.  If this drug is well, we will have to think longer about her bone health and will likely look at a DEXA scan in the future.  For now we agreed to mammogram in 12 months time in Aug 2022.    Regarding colorectal cancer, Marilia is doing quite well.  There is no role for adjuvant therapy.  She had a number of reassuring pathologic  features.  She remains on aspirin which may have a small chemoprophylactic benefit for colorectal cancer which I would support.  She does need a colonoscopy scheduled in June 2022 which would be her 12-month anniversary since surgery, but I do need to check if this is even necessary given her subtotal colectomy.    Sukhjinder Michaels MD    --------------------  Interval History:  Marilia returns for follow up of breast and colorectal cancer.  All in all, she describes her health is pretty good.  She has no major concerns.  She does note that she developed a cough which she attributes to the anastrozole.  The cough is better since stopping anastrozole.  The coughing was so bad she was having coughing spells often in inopportune places like [employer] and would sometimes get to the point of almost vomiting.  No respiratory symptoms at the present time.  She tested negative for Covid.  No bowel concerns.  Stable appetite and energy.    --------------------    Physical Exam:  VS: /78   Pulse 72   Temp 98  F (36.7  C) (Temporal)   Wt 84.2 kg (185 lb 9.6 oz)   SpO2 97%   BMI 29.07 kg/m    GEN: Well appearing.    Labs / Imaging / Path:  We reviewed labs and reviewed pathology reports.    Video Visit:  Marilia is a 76 year old female who is being evaluated via a billable video visit.  }    Video start time: 8:33 AM  Video end time: 8:57 AM    Provider location: [facility]  Patient location: [facility]    Mode of transmission: [employer] / [employer] Onsite.        Again, thank you for allowing me to participate in the care of your patient.        Sincerely,        Sukhjinder Michaels MD

## 2021-12-22 ENCOUNTER — TELEPHONE (OUTPATIENT)
Dept: GASTROENTEROLOGY | Facility: CLINIC | Age: 76
End: 2021-12-22
Payer: COMMERCIAL

## 2022-02-24 PROBLEM — D05.12 DUCTAL CARCINOMA IN SITU (DCIS) OF LEFT BREAST: Status: ACTIVE | Noted: 2021-06-01

## 2022-04-19 ENCOUNTER — TELEPHONE (OUTPATIENT)
Dept: GASTROENTEROLOGY | Facility: CLINIC | Age: 77
End: 2022-04-19
Payer: COMMERCIAL

## 2022-04-19 NOTE — TELEPHONE ENCOUNTER
Screening Questions  BlueKIND OF PREP RedLOCATION [review exclusion criteria] GreenSEDATION TYPE  1. Have you had a positive covid test in the last 90 days? N     2. Do you have a legal guardian or medical Power of ?  Are you able to give consent for your medical care?N (Sedation review/consideration needed)    3. Are you active on mychart? Y    4. What insurance is in the chart? MEDICA     3.   Ordering/Referring Provider: Sukhjinder Michaels MD in  ONCOLOGY CLINIC    4. BMI 29.8 [BMI OVER 40-EXTENDED PREP]  If greater than 40 review exclusion criteria [PAC APPT IF @ [facility]]        5.  Respiratory Screening :  [If yes to any of the following HOSPITAL setting only]     Do you use daily home oxygen? N    Do you have mod to severe Obstructive Sleep Apnea? N  [OKAY @ [facility]]   Do you have Pulmonary Hypertension? N     Do you have UNCONTROLLED asthma? N        6.   Have you had a heart or lung transplant? N      7.   Are you currently on dialysis? N [ If yes, G-PREP & HOSPITAL setting only]     8.   Do you have chronic kidney disease? Y [ If yes, G-PREP ]    9.   Have you had a stroke or Transient ischemic attack (TIA - aka  mini stroke ) within 6 months?  N (If yes, please review exclusion criteria)    10.   In the past 6 months, have you had any heart related issues including cardiomyopathy or heart attack? N           If yes, did it require cardiac stenting or other implantable device? N      11.   Do you have any implantable devices in your body (pacemaker, defib, LVAD)? N (If yes, please review exclusion criteria)    12.   Do you take nitroglycerin? N           If yes, how often? N  (if yes, HOSPITAL setting ONLY)    13.   Are you currently taking any blood thinners? BABY ASPIRIN           [IF YES, INFORM PATIENT TO FOLLOW UP W/ ORDERING PROVIDER FOR BRIDGING INSTRUCTIONS]     14.   Do you have a diagnosis of diabetes? N   [ If yes, G-PREP ]    15.   [FEMALES] Are you currently pregnant?  N    If yes, how many weeks? N    16.   Are you taking any prescription pain medications on a routine schedule?  N  [ If yes, EXTENDED PREP.] [If yes, MAC]    17.   Do you have any chemical dependencies such as alcohol, street drugs, or methadone?  N [If yes, MAC]    18.   Do you have any history of post-traumatic stress syndrome, severe anxiety or history of psychosis?  N  [If yes, MAC]    19.   Do you transfer independently?  Y    20.  On a regular basis do you go 3-5 days between bowel movements? N   [ If yes, EXTENDED PREP.]    21.   Preferred LOCAL Pharmacy for Pre Prescription   [facility] [address]       Scheduling Details      Caller : Marilia Bean  (Please ask for phone number if not scheduled by patient)    Type of Procedure Scheduled: COLONOSCOPY  Which Colonoscopy Prep was Sent?: Juan Alberto MAYERS CF PATIENTS & GROEN'S PATIENTS NEEDS EXTENDED PREP  Surgeon: DR. ROWAN  Date of Procedure: 05/26/2022  Location:        Sedation Type: MAC  Conscious Sedation- Needs  for 6 hours after the procedure  MAC/General-Needs  for 24 hours after procedure    Pre-op Required at [facility] [place], [place] and OR for MAC sedation: N  (advise patient they will need a pre-op prior to procedure -)      Informed patient they will need an adult  Y  Cannot take any type of public or medical transportation alone    Pre[facility]Pro[facility] or Externally: SCHEDULED     Confirmed Nurse will call to complete assessment Y    Additional comments: N

## 2022-05-01 DIAGNOSIS — Z11.59 ENCOUNTER FOR SCREENING FOR OTHER VIRAL DISEASES: Primary | ICD-10-CM

## 2022-05-03 DIAGNOSIS — Z95.1 S/P CABG (CORONARY ARTERY BYPASS GRAFT): ICD-10-CM

## 2022-05-03 DIAGNOSIS — Z98.890 S/P AORTIC ANEURYSM REPAIR: ICD-10-CM

## 2022-05-03 DIAGNOSIS — Z86.79 S/P AORTIC ANEURYSM REPAIR: ICD-10-CM

## 2022-05-03 DIAGNOSIS — E78.5 HYPERLIPIDEMIA LDL GOAL <100: ICD-10-CM

## 2022-05-05 RX ORDER — LOSARTAN POTASSIUM 25 MG/1
TABLET ORAL
Qty: 90 TABLET | Refills: 1 | Status: SHIPPED | OUTPATIENT
Start: 2022-05-05 | End: 2022-11-11

## 2022-05-05 RX ORDER — ATORVASTATIN CALCIUM 20 MG/1
TABLET, FILM COATED ORAL
Qty: 90 TABLET | Refills: 3 | Status: SHIPPED | OUTPATIENT
Start: 2022-05-05 | End: 2022-11-11

## 2022-05-05 RX ORDER — METOPROLOL TARTRATE 25 MG/1
25 TABLET, FILM COATED ORAL 2 TIMES DAILY
Qty: 180 TABLET | Refills: 3 | Status: SHIPPED | OUTPATIENT
Start: 2022-05-05 | End: 2022-11-11

## 2022-05-05 NOTE — TELEPHONE ENCOUNTER
Prescription approved per [facility] Refill Protocol.    Joni Pierce RN

## 2022-05-17 ENCOUNTER — TELEPHONE (OUTPATIENT)
Dept: INTERNAL MEDICINE | Facility: CLINIC | Age: 77
End: 2022-05-17
Payer: COMMERCIAL

## 2022-05-17 DIAGNOSIS — D05.12 DUCTAL CARCINOMA IN SITU (DCIS) OF LEFT BREAST: Primary | ICD-10-CM

## 2022-05-17 NOTE — TELEPHONE ENCOUNTER
PT IS REQUESTING ANASTROZOLE, SHE DID  THE LETROZOLE  BUT WOULD PREFER TO BE ON ANASTROZOLE INSTEAD BECAUSE IT IS CHEAPER

## 2022-05-18 RX ORDER — BISACODYL 5 MG
TABLET, DELAYED RELEASE (ENTERIC COATED) ORAL
Qty: 4 TABLET | Refills: 0 | Status: SHIPPED | OUTPATIENT
Start: 2022-05-18 | End: 2022-06-08

## 2022-05-20 RX ORDER — ANASTROZOLE 1 MG/1
1 TABLET ORAL DAILY
Qty: 90 TABLET | Refills: 3 | Status: SHIPPED | OUTPATIENT
Start: 2022-05-20 | End: 2022-08-18

## 2022-05-23 ENCOUNTER — LAB (OUTPATIENT)
Dept: LAB | Facility: CLINIC | Age: 77
End: 2022-05-23
Payer: COMMERCIAL

## 2022-05-23 DIAGNOSIS — Z11.59 ENCOUNTER FOR SCREENING FOR OTHER VIRAL DISEASES: ICD-10-CM

## 2022-05-23 PROCEDURE — U0005 INFEC AGEN DETEC AMPLI PROBE: HCPCS

## 2022-05-23 PROCEDURE — U0003 INFECTIOUS AGENT DETECTION BY NUCLEIC ACID (DNA OR RNA); SEVERE ACUTE RESPIRATORY SYNDROME CORONAVIRUS 2 (SARS-COV-2) (CORONAVIRUS DISEASE [COVID-19]), AMPLIFIED PROBE TECHNIQUE, MAKING USE OF HIGH THROUGHPUT TECHNOLOGIES AS DESCRIBED BY CMS-2020-01-R: HCPCS

## 2022-05-24 LAB — SARS-COV-2 RNA RESP QL NAA+PROBE: NEGATIVE

## 2022-05-26 ENCOUNTER — HOSPITAL ENCOUNTER (OUTPATIENT)
Facility: AMBULATORY SURGERY CENTER | Age: 77
Discharge: HOME | End: 2022-05-26
Attending: INTERNAL MEDICINE | Admitting: INTERNAL MEDICINE
Payer: COMMERCIAL

## 2022-05-26 VITALS
SYSTOLIC BLOOD PRESSURE: 101 MMHG | OXYGEN SATURATION: 96 % | DIASTOLIC BLOOD PRESSURE: 60 MMHG | TEMPERATURE: 97.4 F | RESPIRATION RATE: 16 BRPM | HEART RATE: 72 BPM

## 2022-05-26 DIAGNOSIS — C18.8 OVERLAPPING MALIGNANT NEOPLASM OF COLON (H): ICD-10-CM

## 2022-05-26 DIAGNOSIS — Z12.11 SCREEN FOR COLON CANCER: Primary | ICD-10-CM

## 2022-05-26 DIAGNOSIS — Z15.09 LYNCH SYNDROME: ICD-10-CM

## 2022-05-26 LAB — COLONOSCOPY: NORMAL

## 2022-05-26 PROCEDURE — G0105 COLORECTAL SCRN; HI RISK IND: HCPCS

## 2022-05-26 PROCEDURE — G8918 PT W/O PREOP ORDER IV AB PRO: HCPCS

## 2022-05-26 PROCEDURE — G8907 PT DOC NO EVENTS ON DISCHARG: HCPCS

## 2022-05-26 RX ORDER — NALOXONE HYDROCHLORIDE 0.4 MG/ML
0.2 INJECTION, SOLUTION INTRAMUSCULAR; INTRAVENOUS; SUBCUTANEOUS
Status: DISCONTINUED | OUTPATIENT
Start: 2022-05-26 | End: 2022-05-27 | Stop reason: HOSPADM

## 2022-05-26 RX ORDER — ONDANSETRON 2 MG/ML
4 INJECTION INTRAMUSCULAR; INTRAVENOUS EVERY 6 HOURS PRN
Status: DISCONTINUED | OUTPATIENT
Start: 2022-05-26 | End: 2022-05-27 | Stop reason: HOSPADM

## 2022-05-26 RX ORDER — ONDANSETRON 2 MG/ML
4 INJECTION INTRAMUSCULAR; INTRAVENOUS
Status: DISCONTINUED | OUTPATIENT
Start: 2022-05-26 | End: 2022-05-27 | Stop reason: HOSPADM

## 2022-05-26 RX ORDER — NALOXONE HYDROCHLORIDE 0.4 MG/ML
0.4 INJECTION, SOLUTION INTRAMUSCULAR; INTRAVENOUS; SUBCUTANEOUS
Status: DISCONTINUED | OUTPATIENT
Start: 2022-05-26 | End: 2022-05-27 | Stop reason: HOSPADM

## 2022-05-26 RX ORDER — FENTANYL CITRATE 50 UG/ML
INJECTION, SOLUTION INTRAMUSCULAR; INTRAVENOUS PRN
Status: DISCONTINUED | OUTPATIENT
Start: 2022-05-26 | End: 2022-05-26 | Stop reason: HOSPADM

## 2022-05-26 RX ORDER — PROCHLORPERAZINE MALEATE 5 MG
5 TABLET ORAL EVERY 6 HOURS PRN
Status: DISCONTINUED | OUTPATIENT
Start: 2022-05-26 | End: 2022-05-27 | Stop reason: HOSPADM

## 2022-05-26 RX ORDER — LIDOCAINE 40 MG/G
CREAM TOPICAL
Status: DISCONTINUED | OUTPATIENT
Start: 2022-05-26 | End: 2022-05-27 | Stop reason: HOSPADM

## 2022-05-26 RX ORDER — ONDANSETRON 4 MG/1
4 TABLET, ORALLY DISINTEGRATING ORAL EVERY 6 HOURS PRN
Status: DISCONTINUED | OUTPATIENT
Start: 2022-05-26 | End: 2022-05-27 | Stop reason: HOSPADM

## 2022-05-26 RX ORDER — FLUMAZENIL 0.1 MG/ML
0.2 INJECTION, SOLUTION INTRAVENOUS
Status: ACTIVE | OUTPATIENT
Start: 2022-05-26 | End: 2022-05-26

## 2022-05-26 NOTE — H&P
"ENDOSCOPY PRE-SEDATION H&P FOR OUTPATIENT PROCEDURES    Marilia Bean  [id number]  1945    Procedure: colonoscopy    Pre-procedure diagnosis: gardiner syndrome, hx CRC    Past medical history:   Past Medical History:   Diagnosis Date     Aortic aneurysm (H) 07/01/2007    see 7/07 [place] report -follow yearly, treat high BP is occurs Problem list name updated by automated process. Provider to review     Aortic aneurysm of unspecified site without mention of rupture 07/2007    4.4 cm ascending aorta noted in 2007     Asymptomatic postmenopausal status (age-related) (natural)     on HRT  Prempro -weaning off 5/'2004     CAD (coronary artery disease)     LAD     Family history of Gardiner syndrome      Hyperlipidemia LDL goal <100 10/31/2010     Hypertension goal BP (blood pressure) < 140/90 02/09/2016     Leiomyoma of uterus, unspecified     Uterine fibroid     Lump or mass in breast 07/2004    rt. nodule - bx neg     Gardiner syndrome      Personal history of colonic polyps      Postmenopausal atrophic vaginitis 08/20/2006     Pulmonary nodule     incidental noted in 2007 during [place]     Pure hypercholesterolemia     mild, diet - low cholesterol, low fat.10/06 start Lovastatin       Past surgical history:   Past Surgical History:   Procedure Laterality Date     BIOPSY BREAST NEEDLE LOCALIZATION Left 8/16/2021    Procedure: LEFT Wire-Localized Segmental Mastectomy (=\"Lumpectomy\");  Surgeon: Bertha Toro MD;  Location: [facility] OR     BYPASS GRAFT ARTERY CORONARY N/A 6/5/2017    Procedure: BYPASS GRAFT ARTERY CORONARY;;  Surgeon: Akshat Soto MD;  Location: [facility] OR     C DEXA INTERPRETATION, AXIAL  12/21/01    wnl. 7/2005 wnl but lower     COLONOSCOPY  05/07/07    Repeat in 1 year for surveillance     COLONOSCOPY  12/10/08    repeat 1 year  -see 1/2009 letter     COLONOSCOPY  03/31/10     COLONOSCOPY  10/31/2011    Procedure:COMBINED COLONOSCOPY, SINGLE BIOPSY/POLYPECTOMY BY BIOPSY; colonoscopy with polypectomy " by biopsy; Surgeon:JESSICA GARCIA; Location:[facility] GI     COLONOSCOPY  12/6/2013    Procedure: COMBINED COLONOSCOPY, SINGLE BIOPSY/POLYPECTOMY BY BIOPSY;  Colonoscopy, Polypectomies;  Surgeon: Herbert Salinas MD;  Location: [facility]     COLONOSCOPY N/A 12/8/2015    Procedure: COLONOSCOPY;  Surgeon: Jared Carbajal MD;  Location: [facility]     COLONOSCOPY N/A 4/26/2017    Procedure: COMBINED COLONOSCOPY, SINGLE OR MULTIPLE BIOPSY/POLYPECTOMY BY BIOPSY;  Colonoscopy with polypectomies with forceps and snare;  Surgeon: Herbert Salinas MD;  Location: [facility]     COLONOSCOPY N/A 5/10/2021    Procedure: Colonoscopy, With Polypectomy And Biopsy;  Surgeon: Franklyn Hernandez MD;  Location: [facility] OR     COLONOSCOPY WITH CO2 INSUFFLATION N/A 5/10/2021    Procedure: COLONOSCOPY, WITH CO2 INSUFFLATION;  Surgeon: Franklyn Hernandez MD;  Location: [facility] OR     COMBINED ESOPHAGOSCOPY, GASTROSCOPY, DUODENOSCOPY (EGD) WITH CO2 INSUFFLATION N/A 5/10/2021    Procedure: ESOPHAGOGASTRODUODENOSCOPY, WITH CO2 INSUFFLATION;  Surgeon: Franklyn Hernandez MD;  Location: [facility] OR     ENDOSCOPIC INSERTION TUBE JEJUNOSTOMY N/A 8/5/2019    Procedure: Gastrojejunostomy tube placement with gastropexy;  Surgeon: Ford Mann MD;  [facility]     ESOPHAGOSCOPY, GASTROSCOPY, DUODENOSCOPY (EGD), COMBINED N/A 12/8/2015    Procedure: COMBINED ESOPHAGOSCOPY, GASTROSCOPY, DUODENOSCOPY (EGD), BIOPSY SINGLE OR MULTIPLE;  Surgeon: Jared Carbajal MD;  Location:  GI     ESOPHAGOSCOPY, GASTROSCOPY, DUODENOSCOPY (EGD), COMBINED N/A 7/31/2019    Procedure: Endoscopic ultrasound with cystoduodenostomy, stent placement x2, stent dilation, and nasojejunal tube placement;  Surgeon: Ford Mann MD;  [facility]     ESOPHAGOSCOPY, GASTROSCOPY, DUODENOSCOPY (EGD), COMBINED N/A 8/5/2019    Procedure: ESOPHAGOGASTRODUODENOSCOPY (EGD);  Surgeon: Ford Mann MD;  [facility]: [facility]     ESOPHAGOSCOPY,  GASTROSCOPY, DUODENOSCOPY (EGD), COMBINED N/A 8/12/2019    Procedure: ESOPHAGOGASTRODUODENOSCOPY (EGD) with GJ exchange, duodenum stent removal.;  Surgeon: Ford Mann MD;  [facility]     ESOPHAGOSCOPY, GASTROSCOPY, DUODENOSCOPY (EGD), COMBINED N/A 5/10/2021    Procedure: Esophagogastroduodenoscopy, With Biopsy;  Surgeon: Franklyn Hernandez MD;  Location: [facility] OR     HC ECP WITH CATARACT SURGERY Bilateral 2015, 2016     HC LAPAROSCOPY, SURGICAL; APPENDECTOMY  10/31/2004     HC LAPAROSCOPY, SURGICAL; CHOLECYSTECTOMY  2000    Cholecystectomy, Laparoscopic     HC REVISE MEDIAN N/CARPAL TUNNEL SURG  10/01/10    left     HYSTERECTOMY, ELVIN  12/14/09    TAHBSO, MMK     LAPAROSCOPIC ASSISTED COLECTOMY N/A 6/30/2021    Procedure: Laparoscopic subtotal colectomy, ileosigmoid anastomosis, lysis of adhesions for 150 mins, splenic flexure mobilization;  Surgeon: Akli Lay MD;  [facility]     REPAIR ANEURYSM ASCENDING AORTA N/A 6/5/2017    Procedure: REPAIR ANEURYSM ASCENDING AORTA;  Median Sternotomy, Ascending Aortic Aneurysm Repair, Coronary Artery Bypass Graft x1 on pump oxygenator;  Surgeon: Akshat Soto MD;  [facility]     REPLACE GASTROJEJUNOSTOMY TUBE, PERCUTANEOUS N/A 8/19/2019    Procedure: REPLACEMENT, GASTROJEJUNOSTOMY TUBE;  Surgeon: Robert Tafoya MD;  [facility]     RESECTION DUODENAL N/A 7/3/2019    Procedure: Resection of Distal Duodenal and proximal Jejunum;  Surgeon: Joaquin Briot MD;  [facility]     [facility] PERC NEEDLE CORE, WITH IMAGING  8/17/2004    Right     [facility] TH[facility] STOMA W BIOPSY/CAUTERY TUMOR/POLYP/LESION  1999,2002 2004 polyp - hyperplastic - repeat 5 years ?     [facility] COLONOSCOPY W/WO BRUSH/WASH  12/12/2005    Polypectomy.  Diverticulosis-minimal. Bx adenomatous and mucosal polyps - repeat 1 year     [facility] ENDOSCOPY, SIMPLE EXAM  03/31/10       Current Outpatient Medications   Medication      anastrozole (ARIMIDEX) 1 MG tablet     aspirin 81 MG EC tablet     atorvastatin (LIPITOR) 20 MG tablet     bisacodyl (DULCOLAX) 5 MG EC tablet     losartan (COZAAR) 25 MG tablet     metoprolol tartrate (LOPRESSOR) 25 MG tablet     polyethylene glycol (GOLYTELY) 236 g suspension     diphenhydrAMINE (BENADRYL) 12.5 MG/5ML solution     losartan (COZAAR) 50 MG tablet     Current Facility-Administered Medications   Medication     flumazenil (ROMAZICON) injection 0.2 mg     lidocaine (LMX4) kit     lidocaine 1 % 0.1-1 mL     naloxone (NARCAN) injection 0.2 mg     naloxone (NARCAN) injection 0.2 mg     naloxone (NARCAN) injection 0.4 mg     naloxone (NARCAN) injection 0.4 mg     ondansetron (ZOFRAN ODT) ODT tab 4 mg    Or     ondansetron (ZOFRAN) injection 4 mg     ondansetron (ZOFRAN) injection 4 mg     prochlorperazine (COMPAZINE) injection 5 mg    Or     prochlorperazine (COMPAZINE) tablet 5 mg     sodium chloride (PF) 0.9% PF flush 3 mL     sodium chloride (PF) 0.9% PF flush 3 mL       Allergies   Allergen Reactions     Contrast Dye Itching and Other (See Comments)     Tingling in mouth     Diatrizoate Itching and Other (See Comments)     Tingling in mouth     Morphine Nausea     Reports that she did not vomit.        History of Anesthesia/Sedation Problems: no    Physical Exam:    Mental status: alert  Heart: Normal  Lung: Normal  Assessment of patient's airway: Normal  Other as pertinent for procedure: None     ASA Score: See Provation note    Mallampati score:  II - Faucial pillars and soft palate may be seen, but uvula is masked by the base of the tongue    Assessment/Plan:     The patient is an appropriate candidate to receive sedation.    Informed consent was discussed with the patient/family, including the risks, benefits, potential complications and any alternative options associated with sedation.    Patient assessment completed just prior to sedation and while under constant observation by the provider.  Condition determined to be adequate for proceeding with sedation.    The specific risks for the procedure were discussed with the patient at the time of informed consent and include but are not limited to perforation which could require surgery, missing significant neoplasm or lesion, hemorrhage and adverse sedative complication.      Franklyn Hernandez MD

## 2022-06-04 ENCOUNTER — HEALTH MAINTENANCE LETTER (OUTPATIENT)
Age: 77
End: 2022-06-04

## 2022-06-08 ENCOUNTER — HOSPITAL ENCOUNTER (OUTPATIENT)
Dept: GENERAL RADIOLOGY | Facility: CLINIC | Age: 77
Discharge: HOME | End: 2022-06-08
Attending: FAMILY MEDICINE
Payer: MEDICARE

## 2022-06-08 ENCOUNTER — OFFICE VISIT (OUTPATIENT)
Dept: FAMILY MEDICINE | Facility: CLINIC | Age: 77
End: 2022-06-08
Payer: COMMERCIAL

## 2022-06-08 ENCOUNTER — NURSE TRIAGE (OUTPATIENT)
Dept: INTERNAL MEDICINE | Facility: CLINIC | Age: 77
End: 2022-06-08
Payer: COMMERCIAL

## 2022-06-08 VITALS
WEIGHT: 189.9 LBS | OXYGEN SATURATION: 99 % | HEIGHT: 67 IN | RESPIRATION RATE: 18 BRPM | HEART RATE: 80 BPM | TEMPERATURE: 97.6 F | DIASTOLIC BLOOD PRESSURE: 84 MMHG | SYSTOLIC BLOOD PRESSURE: 138 MMHG | BODY MASS INDEX: 29.81 KG/M2

## 2022-06-08 DIAGNOSIS — S02.2XXA CLOSED FRACTURE OF NASAL BONE, INITIAL ENCOUNTER: ICD-10-CM
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
## 2022-06-08 PROCEDURE — 99214 OFFICE O/P EST MOD 30 MIN: CPT | Performed by: FAMILY MEDICINE

## 2022-06-08 PROCEDURE — 73110 X-RAY EXAM OF WRIST: CPT | Mod: LT

## 2022-06-08 PROCEDURE — 73130 X-RAY EXAM OF HAND: CPT | Mod: LT

## 2022-06-08 PROCEDURE — 70160 X-RAY EXAM OF NASAL BONES: CPT

## 2022-06-08 ASSESSMENT — PAIN SCALES - GENERAL: PAINLEVEL: EXTREME PAIN (8)
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
## 2022-08-15 ENCOUNTER — HOSPITAL ENCOUNTER (OUTPATIENT)
Dept: MAMMOGRAPHY | Facility: CLINIC | Age: 77
Discharge: HOME | End: 2022-08-15
Attending: INTERNAL MEDICINE | Admitting: INTERNAL MEDICINE
Payer: MEDICARE

## 2022-08-15 DIAGNOSIS — Z12.31 ENCOUNTER FOR SCREENING MAMMOGRAM FOR MALIGNANT NEOPLASM OF BREAST: ICD-10-CM

## 2022-08-15 DIAGNOSIS — D05.12 DUCTAL CARCINOMA IN SITU (DCIS) OF LEFT BREAST: ICD-10-CM

## 2022-08-15 PROCEDURE — 77067 SCR MAMMO BI INCL CAD: CPT

## 2022-08-18 ENCOUNTER — TELEPHONE (OUTPATIENT)
Dept: ONCOLOGY | Facility: CLINIC | Age: 77
End: 2022-08-18

## 2022-08-18 ENCOUNTER — ONCOLOGY VISIT (OUTPATIENT)
Dept: ONCOLOGY | Facility: CLINIC | Age: 77
End: 2022-08-18
Payer: COMMERCIAL

## 2022-08-18 VITALS
WEIGHT: 187.7 LBS | HEART RATE: 78 BPM | DIASTOLIC BLOOD PRESSURE: 78 MMHG | OXYGEN SATURATION: 94 % | HEIGHT: 67 IN | SYSTOLIC BLOOD PRESSURE: 130 MMHG | TEMPERATURE: 97.7 F | BODY MASS INDEX: 29.46 KG/M2

## 2022-08-18 DIAGNOSIS — D05.12 DUCTAL CARCINOMA IN SITU (DCIS) OF LEFT BREAST: Primary | ICD-10-CM

## 2022-08-18 DIAGNOSIS — Z12.31 SCREENING MAMMOGRAM FOR BREAST CANCER: ICD-10-CM

## 2022-08-18 DIAGNOSIS — C18.8 OVERLAPPING MALIGNANT NEOPLASM OF COLON (H): ICD-10-CM

## 2022-08-18 PROCEDURE — 99214 OFFICE O/P EST MOD 30 MIN: CPT | Performed by: INTERNAL MEDICINE

## 2022-08-18 NOTE — PROGRESS NOTES
"[facility] Hematology / Oncology  Progress Note Dec 21, 2021  Name: Marilia Bean  :  1945    MRN:  [id number]    --------------------    Assessment / Plan:  Stage 0 DCIS left breast, grade 2, 15 mm; negative margins, cribiform w/ necrosis, % positive:  # Sep 2021 Status post lumpectomy.  # Declined adjuvant radiation.  # Sep 2021 - Dec 2021 Anastrozole; poorly tolerated 2/2 cough.  # Dec 2021 - Aug 2022 Letrozole; poorly tolerated 2/2 hot flashes.    Stage 1 (pT1 pN) colorectal cancer, 23 mm, mod differentiated, negative margins, high grade dysplastic tubular adenomas present, 28/28 nodes negative, no LVI, no PNI:  # 2021 Subtotal colectomy.  # Adjuvant therapy not indicated.    Clinically well.  After discussion pros / cons, Marilia will finish out AI and stop; reviewed goal would be risk reduction but no left eye benefit.  Reviewed risk of locoregional recurrence w/ declined XRT and no AI, but we are planning to monitor.  Planning mammogram and colonoscopy in future for surveillance; 12 month post-op completed.  Continue ASA 81 mg for colorectal ppx.    Sukhjinder Michaels MD    --------------------  Interval History:  Marilia returns for follow up of breast and colorectal cancer.  All in all, she describes her health is pretty good.  Dealing w/ some right knee discomfort; plans to see orthopedics.  Cough resolved fortunately.  No major breast concerns.  No major bowel concerns; stable increased frequency ever since bowel resection.  Letrozole complicated by hot flashes.    --------------------    Physical Exam:  VS: /78   Pulse 78   Temp 97.7  F (36.5  C) (Temporal)   Ht 1.708 m (5' 7.25\")   Wt 85.1 kg (187 lb 11.2 oz)   SpO2 94%   BMI 29.18 kg/m    GEN: Well appearing.  ABD: ND, NT, Soft, +BS.  BREAST: No palpable masses or axillary adenopathy; left breast lumpectomy cavity without mass.    Labs / Imaging / Path:  We reviewed mammogram and colonoscopy.    "

## 2022-08-18 NOTE — TELEPHONE ENCOUNTER
Spoke with patient regarding The Cancer Treatment Plan and Summary is a brief record of your cancer treatment. The form provides a way for people who have completed cancer treatment to review and retain information about their cancer, cancer treatment, and follow-up care. This form is also meant to give basic information about your previous cancer diagnosis and treatment to a future healthcare provider.    In [facility], you can find it by going to the yellow colored 'Health' folder (up near the top of the screen).  When in the 'Health' folder, go to 'My Conditions', and then to the 'Treatment Summary Tab'.    Please do not hesitate to contact us if you should have any questions.    You can a list of available survivorship resources at:  [email or web address]    Copy mailed to patient's home address on file.    Tami RAMOS

## 2022-08-18 NOTE — LETTER
August 18, 2022      Marilia Bean  [address] [facility] [place] [address]            Dear Ms. Bean,    Attached is a copy of your Cancer Treatment Plan and Summary.  The Cancer Treatment Plan and Summary is a brief record of your cancer treatment. The form provides a way for people who have completed cancer treatment to review and retain information about their cancer, cancer treatment, and follow-up care. This form is also meant to give basic information about your previous cancer diagnosis and treatment to a future healthcare provider.      In [employer], you can find it by going to the yellow colored 'Health' folder (up near the top of the screen).  When in the 'Health' folder, go to 'My Conditions', and then to the 'Treatment Summary Tab'.    Please do not hesitate to contact us if you should have any questions.    You can a list of available survivorship resources at:  [email or web address]        Sincerely,      Tami Cooper RN

## 2022-08-18 NOTE — LETTER
"    2022         RE: Marilia Bean  [address]  [place] [facility] [address]        Dear Colleague,    Thank you for referring your patient, Marilia Bean, to the [facility] [place]. Please see a copy of my visit note below.    [facility] Hematology / Oncology  Progress Note Dec 21, 2021  Name: Marilia Bean  :  1945    MRN:  [id number]    --------------------    Assessment / Plan:  Stage 0 DCIS left breast, grade 2, 15 mm; negative margins, cribiform w/ necrosis, % positive:  # Sep 2021 Status post lumpectomy.  # Declined adjuvant radiation.  # Sep 2021 - Dec 2021 Anastrozole; poorly tolerated 2/2 cough.  # Dec 2021 - Aug 2022 Letrozole; poorly tolerated 2/2 hot flashes.    Stage 1 (pT1 pN) colorectal cancer, 23 mm, mod differentiated, negative margins, high grade dysplastic tubular adenomas present, 28/28 nodes negative, no LVI, no PNI:  # 2021 Subtotal colectomy.  # Adjuvant therapy not indicated.    Clinically well.  After discussion pros / cons, Marilia will finish out AI and stop; reviewed goal would be risk reduction but no left eye benefit.  Reviewed risk of locoregional recurrence w/ declined XRT and no AI, but we are planning to monitor.  Planning mammogram and colonoscopy in future for surveillance; 12 month post-op completed.  Continue ASA 81 mg for colorectal ppx.    Sukhjinder Michaels MD    --------------------  Interval History:  Marilia returns for follow up of breast and colorectal cancer.  All in all, she describes her health is pretty good.  Dealing w/ some right knee discomfort; plans to see orthopedics.  Cough resolved fortunately.  No major breast concerns.  No major bowel concerns; stable increased frequency ever since bowel resection.  Letrozole complicated by hot flashes.    --------------------    Physical Exam:  VS: /78   Pulse 78   Temp 97.7  F (36.5  C) (Temporal)   Ht 1.708 m (5' 7.25\")   Wt 85.1 kg (187 lb 11.2 oz)   SpO2 " 94%   BMI 29.18 kg/m    GEN: Well appearing.  ABD: ND, NT, Soft, +BS.  BREAST: No palpable masses or axillary adenopathy; left breast lumpectomy cavity without mass.    Labs / Imaging / Path:  We reviewed mammogram and colonoscopy.        Again, thank you for allowing me to participate in the care of your patient.        Sincerely,        Sukhjinder Michaels MD

## 2022-08-18 NOTE — PATIENT INSTRUCTIONS
BJT MG 12 months w/ mammogram and colonoscopy.    Sukhjinder Michaels MD.    Today:  Plan for follow up in 1 year!    Please follow up with Dr. Michaels in 1 year.  Please schedule mammogram and colonoscopy prior to follow up appointment.    Mammogram Date/Time:    Colonoscopy Date/Time:    Office visit follow up with Dr. Michaels Date/Time: 8/31/23 @9:00[facility]         If you have any questions or concerns please feel free to call.    If you need to reschedule please call:  Clinic or Lab Appointment - [phone]  Infusion - [phone]  Imaging - [phone]    Lissa Joseph [facility]  [phone]

## 2022-09-01 ENCOUNTER — OFFICE VISIT (OUTPATIENT)
Dept: ORTHOPEDICS | Facility: CLINIC | Age: 77
End: 2022-09-01

## 2022-09-01 ENCOUNTER — ANCILLARY PROCEDURE (OUTPATIENT)
Dept: GENERAL RADIOLOGY | Facility: CLINIC | Age: 77
End: 2022-09-01
Attending: ORTHOPAEDIC SURGERY
Payer: COMMERCIAL

## 2022-09-01 VITALS
BODY MASS INDEX: 29.35 KG/M2 | WEIGHT: 187 LBS | DIASTOLIC BLOOD PRESSURE: 98 MMHG | SYSTOLIC BLOOD PRESSURE: 156 MMHG | HEIGHT: 67 IN | RESPIRATION RATE: 20 BRPM | HEART RATE: 70 BPM

## 2022-09-01 DIAGNOSIS — M25.561 RIGHT KNEE PAIN, UNSPECIFIED CHRONICITY: ICD-10-CM

## 2022-09-01 DIAGNOSIS — M70.41 PREPATELLAR BURSITIS OF RIGHT KNEE: ICD-10-CM

## 2022-09-01 DIAGNOSIS — M25.561 RIGHT KNEE PAIN, UNSPECIFIED CHRONICITY: Primary | ICD-10-CM

## 2022-09-01 PROCEDURE — 73562 X-RAY EXAM OF KNEE 3: CPT | Mod: TC | Performed by: RADIOLOGY

## 2022-09-01 PROCEDURE — 99203 OFFICE O/P NEW LOW 30 MIN: CPT | Performed by: ORTHOPAEDIC SURGERY

## 2022-09-01 NOTE — LETTER
"    9/1/2022         RE: Marilia Bean  [address]  [facility] [address]        Dear Colleague,    Thank you for referring your patient, Marilia Bean, to the [facility]. Please see a copy of my visit note below.    Marilia Bean is a 77 year old female who is seen as self referral for right knee pain.  She has had pain in her knee for the last year or more.  It is especially when kneeling down on her knee.  Early on she did notice some swelling in front of the knee.  She now has occasional sharp pains if she kneels wrong.  At other times she has almost no pain.     X-ray of the knee today shows only slight spurring at the superior pole of the patella.  It is otherwise normal.    Past Medical History:   Diagnosis Date     Aortic aneurysm (H) 07/01/2007    see 7/07 [place] report -follow yearly, treat high BP is occurs Problem list name updated by automated process. Provider to review     Aortic aneurysm of unspecified site without mention of rupture 07/2007    4.4 cm ascending aorta noted in 2007     Asymptomatic postmenopausal status (age-related) (natural)     on HRT  Prempro -weaning off 5/'2004     CAD (coronary artery disease)     LAD     Family history of Gardiner syndrome      Hyperlipidemia LDL goal <100 10/31/2010     Hypertension goal BP (blood pressure) < 140/90 02/09/2016     Leiomyoma of uterus, unspecified     Uterine fibroid     Lump or mass in breast 07/2004    rt. nodule - bx neg     Gardiner syndrome      Personal history of colonic polyps      Postmenopausal atrophic vaginitis 08/20/2006     Pulmonary nodule     incidental noted in 2007 during [place]     Pure hypercholesterolemia     mild, diet - low cholesterol, low fat.10/06 start Lovastatin       Past Surgical History:   Procedure Laterality Date     BIOPSY BREAST NEEDLE LOCALIZATION Left 8/16/2021    Procedure: LEFT Wire-Localized Segmental Mastectomy (=\"Lumpectomy\");  Surgeon: Bertha Toro MD;  Location:  OR "     BYPASS GRAFT ARTERY CORONARY N/A 6/5/2017    Procedure: BYPASS GRAFT ARTERY CORONARY;;  Surgeon: Akshat Soto MD;  [facility]     C DEXA INTERPRETATION, AXIAL  12/21/01    wnl. 7/2005 wnl but lower     COLONOSCOPY  05/07/07    Repeat in 1 year for surveillance     COLONOSCOPY  12/10/08    repeat 1 year  -see 1/2009 letter     COLONOSCOPY  03/31/10     COLONOSCOPY  10/31/2011    Procedure:COMBINED COLONOSCOPY, SINGLE BIOPSY/POLYPECTOMY BY BIOPSY; colonoscopy with polypectomy by biopsy; Surgeon:JESSICA GARCIA; [facility]     COLONOSCOPY  12/6/2013    Procedure: COMBINED COLONOSCOPY, SINGLE BIOPSY/POLYPECTOMY BY BIOPSY;  Colonoscopy, Polypectomies;  Surgeon: Herbert Salinas MD;  [facility]     COLONOSCOPY N/A 12/8/2015    Procedure: COLONOSCOPY;  Surgeon: Jared Carbajal MD;  [facility]     COLONOSCOPY N/A 4/26/2017    Procedure: COMBINED COLONOSCOPY, SINGLE OR MULTIPLE BIOPSY/POLYPECTOMY BY BIOPSY;  Colonoscopy with polypectomies with forceps and snare;  Surgeon: Herbert Salinas MD;  [facility]     COLONOSCOPY N/A 5/10/2021    Procedure: Colonoscopy, With Polypectomy And Biopsy;  Surgeon: Franklyn Hernandez MD;  [facility]     COLONOSCOPY WITH CO2 INSUFFLATION N/A 5/10/2021    Procedure: COLONOSCOPY, WITH CO2 INSUFFLATION;  Surgeon: Franklyn Hernandez MD;  [facility]     COLONOSCOPY WITH CO2 INSUFFLATION N/A 5/26/2022    Procedure: COLONOSCOPY, WITH CO2 INSUFFLATION;  Surgeon: Franklyn Hernandez MD;  [facility]     COMBINED ESOPHAGOSCOPY, GASTROSCOPY, DUODENOSCOPY (EGD) WITH CO2 INSUFFLATION N/A 5/10/2021    Procedure: ESOPHAGOGASTRODUODENOSCOPY, WITH CO2 INSUFFLATION;  Surgeon: Franklyn Hernandez MD;  [facility]     ENDOSCOPIC INSERTION TUBE JEJUNOSTOMY N/A 8/5/2019    Procedure: Gastrojejunostomy tube placement with gastropexy;  Surgeon: Ford Mann MD;  [facility]     ESOPHAGOSCOPY,  GASTROSCOPY, DUODENOSCOPY (EGD), COMBINED N/A 12/8/2015    Procedure: COMBINED ESOPHAGOSCOPY, GASTROSCOPY, DUODENOSCOPY (EGD), BIOPSY SINGLE OR MULTIPLE;  Surgeon: Jared Carbajal MD;  [facility]     ESOPHAGOSCOPY, GASTROSCOPY, DUODENOSCOPY (EGD), COMBINED N/A 7/31/2019    Procedure: Endoscopic ultrasound with cystoduodenostomy, stent placement x2, stent dilation, and nasojejunal tube placement;  Surgeon: Ford Mann MD;  [facility]     ESOPHAGOSCOPY, GASTROSCOPY, DUODENOSCOPY (EGD), COMBINED N/A 8/5/2019    Procedure: ESOPHAGOGASTRODUODENOSCOPY (EGD);  Surgeon: Ford Mann MD;  [facility]     ESOPHAGOSCOPY, GASTROSCOPY, DUODENOSCOPY (EGD), COMBINED N/A 8/12/2019    Procedure: ESOPHAGOGASTRODUODENOSCOPY (EGD) with GJ exchange, duodenum stent removal.;  Surgeon: Ford Mann MD;  [facility]     ESOPHAGOSCOPY, GASTROSCOPY, DUODENOSCOPY (EGD), COMBINED N/A 5/10/2021    Procedure: Esophagogastroduodenoscopy, With Biopsy;  Surgeon: Franklyn Hernandez MD;  [facility]     HC ECP WITH CATARACT SURGERY Bilateral 2015, 2016     HC LAPAROSCOPY, SURGICAL; APPENDECTOMY  10/31/2004     HC LAPAROSCOPY, SURGICAL; CHOLECYSTECTOMY  2000    Cholecystectomy, Laparoscopic     HC REVISE MEDIAN N/CARPAL TUNNEL SURG  10/01/10    left     HYSTERECTOMY, ELVIN  12/14/09    TAHBSO, MMK     LAPAROSCOPIC ASSISTED COLECTOMY N/A 6/30/2021    Procedure: Laparoscopic subtotal colectomy, ileosigmoid anastomosis, lysis of adhesions for 150 mins, splenic flexure mobilization;  Surgeon: Akil Lay MD;  [facility]     REPAIR ANEURYSM ASCENDING AORTA N/A 6/5/2017    Procedure: REPAIR ANEURYSM ASCENDING AORTA;  Median Sternotomy, Ascending Aortic Aneurysm Repair, Coronary Artery Bypass Graft x1 on pump oxygenator;  Surgeon: Akshat Soto MD;  [facility]     REPLACE GASTROJEJUNOSTOMY TUBE, PERCUTANEOUS N/A 8/19/2019    Procedure: REPLACEMENT, GASTROJEJUNOSTOMY  TUBE;  Surgeon: Robert Tafoya MD;  [facility]     RESECTION DUODENAL N/A 7/3/2019    Procedure: Resection of Distal Duodenal and proximal Jejunum;  Surgeon: Joaquin Brito MD;  [facility]     RUST BIOPSY BREAST, PERC NEEDLE CORE, WITH IMAGING  8/17/2004    Right     [facility] W BIOPSY/CAUTERY TUMOR/POLYP/LESION  1999,2002 2004 polyp - hyperplastic - repeat 5 years ?     [facility] COLONOSCOPY W/WO BRUSH/WASH  12/12/2005    Polypectomy.  Diverticulosis-minimal. Bx adenomatous and mucosal polyps - repeat 1 year     [facility], SIMPLE EXAM  03/31/10       Family History   Problem Relation Age of Onset     Cancer Mother         Colon Cancer     Heart Disease Mother         MI     Osteoporosis Mother      Cancer Father         Colon Cancer     Heart Disease Father         Heart Disease/ Heart attacks     Diabetes Father         Adult Onset     Cancer Sister         Colon Cancer     Heart Disease Brother         Heart attacks at age 45     Breast Cancer Sister      Cancer Paternal Grandmother         Unknown type     Heart Disease Maternal Grandfather         MI     Diabetes Sister         Adult Onset     Diabetes Sister         Adult Onset     Diabetes Brother         Adult Onset     Heart Disease Brother         heart attack age 64     Cancer - colorectal Son         age 36, Gardiner syndrome       Social History     Socioeconomic History     Marital status:      Spouse name: Cain     Number of children: 4     Years of education: 12     Highest education level: Not on file   Occupational History     Occupation: [employer]     Employer: [employer]     Comment: [employer]   Tobacco Use     Smoking status: Never Smoker     Smokeless tobacco: Never Used   Vaping Use     Vaping Use: Never used   Substance and Sexual Activity     Alcohol use: Yes     Comment: rarely     Drug use: No     Sexual activity: Yes     Partners: Male     Comment: Post menopausal    Other Topics Concern      Service No     Blood Transfusions No     Caffeine Concern Yes     Comment: coffee; 3c/d pop: 1c/wk     Occupational Exposure No     Hobby Hazards No     Sleep Concern Yes     Comment: c/o insomnia     Stress Concern No     Weight Concern Yes     Comment: desire wt loss     Special Diet No     Back Care No     Exercise No     Bike Helmet No     Seat Belt Yes     Self-Exams No     Parent/sibling w/ CABG, MI or angioplasty before 65F 55M? Not Asked   Social History Narrative    Lives with spouse. No domestic violence issues.     Social Determinants of Health     Financial Resource Strain: Not on file   Food Insecurity: Not on file   Transportation Needs: Not on file   Physical Activity: Not on file   Stress: Not on file   Social Connections: Not on file   Intimate Partner Violence: Not on file   Housing Stability: Not on file       Current Outpatient Medications   Medication Sig Dispense Refill     aspirin 81 MG EC tablet Take 81 mg by mouth every morning  90 tablet 3     atorvastatin (LIPITOR) 20 MG tablet TAKE 1 TABLET (20 MG) BY MOUTH EVERY MORNING 90 tablet 3     losartan (COZAAR) 25 MG tablet TAKE ONE TABLET BY MOUTH EVERY MORNING 90 tablet 1     metoprolol tartrate (LOPRESSOR) 25 MG tablet TAKE 1 TABLET (25 MG) BY MOUTH 2 TIMES DAILY (Patient taking differently: Take by mouth 2 times daily) 180 tablet 3       Allergies   Allergen Reactions     Contrast Dye Itching and Other (See Comments)     Tingling in mouth     Diatrizoate Itching and Other (See Comments)     Tingling in mouth     Morphine Nausea     Reports that she did not vomit.        REVIEW OF SYSTEMS:  CONSTITUTIONAL:  NEGATIVE for fever, chills, change in weight, not feeling tired  SKIN:  NEGATIVE for worrisome rashes, no skin lumps, no skin ulcers and no non-healing wounds  EYES:  NEGATIVE for vision changes or irritation.  ENT/MOUTH:  NEGATIVE.  No hearing loss, no hoarseness, no difficulty swallowing.  RESP:   "NEGATIVE. No cough or shortness of breath.  CV:  NEGATIVE for chest pain, palpitations or peripheral edema  GI:  NEGATIVE for nausea, abdominal pain, heartburn, or change in bowel habits  :  Negative. No dysuria, no hematuria  MUSCULOSKELETAL:  See HPI above  NEURO:  NEGATIVE . No headaches, no dizziness,  no numbness  ENDOCRINE:  NEGATIVE for temperature intolerance, skin/hair changes  HEME/ALLERGY/IMMUNE:  NEGATIVE for bleeding problems  PSYCHIATRIC:  NEGATIVE. no anxiety, no depression.      Exam:  Vitals: BP (!) 156/98   Pulse 70   Resp 20   Ht 1.708 m (5' 7.25\")   Wt 84.8 kg (187 lb)   BMI 29.07 kg/m    BMI= Body mass index is 29.07 kg/m .  Constitutional:  healthy, alert and no distress  Neuro: Alert and Oriented x 3, Sensation grossly WNL.  HEENT:  Atraumatic, EOMI  Neck:  Neck supple with no tenderness.  Psych: Affect normal   Respiratory: Breathing not labored.  Cardiovascular: normal peripheral pulses  Lymph: no adenopathy  Skin: No rashes,worrisome lesions or skin problems  Spine: straight, no straight leg raising pain.  Hips show full range of motion.  There is no tenderness over the sacro-iliac joints, sciatic notch, or greater trochanters.   She has full range of motion of the knees.  She has a bursal band present in the prepatellar bursa on the right knee.  There is no bursal effusion.  There is no effusion in the joints.  She has some tenderness of the bursal band and also at the anterolateral aspect of the right knee.    She has no ligamentous laxity.  She had negative medial and lateral Abby's.    Assessment:  Right prepatellar bursitis with bursal band.  Plan: She should avoid kneeling on the knee is much as possible.  Use a knee pad when she does kneel.  We could consider excision in the future if it gives her enough trouble.        Again, thank you for allowing me to participate in the care of your patient.        Sincerely,        Kavin Vera MD    "

## 2022-09-02 NOTE — PROGRESS NOTES
"Marilia Bean is a 77 year old female who is seen as self referral for right knee pain.  She has had pain in her knee for the last year or more.  It is especially when kneeling down on her knee.  Early on she did notice some swelling in front of the knee.  She now has occasional sharp pains if she kneels wrong.  At other times she has almost no pain.     X-ray of the knee today shows only slight spurring at the superior pole of the patella.  It is otherwise normal.    Past Medical History:   Diagnosis Date     Aortic aneurysm (H) 07/01/2007    see 7/07 [place] report -follow yearly, treat high BP is occurs Problem list name updated by automated process. Provider to review     Aortic aneurysm of unspecified site without mention of rupture 07/2007    4.4 cm ascending aorta noted in 2007     Asymptomatic postmenopausal status (age-related) (natural)     on HRT  Prempro -weaning off 5/'2004     CAD (coronary artery disease)     LAD     Family history of Gardiner syndrome      Hyperlipidemia LDL goal <100 10/31/2010     Hypertension goal BP (blood pressure) < 140/90 02/09/2016     Leiomyoma of uterus, unspecified     Uterine fibroid     Lump or mass in breast 07/2004    rt. nodule - bx neg     Gardiner syndrome      Personal history of colonic polyps      Postmenopausal atrophic vaginitis 08/20/2006     Pulmonary nodule     incidental noted in 2007 during [place]     Pure hypercholesterolemia     mild, diet - low cholesterol, low fat.10/06 start Lovastatin       Past Surgical History:   Procedure Laterality Date     BIOPSY BREAST NEEDLE LOCALIZATION Left 8/16/2021    Procedure: LEFT Wire-Localized Segmental Mastectomy (=\"Lumpectomy\");  Surgeon: Bertha Toro MD;  [facility]     BYPASS GRAFT ARTERY CORONARY N/A 6/5/2017    Procedure: BYPASS GRAFT ARTERY CORONARY;;  Surgeon: Akshat Soto MD;  [facility]     C DEXA INTERPRETATION, AXIAL  12/21/01    wnl. 7/2005 wnl but lower     COLONOSCOPY  05/07/07    " Repeat in 1 year for surveillance     COLONOSCOPY  12/10/08    repeat 1 year  -see 1/2009 letter     COLONOSCOPY  03/31/10     COLONOSCOPY  10/31/2011    Procedure:COMBINED COLONOSCOPY, SINGLE BIOPSY/POLYPECTOMY BY BIOPSY; colonoscopy with polypectomy by biopsy; Surgeon:JESSICA GARCIA; [facility]     COLONOSCOPY  12/6/2013    Procedure: COMBINED COLONOSCOPY, SINGLE BIOPSY/POLYPECTOMY BY BIOPSY;  Colonoscopy, Polypectomies;  Surgeon: Herbert Salinas MD;  [facility]     COLONOSCOPY N/A 12/8/2015    Procedure: COLONOSCOPY;  Surgeon: Jared Carbajal MD;  [facility]     COLONOSCOPY N/A 4/26/2017    Procedure: COMBINED COLONOSCOPY, SINGLE OR MULTIPLE BIOPSY/POLYPECTOMY BY BIOPSY;  Colonoscopy with polypectomies with forceps and snare;  Surgeon: Herbert Salinas MD;  [facility]     COLONOSCOPY N/A 5/10/2021    Procedure: Colonoscopy, With Polypectomy And Biopsy;  Surgeon: Franklyn Hernandez MD;  [facility]     COLONOSCOPY WITH CO2 INSUFFLATION N/A 5/10/2021    Procedure: COLONOSCOPY, WITH CO2 INSUFFLATION;  Surgeon: Franklyn Hernandez MD;  [facility]     COLONOSCOPY WITH CO2 INSUFFLATION N/A 5/26/2022    Procedure: COLONOSCOPY, WITH CO2 INSUFFLATION;  Surgeon: Franklyn Hernandez MD;  [facility]     COMBINED ESOPHAGOSCOPY, GASTROSCOPY, DUODENOSCOPY (EGD) WITH CO2 INSUFFLATION N/A 5/10/2021    Procedure: ESOPHAGOGASTRODUODENOSCOPY, WITH CO2 INSUFFLATION;  Surgeon: Franklyn Hernandez MD;  [facility]     ENDOSCOPIC INSERTION TUBE JEJUNOSTOMY N/A 8/5/2019    Procedure: Gastrojejunostomy tube placement with gastropexy;  Surgeon: Ford Mann MD;  [facility]     ESOPHAGOSCOPY, GASTROSCOPY, DUODENOSCOPY (EGD), COMBINED N/A 12/8/2015    Procedure: COMBINED ESOPHAGOSCOPY, GASTROSCOPY, DUODENOSCOPY (EGD), BIOPSY SINGLE OR MULTIPLE;  Surgeon: Jared Carbajal MD;  Location:  GI     ESOPHAGOSCOPY, GASTROSCOPY, DUODENOSCOPY  (EGD), COMBINED N/A 7/31/2019    Procedure: Endoscopic ultrasound with cystoduodenostomy, stent placement x2, stent dilation, and nasojejunal tube placement;  Surgeon: Ford Mann MD;  [facility]     ESOPHAGOSCOPY, GASTROSCOPY, DUODENOSCOPY (EGD), COMBINED N/A 8/5/2019    Procedure: ESOPHAGOGASTRODUODENOSCOPY (EGD);  Surgeon: Ford Mann MD;  [facility]     ESOPHAGOSCOPY, GASTROSCOPY, DUODENOSCOPY (EGD), COMBINED N/A 8/12/2019    Procedure: ESOPHAGOGASTRODUODENOSCOPY (EGD) with GJ exchange, duodenum stent removal.;  Surgeon: Ford Mann MD;  [facility]     ESOPHAGOSCOPY, GASTROSCOPY, DUODENOSCOPY (EGD), COMBINED N/A 5/10/2021    Procedure: Esophagogastroduodenoscopy, With Biopsy;  Surgeon: Franklyn Hernandez MD;  [facility]     HC ECP WITH CATARACT SURGERY Bilateral 2015, 2016     HC LAPAROSCOPY, SURGICAL; APPENDECTOMY  10/31/2004     HC LAPAROSCOPY, SURGICAL; CHOLECYSTECTOMY  2000    Cholecystectomy, Laparoscopic     HC REVISE MEDIAN N/CARPAL TUNNEL SURG  10/01/10    left     HYSTERECTOMY, ELVIN  12/14/09    TAHBSO, MMK     LAPAROSCOPIC ASSISTED COLECTOMY N/A 6/30/2021    Procedure: Laparoscopic subtotal colectomy, ileosigmoid anastomosis, lysis of adhesions for 150 mins, splenic flexure mobilization;  Surgeon: Akil Lay MD;  [facility]     REPAIR ANEURYSM ASCENDING AORTA N/A 6/5/2017    Procedure: REPAIR ANEURYSM ASCENDING AORTA;  Median Sternotomy, Ascending Aortic Aneurysm Repair, Coronary Artery Bypass Graft x1 on pump oxygenator;  Surgeon: Akshat Soto MD;  [facility]     REPLACE GASTROJEJUNOSTOMY TUBE, PERCUTANEOUS N/A 8/19/2019    Procedure: REPLACEMENT, GASTROJEJUNOSTOMY TUBE;  Surgeon: Robert Tafoya MD;  [facility]     RESECTION DUODENAL N/A 7/3/2019    Procedure: Resection of Distal Duodenal and proximal Jejunum;  Surgeon: Joaquin Brito MD;  [facility]     ZZHC BIOPSY BREAST, PERC NEEDLE  CORE, WITH IMAGING  8/17/2004    Right     [facility] COLONOSCOPY THRU STOMA W BIOPSY/CAUTERY TUMOR/POLYP/LESION  1999,2002 2004 polyp - hyperplastic - repeat 5 years ?     [facility] COLONOSCOPY W/WO BRUSH/WASH  12/12/2005    Polypectomy.  Diverticulosis-minimal. Bx adenomatous and mucosal polyps - repeat 1 year     [facility] UGI ENDOSCOPY, SIMPLE EXAM  03/31/10       Family History   Problem Relation Age of Onset     Cancer Mother         Colon Cancer     Heart Disease Mother         MI     Osteoporosis Mother      Cancer Father         Colon Cancer     Heart Disease Father         Heart Disease/ Heart attacks     Diabetes Father         Adult Onset     Cancer Sister         Colon Cancer     Heart Disease Brother         Heart attacks at age 45     Breast Cancer Sister      Cancer Paternal Grandmother         Unknown type     Heart Disease Maternal Grandfather         MI     Diabetes Sister         Adult Onset     Diabetes Sister         Adult Onset     Diabetes Brother         Adult Onset     Heart Disease Brother         heart attack age 64     Cancer - colorectal Son         age 36, Gardiner syndrome       Social History     Socioeconomic History     Marital status:      Spouse name: Cain     Number of children: 4     Years of education: 12     Highest education level: Not on file   Occupational History     Occupation: [employer]     Employer: [employer]     Comment: [employer]   Tobacco Use     Smoking status: Never Smoker     Smokeless tobacco: Never Used   Vaping Use     Vaping Use: Never used   Substance and Sexual Activity     Alcohol use: Yes     Comment: rarely     Drug use: No     Sexual activity: Yes     Partners: Male     Comment: Post menopausal   Other Topics Concern      Service No     Blood Transfusions No     Caffeine Concern Yes     Comment: coffee; 3c/d pop: 1c/wk     Occupational Exposure No     Hobby Hazards No     Sleep Concern Yes     Comment: c/o insomnia     Stress Concern  No     Weight Concern Yes     Comment: desire wt loss     Special Diet No     Back Care No     Exercise No     Bike Helmet No     Seat Belt Yes     Self-Exams No     Parent/sibling w/ CABG, MI or angioplasty before 65F 55M? Not Asked   Social History Narrative    Lives with spouse. No domestic violence issues.     Social Determinants of Health     Financial Resource Strain: Not on file   Food Insecurity: Not on file   Transportation Needs: Not on file   Physical Activity: Not on file   Stress: Not on file   Social Connections: Not on file   Intimate Partner Violence: Not on file   Housing Stability: Not on file       Current Outpatient Medications   Medication Sig Dispense Refill     aspirin 81 MG EC tablet Take 81 mg by mouth every morning  90 tablet 3     atorvastatin (LIPITOR) 20 MG tablet TAKE 1 TABLET (20 MG) BY MOUTH EVERY MORNING 90 tablet 3     losartan (COZAAR) 25 MG tablet TAKE ONE TABLET BY MOUTH EVERY MORNING 90 tablet 1     metoprolol tartrate (LOPRESSOR) 25 MG tablet TAKE 1 TABLET (25 MG) BY MOUTH 2 TIMES DAILY (Patient taking differently: Take by mouth 2 times daily) 180 tablet 3       Allergies   Allergen Reactions     Contrast Dye Itching and Other (See Comments)     Tingling in mouth     Diatrizoate Itching and Other (See Comments)     Tingling in mouth     Morphine Nausea     Reports that she did not vomit.        REVIEW OF SYSTEMS:  CONSTITUTIONAL:  NEGATIVE for fever, chills, change in weight, not feeling tired  SKIN:  NEGATIVE for worrisome rashes, no skin lumps, no skin ulcers and no non-healing wounds  EYES:  NEGATIVE for vision changes or irritation.  ENT/MOUTH:  NEGATIVE.  No hearing loss, no hoarseness, no difficulty swallowing.  RESP:  NEGATIVE. No cough or shortness of breath.  CV:  NEGATIVE for chest pain, palpitations or peripheral edema  GI:  NEGATIVE for nausea, abdominal pain, heartburn, or change in bowel habits  :  Negative. No dysuria, no hematuria  MUSCULOSKELETAL:  See HPI  "above  NEURO:  NEGATIVE . No headaches, no dizziness,  no numbness  ENDOCRINE:  NEGATIVE for temperature intolerance, skin/hair changes  HEME/ALLERGY/IMMUNE:  NEGATIVE for bleeding problems  PSYCHIATRIC:  NEGATIVE. no anxiety, no depression.      Exam:  Vitals: BP (!) 156/98   Pulse 70   Resp 20   Ht 1.708 m (5' 7.25\")   Wt 84.8 kg (187 lb)   BMI 29.07 kg/m    BMI= Body mass index is 29.07 kg/m .  Constitutional:  healthy, alert and no distress  Neuro: Alert and Oriented x 3, Sensation grossly WNL.  HEENT:  Atraumatic, EOMI  Neck:  Neck supple with no tenderness.  Psych: Affect normal   Respiratory: Breathing not labored.  Cardiovascular: normal peripheral pulses  Lymph: no adenopathy  Skin: No rashes,worrisome lesions or skin problems  Spine: straight, no straight leg raising pain.  Hips show full range of motion.  There is no tenderness over the sacro-iliac joints, sciatic notch, or greater trochanters.   She has full range of motion of the knees.  She has a bursal band present in the prepatellar bursa on the right knee.  There is no bursal effusion.  There is no effusion in the joints.  She has some tenderness of the bursal band and also at the anterolateral aspect of the right knee.    She has no ligamentous laxity.  She had negative medial and lateral Abby's.    Assessment:  Right prepatellar bursitis with bursal band.  Plan: She should avoid kneeling on the knee is much as possible.  Use a knee pad when she does kneel.  We could consider excision in the future if it gives her enough trouble.    "

## 2022-09-26 ENCOUNTER — NURSE TRIAGE (OUTPATIENT)
Dept: INTERNAL MEDICINE | Facility: CLINIC | Age: 77
End: 2022-09-26

## 2022-09-26 ENCOUNTER — NURSE TRIAGE (OUTPATIENT)
Dept: NURSING | Facility: CLINIC | Age: 77
End: 2022-09-26

## 2022-09-26 NOTE — TELEPHONE ENCOUNTER
High BP today 167/100, not feeling well.  Terrible chest pain this morning that went into left shoulder.    Pain did last at least 5 minutes.    Pateint refusing ER disposition and wants appointment with pcp.    Warm transferred her to scheduling and instructed to go to ER again if no appointment is available today.    Oriana Piper RN  [facility] Nurse Advisor        Reason for Disposition    Systolic BP >= 160 OR Diastolic >= 100, and any cardiac or neurologic symptoms (e.g., chest pain, difficulty breathing, unsteady gait, blurred vision)    Additional Information    Negative: Symptom is main concern (e.g., headache, chest pain)    Negative: Low blood pressure is main concern    Negative: Sounds like a life-threatening emergency to the triager    Protocols used: BLOOD PRESSURE - HIGH-A-OH

## 2022-09-26 NOTE — TELEPHONE ENCOUNTER
Patient calling stating she wants an appointment today with Dr. Epstein for high blood pressure because BP was 167/100. She also stated she has chest pain in L arm and neck but states that is from her statin.  Denies any other symptoms.     RN advised patient per protocol she should go to ED. Patient did not want to go to ED. RN strongly encouraged he with the chest pain she should be evaluated, but informed patient RN would send a message to provider.      Patient then took BP while RN was on phone and BP was 200/109. RN then encouraged patient to go into ED. Patient agreed and understood but stated it would have to be later. RN encouraged patient to go in right away.     Routing to PCP as FYI if he would like to make any adjustment in BP medications.     MESHA Gonzalez, RN       Reason for Disposition    Systolic BP >= 160 OR Diastolic >= 100    Systolic BP >= 160 OR Diastolic >= 100, and any cardiac or neurologic symptoms (e.g., chest pain, difficulty breathing, unsteady gait, blurred vision)    Systolic BP >= 200 OR Diastolic >= 120 and having NO cardiac or neurologic symptoms    Additional Information    Negative: Sounds like a life-threatening emergency to the triager    Negative: Symptom is main concern (e.g., headache, chest pain)    Negative: Low blood pressure is main concern    Negative: Pregnant 20 or more weeks (or postpartum < 6 weeks) with new hand or face swelling    Negative: Pregnant 20 or more weeks (or postpartum < 6 weeks) and Systolic BP >= 160 OR Diastolic >= 100    Negative: Patient sounds very sick or weak to the triager    Protocols used: BLOOD PRESSURE - HIGH-A-OH

## 2022-09-26 NOTE — TELEPHONE ENCOUNTER
She needs to go to the ER immediately. Normally bp is not that high and if having chest pains need to be evaluated. Can increase her bp medications later if heart is ok.

## 2022-09-26 NOTE — TELEPHONE ENCOUNTER
I called the patient back.     BP was up today and this weekend on and off.  Feels good now and bp is ok.      Occasionally forgets her pill. Will try better, has good bp.      Will increase losartan 25 mg to bid.

## 2022-10-09 ENCOUNTER — HEALTH MAINTENANCE LETTER (OUTPATIENT)
Age: 77
End: 2022-10-09

## 2022-11-11 ENCOUNTER — OFFICE VISIT (OUTPATIENT)
Dept: INTERNAL MEDICINE | Facility: CLINIC | Age: 77
End: 2022-11-11
Payer: COMMERCIAL

## 2022-11-11 VITALS
OXYGEN SATURATION: 97 % | DIASTOLIC BLOOD PRESSURE: 78 MMHG | TEMPERATURE: 97 F | SYSTOLIC BLOOD PRESSURE: 154 MMHG | RESPIRATION RATE: 10 BRPM | HEART RATE: 60 BPM | HEIGHT: 68 IN | WEIGHT: 191.8 LBS | BODY MASS INDEX: 29.07 KG/M2

## 2022-11-11 DIAGNOSIS — Z86.79 S/P AORTIC ANEURYSM REPAIR: ICD-10-CM

## 2022-11-11 DIAGNOSIS — N18.31 STAGE 3A CHRONIC KIDNEY DISEASE (H): ICD-10-CM

## 2022-11-11 DIAGNOSIS — I10 HYPERTENSION GOAL BP (BLOOD PRESSURE) < 140/90: ICD-10-CM

## 2022-11-11 DIAGNOSIS — Z98.890 S/P AORTIC ANEURYSM REPAIR: ICD-10-CM

## 2022-11-11 DIAGNOSIS — Z95.1 S/P CABG (CORONARY ARTERY BYPASS GRAFT): ICD-10-CM

## 2022-11-11 DIAGNOSIS — E78.5 HYPERLIPIDEMIA LDL GOAL <100: ICD-10-CM

## 2022-11-11 DIAGNOSIS — I71.9 AORTIC ANEURYSM WITHOUT RUPTURE, UNSPECIFIED PORTION OF AORTA (H): ICD-10-CM

## 2022-11-11 DIAGNOSIS — Z00.00 MEDICARE ANNUAL WELLNESS VISIT, SUBSEQUENT: Primary | ICD-10-CM

## 2022-11-11 PROBLEM — I48.91 ATRIAL FIBRILLATION WITH RVR (H): Status: RESOLVED | Noted: 2017-06-13 | Resolved: 2022-11-11

## 2022-11-11 PROBLEM — C17.0 DUODENAL ADENOCARCINOMA (H): Status: RESOLVED | Noted: 2019-07-03 | Resolved: 2022-11-11

## 2022-11-11 LAB
ALBUMIN SERPL-MCNC: 3.6 G/DL (ref 3.4–5)
ALP SERPL-CCNC: 96 U/L (ref 40–150)
ALT SERPL W P-5'-P-CCNC: 30 U/L (ref 0–50)
ANION GAP SERPL CALCULATED.3IONS-SCNC: 5 MMOL/L (ref 3–14)
AST SERPL W P-5'-P-CCNC: 23 U/L (ref 0–45)
BILIRUB SERPL-MCNC: 0.7 MG/DL (ref 0.2–1.3)
BUN SERPL-MCNC: 19 MG/DL (ref 7–30)
CALCIUM SERPL-MCNC: 9.4 MG/DL (ref 8.5–10.1)
CHLORIDE BLD-SCNC: 108 MMOL/L (ref 94–109)
CHOLEST SERPL-MCNC: 205 MG/DL
CO2 SERPL-SCNC: 28 MMOL/L (ref 20–32)
CREAT SERPL-MCNC: 0.82 MG/DL (ref 0.52–1.04)
FASTING STATUS PATIENT QL REPORTED: YES
GFR SERPL CREATININE-BSD FRML MDRD: 73 ML/MIN/1.73M2
GLUCOSE BLD-MCNC: 101 MG/DL (ref 70–99)
HDLC SERPL-MCNC: 56 MG/DL
LDLC SERPL CALC-MCNC: 129 MG/DL
NONHDLC SERPL-MCNC: 149 MG/DL
POTASSIUM BLD-SCNC: 4 MMOL/L (ref 3.4–5.3)
PROT SERPL-MCNC: 7.3 G/DL (ref 6.8–8.8)
SODIUM SERPL-SCNC: 141 MMOL/L (ref 133–144)
TRIGL SERPL-MCNC: 100 MG/DL

## 2022-11-11 PROCEDURE — 90662 IIV NO PRSV INCREASED AG IM: CPT | Performed by: INTERNAL MEDICINE

## 2022-11-11 PROCEDURE — 80061 LIPID PANEL: CPT | Performed by: INTERNAL MEDICINE

## 2022-11-11 PROCEDURE — 36415 COLL VENOUS BLD VENIPUNCTURE: CPT | Performed by: INTERNAL MEDICINE

## 2022-11-11 PROCEDURE — G0439 PPPS, SUBSEQ VISIT: HCPCS | Performed by: INTERNAL MEDICINE

## 2022-11-11 PROCEDURE — G0008 ADMIN INFLUENZA VIRUS VAC: HCPCS | Performed by: INTERNAL MEDICINE

## 2022-11-11 PROCEDURE — 80053 COMPREHEN METABOLIC PANEL: CPT | Performed by: INTERNAL MEDICINE

## 2022-11-11 PROCEDURE — 99214 OFFICE O/P EST MOD 30 MIN: CPT | Mod: 25 | Performed by: INTERNAL MEDICINE

## 2022-11-11 RX ORDER — LOSARTAN POTASSIUM 25 MG/1
25 TABLET ORAL EVERY MORNING
Qty: 180 TABLET | Refills: 3 | Status: SHIPPED | OUTPATIENT
Start: 2022-11-11 | End: 2023-12-13

## 2022-11-11 RX ORDER — ATORVASTATIN CALCIUM 20 MG/1
TABLET, FILM COATED ORAL
Qty: 90 TABLET | Refills: 3 | Status: SHIPPED | OUTPATIENT
Start: 2022-11-11 | End: 2023-12-13

## 2022-11-11 RX ORDER — METOPROLOL TARTRATE 25 MG/1
25 TABLET, FILM COATED ORAL 2 TIMES DAILY
Qty: 180 TABLET | Refills: 3 | Status: SHIPPED | OUTPATIENT
Start: 2022-11-11 | End: 2023-12-13

## 2022-11-11 ASSESSMENT — ENCOUNTER SYMPTOMS
HEMATURIA: 0
COUGH: 0
EYE PAIN: 0
JOINT SWELLING: 0
DIZZINESS: 0
DIARRHEA: 0
ABDOMINAL PAIN: 0
HEMATOCHEZIA: 0
ARTHRALGIAS: 0
WEAKNESS: 0
SHORTNESS OF BREATH: 0
PALPITATIONS: 0
CONSTIPATION: 0
HEADACHES: 1
MYALGIAS: 0
FEVER: 0
PARESTHESIAS: 0
FREQUENCY: 0
BREAST MASS: 0
SORE THROAT: 0
NAUSEA: 0
HEARTBURN: 0
NERVOUS/ANXIOUS: 0
CHILLS: 0
DYSURIA: 0

## 2022-11-11 ASSESSMENT — ACTIVITIES OF DAILY LIVING (ADL): CURRENT_FUNCTION: NO ASSISTANCE NEEDED
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
## 2022-11-14 LAB
CREAT UR-MCNC: 139 MG/DL
MICROALBUMIN UR-MCNC: 12 MG/L
MICROALBUMIN/CREAT UR: 8.63 MG/G CR (ref 0–25)

## 2022-11-14 PROCEDURE — 82043 UR ALBUMIN QUANTITATIVE: CPT | Performed by: INTERNAL MEDICINE

## 2022-12-12 ENCOUNTER — OFFICE VISIT (OUTPATIENT)
Dept: DERMATOLOGY | Facility: CLINIC | Age: 77
End: 2022-12-12
Payer: COMMERCIAL

## 2022-12-12 DIAGNOSIS — L57.0 ACTINIC KERATOSES: Primary | ICD-10-CM

## 2022-12-12 DIAGNOSIS — L82.1 SEBORRHEIC KERATOSES: ICD-10-CM

## 2022-12-12 DIAGNOSIS — Z87.2 HISTORY OF ACTINIC KERATOSES: ICD-10-CM

## 2022-12-12 DIAGNOSIS — L81.4 SOLAR LENTIGO: ICD-10-CM

## 2022-12-12 PROCEDURE — 17003 DESTRUCT PREMALG LES 2-14: CPT | Performed by: DERMATOLOGY

## 2022-12-12 PROCEDURE — 17000 DESTRUCT PREMALG LESION: CPT | Performed by: DERMATOLOGY

## 2022-12-12 PROCEDURE — 99214 OFFICE O/P EST MOD 30 MIN: CPT | Mod: 25 | Performed by: DERMATOLOGY

## 2022-12-12 NOTE — LETTER
12/12/2022         RE: Marilia Bean  [address]  [place] [address]        Dear Colleague,    Thank you for referring your patient, Marilia Bean, to the [facility]. Please see a copy of my visit note below.    [facility] Dermatology Note  Encounter Date: Dec 12, 2022  Office Visit     Dermatology Problem List:  1. 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 AKs, s/p cryo  3. ISKs, s/p cryo    ____________________________________________    Assessment & Plan:    # Gardiner syndrome: Unknown gene (presumed carrier as patient has had colon cancer and son has been tested and showed mutation). Discussed increased risk of skin cancers, including Shyam and sebaceous carcinomas.   - Recommended yearly skin checks.    # Actinic keratosis - right nasal sidewall x 2, left nasal bridge x 4. Based on the location and chronic irritation to this lesion, treatment with cryotherapy is warranted.   - See cryo procedure note.     # Diffuse actinic damage. Chronic, flare. Reviewed further treatment with Efudex cream, discussed efficacy and side effects. After discussion, patient defers at this time.  - Consider Efudex on follow up.  - Reviewed GoodRx, information provided in AVS.    # Seborrheic keratosis, non irritated on the trunk and extremities. Explained to patient benign nature of lesion. No treatment is necessary at this time unless the lesion changes or becomes symptomatic.     - Monitor for changes.     # Solar lentigines on the sun exposed skin areas. Benign nature was discussed. No further intervention required at this time.       Procedures Performed:   - Cryotherapy procedure note, location(s): right nasal sidewall, left nasal bridge. After verbal consent and discussion of risks and benefits including, but not limited to, dyspigmentation/scar, blister, and pain, 6 AK(s) was(were) treated with 1-2 mm freeze border for 1-2 cycles with  liquid nitrogen. Post cryotherapy instructions were provided.    Follow-up: 1 year(s) in-person for a skin check, or earlier for new or changing lesions    Staff and Scribe:     Scribe Disclosure:   I, Tariq Josuehayde, am serving as a scribe to document services personally performed by this physician, Dr. Luis Armando Cortez, based on data collection and the provider's statements to me.     Provider Disclosure:   The documentation recorded by the scribe accurately reflects the services I personally performed and the decisions made by me.    Luis Armando Cortez MD    Department of Dermatology  [facility]: Phone: [phone], Fax:[phone]  [facility]: Phone: [phone] Fax: [phone]  ____________________________________________    CC: Derm Problem (Face check only. Patient declined full body skin check. Personal history of AK.)    HPI:  Ms. Marilia Bean is a(n) 77 year old female who presents today as a return patient for a spot check.    Last seen 10/27/20 by Dr. Wilson for a skin check. At that time, 5 AKs were treated with cryotherapy.     Today, she would like her nose rechecked that had previously been treated with cryotherapy. She also notes several spots on the left temple and left cheek.     The patient otherwise denies any new or concerning lesions. No bleeding, painful, pruritic, or changing lesions. Health otherwise stable. No other skin concerns.       Labs Reviewed:  N/A    Physical Exam:  Vitals: There were no vitals taken for this visit.  SKIN: Focused examination of face and shoulders was performed.  - Scattered brown macules on sun exposed areas.   - There are waxy stuck on tan to brown papules on the trunk and extremities.    - There are erythematous macules with overyling adherent scale on the right nasal sidewall x 2, left nasal bridge x 4.    - No other lesions of  concern on areas examined.     Medications:  Current Outpatient Medications   Medication     aspirin 81 MG EC tablet     atorvastatin (LIPITOR) 20 MG tablet     losartan (COZAAR) 25 MG tablet     metoprolol tartrate (LOPRESSOR) 25 MG tablet     No current facility-administered medications for this visit.      Past Medical History:   Patient Active Problem List   Diagnosis     Postmenopausal atrophic vaginitis     Aortic aneurysm (H)     Hyperlipidemia with target LDL less than 70     Pre-operative cardiovascular examination     Benign essential hypertension     Status post coronary angiogram     Status post thoracic aortic aneurysm repair     Abdominal pain with vomiting     Bowel obstruction (H)     Chronic kidney disease, stage 3 (H)     Ductal carcinoma in situ (DCIS) of left breast     Colon cancer (H)     Status post aorto-coronary artery bypass graft     Overlapping malignant neoplasm of colon (H)     Gardiner syndrome     Prepatellar bursitis of right knee     Past Medical History:   Diagnosis Date     Aortic aneurysm (H) 07/01/2007    see 7/07 [place] report -follow yearly, treat high BP is occurs Problem list name updated by automated process. Provider to review     Aortic aneurysm of unspecified site without mention of rupture 07/2007    4.4 cm ascending aorta noted in 2007     Asymptomatic postmenopausal status (age-related) (natural)     on HRT  Prempro -weaning off 5/'2004     CAD (coronary artery disease)     LAD     Family history of Gardiner syndrome      Hyperlipidemia LDL goal <100 10/31/2010     Hypertension goal BP (blood pressure) < 140/90 02/09/2016     Leiomyoma of uterus, unspecified     Uterine fibroid     Lump or mass in breast 07/2004    rt. nodule - bx neg     Gardiner syndrome      Personal history of colonic polyps      Postmenopausal atrophic vaginitis 08/20/2006     Pulmonary nodule     incidental noted in 2007 during [place]     Pure hypercholesterolemia     mild, diet - low cholesterol, low  fat.10/06 start Lovastatin       Again, thank you for allowing me to participate in the care of your patient.        Sincerely,        Luis Armando Cortez MD

## 2022-12-12 NOTE — PROGRESS NOTES
[facility] Dermatology Note  Encounter Date: Dec 12, 2022  Office Visit     Dermatology Problem List:  1. 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 AKs, s/p cryo  3. ISKs, s/p cryo    ____________________________________________    Assessment & Plan:    # Gardiner syndrome: Unknown gene (presumed carrier as patient has had colon cancer and son has been tested and showed mutation). Discussed increased risk of skin cancers, including Shyam and sebaceous carcinomas.   - Recommended yearly skin checks.    # Actinic keratosis - right nasal sidewall x 2, left nasal bridge x 4. Based on the location and chronic irritation to this lesion, treatment with cryotherapy is warranted.   - See cryo procedure note.     # Diffuse actinic damage. Chronic, flare. Reviewed further treatment with Efudex cream, discussed efficacy and side effects. After discussion, patient defers at this time.  - Consider Efudex on follow up.  - Reviewed GoodRx, information provided in AVS.    # Seborrheic keratosis, non irritated on the trunk and extremities. Explained to patient benign nature of lesion. No treatment is necessary at this time unless the lesion changes or becomes symptomatic.     - Monitor for changes.     # Solar lentigines on the sun exposed skin areas. Benign nature was discussed. No further intervention required at this time.       Procedures Performed:   - Cryotherapy procedure note, location(s): right nasal sidewall, left nasal bridge. After verbal consent and discussion of risks and benefits including, but not limited to, dyspigmentation/scar, blister, and pain, 6 AK(s) was(were) treated with 1-2 mm freeze border for 1-2 cycles with liquid nitrogen. Post cryotherapy instructions were provided.    Follow-up: 1 year(s) in-person for a skin check, or earlier for new or changing lesions    Staff and Scribe:     Scribe Disclosure:   Tariq ORNELAS, am serving as a scribe to document  services personally performed by this physician, Dr. Luis Armando Cortez, based on data collection and the provider's statements to me.     Provider Disclosure:   The documentation recorded by the scribe accurately reflects the services I personally performed and the decisions made by me.    Luis Armando Cortez MD    Department of Dermatology  [facility]: Phone: [phone], Fax:[phone]  [facility]: Phone: [phone] Fax: [phone]  ____________________________________________    CC: Derm Problem (Face check only. Patient declined full body skin check. Personal history of AK.)    HPI:  Ms. Marilia Bean is a(n) 77 year old female who presents today as a return patient for a spot check.    Last seen 10/27/20 by Dr. Wilson for a skin check. At that time, 5 AKs were treated with cryotherapy.     Today, she would like her nose rechecked that had previously been treated with cryotherapy. She also notes several spots on the left temple and left cheek.     The patient otherwise denies any new or concerning lesions. No bleeding, painful, pruritic, or changing lesions. Health otherwise stable. No other skin concerns.       Labs Reviewed:  N/A    Physical Exam:  Vitals: There were no vitals taken for this visit.  SKIN: Focused examination of face and shoulders was performed.  - Scattered brown macules on sun exposed areas.   - There are waxy stuck on tan to brown papules on the trunk and extremities.    - There are erythematous macules with overyling adherent scale on the right nasal sidewall x 2, left nasal bridge x 4.    - No other lesions of concern on areas examined.     Medications:  Current Outpatient Medications   Medication     aspirin 81 MG EC tablet     atorvastatin (LIPITOR) 20 MG tablet     losartan (COZAAR) 25 MG tablet     metoprolol tartrate (LOPRESSOR) 25 MG tablet     No current  facility-administered medications for this visit.      Past Medical History:   Patient Active Problem List   Diagnosis     Postmenopausal atrophic vaginitis     Aortic aneurysm (H)     Hyperlipidemia with target LDL less than 70     Pre-operative cardiovascular examination     Benign essential hypertension     Status post coronary angiogram     Status post thoracic aortic aneurysm repair     Abdominal pain with vomiting     Bowel obstruction (H)     Chronic kidney disease, stage 3 (H)     Ductal carcinoma in situ (DCIS) of left breast     Colon cancer (H)     Status post aorto-coronary artery bypass graft     Overlapping malignant neoplasm of colon (H)     Gardiner syndrome     Prepatellar bursitis of right knee     Past Medical History:   Diagnosis Date     Aortic aneurysm (H) 07/01/2007    see 7/07 [place] report -follow yearly, treat high BP is occurs Problem list name updated by automated process. Provider to review     Aortic aneurysm of unspecified site without mention of rupture 07/2007    4.4 cm ascending aorta noted in 2007     Asymptomatic postmenopausal status (age-related) (natural)     on HRT  Prempro -weaning off 5/'2004     CAD (coronary artery disease)     LAD     Family history of Gardiner syndrome      Hyperlipidemia LDL goal <100 10/31/2010     Hypertension goal BP (blood pressure) < 140/90 02/09/2016     Leiomyoma of uterus, unspecified     Uterine fibroid     Lump or mass in breast 07/2004    rt. nodule - bx neg     Gardiner syndrome      Personal history of colonic polyps      Postmenopausal atrophic vaginitis 08/20/2006     Pulmonary nodule     incidental noted in 2007 during [place]     Pure hypercholesterolemia     mild, diet - low cholesterol, low fat.10/06 start Lovastatin

## 2022-12-12 NOTE — PATIENT INSTRUCTIONS
Cryotherapy    What is it?  Use of a very cold liquid, such as liquid nitrogen, to freeze and destroy abnormal skin cells that need to be removed    What should I expect?  Tenderness and redness  A small blister that might grow and fill with dark purple blood. There may be crusting.  More than one treatment may be needed if the lesions do not go away.    How do I care for the treated area?  Gently wash the area with your hands when bathing.  Use a thin layer of Vaseline to help with healing. You may use a Band-Aid.   The area should heal within 7-10 days and may leave behind a pink or lighter color.   Do not use an antibiotic or Neosporin ointment.   You may take acetaminophen (Tylenol) for pain.     Call your doctor if you have:  Severe pain  Signs of infection (warmth, redness, cloudy yellow drainage, and or a bad smell)  Questions or concerns    Who should I call with questions?      [facility]: [phone]      [facility]: [phone]      For urgent needs outside of business hours call the [facility] at [phone] and ask for the dermatology resident on call         What is GoodRx?    Why do I need GoodRx?  Prescription drug prices are not regulated. The cost of a prescription may differ by more than $100 between pharmacies across the street from each other!    Insurance isn t helping like it used to. In the past 10 years, insurance companies started passing 25-80% more of the cost of drugs onto patients.    How can GoodRx help me?  GoodRx gathers current prices and discounts to help you find the lowest cost pharmacy for your prescriptions.    GoodRx is 100% free. No registration required.    How to Use GoodRx    How do I find discounts for my drug?  It s easy. Just go to the home page, type in your drug s name in the search field, and click the  Find the Lowest Price  button.    We ll even help you spell the name of your  prescription.    What are GoodRx coupons?  GoodRx coupons will help you pay less than the cash price for your prescription. They re free to use and are accepted at virtually every [place] pharmacy.    Your pharmacist will know how to enter the codes on the coupon to pull up the lowest discount available.    How do I use a GoodRx coupon?  It s similar to using a coupon at a grocery store. Simply print the coupon and bring it with you to the pharmacy when you  your prescription. The pharmacist will enter the numbers on the coupon into their system to find the discount.    Don t have a printer or want to save paper and ink cartridges? You can show the coupon on your phone by:    A) Sending the coupon to yourself via email or text    B) Or using our mobile jonny    C) Or visiting our mobile website    What if I have insurance or Medicare?  Many insurance plans have high deductibles or limited formularies that don t cover the drugs you need.    GoodRx may be able to find you a lower price than your insurance co-pay. Hundreds of generic medications are available for $4 or even free without insurance.

## 2023-01-10 NOTE — PROGRESS NOTES
06/07/17 1400   Quick Adds   Type of Visit Initial Occupational Therapy Evaluation   Living Environment   Lives With spouse   Living Arrangements house   Home Accessibility bed and bath on same level;stairs (1 railing present);stairs to enter home;tub/shower is not walk in   Number of Stairs to Enter Home 4  (house on one side and railing on left when going up. )   Number of Stairs Within Home (has basement but does not use)   Transportation Available car   Self-Care   Dominant Hand right   Usual Activity Tolerance moderate   Current Activity Tolerance fair   Regular Exercise no   Equipment Currently Used at Home none   Functional Level Prior   Ambulation 0-->independent   Transferring 0-->independent   Toileting 0-->independent   Bathing 0-->independent   Dressing 0-->independent   Eating 0-->independent   Communication 0-->understands/communicates without difficulty   Swallowing 0-->swallows foods/liquids without difficulty   Cognition 0 - no cognition issues reported   Fall history within last six months no   Which of the above functional risks had a recent onset or change? ambulation;transferring;toileting;bathing;dressing   General Information   Onset of Illness/Injury or Date of Surgery - Date 06/05/17   Referring Physician Gregory Fitzpatrick MD   Patient/Family Goals Statement Pt would like to return to [facility] and be independent with functional mobility and ADLs/IADLs.    Additional Occupational Profile Info/Pertinent History of Current Problem Marilia Bean is a 72 year old female who is status post repair of ascending aortic aneurysm and CAB x 1 on 6/5 with Dr Soto.    Precautions/Limitations fall precautions;sternal precautions   Weight-Bearing Status - LLE full weight-bearing   Weight-Bearing Status - RLE full weight-bearing   Cognitive Status Examination   Orientation orientation to person, place and time   Level of Consciousness alert   Able to Follow Commands mild impairment   Personal Safety (Cognitive)  Caller: Alayna Dominguez    Relationship: Self    Best call back number: [phone]    Requested Prescriptions:   Requested Prescriptions     Pending Prescriptions Disp Refills   • pravastatin (PRAVACHOL) 40 MG tablet [Pharmacy Med Name: PRAVASTATIN 40MG TABLETS] 45 tablet      Sig: TAKE 1 TABLET BY MOUTH EVERY OTHER DAY      Pharmacy where request should be sent: [id number] - [facility] [place] - [address]  AT [facility] [address] - [phone]  - [phone] FX     Additional details provided by patient:     PATIENT ADVISED SHE HAS 2 PILLS LEFT.    Does the patient have less than a 3 day supply:  [x] Yes  [] No    Would you like a call back once the refill request has been completed: [] Yes [x] No    If the office needs to give you a call back, can they leave a voicemail: [] Yes [x] No    Sara Ram Rep   01/10/23 12:27 EST   mild impairment   Visual Perception   Visual Perception No deficits were identified;Wears glasses   Sensory Examination   Sensory Quick Adds No deficits were identified   Sensory Comments Pt reports some tingling in BUE hands due to swelling per pt. Pt able to feel light touch and pressure throughout BUE's and BLE's.    Pain Assessment   Patient Currently in Pain No   Range of Motion (ROM)   ROM Comment BUE AROM WFL within precuations.    Strength   Strength Comments Pt demonstrates a moderate deficit in BUE  strength.    Coordination   Fine Motor Coordination Pt demonstrates decreased fine motor control and coordination due to swelling of BUE's.    Mobility   Bed Mobility Comments Min A and Max vc's.    Transfer Skills   Transfer Comments Min A and Max vc's.    Activities of Daily Living Analysis   Impairments Contributing to Impaired Activities of Daily Living balance impaired;fear and anxiety;flexibility decreased;post surgical precautions;strength decreased   General Therapy Interventions   Planned Therapy Interventions ADL retraining;IADL retraining;bed mobility training;fine motor coordination training;ROM;strengthening;stretching;transfer training;home program guidelines;progressive activity/exercise   Clinical Impression   Criteria for Skilled Therapeutic Interventions Met yes, treatment indicated   OT Diagnosis Decreased independence with functional transfers and ADLs. Pt    Influenced by the following impairments Decreased strength, and activity tolerance, Impaired functional mobility.    Assessment of Occupational Performance 3-5 Performance Deficits   Identified Performance Deficits Decreased independence and safety with funcitonal transfers and ADLs/IADLs. Decreased stregnth/ and activity tolerance.    Clinical Decision Making (Complexity) Low complexity   Therapy Frequency daily   Predicted Duration of Therapy Intervention (days/wks) 6/13/2017   Anticipated Discharge Disposition Home with  "Outpatient Therapy   Risks and Benefits of Treatment have been explained. Yes   Patient, Family & other staff in agreement with plan of care Yes   [facility] TM \"6 Clicks\"   2016, Trustees of [facility], under license to [employer].  All rights reserved.   6 Clicks Short Forms Daily Activity Inpatient Short Form   [facility] AM-PAC  \"6 Clicks\" Daily Activity Inpatient Short Form   1. Putting on and taking off regular lower body clothing? 2 - A Lot   2. Bathing (including washing, rinsing, drying)? 2 - A Lot   3. Toileting, which includes using toilet, bedpan or urinal? 3 - A Little   4. Putting on and taking off regular upper body clothing? 2 - A Lot   5. Taking care of personal grooming such as brushing teeth? 3 - A Little   6. Eating meals? 4 - None   Daily Activity Raw Score (Score out of 24.Lower scores equate to lower levels of function) 16   Total Evaluation Time   Total Evaluation Time (Minutes) 12     "

## 2023-05-02 ENCOUNTER — ANCILLARY PROCEDURE (OUTPATIENT)
Dept: GENERAL RADIOLOGY | Facility: CLINIC | Age: 78
End: 2023-05-02
Attending: ORTHOPAEDIC SURGERY
Payer: COMMERCIAL

## 2023-05-02 ENCOUNTER — OFFICE VISIT (OUTPATIENT)
Dept: ORTHOPEDICS | Facility: CLINIC | Age: 78
End: 2023-05-02
Payer: COMMERCIAL

## 2023-05-02 VITALS — WEIGHT: 190 LBS | RESPIRATION RATE: 18 BRPM | HEIGHT: 67 IN | BODY MASS INDEX: 29.82 KG/M2

## 2023-05-02 DIAGNOSIS — M22.42 CHONDROMALACIA OF LEFT PATELLA: ICD-10-CM

## 2023-05-02 DIAGNOSIS — M25.562 LEFT KNEE PAIN, UNSPECIFIED CHRONICITY: Primary | ICD-10-CM

## 2023-05-02 DIAGNOSIS — M25.562 LEFT KNEE PAIN, UNSPECIFIED CHRONICITY: ICD-10-CM

## 2023-05-02 PROCEDURE — 99213 OFFICE O/P EST LOW 20 MIN: CPT | Performed by: ORTHOPAEDIC SURGERY

## 2023-05-02 PROCEDURE — 73562 X-RAY EXAM OF KNEE 3: CPT | Mod: TC | Performed by: RADIOLOGY

## 2023-05-02 NOTE — PROGRESS NOTES
"Marilia Bean is a 78 year old female who is seen as self referral for left knee pain.  This started in February without history of injury.  She has occasional sharp shooting pain in the knee which causes her to give way.  She rates her pain at 3 out of 10.  It is worse with tripping and sleeping.  At times bending the knee has been painful so she is walked by swinging the leg out to the side.  Sitting on a barstool with her feet off the floor hurts the knee and causes some tingling in the leg.  She has previously had problems with the right knee with a prepatellar bursitis.  This is resolved.  She has used occasional ibuprofen which helps the left knee.    X-ray shows no significant arthritic changes in the left knee.    Past Medical History:   Diagnosis Date     Aortic aneurysm (H) 07/01/2007    see 7/07 [place] report -follow yearly, treat high BP is occurs Problem list name updated by automated process. Provider to review     Aortic aneurysm of unspecified site without mention of rupture 07/2007    4.4 cm ascending aorta noted in 2007     Asymptomatic postmenopausal status (age-related) (natural)     on HRT  Prempro -weaning off 5/'2004     CAD (coronary artery disease)     LAD     Family history of Gardiner syndrome      Hyperlipidemia LDL goal <100 10/31/2010     Hypertension goal BP (blood pressure) < 140/90 02/09/2016     Leiomyoma of uterus, unspecified     Uterine fibroid     Lump or mass in breast 07/2004    rt. nodule - bx neg     Gardiner syndrome      Personal history of colonic polyps      Postmenopausal atrophic vaginitis 08/20/2006     Pulmonary nodule     incidental noted in 2007 during [place]     Pure hypercholesterolemia     mild, diet - low cholesterol, low fat.10/06 start Lovastatin       Past Surgical History:   Procedure Laterality Date     BIOPSY BREAST NEEDLE LOCALIZATION Left 8/16/2021    Procedure: LEFT Wire-Localized Segmental Mastectomy (=\"Lumpectomy\");  Surgeon: Bertha Toro MD;  " Location: [facility]     BYPASS GRAFT ARTERY CORONARY N/A 6/5/2017    Procedure: BYPASS GRAFT ARTERY CORONARY;;  Surgeon: Akshat Soto MD;  [facility]: [facility]     C DEXA INTERPRETATION, AXIAL  12/21/01    wnl. 7/2005 wnl but lower     COLONOSCOPY  05/07/07    Repeat in 1 year for surveillance     COLONOSCOPY  12/10/08    repeat 1 year  -see 1/2009 letter     COLONOSCOPY  03/31/10     COLONOSCOPY  10/31/2011    Procedure:COMBINED COLONOSCOPY, SINGLE BIOPSY/POLYPECTOMY BY BIOPSY; colonoscopy with polypectomy by biopsy; Surgeon:JESSICA GARCIA; Location:[facility]     COLONOSCOPY  12/6/2013    Procedure: COMBINED COLONOSCOPY, SINGLE BIOPSY/POLYPECTOMY BY BIOPSY;  Colonoscopy, Polypectomies;  Surgeon: Herbert Salinas MD;  Location: [facility]     COLONOSCOPY N/A 12/8/2015    Procedure: COLONOSCOPY;  Surgeon: Jared Carbajal MD;  Location: [facility]     COLONOSCOPY N/A 4/26/2017    Procedure: COMBINED COLONOSCOPY, SINGLE OR MULTIPLE BIOPSY/POLYPECTOMY BY BIOPSY;  Colonoscopy with polypectomies with forceps and snare;  Surgeon: Herbert Salinas MD;  [facility]: [facility]     COLONOSCOPY N/A 5/10/2021    Procedure: Colonoscopy, With Polypectomy And Biopsy;  Surgeon: Franklyn Hernandez MD;  Location: [facility]     COLONOSCOPY WITH CO2 INSUFFLATION N/A 5/10/2021    Procedure: COLONOSCOPY, WITH CO2 INSUFFLATION;  Surgeon: Franklyn Hernandez MD;  [facility]: [facility]     COLONOSCOPY WITH CO2 INSUFFLATION N/A 5/26/2022    Procedure: COLONOSCOPY, WITH CO2 INSUFFLATION;  Surgeon: Franklyn Hernandez MD;  [facility]     COMBINED ESOPHAGOSCOPY, GASTROSCOPY, DUODENOSCOPY (EGD) WITH CO2 INSUFFLATION N/A 5/10/2021    Procedure: ESOPHAGOGASTRODUODENOSCOPY, WITH CO2 INSUFFLATION;  Surgeon: Franklyn Hernandez MD;  [facility]     ENDOSCOPIC INSERTION TUBE JEJUNOSTOMY N/A 8/5/2019    Procedure: Gastrojejunostomy tube placement with gastropexy;  Surgeon: Ford Mann MD;  [facility]      ESOPHAGOSCOPY, GASTROSCOPY, DUODENOSCOPY (EGD), COMBINED N/A 12/8/2015    Procedure: COMBINED ESOPHAGOSCOPY, GASTROSCOPY, DUODENOSCOPY (EGD), BIOPSY SINGLE OR MULTIPLE;  Surgeon: Jared Carbajal MD;  [facility]     ESOPHAGOSCOPY, GASTROSCOPY, DUODENOSCOPY (EGD), COMBINED N/A 7/31/2019    Procedure: Endoscopic ultrasound with cystoduodenostomy, stent placement x2, stent dilation, and nasojejunal tube placement;  Surgeon: Ford Mann MD;  [facility]     ESOPHAGOSCOPY, GASTROSCOPY, DUODENOSCOPY (EGD), COMBINED N/A 8/5/2019    Procedure: ESOPHAGOGASTRODUODENOSCOPY (EGD);  Surgeon: Ford Mann MD;  [facility]     ESOPHAGOSCOPY, GASTROSCOPY, DUODENOSCOPY (EGD), COMBINED N/A 8/12/2019    Procedure: ESOPHAGOGASTRODUODENOSCOPY (EGD) with GJ exchange, duodenum stent removal.;  Surgeon: Ford Mann MD;  [facility]     ESOPHAGOSCOPY, GASTROSCOPY, DUODENOSCOPY (EGD), COMBINED N/A 5/10/2021    Procedure: Esophagogastroduodenoscopy, With Biopsy;  Surgeon: Franklyn Hernandez MD;  Location: [facility]     HC ECP WITH CATARACT SURGERY Bilateral 2015, 2016     HC LAPAROSCOPY, SURGICAL; APPENDECTOMY  10/31/2004     HC LAPAROSCOPY, SURGICAL; CHOLECYSTECTOMY  2000    Cholecystectomy, Laparoscopic     HC REVISE MEDIAN N/CARPAL TUNNEL SURG  10/01/10    left     HYSTERECTOMY, ELVIN  12/14/09    TAHBSO, MMK     LAPAROSCOPIC ASSISTED COLECTOMY N/A 6/30/2021    Procedure: Laparoscopic subtotal colectomy, ileosigmoid anastomosis, lysis of adhesions for 150 mins, splenic flexure mobilization;  Surgeon: Akil Lay MD;  [facility]     REPAIR ANEURYSM ASCENDING AORTA N/A 6/5/2017    Procedure: REPAIR ANEURYSM ASCENDING AORTA;  Median Sternotomy, Ascending Aortic Aneurysm Repair, Coronary Artery Bypass Graft x1 on pump oxygenator;  Surgeon: Akshat Soto MD;  [facility]     REPLACE GASTROJEJUNOSTOMY TUBE, PERCUTANEOUS N/A 8/19/2019    Procedure: REPLACEMENT,  GASTROJEJUNOSTOMY TUBE;  Surgeon: Robert Tafoya MD;  [facility]     RESECTION DUODENAL N/A 7/3/2019    Procedure: Resection of Distal Duodenal and proximal Jejunum;  Surgeon: Joaquin Brito MD;  [facility]: [facility]     [facility] BIOPSY BREAST, PERC NEEDLE CORE, WITH IMAGING  8/17/2004    Right     ZZ COLONOSCOPY THRU STOMA W BIOPSY/CAUTERY TUMOR/POLYP/LESION  1999,2002 2004 polyp - hyperplastic - repeat 5 years ?     ZZ COLONOSCOPY W/WO BRUSH/WASH  12/12/2005    Polypectomy.  Diverticulosis-minimal. Bx adenomatous and mucosal polyps - repeat 1 year     [facility] ENDOSCOPY, SIMPLE EXAM  03/31/10       Family History   Problem Relation Age of Onset     Cancer Mother         Colon Cancer     Heart Disease Mother         MI     Osteoporosis Mother      Cancer Father         Colon Cancer     Heart Disease Father         Heart Disease/ Heart attacks     Diabetes Father         Adult Onset     Cancer Sister         Colon Cancer     Heart Disease Brother         Heart attacks at age 45     Breast Cancer Sister      Cancer Paternal Grandmother         Unknown type     Heart Disease Maternal Grandfather         MI     Diabetes Sister         Adult Onset     Diabetes Sister         Adult Onset     Diabetes Brother         Adult Onset     Heart Disease Brother         heart attack age 64     Cancer - colorectal Son         age 36, Gardiner syndrome       Social History     Socioeconomic History     Marital status:      Spouse name: Cain     Number of children: 4     Years of education: 12     Highest education level: Not on file   Occupational History     Occupation: [employer]     Employer: [employer]     Comment: [employer]   Tobacco Use     Smoking status: Never     Smokeless tobacco: Never   Vaping Use     Vaping status: Never Used     Passive vaping exposure: Yes   Substance and Sexual Activity     Alcohol use: Yes     Comment: rarely     Drug use: No     Sexual activity: Yes      Partners: Male     Comment: Post menopausal   Other Topics Concern      Service No     Blood Transfusions No     Caffeine Concern Yes     Comment: coffee; 3c/d pop: 1c/wk     Occupational Exposure No     Hobby Hazards No     Sleep Concern Yes     Comment: c/o insomnia     Stress Concern No     Weight Concern Yes     Comment: desire wt loss     Special Diet No     Back Care No     Exercise No     Bike Helmet No     Seat Belt Yes     Self-Exams No     Parent/sibling w/ CABG, MI or angioplasty before 65F 55M? Not Asked   Social History Narrative    Lives with spouse. No domestic violence issues.     Social Determinants of Health     Financial Resource Strain: Not on file   Food Insecurity: Not on file   Transportation Needs: Not on file   Physical Activity: Not on file   Stress: Not on file   Social Connections: Not on file   Intimate Partner Violence: Not on file   Housing Stability: Not on file       Current Outpatient Medications   Medication Sig Dispense Refill     aspirin 81 MG EC tablet Take 81 mg by mouth every morning  90 tablet 3     atorvastatin (LIPITOR) 20 MG tablet TAKE 1 TABLET (20 MG) BY MOUTH EVERY MORNING Strength: 20 mg 90 tablet 3     losartan (COZAAR) 25 MG tablet Take 1 tablet (25 mg) by mouth every morning 180 tablet 3     metoprolol tartrate (LOPRESSOR) 25 MG tablet Take 1 tablet (25 mg) by mouth 2 times daily 180 tablet 3       Allergies   Allergen Reactions     Contrast Dye Itching and Other (See Comments)     Tingling in mouth     Diatrizoate Itching and Other (See Comments)     Tingling in mouth     Morphine Nausea     Reports that she did not vomit.        REVIEW OF SYSTEMS:  CONSTITUTIONAL:  NEGATIVE for fever, chills, change in weight, not feeling tired  SKIN:  NEGATIVE for worrisome rashes, no skin lumps, no skin ulcers and no non-healing wounds  EYES:  NEGATIVE for vision changes or irritation.  ENT/MOUTH:  NEGATIVE.  No hearing loss, no hoarseness, no difficulty  "swallowing.  RESP:  NEGATIVE. No cough or shortness of breath.  CV:  NEGATIVE for chest pain, palpitations or peripheral edema  GI:  NEGATIVE for nausea, abdominal pain, heartburn, or change in bowel habits  :  Negative. No dysuria, no hematuria  MUSCULOSKELETAL:  See HPI above  NEURO:  NEGATIVE . No headaches, no dizziness,  no numbness  ENDOCRINE:  NEGATIVE for temperature intolerance, skin/hair changes  HEME/ALLERGY/IMMUNE:  NEGATIVE for bleeding problems  PSYCHIATRIC:  NEGATIVE. no anxiety, no depression.      Exam:  Vitals: Resp 18   Ht 1.708 m (5' 7.25\")   Wt 86.2 kg (190 lb)   BMI 29.54 kg/m    BMI= Body mass index is 29.54 kg/m .  Constitutional:  healthy, alert and no distress  Neuro: Alert and Oriented x 3, Sensation grossly WNL.  HEENT:  Atraumatic, EOMI  Neck:  Neck supple with no tenderness.  Psych: Affect normal   Respiratory: Breathing not labored.  Cardiovascular: normal peripheral pulses  Lymph: no adenopathy  Skin: No rashes,worrisome lesions or skin problems  Spine: straight, no straight leg raising pain.  Hips show full range of motion.  There is no tenderness over the sacro-iliac joints, sciatic notch, or greater trochanters.   She has good range of motion of both knees from 0 to about 125 degrees.  She has tenderness at the anteromedial joint on the left.  No posterior medial tenderness.  She does have tenderness in the popliteal space over the tibial nerve.  She had no medial pain with medial Abby.  There is anteromedial pain with lateral Abby.  No effusions.  She does have considerable patellofemoral crepitation and positive grind test.  Sensation, motor and circulation are intact.    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  Plan: She will continue ibuprofen as needed.  Quadriceps and hip abduction strengthening advised.  Consider steroid injection in the future.    "

## 2023-05-02 NOTE — LETTER
5/2/2023         RE: Marilia Bean  [address]  [facility] [address]        Dear Colleague,    Thank you for referring your patient, Marilia Bean, to the [facility]. Please see a copy of my visit note below.    Marilia Bean is a 78 year old female who is seen as self referral for left knee pain.  This started in February without history of injury.  She has occasional sharp shooting pain in the knee which causes her to give way.  She rates her pain at 3 out of 10.  It is worse with tripping and sleeping.  At times bending the knee has been painful so she is walked by swinging the leg out to the side.  Sitting on a barstool with her feet off the floor hurts the knee and causes some tingling in the leg.  She has previously had problems with the right knee with a prepatellar bursitis.  This is resolved.  She has used occasional ibuprofen which helps the left knee.    X-ray shows no significant arthritic changes in the left knee.    Past Medical History:   Diagnosis Date     Aortic aneurysm (H) 07/01/2007    see 7/07 [employer] report -follow yearly, treat high BP is occurs Problem list name updated by automated process. Provider to review     Aortic aneurysm of unspecified site without mention of rupture 07/2007    4.4 cm ascending aorta noted in 2007     Asymptomatic postmenopausal status (age-related) (natural)     on HRT  Prempro -weaning off 5/'2004     CAD (coronary artery disease)     LAD     Family history of Gardiner syndrome      Hyperlipidemia LDL goal <100 10/31/2010     Hypertension goal BP (blood pressure) < 140/90 02/09/2016     Leiomyoma of uterus, unspecified     Uterine fibroid     Lump or mass in breast 07/2004    rt. nodule - bx neg     Gardiner syndrome      Personal history of colonic polyps      Postmenopausal atrophic vaginitis 08/20/2006     Pulmonary nodule     incidental noted in 2007 during [place]     Pure hypercholesterolemia     mild, diet - low cholesterol, low  "fat.10/06 start Lovastatin       Past Surgical History:   Procedure Laterality Date     BIOPSY BREAST NEEDLE LOCALIZATION Left 8/16/2021    Procedure: LEFT Wire-Localized Segmental Mastectomy (=\"Lumpectomy\");  Surgeon: Bertha Toro MD;  Location: [facility]     BYPASS GRAFT ARTERY CORONARY N/A 6/5/2017    Procedure: BYPASS GRAFT ARTERY CORONARY;;  Surgeon: Akshat Soto MD;  [facility]     C DEXA INTERPRETATION, AXIAL  12/21/01    wnl. 7/2005 wnl but lower     COLONOSCOPY  05/07/07    Repeat in 1 year for surveillance     COLONOSCOPY  12/10/08    repeat 1 year  -see 1/2009 letter     COLONOSCOPY  03/31/10     COLONOSCOPY  10/31/2011    Procedure:COMBINED COLONOSCOPY, SINGLE BIOPSY/POLYPECTOMY BY BIOPSY; colonoscopy with polypectomy by biopsy; Surgeon:JESSICA GARCIA; Location:[facility]     COLONOSCOPY  12/6/2013    Procedure: COMBINED COLONOSCOPY, SINGLE BIOPSY/POLYPECTOMY BY BIOPSY;  Colonoscopy, Polypectomies;  Surgeon: Herbert Salinas MD;  Location: [facility]     COLONOSCOPY N/A 12/8/2015    Procedure: COLONOSCOPY;  Surgeon: Jared Carbajal MD;  [facility]     COLONOSCOPY N/A 4/26/2017    Procedure: COMBINED COLONOSCOPY, SINGLE OR MULTIPLE BIOPSY/POLYPECTOMY BY BIOPSY;  Colonoscopy with polypectomies with forceps and snare;  Surgeon: Herbert Salinas MD;  [facility]: [facility]     COLONOSCOPY N/A 5/10/2021    Procedure: Colonoscopy, With Polypectomy And Biopsy;  Surgeon: Franklyn Hernandez MD;  [facility]     COLONOSCOPY WITH CO2 INSUFFLATION N/A 5/10/2021    Procedure: COLONOSCOPY, WITH CO2 INSUFFLATION;  Surgeon: Franklyn Hernandez MD;  [facility]     COLONOSCOPY WITH CO2 INSUFFLATION N/A 5/26/2022    Procedure: COLONOSCOPY, WITH CO2 INSUFFLATION;  Surgeon: Franklyn Hernandez MD;  [facility]     COMBINED ESOPHAGOSCOPY, GASTROSCOPY, DUODENOSCOPY (EGD) WITH CO2 INSUFFLATION N/A 5/10/2021    Procedure: ESOPHAGOGASTRODUODENOSCOPY, WITH CO2 " INSUFFLATION;  Surgeon: Franklyn Hernandez MD;  [facility]     ENDOSCOPIC INSERTION TUBE JEJUNOSTOMY N/A 8/5/2019    Procedure: Gastrojejunostomy tube placement with gastropexy;  Surgeon: Ford Mann MD;  [facility]     ESOPHAGOSCOPY, GASTROSCOPY, DUODENOSCOPY (EGD), COMBINED N/A 12/8/2015    Procedure: COMBINED ESOPHAGOSCOPY, GASTROSCOPY, DUODENOSCOPY (EGD), BIOPSY SINGLE OR MULTIPLE;  Surgeon: Jared Carbajal MD;  [facility]     ESOPHAGOSCOPY, GASTROSCOPY, DUODENOSCOPY (EGD), COMBINED N/A 7/31/2019    Procedure: Endoscopic ultrasound with cystoduodenostomy, stent placement x2, stent dilation, and nasojejunal tube placement;  Surgeon: Ford Mann MD;  [facility]     ESOPHAGOSCOPY, GASTROSCOPY, DUODENOSCOPY (EGD), COMBINED N/A 8/5/2019    Procedure: ESOPHAGOGASTRODUODENOSCOPY (EGD);  Surgeon: Ford Mann MD;  [facility]     ESOPHAGOSCOPY, GASTROSCOPY, DUODENOSCOPY (EGD), COMBINED N/A 8/12/2019    Procedure: ESOPHAGOGASTRODUODENOSCOPY (EGD) with GJ exchange, duodenum stent removal.;  Surgeon: Ford Mann MD;  [facility]     ESOPHAGOSCOPY, GASTROSCOPY, DUODENOSCOPY (EGD), COMBINED N/A 5/10/2021    Procedure: Esophagogastroduodenoscopy, With Biopsy;  Surgeon: Franklyn Hernandez MD;  [facility]     HC ECP WITH CATARACT SURGERY Bilateral 2015, 2016     HC LAPAROSCOPY, SURGICAL; APPENDECTOMY  10/31/2004     HC LAPAROSCOPY, SURGICAL; CHOLECYSTECTOMY  2000    Cholecystectomy, Laparoscopic     HC REVISE MEDIAN N/CARPAL TUNNEL SURG  10/01/10    left     HYSTERECTOMY, ELVIN  12/14/09    TAHBSO, MMK     LAPAROSCOPIC ASSISTED COLECTOMY N/A 6/30/2021    Procedure: Laparoscopic subtotal colectomy, ileosigmoid anastomosis, lysis of adhesions for 150 mins, splenic flexure mobilization;  Surgeon: Akil Lay MD;  [facility]     REPAIR ANEURYSM ASCENDING AORTA N/A 6/5/2017    Procedure: REPAIR ANEURYSM ASCENDING AORTA;  Median  Sternotomy, Ascending Aortic Aneurysm Repair, Coronary Artery Bypass Graft x1 on pump oxygenator;  Surgeon: Akshat Soto MD;  [facility]     REPLACE GASTROJEJUNOSTOMY TUBE, PERCUTANEOUS N/A 8/19/2019    Procedure: REPLACEMENT, GASTROJEJUNOSTOMY TUBE;  Surgeon: Robert Tafoya MD;  [facility]     RESECTION DUODENAL N/A 7/3/2019    Procedure: Resection of Distal Duodenal and proximal Jejunum;  Surgeon: Joaquin Brito MD;  [facility]     ZZ BIOPSY BREAST, PERC NEEDLE CORE, WITH IMAGING  8/17/2004    Right     ZZ COLONOSCOPY THRU STOMA W BIOPSY/CAUTERY TUMOR/POLYP/LESION  1999,2002 2004 polyp - hyperplastic - repeat 5 years ?     [facility] COLONOSCOPY W/WO BRUSH/WASH  12/12/2005    Polypectomy.  Diverticulosis-minimal. Bx adenomatous and mucosal polyps - repeat 1 year     [facility] ENDOSCOPY, SIMPLE EXAM  03/31/10       Family History   Problem Relation Age of Onset     Cancer Mother         Colon Cancer     Heart Disease Mother         MI     Osteoporosis Mother      Cancer Father         Colon Cancer     Heart Disease Father         Heart Disease/ Heart attacks     Diabetes Father         Adult Onset     Cancer Sister         Colon Cancer     Heart Disease Brother         Heart attacks at age 45     Breast Cancer Sister      Cancer Paternal Grandmother         Unknown type     Heart Disease Maternal Grandfather         MI     Diabetes Sister         Adult Onset     Diabetes Sister         Adult Onset     Diabetes Brother         Adult Onset     Heart Disease Brother         heart attack age 64     Cancer - colorectal Son         age 36, Gardiner syndrome       Social History     Socioeconomic History     Marital status:      Spouse name: Cain     Number of children: 4     Years of education: 12     Highest education level: Not on file   Occupational History     Occupation: HIMS clerk     Employer: [employer]     Comment: [employer]   Tobacco Use      Smoking status: Never     Smokeless tobacco: Never   Vaping Use     Vaping status: Never Used     Passive vaping exposure: Yes   Substance and Sexual Activity     Alcohol use: Yes     Comment: rarely     Drug use: No     Sexual activity: Yes     Partners: Male     Comment: Post menopausal   Other Topics Concern      Service No     Blood Transfusions No     Caffeine Concern Yes     Comment: coffee; 3c/d pop: 1c/wk     Occupational Exposure No     Hobby Hazards No     Sleep Concern Yes     Comment: c/o insomnia     Stress Concern No     Weight Concern Yes     Comment: desire wt loss     Special Diet No     Back Care No     Exercise No     Bike Helmet No     Seat Belt Yes     Self-Exams No     Parent/sibling w/ CABG, MI or angioplasty before 65F 55M? Not Asked   Social History Narrative    Lives with spouse. No domestic violence issues.     Social Determinants of Health     Financial Resource Strain: Not on file   Food Insecurity: Not on file   Transportation Needs: Not on file   Physical Activity: Not on file   Stress: Not on file   Social Connections: Not on file   Intimate Partner Violence: Not on file   Housing Stability: Not on file       Current Outpatient Medications   Medication Sig Dispense Refill     aspirin 81 MG EC tablet Take 81 mg by mouth every morning  90 tablet 3     atorvastatin (LIPITOR) 20 MG tablet TAKE 1 TABLET (20 MG) BY MOUTH EVERY MORNING Strength: 20 mg 90 tablet 3     losartan (COZAAR) 25 MG tablet Take 1 tablet (25 mg) by mouth every morning 180 tablet 3     metoprolol tartrate (LOPRESSOR) 25 MG tablet Take 1 tablet (25 mg) by mouth 2 times daily 180 tablet 3       Allergies   Allergen Reactions     Contrast Dye Itching and Other (See Comments)     Tingling in mouth     Diatrizoate Itching and Other (See Comments)     Tingling in mouth     Morphine Nausea     Reports that she did not vomit.        REVIEW OF SYSTEMS:  CONSTITUTIONAL:  NEGATIVE for fever, chills, change in weight, not  "feeling tired  SKIN:  NEGATIVE for worrisome rashes, no skin lumps, no skin ulcers and no non-healing wounds  EYES:  NEGATIVE for vision changes or irritation.  ENT/MOUTH:  NEGATIVE.  No hearing loss, no hoarseness, no difficulty swallowing.  RESP:  NEGATIVE. No cough or shortness of breath.  CV:  NEGATIVE for chest pain, palpitations or peripheral edema  GI:  NEGATIVE for nausea, abdominal pain, heartburn, or change in bowel habits  :  Negative. No dysuria, no hematuria  MUSCULOSKELETAL:  See HPI above  NEURO:  NEGATIVE . No headaches, no dizziness,  no numbness  ENDOCRINE:  NEGATIVE for temperature intolerance, skin/hair changes  HEME/ALLERGY/IMMUNE:  NEGATIVE for bleeding problems  PSYCHIATRIC:  NEGATIVE. no anxiety, no depression.      Exam:  Vitals: Resp 18   Ht 1.708 m (5' 7.25\")   Wt 86.2 kg (190 lb)   BMI 29.54 kg/m    BMI= Body mass index is 29.54 kg/m .  Constitutional:  healthy, alert and no distress  Neuro: Alert and Oriented x 3, Sensation grossly WNL.  HEENT:  Atraumatic, EOMI  Neck:  Neck supple with no tenderness.  Psych: Affect normal   Respiratory: Breathing not labored.  Cardiovascular: normal peripheral pulses  Lymph: no adenopathy  Skin: No rashes,worrisome lesions or skin problems  Spine: straight, no straight leg raising pain.  Hips show full range of motion.  There is no tenderness over the sacro-iliac joints, sciatic notch, or greater trochanters.   She has good range of motion of both knees from 0 to about 125 degrees.  She has tenderness at the anteromedial joint on the left.  No posterior medial tenderness.  She does have tenderness in the popliteal space over the tibial nerve.  She had no medial pain with medial Abby.  There is anteromedial pain with lateral Abby.  No effusions.  She does have considerable patellofemoral crepitation and positive grind test.  Sensation, motor and circulation are intact.    Assessment: This appears to be primarily chondromalacia patella " causing pain and giving way.  Plan: She will continue ibuprofen as needed.  Quadriceps and hip abduction strengthening advised.  Consider steroid injection in the future.        Again, thank you for allowing me to participate in the care of your patient.        Sincerely,        Kavin Vera MD

## 2023-06-19 ENCOUNTER — TELEPHONE (OUTPATIENT)
Dept: GASTROENTEROLOGY | Facility: CLINIC | Age: 78
End: 2023-06-19
Payer: COMMERCIAL

## 2023-06-19 NOTE — TELEPHONE ENCOUNTER
"Endoscopy Scheduling Screen    Have you had a positive Covid test in the last 14 days?  No    Are you active on MyChart?   Yes    What insurance is in the chart?  Other:  [facility]/medicare    Ordering/Referring Provider: Sukhjinder Michaels   (If ordering provider performs procedure, schedule with ordering provider unless otherwise instructed. )    BMI: Estimated body mass index is 29.54 kg/m  as calculated from the following:    Height as of 5/2/23: 1.708 m (5' 7.25\").    Weight as of 5/2/23: 86.2 kg (190 lb).     Sedation Ordered  moderate sedation.   If patient BMI > 50 do not schedule in [facility].    Are you taking any prescription medications for pain?   No    Are you taking methadone or Suboxone?  No    Do you have a history of malignant hyperthermia or adverse reaction to anesthesia?  No    (Females) Are you currently pregnant?   No     Have you been diagnosed or told you have pulmonary hypertension?   No    Do you have an LVAD?  No    Have you been told you have moderate to severe sleep apnea?  No    Have you been told you have COPD, asthma, or any other lung disease?  No    Do you have any heart conditions?  No     Have you ever had or are you awaiting a heart or lung transplant?   No    Have you had a stroke or transient ischemic attack (TIA aka \"mini stroke\" in the last 6 months?   No    Have you been diagnosed with or been told you have cirrhosis of the liver?   No    Are you currently on dialysis?   No    Do you need assistance transferring?   No    BMI: Estimated body mass index is 29.54 kg/m  as calculated from the following:    Height as of 5/2/23: 1.708 m (5' 7.25\").    Weight as of 5/2/23: 86.2 kg (190 lb).     Is patients BMI > 40 and scheduling location [facility]?  No    Do you take the medication Phentermine, Ozempic or Wegovy?  No    Do you take the medication Naltrexone?  No    Do you take blood thinners?  No    Preferred Pharmacy:    [facility] - [place] - [address] [place] [address] [address] [place] [address]  Phone: [phone] Fax: [phone]      Prep   Are you currently on dialysis or do you have chronic kidney disease?  No (standard prep)    Do you have a diagnosis of diabetes?  No    Do you have a diagnosis of cystic fibrosis (CF)?  No    On a regular basis do you go 3 -5 days between bowel movements?  No    BMI > 40?  No    Final Scheduling Details   Colonoscopy prep sent?  MiraLAX (No Mag Citrate)    Procedure scheduled  colonoscopy    Surgeon:  Mary     Date of procedure:  8/24/23     Schedule PAC:   No    Location  [facility]    Sedation   Moderate Sedation    Patient Reminders:    You will receive a call from a Nurse to review instructions and health history.  This assessment must be completed prior to your procedure.  Failure to complete the Nurse assessment may result in the procedure being cancelled.       On the day of your procedure, please designate an adult(s) who can drive you home stay with you for the next 24 hours. The medicines used in the exam will make you sleepy. You will not be able to drive.       You cannot take public transportation, ride share services, or non-medical taxi service without a responsible caregiver.  Medical transport services are allowed with the requirement that a responsible caregiver will receive you at your destination.  We require that drivers and caregivers are confirmed prior to your procedure.

## 2023-07-22 NOTE — MR AVS SNAPSHOT
After Visit Summary   2017    Marilia Bean    MRN: [id number]           Patient Information     Date Of Birth          1945        Visit Information        Provider Department      2017 10:30 AM Rn, [facility] Preoperative Assessment Center        Care Instructions    Preparing for Your Surgery      Name:  Marilia Bean   MRN:  [id number]   :  1945   Today's Date:  2017     Arriving for surgery:   Repair Aneurysm Ascending Aorta  Surgery date:    Surgery time:  7:00 am   Arrival time:  5:00 am   Please come to:       [facility] [address]  [address] [place]  [address]    -   parking is available in front of the hospital from 5:15 am to 8:00 pm    -  Stop at the Information Desk in the lobby    -   Inform the information person that you are here for surgery. An escort to 3c will be provided. If you would not like an escort, please proceed to 3C on the 3rd floor. [phone]     What can I eat or drink?  -  You may have solid food or milk products until 8 hours prior to your surgery.  Nothing after 11 pm   -  You may have water, apple juice or 7up/Sprite until 2 hours prior to your surgery.  Until 5 am arrival time     Which medicines can I take?  -  Do NOT take these medications in the morning, the day of surgery:      Aspirin       triemterene-hydrocholorothiazide (dyazide)        -  Please take these medications the day of surgery:       Atorvastatin               How do I prepare myself?  -  Take two showers: one the night before surgery; and one the morning of surgery.         Use Scrubcare or Hibiclens to wash from neck down.  You may use your own shampoo and conditioner. No other hair products.   -  Do NOT use lotion, powder, deodorant, or antiperspirant the day of your surgery.  -  Do NOT wear any makeup, fingernail polish or jewelry.  -  Begin using Incentive Spirometer 1 week prior to  surgery.  Use 4 times per day, up to 5-10 breaths each time.  Bring Incentive Spirometer to hospital.  -Do not bring your own medications to the hospital, except for inhalers and eye drops.  -  Bring your ID and insurance card.    Questions or Concerns:  If you have questions or concerns, please call the  Preoperative Assessment Center, Monday-Friday 7AM-7PM:  [phone]  AFTER YOUR SURGERY  Breathing exercises   Breathing exercises help you recover faster. Take deep breaths and let the air out slowly. This will:     Help you wake up after surgery.    Help prevent complications like pneumonia.  Preventing complications will help you go home sooner.   We may give you a breathing device (incentive spirometer) to encourage you to breathe deeply.   Nausea and vomiting   You may feel sick to your stomach after surgery; if so, let your nurse know.    Pain control:  After surgery, you may have pain. Our goal is to help you manage your pain. Pain medicine will help you feel comfortable enough to do activities that will help you heal.  These activities may include breathing exercises, walking and physical therapy.   To help your health care team treat your pain we will ask: 1) If you have pain  2) where it is located 3) describe your pain in your words  Methods of pain control include medications given by mouth, vein or by nerve block for some surgeries.  We may give you a pain control pump that will:  1) Deliver the medicine through a tube placed in your vein  2) Control the amount of medicine you receive  3) Allow you to push a button to deliver a dose of pain medicine  Sequential Compression Device (SCD) or Pneumo Boots:  You may need to wear SCD S on your legs or feet. These are wraps connected to a machine that pumps in air and releases it. The repeated pumping helps prevent blood clots from forming.     Using an Incentive Spirometer  Soon after your surgery, a nurse or therapist will teach you breathing exercises.  These keep your lungs clear, strengthen your breathing muscles, and help prevent complications.  The exercises include doing a deep-breathing exercise using a device called an incentive spirometer.  To do these exercises, you will breathe in through your mouth and not your nose. The incentive spirometer only works correctly if you breathe in through your mouth.  Four steps to clear lungs     Deep breathing expands the lungs, aids circulation, and helps prevent pneumonia.   1. Exhale normally.    Relax and breathe out.  2. Place your lips tightly around the mouthpiece.    Make sure the device is upright and not tilted.  3. Inhale as much air as you can through the mouthpiece (don't breath through your nose).    Inhale slowly and deeply.    Hold your breath long enough to keep the balls or disk raised for at least 3 seconds.    If you re inhaling too quickly, your device may make a tone. If you hear this tone, inhale more slowly.  4. Repeat the exercise regularly.    Do this exercise every hour while you're awake, or as your health care provider instructs.    You will also be taught coughing exercises and be asked to do them regularly on your own.    7979-5848 [employer]. [address] [place] [address]. All rights reserved. This information is not intended as a substitute for professional medical care. Always follow your healthcare professional's instructions.                          Follow-ups after your visit        Your next 10 appointments already scheduled     May 25, 2017 12:00 PM CDT   (Arrive by 11:45 AM)   PAC Anesthesia Consult with  Pac Anesthesiologist   [facility] Preoperative Assessment Center ([facility])    [address] [place] [address]  4th Floor  [facility] [address] [phone]            May 25, 2017 12:15 PM CDT   LAB with  LAB   [facility] Lab ([facility])    [address] [place] [address]  [address] [facility] [address]    [phone]           Patient must bring picture ID.  Patient should be prepared to give a urine specimen  Please do not eat 10-12 hours before your appointment if you are coming in fasting for labs on lipids, cholesterol, or glucose (sugar).  Pregnant women should follow their Care Team instructions. Water with medications is okay. Do not drink coffee or other fluids.   If you have concerns about taking  your medications, please ask at office or if scheduling via Livevol, send a message by clicking on Secure Messaging, Message Your Care Team.            May 25, 2017  1:00 PM CDT   Ech Anup with [id number]   [facility], [place],  Echocardiography ([facility] [place])    [address]  [facility] [address] [phone]           1.  Please bring or wear a comfortable two-piece outfit. 2.  Arrival time: -   [facility]:  arrive 75 minutes prior to examination time. -   [facility]:  arrive 90 minutes prior to examination time. -   [facility]:   arrive 15 minutes prior to examination time. 3.  Plan to have someone here to drive you home after the test. -   Someone should stay with you for 6 hours after your test. 4.  No food or drink: -   6 hours before the test 5.  If you take antacids or water pills (diuretics): Do not take them until after your test. You may take blood pressure medicine with a few sips of water. 6.  If you have diabetes: -   Morning slots preferred -   If you take insulin, call your diabetes care team. Ask if you should take a   dose the morning of your test. -   If you take diabetes medicine by mouth, don't take it on the morning of your test. Bring it with you to take after the test. (If you have questions, call your diabetes care team.) 7.  Bring a list of any medicines you are taking. 8.  Do not drive for 24 hours after the test. 9.   A responsible adult must stay with you for 24 hours after the test.  10.  For any questions that cannot be answered, please  contact the ordering physician            Jun 05, 2017   Procedure with Akshat Soto MD   [facility], [place], Same Day Surgery (--)    [address]  [place] [address] [phone]              Who to contact     Please call your clinic at [phone] to:    Ask questions about your health    Make or cancel appointments    Discuss your medicines    Learn about your test results    Speak to your doctor   If you have compliments or concerns about an experience at your clinic, or if you wish to file a complaint, please contact [facility] Patient Relations at [phone] or email us at [email or web address]         Additional Information About Your Visit        [employer] Information     CallVU gives you secure access to your electronic health record. If you see a primary care provider, you can also send messages to your care team and make appointments. If you have questions, please call your primary care clinic.  If you do not have a primary care provider, please call [phone] and they will assist you.      CallVU is an electronic gateway that provides easy, online access to your medical records. With CallVU, you can request a clinic appointment, read your test results, renew a prescription or communicate with your care team.     To access your existing account, please contact your [facility] Clinic or call [phone] for assistance.        Care EveryWhere ID     This is your Care EveryWhere ID. This could be used by other organizations to access your [place] medical records  [id number]         Blood Pressure from Last 3 Encounters:   05/25/17 (!) 169/102   05/22/17 129/82   04/26/17 112/88    Weight from Last 3 Encounters:   05/25/17 79.2 kg (174 lb 11.2 oz)   04/13/17 81.7 kg (180 lb 3.2 oz)   03/08/17 82.6 kg (182 lb)              Today, you had the following     No orders found for display         Today's Medication Changes           These changes are accurate as of: 5/25/17 11:08 AM.  If you have any questions, ask your nurse or doctor.               These medicines have changed or have updated prescriptions.        Dose/Directions    aspirin 81 MG tablet   This may have changed:  when to take this   Used for:  Coronary artery disease involving native coronary artery of native heart without angina pectoris        Dose:  81 mg   Take 1 tablet (81 mg) by mouth daily   Quantity:  90 tablet   Refills:  3       atorvastatin 20 MG tablet   Commonly known as:  LIPITOR   This may have changed:    - how much to take  - when to take this   Used for:  Hyperlipidemia LDL goal <100        Dose:  40 mg   Take 2 tablets (40 mg) by mouth daily   Quantity:  90 tablet   Refills:  3       triamterene-hydrochlorothiazide 37.5-25 MG per capsule   Commonly known as:  DYAZIDE   This may have changed:  when to take this   Used for:  Hypertension goal BP (blood pressure) < 140/90        Dose:  1 capsule   Take 1 capsule by mouth daily   Quantity:  90 capsule   Refills:  3                Primary Care Provider Office Phone # Fax #    Herbert Epstein [phone]       [facility] [address] [place] [address]        Thank you!     Thank you for choosing [facility] PREOPERATIVE ASSESSMENT CENTER  for your care. Our goal is always to provide you with excellent care. Hearing back from our patients is one way we can continue to improve our services. Please take a few minutes to complete the written survey that you may receive in the mail after your visit with us. Thank you!             Your Updated Medication List - Protect others around you: Learn how to safely use, store and throw away your medicines at www.disposemymeds.org.          This list is accurate as of: 5/25/17 11:08 AM.  Always use your most recent med list.                   Brand Name Dispense Instructions for use    aspirin 81 MG tablet     90 tablet    Take 1 tablet (81  mg) by mouth daily       atorvastatin 20 MG tablet    LIPITOR    90 tablet    Take 2 tablets (40 mg) by mouth daily       triamterene-hydrochlorothiazide 37.5-25 MG per capsule    DYAZIDE    90 capsule    Take 1 capsule by mouth daily          able

## 2023-07-26 ENCOUNTER — TELEPHONE (OUTPATIENT)
Dept: GASTROENTEROLOGY | Facility: CLINIC | Age: 78
End: 2023-07-26
Payer: COMMERCIAL

## 2023-07-26 NOTE — TELEPHONE ENCOUNTER
Caller: Patrizia  Reason for Reschedule/Cancellation (please be detailed, any staff messages or encounters to note?): md out of office      Prior to reschedule please review:  Ordering Provider: Sukhjinder Michaels  Sedation per order: Moderate  Does patient have any ASC Exclusions, please identify?: N      Notes on Cancelled Procedure:  Procedure: Lower Endoscopy [Colonoscopy]   Date: 8/24  Location: [facility]; [address], [place] [address]  Surgeon: Mary      Rescheduled: Yes  Procedure: Lower Endoscopy [Colonoscopy]   Date: 8/23  Location: [facility]; [address], [place] [id number]  Surgeon: Khris  Sedation Level Scheduled  moderate,  Reason for Sedation Level ordered  Prep/Instructions updated and sent: y     Send In - basket message to Panc - Eddie Pool if EUS  procedure is canceled or rescheduled: [ N/A, YES or NO] n/a

## 2023-08-08 ENCOUNTER — TELEPHONE (OUTPATIENT)
Dept: GASTROENTEROLOGY | Facility: CLINIC | Age: 78
End: 2023-08-08
Payer: COMMERCIAL

## 2023-08-08 NOTE — TELEPHONE ENCOUNTER
Caller: Marilia Bean    Reason for Reschedule/Cancellation (please be detailed, any staff messages or encounters to note?): pt called back to change her appt back to 8/24      Prior to reschedule please review:  Ordering Provider: Sukhjinder Michaels  Sedation per order: Moderate  Does patient have any ASC Exclusions, please identify?: N      Notes on Cancelled Procedure:  Procedure: Lower Endoscopy [Colonoscopy]   Date: 8/23/23  Location: [facility]; [address], [place] [address]  Surgeon: Khris      Rescheduled: Yes  Procedure: Lower Endoscopy [Colonoscopy]   Date: 8/24/23  Location: [facility]; [address] Floor, [place] [address]  Surgeon: Khris  Sedation Level Scheduled  moderate,  Reason for Sedation Level ordered  Prep/Instructions updated and sent: y  Send In - basket message to Panc - Eddie Pool if EUS  procedure is canceled or rescheduled: [ N/A, YES or NO] N/A

## 2023-08-08 NOTE — TELEPHONE ENCOUNTER
Pre assessment completed for upcoming procedure.      Procedure details:    Patient scheduled for Colonoscopy  on 08.23.2023.     Arrival time: 1050. Procedure time 1135    Pre op exam needed? N/A    Facility location: [facility]; [address], [place] [address]    Sedation type: Conscious sedation     Indication for procedure: Overlapping malignant neoplasm of colon (H)     COVID policy reviewed.    Designated  policy reviewed. Instructed to have someone stay 6 hours post procedure.       Chart review:     Electronic implanted devices? No    Diabetic? No    Diabetic medication HOLDING recommendations: (if applicable)  Oral diabetic medications: N/A  Diabetic injectables: N/A  Insulin: N/A    Medication review:    Anticoagulants? No    NSAIDS? No    Other medication HOLDING recommendations:  N/A      Prep for procedure:     Bowel prep recommendation: Miralax prep without magnesium citrate   Due to: standard bowel prep.    Prep instructions sent via [employer]     Reviewed procedure prep instructions.     Patient verbalized understanding and had no questions or concerns at this time.        Lynsey Armas RN  Endoscopy Procedure Pre Assessment RN  [phone] option 4

## 2023-08-17 NOTE — TELEPHONE ENCOUNTER
Pt returned call and said her procedure date changed    Procedure details:     Patient scheduled for Colonoscopy  on 08.24.2023.     Arrival time: 0615. Procedure time 0700     Pt wanted to go over instructions again.    eviewed procedure prep instructions.      Patient verbalized understanding and had no questions or concerns at this time.    Lynsey Cedillo RN on 8/17/2023 at 11:03 AM

## 2023-08-24 ENCOUNTER — ANESTHESIA EVENT (OUTPATIENT)
Dept: SURGERY | Facility: AMBULATORY SURGERY CENTER | Age: 78
End: 2023-08-24
Payer: COMMERCIAL

## 2023-08-24 ENCOUNTER — ANESTHESIA (OUTPATIENT)
Dept: SURGERY | Facility: AMBULATORY SURGERY CENTER | Age: 78
End: 2023-08-24
Payer: COMMERCIAL

## 2023-08-24 ENCOUNTER — HOSPITAL ENCOUNTER (OUTPATIENT)
Facility: AMBULATORY SURGERY CENTER | Age: 78
Discharge: HOME | End: 2023-08-24
Attending: INTERNAL MEDICINE
Payer: COMMERCIAL

## 2023-08-24 VITALS
HEART RATE: 54 BPM | OXYGEN SATURATION: 96 % | SYSTOLIC BLOOD PRESSURE: 104 MMHG | DIASTOLIC BLOOD PRESSURE: 78 MMHG | RESPIRATION RATE: 16 BRPM | TEMPERATURE: 97.4 F

## 2023-08-24 VITALS — HEART RATE: 54 BPM

## 2023-08-24 LAB — COLONOSCOPY: NORMAL

## 2023-08-24 PROCEDURE — G0105 COLORECTAL SCRN; HI RISK IND: HCPCS

## 2023-08-24 PROCEDURE — G8918 PT W/O PREOP ORDER IV AB PRO: HCPCS

## 2023-08-24 PROCEDURE — G8907 PT DOC NO EVENTS ON DISCHARG: HCPCS

## 2023-08-24 RX ORDER — ONDANSETRON 2 MG/ML
4 INJECTION INTRAMUSCULAR; INTRAVENOUS
Status: DISCONTINUED | OUTPATIENT
Start: 2023-08-24 | End: 2023-08-25 | Stop reason: HOSPADM

## 2023-08-24 RX ORDER — LIDOCAINE 40 MG/G
CREAM TOPICAL
Status: DISCONTINUED | OUTPATIENT
Start: 2023-08-24 | End: 2023-08-25 | Stop reason: HOSPADM

## 2023-08-24 RX ORDER — SODIUM CHLORIDE, SODIUM LACTATE, POTASSIUM CHLORIDE, CALCIUM CHLORIDE 600; 310; 30; 20 MG/100ML; MG/100ML; MG/100ML; MG/100ML
INJECTION, SOLUTION INTRAVENOUS CONTINUOUS PRN
Status: DISCONTINUED | OUTPATIENT
Start: 2023-08-24 | End: 2023-08-24

## 2023-08-24 RX ORDER — NALOXONE HYDROCHLORIDE 0.4 MG/ML
0.4 INJECTION, SOLUTION INTRAMUSCULAR; INTRAVENOUS; SUBCUTANEOUS
Status: DISCONTINUED | OUTPATIENT
Start: 2023-08-24 | End: 2023-08-25 | Stop reason: HOSPADM

## 2023-08-24 RX ORDER — FLUMAZENIL 0.1 MG/ML
0.2 INJECTION, SOLUTION INTRAVENOUS
Status: ACTIVE | OUTPATIENT
Start: 2023-08-24 | End: 2023-08-24

## 2023-08-24 RX ORDER — PROCHLORPERAZINE MALEATE 5 MG
5 TABLET ORAL EVERY 6 HOURS PRN
Status: DISCONTINUED | OUTPATIENT
Start: 2023-08-24 | End: 2023-08-25 | Stop reason: HOSPADM

## 2023-08-24 RX ORDER — PROPOFOL 10 MG/ML
INJECTION, EMULSION INTRAVENOUS CONTINUOUS PRN
Status: DISCONTINUED | OUTPATIENT
Start: 2023-08-24 | End: 2023-08-24

## 2023-08-24 RX ORDER — ONDANSETRON 2 MG/ML
4 INJECTION INTRAMUSCULAR; INTRAVENOUS EVERY 6 HOURS PRN
Status: DISCONTINUED | OUTPATIENT
Start: 2023-08-24 | End: 2023-08-25 | Stop reason: HOSPADM

## 2023-08-24 RX ORDER — NALOXONE HYDROCHLORIDE 0.4 MG/ML
0.2 INJECTION, SOLUTION INTRAMUSCULAR; INTRAVENOUS; SUBCUTANEOUS
Status: DISCONTINUED | OUTPATIENT
Start: 2023-08-24 | End: 2023-08-25 | Stop reason: HOSPADM

## 2023-08-24 RX ORDER — LIDOCAINE HYDROCHLORIDE 20 MG/ML
INJECTION, SOLUTION INFILTRATION; PERINEURAL PRN
Status: DISCONTINUED | OUTPATIENT
Start: 2023-08-24 | End: 2023-08-24

## 2023-08-24 RX ORDER — PROPOFOL 10 MG/ML
INJECTION, EMULSION INTRAVENOUS PRN
Status: DISCONTINUED | OUTPATIENT
Start: 2023-08-24 | End: 2023-08-24

## 2023-08-24 RX ORDER — ONDANSETRON 4 MG/1
4 TABLET, ORALLY DISINTEGRATING ORAL EVERY 6 HOURS PRN
Status: DISCONTINUED | OUTPATIENT
Start: 2023-08-24 | End: 2023-08-25 | Stop reason: HOSPADM

## 2023-08-24 RX ADMIN — PROPOFOL 100 MCG/KG/MIN: 10 INJECTION, EMULSION INTRAVENOUS at 07:06

## 2023-08-24 RX ADMIN — SODIUM CHLORIDE, SODIUM LACTATE, POTASSIUM CHLORIDE, CALCIUM CHLORIDE: 600; 310; 30; 20 INJECTION, SOLUTION INTRAVENOUS at 07:03

## 2023-08-24 RX ADMIN — LIDOCAINE HYDROCHLORIDE 40 MG: 20 INJECTION, SOLUTION INFILTRATION; PERINEURAL at 07:06

## 2023-08-24 RX ADMIN — PROPOFOL 70 MG: 10 INJECTION, EMULSION INTRAVENOUS at 07:06

## 2023-08-24 ASSESSMENT — ENCOUNTER SYMPTOMS: DYSRHYTHMIAS: 1

## 2023-08-24 NOTE — H&P
[facility] [place] Anesthesia Pre-op History and Physical    Marilia Bean MRN# [id number]   Age: 78 year old YOB: 1945                      Primary care provider: Herbert Epstein         Chief Complaint and/or Reason for Procedure:     History of gardiner, history of colon cancer s/p subtotal colectomy.         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      Status post aorto-coronary artery bypass graft 06/15/2021     Priority: Medium    Overlapping malignant neoplasm of colon (H) 06/15/2021     Priority: Medium    Colon cancer (H) 06/07/2021     Priority: Medium     Added automatically from request for surgery [id number]      Ductal carcinoma in situ (DCIS) of left breast 06/01/2021     Priority: Medium    Chronic kidney disease, stage 3 (H) 03/26/2021     Priority: Medium    Bowel obstruction (H) 07/20/2019     Priority: Medium    Abdominal pain with vomiting 07/15/2019     Priority: Medium    Status post thoracic aortic aneurysm repair 06/21/2017     Priority: Medium    Status post coronary angiogram 05/22/2017     Priority: Medium    Benign essential hypertension 02/09/2016     Priority: Medium    Pre-operative cardiovascular examination 01/08/2013     Priority: Medium     Discussed advance care planning with patient; information given to patient to review. 1/8/2013         Hyperlipidemia with target LDL less than 70 10/31/2010     Priority: Medium    Aortic aneurysm (H) 07/01/2007     Priority: Medium     see 7/07 Ba report -follow yearly, treat high BP is occurs  Problem list name updated by automated process. Provider to review      Postmenopausal atrophic vaginitis  08/20/2006     Priority: Medium            Medications (include herbals and vitamins):   Any Plavix use in the last 7 days? No     Current Outpatient Medications   Medication Sig    aspirin 81 MG EC tablet Take 81 mg by mouth every morning     atorvastatin (LIPITOR) 20 MG tablet TAKE 1 TABLET (20 MG) BY MOUTH EVERY MORNING Strength: 20 mg    losartan (COZAAR) 25 MG tablet Take 1 tablet (25 mg) by mouth every morning    metoprolol tartrate (LOPRESSOR) 25 MG tablet Take 1 tablet (25 mg) by mouth 2 times daily     Current Facility-Administered Medications   Medication    lidocaine (LMX4) kit    lidocaine 1 % 0.1-1 mL    ondansetron (ZOFRAN) injection 4 mg    sodium chloride (PF) 0.9% PF flush 3 mL    sodium chloride (PF) 0.9% PF flush 3 mL             Allergies:      Allergies   Allergen Reactions    Contrast Dye Itching and Other (See Comments)     Tingling in mouth    Diatrizoate Itching and Other (See Comments)     Tingling in mouth    Morphine Nausea     Reports that she did not vomit.      Allergy to Latex? No  Allergy to tape?   No  Intolerances:             Physical Exam:   All vitals have been reviewed  No data found.  No intake/output data recorded.  Lungs:   No increased work of breathing, good air exchange, clear to auscultation bilaterally, no crackles or wheezing     Cardiovascular:   normal S1 and S2             Lab / Radiology Results:            Anesthetic risk and/or ASA classification:   2    Annika Pinedo DO

## 2023-08-24 NOTE — ANESTHESIA CARE TRANSFER NOTE
Patient: Marilia Bean    Procedure: Procedure(s):  Colonoscopy with CO2 insufflation       Diagnosis: Overlapping malignant neoplasm of colon (H) [C18.8]  Diagnosis Additional Information: No value filed.    Anesthesia Type:   MAC     Note:    Oropharynx: oropharynx clear of all foreign objects and spontaneously breathing  Level of Consciousness: drowsy  Oxygen Supplementation: room air    Independent Airway: airway patency satisfactory and stable  Dentition: dentition unchanged  Vital Signs Stable: post-procedure vital signs reviewed and stable  Report to RN Given: handoff report given  Patient transferred to: Phase II    Handoff Report: Identifed the Patient, Identified the Reponsible Provider, Reviewed the pertinent medical history, Discussed the surgical course, Reviewed Intra-OP anesthesia mangement and issues during anesthesia, Set expectations for post-procedure period and Allowed opportunity for questions and acknowledgement of understanding      Vitals:  Vitals Value Taken Time   BP     Temp     Pulse     Resp     SpO2         Electronically Signed By: CECILIA Daniel CRNA  August 24, 2023  7:23 AM

## 2023-08-24 NOTE — ANESTHESIA POSTPROCEDURE EVALUATION
Patient: Marilia Bean    Procedure: Procedure(s):  Colonoscopy with CO2 insufflation       Anesthesia Type:  MAC    Note:  Disposition: Outpatient   Postop Pain Control: Uneventful            Sign Out: Well controlled pain   PONV: No   Neuro/Psych: Uneventful            Sign Out: Acceptable/Baseline neuro status   Airway/Respiratory: Uneventful            Sign Out: Acceptable/Baseline resp. status   CV/Hemodynamics: Uneventful            Sign Out: Acceptable CV status; No obvious hypovolemia; No obvious fluid overload   Other NRE: NONE   DID A NON-ROUTINE EVENT OCCUR? No       Last vitals:  Vitals Value Taken Time   BP 97/62 08/24/23 0724   Temp 97.4  F (36.3  C) 08/24/23 0724   Pulse 54 08/24/23 0724   Resp 18 08/24/23 0724   SpO2 94 % 08/24/23 0724       Electronically Signed By: Kavin Rader MD  August 24, 2023  7:39 AM

## 2023-08-24 NOTE — ANESTHESIA PREPROCEDURE EVALUATION
"Anesthesia Pre-Procedure Evaluation    Patient: Marilia Bean   MRN: [id number] : 1945        Procedure : Procedure(s):  Colonoscopy with CO2 insufflation          Past Medical History:   Diagnosis Date     Aortic aneurysm (H) 2007    see  [facility] report -follow yearly, treat high BP is occurs Problem list name updated by automated process. Provider to review     Aortic aneurysm of unspecified site without mention of rupture 2007    4.4 cm ascending aorta noted in      Asymptomatic postmenopausal status (age-related) (natural)     on HRT  Prempro -weaning off      CAD (coronary artery disease)     LAD     Family history of Gardiner syndrome      Hyperlipidemia LDL goal <100 10/31/2010     Hypertension goal BP (blood pressure) < 140/90 2016     Leiomyoma of uterus, unspecified     Uterine fibroid     Lump or mass in breast 2004    rt. nodule - bx neg     Gardiner syndrome      Personal history of colonic polyps      Postmenopausal atrophic vaginitis 2006     Pulmonary nodule     incidental noted in  during [place]     Pure hypercholesterolemia     mild, diet - low cholesterol, low fat.10/06 start Lovastatin      Past Surgical History:   Procedure Laterality Date     BIOPSY BREAST NEEDLE LOCALIZATION Left 2021    Procedure: LEFT Wire-Localized Segmental Mastectomy (=\"Lumpectomy\");  Surgeon: Bertha Toro MD;  [facility]: [facility]     BYPASS GRAFT ARTERY CORONARY N/A 2017    Procedure: BYPASS GRAFT ARTERY CORONARY;;  Surgeon: Akshat Soto MD;  [facility]     C DEXA INTERPRETATION, AXIAL  01    wnl. 2005 wnl but lower     COLONOSCOPY  07    Repeat in 1 year for surveillance     COLONOSCOPY  12/10/08    repeat 1 year  -see 2009 letter     COLONOSCOPY  03/31/10     COLONOSCOPY  10/31/2011    Procedure:COMBINED COLONOSCOPY, SINGLE BIOPSY/POLYPECTOMY BY BIOPSY; colonoscopy with polypectomy by biopsy; Surgeon:JESSICA GARCIA; Location:[facility]     " COLONOSCOPY  12/6/2013    Procedure: COMBINED COLONOSCOPY, SINGLE BIOPSY/POLYPECTOMY BY BIOPSY;  Colonoscopy, Polypectomies;  Surgeon: Herbert Salinas MD;  [facility]     COLONOSCOPY N/A 12/8/2015    Procedure: COLONOSCOPY;  Surgeon: Jared Carbajal MD;  [facility]     COLONOSCOPY N/A 4/26/2017    Procedure: COMBINED COLONOSCOPY, SINGLE OR MULTIPLE BIOPSY/POLYPECTOMY BY BIOPSY;  Colonoscopy with polypectomies with forceps and snare;  Surgeon: Herbert Salinas MD;  [facility]     COLONOSCOPY N/A 5/10/2021    Procedure: Colonoscopy, With Polypectomy And Biopsy;  Surgeon: Franklyn Hernandez MD;  [facility]     COLONOSCOPY WITH CO2 INSUFFLATION N/A 5/10/2021    Procedure: COLONOSCOPY, WITH CO2 INSUFFLATION;  Surgeon: Franklyn Hernandez MD;  [facility]     COLONOSCOPY WITH CO2 INSUFFLATION N/A 5/26/2022    Procedure: COLONOSCOPY, WITH CO2 INSUFFLATION;  Surgeon: Franklyn Hernandez MD;  [facility]     COMBINED ESOPHAGOSCOPY, GASTROSCOPY, DUODENOSCOPY (EGD) WITH CO2 INSUFFLATION N/A 5/10/2021    Procedure: ESOPHAGOGASTRODUODENOSCOPY, WITH CO2 INSUFFLATION;  Surgeon: Franklyn Hernandez MD;  [facility]     ENDOSCOPIC INSERTION TUBE JEJUNOSTOMY N/A 8/5/2019    Procedure: Gastrojejunostomy tube placement with gastropexy;  Surgeon: Ford Mann MD;  [facility]     ESOPHAGOSCOPY, GASTROSCOPY, DUODENOSCOPY (EGD), COMBINED N/A 12/8/2015    Procedure: COMBINED ESOPHAGOSCOPY, GASTROSCOPY, DUODENOSCOPY (EGD), BIOPSY SINGLE OR MULTIPLE;  Surgeon: Jared Carbajal MD;  [facility]     ESOPHAGOSCOPY, GASTROSCOPY, DUODENOSCOPY (EGD), COMBINED N/A 7/31/2019    Procedure: Endoscopic ultrasound with cystoduodenostomy, stent placement x2, stent dilation, and nasojejunal tube placement;  Surgeon: Ford Mann MD;  [facility]     ESOPHAGOSCOPY, GASTROSCOPY, DUODENOSCOPY (EGD), COMBINED N/A 8/5/2019    Procedure:  ESOPHAGOGASTRODUODENOSCOPY (EGD);  Surgeon: Ford Mann MD;  [facility]     ESOPHAGOSCOPY, GASTROSCOPY, DUODENOSCOPY (EGD), COMBINED N/A 8/12/2019    Procedure: ESOPHAGOGASTRODUODENOSCOPY (EGD) with GJ exchange, duodenum stent removal.;  Surgeon: Ford Mann MD;  [facility]     ESOPHAGOSCOPY, GASTROSCOPY, DUODENOSCOPY (EGD), COMBINED N/A 5/10/2021    Procedure: Esophagogastroduodenoscopy, With Biopsy;  Surgeon: Franklyn Hernandez MD;  [facility]     HC ECP WITH CATARACT SURGERY Bilateral 2015, 2016     HC LAPAROSCOPY, SURGICAL; APPENDECTOMY  10/31/2004     HC LAPAROSCOPY, SURGICAL; CHOLECYSTECTOMY  2000    Cholecystectomy, Laparoscopic     HC REVISE MEDIAN N/CARPAL TUNNEL SURG  10/01/10    left     HYSTERECTOMY, ELVIN  12/14/09    TAHBSO, MMK     LAPAROSCOPIC ASSISTED COLECTOMY N/A 6/30/2021    Procedure: Laparoscopic subtotal colectomy, ileosigmoid anastomosis, lysis of adhesions for 150 mins, splenic flexure mobilization;  Surgeon: Akil Lay MD;  [facility]     REPAIR ANEURYSM ASCENDING AORTA N/A 6/5/2017    Procedure: REPAIR ANEURYSM ASCENDING AORTA;  Median Sternotomy, Ascending Aortic Aneurysm Repair, Coronary Artery Bypass Graft x1 on pump oxygenator;  Surgeon: Akshat Soto MD;  [facility]     REPLACE GASTROJEJUNOSTOMY TUBE, PERCUTANEOUS N/A 8/19/2019    Procedure: REPLACEMENT, GASTROJEJUNOSTOMY TUBE;  Surgeon: Robert Tafoya MD;  [facility]     RESECTION DUODENAL N/A 7/3/2019    Procedure: Resection of Distal Duodenal and proximal Jejunum;  Surgeon: Joaquin Brito MD;  [facility]     ZZHC BIOPSY BREAST, PERC NEEDLE CORE, WITH IMAGING  8/17/2004    Right     ZZ COLONOSCOPY THRU STOMA W BIOPSY/CAUTERY TUMOR/POLYP/LESION  1999,2002 2004 polyp - hyperplastic - repeat 5 years ?     ZZ COLONOSCOPY W/WO BRUSH/WASH  12/12/2005    Polypectomy.  Diverticulosis-minimal. Bx adenomatous and mucosal polyps - repeat 1  year     ZZHC [facility] ENDOSCOPY, SIMPLE EXAM  03/31/10      Allergies   Allergen Reactions     Contrast Dye Itching and Other (See Comments)     Tingling in mouth     Diatrizoate Itching and Other (See Comments)     Tingling in mouth     Morphine Nausea     Reports that she did not vomit.       Social History     Tobacco Use     Smoking status: Never     Smokeless tobacco: Never   Substance Use Topics     Alcohol use: Yes     Comment: rarely      Wt Readings from Last 1 Encounters:   05/02/23 86.2 kg (190 lb)        Anesthesia Evaluation   Pt has had prior anesthetic. Type: General and MAC.    No history of anesthetic complications       ROS/MED HX  ENT/Pulmonary:  - neg pulmonary ROS     Neurologic:  - neg neurologic ROS     Cardiovascular: Comment: Metoprolol for hypertension    (+)  hypertension- -  CAD -  - -                        dysrhythmias, Other,             METS/Exercise Tolerance:  Comment: Aortic aneurysm repaired   Hematologic:  - neg hematologic  ROS     Musculoskeletal:  - neg musculoskeletal ROS     GI/Hepatic: Comment: Gardiner syndrome      Renal/Genitourinary:     (+) renal disease, type: CRI, Pt does not require dialysis,           Endo:  - neg endo ROS     Psychiatric/Substance Use:  - neg psychiatric ROS     Infectious Disease:  - neg infectious disease ROS     Malignancy:  - neg malignancy ROS     Other:  - neg other ROS        Physical Exam    Airway  airway exam normal           Respiratory Devices and Support         Dental       (+) Completely normal teeth      Cardiovascular   cardiovascular exam normal          Pulmonary   pulmonary exam normal            OUTSIDE LABS:  CBC:   Lab Results   Component Value Date    WBC 6.5 08/11/2021    WBC 9.4 07/02/2021    HGB 12.7 08/11/2021    HGB 11.8 07/02/2021    HCT 38.6 08/11/2021    HCT 36.9 07/02/2021     08/11/2021     07/02/2021     BMP:   Lab Results   Component Value Date     11/11/2022     11/29/2021    POTASSIUM 4.0  11/11/2022    POTASSIUM 4.2 11/29/2021    CHLORIDE 108 11/11/2022    CHLORIDE 108 11/29/2021    CO2 28 11/11/2022    CO2 28 11/29/2021    BUN 19 11/11/2022    BUN 21 11/29/2021    CR 0.82 11/11/2022    CR 0.85 11/29/2021     (H) 11/11/2022     (H) 11/29/2021     COAGS:   Lab Results   Component Value Date    PTT 33 06/12/2017    INR 1.12 08/12/2019    FIBR 269 06/06/2017     POC:   Lab Results   Component Value Date     (H) 06/30/2021     HEPATIC:   Lab Results   Component Value Date    ALBUMIN 3.6 11/11/2022    PROTTOTAL 7.3 11/11/2022    ALT 30 11/11/2022    AST 23 11/11/2022    ALKPHOS 96 11/11/2022    BILITOTAL 0.7 11/11/2022     OTHER:   Lab Results   Component Value Date    PH 7.32 (L) 06/06/2017    LACT 1.1 08/11/2019    A1C 5.8 06/07/2017    TARIQ 9.4 11/11/2022    PHOS 3.8 08/12/2019    MAG 2.1 08/12/2019    LIPASE 411 (H) 07/14/2019    AMYLASE 30 07/06/2019    TSH 4.97 (H) 06/13/2017       Anesthesia Plan    ASA Status:  3    NPO Status:  NPO Appropriate    Anesthesia Type: MAC.     - Reason for MAC: chronic cardiopulmonary disease   Induction: Intravenous, Propofol.   Maintenance: TIVA.        Consents    Anesthesia Plan(s) and associated risks, benefits, and realistic alternatives discussed. Questions answered and patient/representative(s) expressed understanding.     - Discussed:     - Discussed with:  Patient      - Extended Intubation/Ventilatory Support Discussed: No.      - Patient is DNR/DNI Status: No     Use of blood products discussed: No .     Postoperative Care    Post procedure pain management: None required.   PONV prophylaxis: Ondansetron (or other 5HT-3), Background Propofol Infusion     Comments:              Kavin Rader MD

## 2023-10-17 ENCOUNTER — HOSPITAL ENCOUNTER (OUTPATIENT)
Dept: MAMMOGRAPHY | Facility: CLINIC | Age: 78
Discharge: HOME | End: 2023-10-17
Attending: INTERNAL MEDICINE | Admitting: INTERNAL MEDICINE
Payer: COMMERCIAL

## 2023-10-17 DIAGNOSIS — Z12.31 VISIT FOR SCREENING MAMMOGRAM: ICD-10-CM

## 2023-10-17 PROCEDURE — 77067 SCR MAMMO BI INCL CAD: CPT

## 2023-12-12 ASSESSMENT — ENCOUNTER SYMPTOMS
PARESTHESIAS: 0
CHILLS: 1
HEADACHES: 0
MYALGIAS: 0
DIZZINESS: 1
DIARRHEA: 0
ABDOMINAL PAIN: 0
SHORTNESS OF BREATH: 0
ARTHRALGIAS: 0
HEARTBURN: 0
EYE PAIN: 0
HEMATOCHEZIA: 0
SORE THROAT: 0
HEMATURIA: 0
NERVOUS/ANXIOUS: 1
CONSTIPATION: 0
WEAKNESS: 0
DYSURIA: 0
BREAST MASS: 0
COUGH: 0
JOINT SWELLING: 0
FEVER: 0
PALPITATIONS: 0
NAUSEA: 0
FREQUENCY: 0

## 2023-12-12 ASSESSMENT — ACTIVITIES OF DAILY LIVING (ADL): CURRENT_FUNCTION: NO ASSISTANCE NEEDED

## 2023-12-13 ENCOUNTER — OFFICE VISIT (OUTPATIENT)
Dept: INTERNAL MEDICINE | Facility: CLINIC | Age: 78
End: 2023-12-13
Payer: COMMERCIAL

## 2023-12-13 VITALS
HEIGHT: 67 IN | DIASTOLIC BLOOD PRESSURE: 74 MMHG | TEMPERATURE: 97.8 F | SYSTOLIC BLOOD PRESSURE: 128 MMHG | RESPIRATION RATE: 16 BRPM | WEIGHT: 193 LBS | BODY MASS INDEX: 30.29 KG/M2 | OXYGEN SATURATION: 96 % | HEART RATE: 58 BPM

## 2023-12-13 DIAGNOSIS — E78.5 HYPERLIPIDEMIA WITH TARGET LDL LESS THAN 70: ICD-10-CM

## 2023-12-13 DIAGNOSIS — I10 HYPERTENSION GOAL BP (BLOOD PRESSURE) < 140/90: ICD-10-CM

## 2023-12-13 DIAGNOSIS — N18.31 STAGE 3A CHRONIC KIDNEY DISEASE (H): ICD-10-CM

## 2023-12-13 DIAGNOSIS — Z95.1 S/P CABG (CORONARY ARTERY BYPASS GRAFT): ICD-10-CM

## 2023-12-13 DIAGNOSIS — Z98.890 S/P AORTIC ANEURYSM REPAIR: ICD-10-CM

## 2023-12-13 DIAGNOSIS — Z00.00 ENCOUNTER FOR MEDICARE ANNUAL WELLNESS EXAM: Primary | ICD-10-CM

## 2023-12-13 DIAGNOSIS — Z86.79 S/P AORTIC ANEURYSM REPAIR: ICD-10-CM

## 2023-12-13 PROBLEM — C18.9 COLON CANCER (H): Status: RESOLVED | Noted: 2021-06-07 | Resolved: 2023-12-13

## 2023-12-13 PROBLEM — C18.8 OVERLAPPING MALIGNANT NEOPLASM OF COLON (H): Status: RESOLVED | Noted: 2021-06-15 | Resolved: 2023-12-13

## 2023-12-13 LAB
ALBUMIN SERPL BCG-MCNC: 4.1 G/DL (ref 3.5–5.2)
ALP SERPL-CCNC: 91 U/L (ref 40–150)
ALT SERPL W P-5'-P-CCNC: 24 U/L (ref 0–50)
ANION GAP SERPL CALCULATED.3IONS-SCNC: 8 MMOL/L (ref 7–15)
AST SERPL W P-5'-P-CCNC: 26 U/L (ref 0–45)
BILIRUB SERPL-MCNC: 0.6 MG/DL
BUN SERPL-MCNC: 15.8 MG/DL (ref 8–23)
CALCIUM SERPL-MCNC: 9.5 MG/DL (ref 8.8–10.2)
CHLORIDE SERPL-SCNC: 104 MMOL/L (ref 98–107)
CHOLEST SERPL-MCNC: 157 MG/DL
CREAT SERPL-MCNC: 0.94 MG/DL (ref 0.51–0.95)
CREAT UR-MCNC: 152.6 MG/DL
DEPRECATED HCO3 PLAS-SCNC: 29 MMOL/L (ref 22–29)
EGFRCR SERPLBLD CKD-EPI 2021: 62 ML/MIN/1.73M2
FASTING STATUS PATIENT QL REPORTED: NO
GLUCOSE SERPL-MCNC: 90 MG/DL (ref 70–99)
HDLC SERPL-MCNC: 56 MG/DL
HGB BLD-MCNC: 13.6 G/DL (ref 11.7–15.7)
LDLC SERPL CALC-MCNC: 81 MG/DL
MICROALBUMIN UR-MCNC: <12 MG/L
MICROALBUMIN/CREAT UR: NORMAL MG/G{CREAT}
NONHDLC SERPL-MCNC: 101 MG/DL
POTASSIUM SERPL-SCNC: 4 MMOL/L (ref 3.4–5.3)
PROT SERPL-MCNC: 6.9 G/DL (ref 6.4–8.3)
SODIUM SERPL-SCNC: 141 MMOL/L (ref 135–145)
TRIGL SERPL-MCNC: 101 MG/DL

## 2023-12-13 PROCEDURE — 99213 OFFICE O/P EST LOW 20 MIN: CPT | Mod: 25 | Performed by: INTERNAL MEDICINE

## 2023-12-13 PROCEDURE — 82043 UR ALBUMIN QUANTITATIVE: CPT | Performed by: INTERNAL MEDICINE

## 2023-12-13 PROCEDURE — G0439 PPPS, SUBSEQ VISIT: HCPCS | Performed by: INTERNAL MEDICINE

## 2023-12-13 PROCEDURE — 80053 COMPREHEN METABOLIC PANEL: CPT | Performed by: INTERNAL MEDICINE

## 2023-12-13 PROCEDURE — 82570 ASSAY OF URINE CREATININE: CPT | Performed by: INTERNAL MEDICINE

## 2023-12-13 PROCEDURE — 80061 LIPID PANEL: CPT | Performed by: INTERNAL MEDICINE

## 2023-12-13 PROCEDURE — 36415 COLL VENOUS BLD VENIPUNCTURE: CPT | Performed by: INTERNAL MEDICINE

## 2023-12-13 PROCEDURE — 85018 HEMOGLOBIN: CPT | Performed by: INTERNAL MEDICINE

## 2023-12-13 RX ORDER — METOPROLOL TARTRATE 25 MG/1
25 TABLET, FILM COATED ORAL 2 TIMES DAILY
Qty: 180 TABLET | Refills: 3 | Status: SHIPPED | OUTPATIENT
Start: 2023-12-13

## 2023-12-13 RX ORDER — LOSARTAN POTASSIUM 25 MG/1
25 TABLET ORAL EVERY MORNING
Qty: 180 TABLET | Refills: 3 | Status: SHIPPED | OUTPATIENT
Start: 2023-12-13 | End: 2024-05-31

## 2023-12-13 RX ORDER — ATORVASTATIN CALCIUM 20 MG/1
TABLET, FILM COATED ORAL
Qty: 90 TABLET | Refills: 3 | Status: SHIPPED | OUTPATIENT
Start: 2023-12-13

## 2023-12-13 RX ORDER — RESPIRATORY SYNCYTIAL VIRUS VACCINE 120MCG/0.5
0.5 KIT INTRAMUSCULAR ONCE
Qty: 1 EACH | Refills: 0 | Status: CANCELLED | OUTPATIENT
Start: 2023-12-13 | End: 2023-12-13

## 2023-12-13 ASSESSMENT — ENCOUNTER SYMPTOMS
MYALGIAS: 0
NAUSEA: 0
CHILLS: 1
HEADACHES: 0
SORE THROAT: 0
WEAKNESS: 0
PALPITATIONS: 0
SHORTNESS OF BREATH: 0
FREQUENCY: 0
FEVER: 0
DIARRHEA: 0
PARESTHESIAS: 0
CONSTIPATION: 0
COUGH: 0
JOINT SWELLING: 0
NERVOUS/ANXIOUS: 1
BREAST MASS: 0
ABDOMINAL PAIN: 0
ARTHRALGIAS: 0
HEMATOCHEZIA: 0
HEARTBURN: 0
DYSURIA: 0
HEMATURIA: 0
EYE PAIN: 0
DIZZINESS: 1

## 2023-12-13 ASSESSMENT — ACTIVITIES OF DAILY LIVING (ADL): CURRENT_FUNCTION: NO ASSISTANCE NEEDED
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
## 2023-12-13 NOTE — PROGRESS NOTES
She is at risk for lack of exercise and has been provided with information to increase physical activity for the benefit of her well-being.  The patient was counseled and encouraged to consider modifying their diet and eating habits. She was provided with information on recommended healthy diet options.  The patient was provided with written information regarding signs of hearing loss.  Information on urinary incontinence and treatment options given to patient.  The patient was provided with suggestions to help her develop a healthy emotional lifestyle.

## 2023-12-13 NOTE — PATIENT INSTRUCTIONS
"Patient Education   Personalized Prevention Plan  You are due for the preventive services outlined below.  Your care team is available to assist you in scheduling these services.  If you have already completed any of these items, please share that information with your care team to update in your medical record.  Health Maintenance Due   Topic Date Due     Zoster (Shingles) Vaccine (1 of 2) Never done     RSV VACCINE (Pregnancy & 60+) (1 - 1-dose 60+ series) Never done     Osteoporosis Screening  07/29/2020     Hemoglobin  08/11/2022     Basic Metabolic Panel  11/11/2023     Cholesterol Lab  11/11/2023     ANNUAL REVIEW OF HM ORDERS  11/11/2023     Kidney Microalbumin Urine Test  11/14/2023     Learning About Being Physically Active  What is physical activity?     Being physically active means doing any kind of activity that gets your body moving.  The types of physical activity that can help you get fit and stay healthy include:  Aerobic or \"cardio\" activities. These make your heart beat faster and make you breathe harder, such as brisk walking, riding a bike, or running. They strengthen your heart and lungs and build up your endurance.  Strength training activities. These make your muscles work against, or \"resist,\" something. Examples include lifting weights or doing push-ups. These activities help tone and strengthen your muscles and bones.  Stretches. These let you move your joints and muscles through their full range of motion. Stretching helps you be more flexible.  Reaching a balance between these three types of physical activity is important because each one contributes to your overall fitness.  What are the benefits of being active?  Being active is one of the best things you can do for your health. It helps you to:  Feel stronger and have more energy to do all the things you like to do.  Focus better at school or work.  Feel, think, and sleep better.  Reach and stay at a healthy weight.  Lose fat and " "build lean muscle.  Lower your risk for serious health problems, including diabetes, heart attack, high blood pressure, and some cancers.  Keep your heart, lungs, bones, muscles, and joints strong and healthy.  How can you make being active part of your life?  Start slowly. Make it your long-term goal to get at least 30 minutes of exercise on most days of the week. Walking is a good choice. You also may want to do other activities, such as running, swimming, cycling, or playing tennis or team sports.  Pick activities that you like--ones that make your heart beat faster, your muscles stronger, and your muscles and joints more flexible. If you find more than one thing you like doing, do them all. You don't have to do the same thing every day.  Get your heart pumping every day. Any activity that makes your heart beat faster and keeps it at that rate for a while counts.  Here are some great ways to get your heart beating faster:  Go for a brisk walk, run, or hike.  Go for a swim or bike ride.  Take an online exercise class or dance.  Play a game of touch football, basketball, or soccer.  Play tennis, pickleball, or racquetball.  Climb stairs.  Even some household chores can be aerobic. Just do them at a faster pace. Raking or mowing the lawn, sweeping the garage, and vacuuming and cleaning your home all can help get your heart rate up.  Strengthen your muscles during the week. You don't have to lift heavy weights or grow big, bulky muscles to get stronger. Doing a few simple activities that make your muscles work against, or \"resist,\" something can help you get stronger. Aim for at least twice a week.  For example, you can:  Do push-ups or sit-ups, which use your own body weight as resistance.  Lift weights or dumbbells or use stretch bands at home or in a gym or community center.  Stretch your muscles often. Stretching will help you as you become more active. It can help you stay flexible and loosen tight muscles. It " "can also help improve your balance and posture and can be a great way to relax.  Be sure to stretch the muscles you'll be using when you work out. It's best to warm your muscles slightly before you stretch them. Walk or do some other light aerobic activity for a few minutes. Then start stretching.  When you stretch your muscles:  Do it slowly. Stretching is not about going fast or making sudden movements.  Don't push or bounce during a stretch.  Hold each stretch for at least 15 to 30 seconds, if you can. You should feel a stretch in the muscle, but not pain.  Breathe out as you do the stretch. Then breathe in as you hold the stretch. Don't hold your breath.  If you're worried about how more activity might affect your health, have a checkup before you start. Follow any special advice your doctor gives you for getting a smart start.  Where can you learn more?  Go to https://www.[employer]/patiented  Enter W332 in the search box to learn more about \"Learning About Being Physically Active.\"  Current as of: June 6, 2023               Content Version: 13.8    6436-1720 [employer].   Care instructions adapted under license by your healthcare professional. If you have questions about a medical condition or this instruction, always ask your healthcare professional. [employer] disclaims any warranty or liability for your use of this information.      Learning About Dietary Guidelines  What are the Dietary Guidelines for Americans?     Dietary Guidelines for Americans provide tips for eating well and staying healthy. This helps reduce the risk for long-term (chronic) diseases.  These guidelines recommend that you:  Eat and drink the right amount for you. The U.S. government's food guide is called MyPlate. It can help you make your own well-balanced eating plan.  Try to balance your eating with your activity. This helps you stay at a healthy weight.  Drink alcohol in moderation, if at " "all.  Limit foods high in salt, saturated fat, trans fat, and added sugar.  These guidelines are from the U.S. Department of Agriculture and the U.S. Department of Health and Human Services. They are updated every 5 years.  What is MyPlate?  MyPlate is the U.S. government's food guide. It can help you make your own well-balanced eating plan. A balanced eating plan means that you eat enough, but not too much, and that your food gives you the nutrients you need to stay healthy.  MyPlate focuses on eating plenty of whole grains, fruits, and vegetables, and on limiting fat and sugar. It is available online at www.ChooseMyPlate.gov.  How can you get started?  If you're trying to eat healthier, you can slowly change your eating habits over time. You don't have to make big changes all at once. Start by adding one or two healthy foods to your meals each day.  Grains  Choose whole-grain breads and cereals and whole-wheat pasta and whole-grain crackers.  Vegetables  Eat a variety of vegetables every day. They have lots of nutrients and are part of a heart-healthy diet.  Fruits  Eat a variety of fruits every day. Fruits contain lots of nutrients. Choose fresh fruit instead of fruit juice.  Protein foods  Choose fish and lean poultry more often. Eat red meat and fried meats less often. Dried beans, tofu, and nuts are also good sources of protein.  Dairy  Choose low-fat or fat-free products from this food group. If you have problems digesting milk, try eating cheese or yogurt instead.  Fats and oils  Limit fats and oils if you're trying to cut calories. Choose healthy fats when you cook. These include canola oil and olive oil.  Where can you learn more?  Go to https://www.healthOnevest.net/patiented  Enter D676 in the search box to learn more about \"Learning About Dietary Guidelines.\"  Current as of: February 28, 2023               Content Version: 13.8    7807-6999 [employer].   Care instructions adapted under " license by your healthcare professional. If you have questions about a medical condition or this instruction, always ask your healthcare professional. [employer] disclaims any warranty or liability for your use of this information.      Hearing Loss: Care Instructions  Overview     Hearing loss is a sudden or slow decrease in how well you hear. It can range from slight to profound. Permanent hearing loss can occur with aging. It also can happen when you are exposed long-term to loud noise. Examples include listening to loud music, riding motorcycles, or being around other loud machines.  Hearing loss can affect your work and home life. It can make you feel lonely or depressed. You may feel that you have lost your independence. But hearing aids and other devices can help you hear better and feel connected to others.  Follow-up care is a key part of your treatment and safety. Be sure to make and go to all appointments, and call your doctor if you are having problems. It's also a good idea to know your test results and keep a list of the medicines you take.  How can you care for yourself at home?  Avoid loud noises whenever possible. This helps keep your hearing from getting worse.  Always wear hearing protection around loud noises.  Wear a hearing aid as directed.  A professional can help you pick a hearing aid that will work best for you.  You can also get hearing aids over the counter for mild to moderate hearing loss.  Have hearing tests as your doctor suggests. They can show whether your hearing has changed. Your hearing aid may need to be adjusted.  Use other devices as needed. These may include:  Telephone amplifiers and hearing aids that can connect to a television, stereo, radio, or microphone.  Devices that use lights or vibrations. These alert you to the doorbell, a ringing telephone, or a baby monitor.  Television closed-captioning. This shows the words at the bottom of the screen. Most new  "TVs can do this.  TTY (text telephone). This lets you type messages back and forth on the telephone instead of talking or listening. These devices are also called TDD. When messages are typed on the keyboard, they are sent over the phone line to a receiving TTY. The message is shown on a monitor.  Use text messaging, social media, and email if it is hard for you to communicate by telephone.  Try to learn a listening technique called speechreading. It is not lipreading. You pay attention to people's gestures, expressions, posture, and tone of voice. These clues can help you understand what a person is saying. Face the person you are talking to, and have them face you. Make sure the lighting is good. You need to see the other person's face clearly.  Think about counseling if you need help to adjust to your hearing loss.  When should you call for help?  Watch closely for changes in your health, and be sure to contact your doctor if:    You think your hearing is getting worse.     You have new symptoms, such as dizziness or nausea.   Where can you learn more?  Go to https://www.[employer].net/patiented  Enter R798 in the search box to learn more about \"Hearing Loss: Care Instructions.\"  Current as of: February 28, 2023               Content Version: 13.8    7047-9183 [employer].   Care instructions adapted under license by your healthcare professional. If you have questions about a medical condition or this instruction, always ask your healthcare professional. [employer] disclaims any warranty or liability for your use of this information.      Bladder Training: Care Instructions  Your Care Instructions     Bladder training is used to treat urge incontinence and stress incontinence. Urge incontinence means that the need to urinate comes on so fast that you can't get to a toilet in time. Stress incontinence means that you leak urine because of pressure on your bladder. For example, it may " happen when you laugh, cough, or lift something heavy.  Bladder training can increase how long you can wait before you have to urinate. It can also help your bladder hold more urine. And it can give you better control over the urge to urinate.  It is important to remember that bladder training takes a few weeks to a few months to make a difference. You may not see results right away, but don't give up.  Follow-up care is a key part of your treatment and safety. Be sure to make and go to all appointments, and call your doctor if you are having problems. It's also a good idea to know your test results and keep a list of the medicines you take.  How can you care for yourself at home?  Work with your doctor to come up with a bladder training program that is right for you. You may use one or more of the following methods.  Delayed urination  In the beginning, try to keep from urinating for 5 minutes after you first feel the need to go.  While you wait, take deep, slow breaths to relax. Kegel exercises can also help you delay the need to go to the bathroom.  After some practice, when you can easily wait 5 minutes to urinate, try to wait 10 minutes before you urinate.  Slowly increase the waiting period until you are able to control when you have to urinate.  Scheduled urination  Empty your bladder when you first wake up in the morning.  Schedule times throughout the day when you will urinate.  Start by going to the bathroom every hour, even if you don't need to go.  Slowly increase the time between trips to the bathroom.  When you have found a schedule that works well for you, keep doing it.  If you wake up during the night and have to urinate, do it. Apply your schedule to waking hours only.  Kegel exercises  These tighten and strengthen pelvic muscles, which can help you control the flow of urine. (If doing these exercises causes pain, stop doing them and talk with your doctor.) To do Kegel exercises:  Squeeze your  "muscles as if you were trying not to pass gas. Or squeeze your muscles as if you were stopping the flow of urine. Your belly, legs, and buttocks shouldn't move.  Hold the squeeze for 3 seconds, then relax for 5 to 10 seconds.  Start with 3 seconds, then add 1 second each week until you are able to squeeze for 10 seconds.  Repeat the exercise 10 times a session. Do 3 to 8 sessions a day.  When should you call for help?  Watch closely for changes in your health, and be sure to contact your doctor if:    Your incontinence is getting worse.     You do not get better as expected.   Where can you learn more?  Go to https://www.[employer].net/patiented  Enter V684 in the search box to learn more about \"Bladder Training: Care Instructions.\"  Current as of: February 28, 2023               Content Version: 13.8    6080-1911 [employer].   Care instructions adapted under license by your healthcare professional. If you have questions about a medical condition or this instruction, always ask your healthcare professional. [employer] disclaims any warranty or liability for your use of this information.      Your Health Risk Assessment indicates you feel you are not in good emotional health.    Recreation   Recreation is not limited to sports and team events. It includes any activity that provides relaxation, interest, enjoyment, and exercise. Recreation provides an outlet for physical, mental, and social energy. It can give a sense of worth and achievement. It can help you stay healthy.    Mental Exercise and Social Involvement  Mental and emotional health is as important as physical health. Keep in touch with friends and family. Stay as active as possible. Continue to learn and challenge yourself.   Things you can do to stay mentally active are:  Learn something new, like a foreign language or musical instrument.   Play SCRABBLE or do crossword puzzles. If you cannot find people to play these games with " you at home, you can play them with others on your computer through the Internet.   Join a games club--anything from card games to chess or checkers or lawn bowling.   Start a new hobby.   Go back to school.   Volunteer.   Read.   Keep up with world events.

## 2024-02-06 NOTE — ANESTHESIA POSTPROCEDURE EVALUATION
Anesthesia POST Procedure Evaluation    Patient: Marilia Bean   MRN:     [id number] Gender:   female   Age:    74 year old :      1945        Preoperative Diagnosis: duodenal-jeujonostomy anastomosis   Procedure(s):  ESOPHAGOGASTRODUODENOSCOPY (EGD) with GJ exchange, duodenum stent removal.   Postop Comments: No value filed.       Anesthesia Type:  Not documented  General    Reportable Event: NO     PAIN: Uncomplicated   Sign Out status: Comfortable, Well controlled pain     PONV: No PONV   Sign Out status:  No Nausea or Vomiting     Neuro/Psych: Uneventful perioperative course   Sign Out Status: Preoperative baseline; Age appropriate mentation     Airway/Resp.: Uneventful perioperative course   Sign Out Status: Non labored breathing, age appropriate RR; Resp. Status within EXPECTED Parameters     CV: Uneventful perioperative course   Sign Out status: Appropriate BP and perfusion indices; Appropriate HR/Rhythm     Disposition:   Sign Out in:  PACU  Disposition:  Floor  Recovery Course: Uneventful  Follow-Up: Not required           Last Anesthesia Record Vitals:  CRNA VITALS  2019 1616 - 2019 1716      2019             Pulse:  77    SpO2:  92 %          Last PACU Vitals:  Vitals Value Taken Time   /78 2019  6:24 PM   Temp 35.6  C (96.1  F) 2019  6:24 PM   Pulse 84 2019  5:40 PM   Resp 14 2019  6:24 PM   SpO2 97 % 2019  6:24 PM   Temp src     NIBP     Pulse     SpO2     Resp     Temp     Ht Rate     Temp 2     Vitals shown include unvalidated device data.      Electronically Signed By: Sarah Wachter, MD, 2019, 7:35 PM   [Negative] : Heme/Lymph

## 2024-05-02 NOTE — TELEPHONE ENCOUNTER
May 2, 2024     Patient: Rowan Judd  YOB: 2016  Date of Visit: 5/2/2024      To Whom it May Concern:    Rowan Judd is under my professional care. Rowan was seen in my office on 5/2/2024. Rowan may return to school on 5/6/24 . Please excuse absence on 5/2 and 5/3 due to illnes.     If you have any questions or concerns, please don't hesitate to call.         Sincerely,          Brook Coy MD         Hi,     I just saw her yesterday afternoon. She has a mychart message I can't get into and it was sent to Triage.  She needs to be patient. I reduce her lisinopril to losartan, she has to give it a few days to see if her bp comes up. Hgb is rising.  Let the amiodarone work and try to eat and drink more.     Let me know how she is doing next week.     Thanks   Herbert

## 2024-05-31 ENCOUNTER — OFFICE VISIT (OUTPATIENT)
Dept: INTERNAL MEDICINE | Facility: CLINIC | Age: 79
End: 2024-05-31
Payer: COMMERCIAL

## 2024-05-31 VITALS
OXYGEN SATURATION: 96 % | HEIGHT: 67 IN | TEMPERATURE: 97.5 F | SYSTOLIC BLOOD PRESSURE: 134 MMHG | DIASTOLIC BLOOD PRESSURE: 70 MMHG | BODY MASS INDEX: 29.66 KG/M2 | RESPIRATION RATE: 17 BRPM | HEART RATE: 54 BPM | WEIGHT: 189 LBS

## 2024-05-31 DIAGNOSIS — Z86.79 S/P AORTIC ANEURYSM REPAIR: ICD-10-CM

## 2024-05-31 DIAGNOSIS — H93.233 HYPERACUSIS OF BOTH EARS: ICD-10-CM

## 2024-05-31 DIAGNOSIS — I10 HYPERTENSION GOAL BP (BLOOD PRESSURE) < 140/90: Primary | ICD-10-CM

## 2024-05-31 DIAGNOSIS — Z98.890 S/P AORTIC ANEURYSM REPAIR: ICD-10-CM

## 2024-05-31 PROCEDURE — G2211 COMPLEX E/M VISIT ADD ON: HCPCS | Performed by: INTERNAL MEDICINE

## 2024-05-31 PROCEDURE — 99214 OFFICE O/P EST MOD 30 MIN: CPT | Performed by: INTERNAL MEDICINE

## 2024-05-31 RX ORDER — LOSARTAN POTASSIUM 50 MG/1
50 TABLET ORAL DAILY
Qty: 90 TABLET | Refills: 3 | Status: SHIPPED | OUTPATIENT
Start: 2024-05-31

## 2024-05-31 RX ORDER — RESPIRATORY SYNCYTIAL VIRUS VACCINE 120MCG/0.5
0.5 KIT INTRAMUSCULAR ONCE
Qty: 1 EACH | Refills: 0 | Status: CANCELLED | OUTPATIENT
Start: 2024-05-31 | End: 2024-05-31

## 2024-05-31 ASSESSMENT — PAIN SCALES - GENERAL: PAINLEVEL: NO PAIN (0)

## 2024-05-31 ASSESSMENT — ENCOUNTER SYMPTOMS: HEADACHES: 1

## 2024-05-31 NOTE — PROGRESS NOTES
Assessment & Plan     Hypertension goal BP (blood pressure) < 140/90  Hypertension on metoprolol 25 twice a day losartan 25 daily.  Blood pressure is running high at home up to 190/110 but this was from stress and trying to sell her home.  It is gotten better but still running in the 150s today is 130 and 140 here.  Will increase her losartan from 25-50.  If she has a high reading over 180 she can take an extra metoprolol.  - losartan (COZAAR) 50 MG tablet; Take 1 tablet (50 mg) by mouth daily    Hyperacusis of both ears  Sensitive hearing in both ears sometimes hears her heartbeat sometimes hears other sounds she is reassured that this appears okay I do not hear any carotid bruits.  Hopefully this improves with her blood pressure being better.    S/P aortic aneurysm repair  Aneurysm    Coronary disease status post bypass she does need to have her blood pressure control therefore we will increase the medication.    The longitudinal plan of care for the diagnosis(es)/condition(s) as documented were addressed during this visit. Due to the added complexity in care, I will continue to support Marilia in the subsequent management and with ongoing continuity of care.        FUTURE APPOINTMENTS:       - Follow-up for annual visit or as needed    Subjective   Marilia is a 79 year old, presenting for the following health issues:  Headache (Headaches, hears sounds sometimes in head, blood pressure/2 years ago had a fall and broke nose wondering if its related )      5/31/2024     9:09 AM   Additional Questions   Roomed by Annabel     Headache     History of Present Illness       Reason for visit:  Noise in my head,  also intermittent  high blood pressure  Symptom onset:  More than a month  Symptoms include:  Crickets noise in my head,  Symptom intensity:  Moderate  Symptom progression:  Staying the same  Had these symptoms before:  No  What makes it worse:  NO  What makes it better:  NO    She eats 2-3 servings of fruits and  "vegetables daily.She consumes 0 sweetened beverage(s) daily.She exercises with enough effort to increase her heart rate 9 or less minutes per day.  She exercises with enough effort to increase her heart rate 4 days per week. She is missing 1 dose(s) of medications per week.  She is not taking prescribed medications regularly due to remembering to take.     Hearing soft noises in her head, maybe her heart or other things.     Has had some high bp at night 191/110, took extra bp pill, then has come down some but still high 150/80 range.    Her machine is good here today, same as ours.     Low dose losartan and metoprolol 25 mg of each.     Planning on selling her house and building a new one.         Objective    /70 (Cuff Size: Adult Regular)   Pulse 54   Temp 97.5  F (36.4  C) (Temporal)   Resp 17   Ht 1.702 m (5' 7\")   Wt 85.7 kg (189 lb)   SpO2 96%   BMI 29.60 kg/m    Body mass index is 29.6 kg/m .  Physical Exam   No acute distress  Ears are clear bilaterally  Carotids without any bruits  Heart is regular  Lungs are clear.        Signed Electronically by: Herbert Epstein MD  "

## 2024-07-25 ENCOUNTER — TELEPHONE (OUTPATIENT)
Dept: INTERNAL MEDICINE | Facility: CLINIC | Age: 79
End: 2024-07-25
Payer: COMMERCIAL

## 2024-07-25 NOTE — TELEPHONE ENCOUNTER
History of Gardiner syndrome, yearly colonoscopies.   Last colonoscopy 8/24/23.    Patient woke up this morning had had some blood in her stool. She would like to see Dr. Franklyn Hernandez at [facility] to follow up on this. She called there to make an appointment but was told she needed referral from PCP.     Routing to PCP - are you able to place this referral for patient or would you like to see her first? Last office visit with PCP 5/31/24. She is willing to come in for office visit if needed to obtain referral.     Hamida BIRCHN, RN

## 2024-07-25 NOTE — TELEPHONE ENCOUNTER
Patient calling back on this. Appointment time works for patient.     Future Appointments 7/25/2024 - 1/21/2025        Date Visit Type Length Department Provider     7/29/2024  8:00 AM OFFICE VISIT 20 min PH INTERNAL MED Herbert Epstein MD    Location Instructions:     [facility] is located at [address], within [facility]. This is near the [address] exit off of [address]. Turn north off the exit, then west onto [address]. Park in the Blue Lot. The clinic and medical center share a main entrance and .                    Lynsey Yañez, EYALN, RN

## 2024-07-29 ENCOUNTER — OFFICE VISIT (OUTPATIENT)
Dept: INTERNAL MEDICINE | Facility: CLINIC | Age: 79
End: 2024-07-29
Payer: COMMERCIAL

## 2024-07-29 VITALS
TEMPERATURE: 97.8 F | OXYGEN SATURATION: 97 % | RESPIRATION RATE: 12 BRPM | DIASTOLIC BLOOD PRESSURE: 80 MMHG | WEIGHT: 188.2 LBS | HEART RATE: 54 BPM | BODY MASS INDEX: 29.48 KG/M2 | SYSTOLIC BLOOD PRESSURE: 160 MMHG

## 2024-07-29 DIAGNOSIS — Z15.09 LYNCH SYNDROME: ICD-10-CM

## 2024-07-29 DIAGNOSIS — K62.5 BRIGHT RED BLOOD PER RECTUM: Primary | ICD-10-CM

## 2024-07-29 DIAGNOSIS — Z12.11 SCREEN FOR COLON CANCER: ICD-10-CM

## 2024-07-29 LAB
ERYTHROCYTE [DISTWIDTH] IN BLOOD BY AUTOMATED COUNT: 12.7 % (ref 10–15)
HCT VFR BLD AUTO: 41.1 % (ref 35–47)
HGB BLD-MCNC: 13.9 G/DL (ref 11.7–15.7)
MCH RBC QN AUTO: 30 PG (ref 26.5–33)
MCHC RBC AUTO-ENTMCNC: 33.8 G/DL (ref 31.5–36.5)
MCV RBC AUTO: 89 FL (ref 78–100)
PLATELET # BLD AUTO: 243 10E3/UL (ref 150–450)
RBC # BLD AUTO: 4.63 10E6/UL (ref 3.8–5.2)
WBC # BLD AUTO: 5.7 10E3/UL (ref 4–11)

## 2024-07-29 PROCEDURE — 85027 COMPLETE CBC AUTOMATED: CPT | Performed by: INTERNAL MEDICINE

## 2024-07-29 PROCEDURE — 99213 OFFICE O/P EST LOW 20 MIN: CPT | Performed by: INTERNAL MEDICINE

## 2024-07-29 PROCEDURE — 36415 COLL VENOUS BLD VENIPUNCTURE: CPT | Performed by: INTERNAL MEDICINE

## 2024-07-29 PROCEDURE — G2211 COMPLEX E/M VISIT ADD ON: HCPCS | Performed by: INTERNAL MEDICINE

## 2024-07-29 NOTE — PROGRESS NOTES
"  Assessment & Plan   Problem List Items Addressed This Visit       Gardiner syndrome    Relevant Orders    Adult GI  Referral - Procedure Only    CBC with platelets     Other Visit Diagnoses       Bright red blood per rectum    -  Primary    Relevant Orders    CBC with platelets    Screen for colon cancer        Relevant Orders    Adult GI  Referral - Procedure Only           Patient who has a history of Gardiner syndrome and is supposed to have a colonoscopy every year.  She also has had some bright red blood per rectum.  Maybe some black stool or dark stool which she thought was from grape juice.  She has had the bright red blood on the toilet paper twice.  Already had a colectomy with an anastomosis at the sigmoid level.  She denies hemorrhoids.  We will wait on a rectal exam and have her do this with her colonoscopy.  We will check her hemoglobin today and make sure her colonoscopy gets scheduled in the next few weeks.  Any more bleeding she will let me know.       BMI  Estimated body mass index is 29.48 kg/m  as calculated from the following:    Height as of 5/31/24: 1.702 m (5' 7\").    Weight as of this encounter: 85.4 kg (188 lb 3.2 oz).       FUTURE APPOINTMENTS:       - Follow-up for annual visit or as needed      The longitudinal plan of care for the diagnosis(es)/condition(s) as documented were addressed during this visit. Due to the added complexity in care, I will continue to support Marilia in the subsequent management and with ongoing continuity of care.      Subjective   Marilia is a 79 year old, presenting for the following health issues:  Gastrointestinal Problem (Blood in stool)        7/29/2024     7:51 AM   Additional Questions   Roomed by Maren hannon     History of Present Illness       Reason for visit:  Blood with stool    She eats 0-1 servings of fruits and vegetables daily.She consumes 1 sweetened beverage(s) daily.She exercises with enough effort to increase her heart rate 9 or less " minutes per day.  She exercises with enough effort to increase her heart rate 3 or less days per week. She is missing 1 dose(s) of medications per week.  She is not taking prescribed medications regularly due to remembering to take.       Had some bright red blood in the toilet paper, previous had darker stools but had grape juice.  Happened once and then 3 days later. Not lots of blood.            Objective    BP (!) 152/80   Pulse 54   Temp 97.8  F (36.6  C)   Resp 12   Wt 85.4 kg (188 lb 3.2 oz)   SpO2 97%   BMI 29.48 kg/m    Body mass index is 29.48 kg/m .  Physical Exam   NAD   Not pale.   Recheck bp 160/80          Signed Electronically by: Herbert Epstein MD

## 2024-07-31 ENCOUNTER — TELEPHONE (OUTPATIENT)
Dept: GASTROENTEROLOGY | Facility: CLINIC | Age: 79
End: 2024-07-31
Payer: COMMERCIAL

## 2024-07-31 DIAGNOSIS — Z12.11 SCREEN FOR COLON CANCER: Primary | ICD-10-CM

## 2024-07-31 NOTE — TELEPHONE ENCOUNTER
"Endoscopy Scheduling Screen    Have you had a positive Covid test in the last 14 days?  No    What is your communication preference for Instructions and/or Bowel Prep?   MyChart    What insurance is in the chart?  Other:      Ordering/Referring Provider:     YANNI ENG      (If ordering provider performs procedure, schedule with ordering provider unless otherwise instructed. )    BMI: Estimated body mass index is 29.48 kg/m  as calculated from the following:    Height as of 5/31/24: 1.702 m (5' 7\").    Weight as of 7/29/24: 85.4 kg (188 lb 3.2 oz).     Sedation Ordered  moderate sedation.   If patient BMI > 50 do not schedule in [facility].    If patient BMI > 45 do not schedule at [facility].    Are you taking methadone or Suboxone?  No    Have you had difficulties, pain, or discomfort during past endoscopy procedures?  No    Are you taking any prescription medications for pain 3 or more times per week?   NO, No RN review required.    Do you have a history of malignant hyperthermia?  No    (Females) Are you currently pregnant?   No     Have you been diagnosed or told you have pulmonary hypertension?   No    Do you have an LVAD?  No    Have you been told you have moderate to severe sleep apnea?  No    Have you been told you have COPD, asthma, or any other lung disease?  No    Do you have any heart conditions?  No     Have you ever had or are you waiting for an organ transplant?  No. Continue scheduling, no site restrictions.    Have you had a stroke or transient ischemic attack (TIA aka \"mini stroke\" in the last 6 months?   No    Have you been diagnosed with or been told you have cirrhosis of the liver?   No    Are you currently on dialysis?   No    Do you need assistance transferring?   No    BMI: Estimated body mass index is 29.48 kg/m  as calculated from the following:    Height as of 5/31/24: 1.702 m (5' 7\").    Weight as of 7/29/24: 85.4 kg (188 lb 3.2 oz).     Is patients BMI > 40 and scheduling location " [facility]?  No    Do you take an injectable medication for weight loss or diabetes (excluding insulin)?  No    Do you take the medication Naltrexone?  No    Do you take blood thinners?  No       Prep   Are you currently on dialysis or do you have chronic kidney disease?  No    Do you have a diagnosis of diabetes?  No    Do you have a diagnosis of cystic fibrosis (CF)?  No    On a regular basis do you go 3 -5 days between bowel movements?  No    BMI > 40?  No    Preferred Pharmacy:    [facility] [address]  [place] [address]  Phone: [phone] Fax: [phone]    Final Scheduling Details     Procedure scheduled  Colonoscopy    Surgeon:  HARPAL     Date of procedure:  09/23/2024     Pre-OP / PAC:   No - Not required for this site.    Location  [facility] - Per order.    Sedation   Moderate Sedation - Per order.      Patient Reminders:   You will receive a call from a Nurse to review instructions and health history.  This assessment must be completed prior to your procedure.  Failure to complete the Nurse assessment may result in the procedure being cancelled.      On the day of your procedure, please designate an adult(s) who can drive you home stay with you for the next 24 hours. The medicines used in the exam will make you sleepy. You will not be able to drive.      You cannot take public transportation, ride share services, or non-medical taxi service without a responsible caregiver.  Medical transport services are allowed with the requirement that a responsible caregiver will receive you at your destination.  We require that drivers and caregivers are confirmed prior to your procedure.

## 2024-08-29 RX ORDER — BISACODYL 5 MG/1
TABLET, DELAYED RELEASE ORAL
Qty: 4 TABLET | Refills: 0 | Status: SHIPPED | OUTPATIENT
Start: 2024-08-29

## 2024-08-29 NOTE — TELEPHONE ENCOUNTER
Standard Golytely Bowel Prep recommended due to CKD noted.  Instructions were sent via [employer]. Bowel prep was sent 8/29/2024 to [address]

## 2024-09-03 DIAGNOSIS — Z98.890 S/P AORTIC ANEURYSM REPAIR: ICD-10-CM

## 2024-09-03 DIAGNOSIS — Z86.79 S/P AORTIC ANEURYSM REPAIR: ICD-10-CM

## 2024-09-03 DIAGNOSIS — Z95.1 S/P CABG (CORONARY ARTERY BYPASS GRAFT): ICD-10-CM

## 2024-09-03 RX ORDER — METOPROLOL TARTRATE 25 MG/1
25 TABLET, FILM COATED ORAL 2 TIMES DAILY
Qty: 180 TABLET | Refills: 3 | OUTPATIENT
Start: 2024-09-03

## 2024-09-03 RX ORDER — ATORVASTATIN CALCIUM 20 MG/1
TABLET, FILM COATED ORAL
Qty: 90 TABLET | Refills: 3 | OUTPATIENT
Start: 2024-09-03

## 2024-09-12 ENCOUNTER — TELEPHONE (OUTPATIENT)
Dept: GASTROENTEROLOGY | Facility: CLINIC | Age: 79
End: 2024-09-12
Payer: COMMERCIAL

## 2024-09-12 NOTE — TELEPHONE ENCOUNTER
Pre visit planning completed.      Procedure details:    Patient scheduled for Colonoscopy on 9/23/242.     Arrival time: 1105. Procedure time 1150    Facility location: [facility] [place]; [address], [place] [address]. Check in location: 2nd Floor at Surgery desk.    Sedation type: Conscious sedation     Pre op exam needed? No.    Indication for procedure: colon screening      Chart review:     Electronic implanted devices? No    Recent diagnosis of diverticulitis within the last 6 weeks? No      Medication review:    Diabetic? No    Anticoagulants? No    Weight loss medication/injectable? No GLP-1 medication per patient's medication list.  RN will verify with pre-assessment call.    Other medication HOLDING recommendations:  N/A      Prep for procedure:     Bowel prep recommendation: Standard Golytely. Bowel prep prescription sent to [address]   Due to: CKD noted.     Prep instructions sent via [employer]         Corinne Kliber, RN  Endoscopy Procedure Pre Assessment RN  [phone] option 2

## 2024-09-12 NOTE — TELEPHONE ENCOUNTER
Incoming call from patient.     Patient asking for Miralax prep and will not use Golytely. Explained to patient that Golytely was sent d/t documented CKD diagnosis. Patient reports they do not have chronic kidney disease and that is a mistake in her chart. Writer reviewed chart and noted last creatinine within normal range and GFR >60. Patient reports she used Miralax prep last time. Agreed to send patient the Standard Miralax prep instructions.       Pre assessment completed for upcoming procedure.      Procedure details:    Patient scheduled for Colonoscopy on 9/23/24.     Arrival time: 1105. Procedure time 1150    Facility location: [facility] [place]; [address], [place] [address]. Check in location: 2nd Floor at Surgery desk.    Sedation type: Conscious sedation     Pre op exam needed? No.    Indication for procedure: Gardiner syndrome    Designated  policy reviewed. Instructed to have someone stay 6 hours post procedure.       Chart review:     Electronic implanted devices? No    Recent diagnosis of diverticulitis within the last 6 weeks?  No      Medication review:    Diabetic? No    Anticoagulants? No    Weight loss medication/injectable? No.    Other medication HOLDING recommendations:  N/A      Prep for procedure:     Bowel prep recommendation: Standard Miralax  Due to: standard bowel prep. and patient reports she does not have CKD.     Updated prep instructions sent via [employer].    Patient declined to review procedure prep instructions on the phone. NPO instructions reviewed.    Patient was instructed to call if any questions or concerns arise.     Patient verbalized understanding and had no questions or concerns at this time.    At end of call patient reports she might need to change the date of the procedure and requested to be transferred to scheduling to check for different appointment dates. Patient did reports she might end up keeping this appointment.        Gabrielle Lundberg RN  Endoscopy Procedure Pre Assessment   [phone] option 2

## 2024-09-12 NOTE — TELEPHONE ENCOUNTER
Caller: Marilia    Reason for Reschedule/Cancellation   (please be detailed, any staff messages or encounters to note?): Patient wants different date      Prior to reschedule please review:  Ordering Provider: Lucian  Sedation Determined: CS  Does patient have any ASC Exclusions, please identify?: N      Notes on Cancelled Procedure:  Procedure: Lower Endoscopy [Colonoscopy]   Date: 9/23/24  Location: [facility]; [address], [place] [address]   Surgeon: Mary      Rescheduled: Yes,   Procedure: Lower Endoscopy [Colonoscopy]    Date: 10/14/24   Location: [facility]; [address], [place] [address]    Surgeon: Mary   Sedation Level Scheduled  CS ,  Reason for Sedation Level Protocol   Instructions updated and sent: Yes     Does patient need PAC or Pre -Op Rescheduled? : N       Did you cancel or rescheduled an EUS procedure? No.

## 2024-09-12 NOTE — TELEPHONE ENCOUNTER
Patient asking for Miralax prep and will not use Golytely. Explained to patient that Golytely was sent d/t documented CKD diagnosis. Patient reports they do not have chronic kidney disease and that is a mistake in her chart. Writer reviewed chart and noted last creatinine within normal range and GFR >60. Patient reports she used Miralax prep last time. Agreed to send patient the Standard Miralax prep instructions.     Gabrielle Lundberg RN  Endoscopy Procedure Pre-Assessment RN  [phone], option 2

## 2024-10-01 NOTE — TELEPHONE ENCOUNTER
Rescheduled Colonoscopy  Due to patient requested another day/time.    Pre visit planning completed.      Procedure details:    Patient scheduled for Colonoscopy on 10/14/24.     Arrival time: 0725. Procedure time 0810    Facility location: [facility] [place]; [address], [place] [address]. Check in location: 2nd Floor at Surgery desk.    Sedation type: Conscious sedation     Pre op exam needed? No.    Indication for procedure: kline syndrome      Chart review:     Electronic implanted devices? No    Recent diagnosis of diverticulitis within the last 6 weeks? No      Medication review:    Diabetic? No    Anticoagulants? No    Weight loss medication/injectable? No GLP-1 medication per patient's medication list.  RN will verify with pre-assessment call.    Other medication HOLDING recommendations:  N/A      Prep for procedure:     Bowel prep recommendation: Standard Miralax  Due to: patient request/preference.  and see notes below.  Patient reports CKD noted is mistaken, patient GFR > 60 and creatinine levels wnl    Prep instructions sent via [employer]         Corinne Kliber, RN  Endoscopy Procedure Pre Assessment   [phone] option 2

## 2024-10-02 NOTE — TELEPHONE ENCOUNTER
Pre assessment completed for upcoming procedure.   (Please see previous telephone encounter notes for complete details)    Patient  returned call.       Procedure details:    Arrival time and facility location reviewed.    Pre op exam needed? No.    Designated  policy reviewed. Instructed to have someone stay 6  hours post procedure.       Medication review:    Medications reviewed. Please see supporting documentation below. Holding recommendations discussed (if applicable).       Prep for procedure:     Procedure prep instructions reviewed.        Any additional information needed:  N/A      Patient  verbalized understanding and had no questions or concerns at this time.      Julienne Latham RN  Endoscopy Procedure Pre Assessment   [phone] option 2

## 2024-10-02 NOTE — TELEPHONE ENCOUNTER
Attempted to contact patient in order to complete pre assessment questions.     Spoke to man who answered the phone, gave message for patient to return call to [phone] option 2.    Callback communication sent via [employer].    Corinne Kliber, RN

## 2024-10-13 ENCOUNTER — ANESTHESIA EVENT (OUTPATIENT)
Dept: SURGERY | Facility: AMBULATORY SURGERY CENTER | Age: 79
End: 2024-10-13
Payer: COMMERCIAL

## 2024-10-13 ASSESSMENT — ENCOUNTER SYMPTOMS: DYSRHYTHMIAS: 1

## 2024-10-14 ENCOUNTER — ANESTHESIA (OUTPATIENT)
Dept: SURGERY | Facility: AMBULATORY SURGERY CENTER | Age: 79
End: 2024-10-14
Payer: COMMERCIAL

## 2024-10-14 ENCOUNTER — HOSPITAL ENCOUNTER (OUTPATIENT)
Facility: AMBULATORY SURGERY CENTER | Age: 79
Discharge: HOME | End: 2024-10-14
Attending: INTERNAL MEDICINE
Payer: COMMERCIAL

## 2024-10-14 VITALS
SYSTOLIC BLOOD PRESSURE: 98 MMHG | HEART RATE: 55 BPM | OXYGEN SATURATION: 97 % | RESPIRATION RATE: 16 BRPM | DIASTOLIC BLOOD PRESSURE: 45 MMHG | TEMPERATURE: 97.2 F

## 2024-10-14 VITALS — HEART RATE: 64 BPM

## 2024-10-14 LAB — COLONOSCOPY: NORMAL

## 2024-10-14 PROCEDURE — 99100 ANES PT EXTEME AGE<1 YR&>70: CPT | Performed by: NURSE ANESTHETIST, CERTIFIED REGISTERED

## 2024-10-14 PROCEDURE — G8907 PT DOC NO EVENTS ON DISCHARG: HCPCS

## 2024-10-14 PROCEDURE — G8918 PT W/O PREOP ORDER IV AB PRO: HCPCS

## 2024-10-14 PROCEDURE — 45378 DIAGNOSTIC COLONOSCOPY: CPT | Performed by: ANESTHESIOLOGY

## 2024-10-14 PROCEDURE — 99100 ANES PT EXTEME AGE<1 YR&>70: CPT | Performed by: ANESTHESIOLOGY

## 2024-10-14 PROCEDURE — 45378 DIAGNOSTIC COLONOSCOPY: CPT | Performed by: NURSE ANESTHETIST, CERTIFIED REGISTERED

## 2024-10-14 PROCEDURE — 45378 DIAGNOSTIC COLONOSCOPY: CPT

## 2024-10-14 RX ORDER — NALOXONE HYDROCHLORIDE 0.4 MG/ML
0.2 INJECTION, SOLUTION INTRAMUSCULAR; INTRAVENOUS; SUBCUTANEOUS
Status: DISCONTINUED | OUTPATIENT
Start: 2024-10-14 | End: 2024-10-15 | Stop reason: HOSPADM

## 2024-10-14 RX ORDER — PROPOFOL 10 MG/ML
INJECTION, EMULSION INTRAVENOUS PRN
Status: DISCONTINUED | OUTPATIENT
Start: 2024-10-14 | End: 2024-10-14

## 2024-10-14 RX ORDER — ONDANSETRON 2 MG/ML
4 INJECTION INTRAMUSCULAR; INTRAVENOUS
Status: DISCONTINUED | OUTPATIENT
Start: 2024-10-14 | End: 2024-10-15 | Stop reason: HOSPADM

## 2024-10-14 RX ORDER — LIDOCAINE 40 MG/G
CREAM TOPICAL
Status: DISCONTINUED | OUTPATIENT
Start: 2024-10-14 | End: 2024-10-15 | Stop reason: HOSPADM

## 2024-10-14 RX ORDER — PROPOFOL 10 MG/ML
INJECTION, EMULSION INTRAVENOUS CONTINUOUS PRN
Status: DISCONTINUED | OUTPATIENT
Start: 2024-10-14 | End: 2024-10-14

## 2024-10-14 RX ORDER — NALOXONE HYDROCHLORIDE 0.4 MG/ML
0.4 INJECTION, SOLUTION INTRAMUSCULAR; INTRAVENOUS; SUBCUTANEOUS
Status: DISCONTINUED | OUTPATIENT
Start: 2024-10-14 | End: 2024-10-15 | Stop reason: HOSPADM

## 2024-10-14 RX ORDER — FLUMAZENIL 0.1 MG/ML
0.2 INJECTION, SOLUTION INTRAVENOUS
Status: ACTIVE | OUTPATIENT
Start: 2024-10-14 | End: 2024-10-14

## 2024-10-14 RX ORDER — ONDANSETRON 2 MG/ML
4 INJECTION INTRAMUSCULAR; INTRAVENOUS EVERY 6 HOURS PRN
Status: DISCONTINUED | OUTPATIENT
Start: 2024-10-14 | End: 2024-10-15 | Stop reason: HOSPADM

## 2024-10-14 RX ORDER — ONDANSETRON 4 MG/1
4 TABLET, ORALLY DISINTEGRATING ORAL EVERY 6 HOURS PRN
Status: DISCONTINUED | OUTPATIENT
Start: 2024-10-14 | End: 2024-10-15 | Stop reason: HOSPADM

## 2024-10-14 RX ORDER — LIDOCAINE HYDROCHLORIDE 20 MG/ML
INJECTION, SOLUTION INFILTRATION; PERINEURAL PRN
Status: DISCONTINUED | OUTPATIENT
Start: 2024-10-14 | End: 2024-10-14

## 2024-10-14 RX ORDER — PROCHLORPERAZINE MALEATE 5 MG/1
5 TABLET ORAL EVERY 6 HOURS PRN
Status: DISCONTINUED | OUTPATIENT
Start: 2024-10-14 | End: 2024-10-15 | Stop reason: HOSPADM

## 2024-10-14 RX ADMIN — LIDOCAINE HYDROCHLORIDE 60 MG: 20 INJECTION, SOLUTION INFILTRATION; PERINEURAL at 08:13

## 2024-10-14 RX ADMIN — PROPOFOL 150 MCG/KG/MIN: 10 INJECTION, EMULSION INTRAVENOUS at 08:13

## 2024-10-14 RX ADMIN — PROPOFOL 120 MG: 10 INJECTION, EMULSION INTRAVENOUS at 08:13

## 2024-10-14 NOTE — ANESTHESIA CARE TRANSFER NOTE
Patient: Marilia Bean    Procedure: Procedure(s):  Colonoscopy with CO2 insufflation       Diagnosis: Screen for colon cancer [Z12.11]  Gardiner syndrome [Z15.09]  Diagnosis Additional Information: No value filed.    Anesthesia Type:   MAC     Note:    Oropharynx: oropharynx clear of all foreign objects  Level of Consciousness: drowsy  Oxygen Supplementation: room air    Independent Airway: airway patency satisfactory and stable  Dentition: dentition unchanged  Vital Signs Stable: post-procedure vital signs reviewed and stable  Report to RN Given: handoff report given  Patient transferred to: Phase II  Comments: To Phase II. Report to RN.  VSS Resp status stable.  Handoff Report: Identifed the Patient, Identified the Reponsible Provider, Reviewed the pertinent medical history, Discussed the surgical course, Reviewed Intra-OP anesthesia mangement and issues during anesthesia, Set expectations for post-procedure period and Allowed opportunity for questions and acknowledgement of understanding      Vitals:  Vitals Value Taken Time   BP     Temp     Pulse     Resp     SpO2         Electronically Signed By: CECILIA Almonte CRNA  October 14, 2024  8:27 AM

## 2024-10-14 NOTE — ANESTHESIA PREPROCEDURE EVALUATION
"Anesthesia Pre-Procedure Evaluation    Patient: Marilia Baen   MRN: [id number] : 1945        Procedure : Procedure(s):  Colonoscopy with CO2 insufflation          Past Medical History:   Diagnosis Date     Aortic aneurysm (H) 2007    see  [place] report -follow yearly, treat high BP is occurs Problem list name updated by automated process. Provider to review     Aortic aneurysm of unspecified site without mention of rupture 2007    4.4 cm ascending aorta noted in      Asymptomatic postmenopausal status (age-related) (natural)     on HRT  Prempro -weaning off      CAD (coronary artery disease)     LAD     Family history of Gardiner syndrome      Hyperlipidemia LDL goal <100 10/31/2010     Hypertension goal BP (blood pressure) < 140/90 2016     Leiomyoma of uterus, unspecified     Uterine fibroid     Lump or mass in breast 2004    rt. nodule - bx neg     Gardiner syndrome      Personal history of colonic polyps      Postmenopausal atrophic vaginitis 2006     Pulmonary nodule     incidental noted in  during [place]     Pure hypercholesterolemia     mild, diet - low cholesterol, low fat.10/06 start Lovastatin      Past Surgical History:   Procedure Laterality Date     BIOPSY BREAST NEEDLE LOCALIZATION Left 2021    Procedure: LEFT Wire-Localized Segmental Mastectomy (=\"Lumpectomy\");  Surgeon: Bertha Toro MD;  [facility]     BYPASS GRAFT ARTERY CORONARY N/A 2017    Procedure: BYPASS GRAFT ARTERY CORONARY;;  Surgeon: Akshat Soto MD;  [facility]     C DEXA INTERPRETATION, AXIAL  01    wnl. 2005 wnl but lower     COLONOSCOPY  07    Repeat in 1 year for surveillance     COLONOSCOPY  12/10/08    repeat 1 year  -see 2009 letter     COLONOSCOPY  03/31/10     COLONOSCOPY  10/31/2011    Procedure:COMBINED COLONOSCOPY, SINGLE BIOPSY/POLYPECTOMY BY BIOPSY; colonoscopy with polypectomy by biopsy; Surgeon:JESSICA GARCIA; [facility]     " COLONOSCOPY  12/6/2013    Procedure: COMBINED COLONOSCOPY, SINGLE BIOPSY/POLYPECTOMY BY BIOPSY;  Colonoscopy, Polypectomies;  Surgeon: Herbert Salinas MD;  [facility]     COLONOSCOPY N/A 12/8/2015    Procedure: COLONOSCOPY;  Surgeon: Jared Carbajal MD;  [facility]     COLONOSCOPY N/A 4/26/2017    Procedure: COMBINED COLONOSCOPY, SINGLE OR MULTIPLE BIOPSY/POLYPECTOMY BY BIOPSY;  Colonoscopy with polypectomies with forceps and snare;  Surgeon: Herbert Salinas MD;  [facility]     COLONOSCOPY N/A 5/10/2021    Procedure: Colonoscopy, With Polypectomy And Biopsy;  Surgeon: Franklyn Hernandez MD;  [facility]     COLONOSCOPY WITH CO2 INSUFFLATION N/A 5/10/2021    Procedure: COLONOSCOPY, WITH CO2 INSUFFLATION;  Surgeon: Franklyn Hernandez MD;  [facility]     COLONOSCOPY WITH CO2 INSUFFLATION N/A 5/26/2022    Procedure: COLONOSCOPY, WITH CO2 INSUFFLATION;  Surgeon: Franklyn Hernandez MD;  [facility]     COLONOSCOPY WITH CO2 INSUFFLATION N/A 8/24/2023    Procedure: Colonoscopy with CO2 insufflation;  Surgeon: Annika Pinedo DO;  [facility]     COMBINED ESOPHAGOSCOPY, GASTROSCOPY, DUODENOSCOPY (EGD) WITH CO2 INSUFFLATION N/A 5/10/2021    Procedure: ESOPHAGOGASTRODUODENOSCOPY, WITH CO2 INSUFFLATION;  Surgeon: Franklyn Hernandez MD;  [facility]     ENDOSCOPIC INSERTION TUBE JEJUNOSTOMY N/A 8/5/2019    Procedure: Gastrojejunostomy tube placement with gastropexy;  Surgeon: Ford Mann MD;  [facility]     ESOPHAGOSCOPY, GASTROSCOPY, DUODENOSCOPY (EGD), COMBINED N/A 12/8/2015    Procedure: COMBINED ESOPHAGOSCOPY, GASTROSCOPY, DUODENOSCOPY (EGD), BIOPSY SINGLE OR MULTIPLE;  Surgeon: Jared Carbajal MD;  [facility]     ESOPHAGOSCOPY, GASTROSCOPY, DUODENOSCOPY (EGD), COMBINED N/A 7/31/2019    Procedure: Endoscopic ultrasound with cystoduodenostomy, stent placement x2, stent dilation, and nasojejunal tube placement;   Surgeon: Ford Mann MD;  [facility]     ESOPHAGOSCOPY, GASTROSCOPY, DUODENOSCOPY (EGD), COMBINED N/A 8/5/2019    Procedure: ESOPHAGOGASTRODUODENOSCOPY (EGD);  Surgeon: Ford Mann MD;  [facility]     ESOPHAGOSCOPY, GASTROSCOPY, DUODENOSCOPY (EGD), COMBINED N/A 8/12/2019    Procedure: ESOPHAGOGASTRODUODENOSCOPY (EGD) with GJ exchange, duodenum stent removal.;  Surgeon: Ford Mann MD;  [facility]     ESOPHAGOSCOPY, GASTROSCOPY, DUODENOSCOPY (EGD), COMBINED N/A 5/10/2021    Procedure: Esophagogastroduodenoscopy, With Biopsy;  Surgeon: Franklyn Hernandez MD;  [facility]     HC ECP WITH CATARACT SURGERY Bilateral 2015, 2016     HC LAPAROSCOPY, SURGICAL; APPENDECTOMY  10/31/2004     HC LAPAROSCOPY, SURGICAL; CHOLECYSTECTOMY  2000    Cholecystectomy, Laparoscopic     HC REVISE MEDIAN N/CARPAL TUNNEL SURG  10/01/10    left     HYSTERECTOMY, ELVIN  12/14/09    TAHBSO, MMK     LAPAROSCOPIC ASSISTED COLECTOMY N/A 6/30/2021    Procedure: Laparoscopic subtotal colectomy, ileosigmoid anastomosis, lysis of adhesions for 150 mins, splenic flexure mobilization;  Surgeon: Akil Lay MD;  [facility]     REPAIR ANEURYSM ASCENDING AORTA N/A 6/5/2017    Procedure: REPAIR ANEURYSM ASCENDING AORTA;  Median Sternotomy, Ascending Aortic Aneurysm Repair, Coronary Artery Bypass Graft x1 on pump oxygenator;  Surgeon: Akshat Soto MD;  [facility]     REPLACE GASTROJEJUNOSTOMY TUBE, PERCUTANEOUS N/A 8/19/2019    Procedure: REPLACEMENT, GASTROJEJUNOSTOMY TUBE;  Surgeon: Robert Tafoya MD;  [facility]     RESECTION DUODENAL N/A 7/3/2019    Procedure: Resection of Distal Duodenal and proximal Jejunum;  Surgeon: Joaquin Brito MD;  [facility]     ZZHC BIOPSY BREAST, PERC NEEDLE CORE, WITH IMAGING  8/17/2004    Right     ZZHC COLONOSCOPY THRU STOMA W BIOPSY/CAUTERY TUMOR/POLYP/LESION  1999,2002 2004 polyp - hyperplastic - repeat 5  years ?     [facility] COLONOSCOPY W/WO BRUSH/WASH  12/12/2005    Polypectomy.  Diverticulosis-minimal. Bx adenomatous and mucosal polyps - repeat 1 year     [facility] UGI ENDOSCOPY, SIMPLE EXAM  03/31/10      Allergies   Allergen Reactions     Contrast Dye Itching and Other (See Comments)     Tingling in mouth     Diatrizoate Itching and Other (See Comments)     Tingling in mouth     Morphine Nausea     Reports that she did not vomit.       Social History     Tobacco Use     Smoking status: Never     Smokeless tobacco: Never   Substance Use Topics     Alcohol use: Yes     Comment: rarely      Wt Readings from Last 1 Encounters:   07/29/24 85.4 kg (188 lb 3.2 oz)        Anesthesia Evaluation   Pt has had prior anesthetic. Type: General and MAC.    No history of anesthetic complications       ROS/MED HX  ENT/Pulmonary:  - neg pulmonary ROS     Neurologic:  - neg neurologic ROS     Cardiovascular: Comment: Metoprolol for hypertension    (+)  hypertension- -  CAD -  - -                        dysrhythmias, Other,             METS/Exercise Tolerance:  Comment: Aortic aneurysm repaired   Hematologic:  - neg hematologic  ROS     Musculoskeletal:  - neg musculoskeletal ROS     GI/Hepatic: Comment: Gardiner syndrome      Renal/Genitourinary:     (+) renal disease, type: CRI, Pt does not require dialysis,           Endo:  - neg endo ROS     Psychiatric/Substance Use:  - neg psychiatric ROS     Infectious Disease:  - neg infectious disease ROS     Malignancy:  - neg malignancy ROS     Other:  - neg other ROS          Physical Exam    Airway  airway exam normal      Mallampati: II       Respiratory Devices and Support         Dental       (+) Minor Abnormalities - some fillings, tiny chips and Removable bridges or other hardware      Cardiovascular   cardiovascular exam normal          Pulmonary   pulmonary exam normal            OUTSIDE LABS:  CBC:   Lab Results   Component Value Date    WBC 5.7 07/29/2024    WBC 6.5 08/11/2021    HGB 13.9  07/29/2024    HGB 13.6 12/13/2023    HCT 41.1 07/29/2024    HCT 38.6 08/11/2021     07/29/2024     08/11/2021     BMP:   Lab Results   Component Value Date     12/13/2023     11/11/2022    POTASSIUM 4.0 12/13/2023    POTASSIUM 4.0 11/11/2022    CHLORIDE 104 12/13/2023    CHLORIDE 108 11/11/2022    CO2 29 12/13/2023    CO2 28 11/11/2022    BUN 15.8 12/13/2023    BUN 19 11/11/2022    CR 0.94 12/13/2023    CR 0.82 11/11/2022    GLC 90 12/13/2023     (H) 11/11/2022     COAGS:   Lab Results   Component Value Date    PTT 33 06/12/2017    INR 1.12 08/12/2019    FIBR 269 06/06/2017     POC:   Lab Results   Component Value Date     (H) 06/30/2021     HEPATIC:   Lab Results   Component Value Date    ALBUMIN 4.1 12/13/2023    PROTTOTAL 6.9 12/13/2023    ALT 24 12/13/2023    AST 26 12/13/2023    ALKPHOS 91 12/13/2023    BILITOTAL 0.6 12/13/2023     OTHER:   Lab Results   Component Value Date    PH 7.32 (L) 06/06/2017    LACT 1.1 08/11/2019    A1C 5.8 06/07/2017    TARIQ 9.5 12/13/2023    PHOS 3.8 08/12/2019    MAG 2.1 08/12/2019    LIPASE 411 (H) 07/14/2019    AMYLASE 30 07/06/2019    TSH 4.97 (H) 06/13/2017       Anesthesia Plan    ASA Status:  3    NPO Status:  NPO Appropriate    Anesthesia Type: MAC.     - Reason for MAC: straight local not clinically adequate   Induction: Intravenous.   Maintenance: TIVA.        Consents    Anesthesia Plan(s) and associated risks, benefits, and realistic alternatives discussed. Questions answered and patient/representative(s) expressed understanding.     - Discussed: Risks, Benefits and Alternatives for BOTH SEDATION and the PROCEDURE were discussed     - Discussed with:  Patient            Postoperative Care       PONV prophylaxis: Ondansetron (or other 5HT-3), Background Propofol Infusion     Comments:               Joaquin Lopez MD    I have reviewed the pertinent notes and labs in the chart from the past 30 days and (re)examined the patient.  Any  updates or changes from those notes are reflected in this note.               # Hypertension: Noted on problem list              # History of CABG: noted on surgical history

## 2024-10-14 NOTE — H&P
"ENDOSCOPY PRE-SEDATION H&P FOR OUTPATIENT PROCEDURES    Marilia Bean  [id number]  1945    Procedure: colonoscopy    Pre-procedure diagnosis: gardiner, hx CRC    Past medical history:   Past Medical History:   Diagnosis Date    Aortic aneurysm (H) 07/01/2007    see 7/07 [place] report -follow yearly, treat high BP is occurs Problem list name updated by automated process. Provider to review    Aortic aneurysm of unspecified site without mention of rupture 07/2007    4.4 cm ascending aorta noted in 2007    Asymptomatic postmenopausal status (age-related) (natural)     on HRT  Prempro -weaning off 5/'2004    CAD (coronary artery disease)     LAD    Family history of Gardiner syndrome     Hyperlipidemia LDL goal <100 10/31/2010    Hypertension goal BP (blood pressure) < 140/90 02/09/2016    Leiomyoma of uterus, unspecified     Uterine fibroid    Lump or mass in breast 07/2004    rt. nodule - bx neg    Gardiner syndrome     Personal history of colonic polyps     Postmenopausal atrophic vaginitis 08/20/2006    Pulmonary nodule     incidental noted in 2007 during [place]    Pure hypercholesterolemia     mild, diet - low cholesterol, low fat.10/06 start Lovastatin       Past surgical history:   Past Surgical History:   Procedure Laterality Date    BIOPSY BREAST NEEDLE LOCALIZATION Left 8/16/2021    Procedure: LEFT Wire-Localized Segmental Mastectomy (=\"Lumpectomy\");  Surgeon: Bertha Toro MD;  [facility]    BYPASS GRAFT ARTERY CORONARY N/A 6/5/2017    Procedure: BYPASS GRAFT ARTERY CORONARY;;  Surgeon: Akshat Soto MD;  [facility]    C DEXA INTERPRETATION, AXIAL  12/21/01    wnl. 7/2005 wnl but lower    COLONOSCOPY  05/07/07    Repeat in 1 year for surveillance    COLONOSCOPY  12/10/08    repeat 1 year  -see 1/2009 letter    COLONOSCOPY  03/31/10    COLONOSCOPY  10/31/2011    Procedure:COMBINED COLONOSCOPY, SINGLE BIOPSY/POLYPECTOMY BY BIOPSY; colonoscopy with polypectomy by biopsy; Surgeon:RADHA" SCOT; [facility]    COLONOSCOPY  12/6/2013    Procedure: COMBINED COLONOSCOPY, SINGLE BIOPSY/POLYPECTOMY BY BIOPSY;  Colonoscopy, Polypectomies;  Surgeon: Herbert Salinas MD;  [facility]    COLONOSCOPY N/A 12/8/2015    Procedure: COLONOSCOPY;  Surgeon: Jared Carbajal MD;  [facility]    COLONOSCOPY N/A 4/26/2017    Procedure: COMBINED COLONOSCOPY, SINGLE OR MULTIPLE BIOPSY/POLYPECTOMY BY BIOPSY;  Colonoscopy with polypectomies with forceps and snare;  Surgeon: Herbert Salinas MD;  [facility]    COLONOSCOPY N/A 5/10/2021    Procedure: Colonoscopy, With Polypectomy And Biopsy;  Surgeon: Franklyn Hernandez MD;  [facility]    COLONOSCOPY WITH CO2 INSUFFLATION N/A 5/10/2021    Procedure: COLONOSCOPY, WITH CO2 INSUFFLATION;  Surgeon: Franklyn Hernandez MD;  [facility]    COLONOSCOPY WITH CO2 INSUFFLATION N/A 5/26/2022    Procedure: COLONOSCOPY, WITH CO2 INSUFFLATION;  Surgeon: Franklyn Hernandez MD;  [facility]    COLONOSCOPY WITH CO2 INSUFFLATION N/A 8/24/2023    Procedure: Colonoscopy with CO2 insufflation;  Surgeon: Annika Pinedo DO;  [facility]    COMBINED ESOPHAGOSCOPY, GASTROSCOPY, DUODENOSCOPY (EGD) WITH CO2 INSUFFLATION N/A 5/10/2021    Procedure: ESOPHAGOGASTRODUODENOSCOPY, WITH CO2 INSUFFLATION;  Surgeon: Franklyn Hernandez MD;  [facility]    ENDOSCOPIC INSERTION TUBE JEJUNOSTOMY N/A 8/5/2019    Procedure: Gastrojejunostomy tube placement with gastropexy;  Surgeon: Ford Mann MD;  [facility]    ESOPHAGOSCOPY, GASTROSCOPY, DUODENOSCOPY (EGD), COMBINED N/A 12/8/2015    Procedure: COMBINED ESOPHAGOSCOPY, GASTROSCOPY, DUODENOSCOPY (EGD), BIOPSY SINGLE OR MULTIPLE;  Surgeon: Jared Carbajal MD;  [facility]    ESOPHAGOSCOPY, GASTROSCOPY, DUODENOSCOPY (EGD), COMBINED N/A 7/31/2019    Procedure: Endoscopic ultrasound with cystoduodenostomy, stent placement x2, stent dilation, and nasojejunal  tube placement;  Surgeon: Ford Mann MD;  [facility]    ESOPHAGOSCOPY, GASTROSCOPY, DUODENOSCOPY (EGD), COMBINED N/A 8/5/2019    Procedure: ESOPHAGOGASTRODUODENOSCOPY (EGD);  Surgeon: Ford Mann MD;  [facility]    ESOPHAGOSCOPY, GASTROSCOPY, DUODENOSCOPY (EGD), COMBINED N/A 8/12/2019    Procedure: ESOPHAGOGASTRODUODENOSCOPY (EGD) with GJ exchange, duodenum stent removal.;  Surgeon: Ford Mann MD;  [facility]    ESOPHAGOSCOPY, GASTROSCOPY, DUODENOSCOPY (EGD), COMBINED N/A 5/10/2021    Procedure: Esophagogastroduodenoscopy, With Biopsy;  Surgeon: Franklyn Hernandez MD;  [facility]    HC ECP WITH CATARACT SURGERY Bilateral 2015, 2016    HC LAPAROSCOPY, SURGICAL; APPENDECTOMY  10/31/2004    HC LAPAROSCOPY, SURGICAL; CHOLECYSTECTOMY  2000    Cholecystectomy, Laparoscopic    HC REVISE MEDIAN N/CARPAL TUNNEL SURG  10/01/10    left    HYSTERECTOMY, ELVIN  12/14/09    TAHBSO, MMK    LAPAROSCOPIC ASSISTED COLECTOMY N/A 6/30/2021    Procedure: Laparoscopic subtotal colectomy, ileosigmoid anastomosis, lysis of adhesions for 150 mins, splenic flexure mobilization;  Surgeon: Akil Lay MD;  [facility]    REPAIR ANEURYSM ASCENDING AORTA N/A 6/5/2017    Procedure: REPAIR ANEURYSM ASCENDING AORTA;  Median Sternotomy, Ascending Aortic Aneurysm Repair, Coronary Artery Bypass Graft x1 on pump oxygenator;  Surgeon: Akshat Soto MD;  [facility]    REPLACE GASTROJEJUNOSTOMY TUBE, PERCUTANEOUS N/A 8/19/2019    Procedure: REPLACEMENT, GASTROJEJUNOSTOMY TUBE;  Surgeon: Robert Tafoya MD;  [facility]    RESECTION DUODENAL N/A 7/3/2019    Procedure: Resection of Distal Duodenal and proximal Jejunum;  Surgeon: Joaquin Brito MD;  [facility]    ZZHC BIOPSY BREAST, PERC NEEDLE CORE, WITH IMAGING  8/17/2004    Right    ZZHC COLONOSCOPY THRU STOMA W BIOPSY/CAUTERY TUMOR/POLYP/LESION  1999,2002 2004 polyp - hyperplastic - repeat 5  years ?    [facility] COLONOSCOPY W/WO BRUSH/WASH  12/12/2005    Polypectomy.  Diverticulosis-minimal. Bx adenomatous and mucosal polyps - repeat 1 year    [facility] UGI ENDOSCOPY, SIMPLE EXAM  03/31/10       Current Outpatient Medications   Medication Sig Dispense Refill    aspirin 81 MG EC tablet Take 81 mg by mouth every morning  90 tablet 3    atorvastatin (LIPITOR) 20 MG tablet TAKE 1 TABLET (20 MG) BY MOUTH EVERY MORNING Strength: 20 mg 90 tablet 3    losartan (COZAAR) 50 MG tablet Take 1 tablet (50 mg) by mouth daily 90 tablet 3    metoprolol tartrate (LOPRESSOR) 25 MG tablet Take 1 tablet (25 mg) by mouth 2 times daily 180 tablet 3     Current Facility-Administered Medications   Medication Dose Route Frequency Provider Last Rate Last Admin    lidocaine (LMX4) kit   Topical Q1H PRN Franklyn Hernandez MD        lidocaine 1 % 0.1-1 mL  0.1-1 mL Other Q1H PRN Franklyn Hernandez MD        ondansetron (ZOFRAN) injection 4 mg  4 mg Intravenous Once PRN Franklyn Hernandez MD        sodium chloride (PF) 0.9% PF flush 3 mL  3 mL Intracatheter Q8H Franklyn Hernandez MD        sodium chloride (PF) 0.9% PF flush 3 mL  3 mL Intracatheter q1 min prn Franklyn Hernandez MD           Allergies   Allergen Reactions    Contrast Dye Itching and Other (See Comments)     Tingling in mouth    Diatrizoate Itching and Other (See Comments)     Tingling in mouth    Morphine Nausea     Reports that she did not vomit.        History of Anesthesia/Sedation Problems: no    PHYSICAL EXAMINATION:  Constitutional: aaox3, cooperative, pleasant  Vitals reviewed: BP (!) 166/80 (BP Location: Left arm)   Pulse 73   Temp 97.2  F (36.2  C) (Temporal)   Resp 16   SpO2 99%   Wt:   Wt Readings from Last 2 Encounters:   07/29/24 85.4 kg (188 lb 3.2 oz)   05/31/24 85.7 kg (189 lb)      Eyes: Sclera anicteric/injected  Ears/nose/mouth/throat: Normal oropharynx without ulcers or exudate, mucus membranes moist, hearing  intact  Neck: supple, thyroid normal size  CV: No edema  Respiratory: Unlabored breathing  Lymph: No submandibular, supraclavicular or inguinal lymphadenopathy  Abd: Nondistended, no masses, nontender  Skin: warm, perfused, no jaundice  Psych: Normal affect  MSK: normal movement on limited exam.    ASA Score: See Provation note    Assessment/Plan:     The patient is an appropriate candidate to receive sedation.    Informed consent was discussed with the patient/family, including the risks, benefits, potential complications and any alternative options associated with sedation.    Patient assessment completed just prior to sedation and while under constant observation by the provider. Condition determined to be adequate for proceeding with sedation.    The specific risks for the procedure were discussed with the patient at the time of informed consent and include but are not limited to perforation which could require surgery, missing significant neoplasm or lesion, hemorrhage and adverse sedative complication.      Franklyn Hernandez MD

## 2024-10-14 NOTE — ANESTHESIA POSTPROCEDURE EVALUATION
Patient: Marilia Bean    Procedure: Procedure(s):  Colonoscopy with CO2 insufflation       Anesthesia Type:  MAC    Note:  Disposition: Outpatient   Postop Pain Control: Uneventful            Sign Out: Well controlled pain   PONV: No   Neuro/Psych: Uneventful            Sign Out: Acceptable/Baseline neuro status   Airway/Respiratory: Uneventful            Sign Out: Acceptable/Baseline resp. status   CV/Hemodynamics: Uneventful            Sign Out: Acceptable CV status; No obvious hypovolemia; No obvious fluid overload   Other NRE: NONE   DID A NON-ROUTINE EVENT OCCUR?        Last vitals:  Vitals Value Taken Time   BP 98/45 10/14/24 0909   Temp     Pulse 55 10/14/24 0909   Resp 16 10/14/24 0909   SpO2 97 % 10/14/24 0909       Electronically Signed By: Joaquin Lopez MD  October 14, 2024  11:17 AM

## 2024-10-21 ENCOUNTER — OFFICE VISIT (OUTPATIENT)
Dept: FAMILY MEDICINE | Facility: CLINIC | Age: 79
End: 2024-10-21
Payer: COMMERCIAL

## 2024-10-21 VITALS
TEMPERATURE: 97.8 F | WEIGHT: 183.2 LBS | OXYGEN SATURATION: 96 % | HEART RATE: 54 BPM | SYSTOLIC BLOOD PRESSURE: 137 MMHG | BODY MASS INDEX: 28.75 KG/M2 | RESPIRATION RATE: 16 BRPM | DIASTOLIC BLOOD PRESSURE: 62 MMHG | HEIGHT: 67 IN

## 2024-10-21 DIAGNOSIS — B37.2 CANDIDIASIS OF SKIN: Primary | ICD-10-CM

## 2024-10-21 PROCEDURE — 99213 OFFICE O/P EST LOW 20 MIN: CPT | Performed by: NURSE PRACTITIONER

## 2024-10-21 PROCEDURE — G2211 COMPLEX E/M VISIT ADD ON: HCPCS | Performed by: NURSE PRACTITIONER

## 2024-10-21 PROCEDURE — G0008 ADMIN INFLUENZA VIRUS VAC: HCPCS | Performed by: NURSE PRACTITIONER

## 2024-10-21 PROCEDURE — 90662 IIV NO PRSV INCREASED AG IM: CPT | Performed by: NURSE PRACTITIONER

## 2024-10-21 RX ORDER — KETOCONAZOLE 20 MG/G
CREAM TOPICAL DAILY
Qty: 30 G | Refills: 0 | Status: SHIPPED | OUTPATIENT
Start: 2024-10-21 | End: 2024-11-04

## 2024-10-21 ASSESSMENT — PAIN SCALES - GENERAL: PAINLEVEL: NO PAIN (0)

## 2024-10-21 NOTE — PROGRESS NOTES
"  Assessment & Plan     Candidiasis of skin  - ketoconazole (NIZORAL) 2 % external cream; Apply topically daily for 14 days.    Rash appears most consistent with candidal infection in the skin fold. Discussed supportive measures, keeping the area clean and dry, applying topical antifungal once daily.     I explained my diagnostic considerations and recommendations to the patient, who voiced understanding and agreement with the assessment and treatment plan. All questions were answered to patient's apparent satisfaction. We discussed potential side effects of any prescribed or recommended therapies, as well as expectations for response to treatments and importance of lifestyle measures that may improve symptoms. Patient was advised to contact our office if there are new symptoms or no improvement or worsening of conditions or symptoms.    The longitudinal plan of care for the diagnosis(es)/condition(s) as documented were addressed during this visit. Due to the added complexity in care, I will continue to support Marilia in the subsequent management and with ongoing continuity of care.      BMI  Estimated body mass index is 28.69 kg/m  as calculated from the following:    Height as of this encounter: 1.702 m (5' 7\").    Weight as of this encounter: 83.1 kg (183 lb 3.2 oz).             Subjective   Marilia is a 79 year old, presenting for the following health issues:  Rash        10/21/2024    10:16 AM   Additional Questions   Roomed by Sean     History of Present Illness       Reason for visit:  RASH  Symptom onset:  More than a month  Symptoms include:  RASH  Symptom intensity:  Mild  Symptom progression:  Staying the same  Had these symptoms before:  No  What makes it worse:  NA  What makes it better:  NA She is missing 1 dose(s) of medications per week.  She is not taking prescribed medications regularly due to remembering to take.     Marilia presents today with concerns of a rash in her right axilla. The rash has " "been present for the past month or more. She had itching in the axilla that started her symptoms. She noticed a [place]-like rash with more prominent edges, she began applying a topical steroid cream that her  uses. She notes this did improve the itching however she continues to have the redness and prominent borders of the rash. She denies any pain, blistering, drainage, fever or chills.     Patient Active Problem List   Diagnosis    Postmenopausal atrophic vaginitis    Hyperlipidemia with target LDL less than 70    Pre-operative cardiovascular examination    Benign essential hypertension    Status post coronary angiogram    Status post thoracic aortic aneurysm repair    Abdominal pain with vomiting    Bowel obstruction (H)    Chronic kidney disease, stage 3 (H)    Ductal carcinoma in situ (DCIS) of left breast    Status post aorto-coronary artery bypass graft    Gardiner syndrome    Prepatellar bursitis of right knee    Chondromalacia of left patella     Current Outpatient Medications   Medication Sig Dispense Refill    aspirin 81 MG EC tablet Take 81 mg by mouth every morning  90 tablet 3    atorvastatin (LIPITOR) 20 MG tablet TAKE 1 TABLET (20 MG) BY MOUTH EVERY MORNING Strength: 20 mg 90 tablet 3    ketoconazole (NIZORAL) 2 % external cream Apply topically daily for 14 days. 30 g 0    losartan (COZAAR) 50 MG tablet Take 1 tablet (50 mg) by mouth daily 90 tablet 3    metoprolol tartrate (LOPRESSOR) 25 MG tablet Take 1 tablet (25 mg) by mouth 2 times daily 180 tablet 3     No current facility-administered medications for this visit.         Review of Systems  Constitutional, HEENT, cardiovascular, pulmonary, gi and gu systems are negative, except as otherwise noted.      Objective    /62   Pulse 54   Temp 97.8  F (36.6  C) (Temporal)   Resp 16   Ht 1.702 m (5' 7\")   Wt 83.1 kg (183 lb 3.2 oz)   SpO2 96%   BMI 28.69 kg/m    Body mass index is 28.69 kg/m .  Physical Exam  Vitals reviewed. "   Constitutional:       General: She is not in acute distress.     Appearance: Normal appearance.   Eyes:      Extraocular Movements: Extraocular movements intact.      Conjunctiva/sclera: Conjunctivae normal.   Pulmonary:      Effort: Pulmonary effort is normal.   Skin:     General: Skin is warm and dry.      Comments: Right axilla with slightly erythematic rash with prominent borders, clearing in the center. Most prominent along skin fold. Consistent with tinea or candidal intertrigo.    Neurological:      Mental Status: She is alert and oriented to person, place, and time. Mental status is at baseline.   Psychiatric:         Mood and Affect: Mood normal.         Behavior: Behavior normal.                    Signed Electronically by: Jhoana Wallace NP

## 2024-12-06 DIAGNOSIS — Z98.890 S/P AORTIC ANEURYSM REPAIR: ICD-10-CM

## 2024-12-06 DIAGNOSIS — Z86.79 S/P AORTIC ANEURYSM REPAIR: ICD-10-CM

## 2024-12-06 DIAGNOSIS — Z95.1 S/P CABG (CORONARY ARTERY BYPASS GRAFT): ICD-10-CM

## 2024-12-06 DIAGNOSIS — I10 HYPERTENSION GOAL BP (BLOOD PRESSURE) < 140/90: Primary | ICD-10-CM

## 2024-12-06 NOTE — TELEPHONE ENCOUNTER
MA had to rescheduled Pt scheduled wellness next week as she was too early for Medicare. She is in need of some refills at this time.    Med/pharm pended.      Danika Garcia MA

## 2024-12-09 RX ORDER — METOPROLOL TARTRATE 25 MG/1
25 TABLET, FILM COATED ORAL 2 TIMES DAILY
Qty: 180 TABLET | Refills: 0 | Status: SHIPPED | OUTPATIENT
Start: 2024-12-09

## 2024-12-09 RX ORDER — ATORVASTATIN CALCIUM 20 MG/1
TABLET, FILM COATED ORAL
Qty: 90 TABLET | Refills: 0 | Status: SHIPPED | OUTPATIENT
Start: 2024-12-09

## 2024-12-09 RX ORDER — LOSARTAN POTASSIUM 50 MG/1
50 TABLET ORAL DAILY
Qty: 90 TABLET | Refills: 0 | Status: SHIPPED | OUTPATIENT
Start: 2024-12-09

## (undated) DEVICE — SU VICRYL 0 UR-6 27" J603H

## (undated) DEVICE — DRSG DRAIN 2X2" 7087

## (undated) DEVICE — SYSTEM CLEARIFY VISUALIZATION 21-345

## (undated) DEVICE — SU PROLENE 7-0 BV-1DA 24" 8702H

## (undated) DEVICE — SYR BULB IRRIG DOVER 60 ML LATEX FREE 67000

## (undated) DEVICE — WIRE GUIDE 0.025"X270CM STR VISIGLIDE G-240-2527S

## (undated) DEVICE — ENDO TROCAR FIRST ENTRY KII FIOS Z-THRD 05X100MM CTF03

## (undated) DEVICE — STPL RELOAD LINEAR CUT 55MM TCR55

## (undated) DEVICE — SU PLEDGET SOFT TFE 13MMX7MMX1.5MM D7044

## (undated) DEVICE — LINEN GOWN XLG 5407

## (undated) DEVICE — SOL ADH LIQUID BENZOIN SWAB 0.6ML C1544

## (undated) DEVICE — ESU ELEC BLADE 2.75" COATED/INSULATED E1455

## (undated) DEVICE — BLADE SAW STERNAL 20X30MM KM-32

## (undated) DEVICE — SU SILK 3-0 SH CR 8X18" C013D

## (undated) DEVICE — PROTECTOR ARM ONE-STEP TRENDELENBURG 40418

## (undated) DEVICE — TOURNIQUET VASCULAR KIT 7 1/2" 79012

## (undated) DEVICE — DRSG GAUZE 4X4" TRAY 6939

## (undated) DEVICE — INFLATION DEVICE BIG 60 ENDO-AN6012

## (undated) DEVICE — SOL NACL 0.9% IRRIG 1000ML BOTTLE 2F7124

## (undated) DEVICE — WIPES FOLEY CARE SURESTEP PROVON DFC100

## (undated) DEVICE — INTRODUCER KIT GASTROSTOMY MIC G 22FR 98433

## (undated) DEVICE — SUCTION DRY CHEST DRAIN OASIS 3600-100

## (undated) DEVICE — Device

## (undated) DEVICE — SU VICRYL 3-0 SH 27" UND J416H

## (undated) DEVICE — PAD CHUX UNDERPAD 23X24" 7136

## (undated) DEVICE — TUBING SUCTION 10'X3/16" N510

## (undated) DEVICE — SU SILK 2-0 TIE 12X30" A305H

## (undated) DEVICE — PREP CHLORAPREP 26ML TINTED ORANGE  260815

## (undated) DEVICE — DRAPE IOBAN INCISE 23X17" 6650EZ

## (undated) DEVICE — SU STEEL 6 CCS 4X18" M654G

## (undated) DEVICE — CATH TRAY FOLEY SURESTEP 16FR W/URNE MTR STLK LATEX A303316A

## (undated) DEVICE — INTR PEELAWAY 22FRX13CM G04096 PLVW-22.0-38

## (undated) DEVICE — ENDO NDL ASPIRATION 19GA FLEX SLIMLINE EXPECT EUS M00555530

## (undated) DEVICE — PACK ENDOSCOPY GI CUSTOM UMMC

## (undated) DEVICE — DRSG MEDIPORE 3 1/2X13 3/4" 3573

## (undated) DEVICE — SUCTION IRR STRYKERFLOW II W/TIP 250-070-520

## (undated) DEVICE — CLIP ETHICON LIGACLIP SM BLUE LT100

## (undated) DEVICE — SUCTION MANIFOLD NEPTUNE 2 SYS 4 PORT 0702-020-000

## (undated) DEVICE — BLADE KNIFE SURG 10 371110

## (undated) DEVICE — ANTIFOG SOLUTION W/FOAM PAD 31142527

## (undated) DEVICE — SU SILK 3-0 TIE 24" SA74H

## (undated) DEVICE — ESU LIGASURE LAPAROSCOPIC BLUNT TIP SEALER 5MMX44CM LF1844

## (undated) DEVICE — SU PDS II 0 CT-1 27" Z340H

## (undated) DEVICE — SOL WATER IRRIG 1000ML BOTTLE 2F7114

## (undated) DEVICE — GOWN XLG DISP 9545

## (undated) DEVICE — DEFIB PRO-PADZ LVP LQD GEL ADULT 8900-2105-01

## (undated) DEVICE — DRAPE MAYO STAND 23X54 8337

## (undated) DEVICE — MARKER MARGIN MARKER STD 6 COLOR SGL USE MMS6

## (undated) DEVICE — ENDO BITE BLOCK ADULT OMNI-BLOC

## (undated) DEVICE — SUCTION SMOKE EVAC TUBING REDUCER STACKHOUSE

## (undated) DEVICE — DRSG PRIMAPORE 03 1/8X6" 66000318

## (undated) DEVICE — GLOVE LINER HALF FINGER NYLON KP5015/50

## (undated) DEVICE — GLOVE PROTEXIS POWDER FREE 7.5 ORTHOPEDIC 2D73ET75

## (undated) DEVICE — KIT ENDO FIRST STEP DISINFECTANT 200ML W/POUCH EP-4

## (undated) DEVICE — BLADE CLIPPER SGL USE 9680

## (undated) DEVICE — ENDO TROCAR SLEEVE KII ADV FIXATION 05X100MM CFS02

## (undated) DEVICE — DRAPE SLUSH/WARMER 66X44" ORS-320

## (undated) DEVICE — SU MONOCRYL 4-0 P-3 18" UND Y494G

## (undated) DEVICE — SU PDS II 0 TP-1 60" Z991G

## (undated) DEVICE — LINEN TOWEL PACK X6 WHITE 5487

## (undated) DEVICE — SU VICRYL 2-0 CTX 36" J369H

## (undated) DEVICE — NDL COUNTER 20CT 31142493

## (undated) DEVICE — TUBING INSUFFLATION W/FILTER CPC TO LUER 620-030-301

## (undated) DEVICE — SU ETHIBOND 0 TIE 6X30" X306H

## (undated) DEVICE — SU VICRYL 2-0 TIE 12X18" J905T

## (undated) DEVICE — GLOVE PROTEXIS MICRO 7.0  2D73PM70

## (undated) DEVICE — ENDO PROBE COVER ULTRASOUND BALLOON LATEX  MAJ-249

## (undated) DEVICE — GOWN IMPERVIOUS BREATHABLE SMART XLG 89045

## (undated) DEVICE — PUNCH AORTIC 4.0MM LONG AP-540

## (undated) DEVICE — SUCTION MANIFOLD DORNOCH ULTRA CART UL-CL500

## (undated) DEVICE — DRAPE LEGGINGS CLEAR 8430

## (undated) DEVICE — LINEN TOWEL PACK X30 5481

## (undated) DEVICE — SYR 50ML CATH TIP W/O NDL 309620

## (undated) DEVICE — GLOVE PROTEXIS W/NEU-THERA 5.5  2D73TE55

## (undated) DEVICE — LABEL MEDICATION SYSTEM 3303-P

## (undated) DEVICE — ENDO TUBING CO2 SMARTCAP STERILE DISP 100145CO2EXT

## (undated) DEVICE — GLOVE NEOLON 2G NEOPRENE PF 7.5 LATEX FREE MSG6075

## (undated) DEVICE — SU MONOCRYL 4-0 PS-2 27" UND Y426H

## (undated) DEVICE — DRSG TELFA 3"X8" NON25720

## (undated) DEVICE — GLOVE PROTEXIS POWDER FREE SMT 8.0  2D72PT80X

## (undated) DEVICE — TIES BANDING T50R

## (undated) DEVICE — ENDO CAP AND TUBING STERILE FOR ENDOGATOR  100130

## (undated) DEVICE — ESU PENCIL W/SMOKE EVAC E2515HS

## (undated) DEVICE — SU RETRACT-O-TAPE 1041

## (undated) DEVICE — SUCTION TIP YANKAUER STR K87

## (undated) DEVICE — SUCTION TIP YANKAUER W/O VENT K86

## (undated) DEVICE — SU DERMABOND ADVANCED .7ML DNX12

## (undated) DEVICE — SUTURE BOOTS 051003PBX

## (undated) DEVICE — ESU GROUND PAD ADULT W/CORD E7507

## (undated) DEVICE — SPONGE RAY-TEC 4X8" 7318

## (undated) DEVICE — DEVICE ANCHORING FLEXI-TRAK 37449

## (undated) DEVICE — SPONGE LAP 18X18" X8435

## (undated) DEVICE — PACK ADULT HEART UMMC PV15CG92D

## (undated) DEVICE — PITCHER STERILE 1000ML  SSK9004A

## (undated) DEVICE — BIOPSY VALVE BIOSHIELD 00711135

## (undated) DEVICE — DRAPE SHEET REV FOLD 3/4 9349

## (undated) DEVICE — SU PROLENE 6-0 C-1DA 30" 8706H

## (undated) DEVICE — GUIDEWIRE TERUMO .035X260 3CM STR TIP GS3504

## (undated) DEVICE — GUIDEWIRE TERUMO .035X260CM EXCHANGE ANG GS3509

## (undated) DEVICE — ADPT 5 IN 1 360

## (undated) DEVICE — SU ETHILON 3-0 PS-1 18" 1663H

## (undated) DEVICE — TUBE NASOGASTRIC FEEDING CORFLO ENFIT 12FRX22" 50-4602

## (undated) DEVICE — TAPE MEDIPORE 4"X2YD 2864

## (undated) DEVICE — DRAIN JACKSON PRATT RESERVOIR 100ML SU130-1305

## (undated) DEVICE — CONNECTOR DRAIN CHEST Y EXTENSION SET 19909

## (undated) DEVICE — SU PROLENE 4-0 RB-1DA 36" 8557H

## (undated) DEVICE — ENDO SYSTEM WATER BOTTLE & TUBING W/CO2 FILTER 00711549

## (undated) DEVICE — GLOVE PROTEXIS BLUE W/NEU-THERA 7.5  2D73EB75

## (undated) DEVICE — DRSG PRIMAPORE 02X3" 7133

## (undated) DEVICE — LINEN TOWEL PACK X5 5464

## (undated) DEVICE — TOURNIQUET VASCULAR KIT ARGYLE 8888-585000

## (undated) DEVICE — CLIP HORIZON SM RED WIDE SLOT 001201

## (undated) DEVICE — SU VICRYL 2-0 CT-1 27" UND J259H

## (undated) DEVICE — DRSG STERI STRIP 1/2X4" R1547

## (undated) DEVICE — WIRE GUIDE 0.025"X450CM STR VISIGLIDE G-240-2545S

## (undated) DEVICE — SOL WATER IRRIG 1000ML BOTTLE 07139-09

## (undated) DEVICE — ESU ELEC BLADE 6" COATED E1450-6

## (undated) DEVICE — ENDO DEVICE LOCKING AND BIOPSY CAP M00545261

## (undated) DEVICE — ESU LIGASURE IMPACT OPEN SEALER/DVDR CVD LG JAW LF4418

## (undated) DEVICE — LEAD ELEC MYOCARDIO PACING TEMPORARY MEDTRONIC

## (undated) DEVICE — SOL NACL 0.9% IRRIG 3000ML BAG 2B7477

## (undated) DEVICE — SU PROLENE 4-0 SHDA 36" 8521H

## (undated) DEVICE — TUBE GASTROSTOMY PEG SET 24FR G22636 PEG-24-PULL-S

## (undated) DEVICE — KIT CONNECTOR FOR OLYMPUS ENDOSCOPES DEFENDO 100310

## (undated) DEVICE — TUBE TRANS GASTROJEJUNOSTOMY ENFIT 18FRX45CM 8250-18

## (undated) DEVICE — ESU ENDO SCISSORS 5MM CVD 5DCS

## (undated) DEVICE — DRAPE C-ARM W/STRAPS 42X72" 07-CA104

## (undated) DEVICE — SU VICRYL 3-0 SH 27" J316H

## (undated) DEVICE — STPL RELOAD 80 X 3.8MM GIA8038L

## (undated) DEVICE — SU SILK 0 TIE 6X30" A306H

## (undated) DEVICE — SU SILK 3-0 TIE 12X30" A304H

## (undated) DEVICE — GLOVE PROTEXIS POWDER FREE 8.0 ORTHOPEDIC 2D73ET80

## (undated) DEVICE — DRAIN JACKSON PRATT ROUND W/TROCAR 19FR JP-HUR195

## (undated) DEVICE — SU PROLENE 5-0 RB-2DA 30" 8710H

## (undated) DEVICE — DEVICE SUTURE PASSER 14GA WECK EFX EFXSP2

## (undated) DEVICE — DRSG TELFA 3X8" 1238

## (undated) DEVICE — CATH RETRIEVAL BALLOON EXTRACTOR PRO RX-S INJ ABOVE 9-12MM

## (undated) DEVICE — BAG URINARY DRAIN LUBRISIL IC 4000ML LF 253509A

## (undated) DEVICE — SU PROLENE 3-0 SHDA 36" 8522H

## (undated) DEVICE — DRAIN PENROSE 5/8X18" LATEX 0918040

## (undated) DEVICE — DRAPE SHEET MED 44X70" 9355

## (undated) DEVICE — CLIP HORIZON MED BLUE 002200

## (undated) DEVICE — CLIP HORIZON LG ORANGE 004200

## (undated) DEVICE — SU ETHIBOND 0 CT-1 CR 8X18" CX21D

## (undated) DEVICE — SU STEEL MYO/WIRE II STERNOTOMY 8 BE-1 3X14" 048-217

## (undated) DEVICE — STPL 80 X 3.8MM GIA8038S

## (undated) DEVICE — PACK MINOR SBA15MIFSE

## (undated) DEVICE — SUCTION CATH AIRLIFE TRI-FLO W/CONTROL PORT 14FR  T60C

## (undated) DEVICE — ENDO TROCAR BLUNT TIP KII BALLOON 12X100MM C0R47

## (undated) DEVICE — STPL LINEAR CUT 55MM TLC55

## (undated) DEVICE — SU VICRYL 1 CT-1 27" UND J261H

## (undated) DEVICE — SU VICRYL 3-0 FS-1 27" J442H

## (undated) DEVICE — DRAIN CHEST TUBE 28FR STR 8028

## (undated) DEVICE — KIT PATIENT POSITIONING PIGAZZI LATEX FREE 40580

## (undated) RX ORDER — HYDROMORPHONE HYDROCHLORIDE 1 MG/ML
INJECTION, SOLUTION INTRAMUSCULAR; INTRAVENOUS; SUBCUTANEOUS
Status: DISPENSED
Start: 2019-08-05

## (undated) RX ORDER — FENTANYL CITRATE 50 UG/ML
INJECTION, SOLUTION INTRAMUSCULAR; INTRAVENOUS
Status: DISPENSED
Start: 2017-05-25

## (undated) RX ORDER — FENTANYL CITRATE 50 UG/ML
INJECTION, SOLUTION INTRAMUSCULAR; INTRAVENOUS
Status: DISPENSED
Start: 2021-05-10

## (undated) RX ORDER — PROTAMINE SULFATE 10 MG/ML
INJECTION, SOLUTION INTRAVENOUS
Status: DISPENSED
Start: 2017-06-05

## (undated) RX ORDER — PHENYLEPHRINE HCL IN 0.9% NACL 1 MG/10 ML
SYRINGE (ML) INTRAVENOUS
Status: DISPENSED
Start: 2019-08-12

## (undated) RX ORDER — HYDROMORPHONE HCL/0.9% NACL/PF 0.2MG/0.2
SYRINGE (ML) INTRAVENOUS
Status: DISPENSED
Start: 2019-08-05

## (undated) RX ORDER — HYDROMORPHONE HYDROCHLORIDE 1 MG/ML
INJECTION, SOLUTION INTRAMUSCULAR; INTRAVENOUS; SUBCUTANEOUS
Status: DISPENSED
Start: 2019-07-03

## (undated) RX ORDER — HEPARIN SODIUM 1000 [USP'U]/ML
INJECTION, SOLUTION INTRAVENOUS; SUBCUTANEOUS
Status: DISPENSED
Start: 2017-06-05

## (undated) RX ORDER — ACETAMINOPHEN 325 MG/1
TABLET ORAL
Status: DISPENSED
Start: 2021-06-30

## (undated) RX ORDER — HYDROMORPHONE HYDROCHLORIDE 1 MG/ML
INJECTION, SOLUTION INTRAMUSCULAR; INTRAVENOUS; SUBCUTANEOUS
Status: DISPENSED
Start: 2021-06-30

## (undated) RX ORDER — PAPAVERINE HYDROCHLORIDE 30 MG/ML
INJECTION INTRAMUSCULAR; INTRAVENOUS
Status: DISPENSED
Start: 2017-06-05

## (undated) RX ORDER — PHENYLEPHRINE HCL IN 0.9% NACL 1 MG/10 ML
SYRINGE (ML) INTRAVENOUS
Status: DISPENSED
Start: 2019-08-05

## (undated) RX ORDER — FENTANYL CITRATE 50 UG/ML
INJECTION, SOLUTION INTRAMUSCULAR; INTRAVENOUS
Status: DISPENSED
Start: 2017-05-22

## (undated) RX ORDER — PROPOFOL 10 MG/ML
INJECTION, EMULSION INTRAVENOUS
Status: DISPENSED
Start: 2017-06-05

## (undated) RX ORDER — METHYLPREDNISOLONE SODIUM SUCCINATE 125 MG/2ML
INJECTION, POWDER, LYOPHILIZED, FOR SOLUTION INTRAMUSCULAR; INTRAVENOUS
Status: DISPENSED
Start: 2017-05-22

## (undated) RX ORDER — FENTANYL CITRATE 50 UG/ML
INJECTION, SOLUTION INTRAMUSCULAR; INTRAVENOUS
Status: DISPENSED
Start: 2019-08-12

## (undated) RX ORDER — DEXAMETHASONE SODIUM PHOSPHATE 4 MG/ML
INJECTION, SOLUTION INTRA-ARTICULAR; INTRALESIONAL; INTRAMUSCULAR; INTRAVENOUS; SOFT TISSUE
Status: DISPENSED
Start: 2017-06-05

## (undated) RX ORDER — PROPOFOL 10 MG/ML
INJECTION, EMULSION INTRAVENOUS
Status: DISPENSED
Start: 2019-08-05

## (undated) RX ORDER — CEFAZOLIN SODIUM 1 G/3ML
INJECTION, POWDER, FOR SOLUTION INTRAMUSCULAR; INTRAVENOUS
Status: DISPENSED
Start: 2017-06-05

## (undated) RX ORDER — GLYCOPYRROLATE 0.2 MG/ML
INJECTION, SOLUTION INTRAMUSCULAR; INTRAVENOUS
Status: DISPENSED
Start: 2019-08-19

## (undated) RX ORDER — FENTANYL CITRATE 50 UG/ML
INJECTION, SOLUTION INTRAMUSCULAR; INTRAVENOUS
Status: DISPENSED
Start: 2017-04-26

## (undated) RX ORDER — FENTANYL CITRATE 50 UG/ML
INJECTION, SOLUTION INTRAMUSCULAR; INTRAVENOUS
Status: DISPENSED
Start: 2021-06-30

## (undated) RX ORDER — FENTANYL CITRATE 50 UG/ML
INJECTION, SOLUTION INTRAMUSCULAR; INTRAVENOUS
Status: DISPENSED
Start: 2019-07-03

## (undated) RX ORDER — POTASSIUM CHLORIDE 7.45 MG/ML
INJECTION INTRAVENOUS
Status: DISPENSED
Start: 2017-06-05

## (undated) RX ORDER — ONDANSETRON 2 MG/ML
INJECTION INTRAMUSCULAR; INTRAVENOUS
Status: DISPENSED
Start: 2019-08-05

## (undated) RX ORDER — FENTANYL CITRATE 50 UG/ML
INJECTION, SOLUTION INTRAMUSCULAR; INTRAVENOUS
Status: DISPENSED
Start: 2019-08-05

## (undated) RX ORDER — DEXTROSE MONOHYDRATE, SODIUM CHLORIDE, AND POTASSIUM CHLORIDE 50; 1.49; 4.5 G/1000ML; G/1000ML; G/1000ML
INJECTION, SOLUTION INTRAVENOUS
Status: DISPENSED
Start: 2019-08-12

## (undated) RX ORDER — EPHEDRINE SULFATE 50 MG/ML
INJECTION, SOLUTION INTRAMUSCULAR; INTRAVENOUS; SUBCUTANEOUS
Status: DISPENSED
Start: 2017-06-05

## (undated) RX ORDER — LIDOCAINE HYDROCHLORIDE 20 MG/ML
INJECTION, SOLUTION EPIDURAL; INFILTRATION; INTRACAUDAL; PERINEURAL
Status: DISPENSED
Start: 2017-06-05

## (undated) RX ORDER — CEFAZOLIN SODIUM 2 G/100ML
INJECTION, SOLUTION INTRAVENOUS
Status: DISPENSED
Start: 2017-06-05

## (undated) RX ORDER — KETAMINE HYDROCHLORIDE 50 MG/ML
INJECTION, SOLUTION INTRAMUSCULAR; INTRAVENOUS
Status: DISPENSED
Start: 2021-08-16

## (undated) RX ORDER — FENTANYL CITRATE 50 UG/ML
INJECTION, SOLUTION INTRAMUSCULAR; INTRAVENOUS
Status: DISPENSED
Start: 2019-08-19

## (undated) RX ORDER — HEPARIN SODIUM 1000 [USP'U]/ML
INJECTION, SOLUTION INTRAVENOUS; SUBCUTANEOUS
Status: DISPENSED
Start: 2017-05-22

## (undated) RX ORDER — NITROGLYCERIN 5 MG/ML
VIAL (ML) INTRAVENOUS
Status: DISPENSED
Start: 2017-05-22

## (undated) RX ORDER — DEXAMETHASONE SODIUM PHOSPHATE 4 MG/ML
INJECTION, SOLUTION INTRA-ARTICULAR; INTRALESIONAL; INTRAMUSCULAR; INTRAVENOUS; SOFT TISSUE
Status: DISPENSED
Start: 2019-07-03

## (undated) RX ORDER — LIDOCAINE HYDROCHLORIDE 10 MG/ML
INJECTION, SOLUTION EPIDURAL; INFILTRATION; INTRACAUDAL; PERINEURAL
Status: DISPENSED
Start: 2019-07-18

## (undated) RX ORDER — LIDOCAINE HYDROCHLORIDE 20 MG/ML
INJECTION, SOLUTION EPIDURAL; INFILTRATION; INTRACAUDAL; PERINEURAL
Status: DISPENSED
Start: 2019-08-05

## (undated) RX ORDER — ONDANSETRON 2 MG/ML
INJECTION INTRAMUSCULAR; INTRAVENOUS
Status: DISPENSED
Start: 2019-07-03

## (undated) RX ORDER — LIDOCAINE HYDROCHLORIDE 20 MG/ML
INJECTION, SOLUTION EPIDURAL; INFILTRATION; INTRACAUDAL; PERINEURAL
Status: DISPENSED
Start: 2019-08-19

## (undated) RX ORDER — ERTAPENEM 1 G/1
INJECTION, POWDER, LYOPHILIZED, FOR SOLUTION INTRAMUSCULAR; INTRAVENOUS
Status: DISPENSED
Start: 2019-07-03

## (undated) RX ORDER — PHENYLEPHRINE HCL IN 0.9% NACL 1 MG/10 ML
SYRINGE (ML) INTRAVENOUS
Status: DISPENSED
Start: 2017-06-05

## (undated) RX ORDER — FENTANYL CITRATE 0.05 MG/ML
INJECTION, SOLUTION INTRAMUSCULAR; INTRAVENOUS
Status: DISPENSED
Start: 2017-06-05

## (undated) RX ORDER — PROPOFOL 10 MG/ML
INJECTION, EMULSION INTRAVENOUS
Status: DISPENSED
Start: 2019-07-03

## (undated) RX ORDER — BUPIVACAINE HYDROCHLORIDE 2.5 MG/ML
INJECTION, SOLUTION INFILTRATION; PERINEURAL
Status: DISPENSED
Start: 2021-08-16

## (undated) RX ORDER — ACETAMINOPHEN 325 MG/1
TABLET ORAL
Status: DISPENSED
Start: 2019-07-03

## (undated) RX ORDER — PROPOFOL 10 MG/ML
INJECTION, EMULSION INTRAVENOUS
Status: DISPENSED
Start: 2021-08-16

## (undated) RX ORDER — ALBUMIN, HUMAN INJ 5% 5 %
SOLUTION INTRAVENOUS
Status: DISPENSED
Start: 2019-07-03

## (undated) RX ORDER — ONDANSETRON 2 MG/ML
INJECTION INTRAMUSCULAR; INTRAVENOUS
Status: DISPENSED
Start: 2017-06-05

## (undated) RX ORDER — DEXTROSE MONOHYDRATE, SODIUM CHLORIDE, AND POTASSIUM CHLORIDE 50; 1.49; 4.5 G/1000ML; G/1000ML; G/1000ML
INJECTION, SOLUTION INTRAVENOUS
Status: DISPENSED
Start: 2019-07-03

## (undated) RX ORDER — SIMETHICONE 40MG/0.6ML
SUSPENSION, DROPS(FINAL DOSAGE FORM)(ML) ORAL
Status: DISPENSED
Start: 2022-05-26

## (undated) RX ORDER — GLYCOPYRROLATE 0.2 MG/ML
INJECTION, SOLUTION INTRAMUSCULAR; INTRAVENOUS
Status: DISPENSED
Start: 2019-07-03

## (undated) RX ORDER — MUPIROCIN 20 MG/G
OINTMENT TOPICAL
Status: DISPENSED
Start: 2017-06-05

## (undated) RX ORDER — SODIUM CHLORIDE 9 MG/ML
INJECTION, SOLUTION INTRAVENOUS
Status: DISPENSED
Start: 2017-05-22

## (undated) RX ORDER — SODIUM CHLORIDE, SODIUM LACTATE, POTASSIUM CHLORIDE, CALCIUM CHLORIDE 600; 310; 30; 20 MG/100ML; MG/100ML; MG/100ML; MG/100ML
INJECTION, SOLUTION INTRAVENOUS
Status: DISPENSED
Start: 2021-06-30

## (undated) RX ORDER — CEFAZOLIN SODIUM 2 G/100ML
INJECTION, SOLUTION INTRAVENOUS
Status: DISPENSED
Start: 2021-08-16

## (undated) RX ORDER — FENTANYL CITRATE 50 UG/ML
INJECTION, SOLUTION INTRAMUSCULAR; INTRAVENOUS
Status: DISPENSED
Start: 2022-05-26

## (undated) RX ORDER — CEFAZOLIN SODIUM 2 G/100ML
INJECTION, SOLUTION INTRAVENOUS
Status: DISPENSED
Start: 2021-06-30

## (undated) RX ORDER — FENTANYL CITRATE 50 UG/ML
INJECTION, SOLUTION INTRAMUSCULAR; INTRAVENOUS
Status: DISPENSED
Start: 2019-07-31

## (undated) RX ORDER — GLYCOPYRROLATE 0.2 MG/ML
INJECTION, SOLUTION INTRAMUSCULAR; INTRAVENOUS
Status: DISPENSED
Start: 2019-08-05

## (undated) RX ORDER — ALBUMIN, HUMAN INJ 5% 5 %
SOLUTION INTRAVENOUS
Status: DISPENSED
Start: 2017-06-05

## (undated) RX ORDER — ONDANSETRON 2 MG/ML
INJECTION INTRAMUSCULAR; INTRAVENOUS
Status: DISPENSED
Start: 2019-07-31

## (undated) RX ORDER — FENTANYL CITRATE 50 UG/ML
INJECTION, SOLUTION INTRAMUSCULAR; INTRAVENOUS
Status: DISPENSED
Start: 2021-08-16

## (undated) RX ORDER — PROPOFOL 10 MG/ML
INJECTION, EMULSION INTRAVENOUS
Status: DISPENSED
Start: 2019-08-19

## (undated) RX ORDER — DIPHENHYDRAMINE HYDROCHLORIDE 50 MG/ML
INJECTION INTRAMUSCULAR; INTRAVENOUS
Status: DISPENSED
Start: 2017-05-22

## (undated) RX ORDER — ACETAMINOPHEN 325 MG/1
TABLET ORAL
Status: DISPENSED
Start: 2021-08-16

## (undated) RX ORDER — PROPOFOL 10 MG/ML
INJECTION, EMULSION INTRAVENOUS
Status: DISPENSED
Start: 2019-08-12

## (undated) RX ORDER — SIMETHICONE 40MG/0.6ML
SUSPENSION, DROPS(FINAL DOSAGE FORM)(ML) ORAL
Status: DISPENSED
Start: 2023-08-24